# Patient Record
Sex: MALE | Race: WHITE | NOT HISPANIC OR LATINO | Employment: OTHER | URBAN - METROPOLITAN AREA
[De-identification: names, ages, dates, MRNs, and addresses within clinical notes are randomized per-mention and may not be internally consistent; named-entity substitution may affect disease eponyms.]

---

## 2018-07-11 ENCOUNTER — OFFICE VISIT (OUTPATIENT)
Dept: AUDIOLOGY | Facility: CLINIC | Age: 83
End: 2018-07-11
Payer: COMMERCIAL

## 2018-07-11 ENCOUNTER — OFFICE VISIT (OUTPATIENT)
Dept: OTOLARYNGOLOGY | Facility: CLINIC | Age: 83
End: 2018-07-11
Payer: COMMERCIAL

## 2018-07-11 VITALS
OXYGEN SATURATION: 97 % | HEIGHT: 69 IN | DIASTOLIC BLOOD PRESSURE: 75 MMHG | WEIGHT: 199 LBS | SYSTOLIC BLOOD PRESSURE: 111 MMHG | RESPIRATION RATE: 18 BRPM | BODY MASS INDEX: 29.47 KG/M2 | HEART RATE: 70 BPM | TEMPERATURE: 97.5 F

## 2018-07-11 DIAGNOSIS — Z53.9 ERRONEOUS ENCOUNTER--DISREGARD: Primary | ICD-10-CM

## 2018-07-11 DIAGNOSIS — H61.23 BILATERAL IMPACTED CERUMEN: Primary | ICD-10-CM

## 2018-07-11 PROCEDURE — 69210 REMOVE IMPACTED EAR WAX UNI: CPT | Performed by: OTOLARYNGOLOGY

## 2018-07-11 RX ORDER — ATORVASTATIN CALCIUM 10 MG/1
10 TABLET, FILM COATED ORAL DAILY
Refills: 3 | Status: ON HOLD | COMMUNITY
Start: 2018-04-30 | End: 2024-01-01

## 2018-07-11 RX ORDER — FUROSEMIDE 20 MG
20 TABLET ORAL 2 TIMES DAILY
Status: ON HOLD | COMMUNITY
Start: 2018-04-30 | End: 2024-01-01

## 2018-07-11 RX ORDER — KETOCONAZOLE 20 MG/ML
SHAMPOO TOPICAL
Status: ON HOLD | COMMUNITY
Start: 2018-04-18 | End: 2024-01-01

## 2018-07-11 RX ORDER — SACUBITRIL AND VALSARTAN 97; 103 MG/1; MG/1
1 TABLET, FILM COATED ORAL 2 TIMES DAILY
Refills: 3 | COMMUNITY
Start: 2018-06-22 | End: 2024-01-01

## 2018-07-11 RX ORDER — CARVEDILOL 6.25 MG/1
25 TABLET ORAL 2 TIMES DAILY
Refills: 3 | Status: ON HOLD | COMMUNITY
Start: 2018-06-05 | End: 2024-01-01

## 2018-07-11 RX ORDER — INFLIXIMAB 100 MG/10ML
600 INJECTION, POWDER, LYOPHILIZED, FOR SOLUTION INTRAVENOUS
Status: ON HOLD | COMMUNITY
Start: 2016-07-25 | End: 2024-01-01

## 2018-07-11 RX ORDER — ZOLPIDEM TARTRATE 6.25 MG/1
TABLET, FILM COATED, EXTENDED RELEASE ORAL PRN
Refills: 5 | Status: ON HOLD | COMMUNITY
Start: 2018-05-29 | End: 2024-01-01

## 2018-07-11 RX ORDER — WARFARIN SODIUM 6 MG/1
4-6 TABLET ORAL SEE ADMIN INSTRUCTIONS
Refills: 1 | Status: ON HOLD | COMMUNITY
Start: 2018-06-15 | End: 2024-01-01

## 2018-07-11 RX ORDER — ALPRAZOLAM 0.5 MG
TABLET ORAL PRN
Refills: 3 | Status: ON HOLD | COMMUNITY
Start: 2018-06-10 | End: 2024-01-01

## 2018-07-11 RX ORDER — MUPIROCIN 20 MG/G
OINTMENT TOPICAL PRN
Status: ON HOLD | COMMUNITY
Start: 2018-06-04 | End: 2024-01-01

## 2018-07-11 RX ORDER — ALLOPURINOL 300 MG/1
300 TABLET ORAL DAILY
Refills: 0 | COMMUNITY
Start: 2018-06-06

## 2018-07-11 RX ORDER — COLCHICINE 0.6 MG/1
0.6 TABLET, FILM COATED ORAL DAILY PRN
Refills: 0 | COMMUNITY
Start: 2018-04-13

## 2018-07-11 RX ORDER — WARFARIN SODIUM 4 MG/1
4 TABLET ORAL
Status: ON HOLD | COMMUNITY
End: 2024-01-01

## 2018-07-11 RX ORDER — SITAGLIPTIN 50 MG/1
50 TABLET, FILM COATED ORAL DAILY
Refills: 3 | COMMUNITY
Start: 2018-06-12 | End: 2022-09-02

## 2018-07-11 RX ORDER — AZATHIOPRINE 50 MG/1
50 TABLET ORAL DAILY
Refills: 0 | Status: ON HOLD | COMMUNITY
Start: 2018-06-06 | End: 2024-01-01

## 2018-07-11 NOTE — PATIENT INSTRUCTIONS
General Scheduling Information  To schedule your CT/MRI scan, please contact [place] at [phone]   [address] [place] [address]    To schedule your Surgery, please contact our Specialty Schedulers at [phone]    ENT Clinic Locations Clinic Hours Telephone Number     Tanvir Mcgregor  [address] [place] [address]   Tuesday:       8:00am -- 4:00pm    Wednesday:  8:00am - 4:00pm   To schedule an appointment with   Dr. Felix,   please contact our   Specialty Scheduling Department at:     [phone]       Tanvir Tam  [address] [place] [address]   Friday:          8:00am - 4:00pm         Urgent Care Locations Clinic Hours Telephone Numbers     Tanvir Ingram  [address] [place] [address]     Monday-Friday:     11:00pm - 9:00pm    Saturday-Sunday:  9:00am - 5:00pm   [phone]     Tanvir Tam  [address] [place] [address]     Monday-Friday:      5:00pm - 9:00pm     Saturday-Sunday:  9:00am - 5:00pm   [phone]

## 2018-07-11 NOTE — MR AVS SNAPSHOT
After Visit Summary   7/11/2018    Franklyn Sorto    MRN: [id number]           Patient Information     Date Of Birth          1935        Visit Information        Provider Department      7/11/2018 11:00 AM [facility]        Today's Diagnoses     Bilateral impacted cerumen    -  1      Care Instructions    General Scheduling Information  To schedule your CT/MRI scan, please contact [place] [facility] at [phone] [address] [place] [address]    To schedule your Surgery, please contact our Specialty Schedulers at [phone]    ENT Clinic Locations Clinic Hours Telephone Number     Savannah [facility]wood  [address]  [place] [address]   Tuesday:       8:00am -- 4:00pm    Wednesday:  8:00am - 4:00pm   To schedule an appointment with   Dr. Felix,   please contact our   Specialty Scheduling Department at:     [phone]       [facility]  [address] [address]   Friday:          8:00am - 4:00pm         Urgent Care Locations Clinic Hours Telephone Numbers     Savannah [place]  [address] [place] [address]     Monday-Friday:     11:00pm - 9:00pm    Saturday-Sunday:  9:00am - 5:00pm   [phone]     Savannah Anel  [address]   [place] [address]     Monday-Friday:      5:00pm - 9:00pm     Saturday-Sunday:  9:00am - 5:00pm   [phone]                   Follow-ups after your visit        Who to contact     If you have questions or need follow up information about today's clinic visit or your schedule please contact [facility] directly at [phone].  Normal or non-critical lab and imaging results will be communicated to you by MyChart, letter or phone within 4 business days after the clinic has received the results. If you do not hear from us within 7 days, please contact the clinic through MyChart or phone. If you have a critical or abnormal lab result, we will  "notify you by phone as soon as possible.  Submit refill requests through [employer] or call your pharmacy and they will forward the refill request to us. Please allow 3 business days for your refill to be completed.          Additional Information About Your Visit        Care EveryWhere ID     This is your Care EveryWhere ID. This could be used by other organizations to access your [place] medical records  [id number]        Your Vitals Were     Pulse Temperature Respirations Height Pulse Oximetry BMI (Body Mass Index)    70 97.5  F (36.4  C) (Oral) 18 1.753 m (5' 9\") 97% 29.39 kg/m2       Blood Pressure from Last 3 Encounters:   07/11/18 111/75    Weight from Last 3 Encounters:   07/11/18 90.3 kg (199 lb)              We Performed the Following     [facility]        Primary Care Provider Fax #    Physician No Ref-Primary [phone]       No address on file        Equal Access to Services     [facility] : Hadii maurice mondragono Sodora, waaxda luqadaha, qaybta kaalmada adedarshanayada, bryan higgins . So [facility] [phone].    ATENCIÓN: Si habla español, tiene a dejesus disposición servicios gratuitos de asistencia lingüística. Sanyaame al [phone].    We comply with applicable federal civil rights laws and [place] laws. We do not discriminate on the basis of race, color, national origin, age, disability, sex, sexual orientation, or gender identity.            Thank you!     Thank you for choosing [facility]  for your care. Our goal is always to provide you with excellent care. Hearing back from our patients is one way we can continue to improve our services. Please take a few minutes to complete the written survey that you may receive in the mail after your visit with us. Thank you!             Your Updated Medication List - Protect others around you: Learn how to safely use, store and throw away your medicines at www.disposemymeds.org.          This list is accurate as of " 7/11/18 12:46 PM.  Always use your most recent med list.                   Brand Name Dispense Instructions for use Diagnosis    allopurinol 300 MG tablet    ZYLOPRIM     Take 300 mg by mouth daily        ALPRAZolam 0.5 MG tablet    XANAX     as needed        atorvastatin 10 MG tablet    LIPITOR     Take 10 mg by mouth daily        azaTHIOprine 50 MG tablet    IMURAN     Take 50 mg by mouth daily        blood glucose monitoring test strip    no brand specified     Dispense item covered by pt ins. E11.9 NIDDM type II - Test 1 times/day        carvedilol 25 MG tablet    COREG     Take 25 mg by mouth 2 times daily        COLCRYS 0.6 MG tablet   Generic drug:  colchicine      Take 0.6 mg by mouth as needed        ENTRESTO  MG per tablet   Generic drug:  sacubitril-valsartan      Take 1 tablet by mouth 2 times daily        FISH OIL PO      Take 1 capsule by mouth daily        furosemide 20 MG tablet    LASIX     Take 20 mg by mouth 2 times daily        ICAPS AREDS 2 PO      Take 1 capsule by mouth 2 times daily        inFLIXimab 100 MG injection    REMICADE     Inject 600 mg into the vein        JANUVIA 50 MG tablet   Generic drug:  sitagliptin      Take 50 mg by mouth daily        ketoconazole 2 % shampoo    NIZORAL          mupirocin 2 % ointment    BACTROBAN     as needed        omeprazole 20 MG CR capsule    priLOSEC     Take 20 mg by mouth daily        PRANDIN PO      Take 2 mg by mouth 5 times daily        * warfarin 2 MG tablet    COUMADIN     Take 4 mg by mouth twice a week        * warfarin 4 MG tablet    COUMADIN     Take 6 mg by mouth five times a week        zolpidem 6.25 MG CR tablet    AMBIEN CR     as needed        * Notice:  This list has 2 medication(s) that are the same as other medications prescribed for you. Read the directions carefully, and ask your doctor or other care provider to review them with you.

## 2018-07-11 NOTE — LETTER
7/11/2018         RE: Franklyn Sorto  [address] [place] [address]        Dear Colleague,    Thank you for referring your patient, Franklyn Sorto, to the [facility]. Please see a copy of my visit note below.    Cerumen Removal -patient notes left ear plugging for 2 weeks after he got water in his ear in the shower.  He has had issues with cerumen impaction in the past and is needed to have them cleaned out.  He wears hearing aids intermittently.  He also admits to Q-tip usage.  He denies pain or infection.    Physical Exam and Procedure  Ears - On examination of the ears, I found that both ears were impacted with cerumen.  Therefore, I positioned the patient in the examination chair in a semi-supine position. I used the binocular surgical microscope to perform cerumen removal.  On the right side, I began by using a cerumen loop to gently lift the edges of the cerumen mass away from the walls of the external canal.  Once I did this, I was able to pull away fragments of wax and debris. I removed all the wax and debri.  The tympanic membrane was intact, no sign of perforation or middle ear effusion.    I turned my attention to the left side once again using the binocular surgical microscope to perform cerumen removal.  I began by using a cerumen loop to gently lift the edges of the cerumen mass away from the walls of the external canal.  Once I did this, I was able to pull but also suction away fragments of wax and debris. I removed all the wax and debri. The tympanic membrane was intact, no sign of perforation or middle ear effusion.    Assessment and plan -bilateral cerumen impaction.  Both ears were clean.  I advised against Q-tip usage.  He should continue his hearing aids as needed.  We also discussed the usage of Debrox and ear flushing with a bulb syringe at home.  If he is unable to clean his ears this way he can return for cerumen impaction removal.    Again,  thank you for allowing me to participate in the care of your patient.        Sincerely,        Shubham Felix MD

## 2018-07-11 NOTE — PROGRESS NOTES
Cerumen Removal -patient notes left ear plugging for 2 weeks after he got water in his ear in the shower.  He has had issues with cerumen impaction in the past and is needed to have them cleaned out.  He wears hearing aids intermittently.  He also admits to Q-tip usage.  He denies pain or infection.    Physical Exam and Procedure  Ears - On examination of the ears, I found that both ears were impacted with cerumen.  Therefore, I positioned the patient in the examination chair in a semi-supine position. I used the binocular surgical microscope to perform cerumen removal.  On the right side, I began by using a cerumen loop to gently lift the edges of the cerumen mass away from the walls of the external canal.  Once I did this, I was able to pull away fragments of wax and debris. I removed all the wax and debri.  The tympanic membrane was intact, no sign of perforation or middle ear effusion.    I turned my attention to the left side once again using the binocular surgical microscope to perform cerumen removal.  I began by using a cerumen loop to gently lift the edges of the cerumen mass away from the walls of the external canal.  Once I did this, I was able to pull but also suction away fragments of wax and debris. I removed all the wax and debri. The tympanic membrane was intact, no sign of perforation or middle ear effusion.    Assessment and plan -bilateral cerumen impaction.  Both ears were clean.  I advised against Q-tip usage.  He should continue his hearing aids as needed.  We also discussed the usage of Debrox and ear flushing with a bulb syringe at home.  If he is unable to clean his ears this way he can return for cerumen impaction removal.

## 2018-07-11 NOTE — MR AVS SNAPSHOT
After Visit Summary   7/11/2018    Franklyn Sorto    MRN: [id number]           Patient Information     Date Of Birth          1935        Visit Information        Provider Department      7/11/2018 10:30 AM Steven Blum AuD [facility]        Today's Diagnoses     ERRONEOUS ENCOUNTER--DISREGARD    -  1       Follow-ups after your visit        Who to contact     If you have questions or need follow up information about today's clinic visit or your schedule please contact [facility] directly at [phone].  Normal or non-critical lab and imaging results will be communicated to you by MyChart, letter or phone within 4 business days after the clinic has received the results. If you do not hear from us within 7 days, please contact the clinic through MyChart or phone. If you have a critical or abnormal lab result, we will notify you by phone as soon as possible.  Submit refill requests through [employer] or call your pharmacy and they will forward the refill request to us. Please allow 3 business days for your refill to be completed.          Additional Information About Your Visit        Care EveryWhere ID     This is your Care EveryWhere ID. This could be used by other organizations to access your [place] medical records  [id number]         Blood Pressure from Last 3 Encounters:   07/11/18 111/75    Weight from Last 3 Encounters:   07/11/18 199 lb (90.3 kg)              We Performed the Following     ERRONEOUS ENCOUNTER--DISREGARD        Primary Care Provider Fax #    Physician No Ref-Primary [phone]       No address on file        Equal Access to Services     YOK[facility] TOUSSAINT : Hadii maurice mondragono Sodora, waaxda luqadaha, qaybta kaalmada denizda, bryan higgins . So [facility] [phone].    ATENCIÓN: Si habla español, tiene a dejesus disposición servicios gratuitos de asistencia lingüística. Llame al [phone].    We comply with  applicable federal civil rights laws and [place] laws. We do not discriminate on the basis of race, color, national origin, age, disability, sex, sexual orientation, or gender identity.            Thank you!     [facility]  [facility]. Our goal is always to provide you with excellent care. Hearing back from our patients is one way we can continue to improve our services. Please take a few minutes to complete the written survey that you may receive in the mail after your visit with us. Thank you!             Your Updated Medication List - Protect others around you: Learn how to safely use, store and throw away your medicines at www.disposemymeds.org.          This list is accurate as of 7/11/18 11:25 AM.  Always use your most recent med list.                   Brand Name Dispense Instructions for use Diagnosis    allopurinol 300 MG tablet    ZYLOPRIM     Take 300 mg by mouth daily        ALPRAZolam 0.5 MG tablet    XANAX     as needed        atorvastatin 10 MG tablet    LIPITOR     Take 10 mg by mouth daily        azaTHIOprine 50 MG tablet    IMURAN     Take 50 mg by mouth daily        blood glucose monitoring test strip    no brand specified     Dispense item covered by pt ins. E11.9 NIDDM type II - Test 1 times/day        carvedilol 25 MG tablet    COREG     Take 25 mg by mouth 2 times daily        COLCRYS 0.6 MG tablet   Generic drug:  colchicine      Take 0.6 mg by mouth as needed        ENTRESTO  MG per tablet   Generic drug:  sacubitril-valsartan      Take 1 tablet by mouth 2 times daily        FISH OIL PO      Take 1 capsule by mouth daily        furosemide 20 MG tablet    LASIX     Take 20 mg by mouth 2 times daily        ICAPS AREDS 2 PO      Take 1 capsule by mouth 2 times daily        inFLIXimab 100 MG injection    REMICADE     Inject 600 mg into the vein        JANUVIA 50 MG tablet   Generic drug:  sitagliptin      Take 50 mg by mouth daily         ketoconazole 2 % shampoo    NIZORAL          mupirocin 2 % ointment    BACTROBAN     as needed        omeprazole 20 MG CR capsule    priLOSEC     Take 20 mg by mouth daily        PRANDIN PO      Take 2 mg by mouth 5 times daily        * warfarin 2 MG tablet    COUMADIN     Take 4 mg by mouth twice a week        * warfarin 4 MG tablet    COUMADIN     Take 6 mg by mouth five times a week        zolpidem 6.25 MG CR tablet    AMBIEN CR     as needed        * Notice:  This list has 2 medication(s) that are the same as other medications prescribed for you. Read the directions carefully, and ask your doctor or other care provider to review them with you.

## 2019-11-09 ENCOUNTER — RECORDS - HEALTHEAST (OUTPATIENT)
Dept: LAB | Facility: CLINIC | Age: 84
End: 2019-11-09

## 2019-11-09 LAB — INR PPP: 1.79 (ref 0.9–1.1)

## 2019-11-11 ENCOUNTER — RECORDS - HEALTHEAST (OUTPATIENT)
Dept: LAB | Facility: CLINIC | Age: 84
End: 2019-11-11

## 2019-11-11 LAB — INR PPP: 3 (ref 0.9–1.1)

## 2019-11-13 ENCOUNTER — RECORDS - HEALTHEAST (OUTPATIENT)
Dept: LAB | Facility: CLINIC | Age: 84
End: 2019-11-13

## 2019-11-13 LAB — INR PPP: 2.64 (ref 0.9–1.1)

## 2019-11-15 ENCOUNTER — RECORDS - HEALTHEAST (OUTPATIENT)
Dept: LAB | Facility: CLINIC | Age: 84
End: 2019-11-15

## 2019-11-15 LAB — INR PPP: 2.42 (ref 0.9–1.1)

## 2019-11-29 ENCOUNTER — RECORDS - HEALTHEAST (OUTPATIENT)
Dept: LAB | Facility: CLINIC | Age: 84
End: 2019-11-29

## 2019-11-29 LAB
ANION GAP SERPL CALCULATED.3IONS-SCNC: 7 MMOL/L (ref 5–18)
BUN SERPL-MCNC: 10 MG/DL (ref 8–28)
CALCIUM SERPL-MCNC: 8.4 MG/DL (ref 8.5–10.5)
CHLORIDE BLD-SCNC: 107 MMOL/L (ref 98–107)
CO2 SERPL-SCNC: 26 MMOL/L (ref 22–31)
CREAT SERPL-MCNC: 0.92 MG/DL (ref 0.7–1.3)
GFR SERPL CREATININE-BSD FRML MDRD: >60 ML/MIN/1.73M2
GLUCOSE BLD-MCNC: 126 MG/DL (ref 70–125)
HGB BLD-MCNC: 10.6 G/DL (ref 14–18)
POTASSIUM BLD-SCNC: 4.3 MMOL/L (ref 3.5–5)
SODIUM SERPL-SCNC: 140 MMOL/L (ref 136–145)

## 2020-12-01 ENCOUNTER — MEDICAL CORRESPONDENCE (OUTPATIENT)
Dept: HEALTH INFORMATION MANAGEMENT | Facility: CLINIC | Age: 85
End: 2020-12-01

## 2021-06-02 ENCOUNTER — RECORDS - HEALTHEAST (OUTPATIENT)
Dept: ADMINISTRATIVE | Facility: CLINIC | Age: 86
End: 2021-06-02

## 2022-06-29 NOTE — TELEPHONE ENCOUNTER
RECORDS RECEIVED FROM: Type 2 Diabetes   DATE RECEIVED: 9/2/2022   NOTES (FOR ALL VISITS) STATUS DETAILS   OFFICE NOTES from referring provider N/A    OFFICE NOTES from other specialist Care Everywhere [facility]:  3/31/22, 9/17/21 - [facility] OV with Dr. Sommer   ED NOTES N/A    OPERATIVE REPORT  (thyroid, pituitary, adrenal, parathyroid) N/A    MEDICATION LIST Care Everywhere    IMAGING      DEXASCAN N/A    MRI (BRAIN) N/A    XR (Chest) In process [facility]:  6/20/21 - XR Chest   CT (HEAD/NECK/CHEST/ABDOMEN) In process [facility]:  6/20/21 - CT Abd/Pelvis  6/20/21 - CT Cervical Spine  6/20/21 - CT Head/Brain  12/2/20 - CT Neck   NUCLEAR  N/A    ULTRASOUND (HEAD/NECK) N/A    LABS     DIABETES: HBGA1C, CREATININE, FASTING LIPIDS, MICROALBUMIN URINE, POTASSIUM, TSH, T4    THYROID: TSH, T4, CBC, THYRODLONULIN, TOTAL T3, FREE T4, CALCITONIN, CEA Care Everywhere   [facility]:  4/14/22 - BMP  3/31/22 - HBGA1C  12/21/21 - Potassium  12/17/21 - CBC  6/22/21 - Glucose  1/11/20 - CMP  11/5/19 - Creatinine  6/18/19 - Calcium

## 2022-09-02 ENCOUNTER — OFFICE VISIT (OUTPATIENT)
Dept: ENDOCRINOLOGY | Facility: CLINIC | Age: 87
End: 2022-09-02
Payer: MEDICARE

## 2022-09-02 ENCOUNTER — TELEPHONE (OUTPATIENT)
Dept: ENDOCRINOLOGY | Facility: CLINIC | Age: 87
End: 2022-09-02

## 2022-09-02 ENCOUNTER — PRE VISIT (OUTPATIENT)
Dept: ENDOCRINOLOGY | Facility: CLINIC | Age: 87
End: 2022-09-02

## 2022-09-02 VITALS
SYSTOLIC BLOOD PRESSURE: 130 MMHG | DIASTOLIC BLOOD PRESSURE: 78 MMHG | HEIGHT: 69 IN | BODY MASS INDEX: 30.14 KG/M2 | WEIGHT: 203.5 LBS | HEART RATE: 73 BPM

## 2022-09-02 DIAGNOSIS — E11.65 TYPE 2 DIABETES MELLITUS WITH HYPERGLYCEMIA, WITHOUT LONG-TERM CURRENT USE OF INSULIN (H): Primary | ICD-10-CM

## 2022-09-02 DIAGNOSIS — E11.9 TYPE 2 DIABETES MELLITUS (H): Primary | ICD-10-CM

## 2022-09-02 PROCEDURE — 99205 OFFICE O/P NEW HI 60 MIN: CPT | Performed by: INTERNAL MEDICINE

## 2022-09-02 ASSESSMENT — PAIN SCALES - GENERAL: PAINLEVEL: NO PAIN (0)

## 2022-09-02 NOTE — LETTER
Date:September 6, 2022      Patient was self referred, no letter generated. Do not send.        [facility] Health Information

## 2022-09-02 NOTE — PATIENT INSTRUCTIONS
- stop insulin Basaglar  - continue glimepiride 8 mg in the morning  - increase Jardiance to 25 mg daily -- sent to your pharmacy  - add new medication Ozempic 0.25 mg weekly for 2 weeks then increase to 0.5 mg weekly thereafter    - consider using Leni glucose sensor    - please have the eye clinic fax the report to endocrine clinic at [phone]    Return October 7 at 11:20 am    If you have any questions, please do not hesitate to call clinic line at [phone] and ask for Endocrinology clinic.  If you need to fax, please fax to clinic fax number at [phone]    After clinic hours or weekends, please contact [phone] and ask for Endocrinologist-on call      Sincerely,    Khurram Bruce MD  Endocrinology

## 2022-09-02 NOTE — PROGRESS NOTES
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    He was previously seen PCP for type 2 diabetes.     She reported having type 2 diabetes for 15 years.  His diabetes has initially been under control however A1c has trending up over the past few years.  The recent A1c July was 9.7%.  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    Current diabetes medications  Glimepiride 8 mg once daily morning  Jardiance 10 mg daily  Basaglar 14 units daily --started in July 2022    Reviewed blood glucose log, he checked blood sugar about 3-4 times a day before each meal.  Blood sugar range between  before breakfast.  Blood sugar trending up to 200-300s postprandially. He has no hypoglycemia unawareness but stated that his fasting glucose seemed to be quite tight.    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 E11.9 NIDDM type II - Test 1 times/day       carvedilol (COREG) 25 MG tablet Take 25 mg by mouth 2 times daily  3     COLCRYS 0.6 MG tablet Take 0.6 mg by mouth as needed  0     ENTRESTO  MG per tablet Take 1 tablet by mouth 2 times daily  3     furosemide (LASIX) 20 MG tablet Take 20 mg by mouth 2 times daily       inFLIXimab (REMICADE) 100 MG injection Inject 600 mg into the vein       JANUVIA 50 MG tablet Take 50 mg by mouth daily  3     ketoconazole (NIZORAL) 2 % shampoo        Multiple Vitamins-Minerals (ICAPS AREDS 2 PO) Take 1 capsule by mouth 2 times daily       mupirocin (BACTROBAN) 2 % ointment as needed       Omega-3 Fatty Acids (FISH OIL PO) Take 1 capsule by mouth daily       omeprazole (PRILOSEC) 20 MG CR capsule Take 20 mg by mouth daily  2     Repaglinide (PRANDIN PO) Take 2 mg by mouth 5 times daily       warfarin (COUMADIN) 2 MG tablet Take 4 mg by mouth twice a week       warfarin (COUMADIN) 4 MG tablet Take 6 mg by mouth five times a week   1     zolpidem (AMBIEN CR) 6.25 MG CR tablet as needed  5     Family History  Mother had type 2 diabetes, CAD and smoker  Father had CAD and stroke    Social History  Social History     Tobacco Use     Smoking status: Former Smoker     Types: Cigarettes     Quit date: 1975     Years since quittin.7     Smokeless tobacco: Never Used   Substance Use Topics     Alcohol use: Yes   no current use of smoking  Live with significant other    ROS  10 points ROS were negative otherwise mentioned in HPI      Physical Exam  /78   Pulse 73   Ht 1.74 m (5' 8.5\")   Wt 92.3 kg (203 lb 8 oz)   BMI 30.49 kg/m    There is no height or weight on file to calculate BMI.  Constitutional: no distress, comfortable, pleasant   Eyes: anicteric, normal extra-ocular movements, no lid lag or " retraction  Neck: no thyromegaly, no discrete nodule  Cardiovascular: regular rate and rhythm, normal S1 and S2, no murmurs  Respiratory: clear to auscultation, no wheezes or crackles, normal breath sounds   Gastrointestinal:  nontender, no hepatosplenomegaly, no masses   Musculoskeletal: no edema   Skin: no concerning lesions, no jaundice   Neurological: cranial nerves intact, normal gait, no tremor on outstretched hands bilaterally  Psychological: appropriate mood       RESULTS  I have personally reviewed labs and images. I also reviewed labs with patient and discussed the result and plan of care.          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    1) type 2 diabetes with peripheral neuropathy, nephropathy and CAD: long standing. Glycemic control has been worsen in the past few years. The recent A1c is 9.7% in July 2022 which was triggered to start long acting insulin. He is currently on Basaglar 14 units, glimepiride 8 mg daily and Jardiance 10 mg daily  - reviewed glucose log, he has had right fasting glucose with elevated postprandial glucoses.  - based on his age and comorbidity (cardiac and kidney), I recommend to stop insulin and start GLP-1RA. Goal for A1c would be <8%. No need for tight control.     Plan:  - stop insulin Basaglar  - continue glimepiride 8 mg in the morning  - increase Jardiance to 25 mg daily -- sent to your pharmacy  - add new medication Ozempic 0.25 mg weekly for 2 weeks then increase to 0.5 mg weekly thereafter   - consider using Leni glucose sensor    2) DM complications:  Retinopathy: eye exam annually -- he will have an upcoming appt next month  Nephropathy: CKD stage 3, positive urine microalbumin, Cr 1.39 exam 4/2022  Neuropathy: peripheral  neuropathy    PLAN:   - stop insulin Basaglar  - continue glimepiride 8 mg in the morning  - increase Jardiance to 25 mg daily -- sent to your pharmacy  - add new medication Ozempic 0.25 mg weekly for 2 weeks then increase to 0.5 mg weekly thereafter     Return October 7 at 11:20 am for glucose checked    External notes/medical records independently reviewed, labs and imaging independently reviewed, medical management and tests to be discussed/communicated to patient.    Time: I spent 60 minutes spent on the date of the encounter preparing to see patient (including chart review and preparation), obtaining and or reviewing additional medical history, performing a physical exam and evaluation, documenting clinical information in the electronic health record, independently interpreting results, communicating results to the patient and coordinating care.      Khurram Bruce MD  Division of Diabetes and Endocrinology  Department of Medicine  [phone]

## 2022-09-02 NOTE — LETTER
9/2/2022       RE: Franklyn Sorto  [address] [place] [address]     Dear Colleague,    Thank you for referring your patient, Franklyn Sorto, to the [facility] at [facility]. Please see a copy of my visit note below.    Reached out via phone to the pt on 8/30 to remind them of their visit with Dr. Bruce.  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    He was previously seen PCP for type 2 diabetes.     She reported having type 2 diabetes for 15 years.  His diabetes has initially been under control however A1c has trending up over the past few years.  The recent A1c July was 9.7%.  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    Current diabetes medications  Glimepiride 8 mg once daily morning  Jardiance 10 mg daily  Basaglar 14 units daily --started in July 2022    Reviewed blood glucose log, he checked blood sugar about 3-4 times a day before each meal.  Blood sugar range between  before breakfast.  Blood sugar trending up to 200-300s postprandially. He has no hypoglycemia unawareness but stated that his fasting glucose seemed to be quite tight.    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  and then subsequent CRT-D in 2016  underlying left bundle branch block post TAVR    Allergies  Allergies   Allergen Reactions     Hydrocodone-Acetaminophen Nausea and Vomiting     Medications  Current Outpatient Medications   Medication Sig Dispense Refill     allopurinol (ZYLOPRIM) 300 MG tablet Take 300 mg by mouth daily  0     ALPRAZolam (XANAX) 0.5 MG tablet as needed  3     atorvastatin (LIPITOR) 10 MG tablet Take 10 mg by mouth daily  3     azaTHIOprine (IMURAN) 50 MG tablet Take 50 mg by mouth daily  0     blood glucose monitoring (NO BRAND SPECIFIED) test strip Dispense item covered by pt ins. E11.9 NIDDM type II - Test 1 times/day       carvedilol (COREG) 25 MG tablet Take 25 mg by mouth 2 times daily  3     COLCRYS 0.6 MG tablet Take 0.6 mg by mouth as needed  0     ENTRESTO  MG per tablet Take 1 tablet by mouth 2 times daily  3     furosemide (LASIX) 20 MG tablet Take 20 mg by mouth 2 times daily       inFLIXimab (REMICADE) 100 MG injection Inject 600 mg into the vein       JANUVIA 50 MG tablet Take 50 mg by mouth daily  3     ketoconazole (NIZORAL) 2 % shampoo        Multiple Vitamins-Minerals (ICAPS AREDS 2 PO) Take 1 capsule by mouth 2 times daily       mupirocin (BACTROBAN) 2 % ointment as needed       Omega-3 Fatty Acids (FISH OIL PO) Take 1 capsule by mouth daily       omeprazole (PRILOSEC) 20 MG CR capsule Take 20 mg by mouth daily  2     Repaglinide (PRANDIN PO) Take 2 mg by mouth 5 times daily       warfarin (COUMADIN) 2 MG tablet Take 4 mg by mouth twice a week       warfarin (COUMADIN) 4 MG tablet Take 6 mg by mouth five times a week   1     zolpidem (AMBIEN CR) 6.25 MG CR tablet as needed  5     Family History  Mother had type 2 diabetes, CAD and smoker  Father had CAD and stroke    Social History  Social History     Tobacco Use     Smoking status: Former Smoker     Types: Cigarettes     Quit date: 1975     Years since quittin.7     Smokeless tobacco: Never Used   Substance Use  "Topics     Alcohol use: Yes   no current use of smoking  Live with significant other    ROS  10 points ROS were negative otherwise mentioned in HPI      Physical Exam  /78   Pulse 73   Ht 1.74 m (5' 8.5\")   Wt 92.3 kg (203 lb 8 oz)   BMI 30.49 kg/m    There is no height or weight on file to calculate BMI.  Constitutional: no distress, comfortable, pleasant   Eyes: anicteric, normal extra-ocular movements, no lid lag or retraction  Neck: no thyromegaly, no discrete nodule  Cardiovascular: regular rate and rhythm, normal S1 and S2, no murmurs  Respiratory: clear to auscultation, no wheezes or crackles, normal breath sounds   Gastrointestinal:  nontender, no hepatosplenomegaly, no masses   Musculoskeletal: no edema   Skin: no concerning lesions, no jaundice   Neurological: cranial nerves intact, normal gait, no tremor on outstretched hands bilaterally  Psychological: appropriate mood       RESULTS  I have personally reviewed labs and images. I also reviewed labs with patient and discussed the result and plan of care.          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    1) type 2 diabetes with peripheral neuropathy, nephropathy and CAD: long standing. Glycemic control has been worsen in the past few years. The recent A1c is 9.7% in July 2022 which was triggered to start long acting insulin. He is currently on Basaglar 14 units, glimepiride 8 mg daily and Jardiance 10 mg daily  - reviewed glucose log, he has had right fasting glucose with elevated postprandial glucoses.  - based on his age and comorbidity (cardiac and kidney), I recommend to stop insulin and start GLP-1RA. Goal for A1c would be <8%. No need for tight control.     Plan:  - stop insulin Basaglar  - continue " glimepiride 8 mg in the morning  - increase Jardiance to 25 mg daily -- sent to your pharmacy  - add new medication Ozempic 0.25 mg weekly for 2 weeks then increase to 0.5 mg weekly thereafter   - consider using Leni glucose sensor    2) DM complications:  Retinopathy: eye exam annually -- he will have an upcoming appt next month  Nephropathy: CKD stage 3, positive urine microalbumin, Cr 1.39 exam 4/2022  Neuropathy: peripheral neuropathy    PLAN:   - stop insulin Basaglar  - continue glimepiride 8 mg in the morning  - increase Jardiance to 25 mg daily -- sent to your pharmacy  - add new medication Ozempic 0.25 mg weekly for 2 weeks then increase to 0.5 mg weekly thereafter     Return October 7 at 11:20 am for glucose checked    External notes/medical records independently reviewed, labs and imaging independently reviewed, medical management and tests to be discussed/communicated to patient.    Time: I spent 60 minutes spent on the date of the encounter preparing to see patient (including chart review and preparation), obtaining and or reviewing additional medical history, performing a physical exam and evaluation, documenting clinical information in the electronic health record, independently interpreting results, communicating results to the patient and coordinating care.      Khurram Bruce MD  Division of Diabetes and Endocrinology  Department of Medicine  [phone]        Again, thank you for allowing me to participate in the care of your patient.      Sincerely,    Khurram Bruce MD

## 2022-09-02 NOTE — TELEPHONE ENCOUNTER
Patient is eligible to fill with [facility].  Patient currently filling with [facility] pharmacy, left message for patient to offer [facility].

## 2022-09-06 ENCOUNTER — TELEPHONE (OUTPATIENT)
Dept: ENDOCRINOLOGY | Facility: CLINIC | Age: 87
End: 2022-09-06

## 2022-09-06 DIAGNOSIS — E11.9 TYPE 2 DIABETES MELLITUS (H): ICD-10-CM

## 2022-09-06 NOTE — TELEPHONE ENCOUNTER
Freestyle ifrah 2 reader/ sensors sent to [facility] . I will contact patient to stop insulin when starting the Ozempic  Per Dr Paulson. Mireya Torres RN on 9/6/2022 at 10:53 AM

## 2022-09-06 NOTE — TELEPHONE ENCOUNTER
[facility] Call Center    Phone Message    May a detailed message be left on voicemail: yes     Reason for Call: Other: Pt wondering if script for Freestyle Leni Sensor and [place] can be sent to the [facility] for possibility of insurance covering it. Pt wanting to know if provider would be able to get an approval to the insurance to get it covered. Pt also wondering if he needs to completely stop taking [place] Basaglar when he starts Ozempic. Please follow up. thank you     Action Taken: Message routed to:  Other: endo    Travel Screening: Not Applicable

## 2022-09-06 NOTE — TELEPHONE ENCOUNTER
Hello!     Patients insurance requires a completed Certificate of Medical Necessity filled out prior to dispensing a Continuing Glucose Monitoring system. Below is the link to the Freestyle Leni 2 CMN form. If you could please fill out and sign the document. You can scan into the Media tab in Epic or fax the completed document to [phone].    Freestyle Leni 2 form:  [email or web address]       Thank you!    Diabetes Care Services Team  [facility]  [address] [place] [address]

## 2022-09-10 ENCOUNTER — MEDICAL CORRESPONDENCE (OUTPATIENT)
Dept: HEALTH INFORMATION MANAGEMENT | Facility: CLINIC | Age: 87
End: 2022-09-10

## 2022-09-27 ENCOUNTER — TELEPHONE (OUTPATIENT)
Dept: ENDOCRINOLOGY | Facility: CLINIC | Age: 87
End: 2022-09-27

## 2022-09-27 NOTE — TELEPHONE ENCOUNTER
"Spoke w/ Pt: has been using Ozempic \"just fine\", However, this time, he feels he wasted \"a large amount\" by attempting to \"prime the needle\". Then I Dialed up to 0.5 and pushed it in.\" Confirms he will not administer a second dose. Confirms there is still medication in the pen and has second pen in refrigerator.   Confirms Refills may be needed sooner than expected.   Would like provider to know Ozempic is \"working just great\"  Padmaja Viveros RN on 9/27/2022 at 11:41 AM       [facility] Call Center    Phone Message    May a detailed message be left on voicemail: yes     Reason for Call: Other: Patient called today concerned about his insulin dose. He stated that he pushed to test and the medicine squirted out. He would like a call back. Please follow up. Thank you.    Action Taken: Other: Endo    Travel Screening: Not Applicable                                                                      "

## 2022-09-28 NOTE — TELEPHONE ENCOUNTER
Spoke w/ Pt: confirms understanding to continue with Ozempic 1x/week.   Padmaja Viveros, RN on 9/28/2022 at 1:30 PM         RE    Call Back    Khurram Bruce MD  You 10 minutes ago (1:19 PM)     TH    Does he mean Ozempic? If so, yes to continue with Ozempic once a week.     Thanks,   Khurram      Pt stated this morning his blood sugar was 146 when it is normally 98-120s. Pt stated he would like to get the okay from care team for when he should give himself another shot.

## 2022-10-07 ENCOUNTER — OFFICE VISIT (OUTPATIENT)
Dept: ENDOCRINOLOGY | Facility: CLINIC | Age: 87
End: 2022-10-07
Payer: MEDICARE

## 2022-10-07 VITALS
DIASTOLIC BLOOD PRESSURE: 80 MMHG | BODY MASS INDEX: 29.62 KG/M2 | OXYGEN SATURATION: 96 % | WEIGHT: 197.7 LBS | HEART RATE: 78 BPM | SYSTOLIC BLOOD PRESSURE: 144 MMHG

## 2022-10-07 DIAGNOSIS — E11.59 TYPE 2 DIABETES MELLITUS WITH OTHER CIRCULATORY COMPLICATION, WITHOUT LONG-TERM CURRENT USE OF INSULIN (H): Primary | ICD-10-CM

## 2022-10-07 LAB — HBA1C MFR BLD: 7.2 % (ref 4.3–?)

## 2022-10-07 PROCEDURE — 83036 HEMOGLOBIN GLYCOSYLATED A1C: CPT | Performed by: INTERNAL MEDICINE

## 2022-10-07 PROCEDURE — 99214 OFFICE O/P EST MOD 30 MIN: CPT | Performed by: INTERNAL MEDICINE

## 2022-10-07 RX ORDER — SEMAGLUTIDE 1.34 MG/ML
1 INJECTION, SOLUTION SUBCUTANEOUS WEEKLY
Qty: 9 ML | Refills: 3 | Status: SHIPPED | OUTPATIENT
Start: 2022-10-07 | End: 2023-04-17

## 2022-10-07 ASSESSMENT — PAIN SCALES - GENERAL: PAINLEVEL: NO PAIN (0)

## 2022-10-07 NOTE — PATIENT INSTRUCTIONS
- you can continue current regimen until you run out of Ozempic 0.5 mg weekly    - after that, please do the following  - please take Ozempic 1.0 mg weekly   - reduce glimepiride to 1 pill (4 mg) daily  - continue Jardiance 25 mg daily    - call [phone] to report blood glucose around mid November    Return in 3 months    If you have any questions, please do not hesitate to call clinic line at [phone] and ask for Endocrinology clinic.  If you need to fax, please fax to clinic fax number at [phone]    After clinic hours or weekends, please contact [phone] and ask for Endocrinologist-on call      Sincerely,    Khurram Bruce MD  Endocrinology

## 2022-10-07 NOTE — PROGRESS NOTES
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    He was previously seen PCP for type 2 diabetes.     He reported having type 2 diabetes for 15 years.  His diabetes has initially been under control however A1c has trending up over the past few years.  A1c in July was 9.7%.  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    At the last visit, I stopped insulin glargine and started him on Ozempic and increased Jardiance to 25 mg daily. He is on Ozempic 0.5 mg weekly for the past month. He notices intermittent nausea and diarrhea but tolerable. He lost about 5 lbs in the few months.    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 and then subsequent CRT-D in 2016  underlying left bundle branch block post TAVR    Allergies  Allergies   Allergen Reactions     Hydrocodone-Acetaminophen Nausea and Vomiting     Medications  Current Outpatient Medications   Medication Sig Dispense Refill     allopurinol (ZYLOPRIM) 300 MG tablet Take 300 mg by  mouth daily  0     ALPRAZolam (XANAX) 0.5 MG tablet as needed  3     atorvastatin (LIPITOR) 10 MG tablet Take 10 mg by mouth daily  3     azaTHIOprine (IMURAN) 50 MG tablet Take 50 mg by mouth daily  0     blood glucose monitoring (NO BRAND SPECIFIED) test strip Dispense item covered by pt ins. E11.9 NIDDM type II - Test 1 times/day       carvedilol (COREG) 25 MG tablet Take 25 mg by mouth 2 times daily  3     COLCRYS 0.6 MG tablet Take 0.6 mg by mouth as needed  0     Continuous Blood Gluc  (FREESTYLE JULI 2 READER) CARLOS 1 each daily 1 each 1     Continuous Blood Gluc Sensor (FREESTYLE JULI 2 SENSOR) MISC 1 each every 14 days 2 each 11     empagliflozin (JARDIANCE) 25 MG TABS tablet Take 1 tablet (25 mg) by mouth daily 90 tablet 2     ENTRESTO  MG per tablet Take 1 tablet by mouth 2 times daily  3     furosemide (LASIX) 20 MG tablet Take 20 mg by mouth 2 times daily       inFLIXimab (REMICADE) 100 MG injection Inject 600 mg into the vein       ketoconazole (NIZORAL) 2 % shampoo        Multiple Vitamins-Minerals (ICAPS AREDS 2 PO) Take 1 capsule by mouth 2 times daily       mupirocin (BACTROBAN) 2 % ointment as needed       Omega-3 Fatty Acids (FISH OIL PO) Take 1 capsule by mouth daily       omeprazole (PRILOSEC) 20 MG CR capsule Take 20 mg by mouth daily  2     semaglutide (OZEMPIC) 2 MG/1.5ML SOPN pen -Start Ozempic 0.25 mg weekly for 2 weeks then increase to 0.5 mg weekly thereafter 4.5 mL 1     warfarin (COUMADIN) 2 MG tablet Take 4 mg by mouth twice a week       warfarin (COUMADIN) 4 MG tablet Take 6 mg by mouth five times a week   1     zolpidem (AMBIEN CR) 6.25 MG CR tablet as needed  5     Family History  Mother had type 2 diabetes, CAD and smoker  Father had CAD and stroke    Social History  Social History     Tobacco Use     Smoking status: Former Smoker     Types: Cigarettes     Quit date: 1975     Years since quittin.7     Smokeless tobacco: Never Used   Substance Use Topics      Alcohol use: Yes   no current use of smoking  Live with significant other    ROS  10 points ROS were negative otherwise mentioned in HPI      Physical Exam  BP (!) 144/80   Pulse 78   Wt 89.7 kg (197 lb 11.2 oz)   SpO2 96%   BMI 29.62 kg/m    Body mass index is 29.62 kg/m .  Constitutional: no distress, comfortable, pleasant   Eyes: anicteric, normal extra-ocular movements, no lid lag or retraction  Musculoskeletal: no edema   Skin: no concerning lesions, no jaundice   Neurological: cranial nerves intact, normal gait, no tremor on outstretched hands bilaterally  Psychological: appropriate mood       RESULTS  I have personally reviewed labs and images. I also reviewed labs with patient and discussed the result and plan of care.   Latest Reference Range & Units 10/07/22 11:18   Hemoglobin A1C POCT 4.3 - <5.7 % 7.2 !   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    1) type 2 diabetes with peripheral neuropathy, nephropathy and CAD: long standing. Since the last visit, I stopped insulin, increased Jardiance and started him on Ozempic. BG improved significantly. A1c improved form 9.7% to 7.2% today. He lost some weight. He is happy with the result.     - based on his age and comorbidity (cardiac and kidney), goal for A1c would be <8%. No need for tight control.     Plan:  - increase Ozempic to 1.0 mg weekly  - once Ozempic increased, reduce glimepiride to 4 mg daily  - continue Jardiance 25 mg daily  - continue to check BG fasting and before bedtime    2) DM complications:  Retinopathy: eye exam annually -- he will have an upcoming appt next month  Nephropathy: CKD stage 3, positive urine microalbumin, Cr 1.39 exam 4/2022  Neuropathy: peripheral  neuropathy    PLAN:   - increase Ozempic to 1.0 mg weekly  - once Ozempic increased, reduce glimepiride to 4 mg daily  - continue Jardiance 25 mg daily  - continue to check BG fasting and before bedtime    - update glucose log in 6 weeks    - return in 3 months    External notes/medical records independently reviewed, labs and imaging independently reviewed, medical management and tests to be discussed/communicated to patient.    Time: I spent 30 minutes spent on the date of the encounter preparing to see patient (including chart review and preparation), obtaining and or reviewing additional medical history, performing a physical exam and evaluation, documenting clinical information in the electronic health record, independently interpreting results, communicating results to the patient and coordinating care.      Khurram Bruce MD  Division of Diabetes and Endocrinology  Department of Medicine  [phone]

## 2022-10-07 NOTE — LETTER
10/7/2022       RE: Franklyn Sorto  [address] [place] [address]     Dear Colleague,    Thank you for referring your patient, Franklyn Sorto, to the [facility] [place] [facility]. Please see a copy of my visit note below.    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    He was previously seen PCP for type 2 diabetes.     He reported having type 2 diabetes for 15 years.  His diabetes has initially been under control however A1c has trending up over the past few years.  A1c in July was 9.7%.  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    At the last visit, I stopped insulin glargine and started him on Ozempic and increased Jardiance to 25 mg daily. He is on Ozempic 0.5 mg weekly for the past month. He notices intermittent nausea and diarrhea but tolerable. He lost about 5 lbs in the few months.    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  Hypertension  hyperlipidemia  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 and then subsequent CRT-D in 2016  underlying left bundle branch block post TAVR    Allergies  Allergies   Allergen Reactions     Hydrocodone-Acetaminophen Nausea and Vomiting     Medications  Current Outpatient Medications   Medication Sig Dispense Refill     allopurinol (ZYLOPRIM) 300 MG tablet Take 300 mg by mouth daily  0     ALPRAZolam (XANAX) 0.5 MG tablet as needed  3     atorvastatin (LIPITOR) 10 MG tablet Take 10 mg by mouth daily  3     azaTHIOprine (IMURAN) 50 MG tablet Take 50 mg by mouth daily  0     blood glucose monitoring (NO BRAND SPECIFIED) test strip Dispense item covered by pt ins. E11.9 NIDDM type II - Test 1 times/day       carvedilol (COREG) 25 MG tablet Take 25 mg by mouth 2 times daily  3     COLCRYS 0.6 MG tablet Take 0.6 mg by mouth as needed  0     Continuous Blood Gluc  (FREESTYLE JULI 2 READER) CARLOS 1 each daily 1 each 1     Continuous Blood Gluc Sensor (FREESTYLE JULI 2 SENSOR) MISC 1 each every 14 days 2 each 11     empagliflozin (JARDIANCE) 25 MG TABS tablet Take 1 tablet (25 mg) by mouth daily 90 tablet 2     ENTRESTO  MG per tablet Take 1 tablet by mouth 2 times daily  3     furosemide (LASIX) 20 MG tablet Take 20 mg by mouth 2 times daily       inFLIXimab (REMICADE) 100 MG injection Inject 600 mg into the vein       ketoconazole (NIZORAL) 2 % shampoo        Multiple Vitamins-Minerals (ICAPS AREDS 2 PO) Take 1 capsule by mouth 2 times daily       mupirocin (BACTROBAN) 2 % ointment as needed       Omega-3 Fatty Acids (FISH OIL PO) Take 1 capsule by mouth daily       omeprazole (PRILOSEC) 20 MG CR capsule Take 20 mg by mouth daily  2     semaglutide (OZEMPIC) 2 MG/1.5ML SOPN pen -Start Ozempic 0.25 mg weekly for 2 weeks then increase to 0.5 mg weekly thereafter 4.5 mL 1     warfarin (COUMADIN) 2 MG tablet Take 4 mg by mouth twice a week       warfarin (COUMADIN)  4 MG tablet Take 6 mg by mouth five times a week   1     zolpidem (AMBIEN CR) 6.25 MG CR tablet as needed  5     Family History  Mother had type 2 diabetes, CAD and smoker  Father had CAD and stroke    Social History  Social History     Tobacco Use     Smoking status: Former Smoker     Types: Cigarettes     Quit date: 1975     Years since quittin.7     Smokeless tobacco: Never Used   Substance Use Topics     Alcohol use: Yes   no current use of smoking  Live with significant other    ROS  10 points ROS were negative otherwise mentioned in HPI      Physical Exam  BP (!) 144/80   Pulse 78   Wt 89.7 kg (197 lb 11.2 oz)   SpO2 96%   BMI 29.62 kg/m    Body mass index is 29.62 kg/m .  Constitutional: no distress, comfortable, pleasant   Eyes: anicteric, normal extra-ocular movements, no lid lag or retraction  Musculoskeletal: no edema   Skin: no concerning lesions, no jaundice   Neurological: cranial nerves intact, normal gait, no tremor on outstretched hands bilaterally  Psychological: appropriate mood       RESULTS  I have personally reviewed labs and images. I also reviewed labs with patient and discussed the result and plan of care.   Latest Reference Range & Units 10/07/22 11:18   Hemoglobin A1C POCT 4.3 - <5.7 % 7.2 !   !: Data is abnormal          ASSESSMENT:    Franklyn is a 87 year old male with DM2, HTN, CKD with underlying mixed ischemic cardiomyopathy and severe AS status post TAVR  and then subsequent CRT-D in 2016 with underlying left bundle branch block post TAVR presents today for follow up type 2 diabetes management.    1) type 2 diabetes with peripheral neuropathy, nephropathy and CAD: long standing. Since the last visit, I stopped insulin, increased Jardiance and started him on Ozempic. BG improved significantly. A1c improved form 9.7% to 7.2% today. He lost some weight. He is happy with the result.     - based on his age and comorbidity (cardiac and kidney), goal for A1c would be <8%.  No need for tight control.     Plan:  - increase Ozempic to 1.0 mg weekly  - once Ozempic increased, reduce glimepiride to 4 mg daily  - continue Jardiance 25 mg daily  - continue to check BG fasting and before bedtime    2) DM complications:  Retinopathy: eye exam annually -- he will have an upcoming appt next month  Nephropathy: CKD stage 3, positive urine microalbumin, Cr 1.39 exam 4/2022  Neuropathy: peripheral neuropathy    PLAN:   - increase Ozempic to 1.0 mg weekly  - once Ozempic increased, reduce glimepiride to 4 mg daily  - continue Jardiance 25 mg daily  - continue to check BG fasting and before bedtime    - update glucose log in 6 weeks    - return in 3 months    External notes/medical records independently reviewed, labs and imaging independently reviewed, medical management and tests to be discussed/communicated to patient.    Time: I spent 30 minutes spent on the date of the encounter preparing to see patient (including chart review and preparation), obtaining and or reviewing additional medical history, performing a physical exam and evaluation, documenting clinical information in the electronic health record, independently interpreting results, communicating results to the patient and coordinating care.      Khurram Bruce MD  Division of Diabetes and Endocrinology  Department of Medicine  [phone]      Date Time Reading   10/7 7am 99   10/16 10am 97   10/5 11am 110   10/4 10am 138   10/3 9am 163   10/2 10am 121   10/1 9am 115             Again, thank you for allowing me to participate in the care of your patient.      Sincerely,    Khurram Bruce MD

## 2022-10-07 NOTE — PROGRESS NOTES
Date Time Reading   10/7 7am 99   10/16 10am 97   10/5 11am 110   10/4 10am 138   10/3 9am 163   10/2 10am 121   10/1 9am 115

## 2022-10-07 NOTE — LETTER
Date:October 10, 2022      Patient was self referred, no letter generated. Do not send.        [facility] Health Information

## 2022-10-21 ENCOUNTER — TELEPHONE (OUTPATIENT)
Dept: ENDOCRINOLOGY | Facility: CLINIC | Age: 87
End: 2022-10-21

## 2022-10-21 ENCOUNTER — CARE COORDINATION (OUTPATIENT)
Dept: ENDOCRINOLOGY | Facility: CLINIC | Age: 87
End: 2022-10-21

## 2022-10-21 DIAGNOSIS — E11.9 TYPE 2 DIABETES MELLITUS (H): Primary | ICD-10-CM

## 2022-10-21 RX ORDER — GLIMEPIRIDE 4 MG/1
TABLET ORAL
Qty: 135 TABLET | Refills: 1 | Status: CANCELLED | OUTPATIENT
Start: 2022-10-21

## 2022-10-21 NOTE — TELEPHONE ENCOUNTER
Khurram Bruce MD Miller, Deanne M RN  Caller: Unspecified (Today, 10:51 AM)  Please let him increase Ozempic to 1.0 mg weekly if he has no side effect.     Will start next dose with increased Ozempic dose of 1mg and notify clinic of any ill effects.   Padmaja Viveros, RN on 10/21/2022 at 3:40 PM

## 2022-10-21 NOTE — TELEPHONE ENCOUNTER
"Spoke w/ Pt: States has still been taking 8mg (2 tablets) daily   Still taking 0.5mg Ozempic. Started 0.25mg on 09/06, increased to 0.5mg 10/04   \"continue current regimen until you run out of it\"  Has not run out of it yet and consequently still on 0.5mg dose. Unsure of when he will run out and increase dose to 1mg weekly.   Provider notified.   Padmaja Viveros RN on 10/21/2022 at 11:13 AM       Khurram Bruce MD  [facility]  Please let the patient know that glucoses were up most likely from reduced dose of glimepiride to 4 mg daily. I recommend to take glimepiride 4 mg in the morning and 2 mg (1/2 pill) in the evening then.   Continue Ozempic 1.0 mg weekly.     "

## 2022-10-21 NOTE — TELEPHONE ENCOUNTER
Patient called concerned about his glucose levels.   I called and left a message requesting pt call me back with 3-4 days of glucose readings so I can send them to Dr. Paulson.  Lily Swenson

## 2022-10-21 NOTE — TELEPHONE ENCOUNTER
Amaryl    RE    RE: Pt concerned re glucose - numbers below  Received: Today  Khurram Bruce MD  [facility]  Please let the patient know that glucoses were up most likely from reduced dose of glimepiride to 4 mg daily. I recommend to take glimepiride 4 mg in the morning and 2 mg (1/2 pill) in the evening then.   Continue Ozempic 1.0 mg weekly.       lcv notes:    PLAN:   - increase Ozempic to 1.0 mg weekly  - once Ozempic increased, reduce glimepiride to 4 mg daily  - continue Jardiance 25 mg daily  - continue to check BG fasting and before bedtime

## 2022-10-21 NOTE — TELEPHONE ENCOUNTER
Date Time Reading   10/17 10 a 145   10/18 10 a 145   10/19 10 a 148   10/20 10 a 185   10/21 10 a 146     Lily Swenson    [facility] Call Center     Phone Message     May a detailed message be left on voicemail: yes      Reason for Call: Other: Pt stated he normally has blood sugar levels of around low 100s but the past few days it has been 145 and reached 185 this morning. Pt wanting to speak with care team to further discuss as he believes it may have to do with Ozempic. Please advise. Thank you      Action Taken: Message routed to:  Other: endo     Travel Screening: Not Applicable

## 2022-10-21 NOTE — TELEPHONE ENCOUNTER
----- Message from Khurram Bruce MD sent at 10/21/2022 10:18 AM CDT -----  Regarding: RE: Pt concerned re glucose - numbers below  Please let the patient know that glucoses were up most likely from reduced dose of glimepiride to 4 mg daily. I recommend to take glimepiride 4 mg in the morning and 2 mg (1/2 pill) in the evening then.  Continue Ozempic 1.0 mg weekly.    Thanks,  Khurram    ----- Message -----  From: Lily Swenson  Sent: 10/21/2022  10:03 AM CDT  To: Khurram Bruce MD  Subject: Pt concerned re glucose - numbers below          Called pt and received these numbers over the phone.    Date Time Reading  10/17 10 a 145  10/18 10 a 145  10/19 10 a 148  10/20 10 a 185  10/21 10 a 146     Lily Swenson     [facility] Call Center     Phone Message     May a detailed message be left on voicemail: yes      Reason for Call: Other: Pt stated he normally has blood sugar levels of around low 100s but the past few days it has been 145 and reached 185 this morning. Pt wanting to speak with care team to further discuss as he believes it may have to do with Ozempic. Please advise. Thank you      Action Taken: Message routed to:  Other: endo

## 2022-10-24 ENCOUNTER — TELEPHONE (OUTPATIENT)
Dept: ENDOCRINOLOGY | Facility: CLINIC | Age: 87
End: 2022-10-24

## 2022-10-24 NOTE — TELEPHONE ENCOUNTER
"----- Message from Khurram Bruce MD sent at 10/24/2022  1:12 PM CDT -----  Please clarify with him what kind of shot. I think he referred to Ozempic. He can increase Ozempic to 1.0 mg weekly.    Thanks,  Khurram    ----- Message -----  From: Mireya Torres RN  Sent: 10/24/2022   1:11 PM CDT  To: Khurram Bruce MD      ----- Message -----  From: Hadley Urias  Sent: 10/24/2022  12:41 PM CDT  To: [facility]    Hello,     Pt said last week his number were high. This week, pt says the last two days his number have been 121-131 which he defined as \"good.\" Pt stated that Dr. Paulson recommended him to take a shot when number were high but pt wants to know if he should hold his shot since his number are lower. Please advise when able.     Thank you,   -hadley         "

## 2022-10-24 NOTE — TELEPHONE ENCOUNTER
Franklyn was given the verbal to start te  Ozempic 1 mg tomorrow . Mireya Torres RN on 10/24/2022 at 4:16 PM

## 2022-10-31 DIAGNOSIS — E11.65 TYPE 2 DIABETES MELLITUS WITH HYPERGLYCEMIA, WITHOUT LONG-TERM CURRENT USE OF INSULIN (H): Primary | ICD-10-CM

## 2022-10-31 NOTE — TELEPHONE ENCOUNTER
[facility] Call Center    Phone Message    May a detailed message be left on voicemail: yes     Reason for Call: Medication Refill Request    Has the patient contacted the pharmacy for the refill? Yes   Name of medication being requested: Glimepiride   4MG 60 Tablets     Provider who prescribed the medication: Dr. Perkins     Pharmacy: [facility] #[id number] [place] [id number] AT [facility] [address]      Date medication is needed: asap           Action Taken: Other: ENDO    Travel Screening: Not Applicable

## 2022-11-01 RX ORDER — GLIMEPIRIDE 4 MG/1
4 TABLET ORAL
Qty: 90 TABLET | Refills: 1 | Status: SHIPPED | OUTPATIENT
Start: 2022-11-01 | End: 2022-11-15

## 2022-11-01 NOTE — TELEPHONE ENCOUNTER
" Centralized Medication Refill Team note:  Glimepiride   4MG 60 Tablets         glimepiride (AMARYL) 4 mg tablet  Last Written Prescription Date:  10/1/22  Last Fill Quantity: 60,   # refills: 0  Last Office Visit : 10/7/22 [place]   Future Office visit:  1/6/2023    Routing refill request to provider for review/approval because:  Drug not active on patient's medication list.  Last ordered 10/1/22 by Primary care Dr Ros Meade per chart review  10/7/22 Endocrine note:\" Plan:  - increase Ozempic to 1.0 mg weekly  - once Ozempic increased, reduce glimepiride to 4 mg daily  - continue Jardiance 25 mg daily  - continue to check BG fasting and before bedtime\"             "

## 2022-11-02 ENCOUNTER — TELEPHONE (OUTPATIENT)
Dept: ENDOCRINOLOGY | Facility: CLINIC | Age: 87
End: 2022-11-02

## 2022-11-02 NOTE — TELEPHONE ENCOUNTER
[facility] Call Center    Phone Message    May a detailed message be left on voicemail: yes     Reason for Call: Medication Question or concern regarding medication   Prescription Clarification  Name of Medication: Semaglutide, 1 MG/DOSE, (OZEMPIC, 1 MG/DOSE,) 4 MG/3ML SOPN  Prescribing Provider: Khurram Bruce MD   Pharmacy: [facility] #[id number] [address] AT [facility]   What on the order needs clarification? Patient is requesting a call back to discuss some questions that he has with using his newly higher dosage Ozempic pen. Please follow up. Thank you.          Action Taken: Other: Endo    Travel Screening: Not Applicable

## 2022-11-03 ENCOUNTER — TELEPHONE (OUTPATIENT)
Dept: ENDOCRINOLOGY | Facility: CLINIC | Age: 87
End: 2022-11-03

## 2022-11-03 NOTE — TELEPHONE ENCOUNTER
Can someone  do a  Virtual teach on Ozempic with him ? Mireya Torres RN on 11/3/2022 at 11:59 AM

## 2022-11-03 NOTE — TELEPHONE ENCOUNTER
Phone call to Franklyn:  We discussed how to use the Ozempic pen.  He may not be turning the pen all the way to the 1.0 jesus on the pen but he will do so in the future.  He feels that whatever he is doing is working because his blood sugar has improved. Dianne Agosto RN,CDE

## 2022-11-03 NOTE — TELEPHONE ENCOUNTER
"Patient states that when he dials the pen to 1 he states that the pen does not actually show a one and only shows \"a bunch of downward lines\". Patient states he needs coaching on dialing the pen. Patient states he believes that he is getting the medication but does not feel comfortable because he does not actually see a one when he dials the pen. Please call patient to discuss.   "

## 2022-11-03 NOTE — TELEPHONE ENCOUNTER
LVM to please call clinic with questions.   Padmaja Viveros RN on 11/3/2022 at 11:07 AM       RE    Prescription Clarification  Name of Medication: Semaglutide, 1 MG/DOSE, (OZEMPIC, 1 MG/DOSE,) 4 MG/3ML SOPN  Prescribing Provider: Khurram Bruce MD              Pharmacy: [facility] #[id number] [address] AT [facility]              What on the order needs clarification? Patient is requesting a call back to discuss some questions that he has with using his newly higher dosage Ozempic pen. Please follow up. Thank you.

## 2022-11-15 ENCOUNTER — TELEPHONE (OUTPATIENT)
Dept: ENDOCRINOLOGY | Facility: CLINIC | Age: 87
End: 2022-11-15

## 2022-11-15 NOTE — TELEPHONE ENCOUNTER
Spoke w/ Pt: Confirms understanding to stop glimepiride per Dr Paulson.   No questions at this time.   Padmaja Viveros, RN on 11/15/2022 at 12:04 PM         Symptoms  (Newest Message First)   Khurram Bruce MD  You Just now (12:02 PM)     TH  Please have him stop glimepiride.     ThanksKhurram

## 2022-11-15 NOTE — TELEPHONE ENCOUNTER
Spoke w/ Pt: Unable to upload blood sugars. Only has 2 fasting results to report of 73 and 76 over the past 2 days.  Tested Positive for Covid on Friday.   Reports Decreased Appetite.   No fever, no congestion. Has spoken with Primary Care triage. Called 911 and they came to Pt's home and assessed found no reason to treat. Went to Urgent Care and they found no reason to treat.    Instructed to stay hydrated and get plenty of rest,  have 3 small healthy meals and 3 small healthy snacks, record glucose results, and notify provider if lows persist or worsen.   Provider notified.   Padmaja Viveros RN on 11/15/2022 at 10:31 AM       Re       May a detailed message be left on voicemail: yes      Reason for Call: Symptoms or Concerns      If patient has red-flag symptoms, warm transfer to triage line     Current symptom or concern: low blood sugars     Symptoms have been present for:  1 week(s)     Has patient previously been seen for this? Yes        Are there any new or worsening symptoms? Yes: Patient calling requesting a return call to discuss his symptoms and blood sugars. Patient states he has not been feeling good and not eating well. Patients blood sugars have been 73-76 and patient is concerned. Please call to discuss thank you.         Action Taken: Message routed to:  [facility] [place] [place]     Travel Screening: Not Applicable                                                                                                                                                                                                                                                                                                                                                                                                                                                                                                                                                                                                                                                                                              Note      Franklyn Sorto [phone]

## 2022-11-15 NOTE — TELEPHONE ENCOUNTER
[facility] Call Center    Phone Message    May a detailed message be left on voicemail: yes     Reason for Call: Symptoms or Concerns     If patient has red-flag symptoms, warm transfer to triage line    Current symptom or concern: low blood sugars    Symptoms have been present for:  1 week(s)    Has patient previously been seen for this? Yes      Are there any new or worsening symptoms? Yes: Patient calling requesting a return call to discuss his symptoms and blood sugars. Patient states he has not been feeling good and not eating well. Patients blood sugars have been 73-76 and patient is concerned. Please call to discuss thank you.       Action Taken: Message routed to:  [facility] ([facility]): endo    Travel Screening: Not Applicable

## 2023-01-01 ENCOUNTER — TELEPHONE (OUTPATIENT)
Dept: ENDOCRINOLOGY | Facility: CLINIC | Age: 88
End: 2023-01-01
Payer: MEDICARE

## 2023-01-01 ENCOUNTER — HEALTH MAINTENANCE LETTER (OUTPATIENT)
Age: 88
End: 2023-01-01

## 2023-01-01 ENCOUNTER — OFFICE VISIT (OUTPATIENT)
Dept: ENDOCRINOLOGY | Facility: CLINIC | Age: 88
End: 2023-01-01
Payer: MEDICARE

## 2023-01-01 ENCOUNTER — LAB (OUTPATIENT)
Dept: LAB | Facility: CLINIC | Age: 88
End: 2023-01-01
Payer: MEDICARE

## 2023-01-01 VITALS
OXYGEN SATURATION: 96 % | DIASTOLIC BLOOD PRESSURE: 73 MMHG | BODY MASS INDEX: 24.88 KG/M2 | HEART RATE: 98 BPM | WEIGHT: 168 LBS | HEIGHT: 69 IN | SYSTOLIC BLOOD PRESSURE: 139 MMHG

## 2023-01-01 DIAGNOSIS — E11.65 TYPE 2 DIABETES MELLITUS WITH HYPERGLYCEMIA, WITHOUT LONG-TERM CURRENT USE OF INSULIN (H): ICD-10-CM

## 2023-01-01 DIAGNOSIS — E11.9 WELL CONTROLLED TYPE 2 DIABETES MELLITUS (H): Primary | ICD-10-CM

## 2023-01-01 DIAGNOSIS — E11.9 WELL CONTROLLED TYPE 2 DIABETES MELLITUS (H): ICD-10-CM

## 2023-01-01 LAB
ALT SERPL W P-5'-P-CCNC: 22 U/L (ref 0–70)
ANION GAP SERPL CALCULATED.3IONS-SCNC: 10 MMOL/L (ref 7–15)
BUN SERPL-MCNC: 17.1 MG/DL (ref 8–23)
CALCIUM SERPL-MCNC: 9.2 MG/DL (ref 8.8–10.2)
CHLORIDE SERPL-SCNC: 107 MMOL/L (ref 98–107)
CREAT SERPL-MCNC: 1.09 MG/DL (ref 0.67–1.17)
DEPRECATED HCO3 PLAS-SCNC: 24 MMOL/L (ref 22–29)
EGFRCR SERPLBLD CKD-EPI 2021: 66 ML/MIN/1.73M2
GLUCOSE SERPL-MCNC: 126 MG/DL (ref 70–99)
HBA1C MFR BLD: 7.3 %
POTASSIUM SERPL-SCNC: 5 MMOL/L (ref 3.4–5.3)
SODIUM SERPL-SCNC: 141 MMOL/L (ref 135–145)
T4 FREE SERPL-MCNC: 0.97 NG/DL (ref 0.9–1.7)
TSH SERPL DL<=0.005 MIU/L-ACNC: 5.01 UIU/ML (ref 0.3–4.2)

## 2023-01-01 PROCEDURE — 99213 OFFICE O/P EST LOW 20 MIN: CPT | Performed by: PHYSICIAN ASSISTANT

## 2023-01-01 PROCEDURE — 80048 BASIC METABOLIC PNL TOTAL CA: CPT | Performed by: PATHOLOGY

## 2023-01-01 PROCEDURE — 84443 ASSAY THYROID STIM HORMONE: CPT | Performed by: PATHOLOGY

## 2023-01-01 PROCEDURE — 84460 ALANINE AMINO (ALT) (SGPT): CPT | Performed by: PATHOLOGY

## 2023-01-01 PROCEDURE — 83036 HEMOGLOBIN GLYCOSYLATED A1C: CPT | Performed by: PATHOLOGY

## 2023-01-01 PROCEDURE — 36415 COLL VENOUS BLD VENIPUNCTURE: CPT | Performed by: PATHOLOGY

## 2023-01-01 PROCEDURE — 84439 ASSAY OF FREE THYROXINE: CPT | Performed by: PATHOLOGY

## 2023-01-01 RX ORDER — LANCETS
EACH MISCELLANEOUS
Qty: 200 EACH | Refills: 1 | Status: ON HOLD | OUTPATIENT
Start: 2023-01-01 | End: 2024-01-01

## 2023-01-01 ASSESSMENT — PAIN SCALES - GENERAL: PAINLEVEL: NO PAIN (0)

## 2023-01-05 ENCOUNTER — TELEPHONE (OUTPATIENT)
Dept: ENDOCRINOLOGY | Facility: CLINIC | Age: 88
End: 2023-01-05

## 2023-01-05 NOTE — PROGRESS NOTES
Outcome for 01/05/23 2:40 PM :Patient is sharing data via clinic device website and patient has been instructed to update data on website. Find login information by using .Emily Swenson

## 2023-01-05 NOTE — TELEPHONE ENCOUNTER
Outcome for 01/05/23 2:41 PM :Patient is sharing data via clinic device website and patient has been instructed to update data on website. Find login information by using .[facility]    Appointment reminder phone call made to patient.   Lily Swenson

## 2023-01-06 ENCOUNTER — OFFICE VISIT (OUTPATIENT)
Dept: ENDOCRINOLOGY | Facility: CLINIC | Age: 88
End: 2023-01-06
Payer: MEDICARE

## 2023-01-06 VITALS
WEIGHT: 185 LBS | HEART RATE: 69 BPM | DIASTOLIC BLOOD PRESSURE: 52 MMHG | BODY MASS INDEX: 27.72 KG/M2 | SYSTOLIC BLOOD PRESSURE: 109 MMHG

## 2023-01-06 DIAGNOSIS — E11.65 TYPE 2 DIABETES MELLITUS WITH HYPERGLYCEMIA, WITHOUT LONG-TERM CURRENT USE OF INSULIN (H): Primary | ICD-10-CM

## 2023-01-06 LAB — HBA1C MFR BLD: 7.1 % (ref 4.3–?)

## 2023-01-06 PROCEDURE — 83036 HEMOGLOBIN GLYCOSYLATED A1C: CPT | Performed by: INTERNAL MEDICINE

## 2023-01-06 PROCEDURE — 99215 OFFICE O/P EST HI 40 MIN: CPT | Performed by: INTERNAL MEDICINE

## 2023-01-06 RX ORDER — LANCETS
EACH MISCELLANEOUS
Qty: 200 EACH | Refills: 4 | Status: SHIPPED | OUTPATIENT
Start: 2023-01-06 | End: 2023-01-01

## 2023-01-06 NOTE — PATIENT INSTRUCTIONS
- stop glimepiride  - take Ozempic 1.0 mg weekly  - take Jardiance 25 mg daily  - check blood glucose before breakfast daily and before bedtime 2 times per week  - update blood glucose to me in 2 weeks. You can call my nurse    Return in 3 months with PA and with lab done prior to the visit   Return 6 months with MD    Please let me know if you have any questions or concerns    Thank you,  Khurram

## 2023-01-06 NOTE — LETTER
1/6/2023       RE: Franklyn Sorto  [address] [place] [address]     Dear Colleague,    Thank you for referring your patient, Franklyn Sorto, to the [facility] [place] [facility]. Please see a copy of my visit note below.    Outcome for 01/05/23 2:40 PM :Patient is sharing data via clinic device website and patient has been instructed to update data on website. 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    He was previously seen PCP for type 2 diabetes.     He reported having type 2 diabetes for 15 years.  His diabetes has initially been under control however A1c has trending up over the past few years.  A1c in July 2022 was up to 9.7%.  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    In September 2022, I stopped insulin glargine and started him on Ozempic and increased Jardiance to 25 mg daily.     He has been on Ozempic 1.0 mg weekly and Jardiance 25 mg daily. He lost about 15-18 lbs since September 2022.    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 and then subsequent CRT-D in 2016  underlying left bundle branch block post TAVR    Allergies  Allergies   Allergen  Reactions     Hydrocodone-Acetaminophen Nausea and Vomiting     Medications  Current Outpatient Medications   Medication Sig Dispense Refill     allopurinol (ZYLOPRIM) 300 MG tablet Take 300 mg by mouth daily  0     ALPRAZolam (XANAX) 0.5 MG tablet as needed  3     atorvastatin (LIPITOR) 10 MG tablet Take 10 mg by mouth daily  3     azaTHIOprine (IMURAN) 50 MG tablet Take 50 mg by mouth daily  0     blood glucose monitoring (NO BRAND SPECIFIED) test strip Dispense item covered by pt ins. E11.9 NIDDM type II - Test 1 times/day       carvedilol (COREG) 25 MG tablet Take 25 mg by mouth 2 times daily  3     COLCRYS 0.6 MG tablet Take 0.6 mg by mouth as needed  0     Continuous Blood Gluc  (FREESTYLE JULI 2 READER) CARLOS 1 each daily 1 each 1     Continuous Blood Gluc Sensor (FREESTYLE JULI 2 SENSOR) MISC 1 each every 14 days 2 each 11     empagliflozin (JARDIANCE) 25 MG TABS tablet Take 1 tablet (25 mg) by mouth daily 90 tablet 2     ENTRESTO  MG per tablet Take 1 tablet by mouth 2 times daily  3     furosemide (LASIX) 20 MG tablet Take 20 mg by mouth 2 times daily       inFLIXimab (REMICADE) 100 MG injection Inject 600 mg into the vein       ketoconazole (NIZORAL) 2 % shampoo        Multiple Vitamins-Minerals (ICAPS AREDS 2 PO) Take 1 capsule by mouth 2 times daily       mupirocin (BACTROBAN) 2 % ointment as needed       Omega-3 Fatty Acids (FISH OIL PO) Take 1 capsule by mouth daily       omeprazole (PRILOSEC) 20 MG CR capsule Take 20 mg by mouth daily  2     Semaglutide, 1 MG/DOSE, (OZEMPIC, 1 MG/DOSE,) 4 MG/3ML SOPN Inject 1 mg Subcutaneous once a week 9 mL 3     warfarin (COUMADIN) 2 MG tablet Take 4 mg by mouth twice a week       warfarin (COUMADIN) 4 MG tablet Take 6 mg by mouth five times a week   1     zolpidem (AMBIEN CR) 6.25 MG CR tablet as needed  5     Family History  Mother had type 2 diabetes, CAD and smoker  Father had CAD and stroke    Social History  Social History     Tobacco Use      Smoking status: Former     Types: Cigarettes     Quit date: 1975     Years since quittin.0     Smokeless tobacco: Never   Substance Use Topics     Alcohol use: Yes   no current use of smoking  Live with significant other    ROS  10 points ROS were negative otherwise mentioned in HPI      Physical Exam  /52 (BP Location: Right arm, Patient Position: Sitting, Cuff Size: Adult Regular)   Pulse 69   Wt 83.9 kg (185 lb)   BMI 27.72 kg/m    Body mass index is 27.72 kg/m .  Constitutional: no distress, comfortable, pleasant   Eyes: anicteric, normal extra-ocular movements, no lid lag or retraction  CVS: RRR, normal S1, S2, no murmur  Lungs: CTA B/L  Musculoskeletal: no edema   Skin: no concerning lesions, no jaundice   Neurological: cranial nerves intact, normal gait, no tremor on outstretched hands bilaterally  Psychological: appropriate mood       RESULTS  I have personally reviewed labs and images. I also reviewed labs with patient and discussed the result and plan of care.   Latest Reference Range & Units 10/07/22 11:18 23 13:25   Hemoglobin A1C POCT 4.3 - <5.7 % 7.2 ! 7.1   !: Data is abnormal            ASSESSMENT:    Franklyn is a 87 year old male with DM2, HTN, CKD with underlying mixed ischemic cardiomyopathy and severe AS status post TAVR  and then subsequent CRT-D in  with underlying left bundle branch block post TAVR presents today for follow up type 2 diabetes management.    1) type 2 diabetes with peripheral neuropathy, nephropathy and CAD: long standing.   BG improved significantly with current regiment of Ozempic and Jardiance. A1c is 7.1% today. He lost some weight. He is happy with the result.     - based on his age and comorbidity (cardiac and kidney), goal for A1c would be <8%. No need for the tight control.     Plan:  - continue Ozempic 1.0 mg weekly  - once Ozempic increased, reduce glimepiride to 4 mg daily  - continue Jardiance 25 mg daily  - continue to check BG  fasting and before bedtime    2) DM complications:  Retinopathy: eye exam annually -- he will have an upcoming appt next month  Nephropathy: CKD stage 3, positive urine microalbumin, Cr 1.39 exam 4/2022  Neuropathy: peripheral neuropathy    PLAN:   - continue Ozempic 1.0 mg weekly  - continue Jardiance 25 mg daily  - continue to check BG fasting and before bedtime    - update glucose log in 2-4 weeks    - return in 3 months with PA and fasting labs before the appointment (lipid, A1c, urine microalb, Cr)  - return in 6 months with MD    External notes/medical records independently reviewed, labs and imaging independently reviewed, medical management and tests to be discussed/communicated to patient.    Time: I spent 46 minutes spent on the date of the encounter preparing to see patient (including chart review and preparation), obtaining and or reviewing additional medical history, performing a physical exam and evaluation, documenting clinical information in the electronic health record, independently interpreting results, communicating results to the patient and coordinating care.      Khurram Bruce MD  Division of Diabetes and Endocrinology  Department of Medicine  [phone]        Again, thank you for allowing me to participate in the care of your patient.      Sincerely,    Khurram Bruce MD

## 2023-01-06 NOTE — LETTER
Date:January 9, 2023      Patient was self referred, no letter generated. Do not send.        [facility] Health Information

## 2023-01-06 NOTE — PROGRESS NOTES
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    He was previously seen PCP for type 2 diabetes.     He reported having type 2 diabetes for 15 years.  His diabetes has initially been under control however A1c has trending up over the past few years.  A1c in July 2022 was up to 9.7%.  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    In September 2022, I stopped insulin glargine and started him on Ozempic and increased Jardiance to 25 mg daily.     He has been on Ozempic 1.0 mg weekly and Jardiance 25 mg daily. He lost about 15-18 lbs since September 2022.    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 and then subsequent CRT-D in 2016  underlying left bundle branch block post TAVR    Allergies  Allergies   Allergen Reactions     Hydrocodone-Acetaminophen Nausea and Vomiting     Medications  Current Outpatient Medications   Medication Sig Dispense Refill     allopurinol (ZYLOPRIM) 300 MG tablet Take 300 mg by mouth daily  0     ALPRAZolam (XANAX) 0.5 MG tablet as needed  3     atorvastatin (LIPITOR) 10 MG tablet Take 10 mg by mouth daily  3     azaTHIOprine (IMURAN) 50 MG tablet Take 50 mg by mouth daily  0     blood glucose monitoring (NO BRAND SPECIFIED) test strip Dispense item covered by pt ins. E11.9 NIDDM type II - Test 1 times/day        carvedilol (COREG) 25 MG tablet Take 25 mg by mouth 2 times daily  3     COLCRYS 0.6 MG tablet Take 0.6 mg by mouth as needed  0     Continuous Blood Gluc  (FREESTYLE JULI 2 READER) CARLOS 1 each daily 1 each 1     Continuous Blood Gluc Sensor (FREESTYLE JULI 2 SENSOR) MISC 1 each every 14 days 2 each 11     empagliflozin (JARDIANCE) 25 MG TABS tablet Take 1 tablet (25 mg) by mouth daily 90 tablet 2     ENTRESTO  MG per tablet Take 1 tablet by mouth 2 times daily  3     furosemide (LASIX) 20 MG tablet Take 20 mg by mouth 2 times daily       inFLIXimab (REMICADE) 100 MG injection Inject 600 mg into the vein       ketoconazole (NIZORAL) 2 % shampoo        Multiple Vitamins-Minerals (ICAPS AREDS 2 PO) Take 1 capsule by mouth 2 times daily       mupirocin (BACTROBAN) 2 % ointment as needed       Omega-3 Fatty Acids (FISH OIL PO) Take 1 capsule by mouth daily       omeprazole (PRILOSEC) 20 MG CR capsule Take 20 mg by mouth daily  2     Semaglutide, 1 MG/DOSE, (OZEMPIC, 1 MG/DOSE,) 4 MG/3ML SOPN Inject 1 mg Subcutaneous once a week 9 mL 3     warfarin (COUMADIN) 2 MG tablet Take 4 mg by mouth twice a week       warfarin (COUMADIN) 4 MG tablet Take 6 mg by mouth five times a week   1     zolpidem (AMBIEN CR) 6.25 MG CR tablet as needed  5     Family History  Mother had type 2 diabetes, CAD and smoker  Father had CAD and stroke    Social History  Social History     Tobacco Use     Smoking status: Former     Types: Cigarettes     Quit date: 1975     Years since quittin.0     Smokeless tobacco: Never   Substance Use Topics     Alcohol use: Yes   no current use of smoking  Live with significant other    ROS  10 points ROS were negative otherwise mentioned in HPI      Physical Exam  /52 (BP Location: Right arm, Patient Position: Sitting, Cuff Size: Adult Regular)   Pulse 69   Wt 83.9 kg (185 lb)   BMI 27.72 kg/m    Body mass index is 27.72 kg/m .  Constitutional: no distress, comfortable,  pleasant   Eyes: anicteric, normal extra-ocular movements, no lid lag or retraction  CVS: RRR, normal S1, S2, no murmur  Lungs: CTA B/L  Musculoskeletal: no edema   Skin: no concerning lesions, no jaundice   Neurological: cranial nerves intact, normal gait, no tremor on outstretched hands bilaterally  Psychological: appropriate mood       RESULTS  I have personally reviewed labs and images. I also reviewed labs with patient and discussed the result and plan of care.   Latest Reference Range & Units 10/07/22 11:18 01/06/23 13:25   Hemoglobin A1C POCT 4.3 - <5.7 % 7.2 ! 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    1) type 2 diabetes with peripheral neuropathy, nephropathy and CAD: long standing.   BG improved significantly with current regiment of Ozempic and Jardiance. A1c is 7.1% today. He lost some weight. He is happy with the result.     - based on his age and comorbidity (cardiac and kidney), goal for A1c would be <8%. No need for the tight control.     Plan:  - continue Ozempic 1.0 mg weekly  - once Ozempic increased, reduce glimepiride to 4 mg daily  - continue Jardiance 25 mg daily  - continue to check BG fasting and before bedtime    2) DM complications:  Retinopathy: eye exam annually -- he will have an upcoming appt next month  Nephropathy: CKD stage 3, positive urine microalbumin, Cr 1.39 exam 4/2022  Neuropathy: peripheral neuropathy    PLAN:   - continue Ozempic 1.0 mg weekly  - continue Jardiance 25 mg daily  - continue to check BG fasting and before bedtime    - update glucose log in 2-4 weeks    - return in 3 months with PA and fasting labs before the appointment (lipid, A1c, urine microalb, Cr)  - return in 6 months with  MD    External notes/medical records independently reviewed, labs and imaging independently reviewed, medical management and tests to be discussed/communicated to patient.    Time: I spent 46 minutes spent on the date of the encounter preparing to see patient (including chart review and preparation), obtaining and or reviewing additional medical history, performing a physical exam and evaluation, documenting clinical information in the electronic health record, independently interpreting results, communicating results to the patient and coordinating care.      Khurram Bruce MD  Division of Diabetes and Endocrinology  Department of Medicine  [phone]

## 2023-01-11 ENCOUNTER — TELEPHONE (OUTPATIENT)
Dept: ENDOCRINOLOGY | Facility: CLINIC | Age: 88
End: 2023-01-11

## 2023-01-11 NOTE — TELEPHONE ENCOUNTER
[facility] Call Center    Phone Message    May a detailed message be left on voicemail: yes     Reason for Call: Other: Blood Pressure Concerns they want to dicuss changing the dose of patients   empagliflozin (JARDIANCE) 25 MG TABS tablet      Action Taken: Other: ENDO    Travel Screening: Not Applicable

## 2023-01-12 NOTE — TELEPHONE ENCOUNTER
"Spoke w/ Pt: Heart failure Pt and has concerns about Jardiance, entresto and carvedilol and B/P.  States: B/P \"sinks - it goes way down, ...  when I take Jardiance it goes way down, get real weak and dizzy. Told me at the heart clinic when ever you mix Jardiance with entresto you can get low\"  Average: 90/60 range  \"I didn't take the entresto in the morning but I did take Jardiance and my B/P went to 90/53. I need to know what to do\", \"I have lost alot of weight\"  Has lost 18lbs.   Asking if Jardiance should be adjusted or stopped or if alternative is preferred?   Provider notified.   Padmaja Viveros RN on 1/12/2023 at 11:36 AM     "

## 2023-01-12 NOTE — TELEPHONE ENCOUNTER
Patient calling in , he wants to let care team know that he is not going to take this medication until he hears back from care team due to his concerns about the blood pressure.

## 2023-01-13 NOTE — TELEPHONE ENCOUNTER
"Spoke w/ Pt: Pt confirms understanding of review and recommendation from Dr Paulson.   Padmaja Viveros, RN on 1/13/2023 at 1:40 PM     RE    Message  Received: Today  Khurram Bruce MD Miller, Padmaja RAMOS RN  Caller: Unspecified (2 days ago,  2:35 PM)  Great!!!! Let him continue on this regimen.       Spoke w/ Pt: Asks that we notify Dr Paulson Cardiologist adjusted entresto and carvedilol on Friday.Stopped amlodipine and halved entresto and carvedilol.   Pt held Jardiance 2 days ago and his blood sugar was over 270.    Took Jardiance again yesterday and BS back down to 130s and \"woke up this morning and everything was really good\" \"Blood pressure was really good\"   Provider notified.   Padmaja Viveros, RN on 1/13/2023 at 11:39 AM       RE    Call Back  (Newest Message First)   Khurram Bruce MD  You 14 hours ago (9:22 PM)     TH  He can stop Jardiance for now but he has to talk with his cardiologist to adjust medication entresto and carvedilol before we can add back Jardiance.        "

## 2023-01-20 ENCOUNTER — TELEPHONE (OUTPATIENT)
Dept: ENDOCRINOLOGY | Facility: CLINIC | Age: 88
End: 2023-01-20
Payer: MEDICARE

## 2023-01-20 NOTE — TELEPHONE ENCOUNTER
"LVM with review and recommendation from Dr Paulson.   Padmaja Viveros, RN on 1/23/2023 at 12:53 PM       RE    Call Back  (Newest Message First)  Khurram Bruce MD  You 3 days ago     TH  Thank you. Please have him continue current regimen.          Spoke w/ Pt: Lost 18 lbs. Confirms taking diabetic medications as ordered.     Reports the following Glucose Results: states 10:00AM is fasting at waking    Jan 20: 10AM: 140  Jan 19: \"ate first by mistake\": 164; hos 146  Jan 18: 141 at 10am   Jan 17: 124 10am   Jan 16: 129 10am Jan 15: 111 at 10am Jan 14: 123   Dagoberto 13: 113  Jan 12: 112; HOS \"ate too much before bed 227 highest ever\"  Jan 11: 131 10am   Jan 10: 104 10am   Jan 9: 120;      No questions. Provider notified.   Padmaja Viveros, RN on 1/20/2023 at 12:36 PM     RE    [facility] Call Center     Phone Message     May a detailed message be left on voicemail: yes      Reason for Call: Other: Per patient calling with to connect with nurse to update them on his  insulin and blood sugar levels. He did not wish to state the update in a message only wants to speak to a nurse he said that is what he was instructed to do.      Thank you.      Action Taken: Other: ENDO     Travel Screening: Not Applicable                                                                                                                                                                       "

## 2023-01-20 NOTE — TELEPHONE ENCOUNTER
[facility] Call Center    Phone Message    May a detailed message be left on voicemail: yes     Reason for Call: Other: Per patient calling with to connect with nurse to update them on his  insulin and blood sugar levels. He did not wish to state the update in a message only wants to speak to a nurse he said that is what he was instructed to do.     Thank you.     Action Taken: Other: ENDO    Travel Screening: Not Applicable

## 2023-01-30 ENCOUNTER — MEDICAL CORRESPONDENCE (OUTPATIENT)
Dept: HEALTH INFORMATION MANAGEMENT | Facility: CLINIC | Age: 88
End: 2023-01-30
Payer: MEDICARE

## 2023-01-31 ENCOUNTER — TELEPHONE (OUTPATIENT)
Dept: ENDOCRINOLOGY | Facility: CLINIC | Age: 88
End: 2023-01-31
Payer: MEDICARE

## 2023-04-06 ENCOUNTER — LAB (OUTPATIENT)
Dept: LAB | Facility: CLINIC | Age: 88
End: 2023-04-06
Payer: MEDICARE

## 2023-04-06 DIAGNOSIS — E11.65 TYPE 2 DIABETES MELLITUS WITH HYPERGLYCEMIA, WITHOUT LONG-TERM CURRENT USE OF INSULIN (H): ICD-10-CM

## 2023-04-06 LAB
CHOLEST SERPL-MCNC: 123 MG/DL
CREAT SERPL-MCNC: 1.28 MG/DL (ref 0.67–1.17)
CREAT UR-MCNC: 114 MG/DL
GFR SERPL CREATININE-BSD FRML MDRD: 55 ML/MIN/1.73M2
HBA1C MFR BLD: 7.6 % (ref 0–5.6)
HDLC SERPL-MCNC: 45 MG/DL
LDLC SERPL CALC-MCNC: 64 MG/DL
MICROALBUMIN UR-MCNC: 76.3 MG/L
MICROALBUMIN/CREAT UR: 66.93 MG/G CR (ref 0–17)
NONHDLC SERPL-MCNC: 78 MG/DL
TRIGL SERPL-MCNC: 70 MG/DL

## 2023-04-06 PROCEDURE — 82043 UR ALBUMIN QUANTITATIVE: CPT

## 2023-04-06 PROCEDURE — 82565 ASSAY OF CREATININE: CPT

## 2023-04-06 PROCEDURE — 83036 HEMOGLOBIN GLYCOSYLATED A1C: CPT

## 2023-04-06 PROCEDURE — 80061 LIPID PANEL: CPT

## 2023-04-06 PROCEDURE — 36415 COLL VENOUS BLD VENIPUNCTURE: CPT

## 2023-04-06 PROCEDURE — 82570 ASSAY OF URINE CREATININE: CPT

## 2023-04-13 NOTE — PROGRESS NOTES
Patient is showing 5/5 MNCM met.   Lily Swenson, MEÑO   Outcome for 04/14/23 12:27 PM :Reached patient but they declined to share any data because they will bring meter to appt.   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 He usually sees Dr. Paulson.  Today is the first time I am meeting him. He reported having type 2 diabetes for over 15 years, but pre-diabetes since the 90's.  His diabetes has initially been under control, however A1c has been trending up over the past few years.  A1c in July 2022 was up to 9.7%, now down to 7.6%.  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  In September 2022, Dr. Paulson stopped insulin glargine and started him on Ozempic and increased Jardiance to 25 mg daily. He has been doing well on this combination. He does feel like he has been snacking more at night.       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  No side effects.   - take Jardiance 25 mg daily  - check blood glucose before breakfast daily and before bedtime 2 times per week    Diabetes Control:   Lab Results   Component Value Date    A1C 7.6 04/06/2023    A1C 6.4 03/24/2015    A1C 6.1 08/23/2013    A1C 6.4 09/19/2011    A1C  6.6 2011       No past medical history on file.    No past surgical history on file.    No family history on file.    Social History   Social Hx: .   Retired .  Worked for [employer]  [employer].   Socioeconomic History     Marital status:    Tobacco Use     Smoking status: Former     Types: Cigarettes     Quit date: 1975     Years since quittin.3     Smokeless tobacco: Never   Substance and Sexual Activity     Alcohol use: Yes       Current Outpatient Medications   Medication     allopurinol (ZYLOPRIM) 300 MG tablet     ALPRAZolam (XANAX) 0.5 MG tablet     atorvastatin (LIPITOR) 10 MG tablet     azaTHIOprine (IMURAN) 50 MG tablet     blood glucose monitoring (NO BRAND SPECIFIED) test strip     blood glucose monitoring (ONE TOUCH ULTRASOFT) lancets     carvedilol (COREG) 25 MG tablet     COLCRYS 0.6 MG tablet     empagliflozin (JARDIANCE) 25 MG TABS tablet     ENTRESTO  MG per tablet     furosemide (LASIX) 20 MG tablet     inFLIXimab (REMICADE) 100 MG injection     ketoconazole (NIZORAL) 2 % shampoo     Multiple Vitamins-Minerals (ICAPS AREDS 2 PO)     mupirocin (BACTROBAN) 2 % ointment     Omega-3 Fatty Acids (FISH OIL PO)     omeprazole (PRILOSEC) 20 MG CR capsule     Semaglutide, 1 MG/DOSE, (OZEMPIC, 1 MG/DOSE,) 4 MG/3ML SOPN     warfarin (COUMADIN) 2 MG tablet     warfarin (COUMADIN) 4 MG tablet     zolpidem (AMBIEN CR) 6.25 MG CR tablet     No current facility-administered medications for this visit.          Allergies   Allergen Reactions     Hydrocodone-Acetaminophen Nausea and Vomiting       Physical Exam  /70 (BP Location: Right arm, Patient Position: Sitting, Cuff Size: Adult Regular)   Pulse 83   Wt 81.2 kg (179 lb)   SpO2 95%   BMI 26.82 kg/m    GENERAL:  Alert and oriented X3, NAD, well dressed, answering questions appropriately, appears stated age.  EXTREMITIES: trace edema, +pulses, no rashes, no lesions  NEUROLOGY: reduced  Monofilament bilaterally,  + DTR upper and lower extremity    RESULTS  Lab Results   Component Value Date    A1C 7.6 (H) 04/06/2023    A1C 6.4 (H) 03/24/2015    A1C 6.1 08/23/2013    A1C 6.4 (H) 09/19/2011    A1C 6.6 (H) 01/11/2011    HEMOGLOBINA1 7.1 01/06/2023    HEMOGLOBINA1 7.2 (A) 10/07/2022       No results found for: TSH, T4    No results found for: ALT]    Recent Labs   Lab Test 04/06/23  1044   CHOL 123   HDL 45   LDL 64   TRIG 70       Lab Results   Component Value Date     11/29/2019      Lab Results   Component Value Date    POTASSIUM 4.3 11/29/2019     Lab Results   Component Value Date    CHLORIDE 107 11/29/2019     Lab Results   Component Value Date    JÚNIOR 8.4 11/29/2019     Lab Results   Component Value Date    CO2 26 11/29/2019     Lab Results   Component Value Date    BUN 10 11/29/2019     Lab Results   Component Value Date    CR 1.28 04/06/2023       GFR Estimate   Date Value Ref Range Status   04/06/2023 55 (L) >60 mL/min/1.73m2 Final     Comment:     eGFR calculated using 2021 CKD-EPI equation.   11/29/2019 >60 >60 mL/min/1.73m2 Final     GFR Estimate If Black   Date Value Ref Range Status   11/29/2019 >60 >60 mL/min/1.73m2 Final       Lab Results   Component Value Date    MICROL 76.3 04/06/2023     No results found for: MICROALBUMIN  No results found for: CPEPT, GADAB, ISCAB    No results found for: B12]    Most recent eye exam date: : Not Found     Assessment/Plan:     1.  Type 2 diabetes- Doing very well.  A1c 7.6% (goal <8% given his age) and glucose is stable.  Could consider higher dose ozempic (or low dose metformin) in the future, but for now, he will focus on snacking less at night.    2.  Risk factors-     Retinopathy:Had eye exam in the past year.   Nephropathy:  BP well controlled.+ microalbuminuria.  On SGLT-2 inhibitor and entresto.  Creatinine stable.   Neuropathy: No.    Feet: OK, no ulcers.   Lipids:  LDL at target.  Patient taking statin    3.  F/U in 3 mos with Dr. Paulson, 6 mos with me, sooner  with concerns.     I spent 65 minutes with this patient face to face and explained the conditions and plans (more than 50% of time was counseling/coordination of care, diabetes management, follow up plan for worsening hyper and hypoglycemia) . The patient understood and is satisfied with today's visit.      Merly Gutiérrez PA-C, MPAS   [facility]  Department of Medicine  Division of Endocrinology and Diabetes

## 2023-04-14 ENCOUNTER — TELEPHONE (OUTPATIENT)
Dept: ENDOCRINOLOGY | Facility: CLINIC | Age: 88
End: 2023-04-14
Payer: MEDICARE

## 2023-04-14 NOTE — TELEPHONE ENCOUNTER
Appointment reminder phone call made to patient.   Sent patient mychart activation code.  Lily Swenson

## 2023-04-17 ENCOUNTER — OFFICE VISIT (OUTPATIENT)
Dept: ENDOCRINOLOGY | Facility: CLINIC | Age: 88
End: 2023-04-17
Payer: MEDICARE

## 2023-04-17 VITALS
WEIGHT: 179 LBS | HEART RATE: 83 BPM | OXYGEN SATURATION: 95 % | BODY MASS INDEX: 26.82 KG/M2 | DIASTOLIC BLOOD PRESSURE: 70 MMHG | SYSTOLIC BLOOD PRESSURE: 125 MMHG

## 2023-04-17 DIAGNOSIS — E11.65 TYPE 2 DIABETES MELLITUS WITH HYPERGLYCEMIA, WITHOUT LONG-TERM CURRENT USE OF INSULIN (H): Primary | ICD-10-CM

## 2023-04-17 PROBLEM — E11.9 DIABETES MELLITUS, TYPE 2 (H): Status: ACTIVE | Noted: 2023-04-17

## 2023-04-17 PROCEDURE — 99215 OFFICE O/P EST HI 40 MIN: CPT | Performed by: PHYSICIAN ASSISTANT

## 2023-04-17 RX ORDER — SEMAGLUTIDE 1.34 MG/ML
1 INJECTION, SOLUTION SUBCUTANEOUS WEEKLY
Qty: 9 ML | Refills: 3 | Status: SHIPPED | OUTPATIENT
Start: 2023-04-17 | End: 2024-01-01

## 2023-04-17 ASSESSMENT — PAIN SCALES - GENERAL: PAINLEVEL: NO PAIN (0)

## 2023-04-17 NOTE — PATIENT INSTRUCTIONS
Keep up the great work!    Please let me know if you are having low blood sugars less than 70 or over 250 mg/dL.      If you have concerns, please send me a [employer] message M-Th, call the clinic at [phone], or call [phone] after hours/weekends and ask to speak with the endocrinologist on call.

## 2023-04-17 NOTE — LETTER
4/17/2023       RE: Franklyn Sorto  [address] [place] [address]     Dear Colleague,    Thank you for referring your patient, Franklyn Sorto, to the [facility] ENDO[facility]INIC [place] at [facility]. Please see a copy of my visit note below.    Patient is showing 5/5 MNCM met.   Lily Swenson, VF   Outcome for 04/14/23 12:27 PM :Reached patient but they declined to share any data because they will bring meter to appt.   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 He usually sees Dr. Paulson.  Today is the first time I am meeting him. He reported having type 2 diabetes for over 15 years, but pre-diabetes since the 90's.  His diabetes has initially been under control, however A1c has been trending up over the past few years.  A1c in July 2022 was up to 9.7%, now down to 7.6%.  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  In September 2022, Dr. Paulson stopped insulin glargine and started him on Ozempic and increased Jardiance to 25 mg daily. He has been doing well on this combination. He does feel like he has been snacking more at night.       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  No side effects.   - take Jardiance 25 mg daily  - check blood glucose before breakfast daily and before bedtime 2 times per week    Diabetes Control:   Lab Results   Component Value Date    A1C 7.6 2023    A1C 6.4 2015    A1C 6.1 2013    A1C 6.4 2011    A1C 6.6 2011       No past medical history on file.    No past surgical history on file.    No family history on file.    Social History   Social Hx: .   Retired .  Worked for [employer]  [employer].   Socioeconomic History    Marital status:    Tobacco Use    Smoking status: Former     Types: Cigarettes     Quit date: 1975     Years since quittin.3    Smokeless tobacco: Never   Substance and Sexual Activity    Alcohol use: Yes       Current Outpatient Medications   Medication    allopurinol (ZYLOPRIM) 300 MG tablet    ALPRAZolam (XANAX) 0.5 MG tablet    atorvastatin (LIPITOR) 10 MG tablet    azaTHIOprine (IMURAN) 50 MG tablet    blood glucose monitoring (NO BRAND SPECIFIED) test strip    blood glucose monitoring (ONE TOUCH ULTRASOFT) lancets    carvedilol (COREG) 25 MG tablet    COLCRYS 0.6 MG tablet    empagliflozin (JARDIANCE) 25 MG TABS tablet    ENTRESTO  MG per tablet    furosemide (LASIX) 20 MG tablet    inFLIXimab (REMICADE) 100 MG injection    ketoconazole (NIZORAL) 2 % shampoo    Multiple Vitamins-Minerals (ICAPS AREDS 2 PO)    mupirocin (BACTROBAN) 2 % ointment    Omega-3 Fatty Acids (FISH OIL PO)    omeprazole (PRILOSEC) 20 MG CR capsule    Semaglutide, 1 MG/DOSE, (OZEMPIC, 1 MG/DOSE,) 4 MG/3ML SOPN    warfarin (COUMADIN) 2 MG tablet    warfarin (COUMADIN) 4 MG tablet    zolpidem (AMBIEN CR) 6.25 MG CR tablet     No current facility-administered medications for this visit.          Allergies   Allergen Reactions    Hydrocodone-Acetaminophen Nausea and Vomiting       Physical Exam  /70 (BP Location: Right arm, Patient Position:  Sitting, Cuff Size: Adult Regular)   Pulse 83   Wt 81.2 kg (179 lb)   SpO2 95%   BMI 26.82 kg/m    GENERAL:  Alert and oriented X3, NAD, well dressed, answering questions appropriately, appears stated age.  EXTREMITIES: trace edema, +pulses, no rashes, no lesions  NEUROLOGY: reduced  Monofilament bilaterally, + DTR upper and lower extremity    RESULTS  Lab Results   Component Value Date    A1C 7.6 (H) 04/06/2023    A1C 6.4 (H) 03/24/2015    A1C 6.1 08/23/2013    A1C 6.4 (H) 09/19/2011    A1C 6.6 (H) 01/11/2011    HEMOGLOBINA1 7.1 01/06/2023    HEMOGLOBINA1 7.2 (A) 10/07/2022       No results found for: TSH, T4    No results found for: ALT]    Recent Labs   Lab Test 04/06/23  1044   CHOL 123   HDL 45   LDL 64   TRIG 70       Lab Results   Component Value Date     11/29/2019      Lab Results   Component Value Date    POTASSIUM 4.3 11/29/2019     Lab Results   Component Value Date    CHLORIDE 107 11/29/2019     Lab Results   Component Value Date    JÚNIOR 8.4 11/29/2019     Lab Results   Component Value Date    CO2 26 11/29/2019     Lab Results   Component Value Date    BUN 10 11/29/2019     Lab Results   Component Value Date    CR 1.28 04/06/2023       GFR Estimate   Date Value Ref Range Status   04/06/2023 55 (L) >60 mL/min/1.73m2 Final     Comment:     eGFR calculated using 2021 CKD-EPI equation.   11/29/2019 >60 >60 mL/min/1.73m2 Final     GFR Estimate If Black   Date Value Ref Range Status   11/29/2019 >60 >60 mL/min/1.73m2 Final       Lab Results   Component Value Date    MICROL 76.3 04/06/2023     No results found for: MICROALBUMIN  No results found for: CPEPT, GADAB, ISCAB    No results found for: B12]    Most recent eye exam date: : Not Found     Assessment/Plan:     1.  Type 2 diabetes- Doing very well.  A1c 7.6% (goal <8% given his age) and glucose is stable.  Could consider higher dose ozempic (or low dose metformin) in the future, but for now, he will focus on snacking less at night.    2.  Risk  factors-     Retinopathy:Had eye exam in the past year.   Nephropathy:  BP well controlled.+ microalbuminuria.  On SGLT-2 inhibitor and entresto.  Creatinine stable.   Neuropathy: No.    Feet: OK, no ulcers.   Lipids:  LDL at target.  Patient taking statin    3.  F/U in 3 mos with Dr. Paulson, 6 mos with me, sooner with concerns.     I spent 65 minutes with this patient face to face and explained the conditions and plans (more than 50% of time was counseling/coordination of care, diabetes management, follow up plan for worsening hyper and hypoglycemia) . The patient understood and is satisfied with today's visit.      Merly Gutiérrez PA-C, MPAS   [facility]  Department of Medicine  Division of Endocrinology and Diabetes

## 2023-04-18 ENCOUNTER — TELEPHONE (OUTPATIENT)
Dept: ENDOCRINOLOGY | Facility: CLINIC | Age: 88
End: 2023-04-18

## 2023-04-18 NOTE — TELEPHONE ENCOUNTER
" Health Call Center    Phone Message    May a detailed message be left on voicemail: yes     Reason for Call: Other: Per pt insurance currently has him in a \"donunt\", they dont fully cover the Semaglutide, 1 MG/DOSE, (OZEMPIC, 1 MG/DOSE,) 4 MG/3ML pen. Per pt would have to pay out of pocket $770 for 3 months or $220 for 1 month for the medication. Per pt is doing great with the medication and would like to continue with it. Per pt is wondering if the team would have a alternative or some kind of help for theses medication coverages. Please call pt abck to discuss. Thank you!     Action Taken: Message routed to:  [facility] END[facility]    Travel Screening: Not Applicable                                                                        "

## 2023-04-18 NOTE — TELEPHONE ENCOUNTER
Called and spoke to Franklyn. He is eligible for the [employer] Patient Assistance Program for his Ozempic. I went over the program details and application with patient via phone. I am mailing the patient application to patient per request. I will be mailing that today.    I will work on provider form tomorrow and get it sent to care team for provider to fill out and sign.    Thank you,     Jasiel Leija, [facility]  Pharmacy Clinic [facility]  [email or web address]   Phone: [phone]  Fax: [phone]

## 2023-04-19 NOTE — TELEPHONE ENCOUNTER
"Provider form sent to Mireya Torres and Padmaja Viveros via e-mail. I have also uploaded it under the media tab. Dr. Gutiérrez wrote a script for Ozempic most recently so the provider form is under her name but I can change it to Dr. Baez if preferred.    Please note that the Michelle [employer] program will only accept \"120 days\" or \"4 months\" written in the QTY column on the application.     Thank you,     Jasiel Leija [facility]  Pharmacy Clinic [facility]  [email or web address]   Phone: [phone]  Fax: [phone]  "

## 2023-04-26 NOTE — TELEPHONE ENCOUNTER
Franklyn called and LM asking if I could return his call to help him fill out his application. I gave him a call back. He was just wondering where to sign and about the proof of income needed. He will be finishing his application shortly and will be submitting it [employer] via mail. Will check back in around 5/2 once I receive the provider form.     Thank you,     Jasiel Leija, [facility]  Pharmacy Clinic [facility]  [email or web address]   Phone: [phone]  Fax: [phone]

## 2023-04-26 NOTE — TELEPHONE ENCOUNTER
I have the forms  In clinic for the PAP Ozempic. Merly Gutiérrez will be  here Tuesday 5/2/23 and we will have her sign it then. It needs the  wet signature Mireya Torres RN on 4/26/2023 at 9:24 AM

## 2023-05-02 NOTE — TELEPHONE ENCOUNTER
I sent  It to Merly Gutiérrez  But neither her or Khurram have been in clinic to  Sign.Mireya Torres RN on 5/2/2023 at 12:44 PM

## 2023-05-02 NOTE — TELEPHONE ENCOUNTER
Franklyn called and LM about taking his last shot and he can't afford the $250 for his next box of Ozempic. If I am able to submit the provider application within the next few days I can try to obtain a free drug voucher from [employer] but he has to be accepted into the program first.    When provider form is complete, please send to me via e-mail as soon as possible so I can submit to [employer].    Thank you,     Jasiel Leija, [facility]  Pharmacy Clinic [facility]  [email or web address]   Phone: [phone]  Fax: [phone]

## 2023-05-04 NOTE — TELEPHONE ENCOUNTER
Free Drug Application Initiated  Medication:  Ozempic   Sponsor: [employer]  Phone #: [phone]  Fax #: [phone]  Additional Information: Submitted provider portion. Will f/u today to see if they can process right away so I can obtain a free drug voucher for patient.    Thank you,     Jasiel Leija [facility]  Pharmacy Clinic [facility]  [email or web address]   Phone: [phone]  Fax: [phone]

## 2023-05-04 NOTE — TELEPHONE ENCOUNTER
Called [employer] at [phone]. Spoke to Anju. She states that they only received page 1 of 2 of the 3770 form for the income verification. Called and spoke to Franklyn. Franklyn is currently hospitalized but said his wife may be able to run up to [employer] to fax the income verification tonight. This is time sensitive as the Ozempic is due by Tuesday and he cannot afford the $250 bill.     Thank you,     Jasiel Leija, [facility]  Pharmacy Clinic [facility]  [email or web address]   Phone: [phone]  Fax: [phone]

## 2023-05-05 NOTE — TELEPHONE ENCOUNTER
Free Drug Application Approved  Medication: Ozempic   Effective Dates: 5/5/23-12/31/23   Patient notified: Y  Additional Information:  Application approved. Free drug voucher information received.  Provided information to pharmacy. Pharmacist verified that the free voucher worked and said that he would have to order it in for Monday. Called and relayed information to Franklyn. He will contact me if anything further is needed. [employer] is working on the order and it should be delivered to the providers office within 10-14 business days.    RX BIN: [id number]  RX PCN: [id number]  RX ID: [id number]  RX GRP: [id number]    Thank you,     Jasiel Leija, [facility]  Pharmacy Clinic [facility]  [email or web address]   Phone: [phone]  Fax: [phone]

## 2023-05-18 NOTE — TELEPHONE ENCOUNTER
Franklyn called and LM to ask if the Michelle Nordisk program had yearly renewals. Called back and LM letting him know that it is a yearly renewal program and that I would be giving him a call again around 10/15/23 to start the PAP process for the 2024 year.    Thank you,     Jasiel Leija, [facility]  Pharmacy Clinic [facility]  [email or web address]   Phone: [phone]  Fax: [phone]

## 2023-06-16 ENCOUNTER — TELEPHONE (OUTPATIENT)
Dept: ENDOCRINOLOGY | Facility: CLINIC | Age: 88
End: 2023-06-16
Payer: MEDICARE

## 2023-06-21 ENCOUNTER — TELEPHONE (OUTPATIENT)
Dept: ENDOCRINOLOGY | Facility: CLINIC | Age: 88
End: 2023-06-21
Payer: MEDICARE

## 2023-06-21 NOTE — TELEPHONE ENCOUNTER
[facility] Call Center    Phone Message    May a detailed message be left on voicemail: yes     Reason for Call: Medication Question or concern regarding medication   Prescription Clarification    Name of Medication:   * Semaglutide, 1 MG/DOSE, (OZEMPIC, 1 MG/DOSE,) 4 MG/3ML pen    Prescribing Provider: Khurram     [id number] [address] AT [facility] [address]     What on the order needs clarification? Per Patient states he was in the [facility]  for [facility]. Patient states he was not able to take the medication 6/20/2023. Patient is wondering if he can take the medication today when he gets home from the hospital and resume taking the medication every Tuesday like he has been doing. Patient is wanting to get a call back at confidential line and able to leave a detailed message if not answered. Please advise.       Action Taken: Message routed to:  [facility] Endo    Travel Screening: Not Applicable

## 2023-06-21 NOTE — TELEPHONE ENCOUNTER
Franklyn notified to do his injection tonight per Khurram. Mireya Torres RN on 6/21/2023 at 4:42 PM

## 2023-06-21 NOTE — TELEPHONE ENCOUNTER
Per Patient is calling back stating he never received a call. Patient stats he has a new pace maker put in yesterday 6/20/2023. Patient was not able to take the medication Ozempic on the day of surgery. Patient is wanting to know if he is able to take the medication today and then resume taking the medication every Tuesday starting next week. Please advise.

## 2023-06-29 NOTE — PROGRESS NOTES
Patient is showing 5/5 MNCM met.   Lily Swenson, VF  Outcome for 07/03/23 2:27 PM :Sent patient [employer] message asking them to upload their BG data   Lily Swenson  Outcome for 07/06/23 10:38 AM :Glucose data obtained via Phone and Located in Table Below    Lily Swenson    Date Time Reading   7/6 10:40 a 173   7/5 8 a 141   7/4 10 a 150   7/3 10 a 159   7/2 10 a 182 after breakfast   7/1 10 a 147   6/30 9 a 151   6/29 10 a 163  after breakfast   6/28 10 a 139   6/27 10 a 130   6/26 8 a 139   6/25 9 a 159   6/24 11 a 175   6/23 10 a 128   6/22 11 a 118

## 2023-07-07 ENCOUNTER — OFFICE VISIT (OUTPATIENT)
Dept: ENDOCRINOLOGY | Facility: CLINIC | Age: 88
End: 2023-07-07
Payer: MEDICARE

## 2023-07-07 VITALS
HEART RATE: 88 BPM | SYSTOLIC BLOOD PRESSURE: 108 MMHG | DIASTOLIC BLOOD PRESSURE: 50 MMHG | WEIGHT: 164 LBS | BODY MASS INDEX: 24.57 KG/M2

## 2023-07-07 DIAGNOSIS — E11.65 TYPE 2 DIABETES MELLITUS WITH HYPERGLYCEMIA, WITHOUT LONG-TERM CURRENT USE OF INSULIN (H): Primary | ICD-10-CM

## 2023-07-07 PROCEDURE — 99213 OFFICE O/P EST LOW 20 MIN: CPT | Performed by: INTERNAL MEDICINE

## 2023-07-07 ASSESSMENT — PAIN SCALES - GENERAL: PAINLEVEL: NO PAIN (0)

## 2023-07-07 NOTE — PATIENT INSTRUCTIONS
Continue ozempic 1.0 mg weekly  Continue Jardiance 25 mg daily    Continue eating healthy and walking    Return to see Merly Gutiérrez in 3 months  Return to see MD in 6 months    Please let me know if you have any questions or concerns    Thank you,  Khurram

## 2023-07-07 NOTE — LETTER
7/7/2023       RE: Franklyn Sorto  [address] [place] [address]     Dear Colleague,    Thank you for referring your patient, Franklyn Sorto, to the [facility] [place] at [facility]. Please see a copy of my visit note below.    Patient is showing 5/5 MNCM met.   Lily Swenson, VF  Outcome for 07/03/23 2:27 PM :Sent patient [employer] message asking them to upload their BG data   Lily Swenson  Outcome for 07/06/23 10:38 AM :Glucose data obtained via Phone and Located in Table Below    Lily Swenson    Date Time Reading   7/6 10:40 a 173   7/5 8 a 141   7/4 10 a 150   7/3 10 a 159   7/2 10 a 182 after breakfast   7/1 10 a 147   6/30 9 a 151   6/29 10 a 163  after breakfast   6/28 10 a 139   6/27 10 a 130   6/26 8 a 139   6/25 9 a 159   6/24 11 a 175   6/23 10 a 128   6/22 11 a 118                     Endocrinology Note         Franklyn is a 87 year old male presents today for type 2 diabetes management    HPI  Franklyn is a 87 year old male with CAD s/p PCI, DM2, HTN, CKD with 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    He reported having type 2 diabetes for 15 years.  His diabetes has initially been under control however A1c has trending up over the past few years. 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    In September 2022, I stopped insulin glargine and started him on Ozempic and increased Jardiance to 25 mg daily.     Interim history  He did have pacemaker placement and stents placement in the past couple of months. He recovered well.    He has been on Ozempic 1.0 mg weekly and Jardiance 25 mg daily. He lost about 40 lbs since September 2022. He would like to keep his weight around 160s. He does not want to lose more weight.      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  Hypertension  hyperlipidemia  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 and then subsequent CRT-D in 2016  underlying left bundle branch block post TAVR    Allergies  Allergies   Allergen Reactions     Hydrocodone-Acetaminophen Nausea and Vomiting     Medications  Current Outpatient Medications   Medication Sig Dispense Refill     allopurinol (ZYLOPRIM) 300 MG tablet Take 300 mg by mouth daily  0     ALPRAZolam (XANAX) 0.5 MG tablet as needed  3     atorvastatin (LIPITOR) 10 MG tablet Take 10 mg by mouth daily  3     azaTHIOprine (IMURAN) 50 MG tablet Take 50 mg by mouth daily  0     blood glucose monitoring (NO BRAND SPECIFIED) test strip Dispense item covered by pt ins. E11.9 NIDDM type II - Test 1 times/day       blood glucose monitoring (ONE TOUCH ULTRASOFT) lancets Use to test blood sugar 2 times daily or as directed. 200 each 4     carvedilol (COREG) 25 MG tablet Take 25 mg by mouth 2 times daily  3     empagliflozin (JARDIANCE) 25 MG TABS tablet Take 1 tablet (25 mg) by mouth daily 90 tablet 3     ENTRESTO  MG per tablet Take 1 tablet by mouth 2 times daily  3     furosemide (LASIX) 20 MG tablet Take 20 mg by mouth 2 times daily       inFLIXimab (REMICADE) 100 MG injection Inject 600 mg into the vein       ketoconazole (NIZORAL) 2 % shampoo        Multiple Vitamins-Minerals (ICAPS  AREDS 2 PO) Take 1 capsule by mouth 2 times daily       mupirocin (BACTROBAN) 2 % ointment as needed       Omega-3 Fatty Acids (FISH OIL PO) Take 1 capsule by mouth daily       omeprazole (PRILOSEC) 20 MG CR capsule Take 20 mg by mouth daily  2     Semaglutide, 1 MG/DOSE, (OZEMPIC, 1 MG/DOSE,) 4 MG/3ML pen Inject 1 mg Subcutaneous once a week 9 mL 3     warfarin (COUMADIN) 2 MG tablet Take 4 mg by mouth twice a week       warfarin (COUMADIN) 4 MG tablet Take 6 mg by mouth five times a week   1     zolpidem (AMBIEN CR) 6.25 MG CR tablet as needed  5     COLCRYS 0.6 MG tablet Take 0.6 mg by mouth as needed (Patient not taking: Reported on 2023)  0     Family History  Mother had type 2 diabetes, CAD and smoker  Father had CAD and stroke    Social History  Social History     Tobacco Use     Smoking status: Former     Types: Cigarettes     Quit date: 1975     Years since quittin.5     Smokeless tobacco: Never   Substance Use Topics     Alcohol use: Yes   no current use of smoking  Live with significant other    ROS  10 points ROS were negative otherwise mentioned in HPI      Physical Exam  /50 (BP Location: Right arm, Patient Position: Sitting, Cuff Size: Adult Regular)   Pulse 88   Wt 74.4 kg (164 lb)   BMI 24.57 kg/m    Body mass index is 24.57 kg/m .  Constitutional: no distress, comfortable, pleasant   Eyes: anicteric, normal extra-ocular movements, no lid lag or retraction  CVS: RRR, normal S1, S2, no murmur  Lungs: CTA B/L  Musculoskeletal: no edema   Skin: no concerning lesions, no jaundice   Neurological: cranial nerves intact, normal gait, no tremor on outstretched hands bilaterally  Psychological: appropriate mood       RESULTS  I have personally reviewed labs and images. I also reviewed labs with patient and discussed the result and plan of care.           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    1) type 2 diabetes with peripheral neuropathy, nephropathy and CAD: long standing.   BG improved significantly with current regiment of Ozempic and Jardiance. A1c is 7.6% today. He lost 40 lbs since starting Ozempic He is happy with the result and does not want to lose more.    - based on his age and comorbidity (cardiac and kidney), goal for A1c would be <8%. No need for the tight control.     Plan:  - continue Ozempic 1.0 mg weekly  - continue Jardiance 25 mg daily  - continue to check BG fasting and before bedtime    2) DM complications:  Retinopathy: eye exam annually at [facility]  Nephropathy: CKD stage 3, positive urine microalbumin, Cr 1.2 exam 6/2023  Neuropathy: peripheral neuropathy -- stable    PLAN:   - continue Ozempic 1.0 mg weekly  - continue Jardiance 25 mg daily  - continue to check BG fasting and before bedtime    - return in 3 months with PA  - return in 6 months with MD    External notes/medical records independently reviewed, labs and imaging independently reviewed, medical management and tests to be discussed/communicated to patient.    Time: I spent 28 minutes spent on the date of the encounter preparing to see patient (including chart review and preparation), obtaining and or reviewing additional medical history, performing a physical exam and evaluation, documenting clinical information in the electronic health record, independently interpreting results, communicating results to the patient and coordinating care.      Khurram Bruce MD  Division of Diabetes and Endocrinology  Department of Medicine          Again, thank you for allowing  me to participate in the care of your patient.      Sincerely,    Khurram Bruce MD

## 2023-07-07 NOTE — PROGRESS NOTES
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    He reported having type 2 diabetes for 15 years.  His diabetes has initially been under control however A1c has trending up over the past few years. 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    In September 2022, I stopped insulin glargine and started him on Ozempic and increased Jardiance to 25 mg daily.     Interim history  He did have pacemaker placement and stents placement in the past couple of months. He recovered well.    He has been on Ozempic 1.0 mg weekly and Jardiance 25 mg daily. He lost about 40 lbs since September 2022. He would like to keep his weight around 160s. He does not want to lose more weight.      Date Time Reading   7/6 10:40 a 173   7/5 8 a 141   7/4 10 a 150   7/3 10 a 159   7/2 10 a 182 after breakfast   7/1 10 a 147   6/30 9 a 151   6/29 10 a 163  after breakfast   6/28 10 a 139   6/27 10 a 130   6/26 8 a 139   6/25 9 a 159   6/24 11 a 175   6/23 10 a 128   6/22 11 a 118         DM complications:  Retinopathy: [facility] exam annually [facility]  Nephropathy: CKD stage 3, positive urine microalbumin, Cr 1.2 exam 6/2023  Neuropathy: peripheral neuropathy -- stable    Risk of fall -- yes, he is using cane when walking outside    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  Hypertension  hyperlipidemia  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 and then subsequent CRT-D in 2016  underlying left bundle branch block post TAVR    Allergies  Allergies   Allergen Reactions     Hydrocodone-Acetaminophen Nausea and Vomiting      Medications  Current Outpatient Medications   Medication Sig Dispense Refill     allopurinol (ZYLOPRIM) 300 MG tablet Take 300 mg by mouth daily  0     ALPRAZolam (XANAX) 0.5 MG tablet as needed  3     atorvastatin (LIPITOR) 10 MG tablet Take 10 mg by mouth daily  3     azaTHIOprine (IMURAN) 50 MG tablet Take 50 mg by mouth daily  0     blood glucose monitoring (NO BRAND SPECIFIED) test strip Dispense item covered by pt ins. E11.9 NIDDM type II - Test 1 times/day       blood glucose monitoring (ONE TOUCH ULTRASOFT) lancets Use to test blood sugar 2 times daily or as directed. 200 each 4     carvedilol (COREG) 25 MG tablet Take 25 mg by mouth 2 times daily  3     empagliflozin (JARDIANCE) 25 MG TABS tablet Take 1 tablet (25 mg) by mouth daily 90 tablet 3     ENTRESTO  MG per tablet Take 1 tablet by mouth 2 times daily  3     furosemide (LASIX) 20 MG tablet Take 20 mg by mouth 2 times daily       inFLIXimab (REMICADE) 100 MG injection Inject 600 mg into the vein       ketoconazole (NIZORAL) 2 % shampoo        Multiple Vitamins-Minerals (ICAPS AREDS 2 PO) Take 1 capsule by mouth 2 times daily       mupirocin (BACTROBAN) 2 % ointment as needed       Omega-3 Fatty Acids (FISH OIL PO) Take 1 capsule by mouth daily       omeprazole (PRILOSEC) 20 MG CR capsule Take 20 mg by mouth daily  2     Semaglutide, 1 MG/DOSE, (OZEMPIC, 1 MG/DOSE,) 4 MG/3ML pen Inject 1 mg Subcutaneous once a week 9 mL 3     warfarin (COUMADIN) 2 MG tablet Take 4 mg by mouth twice a week       warfarin (COUMADIN) 4 MG tablet Take 6 mg by mouth five times a week   1     zolpidem (AMBIEN CR) 6.25 MG CR tablet as needed  5     COLCRYS 0.6 MG tablet Take 0.6 mg by mouth as needed (Patient not taking: Reported on 1/6/2023)  0     Family History  Mother had type 2 diabetes, CAD and smoker  Father had CAD and stroke    Social History  Social History     Tobacco Use     Smoking status: Former     Types: Cigarettes     Quit date: 1/1/1975     Years  since quittin.5     Smokeless tobacco: Never   Substance Use Topics     Alcohol use: Yes   no current use of smoking  Live with significant other    ROS  10 points ROS were negative otherwise mentioned in HPI      Physical Exam  /50 (BP Location: Right arm, Patient Position: Sitting, Cuff Size: Adult Regular)   Pulse 88   Wt 74.4 kg (164 lb)   BMI 24.57 kg/m    Body mass index is 24.57 kg/m .  Constitutional: no distress, comfortable, pleasant   Eyes: anicteric, normal extra-ocular movements, no lid lag or retraction  CVS: RRR, normal S1, S2, no murmur  Lungs: CTA B/L  Musculoskeletal: no edema   Skin: no concerning lesions, no jaundice   Neurological: cranial nerves intact, normal gait, no tremor on outstretched hands bilaterally  Psychological: appropriate mood       RESULTS  I have personally reviewed labs and images. I also reviewed labs with patient and discussed the result and plan of care.           Latest Ref Rng 2023  10:44 AM   ENDO DIABETES     Albumin Urine mg/L mg/L 76.3    Albumin Urine mg/g Cr 0.00 - 17.00 mg/g Cr 66.93 (H)       Legend:  (H) High          ASSESSMENT:    Franklyn is a 87 year old male with CAD s/p stents, DM2, HTN, CKD with underlying mixed ischemic cardiomyopathy and severe AS status post TAVR  and then subsequent CRT-D in  with underlying left bundle branch block post TAVR presents today for follow up type 2 diabetes management.    1) type 2 diabetes with peripheral neuropathy, nephropathy and CAD: long standing.   BG improved significantly with current regiment of Ozempic and Jardiance. A1c is 7.6% today. He lost 40 lbs since starting Ozempic He is happy with the result and does not want to lose more.    - based on his age and comorbidity (cardiac and kidney), goal for A1c would be <8%. No need for the tight control.     Plan:  - continue Ozempic 1.0 mg weekly  - continue Jardiance 25 mg daily  - continue to check BG fasting and before bedtime    2) DM  complications:  Retinopathy: eye exam annually at [facility]  Nephropathy: CKD stage 3, positive urine microalbumin, Cr 1.2 exam 6/2023  Neuropathy: peripheral neuropathy -- stable    PLAN:   - continue Ozempic 1.0 mg weekly  - continue Jardiance 25 mg daily  - continue to check BG fasting and before bedtime    - return in 3 months with PA  - return in 6 months with MD    External notes/medical records independently reviewed, labs and imaging independently reviewed, medical management and tests to be discussed/communicated to patient.    Time: I spent 28 minutes spent on the date of the encounter preparing to see patient (including chart review and preparation), obtaining and or reviewing additional medical history, performing a physical exam and evaluation, documenting clinical information in the electronic health record, independently interpreting results, communicating results to the patient and coordinating care.      Khurram Bruce MD  Division of Diabetes and Endocrinology  Department of Medicine

## 2023-07-11 ENCOUNTER — APPOINTMENT (OUTPATIENT)
Dept: RADIOLOGY | Facility: HOSPITAL | Age: 88
End: 2023-07-11
Attending: EMERGENCY MEDICINE
Payer: MEDICARE

## 2023-07-11 ENCOUNTER — APPOINTMENT (OUTPATIENT)
Dept: CT IMAGING | Facility: HOSPITAL | Age: 88
End: 2023-07-11
Attending: EMERGENCY MEDICINE
Payer: MEDICARE

## 2023-07-11 ENCOUNTER — HOSPITAL ENCOUNTER (EMERGENCY)
Facility: HOSPITAL | Age: 88
Discharge: HOME | End: 2023-07-11
Attending: EMERGENCY MEDICINE | Admitting: EMERGENCY MEDICINE
Payer: MEDICARE

## 2023-07-11 VITALS
DIASTOLIC BLOOD PRESSURE: 82 MMHG | BODY MASS INDEX: 24.29 KG/M2 | OXYGEN SATURATION: 95 % | TEMPERATURE: 98 F | SYSTOLIC BLOOD PRESSURE: 130 MMHG | HEART RATE: 75 BPM | HEIGHT: 69 IN | WEIGHT: 164 LBS | RESPIRATION RATE: 21 BRPM

## 2023-07-11 DIAGNOSIS — S09.90XA CLOSED HEAD INJURY, INITIAL ENCOUNTER: ICD-10-CM
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
## 2023-07-11 DIAGNOSIS — S51.819A SKIN TEAR OF FOREARM WITHOUT COMPLICATION, UNSPECIFIED LATERALITY, INITIAL ENCOUNTER: ICD-10-CM

## 2023-07-11 PROBLEM — K76.89 OTHER CHRONIC NONALCOHOLIC LIVER DISEASE: Status: ACTIVE | Noted: 2023-07-11

## 2023-07-11 PROBLEM — M06.9 RHEUMATOID ARTHRITIS (H): Status: ACTIVE | Noted: 2023-07-11

## 2023-07-11 PROBLEM — R80.9 MICROALBUMINURIA: Status: ACTIVE | Noted: 2023-07-11

## 2023-07-11 PROBLEM — M17.9 OA (OSTEOARTHRITIS) OF KNEE: Status: ACTIVE | Noted: 2023-07-11

## 2023-07-11 PROBLEM — I10 HYPERTENSION: Status: ACTIVE | Noted: 2023-07-11

## 2023-07-11 PROBLEM — I50.22 CHRONIC SYSTOLIC CONGESTIVE HEART FAILURE (H): Status: ACTIVE | Noted: 2021-11-18

## 2023-07-11 PROBLEM — I10 BENIGN ESSENTIAL HTN: Status: ACTIVE | Noted: 2023-07-11

## 2023-07-11 PROBLEM — N18.30 STAGE 3 CHRONIC KIDNEY DISEASE, UNSPECIFIED WHETHER STAGE 3A OR 3B CKD (H): Status: ACTIVE | Noted: 2023-01-17

## 2023-07-11 PROBLEM — H51.8 ABNORMAL LATERAL CONJUGATE GAZE: Status: ACTIVE | Noted: 2023-07-11

## 2023-07-11 PROBLEM — E11.49 DM (DIABETES MELLITUS) TYPE II CONTROLLED, NEUROLOGICAL MANIFESTATION (H): Status: ACTIVE | Noted: 2023-07-11

## 2023-07-11 LAB
BASOPHILS # BLD AUTO: 0 10E3/UL (ref 0–0.2)
BASOPHILS NFR BLD AUTO: 0 %
EOSINOPHIL # BLD AUTO: 0.1 10E3/UL (ref 0–0.7)
EOSINOPHIL NFR BLD AUTO: 2 %
ERYTHROCYTE [DISTWIDTH] IN BLOOD BY AUTOMATED COUNT: 14.6 % (ref 10–15)
HCT VFR BLD AUTO: 47 % (ref 40–53)
HGB BLD-MCNC: 15.5 G/DL (ref 13.3–17.7)
HOLD SPECIMEN: NORMAL
IMM GRANULOCYTES # BLD: 0 10E3/UL
IMM GRANULOCYTES NFR BLD: 0 %
INR PPP: 2.72 (ref 0.85–1.15)
LYMPHOCYTES # BLD AUTO: 1.6 10E3/UL (ref 0.8–5.3)
LYMPHOCYTES NFR BLD AUTO: 21 %
MCH RBC QN AUTO: 31.3 PG (ref 26.5–33)
MCHC RBC AUTO-ENTMCNC: 33 G/DL (ref 31.5–36.5)
MCV RBC AUTO: 95 FL (ref 78–100)
MONOCYTES # BLD AUTO: 0.7 10E3/UL (ref 0–1.3)
MONOCYTES NFR BLD AUTO: 9 %
NEUTROPHILS # BLD AUTO: 5.3 10E3/UL (ref 1.6–8.3)
NEUTROPHILS NFR BLD AUTO: 68 %
NRBC # BLD AUTO: 0 10E3/UL
NRBC BLD AUTO-RTO: 0 /100
PLATELET # BLD AUTO: 165 10E3/UL (ref 150–450)
RBC # BLD AUTO: 4.95 10E6/UL (ref 4.4–5.9)
WBC # BLD AUTO: 7.8 10E3/UL (ref 4–11)

## 2023-07-11 PROCEDURE — 85025 COMPLETE CBC W/AUTO DIFF WBC: CPT | Performed by: EMERGENCY MEDICINE

## 2023-07-11 PROCEDURE — 85610 PROTHROMBIN TIME: CPT | Performed by: EMERGENCY MEDICINE

## 2023-07-11 PROCEDURE — 99285 EMERGENCY DEPT VISIT HI MDM: CPT | Mod: 25

## 2023-07-11 PROCEDURE — 73560 X-RAY EXAM OF KNEE 1 OR 2: CPT | Mod: LT

## 2023-07-11 PROCEDURE — G1010 CDSM STANSON: HCPCS

## 2023-07-11 PROCEDURE — 36415 COLL VENOUS BLD VENIPUNCTURE: CPT | Performed by: EMERGENCY MEDICINE

## 2023-07-11 PROCEDURE — 250N000009 HC RX 250: Performed by: EMERGENCY MEDICINE

## 2023-07-11 RX ORDER — GINSENG 100 MG
CAPSULE ORAL ONCE
Status: COMPLETED | OUTPATIENT
Start: 2023-07-11 | End: 2023-07-11

## 2023-07-11 RX ADMIN — BACITRACIN: 500 OINTMENT TOPICAL at 14:17

## 2023-07-11 ASSESSMENT — ACTIVITIES OF DAILY LIVING (ADL): ADLS_ACUITY_SCORE: 35

## 2023-07-11 NOTE — DISCHARGE INSTRUCTIONS
You were seen in the emergency department.  Your evaluation included trauma imaging of your head and your left knee.  This looked reassuring.  Your INR and other blood counts were okay.  We dressed your skin tears.  You can continue dressing these with gauze and topical antibiotic ointment like Neosporin or bacitracin.  Please plan to review any ongoing concerns with your primary clinic after this ED visit.

## 2023-07-11 NOTE — ED PROVIDER NOTES
EMERGENCY DEPARTMENT ENCOUNTER      NAME: Franklyn Sorto  AGE: 87 year old male  YOB: 1935  MRN: [id number]  EVALUATION DATE & TIME: 2023  1:23 PM    PCP: No Ref-Primary, Physician    ED PROVIDER: Mike Burgess M.D.      Chief Complaint   Patient presents with     Fall         FINAL IMPRESSION:  1. Fall, initial encounter    2. Closed head injury, initial encounter    3. Skin tear of forearm without complication, unspecified laterality, initial encounter          ED COURSE & MEDICAL DECISION MAKIN:29 PM Trauma alert called. Patient immediately roomed to ED-02.  1:30 PM I met with the patient, obtained history, performed an initial exam, and discussed options and plan for diagnostics and treatment here in the ED.     87 year old male presents to the Emergency Department for evaluation of a mechanical fall with head injury.  Patient is anticoagulated on Coumadin and thus was seen as a trauma alert, roomed immediately.  He was taken for CT imaging of his head (personally reviewed) which was negative for any acute intracranial trauma.  His INR is therapeutic at 2.7.  He has a couple of skin tears on his forearm, no deep lacerations requiring primary closure.  No evidence of underlying bony fracture or process requiring additional radiographs of these areas.  He also indicates some left anterior knee pain and requests an x-ray due to history of knee replacement.  This showed no evidence of periprosthetic fracture or loosening.  Patient resting comfortably on reevaluation.  We discussed some wound care for his skin tears/abrasions.  He was comfortable plan for discharge and left in stable condition.    At the conclusion of the encounter I discussed the results of all of the tests and the disposition. The questions were answered. The patient or family acknowledged understanding and was agreeable with the care plan.       Medical Decision Making    History:    Supplemental history from:  Documented in chart, if applicable    External Record(s) reviewed: Documented in chart, if applicable.    Work Up:    Chart documentation includes differential considered and any EKGs or imaging independently interpreted by provider, where specified.    In additional to work up documented, I considered the following work up: Documented in chart, if applicable.    External consultation:    Discussion of management with another provider: Documented in chart, if applicable    Complicating factors:    Care impacted by chronic illness: Chronic Kidney Disease, Diabetes, Heart Disease, Hyperlipidemia and Hypertension    Care affected by social determinants of health: N/A    Disposition considerations: Discharge. No recommendations on prescription strength medication(s). See documentation for any additional details.            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    The patient presents after having a mechanical fall and hitting his left forehead at 9:30 AM this morning. He is on Coumadin but denies loss of consciousness. Now, he has an abrasion over his left eyebrow, skin tear on his right forearm, and abrasion on his left forearm. He used a coagulation powder on his right forearm to control the bleeding and placed bandages over his forearms and forehead. The patient did not immediately come in after the fall because he had to take his  "dog to the  and then change a flat tire. He came in after these errands \"because I was in the neighborhood and wanted to get checked out\". He has recently lost 40 pounds on Ozempic and says that his skin is saggy. His latest Tdap was in 2017. He has a road trip to [place] on 7/13/2023 that he would like to go on.      He denies neck pain, abdominal pain, chest pain, back pain, or any other complaints at this time.     REVIEW OF SYSTEMS   All systems reviewed and negative except as noted in HPI.    PAST MEDICAL HISTORY:  No past medical history on file.    PAST SURGICAL HISTORY:  No past surgical history on file.        CURRENT MEDICATIONS:    No current facility-administered medications for this encounter.     Current Outpatient Medications   Medication     allopurinol (ZYLOPRIM) 300 MG tablet     ALPRAZolam (XANAX) 0.5 MG tablet     atorvastatin (LIPITOR) 10 MG tablet     azaTHIOprine (IMURAN) 50 MG tablet     blood glucose monitoring (NO BRAND SPECIFIED) test strip     blood glucose monitoring (ONE TOUCH ULTRASOFT) lancets     carvedilol (COREG) 25 MG tablet     COLCRYS 0.6 MG tablet     empagliflozin (JARDIANCE) 25 MG TABS tablet     ENTRESTO  MG per tablet     furosemide (LASIX) 20 MG tablet     inFLIXimab (REMICADE) 100 MG injection     ketoconazole (NIZORAL) 2 % shampoo     Multiple Vitamins-Minerals (ICAPS AREDS 2 PO)     mupirocin (BACTROBAN) 2 % ointment     Omega-3 Fatty Acids (FISH OIL PO)     omeprazole (PRILOSEC) 20 MG CR capsule     Semaglutide, 1 MG/DOSE, (OZEMPIC, 1 MG/DOSE,) 4 MG/3ML pen     warfarin (COUMADIN) 2 MG tablet     warfarin (COUMADIN) 4 MG tablet     zolpidem (AMBIEN CR) 6.25 MG CR tablet         ALLERGIES:  Allergies   Allergen Reactions     Clopidogrel Hives     Hydrocodone      Other reaction(s): sick to his stomach     Hydrocodone-Acetaminophen Nausea and Vomiting     Levofloxacin Other (See Comments)     Other reaction(s): tendonitis  Tendonitis right calf       " "Spironolactone      Other reaction(s): Hyperkalemia       FAMILY HISTORY:  No family history on file.    SOCIAL HISTORY:   Social History     Socioeconomic History     Marital status:    Tobacco Use     Smoking status: Former     Types: Cigarettes     Quit date: 1975     Years since quittin.5     Smokeless tobacco: Never   Substance and Sexual Activity     Alcohol use: Yes       VITALS:  /82   Pulse 75   Temp 98  F (36.7  C) (Temporal)   Resp 21   Ht 1.74 m (5' 8.5\")   Wt 74.4 kg (164 lb)   SpO2 95%   BMI 24.57 kg/m      PHYSICAL EXAM    Constitutional: Well developed elderly male patient, sitting up in bed, no acute distress.  HENT: There is an abrasion to the left forehead.  No underlying palpable skull defects or deformities.  Ranging neck with no discomfort and mild midline cervical spine tenderness.  Eyes: EOMI, Conjunctiva normal.  Respiratory: Breathing comfortably on room air. Speaks full sentences easily. Lungs clear to ascultation.  Cardiovascular: Normal heart rate, Regular rhythm. No peripheral edema.  Abdomen: Soft, nontender.  Musculoskeletal: Good range of motion in all major joints. No major deformities noted.  There is mild bruising to the left anterior knee but intact range of motion.  Integument: There are abrasions/skin tears to the bilateral forearms.  There is a smaller approximately 1 cm skin tear to the left ulnar forearm without any palpable underlying deformity or bony tenderness.  There is a larger approximately 3 cm skin tear to the dorsum of the right forearm again with no bony tenderness or deformity.    Neurologic: Alert & awake, Normal motor function, Normal sensory function, No focal deficits noted.   Psychiatric: Cooperative. Affect appropriate.     LAB:  All pertinent labs reviewed and interpreted.  Labs Ordered and Resulted from Time of ED Arrival to Time of ED Departure   INR - Abnormal       Result Value    INR 2.72 (*)    CBC WITH PLATELETS AND " DIFFERENTIAL    WBC Count 7.8      RBC Count 4.95      Hemoglobin 15.5      Hematocrit 47.0      MCV 95      MCH 31.3      MCHC 33.0      RDW 14.6      Platelet Count 165      % Neutrophils 68      % Lymphocytes 21      % Monocytes 9      % Eosinophils 2      % Basophils 0      % Immature Granulocytes 0      NRBCs per 100 WBC 0      Absolute Neutrophils 5.3      Absolute Lymphocytes 1.6      Absolute Monocytes 0.7      Absolute Eosinophils 0.1      Absolute Basophils 0.0      Absolute Immature Granulocytes 0.0      Absolute NRBCs 0.0         RADIOLOGY:  Reviewed all pertinent imaging. Please see official radiology report.  XR Knee Left 1/2 Views   Final Result   IMPRESSION: There are postoperative changes from left total knee arthroplasty, in standard alignment. No acute fracture is identified. No concerning periprosthetic lucency. No sizable joint effusion. Vascular calcifications are noted.      Head CT w/o contrast   Final Result   IMPRESSION:     1.  No evidence of acute intracranial hemorrhage or mass effect.   2.  Mild nonspecific white matter changes.   3.  Moderate brain parenchymal volume loss.              I, Jg Cedeno, am serving as a scribe to document services personally performed by Dr. Mike Bugress, based on my observation and the provider's statements to me. I, Mike Burgess MD attest that Jg Cedeno is acting in a scribe capacity, has observed my performance of the services and has documented them in accordance with my direction.    Mike Burgess M.D.  Emergency Medicine  [facility] EMERGENCY DEPARTMENT  [facility] [facility] [address]  [phone]  Dept: [phone]     Mike Burgess MD  07/11/23 0899

## 2023-07-11 NOTE — ED TRIAGE NOTES
Patient on thinners. Patient was bringing in food for his dog and tripped and fell at 0930. Patient did hit head. Denies LOC. Patient has blunt trauma to forehead and bilateral arms. Trauma Alert Called.     Triage Assessment     Row Name 07/11/23 1320       Triage Assessment (Adult)    Airway WDL WDL       Respiratory WDL    Respiratory WDL WDL       Skin Circulation/Temperature WDL    Skin Circulation/Temperature WDL WDL       Cardiac WDL    Cardiac WDL WDL       Peripheral/Neurovascular WDL    Peripheral Neurovascular WDL WDL       Cognitive/Neuro/Behavioral WDL    Cognitive/Neuro/Behavioral WDL WDL

## 2023-08-14 ENCOUNTER — TELEPHONE (OUTPATIENT)
Dept: ENDOCRINOLOGY | Facility: CLINIC | Age: 88
End: 2023-08-14
Payer: MEDICARE

## 2023-08-14 NOTE — TELEPHONE ENCOUNTER
CECILIA and sent mychart x1 for patient to reschedule:    2/16/24 appointment with Dr. Paulson as provider is no longer available at that time. Return Diabetes. Reschedule to same day, different time.

## 2023-08-22 ENCOUNTER — TELEPHONE (OUTPATIENT)
Dept: ENDOCRINOLOGY | Facility: CLINIC | Age: 88
End: 2023-08-22
Payer: MEDICARE

## 2023-08-22 NOTE — TELEPHONE ENCOUNTER
"Spoke w/ Pt: Confirms understanding of 4 doses in every pen.   Padmaja Viveros RN on 8/22/2023 at 2:26 PM           Jerrod Carlson, [facility]  You 8 minutes ago (2:15 PM)     RL  No -- there should be 4 doses inside . . . Now if he is on his 4th dose and it wont let him dial up anymore it could be a  error, which he would contact the company that he received a faulty pen. Do you want me to reach out?     Jerrod       Re      Spoke w/ Pt: states he thinks he only gets 3 shots out of each pen.   Aug 08,   Aug 15,   Aug 22.     Asking for Patrizia at Pharmacy team to \"give him a call\"    Informed Pt that 4 doses out of 3ml is a very small amount of medication and the amount left in the pen after 3 doses should be sufficient.     Pharm D notified to please advise.   Padmaja Viveros RN on 8/22/2023 at 2:00 PM             [facility] Call Center    Phone Message    May a detailed message be left on voicemail: yes     Reason for Call: Medication Question or concern regarding medication   Prescription Clarification  Name of Medication: Semaglutide, 1 MG/DOSE, (OZEMPIC, 1 MG/DOSE,) 4 MG/3ML pen [[id number]]   Prescribing Provider: Merly Gutiérrez PAVasiliyC   Pharmacy: [id number] [address] AT [facility]     What on the order needs clarification? Patient has questions on dosage .  Pls return call      Action Taken: Other: endo    Travel Screening: Not Applicable                                                                   "

## 2023-08-23 NOTE — TELEPHONE ENCOUNTER
Form filled out just need Merly Gutiérrez to sign When back Monday . Form sitting on file cabinet for Padmaja to obtain signature Mireya Torres RN on 8/23/2023 at 2:31 PM

## 2023-08-23 NOTE — TELEPHONE ENCOUNTER
"Franklyn called and stated that he will be needing a refill for his Ozempic right away. Provider refill form uploaded under the media tab. If it is blurry, please let me know and I can send via e-mail. Please note that [employer] requires that the QTY column say either \"120 months\" or \"4 months.\" Once provider form is complete, please send to me via e-mail and I will submit to [employer].     Thank you,     Jasiel Leija [facility]  Pharmacy Clinic [facility]  [email or web address]   Phone: [phone]  Fax: [phone]  "

## 2023-08-29 NOTE — TELEPHONE ENCOUNTER
Form was placed in Merly box  per Padmaja for when she is in clinic again to sign. She was virtual this week . Mireya Torres RN on 8/29/2023 at 3:35 PM

## 2023-09-05 NOTE — TELEPHONE ENCOUNTER
Once form is complete, please send to me via e-mail and I will submit to [facility].     Thank you,     Jasiel Leija [facility]  Pharmacy Clinic [email or web address]   Phone: [phone]  Fax: [phone]

## 2023-09-11 ENCOUNTER — TELEPHONE (OUTPATIENT)
Dept: ENDOCRINOLOGY | Facility: CLINIC | Age: 88
End: 2023-09-11
Payer: MEDICARE

## 2023-09-11 NOTE — TELEPHONE ENCOUNTER
Signed ozempic PAP form faxed to [employer]. Copy on file.   Padmaja Viveros RN on 9/11/2023 at 3:02 PM

## 2023-09-11 NOTE — TELEPHONE ENCOUNTER
Refill faxed to [employer] at [phone]. Fax confirmed sent.     Thank you,     Jasiel Leija [facility]  Pharmacy Clinic [facility]  [email or web address]   Phone: [phone]  Fax: [phone]

## 2023-09-12 ENCOUNTER — TELEPHONE (OUTPATIENT)
Dept: ENDOCRINOLOGY | Facility: CLINIC | Age: 88
End: 2023-09-12
Payer: MEDICARE

## 2023-09-13 NOTE — TELEPHONE ENCOUNTER
Called Michelle [employer] at [phone]. Anju states that they need us to re-send the form. Form re-sent via fax. Fax confirmed sent.    Thank you,     Jasiel Leija [facility]  Pharmacy Clinic [facility]  [email or web address]   Phone: [phone]  Fax: [phone]

## 2023-09-15 NOTE — TELEPHONE ENCOUNTER
Called [employer] at [phone]. They have received the provider form and it has been processed, effective today. They will deliver the Ozempic to the pharmacy within 10-14 business days.    I asked the representative if I can get a free voucher since Franklyn will be out of Ozempic by then and they stated that they could not do that because it hasn't been past the 10-14 business day shipping time. They stated that Franklyn could try calling and getting a free voucher. I called Franklyn and he said that he just ended up paying for the Ozempic out of pocket to get him through until the middle of October. Pharmacy should be receiving shipment by then.    Franklyn will give me a call if he needs any additional assistance. Nothing further needed at this time.     Thank you,     Jasiel Leija, [facility]  Pharmacy Clinic [facility]   [email or web address]   Phone: [phone]  Fax: [phone]

## 2023-09-15 NOTE — TELEPHONE ENCOUNTER
Patient is interested in enrolling in the [facility] Patient Assistance Program for his Jardiance. He requested the application be sent to his residence by mail. Patient application was put into the mail today.   Thank you,     Jasiel Leija [facility]  Pharmacy Clinic [facility]   [email or web address]   Phone: [phone]  Fax: [phone]

## 2023-09-18 ENCOUNTER — TELEPHONE (OUTPATIENT)
Dept: ENDOCRINOLOGY | Facility: CLINIC | Age: 88
End: 2023-09-18
Payer: MEDICARE

## 2023-09-18 NOTE — TELEPHONE ENCOUNTER
Provider form sent to Padmaja Viveros and Mireya Torres via e-mail for provider completion. Once complete, please send to me via e-mail and I will submit to [facility].    Thank you,     Jasiel Leija [facility]  Pharmacy Clinic [facility]  [email or web address]   Phone: [phone]  Fax: [phone]

## 2023-09-18 NOTE — TELEPHONE ENCOUNTER
Signed jardiance form faxed to [facility] Copy to pharm team.   Padmaja Viveros RN on 9/19/2023 at 2:43 PM     RE    Jardiance PAP form emailed to provider.   Padmaja Viveros RN on 9/18/2023 at 1:41 PM

## 2023-09-19 NOTE — TELEPHONE ENCOUNTER
Completed provider form received for [facility]. Submitted to [facility] via fax at [phone]. Fax confirmed sent.    Thank you,     Jasiel Leija [facility]  Pharmacy Clinic Liaison   [facility]  [email or web address]   Phone: [phone]  Fax: [phone]

## 2023-09-20 NOTE — TELEPHONE ENCOUNTER
FREE DRUG APPLICATION INITIATED    Medication:  Jardiance  Free Drug Program Name: ARLETTE Mann  Start Date: 9/20/23  Phone #: [phone]  Fax #: [phone]  Additional Information: Provider application has been received. ARLETTE Mann will be needing patients application. Patient has not received his application yet via mail.     Thank you,     Jasiel Leija, [facility]  Pharmacy Clinic [facility]  [email or web address]   Phone: [phone]  Fax: [phone]

## 2023-09-22 NOTE — TELEPHONE ENCOUNTER
Bhavani states that he needs to speak with Jasiel she was helping him with the forms he was to fill out he has not received the patient portion yet for assistance with Ozempic. Please advise. Thank you

## 2023-09-25 NOTE — NURSING NOTE
"Chief Complaint   Patient presents with     Diabetes     Vital signs:      BP: (!) 148/66 Pulse: 83     SpO2: 95 %       Weight: 81.2 kg (179 lb)  Estimated body mass index is 26.82 kg/m  as calculated from the following:    Height as of 9/2/22: 1.74 m (5' 8.5\").    Weight as of this encounter: 81.2 kg (179 lb).      Vital signs:      BP: 125/70 (manual) Pulse: 83     SpO2: 95 %       Weight: 81.2 kg (179 lb)  Estimated body mass index is 26.82 kg/m  as calculated from the following:    Height as of 9/2/22: 1.74 m (5' 8.5\").    Weight as of this encounter: 81.2 kg (179 lb).          "
No.

## 2023-09-26 NOTE — TELEPHONE ENCOUNTER
Called ARLETTE Mann at [phone]. They state that they do not have Franklyn' portion of the application yet.    Thank you,     Jasiel Leija [facility]  Pharmacy Clinic [facility]  [email or web address]   Phone: [phone]  Fax: [phone]

## 2023-09-28 NOTE — TELEPHONE ENCOUNTER
FREE DRUG APPLICATION APPROVED    Medication:  Jardiance  Program Name: ARLETTE Mann   Effective Date: 9/28/2023  Expiration Date: 12/31/2023  Pharmacy Filling the Rx:    Patient Notified: Yes  Additional Information: Called ARLETTE Mann at [phone] because I received a communication stating that Franklyn was not eligible for the program, but the letter did not state why. Spoke to a representative that stated that Franklyn had marked that he has private, commercial drug coverage (which he does not, he is a Medicare part D patient) which makes him not eligible. I asked the representative if there was any way to change that if I confirm he does NOT have commercial coverage. Representative was able to update the application to reflect that he only has Medicare part D pharmacy insurance. Jardiance will be delivered to patient within 3-5 business days.      Thank you,     Jasiel Leija, [facility]  Pharmacy Clinic [facility]  [email or web address]   Phone: [phone]  Fax: [phone]

## 2023-09-29 ENCOUNTER — TELEPHONE (OUTPATIENT)
Dept: ENDOCRINOLOGY | Facility: CLINIC | Age: 88
End: 2023-09-29

## 2023-09-29 NOTE — TELEPHONE ENCOUNTER
Mailed patient re-enrollment forms for the 2024 for Ozempic through [employer] and Jardiance through [employer]. The programs don't open up the application deadline until on/after 10/15/23. Patient aware. Will request provider forms to be completed on/after 10/15/23.    Thank you,     Jasiel Leija, [facility]  Pharmacy Clinic [facility]  [email or web address]   Phone: [phone]  Fax: [phone]

## 2023-10-16 NOTE — TELEPHONE ENCOUNTER
"Provider forms uploaded under the media tab. Please complete provider forms and send to me via e-mail and I will submit and follow with the programs. Please fill out the [employer] form for Ozempic and the  Cares form for Jardiance.     Please note that [employer] requires the QTY column say either \"120 days\" or \"4 months.\"    If form is blurry and you would like me to send via e-mail, please let me know.     Thank you,     Jasiel Leija [facility]  Pharmacy Clinic [facility]  [email or web address]   Phone: [phone]  Fax: [phone]  "

## 2023-10-16 NOTE — TELEPHONE ENCOUNTER
FREE DRUG APPLICATION INITIATED    Medication:  Jardiance  Free Drug Program Name: [employer]  Date Submitted: 10/16/2023  3:05 PM  Phone #:  [phone]  Fax #:  [phone]  Additional Information: Faxed completed [employer] form to fax number listed above. Fax confirmed sent.     Thank you,     Jasiel Leija [facility]  Pharmacy Clinic [facility]  [email or web address]   Phone: [phone]  Fax: [phone]

## 2023-10-17 NOTE — TELEPHONE ENCOUNTER
FREE DRUG APPLICATION INITIATED    Medication:  Ozempic  Free Drug Program Name: [employer]   Date Submitted: 10/16/2023  3:05 PM  Phone #: [phone]  Fax #: [phone]  Additional Information: Faxed completed provider form to [employer] at the fax number listed above. Fax confirmed sent.     Thank you,     Jasiel Leija [facility]  Pharmacy Clinic [facility]  [email or web address]   Phone: [phone]  Fax: [phone]

## 2023-10-18 NOTE — TELEPHONE ENCOUNTER
Ozempic PAP form signed. Emailed to pharm team. Faxed. Copy on file.   Padmaja Viveros RN on 10/18/2023 at 8:11 AM

## 2023-10-19 NOTE — TELEPHONE ENCOUNTER
Jardiance 25mg PAP form signed, faxed to [facility], copy to pharm team, copy on file.   Padmaja Viveros RN on 10/19/2023 at 2:07 PM

## 2023-10-19 NOTE — TELEPHONE ENCOUNTER
Called  Johnathan at [phone]. They received the provider portion but are waiting on the patient portion. Will check back in early next week.     Thank you,     Jasiel Leija [facility]  [facility] Clinic [facility]  [email or web address]   Phone: [phone]  Fax: [phone]

## 2023-10-19 NOTE — TELEPHONE ENCOUNTER
Called [employer] at [phone]. Spoke to Treasure. She states that they have everything they need except Brad's page 3 of the application. They asked if I could relay that information to Franklyn. I contacted Franklyn to see if he needs me to mail a new application or if he still has his on hand and is able to re-fax to Michelle.     Thank you,     Jasiel Leija [facility]  Pharmacy Clinic [facility]  [email or web address]   Phone: [phone]  Fax: [phone]

## 2023-10-24 NOTE — TELEPHONE ENCOUNTER
[employer] sent a fax communication stating that they have the patients portion of the application but are needing the provider form re-sent. Faxed completed provider form to [employer] at [phone]. Fax confirmed sent.        Thank you,     Jasiel Leija, [facility]  Pharmacy Clinic [facility]  [email or web address]   Phone: [phone]  Fax: [phone]

## 2023-10-30 NOTE — TELEPHONE ENCOUNTER
Called [employer] at [phone]. Spoke to Courtney. PT ID#: [id number]. She states that the provider form, if received, hasn't been uploaded yet. She recommends I check back in a few days.     Thank you,     Jasiel Leija, [facility]  Pharmacy Clinic [facility]  [email or web address]   Phone: [phone]  Fax: [phone]

## 2023-11-01 NOTE — TELEPHONE ENCOUNTER
Called and spoke to ARLETTE Mann at [phone]. They state that they don't have the patients portion of the application.     Spoke to Franklyn. He will re-send the applications via fax. He requested I mail them to him. Mailed applications to patients home address.    Thank you,     Jasiel Leija, [facility]  Pharmacy Clinic [facility]  [email or web address]   Phone: [phone]  Fax: [phone]

## 2023-11-01 NOTE — TELEPHONE ENCOUNTER
Called [email or web address] at [phone]. Spoke to Courtney. PT ID#: [id number]. She states that the provider form, if received, hasn't been uploaded yet. Re-faxed form to [email or web address] at [phone]. Will check early next week to make sure it was received.     Thank you,     Jasiel Leija, [facility]  Pharmacy Clinic [facility]  [email or web address]   Phone: [phone]  Fax: [phone]

## 2023-11-06 NOTE — PROGRESS NOTES
Patient is showing 5/5 MNCM met.   Outcome for 11/06/23 1:23 PM: [employer] message sent  Kellie Norris CMA      HPI:   Franklyn is a 88 year old male here with his friend for follow up of type 2 diabetes.  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 He also sees Dr. Paulson.  He reported having type 2 diabetes for over 15 years, but pre-diabetes since the 90's.  His diabetes has initially been under control, however A1c has been trending up over the past few years.  A1c in July 2022 was up to 9.7%, now down to 7.6% to 7.3%.  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  In September 2022, Dr. Paulson stopped insulin glargine and started him on Ozempic and increased Jardiance to 25 mg daily. He has been doing well on this combination. Sugar does go higher with some sweets, but overall well controlled with numbers in the low 100s.        DM complications:  Retinopathy: eye exam annually   Nephropathy: CKD stage 3, positive urine microalbumin.  Neuropathy: peripheral neuropathy    Risk of fall -- yes, he is using cane when walking outside    Uses the elliptical- leg lifts. He tries to stay active most days.     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    No past surgical history on file.    No family history on file.    Social History   Social Hx: .   Retired .  Worked for [employer]  [employer].   Socioeconomic History    Marital status:    Tobacco Use    Smoking status: Former     Types: Cigarettes     Quit date: 1975     Years since quittin.3    Smokeless tobacco: Never   Substance and Sexual Activity    Alcohol use: Yes       Current Outpatient Medications   Medication    allopurinol (ZYLOPRIM) 300 MG tablet    ALPRAZolam (XANAX) 0.5 MG tablet    atorvastatin (LIPITOR) 10 MG tablet    azaTHIOprine (IMURAN) 50 MG tablet    blood glucose monitoring (NO BRAND SPECIFIED) test strip    blood glucose monitoring (ONE TOUCH ULTRASOFT) lancets    carvedilol (COREG) 25 MG tablet    COLCRYS 0.6 MG tablet    empagliflozin (JARDIANCE) 25 MG TABS tablet    ENTRESTO  MG per tablet    furosemide (LASIX) 20 MG tablet    inFLIXimab (REMICADE) 100 MG injection    ketoconazole (NIZORAL) 2 % shampoo    Multiple Vitamins-Minerals (ICAPS AREDS 2 PO)    mupirocin (BACTROBAN) 2 % ointment    Omega-3 Fatty Acids (FISH OIL PO)    omeprazole (PRILOSEC) 20 MG CR capsule    Semaglutide, 1 MG/DOSE, (OZEMPIC, 1 MG/DOSE,) 4 MG/3ML pen    warfarin (COUMADIN) 2 MG tablet    warfarin (COUMADIN) 4 MG tablet    zolpidem (AMBIEN CR) 6.25 MG CR tablet     No current facility-administered medications for this visit.          Allergies   Allergen Reactions    Clopidogrel Hives    Hydrocodone      Other reaction(s): sick to his stomach    Hydrocodone-Acetaminophen Nausea and Vomiting    Levofloxacin Other (See Comments)     Other reaction(s): tendonitis  Tendonitis right calf      Spironolactone      Other reaction(s): Hyperkalemia       Physical Exam  /73   Pulse 98   Ht 1.753 m (5' 9\")   Wt 76.2 kg (168 lb)   SpO2 96%   BMI 24.81 kg/m    GENERAL:  Alert and oriented X3, NAD, well dressed, answering " "questions appropriately, appears stated age.  EXTREMITIES: trace edema, +pulses, no rashes, no lesions  NEUROLOGY: reduced  Monofilament bilaterally, reduced vibratory sensation. + DTR upper and lower extremity    RESULTS  Lab Results   Component Value Date    A1C 7.6 (H) 04/06/2023    A1C 6.4 (H) 03/24/2015    A1C 6.1 08/23/2013    A1C 6.4 (H) 09/19/2011    A1C 6.6 (H) 01/11/2011    HEMOGLOBINA1 7.1 01/06/2023    HEMOGLOBINA1 7.2 (A) 10/07/2022       No results found for: \"TSH\", \"T4\"    No results found for: \"ALT\"]    Recent Labs   Lab Test 04/06/23  1044   CHOL 123   HDL 45   LDL 64   TRIG 70       Lab Results   Component Value Date     11/29/2019      Lab Results   Component Value Date    POTASSIUM 4.3 11/29/2019     Lab Results   Component Value Date    CHLORIDE 107 11/29/2019     Lab Results   Component Value Date    JÚNIOR 8.4 11/29/2019     Lab Results   Component Value Date    CO2 26 11/29/2019     Lab Results   Component Value Date    BUN 10 11/29/2019     Lab Results   Component Value Date    CR 1.28 04/06/2023       GFR Estimate   Date Value Ref Range Status   04/06/2023 55 (L) >60 mL/min/1.73m2 Final     Comment:     eGFR calculated using 2021 CKD-EPI equation.   11/29/2019 >60 >60 mL/min/1.73m2 Final     GFR Estimate If Black   Date Value Ref Range Status   11/29/2019 >60 >60 mL/min/1.73m2 Final       Lab Results   Component Value Date    MICROL 76.3 04/06/2023     No results found for: \"MICROALBUMIN\"  No results found for: \"CPEPT\", \"GADAB\", \"ISCAB\"    No results found for: \"B12\"]    Most recent eye exam date: : Not Found     Assessment/Plan:     1.  Type 2 diabetes- Doing very well.  A1c 7.3% (goal <8% given his age) and glucose is stable.  Could consider higher dose ozempic (or low dose metformin) in the future, but for now, he is doing quite well.  No changes.      2.  Risk factors-     Retinopathy:Had eye exam in the past year.   Nephropathy:  BP well controlled.+ microalbuminuria.  On SGLT-2 " inhibitor and entresto.  Creatinine stable.   Neuropathy: No.    Feet: OK, no ulcers.   Lipids:  LDL at target.  Patient taking statin    3.  F/U in 3 mos with Dr. Paulson, 6 mos with me, sooner with concerns. Could consider graduation to primary care if glucose remains stable on current regimen.     I spent 28 minutes with this patient face to face and explained the conditions and plans (more than 50% of time was counseling/coordination of care, diabetes management, follow up plan for worsening hyper and hypoglycemia) . The patient understood and is satisfied with today's visit.      Merly Gutiérrez PA-C, MPAS   [facility]  Department of Medicine  Division of Endocrinology and Diabetes

## 2023-11-13 NOTE — LETTER
11/13/2023       RE: Franklyn Sorto  [address] [place] [address]     Dear Colleague,    Thank you for referring your patient, Franklyn Sorto, to the [facility] ENDOCRINOLOG[facility] [place] at [facility]. Please see a copy of my visit note below.    Patient is showing 5/5 MNCM met.   Outcome for 11/06/23 1:23 PM: [employer] message sent  Kellie Norris CMA      HPI:   Franklyn is a 88 year old male here with his friend for follow up of type 2 diabetes.  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 He also sees Dr. Paulson.  He reported having type 2 diabetes for over 15 years, but pre-diabetes since the 90's.  His diabetes has initially been under control, however A1c has been trending up over the past few years.  A1c in July 2022 was up to 9.7%, now down to 7.6% to 7.3%.  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  In September 2022, Dr. Paulson stopped insulin glargine and started him on Ozempic and increased Jardiance to 25 mg daily. He has been doing well on this combination. Sugar does go higher with some sweets, but overall well controlled with numbers in the low 100s.        DM complications:  Retinopathy: eye exam annually   Nephropathy: CKD stage 3, positive urine microalbumin.  Neuropathy: peripheral neuropathy    Risk of fall -- yes, he is using cane when walking outside    Uses the elliptical- leg lifts. He tries to stay active most days.     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    No past surgical history on file.    No family history on file.    Social History   Social Hx: .   Retired .  Worked for [employer]  [employer].   Socioeconomic History    Marital status:    Tobacco Use    Smoking status: Former     Types: Cigarettes     Quit date: 1975     Years since quittin.3    Smokeless tobacco: Never   Substance and Sexual Activity    Alcohol use: Yes       Current Outpatient Medications   Medication    allopurinol (ZYLOPRIM) 300 MG tablet    ALPRAZolam (XANAX) 0.5 MG tablet    atorvastatin (LIPITOR) 10 MG tablet    azaTHIOprine (IMURAN) 50 MG tablet    blood glucose monitoring (NO BRAND SPECIFIED) test strip    blood glucose monitoring (ONE TOUCH ULTRASOFT) lancets    carvedilol (COREG) 25 MG tablet    COLCRYS 0.6 MG tablet    empagliflozin (JARDIANCE) 25 MG TABS tablet    ENTRESTO  MG per tablet    furosemide (LASIX) 20 MG tablet    inFLIXimab (REMICADE) 100 MG injection    ketoconazole (NIZORAL) 2 % shampoo    Multiple Vitamins-Minerals (ICAPS AREDS 2 PO)    mupirocin (BACTROBAN) 2 % ointment    Omega-3 Fatty Acids (FISH OIL PO)    omeprazole (PRILOSEC) 20 MG CR capsule    Semaglutide, 1 MG/DOSE, (OZEMPIC, 1 MG/DOSE,) 4 MG/3ML pen    warfarin (COUMADIN) 2 MG tablet    warfarin (COUMADIN) 4 MG tablet    zolpidem (AMBIEN CR) 6.25 MG CR tablet     No current facility-administered medications for this visit.          Allergies   Allergen Reactions    Clopidogrel Hives    Hydrocodone      Other reaction(s): sick to his stomach    Hydrocodone-Acetaminophen Nausea and Vomiting    Levofloxacin  "Other (See Comments)     Other reaction(s): tendonitis  Tendonitis right calf      Spironolactone      Other reaction(s): Hyperkalemia       Physical Exam  /73   Pulse 98   Ht 1.753 m (5' 9\")   Wt 76.2 kg (168 lb)   SpO2 96%   BMI 24.81 kg/m    GENERAL:  Alert and oriented X3, NAD, well dressed, answering questions appropriately, appears stated age.  EXTREMITIES: trace edema, +pulses, no rashes, no lesions  NEUROLOGY: reduced  Monofilament bilaterally, reduced vibratory sensation. + DTR upper and lower extremity    RESULTS  Lab Results   Component Value Date    A1C 7.6 (H) 04/06/2023    A1C 6.4 (H) 03/24/2015    A1C 6.1 08/23/2013    A1C 6.4 (H) 09/19/2011    A1C 6.6 (H) 01/11/2011    HEMOGLOBINA1 7.1 01/06/2023    HEMOGLOBINA1 7.2 (A) 10/07/2022       No results found for: \"TSH\", \"T4\"    No results found for: \"ALT\"]    Recent Labs   Lab Test 04/06/23  1044   CHOL 123   HDL 45   LDL 64   TRIG 70       Lab Results   Component Value Date     11/29/2019      Lab Results   Component Value Date    POTASSIUM 4.3 11/29/2019     Lab Results   Component Value Date    CHLORIDE 107 11/29/2019     Lab Results   Component Value Date    JÚNIOR 8.4 11/29/2019     Lab Results   Component Value Date    CO2 26 11/29/2019     Lab Results   Component Value Date    BUN 10 11/29/2019     Lab Results   Component Value Date    CR 1.28 04/06/2023       GFR Estimate   Date Value Ref Range Status   04/06/2023 55 (L) >60 mL/min/1.73m2 Final     Comment:     eGFR calculated using 2021 CKD-EPI equation.   11/29/2019 >60 >60 mL/min/1.73m2 Final     GFR Estimate If Black   Date Value Ref Range Status   11/29/2019 >60 >60 mL/min/1.73m2 Final       Lab Results   Component Value Date    MICROL 76.3 04/06/2023     No results found for: \"MICROALBUMIN\"  No results found for: \"CPEPT\", \"GADAB\", \"ISCAB\"    No results found for: \"B12\"]    Most recent eye exam date: : Not Found     Assessment/Plan:     1.  Type 2 diabetes- Doing very well.  A1c " 7.3% (goal <8% given his age) and glucose is stable.  Could consider higher dose ozempic (or low dose metformin) in the future, but for now, he is doing quite well.  No changes.      2.  Risk factors-     Retinopathy:Had eye exam in the past year.   Nephropathy:  BP well controlled.+ microalbuminuria.  On SGLT-2 inhibitor and entresto.  Creatinine stable.   Neuropathy: No.    Feet: OK, no ulcers.   Lipids:  LDL at target.  Patient taking statin    3.  F/U in 3 mos with Dr. Paulson, 6 mos with me, sooner with concerns. Could consider graduation to primary care if glucose remains stable on current regimen.     I spent 28 minutes with this patient face to face and explained the conditions and plans (more than 50% of time was counseling/coordination of care, diabetes management, follow up plan for worsening hyper and hypoglycemia) . The patient understood and is satisfied with today's visit.      Merly Gutiérrez PA-C, MPAS   [facility]  Department of Medicine  Division of Endocrinology and Diabetes

## 2023-11-15 NOTE — TELEPHONE ENCOUNTER
Called [employer] at [phone]. Spoke to Tiffanie. She states that they did receive Franklyn' application via fax but he didn't sign any places that he was supposed to sign. Called and updated Franklyn about what Michelle Nordisk is needing from him. Franklyn said he would re-fax the form today.       Thank you,     Jasiel Leija, [facility]  Pharmacy Clinic [facility]  [email or web address]   Phone: [phone]  Fax: [phone]

## 2023-11-15 NOTE — TELEPHONE ENCOUNTER
FREE DRUG APPLICATION APPROVED    Medication:  Jardiance  Program Name:  Other (see comments)Comment:  [employer]  Effective Date: 1/1/2024  Expiration Date: 12/31/2024  Pharmacy Filling the Rx:  [facility]  Patient Notified: Yes  Additional Information:            Thank you,     Jasiel Leija, [facility]  Pharmacy Clinic [facility]  [email or web address]   Phone: [phone]  Fax: [phone]

## 2023-11-20 NOTE — TELEPHONE ENCOUNTER
FREE DRUG APPLICATION APPROVED    Medication:  Ozempic  Program Name:  Michelle [employer]  Effective Date: 1/1/2024  Expiration Date: 12/31/2024  Pharmacy Filling the Rx:  [facility]  Patient Notified: Yes  Additional Information: Spoke to Justina, application approved    Thank you,     Jasiel Leija, [facility]  Pharmacy Clinic [facility]  [email or web address]   Phone: [phone]  Fax: [phone]

## 2023-12-05 NOTE — TELEPHONE ENCOUNTER
[facility] Call Center    Phone Message    May a detailed message be left on voicemail: yes     Reason for Call: Medication Refill Request    Has the patient contacted the pharmacy for the refill? Yes   Name of medication being requested: onetouch ultrasoft lancets sets and one touch ultra test strips  Provider who prescribed the medication:   Pharmacy:   [id number] [address] AT [facility] [address]     Date medication is needed: ASAP    Per pt would like to ask  to start filling these RX since his old provider no longer working @ [facility].     Action Taken: Message routed to:  [facility] ENDO    Travel Screening: Not Applicable

## 2024-01-01 ENCOUNTER — LAB REQUISITION (OUTPATIENT)
Dept: LAB | Facility: CLINIC | Age: 89
End: 2024-01-01
Payer: MEDICARE

## 2024-01-01 ENCOUNTER — APPOINTMENT (OUTPATIENT)
Dept: PHYSICAL THERAPY | Facility: HOSPITAL | Age: 89
DRG: 853 | End: 2024-01-01
Payer: MEDICARE

## 2024-01-01 ENCOUNTER — APPOINTMENT (OUTPATIENT)
Dept: PHYSICAL THERAPY | Facility: HOSPITAL | Age: 89
DRG: 853 | End: 2024-01-01
Attending: INTERNAL MEDICINE
Payer: MEDICARE

## 2024-01-01 ENCOUNTER — APPOINTMENT (OUTPATIENT)
Dept: CARDIOLOGY | Facility: HOSPITAL | Age: 89
DRG: 853 | End: 2024-01-01
Attending: INTERNAL MEDICINE
Payer: MEDICARE

## 2024-01-01 ENCOUNTER — VIRTUAL VISIT (OUTPATIENT)
Dept: INFECTIOUS DISEASES | Facility: CLINIC | Age: 89
End: 2024-01-01
Payer: MEDICARE

## 2024-01-01 ENCOUNTER — DOCUMENTATION ONLY (OUTPATIENT)
Dept: CARDIOLOGY | Facility: CLINIC | Age: 89
End: 2024-01-01
Payer: MEDICARE

## 2024-01-01 ENCOUNTER — APPOINTMENT (OUTPATIENT)
Dept: CT IMAGING | Facility: HOSPITAL | Age: 89
DRG: 853 | End: 2024-01-01
Attending: STUDENT IN AN ORGANIZED HEALTH CARE EDUCATION/TRAINING PROGRAM
Payer: MEDICARE

## 2024-01-01 ENCOUNTER — APPOINTMENT (OUTPATIENT)
Dept: SPEECH THERAPY | Facility: CLINIC | Age: 89
DRG: 592 | End: 2024-01-01
Payer: MEDICARE

## 2024-01-01 ENCOUNTER — APPOINTMENT (OUTPATIENT)
Dept: ULTRASOUND IMAGING | Facility: HOSPITAL | Age: 89
DRG: 853 | End: 2024-01-01
Attending: INTERNAL MEDICINE
Payer: MEDICARE

## 2024-01-01 ENCOUNTER — APPOINTMENT (OUTPATIENT)
Dept: SPEECH THERAPY | Facility: HOSPITAL | Age: 89
DRG: 853 | End: 2024-01-01
Attending: INTERNAL MEDICINE
Payer: MEDICARE

## 2024-01-01 ENCOUNTER — HOSPITAL ENCOUNTER (INPATIENT)
Facility: CLINIC | Age: 89
LOS: 8 days | Discharge: HOSPICE | DRG: 592 | End: 2024-09-27
Attending: EMERGENCY MEDICINE | Admitting: INTERNAL MEDICINE
Payer: MEDICARE

## 2024-01-01 ENCOUNTER — APPOINTMENT (OUTPATIENT)
Dept: SPEECH THERAPY | Facility: HOSPITAL | Age: 89
DRG: 698 | End: 2024-01-01
Payer: MEDICARE

## 2024-01-01 ENCOUNTER — APPOINTMENT (OUTPATIENT)
Dept: SPEECH THERAPY | Facility: HOSPITAL | Age: 89
DRG: 853 | End: 2024-01-01
Payer: MEDICARE

## 2024-01-01 ENCOUNTER — APPOINTMENT (OUTPATIENT)
Dept: OCCUPATIONAL THERAPY | Facility: HOSPITAL | Age: 89
DRG: 853 | End: 2024-01-01
Attending: HOSPITALIST
Payer: MEDICARE

## 2024-01-01 ENCOUNTER — LAB REQUISITION (OUTPATIENT)
Dept: LAB | Facility: CLINIC | Age: 89
End: 2024-01-01
Payer: COMMERCIAL

## 2024-01-01 ENCOUNTER — PATIENT OUTREACH (OUTPATIENT)
Dept: CARE COORDINATION | Facility: CLINIC | Age: 89
End: 2024-01-01
Payer: MEDICARE

## 2024-01-01 ENCOUNTER — APPOINTMENT (OUTPATIENT)
Dept: NUCLEAR MEDICINE | Facility: HOSPITAL | Age: 89
DRG: 698 | End: 2024-01-01
Attending: NURSE PRACTITIONER
Payer: MEDICARE

## 2024-01-01 ENCOUNTER — APPOINTMENT (OUTPATIENT)
Dept: RADIOLOGY | Facility: HOSPITAL | Age: 89
DRG: 853 | End: 2024-01-01
Attending: HOSPITALIST
Payer: MEDICARE

## 2024-01-01 ENCOUNTER — APPOINTMENT (OUTPATIENT)
Dept: OCCUPATIONAL THERAPY | Facility: HOSPITAL | Age: 89
DRG: 698 | End: 2024-01-01
Payer: MEDICARE

## 2024-01-01 ENCOUNTER — OFFICE VISIT (OUTPATIENT)
Dept: AUDIOLOGY | Facility: CLINIC | Age: 89
End: 2024-01-01
Payer: MEDICARE

## 2024-01-01 ENCOUNTER — APPOINTMENT (OUTPATIENT)
Dept: RADIOLOGY | Facility: HOSPITAL | Age: 89
DRG: 698 | End: 2024-01-01
Attending: STUDENT IN AN ORGANIZED HEALTH CARE EDUCATION/TRAINING PROGRAM
Payer: MEDICARE

## 2024-01-01 ENCOUNTER — TELEPHONE (OUTPATIENT)
Dept: INFECTIOUS DISEASES | Facility: CLINIC | Age: 89
End: 2024-01-01
Payer: MEDICARE

## 2024-01-01 ENCOUNTER — APPOINTMENT (OUTPATIENT)
Dept: CT IMAGING | Facility: HOSPITAL | Age: 89
DRG: 853 | End: 2024-01-01
Payer: MEDICARE

## 2024-01-01 ENCOUNTER — VIRTUAL VISIT (OUTPATIENT)
Dept: SURGERY | Facility: CLINIC | Age: 89
End: 2024-01-01
Payer: MEDICARE

## 2024-01-01 ENCOUNTER — TELEPHONE (OUTPATIENT)
Dept: ENDOCRINOLOGY | Facility: CLINIC | Age: 89
End: 2024-01-01
Payer: MEDICARE

## 2024-01-01 ENCOUNTER — HOSPITAL ENCOUNTER (INPATIENT)
Facility: HOSPITAL | Age: 89
LOS: 5 days | Discharge: NURSING, REHAB OR LONG-TERM CARE | DRG: 698 | End: 2024-06-26
Attending: EMERGENCY MEDICINE | Admitting: STUDENT IN AN ORGANIZED HEALTH CARE EDUCATION/TRAINING PROGRAM
Payer: MEDICARE

## 2024-01-01 ENCOUNTER — APPOINTMENT (OUTPATIENT)
Dept: RADIOLOGY | Facility: HOSPITAL | Age: 89
DRG: 698 | End: 2024-01-01
Attending: EMERGENCY MEDICINE
Payer: MEDICARE

## 2024-01-01 ENCOUNTER — DOCUMENTATION ONLY (OUTPATIENT)
Dept: OTHER | Facility: CLINIC | Age: 89
End: 2024-01-01
Payer: MEDICARE

## 2024-01-01 ENCOUNTER — LAB (OUTPATIENT)
Dept: LAB | Facility: CLINIC | Age: 89
End: 2024-01-01
Payer: MEDICARE

## 2024-01-01 ENCOUNTER — APPOINTMENT (OUTPATIENT)
Dept: GENERAL RADIOLOGY | Facility: CLINIC | Age: 89
DRG: 592 | End: 2024-01-01
Attending: INTERNAL MEDICINE
Payer: MEDICARE

## 2024-01-01 ENCOUNTER — ANESTHESIA EVENT (OUTPATIENT)
Dept: SURGERY | Facility: HOSPITAL | Age: 89
DRG: 853 | End: 2024-01-01
Payer: MEDICARE

## 2024-01-01 ENCOUNTER — ANESTHESIA (OUTPATIENT)
Dept: SURGERY | Facility: HOSPITAL | Age: 89
DRG: 853 | End: 2024-01-01
Payer: MEDICARE

## 2024-01-01 ENCOUNTER — APPOINTMENT (OUTPATIENT)
Dept: OCCUPATIONAL THERAPY | Facility: HOSPITAL | Age: 89
DRG: 853 | End: 2024-01-01
Payer: MEDICARE

## 2024-01-01 ENCOUNTER — APPOINTMENT (OUTPATIENT)
Dept: PHYSICAL THERAPY | Facility: HOSPITAL | Age: 89
DRG: 698 | End: 2024-01-01
Payer: MEDICARE

## 2024-01-01 ENCOUNTER — TRANSCRIBE ORDERS (OUTPATIENT)
Dept: OTHER | Age: 89
End: 2024-01-01

## 2024-01-01 ENCOUNTER — APPOINTMENT (OUTPATIENT)
Dept: OCCUPATIONAL THERAPY | Facility: HOSPITAL | Age: 89
DRG: 698 | End: 2024-01-01
Attending: STUDENT IN AN ORGANIZED HEALTH CARE EDUCATION/TRAINING PROGRAM
Payer: MEDICARE

## 2024-01-01 ENCOUNTER — HOSPITAL ENCOUNTER (INPATIENT)
Facility: HOSPITAL | Age: 89
LOS: 25 days | Discharge: HOME HEALTH | DRG: 853 | End: 2024-08-30
Attending: EMERGENCY MEDICINE | Admitting: INTERNAL MEDICINE
Payer: MEDICARE

## 2024-01-01 ENCOUNTER — APPOINTMENT (OUTPATIENT)
Dept: PHYSICAL THERAPY | Facility: HOSPITAL | Age: 89
DRG: 698 | End: 2024-01-01
Attending: STUDENT IN AN ORGANIZED HEALTH CARE EDUCATION/TRAINING PROGRAM
Payer: MEDICARE

## 2024-01-01 ENCOUNTER — DOCUMENTATION ONLY (OUTPATIENT)
Dept: OTHER | Facility: CLINIC | Age: 89
End: 2024-01-01

## 2024-01-01 ENCOUNTER — APPOINTMENT (OUTPATIENT)
Dept: RADIOLOGY | Facility: HOSPITAL | Age: 89
DRG: 853 | End: 2024-01-01
Attending: INTERNAL MEDICINE
Payer: MEDICARE

## 2024-01-01 ENCOUNTER — HOME INFUSION (PRE-WILLOW HOME INFUSION) (OUTPATIENT)
Dept: PHARMACY | Facility: CLINIC | Age: 89
End: 2024-01-01
Payer: MEDICARE

## 2024-01-01 ENCOUNTER — HEALTH MAINTENANCE LETTER (OUTPATIENT)
Age: 89
End: 2024-01-01

## 2024-01-01 ENCOUNTER — APPOINTMENT (OUTPATIENT)
Dept: GENERAL RADIOLOGY | Facility: CLINIC | Age: 89
DRG: 592 | End: 2024-01-01
Attending: EMERGENCY MEDICINE
Payer: MEDICARE

## 2024-01-01 ENCOUNTER — APPOINTMENT (OUTPATIENT)
Dept: CT IMAGING | Facility: HOSPITAL | Age: 89
DRG: 853 | End: 2024-01-01
Attending: INTERNAL MEDICINE
Payer: MEDICARE

## 2024-01-01 ENCOUNTER — APPOINTMENT (OUTPATIENT)
Dept: RADIOLOGY | Facility: HOSPITAL | Age: 89
DRG: 853 | End: 2024-01-01
Attending: STUDENT IN AN ORGANIZED HEALTH CARE EDUCATION/TRAINING PROGRAM
Payer: MEDICARE

## 2024-01-01 ENCOUNTER — APPOINTMENT (OUTPATIENT)
Dept: RADIOLOGY | Facility: HOSPITAL | Age: 89
DRG: 853 | End: 2024-01-01
Payer: MEDICARE

## 2024-01-01 ENCOUNTER — APPOINTMENT (OUTPATIENT)
Dept: CT IMAGING | Facility: HOSPITAL | Age: 89
DRG: 698 | End: 2024-01-01
Attending: INTERNAL MEDICINE
Payer: MEDICARE

## 2024-01-01 ENCOUNTER — APPOINTMENT (OUTPATIENT)
Dept: SPEECH THERAPY | Facility: CLINIC | Age: 89
DRG: 592 | End: 2024-01-01
Attending: INTERNAL MEDICINE
Payer: MEDICARE

## 2024-01-01 ENCOUNTER — TELEPHONE (OUTPATIENT)
Dept: INFECTIOUS DISEASES | Facility: CLINIC | Age: 89
End: 2024-01-01

## 2024-01-01 ENCOUNTER — APPOINTMENT (OUTPATIENT)
Dept: SPEECH THERAPY | Facility: HOSPITAL | Age: 89
DRG: 698 | End: 2024-01-01
Attending: STUDENT IN AN ORGANIZED HEALTH CARE EDUCATION/TRAINING PROGRAM
Payer: MEDICARE

## 2024-01-01 ENCOUNTER — OFFICE VISIT (OUTPATIENT)
Dept: ENDOCRINOLOGY | Facility: CLINIC | Age: 89
End: 2024-01-01
Payer: MEDICARE

## 2024-01-01 ENCOUNTER — MEDICAL CORRESPONDENCE (OUTPATIENT)
Dept: HEALTH INFORMATION MANAGEMENT | Facility: CLINIC | Age: 89
End: 2024-01-01
Payer: MEDICARE

## 2024-01-01 ENCOUNTER — APPOINTMENT (OUTPATIENT)
Dept: CT IMAGING | Facility: HOSPITAL | Age: 89
DRG: 853 | End: 2024-01-01
Attending: SURGERY
Payer: MEDICARE

## 2024-01-01 ENCOUNTER — APPOINTMENT (OUTPATIENT)
Dept: ULTRASOUND IMAGING | Facility: HOSPITAL | Age: 89
DRG: 698 | End: 2024-01-01
Attending: EMERGENCY MEDICINE
Payer: MEDICARE

## 2024-01-01 ENCOUNTER — APPOINTMENT (OUTPATIENT)
Dept: PHYSICAL THERAPY | Facility: CLINIC | Age: 89
DRG: 592 | End: 2024-01-01
Attending: INTERNAL MEDICINE
Payer: MEDICARE

## 2024-01-01 ENCOUNTER — APPOINTMENT (OUTPATIENT)
Dept: OCCUPATIONAL THERAPY | Facility: HOSPITAL | Age: 89
DRG: 853 | End: 2024-01-01
Attending: INTERNAL MEDICINE
Payer: MEDICARE

## 2024-01-01 ENCOUNTER — LAB REQUISITION (OUTPATIENT)
Dept: LAB | Facility: CLINIC | Age: 89
DRG: 592 | End: 2024-01-01
Payer: MEDICARE

## 2024-01-01 ENCOUNTER — OFFICE VISIT (OUTPATIENT)
Dept: OTOLARYNGOLOGY | Facility: CLINIC | Age: 89
End: 2024-01-01
Payer: MEDICARE

## 2024-01-01 ENCOUNTER — HOSPITAL ENCOUNTER (EMERGENCY)
Facility: CLINIC | Age: 89
Discharge: NURSING, REHAB OR LONG-TERM CARE | End: 2024-09-14
Attending: EMERGENCY MEDICINE | Admitting: EMERGENCY MEDICINE
Payer: MEDICARE

## 2024-01-01 ENCOUNTER — VIRTUAL VISIT (OUTPATIENT)
Dept: PALLIATIVE CARE | Facility: CLINIC | Age: 89
End: 2024-01-01
Attending: INTERNAL MEDICINE
Payer: MEDICARE

## 2024-01-01 VITALS
WEIGHT: 146.8 LBS | RESPIRATION RATE: 16 BRPM | SYSTOLIC BLOOD PRESSURE: 106 MMHG | DIASTOLIC BLOOD PRESSURE: 53 MMHG | TEMPERATURE: 97.3 F | OXYGEN SATURATION: 96 % | HEIGHT: 69 IN | HEART RATE: 84 BPM | BODY MASS INDEX: 21.74 KG/M2

## 2024-01-01 VITALS
TEMPERATURE: 98 F | OXYGEN SATURATION: 95 % | DIASTOLIC BLOOD PRESSURE: 70 MMHG | SYSTOLIC BLOOD PRESSURE: 152 MMHG | RESPIRATION RATE: 18 BRPM | BODY MASS INDEX: 24.29 KG/M2 | WEIGHT: 164.02 LBS | HEIGHT: 69 IN | HEART RATE: 84 BPM

## 2024-01-01 VITALS
OXYGEN SATURATION: 92 % | SYSTOLIC BLOOD PRESSURE: 146 MMHG | BODY MASS INDEX: 24.73 KG/M2 | DIASTOLIC BLOOD PRESSURE: 68 MMHG | WEIGHT: 167 LBS | HEIGHT: 69 IN | HEART RATE: 82 BPM

## 2024-01-01 VITALS
TEMPERATURE: 98 F | BODY MASS INDEX: 23.84 KG/M2 | RESPIRATION RATE: 14 BRPM | WEIGHT: 160.94 LBS | HEIGHT: 69 IN | DIASTOLIC BLOOD PRESSURE: 81 MMHG | OXYGEN SATURATION: 94 % | HEART RATE: 76 BPM | SYSTOLIC BLOOD PRESSURE: 139 MMHG

## 2024-01-01 VITALS — WEIGHT: 164 LBS | BODY MASS INDEX: 24.29 KG/M2 | HEIGHT: 69 IN

## 2024-01-01 VITALS
RESPIRATION RATE: 16 BRPM | DIASTOLIC BLOOD PRESSURE: 71 MMHG | BODY MASS INDEX: 24.25 KG/M2 | TEMPERATURE: 98 F | HEART RATE: 82 BPM | SYSTOLIC BLOOD PRESSURE: 125 MMHG | WEIGHT: 160 LBS | OXYGEN SATURATION: 97 % | HEIGHT: 68 IN

## 2024-01-01 VITALS
SYSTOLIC BLOOD PRESSURE: 130 MMHG | OXYGEN SATURATION: 98 % | DIASTOLIC BLOOD PRESSURE: 84 MMHG | HEART RATE: 80 BPM | RESPIRATION RATE: 18 BRPM

## 2024-01-01 DIAGNOSIS — E55.9 VITAMIN D DEFICIENCY, UNSPECIFIED: ICD-10-CM

## 2024-01-01 DIAGNOSIS — I10 ESSENTIAL (PRIMARY) HYPERTENSION: ICD-10-CM

## 2024-01-01 DIAGNOSIS — Z79.01 ANTICOAGULATED ON COUMADIN: ICD-10-CM

## 2024-01-01 DIAGNOSIS — H90.3 ASYMMETRICAL SENSORINEURAL HEARING LOSS: Primary | ICD-10-CM

## 2024-01-01 DIAGNOSIS — I63.311 CEREBRAL INFARCTION DUE TO THROMBOSIS OF RIGHT MIDDLE CEREBRAL ARTERY (H): ICD-10-CM

## 2024-01-01 DIAGNOSIS — L89.154 PRESSURE INJURY OF SACRAL REGION, STAGE 4 (H): ICD-10-CM

## 2024-01-01 DIAGNOSIS — I10 BENIGN ESSENTIAL HTN: ICD-10-CM

## 2024-01-01 DIAGNOSIS — Z79.01 LONG TERM (CURRENT) USE OF ANTICOAGULANTS: ICD-10-CM

## 2024-01-01 DIAGNOSIS — Z51.5 HOSPICE CARE: ICD-10-CM

## 2024-01-01 DIAGNOSIS — D68.69 OTHER THROMBOPHILIA (H): ICD-10-CM

## 2024-01-01 DIAGNOSIS — Z95.2 S/P TAVR (TRANSCATHETER AORTIC VALVE REPLACEMENT): ICD-10-CM

## 2024-01-01 DIAGNOSIS — R33.9 URINARY RETENTION: ICD-10-CM

## 2024-01-01 DIAGNOSIS — I95.9 HYPOTENSION, UNSPECIFIED HYPOTENSION TYPE: ICD-10-CM

## 2024-01-01 DIAGNOSIS — D72.829 LEUKOCYTOSIS, UNSPECIFIED TYPE: ICD-10-CM

## 2024-01-01 DIAGNOSIS — D69.6 THROMBOCYTOPENIA, UNSPECIFIED (H): ICD-10-CM

## 2024-01-01 DIAGNOSIS — H65.92 OME (OTITIS MEDIA WITH EFFUSION), LEFT: ICD-10-CM

## 2024-01-01 DIAGNOSIS — J18.9 COMMUNITY ACQUIRED PNEUMONIA OF LEFT LOWER LOBE OF LUNG: ICD-10-CM

## 2024-01-01 DIAGNOSIS — I50.22 CHRONIC SYSTOLIC CONGESTIVE HEART FAILURE (H): ICD-10-CM

## 2024-01-01 DIAGNOSIS — Z95.2 PRESENCE OF PROSTHETIC HEART VALVE: ICD-10-CM

## 2024-01-01 DIAGNOSIS — M25.569 ARTHRALGIA OF LOWER LEG, UNSPECIFIED LATERALITY: ICD-10-CM

## 2024-01-01 DIAGNOSIS — H90.3 SENSORINEURAL HEARING LOSS (SNHL), BILATERAL: ICD-10-CM

## 2024-01-01 DIAGNOSIS — Z86.73 HISTORY OF STROKE: ICD-10-CM

## 2024-01-01 DIAGNOSIS — I63.511 ACUTE ISCHEMIC RIGHT MCA STROKE (H): Primary | ICD-10-CM

## 2024-01-01 DIAGNOSIS — E11.69 TYPE 2 DIABETES MELLITUS WITH OTHER SPECIFIED COMPLICATION, UNSPECIFIED WHETHER LONG TERM INSULIN USE (H): ICD-10-CM

## 2024-01-01 DIAGNOSIS — I50.9 CONGESTIVE HEART FAILURE, UNSPECIFIED HF CHRONICITY, UNSPECIFIED HEART FAILURE TYPE (H): ICD-10-CM

## 2024-01-01 DIAGNOSIS — D72.829 ELEVATED WHITE BLOOD CELL COUNT, UNSPECIFIED: ICD-10-CM

## 2024-01-01 DIAGNOSIS — H90.8 MIXED HEARING LOSS, UNILATERAL: Primary | ICD-10-CM

## 2024-01-01 DIAGNOSIS — Z86.73 HISTORY OF CVA (CEREBROVASCULAR ACCIDENT): ICD-10-CM

## 2024-01-01 DIAGNOSIS — E11.40 CONTROLLED TYPE 2 DIABETES MELLITUS WITH DIABETIC NEUROPATHY, WITHOUT LONG-TERM CURRENT USE OF INSULIN (H): ICD-10-CM

## 2024-01-01 DIAGNOSIS — L89.150 PRESSURE INJURY OF SACRAL REGION, UNSTAGEABLE (H): ICD-10-CM

## 2024-01-01 DIAGNOSIS — Z16.12 INFECTION DUE TO ESBL-PRODUCING ESCHERICHIA COLI: ICD-10-CM

## 2024-01-01 DIAGNOSIS — A49.8 INFECTION DUE TO ESBL-PRODUCING ESCHERICHIA COLI: ICD-10-CM

## 2024-01-01 DIAGNOSIS — E11.65 TYPE 2 DIABETES MELLITUS WITH HYPERGLYCEMIA, WITHOUT LONG-TERM CURRENT USE OF INSULIN (H): ICD-10-CM

## 2024-01-01 DIAGNOSIS — T14.8XXA WOUND INFECTION: Primary | ICD-10-CM

## 2024-01-01 DIAGNOSIS — S31.000S WOUND OF SACRAL REGION, SEQUELA: ICD-10-CM

## 2024-01-01 DIAGNOSIS — L08.9 WOUND INFECTION: ICD-10-CM

## 2024-01-01 DIAGNOSIS — B96.29 OTHER ESCHERICHIA COLI (E. COLI) AS THE CAUSE OF DISEASES CLASSIFIED ELSEWHERE: ICD-10-CM

## 2024-01-01 DIAGNOSIS — L89.150 PRESSURE INJURY OF SACRAL REGION, UNSTAGEABLE (H): Primary | ICD-10-CM

## 2024-01-01 DIAGNOSIS — L08.9 WOUND INFECTION: Primary | ICD-10-CM

## 2024-01-01 DIAGNOSIS — R33.8 OTHER RETENTION OF URINE: ICD-10-CM

## 2024-01-01 DIAGNOSIS — T19.0XXA URETHRAL FOREIGN BODY, INITIAL ENCOUNTER: ICD-10-CM

## 2024-01-01 DIAGNOSIS — R09.02 HYPOXIA: ICD-10-CM

## 2024-01-01 DIAGNOSIS — E83.39 HYPOPHOSPHATEMIA: ICD-10-CM

## 2024-01-01 DIAGNOSIS — I50.20 HFREF (HEART FAILURE WITH REDUCED EJECTION FRACTION) (H): ICD-10-CM

## 2024-01-01 DIAGNOSIS — E11.9 DIABETES MELLITUS TYPE 2, NONINSULIN DEPENDENT (H): ICD-10-CM

## 2024-01-01 DIAGNOSIS — E87.6 HYPOKALEMIA: ICD-10-CM

## 2024-01-01 DIAGNOSIS — T14.8XXA WOUND INFECTION: ICD-10-CM

## 2024-01-01 DIAGNOSIS — Z43.3 COLOSTOMY CARE (H): ICD-10-CM

## 2024-01-01 DIAGNOSIS — D64.9 ANEMIA, UNSPECIFIED: ICD-10-CM

## 2024-01-01 DIAGNOSIS — L89.153 PRESSURE INJURY OF SACRAL REGION, STAGE 3 (H): ICD-10-CM

## 2024-01-01 DIAGNOSIS — D68.59 THROMBOPHILIA (H): ICD-10-CM

## 2024-01-01 DIAGNOSIS — N39.0 URINARY TRACT INFECTION, SITE NOT SPECIFIED: ICD-10-CM

## 2024-01-01 DIAGNOSIS — N30.00 ACUTE CYSTITIS WITHOUT HEMATURIA: ICD-10-CM

## 2024-01-01 DIAGNOSIS — I25.10 ATHEROSCLEROTIC HEART DISEASE OF NATIVE CORONARY ARTERY WITHOUT ANGINA PECTORIS: ICD-10-CM

## 2024-01-01 DIAGNOSIS — E11.65 TYPE 2 DIABETES MELLITUS WITH HYPERGLYCEMIA, WITHOUT LONG-TERM CURRENT USE OF INSULIN (H): Primary | ICD-10-CM

## 2024-01-01 DIAGNOSIS — J69.0 ASPIRATION PNEUMONIA, UNSPECIFIED ASPIRATION PNEUMONIA TYPE, UNSPECIFIED LATERALITY, UNSPECIFIED PART OF LUNG (H): ICD-10-CM

## 2024-01-01 DIAGNOSIS — H90.8 MIXED HEARING LOSS, UNILATERAL: ICD-10-CM

## 2024-01-01 DIAGNOSIS — Z48.02 ENCOUNTER FOR STAPLE REMOVAL: ICD-10-CM

## 2024-01-01 DIAGNOSIS — L89.626 PRESSURE INJURY OF DEEP TISSUE OF LEFT HEEL: ICD-10-CM

## 2024-01-01 DIAGNOSIS — Z46.6 URINARY CATHETER (FOLEY) CHANGE REQUIRED: ICD-10-CM

## 2024-01-01 DIAGNOSIS — Z98.890 POSTOPERATIVE STATE: Primary | ICD-10-CM

## 2024-01-01 LAB
ABO/RH(D): NORMAL
ACINETOBACTER SPECIES: NOT DETECTED
ALBUMIN SERPL BCG-MCNC: 2.2 G/DL (ref 3.5–5.2)
ALBUMIN SERPL BCG-MCNC: 2.4 G/DL (ref 3.5–5.2)
ALBUMIN SERPL BCG-MCNC: 2.4 G/DL (ref 3.5–5.2)
ALBUMIN SERPL BCG-MCNC: 2.5 G/DL (ref 3.5–5.2)
ALBUMIN SERPL BCG-MCNC: 2.5 G/DL (ref 3.5–5.2)
ALBUMIN SERPL BCG-MCNC: 2.6 G/DL (ref 3.5–5.2)
ALBUMIN SERPL BCG-MCNC: 2.7 G/DL (ref 3.5–5.2)
ALBUMIN SERPL BCG-MCNC: 2.8 G/DL (ref 3.5–5.2)
ALBUMIN UR-MCNC: 20 MG/DL
ALBUMIN UR-MCNC: 20 MG/DL
ALBUMIN UR-MCNC: 30 MG/DL
ALBUMIN UR-MCNC: 300 MG/DL
ALBUMIN UR-MCNC: 50 MG/DL
ALBUMIN UR-MCNC: 70 MG/DL
ALP SERPL-CCNC: 101 U/L (ref 40–150)
ALP SERPL-CCNC: 102 U/L (ref 40–150)
ALP SERPL-CCNC: 117 U/L (ref 40–150)
ALP SERPL-CCNC: 90 U/L (ref 40–150)
ALP SERPL-CCNC: 96 U/L (ref 40–150)
ALP SERPL-CCNC: 96 U/L (ref 40–150)
ALP SERPL-CCNC: 97 U/L (ref 40–150)
ALP SERPL-CCNC: 99 U/L (ref 40–150)
ALT SERPL W P-5'-P-CCNC: 10 U/L (ref 0–70)
ALT SERPL W P-5'-P-CCNC: 11 U/L (ref 0–70)
ALT SERPL W P-5'-P-CCNC: 11 U/L (ref 0–70)
ALT SERPL W P-5'-P-CCNC: 12 U/L (ref 0–70)
ALT SERPL W P-5'-P-CCNC: 18 U/L (ref 0–70)
ALT SERPL W P-5'-P-CCNC: 19 U/L (ref 0–70)
ALT SERPL W P-5'-P-CCNC: 23 U/L (ref 0–70)
ALT SERPL W P-5'-P-CCNC: 6 U/L (ref 0–70)
ALT SERPL W P-5'-P-CCNC: 7 U/L (ref 0–70)
ALT SERPL W P-5'-P-CCNC: 7 U/L (ref 0–70)
ANION GAP SERPL CALCULATED.3IONS-SCNC: 10 MMOL/L (ref 7–15)
ANION GAP SERPL CALCULATED.3IONS-SCNC: 11 MMOL/L (ref 7–15)
ANION GAP SERPL CALCULATED.3IONS-SCNC: 12 MMOL/L (ref 7–15)
ANION GAP SERPL CALCULATED.3IONS-SCNC: 12 MMOL/L (ref 7–15)
ANION GAP SERPL CALCULATED.3IONS-SCNC: 13 MMOL/L (ref 7–15)
ANION GAP SERPL CALCULATED.3IONS-SCNC: 14 MMOL/L (ref 7–15)
ANION GAP SERPL CALCULATED.3IONS-SCNC: 15 MMOL/L (ref 7–15)
ANION GAP SERPL CALCULATED.3IONS-SCNC: 4 MMOL/L (ref 7–15)
ANION GAP SERPL CALCULATED.3IONS-SCNC: 5 MMOL/L (ref 7–15)
ANION GAP SERPL CALCULATED.3IONS-SCNC: 6 MMOL/L (ref 7–15)
ANION GAP SERPL CALCULATED.3IONS-SCNC: 6 MMOL/L (ref 7–15)
ANION GAP SERPL CALCULATED.3IONS-SCNC: 7 MMOL/L (ref 7–15)
ANION GAP SERPL CALCULATED.3IONS-SCNC: 7 MMOL/L (ref 7–15)
ANION GAP SERPL CALCULATED.3IONS-SCNC: 8 MMOL/L (ref 7–15)
ANION GAP SERPL CALCULATED.3IONS-SCNC: 9 MMOL/L (ref 7–15)
ANION GAP SERPL CALCULATED.3IONS-SCNC: 9 MMOL/L (ref 7–15)
ANTIBODY SCREEN: NEGATIVE
APPEARANCE UR: ABNORMAL
APTT PPP: 54 SECONDS (ref 22–38)
AST SERPL W P-5'-P-CCNC: 14 U/L (ref 0–45)
AST SERPL W P-5'-P-CCNC: 19 U/L (ref 0–45)
AST SERPL W P-5'-P-CCNC: 20 U/L (ref 0–45)
AST SERPL W P-5'-P-CCNC: 21 U/L (ref 0–45)
AST SERPL W P-5'-P-CCNC: 24 U/L (ref 0–45)
AST SERPL W P-5'-P-CCNC: 25 U/L (ref 0–45)
AST SERPL W P-5'-P-CCNC: 25 U/L (ref 0–45)
AST SERPL W P-5'-P-CCNC: 30 U/L (ref 0–45)
ATRIAL RATE - MUSE: 73 BPM
BACTERIA #/AREA URNS HPF: ABNORMAL /HPF
BACTERIA #/AREA URNS HPF: ABNORMAL /HPF
BACTERIA BLD CULT: ABNORMAL
BACTERIA BLD CULT: NO GROWTH
BACTERIA BLD CULT: NO GROWTH
BACTERIA SPEC CULT: NORMAL
BACTERIA TISS BX CULT: ABNORMAL
BACTERIA UR CULT: ABNORMAL
BACTERIA UR CULT: NO GROWTH
BACTERIA WND CULT: ABNORMAL
BASE EXCESS BLDV CALC-SCNC: 2.5 MMOL/L (ref -3–3)
BASOPHILS # BLD AUTO: 0.1 10E3/UL (ref 0–0.2)
BASOPHILS # BLD MANUAL: 0 10E3/UL (ref 0–0.2)
BASOPHILS NFR BLD AUTO: 0 %
BASOPHILS NFR BLD AUTO: 1 %
BASOPHILS NFR BLD MANUAL: 0 %
BILIRUB DIRECT SERPL-MCNC: <0.2 MG/DL (ref 0–0.3)
BILIRUB DIRECT SERPL-MCNC: <0.2 MG/DL (ref 0–0.3)
BILIRUB SERPL-MCNC: 0.3 MG/DL
BILIRUB SERPL-MCNC: 0.4 MG/DL
BILIRUB SERPL-MCNC: 0.5 MG/DL
BILIRUB SERPL-MCNC: 0.5 MG/DL
BILIRUB SERPL-MCNC: 0.6 MG/DL
BILIRUB UR QL STRIP: NEGATIVE
BLD PROD TYP BPU: NORMAL
BLD PROD TYP BPU: NORMAL
BLOOD COMPONENT TYPE: NORMAL
BLOOD COMPONENT TYPE: NORMAL
BUN SERPL-MCNC: 13.6 MG/DL (ref 8–23)
BUN SERPL-MCNC: 14 MG/DL (ref 8–23)
BUN SERPL-MCNC: 14.4 MG/DL (ref 8–23)
BUN SERPL-MCNC: 14.4 MG/DL (ref 8–23)
BUN SERPL-MCNC: 14.9 MG/DL (ref 8–23)
BUN SERPL-MCNC: 15 MG/DL (ref 8–23)
BUN SERPL-MCNC: 15.2 MG/DL (ref 8–23)
BUN SERPL-MCNC: 15.2 MG/DL (ref 8–23)
BUN SERPL-MCNC: 15.7 MG/DL (ref 8–23)
BUN SERPL-MCNC: 16.1 MG/DL (ref 8–23)
BUN SERPL-MCNC: 16.2 MG/DL (ref 8–23)
BUN SERPL-MCNC: 16.5 MG/DL (ref 8–23)
BUN SERPL-MCNC: 16.7 MG/DL (ref 8–23)
BUN SERPL-MCNC: 17 MG/DL (ref 8–23)
BUN SERPL-MCNC: 17.3 MG/DL (ref 8–23)
BUN SERPL-MCNC: 17.3 MG/DL (ref 8–23)
BUN SERPL-MCNC: 18.1 MG/DL (ref 8–23)
BUN SERPL-MCNC: 18.2 MG/DL (ref 8–23)
BUN SERPL-MCNC: 18.7 MG/DL (ref 8–23)
BUN SERPL-MCNC: 19.4 MG/DL (ref 8–23)
BUN SERPL-MCNC: 20.5 MG/DL (ref 8–23)
BUN SERPL-MCNC: 20.7 MG/DL (ref 8–23)
BUN SERPL-MCNC: 21.1 MG/DL (ref 8–23)
BUN SERPL-MCNC: 21.8 MG/DL (ref 8–23)
BUN SERPL-MCNC: 23.5 MG/DL (ref 8–23)
BUN SERPL-MCNC: 24.3 MG/DL (ref 8–23)
BUN SERPL-MCNC: 24.5 MG/DL (ref 8–23)
BUN SERPL-MCNC: 24.8 MG/DL (ref 8–23)
BUN SERPL-MCNC: 25.4 MG/DL (ref 8–23)
BUN SERPL-MCNC: 26.4 MG/DL (ref 8–23)
BUN SERPL-MCNC: 26.8 MG/DL (ref 8–23)
BUN SERPL-MCNC: 28.9 MG/DL (ref 8–23)
BUN SERPL-MCNC: 31 MG/DL (ref 8–23)
C PNEUM DNA SPEC QL NAA+PROBE: NOT DETECTED
CALCIUM SERPL-MCNC: 7.1 MG/DL (ref 8.8–10.4)
CALCIUM SERPL-MCNC: 7.3 MG/DL (ref 8.8–10.4)
CALCIUM SERPL-MCNC: 7.4 MG/DL (ref 8.8–10.4)
CALCIUM SERPL-MCNC: 7.4 MG/DL (ref 8.8–10.4)
CALCIUM SERPL-MCNC: 7.5 MG/DL (ref 8.8–10.4)
CALCIUM SERPL-MCNC: 7.6 MG/DL (ref 8.8–10.4)
CALCIUM SERPL-MCNC: 7.7 MG/DL (ref 8.8–10.4)
CALCIUM SERPL-MCNC: 7.8 MG/DL (ref 8.8–10.4)
CALCIUM SERPL-MCNC: 7.9 MG/DL (ref 8.8–10.2)
CALCIUM SERPL-MCNC: 8 MG/DL (ref 8.8–10.2)
CALCIUM SERPL-MCNC: 8.1 MG/DL (ref 8.8–10.2)
CALCIUM SERPL-MCNC: 8.1 MG/DL (ref 8.8–10.4)
CALCIUM SERPL-MCNC: 8.1 MG/DL (ref 8.8–10.4)
CALCIUM SERPL-MCNC: 8.2 MG/DL (ref 8.8–10.2)
CALCIUM SERPL-MCNC: 8.2 MG/DL (ref 8.8–10.2)
CALCIUM SERPL-MCNC: 8.2 MG/DL (ref 8.8–10.4)
CALCIUM SERPL-MCNC: 8.2 MG/DL (ref 8.8–10.4)
CALCIUM SERPL-MCNC: 8.3 MG/DL (ref 8.8–10.4)
CALCIUM SERPL-MCNC: 8.4 MG/DL (ref 8.8–10.4)
CALCIUM SERPL-MCNC: 8.5 MG/DL (ref 8.8–10.4)
CALCIUM SERPL-MCNC: 8.5 MG/DL (ref 8.8–10.4)
CHLORIDE SERPL-SCNC: 100 MMOL/L (ref 98–107)
CHLORIDE SERPL-SCNC: 101 MMOL/L (ref 98–107)
CHLORIDE SERPL-SCNC: 102 MMOL/L (ref 98–107)
CHLORIDE SERPL-SCNC: 103 MMOL/L (ref 98–107)
CHLORIDE SERPL-SCNC: 104 MMOL/L (ref 98–107)
CHLORIDE SERPL-SCNC: 105 MMOL/L (ref 98–107)
CHLORIDE SERPL-SCNC: 106 MMOL/L (ref 98–107)
CHLORIDE SERPL-SCNC: 107 MMOL/L (ref 98–107)
CHLORIDE SERPL-SCNC: 107 MMOL/L (ref 98–107)
CHLORIDE SERPL-SCNC: 109 MMOL/L (ref 98–107)
CHLORIDE SERPL-SCNC: 95 MMOL/L (ref 98–107)
CHLORIDE SERPL-SCNC: 97 MMOL/L (ref 98–107)
CHLORIDE SERPL-SCNC: 98 MMOL/L (ref 98–107)
CHLORIDE SERPL-SCNC: 98 MMOL/L (ref 98–107)
CHLORIDE SERPL-SCNC: 99 MMOL/L (ref 98–107)
CHLORIDE SERPL-SCNC: 99 MMOL/L (ref 98–107)
CHOLEST SERPL-MCNC: 136 MG/DL
CITROBACTER SPECIES: NOT DETECTED
CK SERPL-CCNC: 21 U/L (ref 39–308)
CK SERPL-CCNC: 21 U/L (ref 39–308)
CK SERPL-CCNC: 28 U/L (ref 39–308)
CK SERPL-CCNC: 30 U/L (ref 39–308)
CK SERPL-CCNC: 92 U/L (ref 39–308)
CK SERPL-CCNC: 95 U/L (ref 39–308)
CODING SYSTEM: NORMAL
CODING SYSTEM: NORMAL
COLOR UR AUTO: ABNORMAL
COLOR UR AUTO: ABNORMAL
COLOR UR AUTO: YELLOW
CREAT SERPL-MCNC: 0.48 MG/DL (ref 0.67–1.17)
CREAT SERPL-MCNC: 0.49 MG/DL (ref 0.67–1.17)
CREAT SERPL-MCNC: 0.5 MG/DL (ref 0.67–1.17)
CREAT SERPL-MCNC: 0.5 MG/DL (ref 0.67–1.17)
CREAT SERPL-MCNC: 0.51 MG/DL (ref 0.67–1.17)
CREAT SERPL-MCNC: 0.52 MG/DL (ref 0.67–1.17)
CREAT SERPL-MCNC: 0.52 MG/DL (ref 0.67–1.17)
CREAT SERPL-MCNC: 0.53 MG/DL (ref 0.67–1.17)
CREAT SERPL-MCNC: 0.55 MG/DL (ref 0.67–1.17)
CREAT SERPL-MCNC: 0.56 MG/DL (ref 0.67–1.17)
CREAT SERPL-MCNC: 0.57 MG/DL (ref 0.67–1.17)
CREAT SERPL-MCNC: 0.57 MG/DL (ref 0.67–1.17)
CREAT SERPL-MCNC: 0.6 MG/DL (ref 0.67–1.17)
CREAT SERPL-MCNC: 0.62 MG/DL (ref 0.67–1.17)
CREAT SERPL-MCNC: 0.63 MG/DL (ref 0.67–1.17)
CREAT SERPL-MCNC: 0.65 MG/DL (ref 0.67–1.17)
CREAT SERPL-MCNC: 0.66 MG/DL (ref 0.67–1.17)
CREAT SERPL-MCNC: 0.67 MG/DL (ref 0.67–1.17)
CREAT SERPL-MCNC: 0.68 MG/DL (ref 0.67–1.17)
CREAT SERPL-MCNC: 0.69 MG/DL (ref 0.67–1.17)
CREAT SERPL-MCNC: 0.7 MG/DL (ref 0.67–1.17)
CREAT SERPL-MCNC: 0.74 MG/DL (ref 0.67–1.17)
CREAT SERPL-MCNC: 0.8 MG/DL (ref 0.67–1.17)
CREAT SERPL-MCNC: 0.81 MG/DL (ref 0.67–1.17)
CREAT SERPL-MCNC: 0.85 MG/DL (ref 0.67–1.17)
CREAT SERPL-MCNC: 0.87 MG/DL (ref 0.67–1.17)
CREAT SERPL-MCNC: 0.9 MG/DL (ref 0.67–1.17)
CREAT SERPL-MCNC: 0.93 MG/DL (ref 0.67–1.17)
CREAT SERPL-MCNC: 0.98 MG/DL (ref 0.67–1.17)
CREAT SERPL-MCNC: 1.12 MG/DL (ref 0.67–1.17)
CREAT SERPL-MCNC: 1.22 MG/DL (ref 0.67–1.17)
CREAT SERPL-MCNC: 1.38 MG/DL (ref 0.67–1.17)
CREAT UR-MCNC: 87.3 MG/DL
CRP SERPL HS-MCNC: 52.7 MG/L
CRP SERPL-MCNC: 154.9 MG/L
CRP SERPL-MCNC: 37.4 MG/L
CRP SERPL-MCNC: 44.75 MG/L
CRP SERPL-MCNC: 45.23 MG/L
CRP SERPL-MCNC: 61.8 MG/L
CRP SERPL-MCNC: 68.73 MG/L
CTX-M: DETECTED
DEPRECATED HCO3 PLAS-SCNC: 21 MMOL/L (ref 22–29)
DEPRECATED HCO3 PLAS-SCNC: 21 MMOL/L (ref 22–29)
DEPRECATED HCO3 PLAS-SCNC: 23 MMOL/L (ref 22–29)
DEPRECATED HCO3 PLAS-SCNC: 24 MMOL/L (ref 22–29)
DEPRECATED HCO3 PLAS-SCNC: 25 MMOL/L (ref 22–29)
DEPRECATED HCO3 PLAS-SCNC: 25 MMOL/L (ref 22–29)
DEPRECATED HCO3 PLAS-SCNC: 28 MMOL/L (ref 22–29)
DIASTOLIC BLOOD PRESSURE - MUSE: 48 MMHG
EGFRCR SERPLBLD CKD-EPI 2021: 50 ML/MIN/1.73M2
EGFRCR SERPLBLD CKD-EPI 2021: 58 ML/MIN/1.73M2
EGFRCR SERPLBLD CKD-EPI 2021: 64 ML/MIN/1.73M2
EGFRCR SERPLBLD CKD-EPI 2021: 75 ML/MIN/1.73M2
EGFRCR SERPLBLD CKD-EPI 2021: 80 ML/MIN/1.73M2
EGFRCR SERPLBLD CKD-EPI 2021: 83 ML/MIN/1.73M2
EGFRCR SERPLBLD CKD-EPI 2021: 84 ML/MIN/1.73M2
EGFRCR SERPLBLD CKD-EPI 2021: 85 ML/MIN/1.73M2
EGFRCR SERPLBLD CKD-EPI 2021: 86 ML/MIN/1.73M2
EGFRCR SERPLBLD CKD-EPI 2021: 86 ML/MIN/1.73M2
EGFRCR SERPLBLD CKD-EPI 2021: 88 ML/MIN/1.73M2
EGFRCR SERPLBLD CKD-EPI 2021: 90 ML/MIN/1.73M2
EGFRCR SERPLBLD CKD-EPI 2021: 90 ML/MIN/1.73M2
EGFRCR SERPLBLD CKD-EPI 2021: >90 ML/MIN/1.73M2
ENTEROBACTER SPECIES: NOT DETECTED
ENTEROCOCCUS FAECALIS: NOT DETECTED
ENTEROCOCCUS FAECIUM: NOT DETECTED
EOSINOPHIL # BLD AUTO: 0.1 10E3/UL (ref 0–0.7)
EOSINOPHIL # BLD AUTO: 0.2 10E3/UL (ref 0–0.7)
EOSINOPHIL # BLD AUTO: 0.2 10E3/UL (ref 0–0.7)
EOSINOPHIL # BLD AUTO: 0.5 10E3/UL (ref 0–0.7)
EOSINOPHIL # BLD AUTO: 0.5 10E3/UL (ref 0–0.7)
EOSINOPHIL # BLD AUTO: 0.9 10E3/UL (ref 0–0.7)
EOSINOPHIL # BLD MANUAL: 0 10E3/UL (ref 0–0.7)
EOSINOPHIL # BLD MANUAL: 0 10E3/UL (ref 0–0.7)
EOSINOPHIL # BLD MANUAL: 0.3 10E3/UL (ref 0–0.7)
EOSINOPHIL NFR BLD AUTO: 0 %
EOSINOPHIL NFR BLD AUTO: 1 %
EOSINOPHIL NFR BLD AUTO: 1 %
EOSINOPHIL NFR BLD AUTO: 3 %
EOSINOPHIL NFR BLD AUTO: 4 %
EOSINOPHIL NFR BLD AUTO: 9 %
EOSINOPHIL NFR BLD MANUAL: 0 %
EOSINOPHIL NFR BLD MANUAL: 0 %
EOSINOPHIL NFR BLD MANUAL: 2 %
ERYTHROCYTE [DISTWIDTH] IN BLOOD BY AUTOMATED COUNT: 13.1 % (ref 10–15)
ERYTHROCYTE [DISTWIDTH] IN BLOOD BY AUTOMATED COUNT: 13.2 % (ref 10–15)
ERYTHROCYTE [DISTWIDTH] IN BLOOD BY AUTOMATED COUNT: 13.3 % (ref 10–15)
ERYTHROCYTE [DISTWIDTH] IN BLOOD BY AUTOMATED COUNT: 13.3 % (ref 10–15)
ERYTHROCYTE [DISTWIDTH] IN BLOOD BY AUTOMATED COUNT: 13.4 % (ref 10–15)
ERYTHROCYTE [DISTWIDTH] IN BLOOD BY AUTOMATED COUNT: 13.5 % (ref 10–15)
ERYTHROCYTE [DISTWIDTH] IN BLOOD BY AUTOMATED COUNT: 13.9 % (ref 10–15)
ERYTHROCYTE [DISTWIDTH] IN BLOOD BY AUTOMATED COUNT: 14.4 % (ref 10–15)
ERYTHROCYTE [DISTWIDTH] IN BLOOD BY AUTOMATED COUNT: 14.5 % (ref 10–15)
ERYTHROCYTE [DISTWIDTH] IN BLOOD BY AUTOMATED COUNT: 14.5 % (ref 10–15)
ERYTHROCYTE [DISTWIDTH] IN BLOOD BY AUTOMATED COUNT: 14.6 % (ref 10–15)
ERYTHROCYTE [DISTWIDTH] IN BLOOD BY AUTOMATED COUNT: 14.7 % (ref 10–15)
ERYTHROCYTE [DISTWIDTH] IN BLOOD BY AUTOMATED COUNT: 14.7 % (ref 10–15)
ERYTHROCYTE [DISTWIDTH] IN BLOOD BY AUTOMATED COUNT: 14.8 % (ref 10–15)
ERYTHROCYTE [DISTWIDTH] IN BLOOD BY AUTOMATED COUNT: 14.8 % (ref 10–15)
ERYTHROCYTE [DISTWIDTH] IN BLOOD BY AUTOMATED COUNT: 14.9 % (ref 10–15)
ERYTHROCYTE [DISTWIDTH] IN BLOOD BY AUTOMATED COUNT: 14.9 % (ref 10–15)
ERYTHROCYTE [DISTWIDTH] IN BLOOD BY AUTOMATED COUNT: 15 % (ref 10–15)
ERYTHROCYTE [DISTWIDTH] IN BLOOD BY AUTOMATED COUNT: 15.1 % (ref 10–15)
ERYTHROCYTE [DISTWIDTH] IN BLOOD BY AUTOMATED COUNT: 15.2 % (ref 10–15)
ERYTHROCYTE [DISTWIDTH] IN BLOOD BY AUTOMATED COUNT: 15.3 % (ref 10–15)
ERYTHROCYTE [DISTWIDTH] IN BLOOD BY AUTOMATED COUNT: 15.4 % (ref 10–15)
ERYTHROCYTE [DISTWIDTH] IN BLOOD BY AUTOMATED COUNT: 15.8 % (ref 10–15)
ERYTHROCYTE [DISTWIDTH] IN BLOOD BY AUTOMATED COUNT: 16 % (ref 10–15)
ERYTHROCYTE [DISTWIDTH] IN BLOOD BY AUTOMATED COUNT: 16 % (ref 10–15)
ERYTHROCYTE [DISTWIDTH] IN BLOOD BY AUTOMATED COUNT: 16.4 % (ref 10–15)
ERYTHROCYTE [DISTWIDTH] IN BLOOD BY AUTOMATED COUNT: 16.5 % (ref 10–15)
ESCHERICHIA COLI: DETECTED
FASTING STATUS PATIENT QL REPORTED: NO
FLUAV H1 2009 PAND RNA SPEC QL NAA+PROBE: NOT DETECTED
FLUAV H1 RNA SPEC QL NAA+PROBE: NOT DETECTED
FLUAV H3 RNA SPEC QL NAA+PROBE: NOT DETECTED
FLUAV RNA SPEC QL NAA+PROBE: NEGATIVE
FLUAV RNA SPEC QL NAA+PROBE: NOT DETECTED
FLUBV RNA RESP QL NAA+PROBE: NEGATIVE
FLUBV RNA SPEC QL NAA+PROBE: NOT DETECTED
GLUCOSE BLDC GLUCOMTR-MCNC: 101 MG/DL (ref 70–99)
GLUCOSE BLDC GLUCOMTR-MCNC: 101 MG/DL (ref 70–99)
GLUCOSE BLDC GLUCOMTR-MCNC: 102 MG/DL (ref 70–99)
GLUCOSE BLDC GLUCOMTR-MCNC: 104 MG/DL (ref 70–99)
GLUCOSE BLDC GLUCOMTR-MCNC: 105 MG/DL (ref 70–99)
GLUCOSE BLDC GLUCOMTR-MCNC: 107 MG/DL (ref 70–99)
GLUCOSE BLDC GLUCOMTR-MCNC: 109 MG/DL (ref 70–99)
GLUCOSE BLDC GLUCOMTR-MCNC: 109 MG/DL (ref 70–99)
GLUCOSE BLDC GLUCOMTR-MCNC: 112 MG/DL (ref 70–99)
GLUCOSE BLDC GLUCOMTR-MCNC: 115 MG/DL (ref 70–99)
GLUCOSE BLDC GLUCOMTR-MCNC: 115 MG/DL (ref 70–99)
GLUCOSE BLDC GLUCOMTR-MCNC: 116 MG/DL (ref 70–99)
GLUCOSE BLDC GLUCOMTR-MCNC: 117 MG/DL (ref 70–99)
GLUCOSE BLDC GLUCOMTR-MCNC: 118 MG/DL (ref 70–99)
GLUCOSE BLDC GLUCOMTR-MCNC: 118 MG/DL (ref 70–99)
GLUCOSE BLDC GLUCOMTR-MCNC: 119 MG/DL (ref 70–99)
GLUCOSE BLDC GLUCOMTR-MCNC: 120 MG/DL (ref 70–99)
GLUCOSE BLDC GLUCOMTR-MCNC: 121 MG/DL (ref 70–99)
GLUCOSE BLDC GLUCOMTR-MCNC: 122 MG/DL (ref 70–99)
GLUCOSE BLDC GLUCOMTR-MCNC: 124 MG/DL (ref 70–99)
GLUCOSE BLDC GLUCOMTR-MCNC: 126 MG/DL (ref 70–99)
GLUCOSE BLDC GLUCOMTR-MCNC: 126 MG/DL (ref 70–99)
GLUCOSE BLDC GLUCOMTR-MCNC: 127 MG/DL (ref 70–99)
GLUCOSE BLDC GLUCOMTR-MCNC: 128 MG/DL (ref 70–99)
GLUCOSE BLDC GLUCOMTR-MCNC: 128 MG/DL (ref 70–99)
GLUCOSE BLDC GLUCOMTR-MCNC: 129 MG/DL (ref 70–99)
GLUCOSE BLDC GLUCOMTR-MCNC: 130 MG/DL (ref 70–99)
GLUCOSE BLDC GLUCOMTR-MCNC: 131 MG/DL (ref 70–99)
GLUCOSE BLDC GLUCOMTR-MCNC: 133 MG/DL (ref 70–99)
GLUCOSE BLDC GLUCOMTR-MCNC: 134 MG/DL (ref 70–99)
GLUCOSE BLDC GLUCOMTR-MCNC: 135 MG/DL (ref 70–99)
GLUCOSE BLDC GLUCOMTR-MCNC: 135 MG/DL (ref 70–99)
GLUCOSE BLDC GLUCOMTR-MCNC: 138 MG/DL (ref 70–99)
GLUCOSE BLDC GLUCOMTR-MCNC: 138 MG/DL (ref 70–99)
GLUCOSE BLDC GLUCOMTR-MCNC: 140 MG/DL (ref 70–99)
GLUCOSE BLDC GLUCOMTR-MCNC: 140 MG/DL (ref 70–99)
GLUCOSE BLDC GLUCOMTR-MCNC: 141 MG/DL (ref 70–99)
GLUCOSE BLDC GLUCOMTR-MCNC: 141 MG/DL (ref 70–99)
GLUCOSE BLDC GLUCOMTR-MCNC: 142 MG/DL (ref 70–99)
GLUCOSE BLDC GLUCOMTR-MCNC: 143 MG/DL (ref 70–99)
GLUCOSE BLDC GLUCOMTR-MCNC: 144 MG/DL (ref 70–99)
GLUCOSE BLDC GLUCOMTR-MCNC: 145 MG/DL (ref 70–99)
GLUCOSE BLDC GLUCOMTR-MCNC: 145 MG/DL (ref 70–99)
GLUCOSE BLDC GLUCOMTR-MCNC: 147 MG/DL (ref 70–99)
GLUCOSE BLDC GLUCOMTR-MCNC: 148 MG/DL (ref 70–99)
GLUCOSE BLDC GLUCOMTR-MCNC: 149 MG/DL (ref 70–99)
GLUCOSE BLDC GLUCOMTR-MCNC: 150 MG/DL (ref 70–99)
GLUCOSE BLDC GLUCOMTR-MCNC: 151 MG/DL (ref 70–99)
GLUCOSE BLDC GLUCOMTR-MCNC: 151 MG/DL (ref 70–99)
GLUCOSE BLDC GLUCOMTR-MCNC: 152 MG/DL (ref 70–99)
GLUCOSE BLDC GLUCOMTR-MCNC: 153 MG/DL (ref 70–99)
GLUCOSE BLDC GLUCOMTR-MCNC: 155 MG/DL (ref 70–99)
GLUCOSE BLDC GLUCOMTR-MCNC: 157 MG/DL (ref 70–99)
GLUCOSE BLDC GLUCOMTR-MCNC: 157 MG/DL (ref 70–99)
GLUCOSE BLDC GLUCOMTR-MCNC: 158 MG/DL (ref 70–99)
GLUCOSE BLDC GLUCOMTR-MCNC: 160 MG/DL (ref 70–99)
GLUCOSE BLDC GLUCOMTR-MCNC: 161 MG/DL (ref 70–99)
GLUCOSE BLDC GLUCOMTR-MCNC: 164 MG/DL (ref 70–99)
GLUCOSE BLDC GLUCOMTR-MCNC: 165 MG/DL (ref 70–99)
GLUCOSE BLDC GLUCOMTR-MCNC: 165 MG/DL (ref 70–99)
GLUCOSE BLDC GLUCOMTR-MCNC: 166 MG/DL (ref 70–99)
GLUCOSE BLDC GLUCOMTR-MCNC: 166 MG/DL (ref 70–99)
GLUCOSE BLDC GLUCOMTR-MCNC: 167 MG/DL (ref 70–99)
GLUCOSE BLDC GLUCOMTR-MCNC: 167 MG/DL (ref 70–99)
GLUCOSE BLDC GLUCOMTR-MCNC: 170 MG/DL (ref 70–99)
GLUCOSE BLDC GLUCOMTR-MCNC: 171 MG/DL (ref 70–99)
GLUCOSE BLDC GLUCOMTR-MCNC: 173 MG/DL (ref 70–99)
GLUCOSE BLDC GLUCOMTR-MCNC: 174 MG/DL (ref 70–99)
GLUCOSE BLDC GLUCOMTR-MCNC: 176 MG/DL (ref 70–99)
GLUCOSE BLDC GLUCOMTR-MCNC: 177 MG/DL (ref 70–99)
GLUCOSE BLDC GLUCOMTR-MCNC: 178 MG/DL (ref 70–99)
GLUCOSE BLDC GLUCOMTR-MCNC: 179 MG/DL (ref 70–99)
GLUCOSE BLDC GLUCOMTR-MCNC: 180 MG/DL (ref 70–99)
GLUCOSE BLDC GLUCOMTR-MCNC: 181 MG/DL (ref 70–99)
GLUCOSE BLDC GLUCOMTR-MCNC: 183 MG/DL (ref 70–99)
GLUCOSE BLDC GLUCOMTR-MCNC: 185 MG/DL (ref 70–99)
GLUCOSE BLDC GLUCOMTR-MCNC: 186 MG/DL (ref 70–99)
GLUCOSE BLDC GLUCOMTR-MCNC: 186 MG/DL (ref 70–99)
GLUCOSE BLDC GLUCOMTR-MCNC: 187 MG/DL (ref 70–99)
GLUCOSE BLDC GLUCOMTR-MCNC: 187 MG/DL (ref 70–99)
GLUCOSE BLDC GLUCOMTR-MCNC: 188 MG/DL (ref 70–99)
GLUCOSE BLDC GLUCOMTR-MCNC: 193 MG/DL (ref 70–99)
GLUCOSE BLDC GLUCOMTR-MCNC: 194 MG/DL (ref 70–99)
GLUCOSE BLDC GLUCOMTR-MCNC: 196 MG/DL (ref 70–99)
GLUCOSE BLDC GLUCOMTR-MCNC: 196 MG/DL (ref 70–99)
GLUCOSE BLDC GLUCOMTR-MCNC: 197 MG/DL (ref 70–99)
GLUCOSE BLDC GLUCOMTR-MCNC: 197 MG/DL (ref 70–99)
GLUCOSE BLDC GLUCOMTR-MCNC: 198 MG/DL (ref 70–99)
GLUCOSE BLDC GLUCOMTR-MCNC: 200 MG/DL (ref 70–99)
GLUCOSE BLDC GLUCOMTR-MCNC: 212 MG/DL (ref 70–99)
GLUCOSE BLDC GLUCOMTR-MCNC: 214 MG/DL (ref 70–99)
GLUCOSE BLDC GLUCOMTR-MCNC: 216 MG/DL (ref 70–99)
GLUCOSE BLDC GLUCOMTR-MCNC: 219 MG/DL (ref 70–99)
GLUCOSE BLDC GLUCOMTR-MCNC: 219 MG/DL (ref 70–99)
GLUCOSE BLDC GLUCOMTR-MCNC: 220 MG/DL (ref 70–99)
GLUCOSE BLDC GLUCOMTR-MCNC: 221 MG/DL (ref 70–99)
GLUCOSE BLDC GLUCOMTR-MCNC: 222 MG/DL (ref 70–99)
GLUCOSE BLDC GLUCOMTR-MCNC: 223 MG/DL (ref 70–99)
GLUCOSE BLDC GLUCOMTR-MCNC: 223 MG/DL (ref 70–99)
GLUCOSE BLDC GLUCOMTR-MCNC: 224 MG/DL (ref 70–99)
GLUCOSE BLDC GLUCOMTR-MCNC: 225 MG/DL (ref 70–99)
GLUCOSE BLDC GLUCOMTR-MCNC: 226 MG/DL (ref 70–99)
GLUCOSE BLDC GLUCOMTR-MCNC: 231 MG/DL (ref 70–99)
GLUCOSE BLDC GLUCOMTR-MCNC: 232 MG/DL (ref 70–99)
GLUCOSE BLDC GLUCOMTR-MCNC: 234 MG/DL (ref 70–99)
GLUCOSE BLDC GLUCOMTR-MCNC: 236 MG/DL (ref 70–99)
GLUCOSE BLDC GLUCOMTR-MCNC: 240 MG/DL (ref 70–99)
GLUCOSE BLDC GLUCOMTR-MCNC: 246 MG/DL (ref 70–99)
GLUCOSE BLDC GLUCOMTR-MCNC: 248 MG/DL (ref 70–99)
GLUCOSE BLDC GLUCOMTR-MCNC: 250 MG/DL (ref 70–99)
GLUCOSE BLDC GLUCOMTR-MCNC: 259 MG/DL (ref 70–99)
GLUCOSE BLDC GLUCOMTR-MCNC: 261 MG/DL (ref 70–99)
GLUCOSE BLDC GLUCOMTR-MCNC: 264 MG/DL (ref 70–99)
GLUCOSE BLDC GLUCOMTR-MCNC: 271 MG/DL (ref 70–99)
GLUCOSE BLDC GLUCOMTR-MCNC: 279 MG/DL (ref 70–99)
GLUCOSE BLDC GLUCOMTR-MCNC: 284 MG/DL (ref 70–99)
GLUCOSE BLDC GLUCOMTR-MCNC: 285 MG/DL (ref 70–99)
GLUCOSE BLDC GLUCOMTR-MCNC: 287 MG/DL (ref 70–99)
GLUCOSE BLDC GLUCOMTR-MCNC: 289 MG/DL (ref 70–99)
GLUCOSE BLDC GLUCOMTR-MCNC: 313 MG/DL (ref 70–99)
GLUCOSE BLDC GLUCOMTR-MCNC: 317 MG/DL (ref 70–99)
GLUCOSE BLDC GLUCOMTR-MCNC: 56 MG/DL (ref 70–99)
GLUCOSE BLDC GLUCOMTR-MCNC: 56 MG/DL (ref 70–99)
GLUCOSE BLDC GLUCOMTR-MCNC: 66 MG/DL (ref 70–99)
GLUCOSE BLDC GLUCOMTR-MCNC: 70 MG/DL (ref 70–99)
GLUCOSE BLDC GLUCOMTR-MCNC: 73 MG/DL (ref 70–99)
GLUCOSE BLDC GLUCOMTR-MCNC: 74 MG/DL (ref 70–99)
GLUCOSE BLDC GLUCOMTR-MCNC: 75 MG/DL (ref 70–99)
GLUCOSE BLDC GLUCOMTR-MCNC: 80 MG/DL (ref 70–99)
GLUCOSE BLDC GLUCOMTR-MCNC: 81 MG/DL (ref 70–99)
GLUCOSE BLDC GLUCOMTR-MCNC: 83 MG/DL (ref 70–99)
GLUCOSE BLDC GLUCOMTR-MCNC: 86 MG/DL (ref 70–99)
GLUCOSE BLDC GLUCOMTR-MCNC: 90 MG/DL (ref 70–99)
GLUCOSE BLDC GLUCOMTR-MCNC: 90 MG/DL (ref 70–99)
GLUCOSE BLDC GLUCOMTR-MCNC: 91 MG/DL (ref 70–99)
GLUCOSE BLDC GLUCOMTR-MCNC: 94 MG/DL (ref 70–99)
GLUCOSE BLDC GLUCOMTR-MCNC: 95 MG/DL (ref 70–99)
GLUCOSE BLDC GLUCOMTR-MCNC: 97 MG/DL (ref 70–99)
GLUCOSE BLDC GLUCOMTR-MCNC: 99 MG/DL (ref 70–99)
GLUCOSE SERPL-MCNC: 104 MG/DL (ref 70–99)
GLUCOSE SERPL-MCNC: 104 MG/DL (ref 70–99)
GLUCOSE SERPL-MCNC: 112 MG/DL (ref 70–99)
GLUCOSE SERPL-MCNC: 114 MG/DL (ref 70–99)
GLUCOSE SERPL-MCNC: 121 MG/DL (ref 70–99)
GLUCOSE SERPL-MCNC: 122 MG/DL (ref 70–99)
GLUCOSE SERPL-MCNC: 128 MG/DL (ref 70–99)
GLUCOSE SERPL-MCNC: 132 MG/DL (ref 70–99)
GLUCOSE SERPL-MCNC: 134 MG/DL (ref 70–99)
GLUCOSE SERPL-MCNC: 140 MG/DL (ref 70–99)
GLUCOSE SERPL-MCNC: 146 MG/DL (ref 70–99)
GLUCOSE SERPL-MCNC: 147 MG/DL (ref 70–99)
GLUCOSE SERPL-MCNC: 148 MG/DL (ref 70–99)
GLUCOSE SERPL-MCNC: 150 MG/DL (ref 70–99)
GLUCOSE SERPL-MCNC: 152 MG/DL (ref 70–99)
GLUCOSE SERPL-MCNC: 152 MG/DL (ref 70–99)
GLUCOSE SERPL-MCNC: 156 MG/DL (ref 70–99)
GLUCOSE SERPL-MCNC: 166 MG/DL (ref 70–99)
GLUCOSE SERPL-MCNC: 185 MG/DL (ref 70–99)
GLUCOSE SERPL-MCNC: 189 MG/DL (ref 70–99)
GLUCOSE SERPL-MCNC: 194 MG/DL (ref 70–99)
GLUCOSE SERPL-MCNC: 197 MG/DL (ref 70–99)
GLUCOSE SERPL-MCNC: 206 MG/DL (ref 70–99)
GLUCOSE SERPL-MCNC: 218 MG/DL (ref 70–99)
GLUCOSE SERPL-MCNC: 232 MG/DL (ref 70–99)
GLUCOSE SERPL-MCNC: 237 MG/DL (ref 70–99)
GLUCOSE SERPL-MCNC: 238 MG/DL (ref 70–99)
GLUCOSE SERPL-MCNC: 280 MG/DL (ref 70–99)
GLUCOSE SERPL-MCNC: 289 MG/DL (ref 70–99)
GLUCOSE SERPL-MCNC: 72 MG/DL (ref 70–99)
GLUCOSE SERPL-MCNC: 79 MG/DL (ref 70–99)
GLUCOSE SERPL-MCNC: 79 MG/DL (ref 70–99)
GLUCOSE SERPL-MCNC: 85 MG/DL (ref 70–99)
GLUCOSE UR STRIP-MCNC: >1000 MG/DL
GLUCOSE UR STRIP-MCNC: >1000 MG/DL
GLUCOSE UR STRIP-MCNC: >=1000 MG/DL
GRAM STAIN RESULT: ABNORMAL
GRAM STAIN RESULT: NORMAL
GRAM STAIN RESULT: NORMAL
HADV DNA SPEC QL NAA+PROBE: NOT DETECTED
HBA1C MFR BLD: 7.2 %
HCO3 BLDV-SCNC: 28 MMOL/L (ref 21–28)
HCO3 SERPL-SCNC: 22 MMOL/L (ref 22–29)
HCO3 SERPL-SCNC: 23 MMOL/L (ref 22–29)
HCO3 SERPL-SCNC: 24 MMOL/L (ref 22–29)
HCO3 SERPL-SCNC: 24 MMOL/L (ref 22–29)
HCO3 SERPL-SCNC: 25 MMOL/L (ref 22–29)
HCO3 SERPL-SCNC: 26 MMOL/L (ref 22–29)
HCO3 SERPL-SCNC: 27 MMOL/L (ref 22–29)
HCO3 SERPL-SCNC: 28 MMOL/L (ref 22–29)
HCO3 SERPL-SCNC: 30 MMOL/L (ref 22–29)
HCO3 SERPL-SCNC: 30 MMOL/L (ref 22–29)
HCO3 SERPL-SCNC: 32 MMOL/L (ref 22–29)
HCO3 SERPL-SCNC: 36 MMOL/L (ref 22–29)
HCO3 SERPL-SCNC: 36 MMOL/L (ref 22–29)
HCO3 SERPL-SCNC: 38 MMOL/L (ref 22–29)
HCOV PNL SPEC NAA+PROBE: NOT DETECTED
HCT VFR BLD AUTO: 25.4 % (ref 40–53)
HCT VFR BLD AUTO: 26.3 % (ref 40–53)
HCT VFR BLD AUTO: 26.6 % (ref 40–53)
HCT VFR BLD AUTO: 27.1 % (ref 40–53)
HCT VFR BLD AUTO: 27.2 % (ref 40–53)
HCT VFR BLD AUTO: 27.3 % (ref 40–53)
HCT VFR BLD AUTO: 27.4 % (ref 40–53)
HCT VFR BLD AUTO: 28.2 % (ref 40–53)
HCT VFR BLD AUTO: 32.3 % (ref 40–53)
HCT VFR BLD AUTO: 32.6 % (ref 40–53)
HCT VFR BLD AUTO: 32.9 % (ref 40–53)
HCT VFR BLD AUTO: 33 % (ref 40–53)
HCT VFR BLD AUTO: 33.4 % (ref 40–53)
HCT VFR BLD AUTO: 33.5 % (ref 40–53)
HCT VFR BLD AUTO: 33.8 % (ref 40–53)
HCT VFR BLD AUTO: 34 % (ref 40–53)
HCT VFR BLD AUTO: 34.2 % (ref 40–53)
HCT VFR BLD AUTO: 34.6 % (ref 40–53)
HCT VFR BLD AUTO: 35.1 % (ref 40–53)
HCT VFR BLD AUTO: 35.3 % (ref 40–53)
HCT VFR BLD AUTO: 35.6 % (ref 40–53)
HCT VFR BLD AUTO: 36.2 % (ref 40–53)
HCT VFR BLD AUTO: 37 % (ref 40–53)
HCT VFR BLD AUTO: 37.7 % (ref 40–53)
HCT VFR BLD AUTO: 37.7 % (ref 40–53)
HCT VFR BLD AUTO: 40.2 % (ref 40–53)
HCT VFR BLD AUTO: 40.3 % (ref 40–53)
HCT VFR BLD AUTO: 41.8 % (ref 40–53)
HCT VFR BLD AUTO: 41.9 % (ref 40–53)
HCT VFR BLD AUTO: 42 % (ref 40–53)
HCT VFR BLD AUTO: 42 % (ref 40–53)
HCT VFR BLD AUTO: 42.9 % (ref 40–53)
HCT VFR BLD AUTO: 43.2 % (ref 40–53)
HCT VFR BLD AUTO: 45.2 % (ref 40–53)
HDLC SERPL-MCNC: 52 MG/DL
HGB BLD-MCNC: 10.1 G/DL (ref 13.3–17.7)
HGB BLD-MCNC: 10.3 G/DL (ref 13.3–17.7)
HGB BLD-MCNC: 10.4 G/DL (ref 13.3–17.7)
HGB BLD-MCNC: 10.4 G/DL (ref 13.3–17.7)
HGB BLD-MCNC: 10.5 G/DL (ref 13.3–17.7)
HGB BLD-MCNC: 10.5 G/DL (ref 13.3–17.7)
HGB BLD-MCNC: 10.8 G/DL (ref 13.3–17.7)
HGB BLD-MCNC: 10.8 G/DL (ref 13.3–17.7)
HGB BLD-MCNC: 10.9 G/DL (ref 13.3–17.7)
HGB BLD-MCNC: 10.9 G/DL (ref 13.3–17.7)
HGB BLD-MCNC: 11 G/DL (ref 13.3–17.7)
HGB BLD-MCNC: 11.1 G/DL (ref 13.3–17.7)
HGB BLD-MCNC: 11.3 G/DL (ref 13.3–17.7)
HGB BLD-MCNC: 11.4 G/DL (ref 13.3–17.7)
HGB BLD-MCNC: 11.6 G/DL (ref 13.3–17.7)
HGB BLD-MCNC: 11.7 G/DL (ref 13.3–17.7)
HGB BLD-MCNC: 11.8 G/DL (ref 13.3–17.7)
HGB BLD-MCNC: 12.9 G/DL (ref 13.3–17.7)
HGB BLD-MCNC: 13 G/DL (ref 13.3–17.7)
HGB BLD-MCNC: 13.1 G/DL (ref 13.3–17.7)
HGB BLD-MCNC: 13.5 G/DL (ref 13.3–17.7)
HGB BLD-MCNC: 13.7 G/DL (ref 13.3–17.7)
HGB BLD-MCNC: 13.7 G/DL (ref 13.3–17.7)
HGB BLD-MCNC: 13.9 G/DL (ref 13.3–17.7)
HGB BLD-MCNC: 13.9 G/DL (ref 13.3–17.7)
HGB BLD-MCNC: 14.1 G/DL (ref 13.3–17.7)
HGB BLD-MCNC: 14.7 G/DL (ref 13.3–17.7)
HGB BLD-MCNC: 7.5 G/DL (ref 13.3–17.7)
HGB BLD-MCNC: 8.1 G/DL (ref 13.3–17.7)
HGB BLD-MCNC: 8.2 G/DL (ref 13.3–17.7)
HGB BLD-MCNC: 8.3 G/DL (ref 13.3–17.7)
HGB BLD-MCNC: 8.4 G/DL (ref 13.3–17.7)
HGB BLD-MCNC: 8.5 G/DL (ref 13.3–17.7)
HGB BLD-MCNC: 8.6 G/DL (ref 13.3–17.7)
HGB BLD-MCNC: 8.6 G/DL (ref 13.3–17.7)
HGB BLD-MCNC: 8.7 G/DL (ref 13.3–17.7)
HGB BLD-MCNC: 8.8 G/DL (ref 13.3–17.7)
HGB BLD-MCNC: 8.8 G/DL (ref 13.3–17.7)
HGB UR QL STRIP: ABNORMAL
HMPV RNA SPEC QL NAA+PROBE: NOT DETECTED
HOLD SPECIMEN: NORMAL
HPIV1 RNA SPEC QL NAA+PROBE: NOT DETECTED
HPIV2 RNA SPEC QL NAA+PROBE: NOT DETECTED
HPIV3 RNA SPEC QL NAA+PROBE: NOT DETECTED
HPIV4 RNA SPEC QL NAA+PROBE: NOT DETECTED
IMM GRANULOCYTES # BLD: 0 10E3/UL
IMM GRANULOCYTES # BLD: 0 10E3/UL
IMM GRANULOCYTES # BLD: 0.1 10E3/UL
IMM GRANULOCYTES # BLD: 0.1 10E3/UL
IMM GRANULOCYTES # BLD: 0.2 10E3/UL
IMM GRANULOCYTES # BLD: 0.3 10E3/UL
IMM GRANULOCYTES NFR BLD: 0 %
IMM GRANULOCYTES NFR BLD: 0 %
IMM GRANULOCYTES NFR BLD: 1 %
IMM GRANULOCYTES NFR BLD: 2 %
IMP: NOT DETECTED
INR PPP: 1.35 (ref 0.85–1.15)
INR PPP: 1.37 (ref 0.85–1.15)
INR PPP: 1.42 (ref 0.85–1.15)
INR PPP: 1.43 (ref 0.85–1.15)
INR PPP: 1.44 (ref 0.85–1.15)
INR PPP: 1.45 (ref 0.85–1.15)
INR PPP: 1.49 (ref 0.85–1.15)
INR PPP: 1.54 (ref 0.85–1.15)
INR PPP: 1.64 (ref 0.85–1.15)
INR PPP: 1.66 (ref 0.85–1.15)
INR PPP: 1.7 (ref 0.85–1.15)
INR PPP: 1.71 (ref 0.85–1.15)
INR PPP: 1.75 (ref 0.85–1.15)
INR PPP: 1.75 (ref 0.85–1.15)
INR PPP: 1.76 (ref 0.85–1.15)
INR PPP: 1.76 (ref 0.85–1.15)
INR PPP: 1.8 (ref 0.85–1.15)
INR PPP: 1.91 (ref 0.85–1.15)
INR PPP: 2 (ref 0.85–1.15)
INR PPP: 2.03 (ref 0.85–1.15)
INR PPP: 2.04 (ref 0.85–1.15)
INR PPP: 2.05 (ref 0.85–1.15)
INR PPP: 2.07 (ref 0.85–1.15)
INR PPP: 2.11 (ref 0.85–1.15)
INR PPP: 2.11 (ref 0.85–1.15)
INR PPP: 2.12 (ref 0.85–1.15)
INR PPP: 2.31 (ref 0.85–1.15)
INR PPP: 2.32 (ref 0.85–1.15)
INR PPP: 2.37 (ref 0.85–1.15)
INR PPP: 2.39 (ref 0.85–1.15)
INR PPP: 2.54 (ref 0.85–1.15)
INR PPP: 2.66 (ref 0.85–1.15)
INR PPP: 2.71 (ref 0.85–1.15)
INR PPP: 2.88 (ref 0.85–1.15)
INR PPP: 2.96 (ref 0.85–1.15)
INR PPP: 3.02 (ref 0.85–1.15)
INR PPP: 3.03 (ref 0.85–1.15)
INR PPP: 3.14 (ref 0.85–1.15)
INR PPP: 3.31 (ref 0.85–1.15)
INR PPP: 3.43 (ref 0.85–1.15)
INR PPP: 3.66 (ref 0.85–1.15)
INR PPP: 3.66 (ref 0.85–1.15)
INR PPP: 3.68 (ref 0.85–1.15)
INR PPP: 3.69 (ref 0.85–1.15)
INR PPP: 3.94 (ref 0.85–1.15)
INR PPP: 4.08 (ref 0.85–1.15)
INR PPP: 4.14 (ref 0.85–1.15)
INR PPP: 4.14 (ref 0.85–1.15)
INR PPP: 4.84 (ref 0.85–1.15)
INR PPP: 6.09 (ref 0.85–1.15)
INR PPP: 6.93 (ref 0.85–1.15)
INTERPRETATION ECG - MUSE: NORMAL
ISSUE DATE AND TIME: NORMAL
ISSUE DATE AND TIME: NORMAL
KETONES UR STRIP-MCNC: NEGATIVE MG/DL
KLEBSIELLA OXYTOCA: NOT DETECTED
KLEBSIELLA PNEUMONIAE: NOT DETECTED
KPC: NOT DETECTED
LACTATE SERPL-SCNC: 1.1 MMOL/L (ref 0.7–2)
LACTATE SERPL-SCNC: 1.2 MMOL/L (ref 0.7–2)
LACTATE SERPL-SCNC: 1.2 MMOL/L (ref 0.7–2)
LACTATE SERPL-SCNC: 1.3 MMOL/L (ref 0.7–2)
LACTATE SERPL-SCNC: 1.4 MMOL/L (ref 0.7–2)
LACTATE SERPL-SCNC: 1.5 MMOL/L (ref 0.7–2)
LDLC SERPL CALC-MCNC: 65 MG/DL
LEUKOCYTE ESTERASE UR QL STRIP: ABNORMAL
LIPASE SERPL-CCNC: 43 U/L (ref 13–60)
LISTERIA SPECIES (DETECTED/NOT DETECTED): NOT DETECTED
LYMPHOCYTES # BLD AUTO: 1.5 10E3/UL (ref 0.8–5.3)
LYMPHOCYTES # BLD AUTO: 1.9 10E3/UL (ref 0.8–5.3)
LYMPHOCYTES # BLD AUTO: 2 10E3/UL (ref 0.8–5.3)
LYMPHOCYTES # BLD AUTO: 2.1 10E3/UL (ref 0.8–5.3)
LYMPHOCYTES # BLD AUTO: 2.1 10E3/UL (ref 0.8–5.3)
LYMPHOCYTES # BLD AUTO: 2.2 10E3/UL (ref 0.8–5.3)
LYMPHOCYTES # BLD MANUAL: 1.6 10E3/UL (ref 0.8–5.3)
LYMPHOCYTES # BLD MANUAL: 1.7 10E3/UL (ref 0.8–5.3)
LYMPHOCYTES # BLD MANUAL: 2.2 10E3/UL (ref 0.8–5.3)
LYMPHOCYTES NFR BLD AUTO: 11 %
LYMPHOCYTES NFR BLD AUTO: 13 %
LYMPHOCYTES NFR BLD AUTO: 14 %
LYMPHOCYTES NFR BLD AUTO: 16 %
LYMPHOCYTES NFR BLD AUTO: 21 %
LYMPHOCYTES NFR BLD AUTO: 6 %
LYMPHOCYTES NFR BLD MANUAL: 17 %
LYMPHOCYTES NFR BLD MANUAL: 7 %
LYMPHOCYTES NFR BLD MANUAL: 9 %
M PNEUMO DNA SPEC QL NAA+PROBE: NOT DETECTED
MAGNESIUM SERPL-MCNC: 1.6 MG/DL (ref 1.7–2.3)
MAGNESIUM SERPL-MCNC: 2 MG/DL (ref 1.7–2.3)
MAGNESIUM SERPL-MCNC: 2.1 MG/DL (ref 1.7–2.3)
MAGNESIUM SERPL-MCNC: 2.2 MG/DL (ref 1.7–2.3)
MCH RBC QN AUTO: 28.7 PG (ref 26.5–33)
MCH RBC QN AUTO: 29.1 PG (ref 26.5–33)
MCH RBC QN AUTO: 29.2 PG (ref 26.5–33)
MCH RBC QN AUTO: 29.5 PG (ref 26.5–33)
MCH RBC QN AUTO: 29.9 PG (ref 26.5–33)
MCH RBC QN AUTO: 30 PG (ref 26.5–33)
MCH RBC QN AUTO: 30.1 PG (ref 26.5–33)
MCH RBC QN AUTO: 30.1 PG (ref 26.5–33)
MCH RBC QN AUTO: 30.2 PG (ref 26.5–33)
MCH RBC QN AUTO: 30.4 PG (ref 26.5–33)
MCH RBC QN AUTO: 30.5 PG (ref 26.5–33)
MCH RBC QN AUTO: 30.6 PG (ref 26.5–33)
MCH RBC QN AUTO: 30.7 PG (ref 26.5–33)
MCH RBC QN AUTO: 30.7 PG (ref 26.5–33)
MCH RBC QN AUTO: 30.8 PG (ref 26.5–33)
MCH RBC QN AUTO: 30.9 PG (ref 26.5–33)
MCH RBC QN AUTO: 31 PG (ref 26.5–33)
MCH RBC QN AUTO: 31 PG (ref 26.5–33)
MCH RBC QN AUTO: 31.2 PG (ref 26.5–33)
MCH RBC QN AUTO: 31.4 PG (ref 26.5–33)
MCH RBC QN AUTO: 31.5 PG (ref 26.5–33)
MCH RBC QN AUTO: 31.6 PG (ref 26.5–33)
MCH RBC QN AUTO: 31.6 PG (ref 26.5–33)
MCH RBC QN AUTO: 31.7 PG (ref 26.5–33)
MCH RBC QN AUTO: 31.8 PG (ref 26.5–33)
MCH RBC QN AUTO: 32.8 PG (ref 26.5–33)
MCHC RBC AUTO-ENTMCNC: 30.4 G/DL (ref 31.5–36.5)
MCHC RBC AUTO-ENTMCNC: 30.8 G/DL (ref 31.5–36.5)
MCHC RBC AUTO-ENTMCNC: 30.9 G/DL (ref 31.5–36.5)
MCHC RBC AUTO-ENTMCNC: 31 G/DL (ref 31.5–36.5)
MCHC RBC AUTO-ENTMCNC: 31 G/DL (ref 31.5–36.5)
MCHC RBC AUTO-ENTMCNC: 31.3 G/DL (ref 31.5–36.5)
MCHC RBC AUTO-ENTMCNC: 31.4 G/DL (ref 31.5–36.5)
MCHC RBC AUTO-ENTMCNC: 31.5 G/DL (ref 31.5–36.5)
MCHC RBC AUTO-ENTMCNC: 31.5 G/DL (ref 31.5–36.5)
MCHC RBC AUTO-ENTMCNC: 31.6 G/DL (ref 31.5–36.5)
MCHC RBC AUTO-ENTMCNC: 31.9 G/DL (ref 31.5–36.5)
MCHC RBC AUTO-ENTMCNC: 32 G/DL (ref 31.5–36.5)
MCHC RBC AUTO-ENTMCNC: 32.1 G/DL (ref 31.5–36.5)
MCHC RBC AUTO-ENTMCNC: 32.2 G/DL (ref 31.5–36.5)
MCHC RBC AUTO-ENTMCNC: 32.2 G/DL (ref 31.5–36.5)
MCHC RBC AUTO-ENTMCNC: 32.3 G/DL (ref 31.5–36.5)
MCHC RBC AUTO-ENTMCNC: 32.4 G/DL (ref 31.5–36.5)
MCHC RBC AUTO-ENTMCNC: 32.5 G/DL (ref 31.5–36.5)
MCHC RBC AUTO-ENTMCNC: 32.6 G/DL (ref 31.5–36.5)
MCHC RBC AUTO-ENTMCNC: 32.7 G/DL (ref 31.5–36.5)
MCHC RBC AUTO-ENTMCNC: 32.8 G/DL (ref 31.5–36.5)
MCHC RBC AUTO-ENTMCNC: 33.1 G/DL (ref 31.5–36.5)
MCHC RBC AUTO-ENTMCNC: 34.5 G/DL (ref 31.5–36.5)
MCV RBC AUTO: 100 FL (ref 78–100)
MCV RBC AUTO: 91 FL (ref 78–100)
MCV RBC AUTO: 92 FL (ref 78–100)
MCV RBC AUTO: 93 FL (ref 78–100)
MCV RBC AUTO: 94 FL (ref 78–100)
MCV RBC AUTO: 95 FL (ref 78–100)
MCV RBC AUTO: 96 FL (ref 78–100)
MCV RBC AUTO: 97 FL (ref 78–100)
MCV RBC AUTO: 98 FL (ref 78–100)
MCV RBC AUTO: 99 FL (ref 78–100)
MCV RBC AUTO: 99 FL (ref 78–100)
MICROALBUMIN UR-MCNC: 46.8 MG/L
MICROALBUMIN/CREAT UR: 53.61 MG/G CR (ref 0–17)
MONOCYTES # BLD AUTO: 0.9 10E3/UL (ref 0–1.3)
MONOCYTES # BLD AUTO: 1.3 10E3/UL (ref 0–1.3)
MONOCYTES # BLD AUTO: 1.4 10E3/UL (ref 0–1.3)
MONOCYTES # BLD AUTO: 1.5 10E3/UL (ref 0–1.3)
MONOCYTES # BLD MANUAL: 1.2 10E3/UL (ref 0–1.3)
MONOCYTES # BLD MANUAL: 1.6 10E3/UL (ref 0–1.3)
MONOCYTES # BLD MANUAL: 2.2 10E3/UL (ref 0–1.3)
MONOCYTES NFR BLD AUTO: 10 %
MONOCYTES NFR BLD AUTO: 12 %
MONOCYTES NFR BLD AUTO: 6 %
MONOCYTES NFR BLD AUTO: 7 %
MONOCYTES NFR BLD AUTO: 8 %
MONOCYTES NFR BLD AUTO: 8 %
MONOCYTES NFR BLD MANUAL: 9 %
MUCOUS THREADS #/AREA URNS LPF: PRESENT /LPF
NDM: NOT DETECTED
NEUTROPHILS # BLD AUTO: 10.8 10E3/UL (ref 1.6–8.3)
NEUTROPHILS # BLD AUTO: 13 10E3/UL (ref 1.6–8.3)
NEUTROPHILS # BLD AUTO: 13.4 10E3/UL (ref 1.6–8.3)
NEUTROPHILS # BLD AUTO: 20.6 10E3/UL (ref 1.6–8.3)
NEUTROPHILS # BLD AUTO: 5.8 10E3/UL (ref 1.6–8.3)
NEUTROPHILS # BLD AUTO: 8.8 10E3/UL (ref 1.6–8.3)
NEUTROPHILS # BLD MANUAL: 14.8 10E3/UL (ref 1.6–8.3)
NEUTROPHILS # BLD MANUAL: 20.7 10E3/UL (ref 1.6–8.3)
NEUTROPHILS # BLD MANUAL: 9.3 10E3/UL (ref 1.6–8.3)
NEUTROPHILS NFR BLD AUTO: 57 %
NEUTROPHILS NFR BLD AUTO: 70 %
NEUTROPHILS NFR BLD AUTO: 74 %
NEUTROPHILS NFR BLD AUTO: 77 %
NEUTROPHILS NFR BLD AUTO: 77 %
NEUTROPHILS NFR BLD AUTO: 87 %
NEUTROPHILS NFR BLD MANUAL: 72 %
NEUTROPHILS NFR BLD MANUAL: 82 %
NEUTROPHILS NFR BLD MANUAL: 84 %
NITRATE UR QL: NEGATIVE
NONHDLC SERPL-MCNC: 84 MG/DL
NRBC # BLD AUTO: 0 10E3/UL
NRBC BLD AUTO-RTO: 0 /100
NT-PROBNP SERPL-MCNC: 4359 PG/ML (ref 0–1800)
NT-PROBNP SERPL-MCNC: 9693 PG/ML (ref 0–1800)
O2/TOTAL GAS SETTING VFR VENT: 28 %
OXA (DETECTED/NOT DETECTED): NOT DETECTED
OXYHGB MFR BLDV: 13 % (ref 70–75)
P AXIS - MUSE: NORMAL DEGREES
PATH REV: ABNORMAL
PCO2 BLDV: 50 MM HG (ref 40–50)
PH BLDV: 7.36 [PH] (ref 7.32–7.43)
PH UR STRIP: 6 [PH] (ref 5–7)
PH UR STRIP: 8 [PH] (ref 5–7)
PH UR STRIP: 8 [PH] (ref 5–7)
PHOSPHATE SERPL-MCNC: 0.8 MG/DL (ref 2.5–4.5)
PHOSPHATE SERPL-MCNC: 1.4 MG/DL (ref 2.5–4.5)
PHOSPHATE SERPL-MCNC: 1.5 MG/DL (ref 2.5–4.5)
PHOSPHATE SERPL-MCNC: 1.6 MG/DL (ref 2.5–4.5)
PHOSPHATE SERPL-MCNC: 1.6 MG/DL (ref 2.5–4.5)
PHOSPHATE SERPL-MCNC: 1.7 MG/DL (ref 2.5–4.5)
PHOSPHATE SERPL-MCNC: 1.8 MG/DL (ref 2.5–4.5)
PHOSPHATE SERPL-MCNC: 1.8 MG/DL (ref 2.5–4.5)
PHOSPHATE SERPL-MCNC: 1.9 MG/DL (ref 2.5–4.5)
PHOSPHATE SERPL-MCNC: 2.1 MG/DL (ref 2.5–4.5)
PHOSPHATE SERPL-MCNC: 2.1 MG/DL (ref 2.5–4.5)
PHOSPHATE SERPL-MCNC: 2.2 MG/DL (ref 2.5–4.5)
PHOSPHATE SERPL-MCNC: 2.3 MG/DL (ref 2.5–4.5)
PHOSPHATE SERPL-MCNC: 2.4 MG/DL (ref 2.5–4.5)
PHOSPHATE SERPL-MCNC: 2.4 MG/DL (ref 2.5–4.5)
PHOSPHATE SERPL-MCNC: 2.6 MG/DL (ref 2.5–4.5)
PHOSPHATE SERPL-MCNC: 2.7 MG/DL (ref 2.5–4.5)
PHOSPHATE SERPL-MCNC: 2.7 MG/DL (ref 2.5–4.5)
PHOSPHATE SERPL-MCNC: 2.8 MG/DL (ref 2.5–4.5)
PHOSPHATE SERPL-MCNC: 2.8 MG/DL (ref 2.5–4.5)
PHOSPHATE SERPL-MCNC: 2.9 MG/DL (ref 2.5–4.5)
PLAT MORPH BLD: ABNORMAL
PLATELET # BLD AUTO: 146 10E3/UL (ref 150–450)
PLATELET # BLD AUTO: 148 10E3/UL (ref 150–450)
PLATELET # BLD AUTO: 154 10E3/UL (ref 150–450)
PLATELET # BLD AUTO: 158 10E3/UL (ref 150–450)
PLATELET # BLD AUTO: 158 10E3/UL (ref 150–450)
PLATELET # BLD AUTO: 160 10E3/UL (ref 150–450)
PLATELET # BLD AUTO: 161 10E3/UL (ref 150–450)
PLATELET # BLD AUTO: 163 10E3/UL (ref 150–450)
PLATELET # BLD AUTO: 164 10E3/UL (ref 150–450)
PLATELET # BLD AUTO: 168 10E3/UL (ref 150–450)
PLATELET # BLD AUTO: 170 10E3/UL (ref 150–450)
PLATELET # BLD AUTO: 180 10E3/UL (ref 150–450)
PLATELET # BLD AUTO: 184 10E3/UL (ref 150–450)
PLATELET # BLD AUTO: 186 10E3/UL (ref 150–450)
PLATELET # BLD AUTO: 191 10E3/UL (ref 150–450)
PLATELET # BLD AUTO: 192 10E3/UL (ref 150–450)
PLATELET # BLD AUTO: 192 10E3/UL (ref 150–450)
PLATELET # BLD AUTO: 195 10E3/UL (ref 150–450)
PLATELET # BLD AUTO: 198 10E3/UL (ref 150–450)
PLATELET # BLD AUTO: 203 10E3/UL (ref 150–450)
PLATELET # BLD AUTO: 203 10E3/UL (ref 150–450)
PLATELET # BLD AUTO: 213 10E3/UL (ref 150–450)
PLATELET # BLD AUTO: 223 10E3/UL (ref 150–450)
PLATELET # BLD AUTO: 223 10E3/UL (ref 150–450)
PLATELET # BLD AUTO: 224 10E3/UL (ref 150–450)
PLATELET # BLD AUTO: 224 10E3/UL (ref 150–450)
PLATELET # BLD AUTO: 225 10E3/UL (ref 150–450)
PLATELET # BLD AUTO: 235 10E3/UL (ref 150–450)
PLATELET # BLD AUTO: 236 10E3/UL (ref 150–450)
PLATELET # BLD AUTO: 240 10E3/UL (ref 150–450)
PLATELET # BLD AUTO: 253 10E3/UL (ref 150–450)
PLATELET # BLD AUTO: 261 10E3/UL (ref 150–450)
PLATELET # BLD AUTO: 278 10E3/UL (ref 150–450)
PLATELET # BLD AUTO: 287 10E3/UL (ref 150–450)
PLATELET # BLD AUTO: 295 10E3/UL (ref 150–450)
PLATELET # BLD AUTO: 307 10E3/UL (ref 150–450)
PLATELET # BLD AUTO: 326 10E3/UL (ref 150–450)
PLATELET # BLD AUTO: 92 10E3/UL (ref 150–450)
PO2 BLDV: 14 MM HG (ref 25–47)
POTASSIUM SERPL-SCNC: 3.2 MMOL/L (ref 3.4–5.3)
POTASSIUM SERPL-SCNC: 3.2 MMOL/L (ref 3.4–5.3)
POTASSIUM SERPL-SCNC: 3.5 MMOL/L (ref 3.4–5.3)
POTASSIUM SERPL-SCNC: 3.6 MMOL/L (ref 3.4–5.3)
POTASSIUM SERPL-SCNC: 3.7 MMOL/L (ref 3.4–5.3)
POTASSIUM SERPL-SCNC: 3.8 MMOL/L (ref 3.4–5.3)
POTASSIUM SERPL-SCNC: 3.9 MMOL/L (ref 3.4–5.3)
POTASSIUM SERPL-SCNC: 4 MMOL/L (ref 3.4–5.3)
POTASSIUM SERPL-SCNC: 4.1 MMOL/L (ref 3.4–5.3)
POTASSIUM SERPL-SCNC: 4.2 MMOL/L (ref 3.4–5.3)
POTASSIUM SERPL-SCNC: 4.3 MMOL/L (ref 3.4–5.3)
POTASSIUM SERPL-SCNC: 4.4 MMOL/L (ref 3.4–5.3)
POTASSIUM SERPL-SCNC: 4.8 MMOL/L (ref 3.4–5.3)
POTASSIUM SERPL-SCNC: 4.8 MMOL/L (ref 3.4–5.3)
PR INTERVAL - MUSE: NORMAL MS
PROCALCITONIN SERPL IA-MCNC: 0.11 NG/ML
PROCALCITONIN SERPL IA-MCNC: 0.12 NG/ML
PROCALCITONIN SERPL IA-MCNC: 39.57 NG/ML
PROCALCITONIN SERPL IA-MCNC: 65.78 NG/ML
PROT SERPL-MCNC: 6 G/DL (ref 6.4–8.3)
PROT SERPL-MCNC: 6.1 G/DL (ref 6.4–8.3)
PROT SERPL-MCNC: 6.1 G/DL (ref 6.4–8.3)
PROT SERPL-MCNC: 6.4 G/DL (ref 6.4–8.3)
PROT SERPL-MCNC: 6.5 G/DL (ref 6.4–8.3)
PROT SERPL-MCNC: 6.9 G/DL (ref 6.4–8.3)
PROTEUS SPECIES: NOT DETECTED
PSEUDOMONAS AERUGINOSA: NOT DETECTED
QRS DURATION - MUSE: 114 MS
QT - MUSE: 496 MS
QTC - MUSE: 546 MS
R AXIS - MUSE: -84 DEGREES
RBC # BLD AUTO: 2.66 10E6/UL (ref 4.4–5.9)
RBC # BLD AUTO: 2.66 10E6/UL (ref 4.4–5.9)
RBC # BLD AUTO: 2.74 10E6/UL (ref 4.4–5.9)
RBC # BLD AUTO: 2.77 10E6/UL (ref 4.4–5.9)
RBC # BLD AUTO: 2.81 10E6/UL (ref 4.4–5.9)
RBC # BLD AUTO: 2.87 10E6/UL (ref 4.4–5.9)
RBC # BLD AUTO: 2.88 10E6/UL (ref 4.4–5.9)
RBC # BLD AUTO: 2.89 10E6/UL (ref 4.4–5.9)
RBC # BLD AUTO: 3.32 10E6/UL (ref 4.4–5.9)
RBC # BLD AUTO: 3.38 10E6/UL (ref 4.4–5.9)
RBC # BLD AUTO: 3.44 10E6/UL (ref 4.4–5.9)
RBC # BLD AUTO: 3.45 10E6/UL (ref 4.4–5.9)
RBC # BLD AUTO: 3.5 10E6/UL (ref 4.4–5.9)
RBC # BLD AUTO: 3.53 10E6/UL (ref 4.4–5.9)
RBC # BLD AUTO: 3.57 10E6/UL (ref 4.4–5.9)
RBC # BLD AUTO: 3.58 10E6/UL (ref 4.4–5.9)
RBC # BLD AUTO: 3.59 10E6/UL (ref 4.4–5.9)
RBC # BLD AUTO: 3.65 10E6/UL (ref 4.4–5.9)
RBC # BLD AUTO: 3.65 10E6/UL (ref 4.4–5.9)
RBC # BLD AUTO: 3.68 10E6/UL (ref 4.4–5.9)
RBC # BLD AUTO: 3.69 10E6/UL (ref 4.4–5.9)
RBC # BLD AUTO: 3.87 10E6/UL (ref 4.4–5.9)
RBC # BLD AUTO: 3.96 10E6/UL (ref 4.4–5.9)
RBC # BLD AUTO: 4.06 10E6/UL (ref 4.4–5.9)
RBC # BLD AUTO: 4.08 10E6/UL (ref 4.4–5.9)
RBC # BLD AUTO: 4.11 10E6/UL (ref 4.4–5.9)
RBC # BLD AUTO: 4.15 10E6/UL (ref 4.4–5.9)
RBC # BLD AUTO: 4.25 10E6/UL (ref 4.4–5.9)
RBC # BLD AUTO: 4.34 10E6/UL (ref 4.4–5.9)
RBC # BLD AUTO: 4.37 10E6/UL (ref 4.4–5.9)
RBC # BLD AUTO: 4.42 10E6/UL (ref 4.4–5.9)
RBC # BLD AUTO: 4.45 10E6/UL (ref 4.4–5.9)
RBC # BLD AUTO: 4.48 10E6/UL (ref 4.4–5.9)
RBC # BLD AUTO: 4.64 10E6/UL (ref 4.4–5.9)
RBC MORPH BLD: ABNORMAL
RBC URINE: 0 /HPF
RBC URINE: 111 /HPF
RBC URINE: 33 /HPF
RBC URINE: 7 /HPF
RBC URINE: >182 /HPF
RBC URINE: >182 /HPF
RSV RNA SPEC NAA+PROBE: NEGATIVE
RSV RNA SPEC QL NAA+PROBE: NOT DETECTED
RSV RNA SPEC QL NAA+PROBE: NOT DETECTED
RV+EV RNA SPEC QL NAA+PROBE: NOT DETECTED
SAO2 % BLDV: 13.3 % (ref 70–75)
SARS-COV-2 RNA RESP QL NAA+PROBE: NEGATIVE
SARS-COV-2 RNA RESP QL NAA+PROBE: NEGATIVE
SODIUM SERPL-SCNC: 136 MMOL/L (ref 135–145)
SODIUM SERPL-SCNC: 137 MMOL/L (ref 135–145)
SODIUM SERPL-SCNC: 138 MMOL/L (ref 135–145)
SODIUM SERPL-SCNC: 139 MMOL/L (ref 135–145)
SODIUM SERPL-SCNC: 140 MMOL/L (ref 135–145)
SODIUM SERPL-SCNC: 141 MMOL/L (ref 135–145)
SODIUM SERPL-SCNC: 142 MMOL/L (ref 135–145)
SODIUM SERPL-SCNC: 144 MMOL/L (ref 135–145)
SP GR UR STRIP: 1.01 (ref 1–1.03)
SP GR UR STRIP: 1.01 (ref 1–1.03)
SP GR UR STRIP: 1.03 (ref 1–1.03)
SPECIMEN EXPIRATION DATE: NORMAL
SQUAMOUS EPITHELIAL: 1 /HPF
SQUAMOUS EPITHELIAL: 7 /HPF
SQUAMOUS EPITHELIAL: 9 /HPF
STAPHYLOCOCCUS AUREUS: NOT DETECTED
STAPHYLOCOCCUS EPIDERMIDIS: NOT DETECTED
STAPHYLOCOCCUS LUGDUNENSIS: NOT DETECTED
STAPHYLOCOCCUS SPECIES: NOT DETECTED
STREPTOCOCCUS AGALACTIAE: NOT DETECTED
STREPTOCOCCUS ANGINOSUS GROUP: NOT DETECTED
STREPTOCOCCUS PNEUMONIAE: NOT DETECTED
STREPTOCOCCUS PYOGENES: NOT DETECTED
STREPTOCOCCUS SPECIES: NOT DETECTED
SYSTOLIC BLOOD PRESSURE - MUSE: 84 MMHG
T AXIS - MUSE: 62 DEGREES
TRANSITIONAL EPI: <1 /HPF
TRIGL SERPL-MCNC: 93 MG/DL
TROPONIN T SERPL HS-MCNC: 48 NG/L
TROPONIN T SERPL HS-MCNC: 63 NG/L
TROPONIN T SERPL HS-MCNC: 72 NG/L
UNIT ABO/RH: NORMAL
UNIT ABO/RH: NORMAL
UNIT NUMBER: NORMAL
UNIT NUMBER: NORMAL
UNIT STATUS: NORMAL
UNIT STATUS: NORMAL
UNIT TYPE ISBT: 6200
UNIT TYPE ISBT: 6200
UROBILINOGEN UR STRIP-MCNC: 2 MG/DL
UROBILINOGEN UR STRIP-MCNC: 4 MG/DL
UROBILINOGEN UR STRIP-MCNC: <2 MG/DL
UROBILINOGEN UR STRIP-MCNC: NORMAL MG/DL
VANCOMYCIN SERPL-MCNC: 16.1 UG/ML
VENTRICULAR RATE- MUSE: 73 BPM
VIM: NOT DETECTED
VIT D+METAB SERPL-MCNC: 34 NG/ML (ref 20–50)
WBC # BLD AUTO: 10.2 10E3/UL (ref 4–11)
WBC # BLD AUTO: 10.8 10E3/UL (ref 4–11)
WBC # BLD AUTO: 10.9 10E3/UL (ref 4–11)
WBC # BLD AUTO: 11.3 10E3/UL (ref 4–11)
WBC # BLD AUTO: 11.7 10E3/UL (ref 4–11)
WBC # BLD AUTO: 12.4 10E3/UL (ref 4–11)
WBC # BLD AUTO: 12.7 10E3/UL (ref 4–11)
WBC # BLD AUTO: 12.9 10E3/UL (ref 4–11)
WBC # BLD AUTO: 13.1 10E3/UL (ref 4–11)
WBC # BLD AUTO: 13.2 10E3/UL (ref 4–11)
WBC # BLD AUTO: 13.9 10E3/UL (ref 4–11)
WBC # BLD AUTO: 14 10E3/UL (ref 4–11)
WBC # BLD AUTO: 14.1 10E3/UL (ref 4–11)
WBC # BLD AUTO: 14.1 10E3/UL (ref 4–11)
WBC # BLD AUTO: 14.3 10E3/UL (ref 4–11)
WBC # BLD AUTO: 14.6 10E3/UL (ref 4–11)
WBC # BLD AUTO: 14.7 10E3/UL (ref 4–11)
WBC # BLD AUTO: 15.5 10E3/UL (ref 4–11)
WBC # BLD AUTO: 15.6 10E3/UL (ref 4–11)
WBC # BLD AUTO: 16.1 10E3/UL (ref 4–11)
WBC # BLD AUTO: 16.4 10E3/UL (ref 4–11)
WBC # BLD AUTO: 16.4 10E3/UL (ref 4–11)
WBC # BLD AUTO: 16.7 10E3/UL (ref 4–11)
WBC # BLD AUTO: 16.7 10E3/UL (ref 4–11)
WBC # BLD AUTO: 16.8 10E3/UL (ref 4–11)
WBC # BLD AUTO: 17.3 10E3/UL (ref 4–11)
WBC # BLD AUTO: 17.8 10E3/UL (ref 4–11)
WBC # BLD AUTO: 18 10E3/UL (ref 4–11)
WBC # BLD AUTO: 20.9 10E3/UL (ref 4–11)
WBC # BLD AUTO: 21 10E3/UL (ref 4–11)
WBC # BLD AUTO: 23.7 10E3/UL (ref 4–11)
WBC # BLD AUTO: 24.6 10E3/UL (ref 4–11)
WBC # BLD AUTO: 9.1 10E3/UL (ref 4–11)
WBC # BLD AUTO: 9.2 10E3/UL (ref 4–11)
WBC # BLD AUTO: 9.3 10E3/UL (ref 4–11)
WBC # BLD AUTO: 9.5 10E3/UL (ref 4–11)
WBC # BLD AUTO: 9.7 10E3/UL (ref 4–11)
WBC CLUMPS #/AREA URNS HPF: PRESENT /HPF
WBC URINE: >182 /HPF
YEAST #/AREA URNS HPF: ABNORMAL /HPF
YEAST #/AREA URNS HPF: ABNORMAL /HPF

## 2024-01-01 PROCEDURE — 87637 SARSCOV2&INF A&B&RSV AMP PRB: CPT | Performed by: EMERGENCY MEDICINE

## 2024-01-01 PROCEDURE — 80048 BASIC METABOLIC PNL TOTAL CA: CPT | Performed by: INTERNAL MEDICINE

## 2024-01-01 PROCEDURE — 85007 BL SMEAR W/DIFF WBC COUNT: CPT | Performed by: INTERNAL MEDICINE

## 2024-01-01 PROCEDURE — 82962 GLUCOSE BLOOD TEST: CPT

## 2024-01-01 PROCEDURE — 83605 ASSAY OF LACTIC ACID: CPT | Performed by: EMERGENCY MEDICINE

## 2024-01-01 PROCEDURE — 120N000001 HC R&B MED SURG/OB

## 2024-01-01 PROCEDURE — 85025 COMPLETE CBC W/AUTO DIFF WBC: CPT | Mod: ORL | Performed by: FAMILY MEDICINE

## 2024-01-01 PROCEDURE — 36415 COLL VENOUS BLD VENIPUNCTURE: CPT | Performed by: INTERNAL MEDICINE

## 2024-01-01 PROCEDURE — 258N000003 HC RX IP 258 OP 636: Performed by: SURGERY

## 2024-01-01 PROCEDURE — 85610 PROTHROMBIN TIME: CPT | Performed by: NURSE PRACTITIONER

## 2024-01-01 PROCEDURE — 250N000012 HC RX MED GY IP 250 OP 636 PS 637

## 2024-01-01 PROCEDURE — 250N000013 HC RX MED GY IP 250 OP 250 PS 637: Performed by: INTERNAL MEDICINE

## 2024-01-01 PROCEDURE — 85027 COMPLETE CBC AUTOMATED: CPT | Performed by: INTERNAL MEDICINE

## 2024-01-01 PROCEDURE — 99232 SBSQ HOSP IP/OBS MODERATE 35: CPT | Performed by: INTERNAL MEDICINE

## 2024-01-01 PROCEDURE — 97535 SELF CARE MNGMENT TRAINING: CPT | Mod: GO

## 2024-01-01 PROCEDURE — 99239 HOSP IP/OBS DSCHRG MGMT >30: CPT | Performed by: INTERNAL MEDICINE

## 2024-01-01 PROCEDURE — 250N000013 HC RX MED GY IP 250 OP 250 PS 637: Performed by: SURGERY

## 2024-01-01 PROCEDURE — 93306 TTE W/DOPPLER COMPLETE: CPT | Mod: 26 | Performed by: INTERNAL MEDICINE

## 2024-01-01 PROCEDURE — 250N000013 HC RX MED GY IP 250 OP 250 PS 637: Performed by: EMERGENCY MEDICINE

## 2024-01-01 PROCEDURE — 250N000011 HC RX IP 250 OP 636: Performed by: SURGERY

## 2024-01-01 PROCEDURE — 258N000003 HC RX IP 258 OP 636: Performed by: INTERNAL MEDICINE

## 2024-01-01 PROCEDURE — 85610 PROTHROMBIN TIME: CPT | Performed by: SURGERY

## 2024-01-01 PROCEDURE — A9537 TC99M MEBROFENIN: HCPCS | Performed by: EMERGENCY MEDICINE

## 2024-01-01 PROCEDURE — 99283 EMERGENCY DEPT VISIT LOW MDM: CPT | Mod: 25

## 2024-01-01 PROCEDURE — 85610 PROTHROMBIN TIME: CPT | Performed by: INTERNAL MEDICINE

## 2024-01-01 PROCEDURE — 84100 ASSAY OF PHOSPHORUS: CPT | Performed by: HOSPITALIST

## 2024-01-01 PROCEDURE — 250N000013 HC RX MED GY IP 250 OP 250 PS 637: Performed by: NURSE PRACTITIONER

## 2024-01-01 PROCEDURE — 83880 ASSAY OF NATRIURETIC PEPTIDE: CPT | Performed by: STUDENT IN AN ORGANIZED HEALTH CARE EDUCATION/TRAINING PROGRAM

## 2024-01-01 PROCEDURE — 80202 ASSAY OF VANCOMYCIN: CPT | Performed by: INTERNAL MEDICINE

## 2024-01-01 PROCEDURE — 81001 URINALYSIS AUTO W/SCOPE: CPT | Mod: ORL | Performed by: FAMILY MEDICINE

## 2024-01-01 PROCEDURE — 97605 NEG PRS WND THER DME<=50SQCM: CPT

## 2024-01-01 PROCEDURE — 87077 CULTURE AEROBIC IDENTIFY: CPT | Performed by: PHYSICIAN ASSISTANT

## 2024-01-01 PROCEDURE — 83735 ASSAY OF MAGNESIUM: CPT | Performed by: STUDENT IN AN ORGANIZED HEALTH CARE EDUCATION/TRAINING PROGRAM

## 2024-01-01 PROCEDURE — 97530 THERAPEUTIC ACTIVITIES: CPT | Mod: GO

## 2024-01-01 PROCEDURE — 85610 PROTHROMBIN TIME: CPT | Performed by: EMERGENCY MEDICINE

## 2024-01-01 PROCEDURE — 250N000013 HC RX MED GY IP 250 OP 250 PS 637: Performed by: HOSPITALIST

## 2024-01-01 PROCEDURE — 82565 ASSAY OF CREATININE: CPT | Performed by: EMERGENCY MEDICINE

## 2024-01-01 PROCEDURE — 250N000012 HC RX MED GY IP 250 OP 636 PS 637: Performed by: STUDENT IN AN ORGANIZED HEALTH CARE EDUCATION/TRAINING PROGRAM

## 2024-01-01 PROCEDURE — 250N000011 HC RX IP 250 OP 636: Performed by: INTERNAL MEDICINE

## 2024-01-01 PROCEDURE — 82565 ASSAY OF CREATININE: CPT | Performed by: INTERNAL MEDICINE

## 2024-01-01 PROCEDURE — 87077 CULTURE AEROBIC IDENTIFY: CPT | Performed by: INTERNAL MEDICINE

## 2024-01-01 PROCEDURE — 97110 THERAPEUTIC EXERCISES: CPT | Mod: GP

## 2024-01-01 PROCEDURE — 250N000009 HC RX 250: Performed by: HOSPITALIST

## 2024-01-01 PROCEDURE — 85049 AUTOMATED PLATELET COUNT: CPT | Performed by: HOSPITALIST

## 2024-01-01 PROCEDURE — 99223 1ST HOSP IP/OBS HIGH 75: CPT | Mod: 24 | Performed by: NURSE PRACTITIONER

## 2024-01-01 PROCEDURE — 83735 ASSAY OF MAGNESIUM: CPT | Performed by: INTERNAL MEDICINE

## 2024-01-01 PROCEDURE — 85018 HEMOGLOBIN: CPT | Performed by: INTERNAL MEDICINE

## 2024-01-01 PROCEDURE — 92526 ORAL FUNCTION THERAPY: CPT | Mod: GN

## 2024-01-01 PROCEDURE — 74177 CT ABD & PELVIS W/CONTRAST: CPT | Mod: MG

## 2024-01-01 PROCEDURE — 96372 THER/PROPH/DIAG INJ SC/IM: CPT | Performed by: STUDENT IN AN ORGANIZED HEALTH CARE EDUCATION/TRAINING PROGRAM

## 2024-01-01 PROCEDURE — 86140 C-REACTIVE PROTEIN: CPT | Performed by: INTERNAL MEDICINE

## 2024-01-01 PROCEDURE — 250N000011 HC RX IP 250 OP 636: Mod: JZ | Performed by: INTERNAL MEDICINE

## 2024-01-01 PROCEDURE — 97530 THERAPEUTIC ACTIVITIES: CPT | Mod: GP

## 2024-01-01 PROCEDURE — 82805 BLOOD GASES W/O2 SATURATION: CPT | Performed by: EMERGENCY MEDICINE

## 2024-01-01 PROCEDURE — 99231 SBSQ HOSP IP/OBS SF/LOW 25: CPT | Mod: 24

## 2024-01-01 PROCEDURE — 0D1N0Z4 BYPASS SIGMOID COLON TO CUTANEOUS, OPEN APPROACH: ICD-10-PCS | Performed by: SURGERY

## 2024-01-01 PROCEDURE — 0JB70ZZ EXCISION OF BACK SUBCUTANEOUS TISSUE AND FASCIA, OPEN APPROACH: ICD-10-PCS | Performed by: SURGERY

## 2024-01-01 PROCEDURE — 92610 EVALUATE SWALLOWING FUNCTION: CPT | Mod: GN

## 2024-01-01 PROCEDURE — 80048 BASIC METABOLIC PNL TOTAL CA: CPT | Performed by: NURSE PRACTITIONER

## 2024-01-01 PROCEDURE — 99285 EMERGENCY DEPT VISIT HI MDM: CPT | Mod: 25

## 2024-01-01 PROCEDURE — 99222 1ST HOSP IP/OBS MODERATE 55: CPT | Performed by: INTERNAL MEDICINE

## 2024-01-01 PROCEDURE — 250N000013 HC RX MED GY IP 250 OP 250 PS 637

## 2024-01-01 PROCEDURE — 85610 PROTHROMBIN TIME: CPT | Mod: ORL | Performed by: NURSE PRACTITIONER

## 2024-01-01 PROCEDURE — 99417 PROLNG OP E/M EACH 15 MIN: CPT | Mod: 95 | Performed by: STUDENT IN AN ORGANIZED HEALTH CARE EDUCATION/TRAINING PROGRAM

## 2024-01-01 PROCEDURE — 258N000001 HC RX 258: Performed by: INTERNAL MEDICINE

## 2024-01-01 PROCEDURE — 80061 LIPID PANEL: CPT | Performed by: PATHOLOGY

## 2024-01-01 PROCEDURE — 99292 CRITICAL CARE ADDL 30 MIN: CPT

## 2024-01-01 PROCEDURE — 85610 PROTHROMBIN TIME: CPT

## 2024-01-01 PROCEDURE — 84100 ASSAY OF PHOSPHORUS: CPT | Performed by: INTERNAL MEDICINE

## 2024-01-01 PROCEDURE — 97550 CAREGIVER TRAING 1ST 30 MIN: CPT | Mod: GN

## 2024-01-01 PROCEDURE — 250N000011 HC RX IP 250 OP 636: Mod: JZ | Performed by: STUDENT IN AN ORGANIZED HEALTH CARE EDUCATION/TRAINING PROGRAM

## 2024-01-01 PROCEDURE — 250N000012 HC RX MED GY IP 250 OP 636 PS 637: Performed by: SURGERY

## 2024-01-01 PROCEDURE — 80048 BASIC METABOLIC PNL TOTAL CA: CPT | Performed by: HOSPITALIST

## 2024-01-01 PROCEDURE — 36415 COLL VENOUS BLD VENIPUNCTURE: CPT | Performed by: STUDENT IN AN ORGANIZED HEALTH CARE EDUCATION/TRAINING PROGRAM

## 2024-01-01 PROCEDURE — 250N000011 HC RX IP 250 OP 636: Performed by: ANESTHESIOLOGY

## 2024-01-01 PROCEDURE — 93005 ELECTROCARDIOGRAM TRACING: CPT | Performed by: STUDENT IN AN ORGANIZED HEALTH CARE EDUCATION/TRAINING PROGRAM

## 2024-01-01 PROCEDURE — 82947 ASSAY GLUCOSE BLOOD QUANT: CPT | Performed by: STUDENT IN AN ORGANIZED HEALTH CARE EDUCATION/TRAINING PROGRAM

## 2024-01-01 PROCEDURE — 250N000011 HC RX IP 250 OP 636: Performed by: EMERGENCY MEDICINE

## 2024-01-01 PROCEDURE — 82550 ASSAY OF CK (CPK): CPT | Performed by: INTERNAL MEDICINE

## 2024-01-01 PROCEDURE — 80048 BASIC METABOLIC PNL TOTAL CA: CPT

## 2024-01-01 PROCEDURE — G0463 HOSPITAL OUTPT CLINIC VISIT: HCPCS | Mod: 25

## 2024-01-01 PROCEDURE — 44188 LAP COLOSTOMY: CPT | Performed by: SURGERY

## 2024-01-01 PROCEDURE — 72193 CT PELVIS W/DYE: CPT | Mod: MG

## 2024-01-01 PROCEDURE — 250N000009 HC RX 250: Performed by: SURGERY

## 2024-01-01 PROCEDURE — F08G5CZ WOUND MANAGEMENT TREATMENT OF INTEGUMENTARY SYSTEM - LOWER BACK / LOWER EXTREMITY USING MECHANICAL EQUIPMENT: ICD-10-PCS | Performed by: SURGERY

## 2024-01-01 PROCEDURE — 85025 COMPLETE CBC W/AUTO DIFF WBC: CPT | Performed by: EMERGENCY MEDICINE

## 2024-01-01 PROCEDURE — 99231 SBSQ HOSP IP/OBS SF/LOW 25: CPT | Performed by: PHYSICIAN ASSISTANT

## 2024-01-01 PROCEDURE — 84460 ALANINE AMINO (ALT) (SGPT): CPT | Performed by: INTERNAL MEDICINE

## 2024-01-01 PROCEDURE — 258N000003 HC RX IP 258 OP 636: Performed by: STUDENT IN AN ORGANIZED HEALTH CARE EDUCATION/TRAINING PROGRAM

## 2024-01-01 PROCEDURE — 250N000009 HC RX 250: Performed by: INTERNAL MEDICINE

## 2024-01-01 PROCEDURE — 250N000009 HC RX 250

## 2024-01-01 PROCEDURE — 97161 PT EVAL LOW COMPLEX 20 MIN: CPT | Mod: GP

## 2024-01-01 PROCEDURE — 85027 COMPLETE CBC AUTOMATED: CPT | Performed by: SURGERY

## 2024-01-01 PROCEDURE — 87086 URINE CULTURE/COLONY COUNT: CPT | Performed by: EMERGENCY MEDICINE

## 2024-01-01 PROCEDURE — 87205 SMEAR GRAM STAIN: CPT | Performed by: SURGERY

## 2024-01-01 PROCEDURE — 92611 MOTION FLUOROSCOPY/SWALLOW: CPT | Mod: GN

## 2024-01-01 PROCEDURE — 99100 ANES PT EXTEME AGE<1 YR&>70: CPT | Performed by: NURSE ANESTHETIST, CERTIFIED REGISTERED

## 2024-01-01 PROCEDURE — 36415 COLL VENOUS BLD VENIPUNCTURE: CPT | Performed by: EMERGENCY MEDICINE

## 2024-01-01 PROCEDURE — 87077 CULTURE AEROBIC IDENTIFY: CPT | Performed by: EMERGENCY MEDICINE

## 2024-01-01 PROCEDURE — 250N000012 HC RX MED GY IP 250 OP 636 PS 637: Performed by: INTERNAL MEDICINE

## 2024-01-01 PROCEDURE — 99231 SBSQ HOSP IP/OBS SF/LOW 25: CPT | Performed by: INTERNAL MEDICINE

## 2024-01-01 PROCEDURE — 85610 PROTHROMBIN TIME: CPT | Performed by: ANESTHESIOLOGY

## 2024-01-01 PROCEDURE — 272N000001 HC OR GENERAL SUPPLY STERILE: Performed by: SURGERY

## 2024-01-01 PROCEDURE — 250N000009 HC RX 250: Performed by: NURSE ANESTHETIST, CERTIFIED REGISTERED

## 2024-01-01 PROCEDURE — 99231 SBSQ HOSP IP/OBS SF/LOW 25: CPT

## 2024-01-01 PROCEDURE — 97112 NEUROMUSCULAR REEDUCATION: CPT | Mod: GP

## 2024-01-01 PROCEDURE — 250N000025 HC SEVOFLURANE, PER MIN: Performed by: SURGERY

## 2024-01-01 PROCEDURE — 250N000013 HC RX MED GY IP 250 OP 250 PS 637: Performed by: STUDENT IN AN ORGANIZED HEALTH CARE EDUCATION/TRAINING PROGRAM

## 2024-01-01 PROCEDURE — 80048 BASIC METABOLIC PNL TOTAL CA: CPT | Mod: ORL | Performed by: FAMILY MEDICINE

## 2024-01-01 PROCEDURE — 250N000013 HC RX MED GY IP 250 OP 250 PS 637: Performed by: FAMILY MEDICINE

## 2024-01-01 PROCEDURE — G0378 HOSPITAL OBSERVATION PER HR: HCPCS

## 2024-01-01 PROCEDURE — 999N000040 HC STATISTIC CONSULT NO CHARGE VASC ACCESS

## 2024-01-01 PROCEDURE — 11043 DBRDMT MUSC&/FSCA 1ST 20/<: CPT | Mod: 51 | Performed by: SURGERY

## 2024-01-01 PROCEDURE — 83605 ASSAY OF LACTIC ACID: CPT | Performed by: INTERNAL MEDICINE

## 2024-01-01 PROCEDURE — 258N000003 HC RX IP 258 OP 636: Performed by: NURSE ANESTHETIST, CERTIFIED REGISTERED

## 2024-01-01 PROCEDURE — 83880 ASSAY OF NATRIURETIC PEPTIDE: CPT | Performed by: INTERNAL MEDICINE

## 2024-01-01 PROCEDURE — 97166 OT EVAL MOD COMPLEX 45 MIN: CPT | Mod: GO

## 2024-01-01 PROCEDURE — 92550 TYMPANOMETRY & REFLEX THRESH: CPT

## 2024-01-01 PROCEDURE — 99233 SBSQ HOSP IP/OBS HIGH 50: CPT | Performed by: INTERNAL MEDICINE

## 2024-01-01 PROCEDURE — 71045 X-RAY EXAM CHEST 1 VIEW: CPT

## 2024-01-01 PROCEDURE — 83605 ASSAY OF LACTIC ACID: CPT

## 2024-01-01 PROCEDURE — 0WQF0ZZ REPAIR ABDOMINAL WALL, OPEN APPROACH: ICD-10-PCS | Performed by: SURGERY

## 2024-01-01 PROCEDURE — 258N000003 HC RX IP 258 OP 636: Mod: JZ | Performed by: EMERGENCY MEDICINE

## 2024-01-01 PROCEDURE — G0463 HOSPITAL OUTPT CLINIC VISIT: HCPCS

## 2024-01-01 PROCEDURE — G1010 CDSM STANSON: HCPCS

## 2024-01-01 PROCEDURE — 97112 NEUROMUSCULAR REEDUCATION: CPT | Mod: GO

## 2024-01-01 PROCEDURE — 81001 URINALYSIS AUTO W/SCOPE: CPT | Performed by: EMERGENCY MEDICINE

## 2024-01-01 PROCEDURE — 11046 DBRDMT MUSC&/FSCA EA ADDL: CPT | Mod: 51 | Performed by: SURGERY

## 2024-01-01 PROCEDURE — 99222 1ST HOSP IP/OBS MODERATE 55: CPT | Mod: FS | Performed by: SURGERY

## 2024-01-01 PROCEDURE — 44340 REVISION OF COLOSTOMY: CPT | Performed by: ANESTHESIOLOGY

## 2024-01-01 PROCEDURE — 85025 COMPLETE CBC W/AUTO DIFF WBC: CPT | Performed by: INTERNAL MEDICINE

## 2024-01-01 PROCEDURE — 83036 HEMOGLOBIN GLYCOSYLATED A1C: CPT | Performed by: PATHOLOGY

## 2024-01-01 PROCEDURE — 99140 ANES COMP EMERGENCY COND: CPT | Performed by: NURSE ANESTHETIST, CERTIFIED REGISTERED

## 2024-01-01 PROCEDURE — 85048 AUTOMATED LEUKOCYTE COUNT: CPT | Performed by: EMERGENCY MEDICINE

## 2024-01-01 PROCEDURE — 250N000011 HC RX IP 250 OP 636: Performed by: HOSPITALIST

## 2024-01-01 PROCEDURE — 99207 PR NO CHARGE LOS: CPT | Performed by: INTERNAL MEDICINE

## 2024-01-01 PROCEDURE — 87186 SC STD MICRODIL/AGAR DIL: CPT | Performed by: STUDENT IN AN ORGANIZED HEALTH CARE EDUCATION/TRAINING PROGRAM

## 2024-01-01 PROCEDURE — 80048 BASIC METABOLIC PNL TOTAL CA: CPT | Mod: ORL | Performed by: NURSE PRACTITIONER

## 2024-01-01 PROCEDURE — 84145 PROCALCITONIN (PCT): CPT | Performed by: INTERNAL MEDICINE

## 2024-01-01 PROCEDURE — 99232 SBSQ HOSP IP/OBS MODERATE 35: CPT | Performed by: HOSPITALIST

## 2024-01-01 PROCEDURE — 343N000001 HC RX 343: Performed by: EMERGENCY MEDICINE

## 2024-01-01 PROCEDURE — 370N000017 HC ANESTHESIA TECHNICAL FEE, PER MIN: Performed by: SURGERY

## 2024-01-01 PROCEDURE — 85041 AUTOMATED RBC COUNT: CPT | Performed by: EMERGENCY MEDICINE

## 2024-01-01 PROCEDURE — 87040 BLOOD CULTURE FOR BACTERIA: CPT | Performed by: EMERGENCY MEDICINE

## 2024-01-01 PROCEDURE — 87635 SARS-COV-2 COVID-19 AMP PRB: CPT | Performed by: INTERNAL MEDICINE

## 2024-01-01 PROCEDURE — 84100 ASSAY OF PHOSPHORUS: CPT | Performed by: SURGERY

## 2024-01-01 PROCEDURE — 250N000011 HC RX IP 250 OP 636: Mod: JZ | Performed by: EMERGENCY MEDICINE

## 2024-01-01 PROCEDURE — 82550 ASSAY OF CK (CPK): CPT | Performed by: EMERGENCY MEDICINE

## 2024-01-01 PROCEDURE — 99231 SBSQ HOSP IP/OBS SF/LOW 25: CPT | Mod: 57 | Performed by: SURGERY

## 2024-01-01 PROCEDURE — 999N000287 HC ICU ADULT ROUNDING, EACH 10 MINS

## 2024-01-01 PROCEDURE — 200N000001 HC R&B ICU

## 2024-01-01 PROCEDURE — 87149 DNA/RNA DIRECT PROBE: CPT | Performed by: EMERGENCY MEDICINE

## 2024-01-01 PROCEDURE — 87086 URINE CULTURE/COLONY COUNT: CPT | Performed by: NURSE PRACTITIONER

## 2024-01-01 PROCEDURE — 97110 THERAPEUTIC EXERCISES: CPT | Mod: GP | Performed by: PHYSICAL THERAPIST

## 2024-01-01 PROCEDURE — 85027 COMPLETE CBC AUTOMATED: CPT | Mod: ORL | Performed by: NURSE PRACTITIONER

## 2024-01-01 PROCEDURE — 81003 URINALYSIS AUTO W/O SCOPE: CPT | Performed by: EMERGENCY MEDICINE

## 2024-01-01 PROCEDURE — 85049 AUTOMATED PLATELET COUNT: CPT | Performed by: SURGERY

## 2024-01-01 PROCEDURE — 87186 SC STD MICRODIL/AGAR DIL: CPT | Mod: ORL | Performed by: NURSE PRACTITIONER

## 2024-01-01 PROCEDURE — 84132 ASSAY OF SERUM POTASSIUM: CPT | Performed by: INTERNAL MEDICINE

## 2024-01-01 PROCEDURE — 99291 CRITICAL CARE FIRST HOUR: CPT | Mod: 25

## 2024-01-01 PROCEDURE — P9059 PLASMA, FRZ BETWEEN 8-24HOUR: HCPCS | Performed by: SURGERY

## 2024-01-01 PROCEDURE — 87040 BLOOD CULTURE FOR BACTERIA: CPT | Performed by: INTERNAL MEDICINE

## 2024-01-01 PROCEDURE — 99207 PR NO BILLABLE SERVICE THIS VISIT: CPT | Performed by: PHYSICIAN ASSISTANT

## 2024-01-01 PROCEDURE — P9047 ALBUMIN (HUMAN), 25%, 50ML: HCPCS | Performed by: INTERNAL MEDICINE

## 2024-01-01 PROCEDURE — P9045 ALBUMIN (HUMAN), 5%, 250 ML: HCPCS | Performed by: INTERNAL MEDICINE

## 2024-01-01 PROCEDURE — 96367 TX/PROPH/DG ADDL SEQ IV INF: CPT

## 2024-01-01 PROCEDURE — 97606 NEG PRS WND THER DME>50 SQCM: CPT

## 2024-01-01 PROCEDURE — 710N000010 HC RECOVERY PHASE 1, LEVEL 2, PER MIN: Performed by: SURGERY

## 2024-01-01 PROCEDURE — 97530 THERAPEUTIC ACTIVITIES: CPT | Mod: GP | Performed by: PHYSICAL THERAPIST

## 2024-01-01 PROCEDURE — 82043 UR ALBUMIN QUANTITATIVE: CPT | Performed by: INTERNAL MEDICINE

## 2024-01-01 PROCEDURE — 44188 LAP COLOSTOMY: CPT | Performed by: ANESTHESIOLOGY

## 2024-01-01 PROCEDURE — 87070 CULTURE OTHR SPECIMN AEROBIC: CPT | Performed by: PHYSICIAN ASSISTANT

## 2024-01-01 PROCEDURE — 999N000127 HC STATISTIC PERIPHERAL IV START W US GUIDANCE

## 2024-01-01 PROCEDURE — 86141 C-REACTIVE PROTEIN HS: CPT | Mod: ORL | Performed by: NURSE PRACTITIONER

## 2024-01-01 PROCEDURE — 71046 X-RAY EXAM CHEST 2 VIEWS: CPT

## 2024-01-01 PROCEDURE — 258N000003 HC RX IP 258 OP 636: Performed by: EMERGENCY MEDICINE

## 2024-01-01 PROCEDURE — 80048 BASIC METABOLIC PNL TOTAL CA: CPT | Performed by: STUDENT IN AN ORGANIZED HEALTH CARE EDUCATION/TRAINING PROGRAM

## 2024-01-01 PROCEDURE — 85014 HEMATOCRIT: CPT | Performed by: INTERNAL MEDICINE

## 2024-01-01 PROCEDURE — 36415 COLL VENOUS BLD VENIPUNCTURE: CPT | Performed by: SURGERY

## 2024-01-01 PROCEDURE — 96375 TX/PRO/DX INJ NEW DRUG ADDON: CPT

## 2024-01-01 PROCEDURE — 82306 VITAMIN D 25 HYDROXY: CPT | Mod: ORL | Performed by: FAMILY MEDICINE

## 2024-01-01 PROCEDURE — 84145 PROCALCITONIN (PCT): CPT | Performed by: EMERGENCY MEDICINE

## 2024-01-01 PROCEDURE — 87186 SC STD MICRODIL/AGAR DIL: CPT | Mod: ORL | Performed by: FAMILY MEDICINE

## 2024-01-01 PROCEDURE — 84145 PROCALCITONIN (PCT): CPT | Performed by: FAMILY MEDICINE

## 2024-01-01 PROCEDURE — 44340 REVISION OF COLOSTOMY: CPT | Performed by: NURSE ANESTHETIST, CERTIFIED REGISTERED

## 2024-01-01 PROCEDURE — 250N000011 HC RX IP 250 OP 636: Performed by: PHYSICIAN ASSISTANT

## 2024-01-01 PROCEDURE — 999N000141 HC STATISTIC PRE-PROCEDURE NURSING ASSESSMENT: Performed by: SURGERY

## 2024-01-01 PROCEDURE — 99024 POSTOP FOLLOW-UP VISIT: CPT | Performed by: PHYSICIAN ASSISTANT

## 2024-01-01 PROCEDURE — 99207 PR APP CREDIT; MD BILLING SHARED VISIT: CPT

## 2024-01-01 PROCEDURE — 99214 OFFICE O/P EST MOD 30 MIN: CPT | Mod: 95 | Performed by: INTERNAL MEDICINE

## 2024-01-01 PROCEDURE — 84155 ASSAY OF PROTEIN SERUM: CPT | Performed by: EMERGENCY MEDICINE

## 2024-01-01 PROCEDURE — 36415 COLL VENOUS BLD VENIPUNCTURE: CPT | Performed by: PATHOLOGY

## 2024-01-01 PROCEDURE — 99215 OFFICE O/P EST HI 40 MIN: CPT | Mod: 24 | Performed by: STUDENT IN AN ORGANIZED HEALTH CARE EDUCATION/TRAINING PROGRAM

## 2024-01-01 PROCEDURE — 85610 PROTHROMBIN TIME: CPT | Mod: ORL | Performed by: FAMILY MEDICINE

## 2024-01-01 PROCEDURE — 96376 TX/PRO/DX INJ SAME DRUG ADON: CPT

## 2024-01-01 PROCEDURE — 51702 INSERT TEMP BLADDER CATH: CPT

## 2024-01-01 PROCEDURE — 44188 LAP COLOSTOMY: CPT | Mod: AS

## 2024-01-01 PROCEDURE — 83605 ASSAY OF LACTIC ACID: CPT | Performed by: STUDENT IN AN ORGANIZED HEALTH CARE EDUCATION/TRAINING PROGRAM

## 2024-01-01 PROCEDURE — 99233 SBSQ HOSP IP/OBS HIGH 50: CPT | Performed by: EMERGENCY MEDICINE

## 2024-01-01 PROCEDURE — 83690 ASSAY OF LIPASE: CPT | Performed by: EMERGENCY MEDICINE

## 2024-01-01 PROCEDURE — 87205 SMEAR GRAM STAIN: CPT | Performed by: INTERNAL MEDICINE

## 2024-01-01 PROCEDURE — 84145 PROCALCITONIN (PCT): CPT | Performed by: STUDENT IN AN ORGANIZED HEALTH CARE EDUCATION/TRAINING PROGRAM

## 2024-01-01 PROCEDURE — 93971 EXTREMITY STUDY: CPT | Mod: RT

## 2024-01-01 PROCEDURE — 83735 ASSAY OF MAGNESIUM: CPT | Performed by: HOSPITALIST

## 2024-01-01 PROCEDURE — 0D1N4Z4 BYPASS SIGMOID COLON TO CUTANEOUS, PERCUTANEOUS ENDOSCOPIC APPROACH: ICD-10-PCS | Performed by: SURGERY

## 2024-01-01 PROCEDURE — 250N000011 HC RX IP 250 OP 636: Performed by: NURSE ANESTHETIST, CERTIFIED REGISTERED

## 2024-01-01 PROCEDURE — 99239 HOSP IP/OBS DSCHRG MGMT >30: CPT | Performed by: HOSPITALIST

## 2024-01-01 PROCEDURE — 82040 ASSAY OF SERUM ALBUMIN: CPT | Performed by: INTERNAL MEDICINE

## 2024-01-01 PROCEDURE — 86900 BLOOD TYPING SEROLOGIC ABO: CPT | Performed by: INTERNAL MEDICINE

## 2024-01-01 PROCEDURE — 87486 CHLMYD PNEUM DNA AMP PROBE: CPT | Performed by: INTERNAL MEDICINE

## 2024-01-01 PROCEDURE — 85027 COMPLETE CBC AUTOMATED: CPT | Performed by: HOSPITALIST

## 2024-01-01 PROCEDURE — 85014 HEMATOCRIT: CPT | Performed by: HOSPITALIST

## 2024-01-01 PROCEDURE — 97602 WOUND(S) CARE NON-SELECTIVE: CPT

## 2024-01-01 PROCEDURE — 80053 COMPREHEN METABOLIC PANEL: CPT | Performed by: INTERNAL MEDICINE

## 2024-01-01 PROCEDURE — 255N000002 HC RX 255 OP 636: Performed by: INTERNAL MEDICINE

## 2024-01-01 PROCEDURE — 999N000065 XR CHEST PICC PLACEMENT PORT 1 VIEW

## 2024-01-01 PROCEDURE — 0DNN4ZZ RELEASE SIGMOID COLON, PERCUTANEOUS ENDOSCOPIC APPROACH: ICD-10-PCS | Performed by: SURGERY

## 2024-01-01 PROCEDURE — 99207 PR NO BILLABLE SERVICE THIS VISIT: CPT | Performed by: CLINICAL NURSE SPECIALIST

## 2024-01-01 PROCEDURE — 82310 ASSAY OF CALCIUM: CPT | Performed by: INTERNAL MEDICINE

## 2024-01-01 PROCEDURE — 97168 OT RE-EVAL EST PLAN CARE: CPT | Mod: GO

## 2024-01-01 PROCEDURE — 74230 X-RAY XM SWLNG FUNCJ C+: CPT

## 2024-01-01 PROCEDURE — 84450 TRANSFERASE (AST) (SGOT): CPT | Performed by: EMERGENCY MEDICINE

## 2024-01-01 PROCEDURE — 81001 URINALYSIS AUTO W/SCOPE: CPT

## 2024-01-01 PROCEDURE — 82248 BILIRUBIN DIRECT: CPT | Performed by: EMERGENCY MEDICINE

## 2024-01-01 PROCEDURE — 44345 REVISION OF COLOSTOMY: CPT | Mod: 58 | Performed by: SURGERY

## 2024-01-01 PROCEDURE — 96361 HYDRATE IV INFUSION ADD-ON: CPT

## 2024-01-01 PROCEDURE — 99000 SPECIMEN HANDLING OFFICE-LAB: CPT | Performed by: PATHOLOGY

## 2024-01-01 PROCEDURE — 36415 COLL VENOUS BLD VENIPUNCTURE: CPT

## 2024-01-01 PROCEDURE — 97110 THERAPEUTIC EXERCISES: CPT | Mod: GO

## 2024-01-01 PROCEDURE — P9604 ONE-WAY ALLOW PRORATED TRIP: HCPCS | Performed by: NURSE PRACTITIONER

## 2024-01-01 PROCEDURE — 85027 COMPLETE CBC AUTOMATED: CPT | Mod: ORL | Performed by: FAMILY MEDICINE

## 2024-01-01 PROCEDURE — 96365 THER/PROPH/DIAG IV INF INIT: CPT

## 2024-01-01 PROCEDURE — 81001 URINALYSIS AUTO W/SCOPE: CPT | Performed by: NURSE PRACTITIONER

## 2024-01-01 PROCEDURE — 99222 1ST HOSP IP/OBS MODERATE 55: CPT | Performed by: SPECIALIST

## 2024-01-01 PROCEDURE — 78227 HEPATOBIL SYST IMAGE W/DRUG: CPT | Mod: MG

## 2024-01-01 PROCEDURE — 258N000003 HC RX IP 258 OP 636: Performed by: HOSPITALIST

## 2024-01-01 PROCEDURE — 97162 PT EVAL MOD COMPLEX 30 MIN: CPT | Mod: GP

## 2024-01-01 PROCEDURE — 99291 CRITICAL CARE FIRST HOUR: CPT | Performed by: INTERNAL MEDICINE

## 2024-01-01 PROCEDURE — 99233 SBSQ HOSP IP/OBS HIGH 50: CPT | Mod: 24 | Performed by: STUDENT IN AN ORGANIZED HEALTH CARE EDUCATION/TRAINING PROGRAM

## 2024-01-01 PROCEDURE — 85014 HEMATOCRIT: CPT | Performed by: STUDENT IN AN ORGANIZED HEALTH CARE EDUCATION/TRAINING PROGRAM

## 2024-01-01 PROCEDURE — 82565 ASSAY OF CREATININE: CPT | Performed by: HOSPITALIST

## 2024-01-01 PROCEDURE — 92526 ORAL FUNCTION THERAPY: CPT | Mod: GN | Performed by: REHABILITATION PRACTITIONER

## 2024-01-01 PROCEDURE — 250N000011 HC RX IP 250 OP 636

## 2024-01-01 PROCEDURE — 99212 OFFICE O/P EST SF 10 MIN: CPT | Performed by: INTERNAL MEDICINE

## 2024-01-01 PROCEDURE — 258N000003 HC RX IP 258 OP 636

## 2024-01-01 PROCEDURE — 92557 COMPREHENSIVE HEARING TEST: CPT

## 2024-01-01 PROCEDURE — 82310 ASSAY OF CALCIUM: CPT | Performed by: HOSPITALIST

## 2024-01-01 PROCEDURE — 85027 COMPLETE CBC AUTOMATED: CPT | Performed by: NURSE PRACTITIONER

## 2024-01-01 PROCEDURE — 92526 ORAL FUNCTION THERAPY: CPT | Mod: GN | Performed by: SPEECH-LANGUAGE PATHOLOGIST

## 2024-01-01 PROCEDURE — 71275 CT ANGIOGRAPHY CHEST: CPT | Mod: ME

## 2024-01-01 PROCEDURE — 99223 1ST HOSP IP/OBS HIGH 75: CPT | Mod: AI | Performed by: INTERNAL MEDICINE

## 2024-01-01 PROCEDURE — 250N000011 HC RX IP 250 OP 636: Performed by: STUDENT IN AN ORGANIZED HEALTH CARE EDUCATION/TRAINING PROGRAM

## 2024-01-01 PROCEDURE — 80053 COMPREHEN METABOLIC PANEL: CPT | Performed by: NURSE PRACTITIONER

## 2024-01-01 PROCEDURE — 99203 OFFICE O/P NEW LOW 30 MIN: CPT | Performed by: OTOLARYNGOLOGY

## 2024-01-01 PROCEDURE — 99232 SBSQ HOSP IP/OBS MODERATE 35: CPT | Mod: FS | Performed by: FAMILY MEDICINE

## 2024-01-01 PROCEDURE — 272N000450 HC KIT 4FR POWER PICC SINGLE LUMEN

## 2024-01-01 PROCEDURE — 85007 BL SMEAR W/DIFF WBC COUNT: CPT | Performed by: EMERGENCY MEDICINE

## 2024-01-01 PROCEDURE — 87070 CULTURE OTHR SPECIMN AEROBIC: CPT | Performed by: SURGERY

## 2024-01-01 PROCEDURE — 80048 BASIC METABOLIC PNL TOTAL CA: CPT | Performed by: FAMILY MEDICINE

## 2024-01-01 PROCEDURE — 82247 BILIRUBIN TOTAL: CPT | Performed by: EMERGENCY MEDICINE

## 2024-01-01 PROCEDURE — 360N000077 HC SURGERY LEVEL 4, PER MIN: Performed by: SURGERY

## 2024-01-01 PROCEDURE — 250N000011 HC RX IP 250 OP 636: Performed by: FAMILY MEDICINE

## 2024-01-01 PROCEDURE — 99221 1ST HOSP IP/OBS SF/LOW 40: CPT | Mod: FS | Performed by: SPECIALIST

## 2024-01-01 PROCEDURE — 87205 SMEAR GRAM STAIN: CPT | Performed by: PHYSICIAN ASSISTANT

## 2024-01-01 PROCEDURE — 258N000003 HC RX IP 258 OP 636: Performed by: ANESTHESIOLOGY

## 2024-01-01 PROCEDURE — 999N000065 XR CHEST PORT 1 VIEW

## 2024-01-01 PROCEDURE — 80048 BASIC METABOLIC PNL TOTAL CA: CPT | Performed by: SURGERY

## 2024-01-01 PROCEDURE — 85730 THROMBOPLASTIN TIME PARTIAL: CPT | Performed by: EMERGENCY MEDICINE

## 2024-01-01 PROCEDURE — 99232 SBSQ HOSP IP/OBS MODERATE 35: CPT | Mod: 25 | Performed by: INTERNAL MEDICINE

## 2024-01-01 PROCEDURE — 85027 COMPLETE CBC AUTOMATED: CPT | Performed by: PHYSICIAN ASSISTANT

## 2024-01-01 PROCEDURE — 85018 HEMOGLOBIN: CPT

## 2024-01-01 PROCEDURE — 44188 LAP COLOSTOMY: CPT | Performed by: NURSE ANESTHETIST, CERTIFIED REGISTERED

## 2024-01-01 PROCEDURE — 99223 1ST HOSP IP/OBS HIGH 75: CPT | Performed by: STUDENT IN AN ORGANIZED HEALTH CARE EDUCATION/TRAINING PROGRAM

## 2024-01-01 PROCEDURE — 85027 COMPLETE CBC AUTOMATED: CPT | Performed by: EMERGENCY MEDICINE

## 2024-01-01 PROCEDURE — 87075 CULTR BACTERIA EXCEPT BLOOD: CPT | Performed by: SURGERY

## 2024-01-01 PROCEDURE — 99207 PR APP CREDIT; MD BILLING SHARED VISIT: CPT | Performed by: NURSE PRACTITIONER

## 2024-01-01 PROCEDURE — G2211 COMPLEX E/M VISIT ADD ON: HCPCS | Mod: 95 | Performed by: STUDENT IN AN ORGANIZED HEALTH CARE EDUCATION/TRAINING PROGRAM

## 2024-01-01 PROCEDURE — 80053 COMPREHEN METABOLIC PANEL: CPT | Performed by: EMERGENCY MEDICINE

## 2024-01-01 PROCEDURE — 0DBN0ZZ EXCISION OF SIGMOID COLON, OPEN APPROACH: ICD-10-PCS | Performed by: SURGERY

## 2024-01-01 PROCEDURE — 86901 BLOOD TYPING SEROLOGIC RH(D): CPT | Performed by: INTERNAL MEDICINE

## 2024-01-01 PROCEDURE — 93005 ELECTROCARDIOGRAM TRACING: CPT | Performed by: EMERGENCY MEDICINE

## 2024-01-01 PROCEDURE — 84484 ASSAY OF TROPONIN QUANT: CPT | Performed by: INTERNAL MEDICINE

## 2024-01-01 PROCEDURE — 83735 ASSAY OF MAGNESIUM: CPT | Performed by: SURGERY

## 2024-01-01 PROCEDURE — 99223 1ST HOSP IP/OBS HIGH 75: CPT | Performed by: HOSPITALIST

## 2024-01-01 PROCEDURE — 87070 CULTURE OTHR SPECIMN AEROBIC: CPT | Performed by: INTERNAL MEDICINE

## 2024-01-01 PROCEDURE — P9047 ALBUMIN (HUMAN), 25%, 50ML: HCPCS | Performed by: HOSPITALIST

## 2024-01-01 PROCEDURE — 99024 POSTOP FOLLOW-UP VISIT: CPT | Mod: 93 | Performed by: SURGERY

## 2024-01-01 PROCEDURE — 81001 URINALYSIS AUTO W/SCOPE: CPT | Performed by: STUDENT IN AN ORGANIZED HEALTH CARE EDUCATION/TRAINING PROGRAM

## 2024-01-01 PROCEDURE — 84484 ASSAY OF TROPONIN QUANT: CPT | Performed by: STUDENT IN AN ORGANIZED HEALTH CARE EDUCATION/TRAINING PROGRAM

## 2024-01-01 PROCEDURE — 85025 COMPLETE CBC W/AUTO DIFF WBC: CPT | Performed by: STUDENT IN AN ORGANIZED HEALTH CARE EDUCATION/TRAINING PROGRAM

## 2024-01-01 PROCEDURE — 99233 SBSQ HOSP IP/OBS HIGH 50: CPT | Performed by: STUDENT IN AN ORGANIZED HEALTH CARE EDUCATION/TRAINING PROGRAM

## 2024-01-01 PROCEDURE — 99222 1ST HOSP IP/OBS MODERATE 55: CPT | Performed by: CLINICAL NURSE SPECIALIST

## 2024-01-01 PROCEDURE — 87086 URINE CULTURE/COLONY COUNT: CPT | Mod: ORL | Performed by: FAMILY MEDICINE

## 2024-01-01 PROCEDURE — 85027 COMPLETE CBC AUTOMATED: CPT | Performed by: FAMILY MEDICINE

## 2024-01-01 PROCEDURE — 250N000011 HC RX IP 250 OP 636: Mod: JZ

## 2024-01-01 PROCEDURE — 76705 ECHO EXAM OF ABDOMEN: CPT

## 2024-01-01 PROCEDURE — 250N000011 HC RX IP 250 OP 636: Mod: JW | Performed by: SURGERY

## 2024-01-01 PROCEDURE — 710N000009 HC RECOVERY PHASE 1, LEVEL 1, PER MIN: Performed by: SURGERY

## 2024-01-01 RX ORDER — ONDANSETRON 4 MG/1
4 TABLET, ORALLY DISINTEGRATING ORAL EVERY 8 HOURS PRN
Status: ON HOLD | COMMUNITY
End: 2024-01-01

## 2024-01-01 RX ORDER — BACLOFEN 5 MG/1
5 TABLET ORAL 3 TIMES DAILY PRN
Status: DISCONTINUED | OUTPATIENT
Start: 2024-01-01 | End: 2024-01-01 | Stop reason: HOSPADM

## 2024-01-01 RX ORDER — PROPOFOL 10 MG/ML
INJECTION, EMULSION INTRAVENOUS CONTINUOUS PRN
Status: DISCONTINUED | OUTPATIENT
Start: 2024-01-01 | End: 2024-01-01

## 2024-01-01 RX ORDER — NICOTINE POLACRILEX 4 MG
15-30 LOZENGE BUCCAL
Status: DISCONTINUED | OUTPATIENT
Start: 2024-01-01 | End: 2024-01-01 | Stop reason: HOSPADM

## 2024-01-01 RX ORDER — NALOXONE HYDROCHLORIDE 0.4 MG/ML
0.4 INJECTION, SOLUTION INTRAMUSCULAR; INTRAVENOUS; SUBCUTANEOUS
Status: DISCONTINUED | OUTPATIENT
Start: 2024-01-01 | End: 2024-01-01 | Stop reason: HOSPADM

## 2024-01-01 RX ORDER — LIDOCAINE HYDROCHLORIDE 10 MG/ML
INJECTION, SOLUTION INFILTRATION; PERINEURAL PRN
Status: DISCONTINUED | OUTPATIENT
Start: 2024-01-01 | End: 2024-01-01

## 2024-01-01 RX ORDER — OXYCODONE HYDROCHLORIDE 5 MG/1
5 TABLET ORAL EVERY 4 HOURS PRN
Status: DISCONTINUED | OUTPATIENT
Start: 2024-01-01 | End: 2024-01-01

## 2024-01-01 RX ORDER — DEXTROSE MONOHYDRATE 25 G/50ML
25-50 INJECTION, SOLUTION INTRAVENOUS
Status: DISCONTINUED | OUTPATIENT
Start: 2024-01-01 | End: 2024-01-01 | Stop reason: HOSPADM

## 2024-01-01 RX ORDER — OXYCODONE HYDROCHLORIDE 10 MG/1
10 TABLET ORAL
Status: DISCONTINUED | OUTPATIENT
Start: 2024-01-01 | End: 2024-01-01 | Stop reason: HOSPADM

## 2024-01-01 RX ORDER — ACETAMINOPHEN 325 MG/1
650 TABLET ORAL EVERY 4 HOURS PRN
Status: DISCONTINUED | OUTPATIENT
Start: 2024-01-01 | End: 2024-01-01

## 2024-01-01 RX ORDER — ACETAMINOPHEN 325 MG/1
650 TABLET ORAL EVERY 4 HOURS PRN
Status: DISCONTINUED | OUTPATIENT
Start: 2024-01-01 | End: 2024-01-01 | Stop reason: HOSPADM

## 2024-01-01 RX ORDER — MEROPENEM 1 G/1
1 INJECTION, POWDER, FOR SOLUTION INTRAVENOUS ONCE
Status: COMPLETED | OUTPATIENT
Start: 2024-01-01 | End: 2024-01-01

## 2024-01-01 RX ORDER — ASPIRIN 81 MG/1
81 TABLET, CHEWABLE ORAL DAILY
Status: DISCONTINUED | OUTPATIENT
Start: 2024-01-01 | End: 2024-01-01

## 2024-01-01 RX ORDER — ACETAMINOPHEN 325 MG/1
975 TABLET ORAL EVERY 8 HOURS
Status: DISCONTINUED | OUTPATIENT
Start: 2024-01-01 | End: 2024-01-01

## 2024-01-01 RX ORDER — WARFARIN SODIUM 2 MG/1
4 TABLET ORAL
Status: DISCONTINUED | OUTPATIENT
Start: 2024-01-01 | End: 2024-01-01 | Stop reason: HOSPADM

## 2024-01-01 RX ORDER — BARIUM SULFATE 400 MG/ML
SUSPENSION ORAL ONCE
Status: COMPLETED | OUTPATIENT
Start: 2024-01-01 | End: 2024-01-01

## 2024-01-01 RX ORDER — ASPIRIN 81 MG/1
81 TABLET ORAL DAILY
Status: DISCONTINUED | OUTPATIENT
Start: 2024-01-01 | End: 2024-01-01

## 2024-01-01 RX ORDER — WARFARIN SODIUM 2 MG/1
2 TABLET ORAL
Status: COMPLETED | OUTPATIENT
Start: 2024-01-01 | End: 2024-01-01

## 2024-01-01 RX ORDER — ALBUMIN (HUMAN) 12.5 G/50ML
25 SOLUTION INTRAVENOUS ONCE
Status: COMPLETED | OUTPATIENT
Start: 2024-01-01 | End: 2024-01-01

## 2024-01-01 RX ORDER — BISACODYL 5 MG
5 TABLET, DELAYED RELEASE (ENTERIC COATED) ORAL DAILY PRN
Status: DISCONTINUED | OUTPATIENT
Start: 2024-01-01 | End: 2024-01-01 | Stop reason: HOSPADM

## 2024-01-01 RX ORDER — BISACODYL 10 MG
10 SUPPOSITORY, RECTAL RECTAL DAILY PRN
Qty: 2 SUPPOSITORY | Refills: 0 | Status: SHIPPED | OUTPATIENT
Start: 2024-01-01

## 2024-01-01 RX ORDER — NALOXONE HYDROCHLORIDE 0.4 MG/ML
0.2 INJECTION, SOLUTION INTRAMUSCULAR; INTRAVENOUS; SUBCUTANEOUS
Status: DISCONTINUED | OUTPATIENT
Start: 2024-01-01 | End: 2024-01-01 | Stop reason: HOSPADM

## 2024-01-01 RX ORDER — BISACODYL 10 MG
10 SUPPOSITORY, RECTAL RECTAL DAILY PRN
Status: DISCONTINUED | OUTPATIENT
Start: 2024-01-01 | End: 2024-01-01

## 2024-01-01 RX ORDER — GUAIFENESIN AND DEXTROMETHORPHAN HYDROBROMIDE 100; 10 MG/5ML; MG/5ML
5 SOLUTION ORAL EVERY 4 HOURS PRN
Status: ON HOLD | COMMUNITY
End: 2024-01-01

## 2024-01-01 RX ORDER — FENTANYL CITRATE 50 UG/ML
25 INJECTION, SOLUTION INTRAMUSCULAR; INTRAVENOUS EVERY 5 MIN PRN
Status: DISCONTINUED | OUTPATIENT
Start: 2024-01-01 | End: 2024-01-01 | Stop reason: HOSPADM

## 2024-01-01 RX ORDER — LORAZEPAM 0.5 MG/1
.25-.5 TABLET ORAL EVERY 4 HOURS PRN
Qty: 15 TABLET | Refills: 0 | Status: SHIPPED | OUTPATIENT
Start: 2024-01-01

## 2024-01-01 RX ORDER — AMOXICILLIN 250 MG
1 CAPSULE ORAL 2 TIMES DAILY
Status: DISCONTINUED | OUTPATIENT
Start: 2024-01-01 | End: 2024-01-01 | Stop reason: HOSPADM

## 2024-01-01 RX ORDER — OXYCODONE HYDROCHLORIDE 5 MG/1
2.5 TABLET ORAL EVERY 4 HOURS PRN
Qty: 30 TABLET | Refills: 0 | Status: SHIPPED | OUTPATIENT
Start: 2024-01-01 | End: 2024-01-01

## 2024-01-01 RX ORDER — POLYETHYLENE GLYCOL 3350 17 G/17G
17 POWDER, FOR SOLUTION ORAL DAILY
Status: DISCONTINUED | OUTPATIENT
Start: 2024-01-01 | End: 2024-01-01

## 2024-01-01 RX ORDER — ASPIRIN 81 MG/1
81 TABLET ORAL DAILY
Status: DISCONTINUED | OUTPATIENT
Start: 2024-01-01 | End: 2024-01-01 | Stop reason: ALTCHOICE

## 2024-01-01 RX ORDER — MINERAL OIL/HYDROPHIL PETROLAT
OINTMENT (GRAM) TOPICAL
Status: DISCONTINUED | OUTPATIENT
Start: 2024-01-01 | End: 2024-01-01 | Stop reason: HOSPADM

## 2024-01-01 RX ORDER — WARFARIN SODIUM 2 MG/1
2-4 TABLET ORAL DAILY
COMMUNITY
End: 2024-01-01

## 2024-01-01 RX ORDER — AMPICILLIN AND SULBACTAM 2; 1 G/1; G/1
3 INJECTION, POWDER, FOR SOLUTION INTRAMUSCULAR; INTRAVENOUS EVERY 6 HOURS
Status: DISCONTINUED | OUTPATIENT
Start: 2024-01-01 | End: 2024-01-01

## 2024-01-01 RX ORDER — ACETAMINOPHEN 500 MG
1000 TABLET ORAL 3 TIMES DAILY PRN
Status: ON HOLD | COMMUNITY
End: 2024-01-01

## 2024-01-01 RX ORDER — BUPIVACAINE HYDROCHLORIDE AND EPINEPHRINE 2.5; 5 MG/ML; UG/ML
INJECTION, SOLUTION INFILTRATION; PERINEURAL PRN
Status: DISCONTINUED | OUTPATIENT
Start: 2024-01-01 | End: 2024-01-01 | Stop reason: HOSPADM

## 2024-01-01 RX ORDER — ERYTHROMYCIN 5 MG/G
OINTMENT OPHTHALMIC AT BEDTIME
Status: DISCONTINUED | OUTPATIENT
Start: 2024-01-01 | End: 2024-01-01

## 2024-01-01 RX ORDER — BISACODYL 10 MG
10 SUPPOSITORY, RECTAL RECTAL DAILY PRN
Status: DISCONTINUED | OUTPATIENT
Start: 2024-01-01 | End: 2024-01-01 | Stop reason: HOSPADM

## 2024-01-01 RX ORDER — CEFAZOLIN SODIUM/WATER 2 G/20 ML
2 SYRINGE (ML) INTRAVENOUS
Status: COMPLETED | OUTPATIENT
Start: 2024-01-01 | End: 2024-01-01

## 2024-01-01 RX ORDER — ERYTHROMYCIN 5 MG/G
OINTMENT OPHTHALMIC AT BEDTIME
Status: ON HOLD | COMMUNITY
Start: 2023-01-01 | End: 2024-01-01

## 2024-01-01 RX ORDER — ALLOPURINOL 300 MG/1
300 TABLET ORAL DAILY
Status: DISCONTINUED | OUTPATIENT
Start: 2024-01-01 | End: 2024-01-01 | Stop reason: HOSPADM

## 2024-01-01 RX ORDER — MULTIPLE VITAMINS W/ MINERALS TAB 9MG-400MCG
1 TAB ORAL DAILY
Status: DISCONTINUED | OUTPATIENT
Start: 2024-01-01 | End: 2024-01-01 | Stop reason: HOSPADM

## 2024-01-01 RX ORDER — AMOXICILLIN 250 MG
1 CAPSULE ORAL 2 TIMES DAILY PRN
Status: DISCONTINUED | OUTPATIENT
Start: 2024-01-01 | End: 2024-01-01 | Stop reason: HOSPADM

## 2024-01-01 RX ORDER — ONDANSETRON 4 MG/1
4 TABLET, ORALLY DISINTEGRATING ORAL EVERY 30 MIN PRN
Status: DISCONTINUED | OUTPATIENT
Start: 2024-01-01 | End: 2024-01-01 | Stop reason: HOSPADM

## 2024-01-01 RX ORDER — SODIUM CHLORIDE, SODIUM LACTATE, POTASSIUM CHLORIDE, CALCIUM CHLORIDE 600; 310; 30; 20 MG/100ML; MG/100ML; MG/100ML; MG/100ML
INJECTION, SOLUTION INTRAVENOUS CONTINUOUS
Status: DISCONTINUED | OUTPATIENT
Start: 2024-01-01 | End: 2024-01-01 | Stop reason: HOSPADM

## 2024-01-01 RX ORDER — SALIVA STIMULANT COMB. NO.3
2 SPRAY, NON-AEROSOL (ML) MUCOUS MEMBRANE
Status: DISCONTINUED | OUTPATIENT
Start: 2024-01-01 | End: 2024-01-01 | Stop reason: HOSPADM

## 2024-01-01 RX ORDER — HYDROMORPHONE HCL IN WATER/PF 6 MG/30 ML
0.2 PATIENT CONTROLLED ANALGESIA SYRINGE INTRAVENOUS
Status: DISCONTINUED | OUTPATIENT
Start: 2024-01-01 | End: 2024-01-01

## 2024-01-01 RX ORDER — POTASSIUM CHLORIDE 7.45 MG/ML
10 INJECTION INTRAVENOUS
Status: DISCONTINUED | OUTPATIENT
Start: 2024-01-01 | End: 2024-01-01

## 2024-01-01 RX ORDER — LIDOCAINE 40 MG/G
CREAM TOPICAL
Status: ACTIVE | OUTPATIENT
Start: 2024-01-01 | End: 2024-01-01

## 2024-01-01 RX ORDER — METHOCARBAMOL 500 MG/1
500 TABLET, FILM COATED ORAL 3 TIMES DAILY
Status: ON HOLD | COMMUNITY
Start: 2024-01-01 | End: 2024-01-01

## 2024-01-01 RX ORDER — POTASSIUM CHLORIDE 7.45 MG/ML
10 INJECTION INTRAVENOUS
Status: COMPLETED | OUTPATIENT
Start: 2024-01-01 | End: 2024-01-01

## 2024-01-01 RX ORDER — WARFARIN SODIUM 2 MG/1
4 TABLET ORAL
Status: COMPLETED | OUTPATIENT
Start: 2024-01-01 | End: 2024-01-01

## 2024-01-01 RX ORDER — POLYETHYLENE GLYCOL 3350 17 G/17G
17 POWDER, FOR SOLUTION ORAL EVERY OTHER DAY
Status: DISCONTINUED | OUTPATIENT
Start: 2024-01-01 | End: 2024-01-01

## 2024-01-01 RX ORDER — MAGNESIUM SULFATE HEPTAHYDRATE 40 MG/ML
2 INJECTION, SOLUTION INTRAVENOUS ONCE
Status: COMPLETED | OUTPATIENT
Start: 2024-01-01 | End: 2024-01-01

## 2024-01-01 RX ORDER — ACETAMINOPHEN 500 MG
500 TABLET ORAL EVERY 8 HOURS PRN
COMMUNITY
Start: 2024-01-01

## 2024-01-01 RX ORDER — ACETAMINOPHEN 500 MG
500 TABLET ORAL EVERY 8 HOURS
Status: DISCONTINUED | OUTPATIENT
Start: 2024-01-01 | End: 2024-01-01 | Stop reason: HOSPADM

## 2024-01-01 RX ORDER — FUROSEMIDE 20 MG
20 TABLET ORAL DAILY
Status: DISCONTINUED | OUTPATIENT
Start: 2024-01-01 | End: 2024-01-01 | Stop reason: HOSPADM

## 2024-01-01 RX ORDER — ALLOPURINOL 300 MG/1
300 TABLET ORAL DAILY
Status: DISCONTINUED | OUTPATIENT
Start: 2024-01-01 | End: 2024-01-01

## 2024-01-01 RX ORDER — MIDODRINE HYDROCHLORIDE 5 MG/1
5 TABLET ORAL 3 TIMES DAILY PRN
Status: DISCONTINUED | OUTPATIENT
Start: 2024-01-01 | End: 2024-01-01 | Stop reason: HOSPADM

## 2024-01-01 RX ORDER — AMOXICILLIN 250 MG
1 CAPSULE ORAL 2 TIMES DAILY
Status: DISCONTINUED | OUTPATIENT
Start: 2024-01-01 | End: 2024-01-01

## 2024-01-01 RX ORDER — HEPARIN SODIUM 5000 [USP'U]/.5ML
5000 INJECTION, SOLUTION INTRAVENOUS; SUBCUTANEOUS EVERY 8 HOURS
Status: DISCONTINUED | OUTPATIENT
Start: 2024-01-01 | End: 2024-01-01

## 2024-01-01 RX ORDER — ALBUTEROL SULFATE 0.83 MG/ML
2.5 SOLUTION RESPIRATORY (INHALATION)
OUTPATIENT
Start: 2024-01-01

## 2024-01-01 RX ORDER — WARFARIN SODIUM 3 MG/1
3 TABLET ORAL
Status: COMPLETED | OUTPATIENT
Start: 2024-01-01 | End: 2024-01-01

## 2024-01-01 RX ORDER — NALOXONE HYDROCHLORIDE 0.4 MG/ML
0.2 INJECTION, SOLUTION INTRAMUSCULAR; INTRAVENOUS; SUBCUTANEOUS
Status: DISCONTINUED | OUTPATIENT
Start: 2024-01-01 | End: 2024-01-01

## 2024-01-01 RX ORDER — LIDOCAINE 40 MG/G
CREAM TOPICAL
Status: DISCONTINUED | OUTPATIENT
Start: 2024-01-01 | End: 2024-01-01 | Stop reason: HOSPADM

## 2024-01-01 RX ORDER — AMOXICILLIN 250 MG
1 CAPSULE ORAL AT BEDTIME
Status: DISCONTINUED | OUTPATIENT
Start: 2024-01-01 | End: 2024-01-01

## 2024-01-01 RX ORDER — PANTOPRAZOLE SODIUM 40 MG/1
40 TABLET, DELAYED RELEASE ORAL
Status: DISCONTINUED | OUTPATIENT
Start: 2024-01-01 | End: 2024-01-01

## 2024-01-01 RX ORDER — MEROPENEM 1 G/1
1 INJECTION, POWDER, FOR SOLUTION INTRAVENOUS EVERY 8 HOURS
Status: ON HOLD
Start: 2024-01-01 | End: 2024-01-01

## 2024-01-01 RX ORDER — SODIUM CHLORIDE, SODIUM LACTATE, POTASSIUM CHLORIDE, CALCIUM CHLORIDE 600; 310; 30; 20 MG/100ML; MG/100ML; MG/100ML; MG/100ML
INJECTION, SOLUTION INTRAVENOUS CONTINUOUS PRN
Status: DISCONTINUED | OUTPATIENT
Start: 2024-01-01 | End: 2024-01-01

## 2024-01-01 RX ORDER — BACLOFEN 5 MG/1
5 TABLET ORAL 3 TIMES DAILY
Status: ON HOLD | COMMUNITY
Start: 2024-01-01 | End: 2024-01-01

## 2024-01-01 RX ORDER — BISACODYL 10 MG
10 SUPPOSITORY, RECTAL RECTAL DAILY PRN
COMMUNITY
Start: 2024-01-01

## 2024-01-01 RX ORDER — DEXAMETHASONE SODIUM PHOSPHATE 4 MG/ML
4 INJECTION, SOLUTION INTRA-ARTICULAR; INTRALESIONAL; INTRAMUSCULAR; INTRAVENOUS; SOFT TISSUE
Status: DISCONTINUED | OUTPATIENT
Start: 2024-01-01 | End: 2024-01-01 | Stop reason: HOSPADM

## 2024-01-01 RX ORDER — PIPERACILLIN SODIUM, TAZOBACTAM SODIUM 3; .375 G/15ML; G/15ML
3.38 INJECTION, POWDER, LYOPHILIZED, FOR SOLUTION INTRAVENOUS EVERY 8 HOURS
Status: DISCONTINUED | OUTPATIENT
Start: 2024-01-01 | End: 2024-01-01

## 2024-01-01 RX ORDER — DEXTROSE MONOHYDRATE, SODIUM CHLORIDE, AND POTASSIUM CHLORIDE 50; 1.49; 4.5 G/1000ML; G/1000ML; G/1000ML
INJECTION, SOLUTION INTRAVENOUS CONTINUOUS
Status: DISCONTINUED | OUTPATIENT
Start: 2024-01-01 | End: 2024-01-01

## 2024-01-01 RX ORDER — ACETAMINOPHEN 650 MG/1
650 SUPPOSITORY RECTAL EVERY 4 HOURS PRN
Status: DISCONTINUED | OUTPATIENT
Start: 2024-01-01 | End: 2024-01-01 | Stop reason: HOSPADM

## 2024-01-01 RX ORDER — OXYCODONE HYDROCHLORIDE 5 MG/1
10 TABLET ORAL EVERY 4 HOURS PRN
Status: ON HOLD | COMMUNITY
Start: 2024-01-01 | End: 2024-01-01

## 2024-01-01 RX ORDER — POLYETHYLENE GLYCOL 3350 17 G/17G
17 POWDER, FOR SOLUTION ORAL 2 TIMES DAILY PRN
Status: DISCONTINUED | OUTPATIENT
Start: 2024-01-01 | End: 2024-01-01 | Stop reason: HOSPADM

## 2024-01-01 RX ORDER — PROCHLORPERAZINE MALEATE 5 MG
5 TABLET ORAL EVERY 6 HOURS PRN
Status: DISCONTINUED | OUTPATIENT
Start: 2024-01-01 | End: 2024-01-01 | Stop reason: HOSPADM

## 2024-01-01 RX ORDER — SODIUM BICARBONATE 650 MG/1
650 TABLET ORAL PRN
Status: ON HOLD | COMMUNITY
Start: 2024-01-01 | End: 2024-01-01

## 2024-01-01 RX ORDER — MIDODRINE HYDROCHLORIDE 5 MG/1
5 TABLET ORAL
Status: COMPLETED | OUTPATIENT
Start: 2024-01-01 | End: 2024-01-01

## 2024-01-01 RX ORDER — ROSUVASTATIN CALCIUM 10 MG/1
20 TABLET, COATED ORAL AT BEDTIME
Status: DISCONTINUED | OUTPATIENT
Start: 2024-01-01 | End: 2024-01-01 | Stop reason: HOSPADM

## 2024-01-01 RX ORDER — FENTANYL CITRATE 50 UG/ML
50 INJECTION, SOLUTION INTRAMUSCULAR; INTRAVENOUS EVERY 5 MIN PRN
Status: DISCONTINUED | OUTPATIENT
Start: 2024-01-01 | End: 2024-01-01 | Stop reason: HOSPADM

## 2024-01-01 RX ORDER — NALOXONE HYDROCHLORIDE 0.4 MG/ML
0.4 INJECTION, SOLUTION INTRAMUSCULAR; INTRAVENOUS; SUBCUTANEOUS
Status: DISCONTINUED | OUTPATIENT
Start: 2024-01-01 | End: 2024-01-01

## 2024-01-01 RX ORDER — PIPERACILLIN SODIUM, TAZOBACTAM SODIUM 3; .375 G/15ML; G/15ML
3.38 INJECTION, POWDER, LYOPHILIZED, FOR SOLUTION INTRAVENOUS ONCE
Status: COMPLETED | OUTPATIENT
Start: 2024-01-01 | End: 2024-01-01

## 2024-01-01 RX ORDER — EPINEPHRINE 1 MG/ML
0.3 INJECTION, SOLUTION, CONCENTRATE INTRAVENOUS EVERY 5 MIN PRN
OUTPATIENT
Start: 2024-01-01

## 2024-01-01 RX ORDER — METHYLPREDNISOLONE SODIUM SUCCINATE 125 MG/2ML
125 INJECTION, POWDER, LYOPHILIZED, FOR SOLUTION INTRAMUSCULAR; INTRAVENOUS
Start: 2024-01-01

## 2024-01-01 RX ORDER — METOPROLOL SUCCINATE 25 MG/1
25 TABLET, EXTENDED RELEASE ORAL DAILY
Status: CANCELLED | OUTPATIENT
Start: 2024-01-01

## 2024-01-01 RX ORDER — MULTIPLE VITAMINS W/ MINERALS TAB 9MG-400MCG
1 TAB ORAL DAILY
Qty: 30 TABLET | Refills: 0 | Status: ON HOLD | OUTPATIENT
Start: 2024-01-01 | End: 2024-01-01

## 2024-01-01 RX ORDER — NALOXONE HYDROCHLORIDE 1 MG/ML
0.1 INJECTION INTRAMUSCULAR; INTRAVENOUS; SUBCUTANEOUS
Status: DISCONTINUED | OUTPATIENT
Start: 2024-01-01 | End: 2024-01-01 | Stop reason: HOSPADM

## 2024-01-01 RX ORDER — ONDANSETRON 2 MG/ML
4 INJECTION INTRAMUSCULAR; INTRAVENOUS EVERY 6 HOURS PRN
Status: DISCONTINUED | OUTPATIENT
Start: 2024-01-01 | End: 2024-01-01 | Stop reason: HOSPADM

## 2024-01-01 RX ORDER — MAGNESIUM HYDROXIDE 1200 MG/15ML
LIQUID ORAL PRN
Status: DISCONTINUED | OUTPATIENT
Start: 2024-01-01 | End: 2024-01-01 | Stop reason: HOSPADM

## 2024-01-01 RX ORDER — MIDODRINE HYDROCHLORIDE 5 MG/1
5 TABLET ORAL
Status: DISCONTINUED | OUTPATIENT
Start: 2024-01-01 | End: 2024-01-01

## 2024-01-01 RX ORDER — AMOXICILLIN 250 MG
2 CAPSULE ORAL 2 TIMES DAILY PRN
Status: DISCONTINUED | OUTPATIENT
Start: 2024-01-01 | End: 2024-01-01 | Stop reason: HOSPADM

## 2024-01-01 RX ORDER — NITROGLYCERIN 0.4 MG/1
0.4 TABLET SUBLINGUAL EVERY 5 MIN PRN
Status: DISCONTINUED | OUTPATIENT
Start: 2024-01-01 | End: 2024-01-01 | Stop reason: HOSPADM

## 2024-01-01 RX ORDER — WARFARIN SODIUM 2 MG/1
2 TABLET ORAL
Status: DISCONTINUED | OUTPATIENT
Start: 2024-01-01 | End: 2024-01-01

## 2024-01-01 RX ORDER — FAMOTIDINE 20 MG/1
20 TABLET, FILM COATED ORAL 2 TIMES DAILY
Status: DISCONTINUED | OUTPATIENT
Start: 2024-01-01 | End: 2024-01-01 | Stop reason: HOSPADM

## 2024-01-01 RX ORDER — MIDODRINE HYDROCHLORIDE 5 MG/1
5 TABLET ORAL 2 TIMES DAILY
Status: DISCONTINUED | OUTPATIENT
Start: 2024-01-01 | End: 2024-01-01

## 2024-01-01 RX ORDER — MEPERIDINE HYDROCHLORIDE 25 MG/ML
25 INJECTION INTRAMUSCULAR; INTRAVENOUS; SUBCUTANEOUS EVERY 30 MIN PRN
OUTPATIENT
Start: 2024-01-01

## 2024-01-01 RX ORDER — BUPIVACAINE HYDROCHLORIDE AND EPINEPHRINE 2.5; 5 MG/ML; UG/ML
INJECTION, SOLUTION EPIDURAL; INFILTRATION; INTRACAUDAL; PERINEURAL PRN
Status: DISCONTINUED | OUTPATIENT
Start: 2024-01-01 | End: 2024-01-01 | Stop reason: HOSPADM

## 2024-01-01 RX ORDER — NICOTINE POLACRILEX 4 MG
15-30 LOZENGE BUCCAL
Status: DISCONTINUED | OUTPATIENT
Start: 2024-01-01 | End: 2024-01-01

## 2024-01-01 RX ORDER — ONDANSETRON 2 MG/ML
4 INJECTION INTRAMUSCULAR; INTRAVENOUS EVERY 6 HOURS PRN
Status: DISCONTINUED | OUTPATIENT
Start: 2024-01-01 | End: 2024-01-01

## 2024-01-01 RX ORDER — MEROPENEM 1 G/1
1 INJECTION, POWDER, FOR SOLUTION INTRAVENOUS EVERY 8 HOURS
Status: DISCONTINUED | OUTPATIENT
Start: 2024-01-01 | End: 2024-01-01

## 2024-01-01 RX ORDER — CALCIUM CARBONATE 500 MG/1
1000 TABLET, CHEWABLE ORAL 4 TIMES DAILY PRN
Status: DISCONTINUED | OUTPATIENT
Start: 2024-01-01 | End: 2024-01-01 | Stop reason: HOSPADM

## 2024-01-01 RX ORDER — SALIVA STIMULANT COMB. NO.3
2 SPRAY, NON-AEROSOL (ML) MUCOUS MEMBRANE EVERY 6 HOURS PRN
Status: DISCONTINUED | OUTPATIENT
Start: 2024-01-01 | End: 2024-01-01

## 2024-01-01 RX ORDER — OXYCODONE HYDROCHLORIDE 5 MG/1
10 TABLET ORAL EVERY 4 HOURS PRN
Status: DISCONTINUED | OUTPATIENT
Start: 2024-01-01 | End: 2024-01-01

## 2024-01-01 RX ORDER — VANCOMYCIN HYDROCHLORIDE 1 G/200ML
1000 INJECTION, SOLUTION INTRAVENOUS EVERY 24 HOURS
Status: DISCONTINUED | OUTPATIENT
Start: 2024-01-01 | End: 2024-01-01

## 2024-01-01 RX ORDER — FUROSEMIDE 20 MG
20 TABLET ORAL DAILY
Status: DISCONTINUED | OUTPATIENT
Start: 2024-01-01 | End: 2024-01-01

## 2024-01-01 RX ORDER — ERTAPENEM 1 G/1
1 INJECTION, POWDER, LYOPHILIZED, FOR SOLUTION INTRAMUSCULAR; INTRAVENOUS EVERY 24 HOURS
DISCHARGE
Start: 2024-01-01 | End: 2024-01-01

## 2024-01-01 RX ORDER — ACETAMINOPHEN 325 MG/1
650 TABLET ORAL EVERY 8 HOURS
Status: DISCONTINUED | OUTPATIENT
Start: 2024-01-01 | End: 2024-01-01

## 2024-01-01 RX ORDER — FENTANYL CITRATE 50 UG/ML
INJECTION, SOLUTION INTRAMUSCULAR; INTRAVENOUS PRN
Status: DISCONTINUED | OUTPATIENT
Start: 2024-01-01 | End: 2024-01-01

## 2024-01-01 RX ORDER — LANOLIN ALCOHOL/MO/W.PET/CERES
3 CREAM (GRAM) TOPICAL AT BEDTIME
COMMUNITY

## 2024-01-01 RX ORDER — OXYCODONE HYDROCHLORIDE 5 MG/1
5 TABLET ORAL EVERY 4 HOURS PRN
Status: DISCONTINUED | OUTPATIENT
Start: 2024-01-01 | End: 2024-01-01 | Stop reason: HOSPADM

## 2024-01-01 RX ORDER — BACLOFEN 5 MG/1
5 TABLET ORAL DAILY PRN
COMMUNITY
End: 2024-01-01

## 2024-01-01 RX ORDER — FUROSEMIDE 20 MG
20 TABLET ORAL DAILY
Qty: 30 TABLET | Refills: 0 | Status: ON HOLD | OUTPATIENT
Start: 2024-01-01 | End: 2024-01-01

## 2024-01-01 RX ORDER — METOPROLOL SUCCINATE 25 MG/1
25 TABLET, EXTENDED RELEASE ORAL DAILY
Qty: 30 TABLET | Refills: 0 | Status: ON HOLD | OUTPATIENT
Start: 2024-01-01 | End: 2024-01-01

## 2024-01-01 RX ORDER — ONDANSETRON 4 MG/1
4 TABLET, ORALLY DISINTEGRATING ORAL EVERY 6 HOURS PRN
Status: DISCONTINUED | OUTPATIENT
Start: 2024-01-01 | End: 2024-01-01 | Stop reason: HOSPADM

## 2024-01-01 RX ORDER — LANSOPRAZOLE 30 MG/1
30 TABLET, ORALLY DISINTEGRATING, DELAYED RELEASE ORAL
COMMUNITY
Start: 2024-01-01 | End: 2024-01-01

## 2024-01-01 RX ORDER — OLANZAPINE 5 MG/1
5 TABLET, ORALLY DISINTEGRATING ORAL EVERY 6 HOURS PRN
Status: DISCONTINUED | OUTPATIENT
Start: 2024-01-01 | End: 2024-01-01 | Stop reason: HOSPADM

## 2024-01-01 RX ORDER — CEFTRIAXONE 1 G/1
1 INJECTION, POWDER, FOR SOLUTION INTRAMUSCULAR; INTRAVENOUS EVERY 24 HOURS
Status: DISCONTINUED | OUTPATIENT
Start: 2024-01-01 | End: 2024-01-01

## 2024-01-01 RX ORDER — ONDANSETRON 2 MG/ML
INJECTION INTRAMUSCULAR; INTRAVENOUS PRN
Status: DISCONTINUED | OUTPATIENT
Start: 2024-01-01 | End: 2024-01-01

## 2024-01-01 RX ORDER — NITROGLYCERIN 0.4 MG/1
0.4 TABLET SUBLINGUAL EVERY 5 MIN PRN
COMMUNITY

## 2024-01-01 RX ORDER — ONDANSETRON 2 MG/ML
4 INJECTION INTRAMUSCULAR; INTRAVENOUS EVERY 30 MIN PRN
Status: DISCONTINUED | OUTPATIENT
Start: 2024-01-01 | End: 2024-01-01 | Stop reason: HOSPADM

## 2024-01-01 RX ORDER — ROSUVASTATIN CALCIUM 20 MG/1
1 TABLET, COATED ORAL AT BEDTIME
Status: ON HOLD | COMMUNITY
Start: 2024-01-01 | End: 2024-01-01

## 2024-01-01 RX ORDER — ERTAPENEM 1 G/1
1 INJECTION, POWDER, LYOPHILIZED, FOR SOLUTION INTRAMUSCULAR; INTRAVENOUS EVERY 24 HOURS
Status: DISCONTINUED | OUTPATIENT
Start: 2024-01-01 | End: 2024-01-01 | Stop reason: HOSPADM

## 2024-01-01 RX ORDER — ONDANSETRON 4 MG/1
4 TABLET, ORALLY DISINTEGRATING ORAL EVERY 6 HOURS PRN
Qty: 15 TABLET | Refills: 0 | Status: SHIPPED | OUTPATIENT
Start: 2024-01-01

## 2024-01-01 RX ORDER — CARVEDILOL 3.12 MG/1
3.12 TABLET ORAL 2 TIMES DAILY WITH MEALS
Status: DISCONTINUED | OUTPATIENT
Start: 2024-01-01 | End: 2024-01-01

## 2024-01-01 RX ORDER — IOPAMIDOL 755 MG/ML
75 INJECTION, SOLUTION INTRAVASCULAR ONCE
Status: COMPLETED | OUTPATIENT
Start: 2024-01-01 | End: 2024-01-01

## 2024-01-01 RX ORDER — IOPAMIDOL 755 MG/ML
80 INJECTION, SOLUTION INTRAVASCULAR ONCE
Status: COMPLETED | OUTPATIENT
Start: 2024-01-01 | End: 2024-01-01

## 2024-01-01 RX ORDER — PANTOPRAZOLE SODIUM 40 MG/1
40 TABLET, DELAYED RELEASE ORAL
Status: DISCONTINUED | OUTPATIENT
Start: 2024-01-01 | End: 2024-01-01 | Stop reason: HOSPADM

## 2024-01-01 RX ORDER — WARFARIN SODIUM 3 MG/1
3 TABLET ORAL
Status: DISCONTINUED | OUTPATIENT
Start: 2024-01-01 | End: 2024-01-01

## 2024-01-01 RX ORDER — HYDROMORPHONE HYDROCHLORIDE 2 MG/1
2 TABLET ORAL 3 TIMES DAILY PRN
Status: ON HOLD | COMMUNITY
Start: 2024-01-01 | End: 2024-01-01

## 2024-01-01 RX ORDER — PROPOFOL 10 MG/ML
INJECTION, EMULSION INTRAVENOUS PRN
Status: DISCONTINUED | OUTPATIENT
Start: 2024-01-01 | End: 2024-01-01

## 2024-01-01 RX ORDER — NYSTATIN 100000 [USP'U]/G
POWDER TOPICAL 2 TIMES DAILY PRN
COMMUNITY
Start: 2024-01-01

## 2024-01-01 RX ORDER — POLYETHYLENE GLYCOL 400 AND PROPYLENE GLYCOL 4; 3 MG/ML; MG/ML
2 SOLUTION/ DROPS OPHTHALMIC 2 TIMES DAILY
COMMUNITY
Start: 2024-01-01

## 2024-01-01 RX ORDER — POLYETHYLENE GLYCOL 3350 17 G/17G
17 POWDER, FOR SOLUTION ORAL 2 TIMES DAILY PRN
COMMUNITY
Start: 2024-01-01

## 2024-01-01 RX ORDER — NALOXONE HYDROCHLORIDE 0.4 MG/ML
0.1 INJECTION, SOLUTION INTRAMUSCULAR; INTRAVENOUS; SUBCUTANEOUS
Status: DISCONTINUED | OUTPATIENT
Start: 2024-01-01 | End: 2024-01-01 | Stop reason: HOSPADM

## 2024-01-01 RX ORDER — FUROSEMIDE 10 MG/ML
20 INJECTION INTRAMUSCULAR; INTRAVENOUS EVERY 8 HOURS
Status: DISCONTINUED | OUTPATIENT
Start: 2024-01-01 | End: 2024-01-01

## 2024-01-01 RX ORDER — ERTAPENEM 1 G/1
1 INJECTION, POWDER, LYOPHILIZED, FOR SOLUTION INTRAMUSCULAR; INTRAVENOUS EVERY 24 HOURS
Status: SHIPPED
Start: 2024-01-01 | End: 2024-01-01

## 2024-01-01 RX ORDER — METHOCARBAMOL 500 MG/1
500 TABLET, FILM COATED ORAL 3 TIMES DAILY PRN
Status: DISCONTINUED | OUTPATIENT
Start: 2024-01-01 | End: 2024-01-01 | Stop reason: HOSPADM

## 2024-01-01 RX ORDER — WARFARIN SODIUM 2 MG/1
4 TABLET ORAL
Status: DISCONTINUED | OUTPATIENT
Start: 2024-01-01 | End: 2024-01-01

## 2024-01-01 RX ORDER — LORAZEPAM 2 MG/ML
1 INJECTION INTRAMUSCULAR
Status: DISCONTINUED | OUTPATIENT
Start: 2024-01-01 | End: 2024-01-01 | Stop reason: HOSPADM

## 2024-01-01 RX ORDER — ACETAMINOPHEN 650 MG/1
650 SUPPOSITORY RECTAL EVERY 4 HOURS PRN
Qty: 4 SUPPOSITORY | Refills: 0 | Status: SHIPPED | OUTPATIENT
Start: 2024-01-01

## 2024-01-01 RX ORDER — MEROPENEM 1 G/1
1 INJECTION, POWDER, FOR SOLUTION INTRAVENOUS EVERY 12 HOURS
Status: DISCONTINUED | OUTPATIENT
Start: 2024-01-01 | End: 2024-01-01

## 2024-01-01 RX ORDER — IOPAMIDOL 755 MG/ML
81 INJECTION, SOLUTION INTRAVASCULAR ONCE
Status: COMPLETED | OUTPATIENT
Start: 2024-01-01 | End: 2024-01-01

## 2024-01-01 RX ORDER — MULTIVIT WITH MINERALS/LUTEIN
500 TABLET ORAL DAILY
Status: DISCONTINUED | OUTPATIENT
Start: 2024-01-01 | End: 2024-01-01 | Stop reason: HOSPADM

## 2024-01-01 RX ORDER — HYDROMORPHONE HYDROCHLORIDE 2 MG/1
2 TABLET ORAL 3 TIMES DAILY PRN
Qty: 10 TABLET | Refills: 0 | Status: ON HOLD | OUTPATIENT
Start: 2024-01-01 | End: 2024-01-01

## 2024-01-01 RX ORDER — DEXTROSE MONOHYDRATE 25 G/50ML
25-50 INJECTION, SOLUTION INTRAVENOUS
Status: DISCONTINUED | OUTPATIENT
Start: 2024-01-01 | End: 2024-01-01

## 2024-01-01 RX ORDER — HYDROMORPHONE HCL IN WATER/PF 6 MG/30 ML
0.2 PATIENT CONTROLLED ANALGESIA SYRINGE INTRAVENOUS
Status: DISCONTINUED | OUTPATIENT
Start: 2024-01-01 | End: 2024-01-01 | Stop reason: HOSPADM

## 2024-01-01 RX ORDER — HYDROMORPHONE HYDROCHLORIDE 1 MG/ML
0.5 INJECTION, SOLUTION INTRAMUSCULAR; INTRAVENOUS; SUBCUTANEOUS
Status: DISCONTINUED | OUTPATIENT
Start: 2024-01-01 | End: 2024-01-01

## 2024-01-01 RX ORDER — CHOLECALCIFEROL (VITAMIN D3) 50 MCG
2 TABLET ORAL DAILY
Status: ON HOLD | COMMUNITY
End: 2024-01-01

## 2024-01-01 RX ORDER — METHOCARBAMOL 500 MG/1
500 TABLET, FILM COATED ORAL 3 TIMES DAILY PRN
DISCHARGE
Start: 2024-01-01 | End: 2024-01-01

## 2024-01-01 RX ORDER — FUROSEMIDE 10 MG/ML
20 INJECTION INTRAMUSCULAR; INTRAVENOUS ONCE
Status: COMPLETED | OUTPATIENT
Start: 2024-01-01 | End: 2024-01-01

## 2024-01-01 RX ORDER — MORPHINE SULFATE 2 MG/ML
1 INJECTION, SOLUTION INTRAMUSCULAR; INTRAVENOUS ONCE
Status: COMPLETED | OUTPATIENT
Start: 2024-01-01 | End: 2024-01-01

## 2024-01-01 RX ORDER — LANOLIN ALCOHOL/MO/W.PET/CERES
3 CREAM (GRAM) TOPICAL
Status: DISCONTINUED | OUTPATIENT
Start: 2024-01-01 | End: 2024-01-01 | Stop reason: HOSPADM

## 2024-01-01 RX ORDER — BACITRACIN ZINC 500 [USP'U]/G
OINTMENT TOPICAL 2 TIMES DAILY
Status: ON HOLD | COMMUNITY
End: 2024-01-01

## 2024-01-01 RX ORDER — MEROPENEM 1 G/1
1 INJECTION, POWDER, FOR SOLUTION INTRAVENOUS EVERY 8 HOURS
Status: DISCONTINUED | OUTPATIENT
Start: 2024-01-01 | End: 2024-01-01 | Stop reason: HOSPADM

## 2024-01-01 RX ORDER — ONDANSETRON 4 MG/1
4 TABLET, ORALLY DISINTEGRATING ORAL EVERY 6 HOURS PRN
Status: DISCONTINUED | OUTPATIENT
Start: 2024-01-01 | End: 2024-01-01

## 2024-01-01 RX ORDER — ERYTHROMYCIN 5 MG/G
OINTMENT OPHTHALMIC AT BEDTIME
Status: DISCONTINUED | OUTPATIENT
Start: 2024-01-01 | End: 2024-01-01 | Stop reason: HOSPADM

## 2024-01-01 RX ORDER — CEFAZOLIN SODIUM/WATER 2 G/20 ML
2 SYRINGE (ML) INTRAVENOUS SEE ADMIN INSTRUCTIONS
Status: DISCONTINUED | OUTPATIENT
Start: 2024-01-01 | End: 2024-01-01 | Stop reason: HOSPADM

## 2024-01-01 RX ORDER — WARFARIN SODIUM 2 MG/1
2-4 TABLET ORAL DAILY
Qty: 34 TABLET | Refills: 11 | Status: SHIPPED | OUTPATIENT
Start: 2024-01-01 | End: 2024-01-01

## 2024-01-01 RX ORDER — BISACODYL 10 MG
10 SUPPOSITORY, RECTAL RECTAL
Status: DISCONTINUED | OUTPATIENT
Start: 2024-01-01 | End: 2024-01-01 | Stop reason: HOSPADM

## 2024-01-01 RX ORDER — CEFTRIAXONE 1 G/1
1 INJECTION, POWDER, FOR SOLUTION INTRAMUSCULAR; INTRAVENOUS ONCE
Status: COMPLETED | OUTPATIENT
Start: 2024-01-01 | End: 2024-01-01

## 2024-01-01 RX ORDER — WARFARIN SODIUM 6 MG/1
TABLET ORAL
DISCHARGE
Start: 2024-01-01 | End: 2024-01-01

## 2024-01-01 RX ORDER — ENOXAPARIN SODIUM 100 MG/ML
40 INJECTION SUBCUTANEOUS EVERY 24 HOURS
Status: DISCONTINUED | OUTPATIENT
Start: 2024-01-01 | End: 2024-01-01

## 2024-01-01 RX ORDER — ATROPINE SULFATE 10 MG/ML
1-2 SOLUTION/ DROPS OPHTHALMIC EVERY 4 HOURS PRN
Qty: 5 ML | Refills: 0 | Status: SHIPPED | OUTPATIENT
Start: 2024-01-01

## 2024-01-01 RX ORDER — FUROSEMIDE 20 MG
20 TABLET ORAL DAILY
Status: CANCELLED | OUTPATIENT
Start: 2024-01-01

## 2024-01-01 RX ORDER — KIT FOR THE PREPARATION OF TECHNETIUM TC 99M MEBROFENIN 45 MG/10ML
5-10 INJECTION, POWDER, LYOPHILIZED, FOR SOLUTION INTRAVENOUS ONCE
Status: COMPLETED | OUTPATIENT
Start: 2024-01-01 | End: 2024-01-01

## 2024-01-01 RX ORDER — POLYETHYLENE GLYCOL 3350 17 G/17G
17 POWDER, FOR SOLUTION ORAL AT BEDTIME
Status: DISCONTINUED | OUTPATIENT
Start: 2024-01-01 | End: 2024-01-01

## 2024-01-01 RX ORDER — DIPHENHYDRAMINE HYDROCHLORIDE 50 MG/ML
50 INJECTION INTRAMUSCULAR; INTRAVENOUS
Start: 2024-01-01

## 2024-01-01 RX ORDER — WARFARIN SODIUM 2 MG/1
TABLET ORAL DAILY
Status: ON HOLD | COMMUNITY
End: 2024-01-01

## 2024-01-01 RX ORDER — SENNOSIDES A AND B 8.6 MG/1
1-2 TABLET, FILM COATED ORAL 2 TIMES DAILY PRN
Qty: 100 TABLET | Refills: 0 | Status: SHIPPED | OUTPATIENT
Start: 2024-01-01

## 2024-01-01 RX ORDER — HYDROMORPHONE HCL IN WATER/PF 6 MG/30 ML
0.2 PATIENT CONTROLLED ANALGESIA SYRINGE INTRAVENOUS EVERY 5 MIN PRN
Status: DISCONTINUED | OUTPATIENT
Start: 2024-01-01 | End: 2024-01-01 | Stop reason: HOSPADM

## 2024-01-01 RX ORDER — POLYETHYLENE GLYCOL 3350 17 G/17G
17 POWDER, FOR SOLUTION ORAL EVERY OTHER DAY
Status: DISCONTINUED | OUTPATIENT
Start: 2024-01-01 | End: 2024-01-01 | Stop reason: HOSPADM

## 2024-01-01 RX ORDER — CEFAZOLIN SODIUM 1 G/50ML
1250 SOLUTION INTRAVENOUS EVERY 24 HOURS
Status: DISCONTINUED | OUTPATIENT
Start: 2024-01-01 | End: 2024-01-01

## 2024-01-01 RX ORDER — ASPIRIN 81 MG/1
81 TABLET, CHEWABLE ORAL DAILY
Status: DISCONTINUED | OUTPATIENT
Start: 2024-01-01 | End: 2024-01-01 | Stop reason: HOSPADM

## 2024-01-01 RX ORDER — GLIPIZIDE 10 MG/1
2 TABLET ORAL 2 TIMES DAILY
Status: DISCONTINUED | OUTPATIENT
Start: 2024-01-01 | End: 2024-01-01 | Stop reason: HOSPADM

## 2024-01-01 RX ORDER — ASPIRIN 81 MG/1
81 TABLET ORAL DAILY
Status: ON HOLD | COMMUNITY
End: 2024-01-01

## 2024-01-01 RX ORDER — OXYCODONE HYDROCHLORIDE 5 MG/1
5-10 TABLET ORAL EVERY 4 HOURS PRN
Qty: 30 TABLET | Refills: 0 | Status: SHIPPED | OUTPATIENT
Start: 2024-01-01

## 2024-01-01 RX ORDER — ALBUTEROL SULFATE 90 UG/1
1-2 AEROSOL, METERED RESPIRATORY (INHALATION)
Start: 2024-01-01

## 2024-01-01 RX ORDER — LIDOCAINE 40 MG/G
CREAM TOPICAL
Status: DISCONTINUED | OUTPATIENT
Start: 2024-01-01 | End: 2024-01-01

## 2024-01-01 RX ORDER — FUROSEMIDE 10 MG/ML
20 INJECTION INTRAMUSCULAR; INTRAVENOUS EVERY 12 HOURS
Status: DISCONTINUED | OUTPATIENT
Start: 2024-01-01 | End: 2024-01-01

## 2024-01-01 RX ORDER — CARVEDILOL 12.5 MG/1
25 TABLET ORAL 2 TIMES DAILY
Status: DISCONTINUED | OUTPATIENT
Start: 2024-01-01 | End: 2024-01-01 | Stop reason: HOSPADM

## 2024-01-01 RX ORDER — FUROSEMIDE 10 MG/ML
20 INJECTION INTRAMUSCULAR; INTRAVENOUS DAILY
Status: DISCONTINUED | OUTPATIENT
Start: 2024-01-01 | End: 2024-01-01

## 2024-01-01 RX ORDER — HALOPERIDOL 0.5 MG/1
.5-1 TABLET ORAL EVERY 6 HOURS PRN
Qty: 15 TABLET | Refills: 0 | Status: SHIPPED | OUTPATIENT
Start: 2024-01-01

## 2024-01-01 RX ORDER — EPHEDRINE SULFATE 50 MG/ML
INJECTION, SOLUTION INTRAMUSCULAR; INTRAVENOUS; SUBCUTANEOUS PRN
Status: DISCONTINUED | OUTPATIENT
Start: 2024-01-01 | End: 2024-01-01

## 2024-01-01 RX ORDER — LORAZEPAM 1 MG/1
1 TABLET ORAL
Status: DISCONTINUED | OUTPATIENT
Start: 2024-01-01 | End: 2024-01-01 | Stop reason: HOSPADM

## 2024-01-01 RX ORDER — METOPROLOL SUCCINATE 25 MG/1
25 TABLET, EXTENDED RELEASE ORAL DAILY
Status: DISCONTINUED | OUTPATIENT
Start: 2024-01-01 | End: 2024-01-01

## 2024-01-01 RX ORDER — MIDODRINE HYDROCHLORIDE 2.5 MG/1
2.5 TABLET ORAL
Status: DISCONTINUED | OUTPATIENT
Start: 2024-01-01 | End: 2024-01-01

## 2024-01-01 RX ORDER — PROCHLORPERAZINE MALEATE 5 MG
5 TABLET ORAL EVERY 6 HOURS PRN
Status: DISCONTINUED | OUTPATIENT
Start: 2024-01-01 | End: 2024-01-01

## 2024-01-01 RX ORDER — CARBOXYMETHYLCELLULOSE SODIUM 5 MG/ML
1-2 SOLUTION/ DROPS OPHTHALMIC
Status: DISCONTINUED | OUTPATIENT
Start: 2024-01-01 | End: 2024-01-01 | Stop reason: HOSPADM

## 2024-01-01 RX ORDER — METOPROLOL SUCCINATE 25 MG/1
25 TABLET, EXTENDED RELEASE ORAL DAILY
Status: DISCONTINUED | OUTPATIENT
Start: 2024-01-01 | End: 2024-01-01 | Stop reason: HOSPADM

## 2024-01-01 RX ORDER — SENNOSIDES 8.6 MG
1 TABLET ORAL 2 TIMES DAILY PRN
Status: DISCONTINUED | OUTPATIENT
Start: 2024-01-01 | End: 2024-01-01

## 2024-01-01 RX ORDER — ROSUVASTATIN CALCIUM 20 MG/1
20 TABLET, COATED ORAL AT BEDTIME
Status: ON HOLD | COMMUNITY
Start: 2024-01-01 | End: 2024-01-01

## 2024-01-01 RX ORDER — SALIVA STIMULANT COMB. NO.3
2 SPRAY, NON-AEROSOL (ML) MUCOUS MEMBRANE 4 TIMES DAILY
Status: DISCONTINUED | OUTPATIENT
Start: 2024-01-01 | End: 2024-01-01 | Stop reason: HOSPADM

## 2024-01-01 RX ORDER — AMPICILLIN AND SULBACTAM 2; 1 G/1; G/1
3 INJECTION, POWDER, FOR SOLUTION INTRAMUSCULAR; INTRAVENOUS ONCE
Status: COMPLETED | OUTPATIENT
Start: 2024-01-01 | End: 2024-01-01

## 2024-01-01 RX ORDER — HYDROMORPHONE HCL IN WATER/PF 6 MG/30 ML
0.4 PATIENT CONTROLLED ANALGESIA SYRINGE INTRAVENOUS EVERY 5 MIN PRN
Status: DISCONTINUED | OUTPATIENT
Start: 2024-01-01 | End: 2024-01-01 | Stop reason: HOSPADM

## 2024-01-01 RX ORDER — ACETAMINOPHEN 325 MG/1
975 TABLET ORAL EVERY 8 HOURS
Status: COMPLETED | OUTPATIENT
Start: 2024-01-01 | End: 2024-01-01

## 2024-01-01 RX ORDER — MULTIPLE VITAMINS W/ MINERALS TAB 9MG-400MCG
1 TAB ORAL DAILY
Status: DISCONTINUED | OUTPATIENT
Start: 2024-01-01 | End: 2024-01-01

## 2024-01-01 RX ORDER — MAGNESIUM SULFATE 4 G/50ML
4 INJECTION INTRAVENOUS ONCE
Status: COMPLETED | OUTPATIENT
Start: 2024-01-01 | End: 2024-01-01

## 2024-01-01 RX ORDER — OXYCODONE HYDROCHLORIDE 5 MG/1
5 TABLET ORAL
Status: DISCONTINUED | OUTPATIENT
Start: 2024-01-01 | End: 2024-01-01 | Stop reason: HOSPADM

## 2024-01-01 RX ORDER — HYDROXYZINE HYDROCHLORIDE 10 MG/1
10 TABLET, FILM COATED ORAL EVERY 6 HOURS PRN
Status: DISCONTINUED | OUTPATIENT
Start: 2024-01-01 | End: 2024-01-01 | Stop reason: HOSPADM

## 2024-01-01 RX ORDER — SODIUM CHLORIDE 9 MG/ML
INJECTION, SOLUTION INTRAVENOUS CONTINUOUS
Status: DISCONTINUED | OUTPATIENT
Start: 2024-01-01 | End: 2024-01-01

## 2024-01-01 RX ORDER — CEFAZOLIN SODIUM 1 G/50ML
1250 SOLUTION INTRAVENOUS ONCE
Status: COMPLETED | OUTPATIENT
Start: 2024-01-01 | End: 2024-01-01

## 2024-01-01 RX ORDER — HYDROMORPHONE HCL IN WATER/PF 6 MG/30 ML
0.4 PATIENT CONTROLLED ANALGESIA SYRINGE INTRAVENOUS
Status: DISCONTINUED | OUTPATIENT
Start: 2024-01-01 | End: 2024-01-01 | Stop reason: HOSPADM

## 2024-01-01 RX ORDER — ACETAMINOPHEN 325 MG/1
975 TABLET ORAL ONCE
Status: COMPLETED | OUTPATIENT
Start: 2024-01-01 | End: 2024-01-01

## 2024-01-01 RX ORDER — HALOPERIDOL 0.5 MG/1
.5-1 TABLET ORAL EVERY 6 HOURS PRN
Status: DISCONTINUED | OUTPATIENT
Start: 2024-01-01 | End: 2024-01-01 | Stop reason: HOSPADM

## 2024-01-01 RX ADMIN — Medication 1 TABLET: at 12:42

## 2024-01-01 RX ADMIN — MEROPENEM 1 G: 1 INJECTION, POWDER, FOR SOLUTION INTRAVENOUS at 12:42

## 2024-01-01 RX ADMIN — MEROPENEM 1 G: 1 INJECTION, POWDER, FOR SOLUTION INTRAVENOUS at 02:29

## 2024-01-01 RX ADMIN — ACETAMINOPHEN 975 MG: 325 TABLET ORAL at 17:28

## 2024-01-01 RX ADMIN — FAMOTIDINE 20 MG: 10 INJECTION, SOLUTION INTRAVENOUS at 21:22

## 2024-01-01 RX ADMIN — ALLOPURINOL 300 MG: 300 TABLET ORAL at 09:20

## 2024-01-01 RX ADMIN — Medication 3 MG: at 20:28

## 2024-01-01 RX ADMIN — SENNOSIDES AND DOCUSATE SODIUM 1 TABLET: 8.6; 5 TABLET ORAL at 09:40

## 2024-01-01 RX ADMIN — ASPIRIN 81 MG CHEWABLE TABLET 81 MG: 81 TABLET CHEWABLE at 09:26

## 2024-01-01 RX ADMIN — Medication 1 TABLET: at 09:24

## 2024-01-01 RX ADMIN — Medication 2 SPRAY: at 10:02

## 2024-01-01 RX ADMIN — Medication 2 SPRAY: at 12:19

## 2024-01-01 RX ADMIN — MEROPENEM 1 G: 1 INJECTION, POWDER, FOR SOLUTION INTRAVENOUS at 13:43

## 2024-01-01 RX ADMIN — INSULIN GLARGINE 5 UNITS: 100 INJECTION, SOLUTION SUBCUTANEOUS at 22:35

## 2024-01-01 RX ADMIN — MICONAZOLE NITRATE: 20 POWDER TOPICAL at 22:31

## 2024-01-01 RX ADMIN — SODIUM CHLORIDE 1250 MG: 9 INJECTION, SOLUTION INTRAVENOUS at 13:29

## 2024-01-01 RX ADMIN — SENNOSIDES AND DOCUSATE SODIUM 1 TABLET: 8.6; 5 TABLET ORAL at 21:16

## 2024-01-01 RX ADMIN — Medication 40 MG: at 05:54

## 2024-01-01 RX ADMIN — Medication 1 TABLET: at 10:30

## 2024-01-01 RX ADMIN — PANTOPRAZOLE SODIUM 40 MG: 40 TABLET, DELAYED RELEASE ORAL at 07:00

## 2024-01-01 RX ADMIN — DICLOFENAC 2 G: 10 GEL TOPICAL at 17:39

## 2024-01-01 RX ADMIN — OXYCODONE HYDROCHLORIDE 5 MG: 5 TABLET ORAL at 20:55

## 2024-01-01 RX ADMIN — Medication 500 MG: at 09:41

## 2024-01-01 RX ADMIN — OXYCODONE HYDROCHLORIDE 5 MG: 5 TABLET ORAL at 10:55

## 2024-01-01 RX ADMIN — Medication 2 SPRAY: at 09:11

## 2024-01-01 RX ADMIN — Medication 1 TABLET: at 08:49

## 2024-01-01 RX ADMIN — IOPAMIDOL 75 ML: 755 INJECTION, SOLUTION INTRAVENOUS at 14:38

## 2024-01-01 RX ADMIN — DEXMEDETOMIDINE HYDROCHLORIDE 12 MCG: 100 INJECTION, SOLUTION INTRAVENOUS at 07:47

## 2024-01-01 RX ADMIN — OXYCODONE HYDROCHLORIDE 2.5 MG: 5 TABLET ORAL at 21:53

## 2024-01-01 RX ADMIN — ERTAPENEM SODIUM 1 G: 1 INJECTION, POWDER, LYOPHILIZED, FOR SOLUTION INTRAMUSCULAR; INTRAVENOUS at 21:28

## 2024-01-01 RX ADMIN — MEROPENEM 1 G: 1 INJECTION, POWDER, FOR SOLUTION INTRAVENOUS at 05:54

## 2024-01-01 RX ADMIN — ROCURONIUM BROMIDE 20 MG: 50 INJECTION, SOLUTION INTRAVENOUS at 14:57

## 2024-01-01 RX ADMIN — CARVEDILOL 25 MG: 12.5 TABLET, FILM COATED ORAL at 09:18

## 2024-01-01 RX ADMIN — ACETAMINOPHEN 975 MG: 325 TABLET ORAL at 05:10

## 2024-01-01 RX ADMIN — MICONAZOLE NITRATE: 20 POWDER TOPICAL at 21:59

## 2024-01-01 RX ADMIN — SODIUM CHLORIDE, POTASSIUM CHLORIDE, SODIUM LACTATE AND CALCIUM CHLORIDE: 600; 310; 30; 20 INJECTION, SOLUTION INTRAVENOUS at 07:51

## 2024-01-01 RX ADMIN — HYDROMORPHONE HYDROCHLORIDE 0.2 MG: 0.2 INJECTION, SOLUTION INTRAMUSCULAR; INTRAVENOUS; SUBCUTANEOUS at 00:52

## 2024-01-01 RX ADMIN — IOPAMIDOL 81 ML: 755 INJECTION, SOLUTION INTRAVENOUS at 18:37

## 2024-01-01 RX ADMIN — Medication 2 SPRAY: at 16:43

## 2024-01-01 RX ADMIN — DAPTOMYCIN 400 MG: 500 INJECTION, POWDER, LYOPHILIZED, FOR SOLUTION INTRAVENOUS at 20:47

## 2024-01-01 RX ADMIN — Medication 2 SPRAY: at 17:01

## 2024-01-01 RX ADMIN — Medication 500 MG: at 09:11

## 2024-01-01 RX ADMIN — ALLOPURINOL 300 MG: 300 TABLET ORAL at 09:05

## 2024-01-01 RX ADMIN — Medication 2 SPRAY: at 09:49

## 2024-01-01 RX ADMIN — Medication 2 SPRAY: at 11:59

## 2024-01-01 RX ADMIN — ACETAMINOPHEN 500 MG: 500 TABLET ORAL at 23:49

## 2024-01-01 RX ADMIN — DICLOFENAC 2 G: 10 GEL TOPICAL at 20:43

## 2024-01-01 RX ADMIN — DEXTRAN 70, GLYCERIN, HYPROMELLOSE 2 DROP: 1; 2; 3 SOLUTION/ DROPS OPHTHALMIC at 09:35

## 2024-01-01 RX ADMIN — MEROPENEM 1 G: 1 INJECTION, POWDER, FOR SOLUTION INTRAVENOUS at 18:40

## 2024-01-01 RX ADMIN — OXYCODONE HYDROCHLORIDE 10 MG: 5 TABLET ORAL at 11:13

## 2024-01-01 RX ADMIN — SACUBITRIL AND VALSARTAN 1 TABLET: 97; 103 TABLET, FILM COATED ORAL at 22:24

## 2024-01-01 RX ADMIN — Medication 2 SPRAY: at 16:15

## 2024-01-01 RX ADMIN — MEROPENEM 1 G: 1 INJECTION, POWDER, FOR SOLUTION INTRAVENOUS at 17:25

## 2024-01-01 RX ADMIN — POLYPROPYLENE GLYCOL 400, PROPYLENE GLYCOL 2 DROP: .4; .3 LIQUID OPHTHALMIC at 09:24

## 2024-01-01 RX ADMIN — POTASSIUM & SODIUM PHOSPHATES POWDER PACK 280-160-250 MG 1 PACKET: 280-160-250 PACK at 18:05

## 2024-01-01 RX ADMIN — SODIUM PHOSPHATE, MONOBASIC, MONOHYDRATE AND SODIUM PHOSPHATE, DIBASIC, ANHYDROUS 9 MMOL: 142; 276 INJECTION, SOLUTION INTRAVENOUS at 09:01

## 2024-01-01 RX ADMIN — ASPIRIN 81 MG: 81 TABLET, COATED ORAL at 09:18

## 2024-01-01 RX ADMIN — DEXTRAN 70, GLYCERIN, HYPROMELLOSE 2 DROP: 1; 2; 3 SOLUTION/ DROPS OPHTHALMIC at 21:29

## 2024-01-01 RX ADMIN — DEXTRAN 70, GLYCERIN, HYPROMELLOSE 2 DROP: 1; 2; 3 SOLUTION/ DROPS OPHTHALMIC at 22:27

## 2024-01-01 RX ADMIN — POLYPROPYLENE GLYCOL 400, PROPYLENE GLYCOL 2 DROP: .4; .3 LIQUID OPHTHALMIC at 09:33

## 2024-01-01 RX ADMIN — METOPROLOL SUCCINATE 25 MG: 25 TABLET, EXTENDED RELEASE ORAL at 09:11

## 2024-01-01 RX ADMIN — MEROPENEM 1 G: 1 INJECTION, POWDER, FOR SOLUTION INTRAVENOUS at 03:03

## 2024-01-01 RX ADMIN — DEXTRAN 70, GLYCERIN, HYPROMELLOSE 2 DROP: 1; 2; 3 SOLUTION/ DROPS OPHTHALMIC at 21:31

## 2024-01-01 RX ADMIN — CARVEDILOL 25 MG: 12.5 TABLET, FILM COATED ORAL at 22:25

## 2024-01-01 RX ADMIN — MEROPENEM 1 G: 1 INJECTION, POWDER, FOR SOLUTION INTRAVENOUS at 21:18

## 2024-01-01 RX ADMIN — PANTOPRAZOLE SODIUM 40 MG: 40 TABLET, DELAYED RELEASE ORAL at 08:29

## 2024-01-01 RX ADMIN — DAPTOMYCIN 400 MG: 500 INJECTION, POWDER, LYOPHILIZED, FOR SOLUTION INTRAVENOUS at 18:15

## 2024-01-01 RX ADMIN — Medication 3 MG: at 18:31

## 2024-01-01 RX ADMIN — Medication 1 TABLET: at 09:45

## 2024-01-01 RX ADMIN — POLYPROPYLENE GLYCOL 400, PROPYLENE GLYCOL 2 DROP: .4; .3 LIQUID OPHTHALMIC at 20:25

## 2024-01-01 RX ADMIN — MEROPENEM 1 G: 1 INJECTION, POWDER, FOR SOLUTION INTRAVENOUS at 01:37

## 2024-01-01 RX ADMIN — MEROPENEM 1 G: 1 INJECTION, POWDER, FOR SOLUTION INTRAVENOUS at 20:59

## 2024-01-01 RX ADMIN — MEROPENEM 1 G: 1 INJECTION, POWDER, FOR SOLUTION INTRAVENOUS at 14:56

## 2024-01-01 RX ADMIN — HEPARIN SODIUM 5000 UNITS: 10000 INJECTION, SOLUTION INTRAVENOUS; SUBCUTANEOUS at 05:10

## 2024-01-01 RX ADMIN — INSULIN ASPART 1 UNITS: 100 INJECTION, SOLUTION INTRAVENOUS; SUBCUTANEOUS at 17:21

## 2024-01-01 RX ADMIN — ACETAMINOPHEN 500 MG: 500 TABLET ORAL at 09:24

## 2024-01-01 RX ADMIN — ACETAMINOPHEN 975 MG: 325 TABLET ORAL at 10:17

## 2024-01-01 RX ADMIN — INSULIN ASPART 2 UNITS: 100 INJECTION, SOLUTION INTRAVENOUS; SUBCUTANEOUS at 12:26

## 2024-01-01 RX ADMIN — ACETAMINOPHEN 650 MG: 325 TABLET ORAL at 20:50

## 2024-01-01 RX ADMIN — Medication 1 TABLET: at 09:00

## 2024-01-01 RX ADMIN — FUROSEMIDE 20 MG: 10 INJECTION, SOLUTION INTRAMUSCULAR; INTRAVENOUS at 18:38

## 2024-01-01 RX ADMIN — DEXTRAN 70, GLYCERIN, HYPROMELLOSE 2 DROP: 1; 2; 3 SOLUTION/ DROPS OPHTHALMIC at 08:53

## 2024-01-01 RX ADMIN — OXYCODONE HYDROCHLORIDE 2.5 MG: 5 TABLET ORAL at 10:34

## 2024-01-01 RX ADMIN — Medication 2 SPRAY: at 20:14

## 2024-01-01 RX ADMIN — MEROPENEM 1 G: 1 INJECTION, POWDER, FOR SOLUTION INTRAVENOUS at 01:58

## 2024-01-01 RX ADMIN — Medication 500 MG: at 09:59

## 2024-01-01 RX ADMIN — PANTOPRAZOLE SODIUM 40 MG: 40 TABLET, DELAYED RELEASE ORAL at 09:24

## 2024-01-01 RX ADMIN — Medication 2 SPRAY: at 20:47

## 2024-01-01 RX ADMIN — INSULIN ASPART 1 UNITS: 100 INJECTION, SOLUTION INTRAVENOUS; SUBCUTANEOUS at 12:30

## 2024-01-01 RX ADMIN — INSULIN ASPART 1 UNITS: 100 INJECTION, SOLUTION INTRAVENOUS; SUBCUTANEOUS at 18:37

## 2024-01-01 RX ADMIN — FUROSEMIDE 20 MG: 10 INJECTION, SOLUTION INTRAMUSCULAR; INTRAVENOUS at 12:30

## 2024-01-01 RX ADMIN — Medication 2 SPRAY: at 20:01

## 2024-01-01 RX ADMIN — SODIUM PHOSPHATE, DIBASIC, ANHYDROUS, POTASSIUM PHOSPHATE, MONOBASIC, AND SODIUM PHOSPHATE, MONOBASIC, MONOHYDRATE 250 MG: 852; 155; 130 TABLET, COATED ORAL at 21:04

## 2024-01-01 RX ADMIN — WARFARIN SODIUM 4 MG: 2 TABLET ORAL at 18:01

## 2024-01-01 RX ADMIN — INSULIN GLARGINE 5 UNITS: 100 INJECTION, SOLUTION SUBCUTANEOUS at 21:34

## 2024-01-01 RX ADMIN — OXYCODONE HYDROCHLORIDE 5 MG: 5 TABLET ORAL at 09:41

## 2024-01-01 RX ADMIN — DAPTOMYCIN 400 MG: 500 INJECTION, POWDER, LYOPHILIZED, FOR SOLUTION INTRAVENOUS at 19:11

## 2024-01-01 RX ADMIN — SACUBITRIL AND VALSARTAN 1 TABLET: 97; 103 TABLET, FILM COATED ORAL at 19:42

## 2024-01-01 RX ADMIN — SACUBITRIL AND VALSARTAN 1 TABLET: 97; 103 TABLET, FILM COATED ORAL at 21:21

## 2024-01-01 RX ADMIN — DICLOFENAC 2 G: 10 GEL TOPICAL at 20:36

## 2024-01-01 RX ADMIN — OXYCODONE HYDROCHLORIDE 5 MG: 5 TABLET ORAL at 14:23

## 2024-01-01 RX ADMIN — SODIUM CHLORIDE 250 ML: 9 INJECTION, SOLUTION INTRAVENOUS at 12:52

## 2024-01-01 RX ADMIN — ACETAMINOPHEN 650 MG: 325 TABLET ORAL at 16:42

## 2024-01-01 RX ADMIN — POLYPROPYLENE GLYCOL 400, PROPYLENE GLYCOL 2 DROP: .4; .3 LIQUID OPHTHALMIC at 08:59

## 2024-01-01 RX ADMIN — FENTANYL CITRATE 25 MCG: 50 INJECTION, SOLUTION INTRAMUSCULAR; INTRAVENOUS at 11:36

## 2024-01-01 RX ADMIN — DICLOFENAC SODIUM 2 G: 10 GEL TOPICAL at 22:27

## 2024-01-01 RX ADMIN — DICLOFENAC SODIUM 2 G: 10 GEL TOPICAL at 22:34

## 2024-01-01 RX ADMIN — OXYCODONE HYDROCHLORIDE 5 MG: 5 TABLET ORAL at 05:49

## 2024-01-01 RX ADMIN — FUROSEMIDE 20 MG: 10 INJECTION, SOLUTION INTRAMUSCULAR; INTRAVENOUS at 19:09

## 2024-01-01 RX ADMIN — SODIUM CHLORIDE, POTASSIUM CHLORIDE, SODIUM LACTATE AND CALCIUM CHLORIDE: 600; 310; 30; 20 INJECTION, SOLUTION INTRAVENOUS at 06:37

## 2024-01-01 RX ADMIN — Medication 2 SPRAY: at 17:15

## 2024-01-01 RX ADMIN — MEROPENEM 1 G: 1 INJECTION, POWDER, FOR SOLUTION INTRAVENOUS at 17:28

## 2024-01-01 RX ADMIN — DICLOFENAC 2 G: 10 GEL TOPICAL at 12:46

## 2024-01-01 RX ADMIN — EMPAGLIFLOZIN 25 MG: 25 TABLET, FILM COATED ORAL at 08:42

## 2024-01-01 RX ADMIN — ALLOPURINOL 300 MG: 300 TABLET ORAL at 09:26

## 2024-01-01 RX ADMIN — HYDROMORPHONE HYDROCHLORIDE 0.4 MG: 0.2 INJECTION, SOLUTION INTRAMUSCULAR; INTRAVENOUS; SUBCUTANEOUS at 19:45

## 2024-01-01 RX ADMIN — DAPTOMYCIN 400 MG: 500 INJECTION, POWDER, LYOPHILIZED, FOR SOLUTION INTRAVENOUS at 19:02

## 2024-01-01 RX ADMIN — BARIUM SULFATE: 400 SUSPENSION ORAL at 14:52

## 2024-01-01 RX ADMIN — HYDROMORPHONE HYDROCHLORIDE 0.4 MG: 1 INJECTION, SOLUTION INTRAMUSCULAR; INTRAVENOUS; SUBCUTANEOUS at 17:24

## 2024-01-01 RX ADMIN — INSULIN ASPART 3 UNITS: 100 INJECTION, SOLUTION INTRAVENOUS; SUBCUTANEOUS at 17:24

## 2024-01-01 RX ADMIN — ACETAMINOPHEN 650 MG: 325 TABLET ORAL at 21:44

## 2024-01-01 RX ADMIN — OXYCODONE HYDROCHLORIDE 5 MG: 5 TABLET ORAL at 16:18

## 2024-01-01 RX ADMIN — Medication 3 MG: at 21:30

## 2024-01-01 RX ADMIN — DEXTRAN 70, GLYCERIN, HYPROMELLOSE 2 DROP: 1; 2; 3 SOLUTION/ DROPS OPHTHALMIC at 20:48

## 2024-01-01 RX ADMIN — SACUBITRIL AND VALSARTAN 1 TABLET: 97; 103 TABLET, FILM COATED ORAL at 08:04

## 2024-01-01 RX ADMIN — MEROPENEM 1 G: 1 INJECTION, POWDER, FOR SOLUTION INTRAVENOUS at 05:17

## 2024-01-01 RX ADMIN — MIDODRINE HYDROCHLORIDE 5 MG: 5 TABLET ORAL at 17:14

## 2024-01-01 RX ADMIN — Medication 2 SPRAY: at 20:25

## 2024-01-01 RX ADMIN — PANTOPRAZOLE SODIUM 40 MG: 40 TABLET, DELAYED RELEASE ORAL at 06:38

## 2024-01-01 RX ADMIN — MIDODRINE HYDROCHLORIDE 5 MG: 5 TABLET ORAL at 13:36

## 2024-01-01 RX ADMIN — OXYCODONE HYDROCHLORIDE 10 MG: 10 TABLET ORAL at 13:01

## 2024-01-01 RX ADMIN — MIDODRINE HYDROCHLORIDE 5 MG: 5 TABLET ORAL at 18:49

## 2024-01-01 RX ADMIN — SACUBITRIL AND VALSARTAN 1 TABLET: 97; 103 TABLET, FILM COATED ORAL at 21:45

## 2024-01-01 RX ADMIN — INSULIN ASPART 1 UNITS: 100 INJECTION, SOLUTION INTRAVENOUS; SUBCUTANEOUS at 12:34

## 2024-01-01 RX ADMIN — OXYCODONE HYDROCHLORIDE 5 MG: 5 TABLET ORAL at 09:39

## 2024-01-01 RX ADMIN — INSULIN ASPART 1 UNITS: 100 INJECTION, SOLUTION INTRAVENOUS; SUBCUTANEOUS at 16:09

## 2024-01-01 RX ADMIN — Medication 2 SPRAY: at 11:54

## 2024-01-01 RX ADMIN — PROCHLORPERAZINE EDISYLATE 5 MG: 5 INJECTION INTRAMUSCULAR; INTRAVENOUS at 17:31

## 2024-01-01 RX ADMIN — Medication 3 MG: at 21:16

## 2024-01-01 RX ADMIN — MEROPENEM 1 G: 1 INJECTION, POWDER, FOR SOLUTION INTRAVENOUS at 00:36

## 2024-01-01 RX ADMIN — ASPIRIN 81 MG: 81 TABLET, COATED ORAL at 08:53

## 2024-01-01 RX ADMIN — ACETAMINOPHEN 325MG 650 MG: 325 TABLET ORAL at 16:41

## 2024-01-01 RX ADMIN — INSULIN ASPART 3 UNITS: 100 INJECTION, SOLUTION INTRAVENOUS; SUBCUTANEOUS at 16:35

## 2024-01-01 RX ADMIN — PHENYLEPHRINE HYDROCHLORIDE 100 MCG: 10 INJECTION INTRAVENOUS at 14:35

## 2024-01-01 RX ADMIN — POLYETHYLENE GLYCOL 3350 17 G: 17 POWDER, FOR SOLUTION ORAL at 21:32

## 2024-01-01 RX ADMIN — FUROSEMIDE 20 MG: 20 TABLET ORAL at 08:40

## 2024-01-01 RX ADMIN — POLYPROPYLENE GLYCOL 400, PROPYLENE GLYCOL 2 DROP: .4; .3 LIQUID OPHTHALMIC at 10:17

## 2024-01-01 RX ADMIN — HYDROMORPHONE HYDROCHLORIDE 0.2 MG: 0.2 INJECTION, SOLUTION INTRAMUSCULAR; INTRAVENOUS; SUBCUTANEOUS at 23:35

## 2024-01-01 RX ADMIN — Medication 2 SPRAY: at 20:50

## 2024-01-01 RX ADMIN — INSULIN ASPART 3 UNITS: 100 INJECTION, SOLUTION INTRAVENOUS; SUBCUTANEOUS at 17:42

## 2024-01-01 RX ADMIN — HYDROMORPHONE HYDROCHLORIDE 0.4 MG: 0.2 INJECTION, SOLUTION INTRAMUSCULAR; INTRAVENOUS; SUBCUTANEOUS at 18:55

## 2024-01-01 RX ADMIN — ONDANSETRON 4 MG: 2 INJECTION INTRAMUSCULAR; INTRAVENOUS at 16:06

## 2024-01-01 RX ADMIN — POLYPROPYLENE GLYCOL 400, PROPYLENE GLYCOL 2 DROP: .4; .3 LIQUID OPHTHALMIC at 09:07

## 2024-01-01 RX ADMIN — Medication 3 MG: at 19:36

## 2024-01-01 RX ADMIN — FUROSEMIDE 20 MG: 20 TABLET ORAL at 09:12

## 2024-01-01 RX ADMIN — POLYETHYLENE GLYCOL 400 AND PROPYLENE GLYCOL 2 DROP: 4; 3 SOLUTION/ DROPS OPHTHALMIC at 08:43

## 2024-01-01 RX ADMIN — SACUBITRIL AND VALSARTAN 1 TABLET: 97; 103 TABLET, FILM COATED ORAL at 08:33

## 2024-01-01 RX ADMIN — DAPTOMYCIN 400 MG: 500 INJECTION, POWDER, LYOPHILIZED, FOR SOLUTION INTRAVENOUS at 01:33

## 2024-01-01 RX ADMIN — ALLOPURINOL 300 MG: 300 TABLET ORAL at 09:12

## 2024-01-01 RX ADMIN — POLYPROPYLENE GLYCOL 400, PROPYLENE GLYCOL 2 DROP: .4; .3 LIQUID OPHTHALMIC at 09:51

## 2024-01-01 RX ADMIN — DAPTOMYCIN 400 MG: 500 INJECTION, POWDER, LYOPHILIZED, FOR SOLUTION INTRAVENOUS at 20:09

## 2024-01-01 RX ADMIN — POLYETHYLENE GLYCOL 400 AND PROPYLENE GLYCOL 2 DROP: 4; 3 SOLUTION/ DROPS OPHTHALMIC at 08:32

## 2024-01-01 RX ADMIN — ROSUVASTATIN CALCIUM 20 MG: 10 TABLET, FILM COATED ORAL at 21:41

## 2024-01-01 RX ADMIN — SINCALIDE 1.5 MCG: 5 INJECTION, POWDER, LYOPHILIZED, FOR SOLUTION INTRAVENOUS at 14:50

## 2024-01-01 RX ADMIN — INSULIN GLARGINE 5 UNITS: 100 INJECTION, SOLUTION SUBCUTANEOUS at 21:10

## 2024-01-01 RX ADMIN — AMPICILLIN SODIUM AND SULBACTAM SODIUM 3 G: 2; 1 INJECTION, POWDER, FOR SOLUTION INTRAMUSCULAR; INTRAVENOUS at 18:53

## 2024-01-01 RX ADMIN — Medication 2 SPRAY: at 09:19

## 2024-01-01 RX ADMIN — DICLOFENAC 2 G: 10 GEL TOPICAL at 11:40

## 2024-01-01 RX ADMIN — MEROPENEM 1 G: 1 INJECTION, POWDER, FOR SOLUTION INTRAVENOUS at 09:27

## 2024-01-01 RX ADMIN — SODIUM PHOSPHATE, MONOBASIC, MONOHYDRATE AND SODIUM PHOSPHATE, DIBASIC, ANHYDROUS 9 MMOL: 142; 276 INJECTION, SOLUTION INTRAVENOUS at 00:23

## 2024-01-01 RX ADMIN — POTASSIUM CHLORIDE 10 MEQ: 10 INJECTION, SOLUTION INTRAVENOUS at 14:18

## 2024-01-01 RX ADMIN — ERYTHROMYCIN 1 G: 5 OINTMENT OPHTHALMIC at 20:52

## 2024-01-01 RX ADMIN — INSULIN ASPART 1 UNITS: 100 INJECTION, SOLUTION INTRAVENOUS; SUBCUTANEOUS at 12:33

## 2024-01-01 RX ADMIN — MEROPENEM 1 G: 1 INJECTION, POWDER, FOR SOLUTION INTRAVENOUS at 21:40

## 2024-01-01 RX ADMIN — OXYCODONE HYDROCHLORIDE 5 MG: 5 TABLET ORAL at 16:32

## 2024-01-01 RX ADMIN — FUROSEMIDE 20 MG: 10 INJECTION, SOLUTION INTRAMUSCULAR; INTRAVENOUS at 03:33

## 2024-01-01 RX ADMIN — WARFARIN SODIUM 4 MG: 2 TABLET ORAL at 18:20

## 2024-01-01 RX ADMIN — PANTOPRAZOLE SODIUM 40 MG: 40 TABLET, DELAYED RELEASE ORAL at 10:07

## 2024-01-01 RX ADMIN — DAPTOMYCIN 400 MG: 500 INJECTION, POWDER, LYOPHILIZED, FOR SOLUTION INTRAVENOUS at 18:52

## 2024-01-01 RX ADMIN — OXYCODONE HYDROCHLORIDE 5 MG: 5 TABLET ORAL at 21:52

## 2024-01-01 RX ADMIN — ALTEPLASE 2 MG: 2.2 INJECTION, POWDER, LYOPHILIZED, FOR SOLUTION INTRAVENOUS at 12:55

## 2024-01-01 RX ADMIN — HEPARIN SODIUM 5000 UNITS: 10000 INJECTION, SOLUTION INTRAVENOUS; SUBCUTANEOUS at 12:09

## 2024-01-01 RX ADMIN — ENOXAPARIN SODIUM 40 MG: 40 INJECTION SUBCUTANEOUS at 11:04

## 2024-01-01 RX ADMIN — MICONAZOLE NITRATE: 20 POWDER TOPICAL at 08:55

## 2024-01-01 RX ADMIN — Medication 3 MG: at 20:25

## 2024-01-01 RX ADMIN — MEROPENEM 1 G: 1 INJECTION, POWDER, FOR SOLUTION INTRAVENOUS at 04:48

## 2024-01-01 RX ADMIN — Medication 2 SPRAY: at 19:41

## 2024-01-01 RX ADMIN — POLYPROPYLENE GLYCOL 400, PROPYLENE GLYCOL 2 DROP: .4; .3 LIQUID OPHTHALMIC at 09:19

## 2024-01-01 RX ADMIN — DEXTRAN 70, GLYCERIN, HYPROMELLOSE 2 DROP: 1; 2; 3 SOLUTION/ DROPS OPHTHALMIC at 09:15

## 2024-01-01 RX ADMIN — Medication 2 SPRAY: at 16:28

## 2024-01-01 RX ADMIN — ACETAMINOPHEN 650 MG: 325 TABLET ORAL at 09:10

## 2024-01-01 RX ADMIN — DICLOFENAC 2 G: 10 GEL TOPICAL at 20:29

## 2024-01-01 RX ADMIN — OXYCODONE HYDROCHLORIDE 5 MG: 5 TABLET ORAL at 01:11

## 2024-01-01 RX ADMIN — ROCURONIUM BROMIDE 20 MG: 50 INJECTION, SOLUTION INTRAVENOUS at 15:50

## 2024-01-01 RX ADMIN — METOPROLOL SUCCINATE 25 MG: 25 TABLET, EXTENDED RELEASE ORAL at 10:30

## 2024-01-01 RX ADMIN — MEROPENEM 1 G: 1 INJECTION, POWDER, FOR SOLUTION INTRAVENOUS at 13:41

## 2024-01-01 RX ADMIN — POLYPROPYLENE GLYCOL 400, PROPYLENE GLYCOL 2 DROP: .4; .3 LIQUID OPHTHALMIC at 10:03

## 2024-01-01 RX ADMIN — HYDROMORPHONE HYDROCHLORIDE 0.4 MG: 0.2 INJECTION, SOLUTION INTRAMUSCULAR; INTRAVENOUS; SUBCUTANEOUS at 08:29

## 2024-01-01 RX ADMIN — FUROSEMIDE 20 MG: 10 INJECTION, SOLUTION INTRAMUSCULAR; INTRAVENOUS at 09:54

## 2024-01-01 RX ADMIN — SODIUM PHOSPHATE, MONOBASIC, MONOHYDRATE AND SODIUM PHOSPHATE, DIBASIC, ANHYDROUS 9 MMOL: 142; 276 INJECTION, SOLUTION INTRAVENOUS at 23:38

## 2024-01-01 RX ADMIN — WARFARIN SODIUM 4 MG: 2 TABLET ORAL at 17:27

## 2024-01-01 RX ADMIN — OXYCODONE HYDROCHLORIDE 5 MG: 5 TABLET ORAL at 12:03

## 2024-01-01 RX ADMIN — ERYTHROMYCIN 1 G: 5 OINTMENT OPHTHALMIC at 21:45

## 2024-01-01 RX ADMIN — OXYCODONE HYDROCHLORIDE 5 MG: 5 TABLET ORAL at 08:35

## 2024-01-01 RX ADMIN — FENTANYL CITRATE 50 MCG: 50 INJECTION INTRAMUSCULAR; INTRAVENOUS at 14:32

## 2024-01-01 RX ADMIN — HYDROMORPHONE HYDROCHLORIDE 0.4 MG: 0.2 INJECTION, SOLUTION INTRAMUSCULAR; INTRAVENOUS; SUBCUTANEOUS at 03:05

## 2024-01-01 RX ADMIN — SENNOSIDES AND DOCUSATE SODIUM 1 TABLET: 8.6; 5 TABLET ORAL at 09:25

## 2024-01-01 RX ADMIN — POLYPROPYLENE GLYCOL 400, PROPYLENE GLYCOL 2 DROP: .4; .3 LIQUID OPHTHALMIC at 20:23

## 2024-01-01 RX ADMIN — MEROPENEM 1 G: 1 INJECTION, POWDER, FOR SOLUTION INTRAVENOUS at 04:32

## 2024-01-01 RX ADMIN — MIDODRINE HYDROCHLORIDE 5 MG: 5 TABLET ORAL at 08:51

## 2024-01-01 RX ADMIN — ENOXAPARIN SODIUM 40 MG: 40 INJECTION SUBCUTANEOUS at 09:47

## 2024-01-01 RX ADMIN — MEBROFENIN 8 MILLICURIE: 45 INJECTION, POWDER, LYOPHILIZED, FOR SOLUTION INTRAVENOUS at 13:51

## 2024-01-01 RX ADMIN — DICLOFENAC 2 G: 10 GEL TOPICAL at 17:15

## 2024-01-01 RX ADMIN — SODIUM CHLORIDE 1500 MG: 9 INJECTION, SOLUTION INTRAVENOUS at 17:31

## 2024-01-01 RX ADMIN — FUROSEMIDE 20 MG: 20 TABLET ORAL at 09:10

## 2024-01-01 RX ADMIN — POTASSIUM CHLORIDE 10 MEQ: 7.46 INJECTION, SOLUTION INTRAVENOUS at 14:12

## 2024-01-01 RX ADMIN — MEROPENEM 1 G: 1 INJECTION, POWDER, FOR SOLUTION INTRAVENOUS at 16:08

## 2024-01-01 RX ADMIN — HYDROMORPHONE HYDROCHLORIDE 0.2 MG: 0.2 INJECTION, SOLUTION INTRAMUSCULAR; INTRAVENOUS; SUBCUTANEOUS at 15:05

## 2024-01-01 RX ADMIN — HYDROMORPHONE HYDROCHLORIDE 0.2 MG: 0.2 INJECTION, SOLUTION INTRAMUSCULAR; INTRAVENOUS; SUBCUTANEOUS at 14:20

## 2024-01-01 RX ADMIN — METOPROLOL SUCCINATE 25 MG: 25 TABLET, EXTENDED RELEASE ORAL at 09:24

## 2024-01-01 RX ADMIN — WARFARIN SODIUM 4 MG: 2 TABLET ORAL at 17:10

## 2024-01-01 RX ADMIN — MEROPENEM 1 G: 1 INJECTION, POWDER, FOR SOLUTION INTRAVENOUS at 15:27

## 2024-01-01 RX ADMIN — PERFLUTREN 3 ML: 6.52 INJECTION, SUSPENSION INTRAVENOUS at 16:28

## 2024-01-01 RX ADMIN — PANTOPRAZOLE SODIUM 40 MG: 40 TABLET, DELAYED RELEASE ORAL at 08:02

## 2024-01-01 RX ADMIN — Medication 2 SPRAY: at 08:52

## 2024-01-01 RX ADMIN — MICONAZOLE NITRATE: 20 POWDER TOPICAL at 08:20

## 2024-01-01 RX ADMIN — POTASSIUM & SODIUM PHOSPHATES POWDER PACK 280-160-250 MG 1 PACKET: 280-160-250 PACK at 09:28

## 2024-01-01 RX ADMIN — FENTANYL CITRATE 50 MCG: 50 INJECTION, SOLUTION INTRAMUSCULAR; INTRAVENOUS at 17:13

## 2024-01-01 RX ADMIN — Medication 2 SPRAY: at 13:08

## 2024-01-01 RX ADMIN — POLYPROPYLENE GLYCOL 400, PROPYLENE GLYCOL 2 DROP: .4; .3 LIQUID OPHTHALMIC at 11:54

## 2024-01-01 RX ADMIN — Medication 500 MG: at 09:45

## 2024-01-01 RX ADMIN — WARFARIN SODIUM 3 MG: 3 TABLET ORAL at 17:37

## 2024-01-01 RX ADMIN — WARFARIN SODIUM 4 MG: 2 TABLET ORAL at 18:48

## 2024-01-01 RX ADMIN — MEROPENEM 1 G: 1 INJECTION, POWDER, FOR SOLUTION INTRAVENOUS at 13:16

## 2024-01-01 RX ADMIN — POLYPROPYLENE GLYCOL 400, PROPYLENE GLYCOL 2 DROP: .4; .3 LIQUID OPHTHALMIC at 09:59

## 2024-01-01 RX ADMIN — Medication 40 MG: at 05:38

## 2024-01-01 RX ADMIN — POTASSIUM CHLORIDE, DEXTROSE MONOHYDRATE AND SODIUM CHLORIDE: 150; 5; 450 INJECTION, SOLUTION INTRAVENOUS at 19:55

## 2024-01-01 RX ADMIN — Medication 1 TABLET: at 09:11

## 2024-01-01 RX ADMIN — Medication 1 TABLET: at 09:38

## 2024-01-01 RX ADMIN — FAMOTIDINE 20 MG: 20 TABLET ORAL at 18:06

## 2024-01-01 RX ADMIN — INSULIN ASPART 1 UNITS: 100 INJECTION, SOLUTION INTRAVENOUS; SUBCUTANEOUS at 13:00

## 2024-01-01 RX ADMIN — SODIUM CHLORIDE 500 ML: 9 INJECTION, SOLUTION INTRAVENOUS at 13:57

## 2024-01-01 RX ADMIN — FUROSEMIDE 20 MG: 10 INJECTION, SOLUTION INTRAMUSCULAR; INTRAVENOUS at 10:12

## 2024-01-01 RX ADMIN — ACETAMINOPHEN 650 MG: 325 TABLET ORAL at 09:56

## 2024-01-01 RX ADMIN — ENOXAPARIN SODIUM 40 MG: 40 INJECTION SUBCUTANEOUS at 20:35

## 2024-01-01 RX ADMIN — CARVEDILOL 25 MG: 12.5 TABLET, FILM COATED ORAL at 08:43

## 2024-01-01 RX ADMIN — HYDROMORPHONE HYDROCHLORIDE 0.4 MG: 0.2 INJECTION, SOLUTION INTRAMUSCULAR; INTRAVENOUS; SUBCUTANEOUS at 08:51

## 2024-01-01 RX ADMIN — OXYCODONE HYDROCHLORIDE 2.5 MG: 5 TABLET ORAL at 13:33

## 2024-01-01 RX ADMIN — ERYTHROMYCIN 1 G: 5 OINTMENT OPHTHALMIC at 20:00

## 2024-01-01 RX ADMIN — Medication 2 SPRAY: at 09:40

## 2024-01-01 RX ADMIN — SENNOSIDES AND DOCUSATE SODIUM 1 TABLET: 8.6; 5 TABLET ORAL at 10:16

## 2024-01-01 RX ADMIN — DAPTOMYCIN 400 MG: 500 INJECTION, POWDER, LYOPHILIZED, FOR SOLUTION INTRAVENOUS at 19:20

## 2024-01-01 RX ADMIN — FAMOTIDINE 20 MG: 20 TABLET ORAL at 09:45

## 2024-01-01 RX ADMIN — POLYPROPYLENE GLYCOL 400, PROPYLENE GLYCOL 2 DROP: .4; .3 LIQUID OPHTHALMIC at 21:34

## 2024-01-01 RX ADMIN — SACUBITRIL AND VALSARTAN 1 TABLET: 97; 103 TABLET, FILM COATED ORAL at 22:25

## 2024-01-01 RX ADMIN — Medication 3 MG: at 20:50

## 2024-01-01 RX ADMIN — MEROPENEM 1 G: 1 INJECTION, POWDER, FOR SOLUTION INTRAVENOUS at 12:32

## 2024-01-01 RX ADMIN — SACUBITRIL AND VALSARTAN 1 TABLET: 97; 103 TABLET, FILM COATED ORAL at 08:42

## 2024-01-01 RX ADMIN — FUROSEMIDE 20 MG: 10 INJECTION, SOLUTION INTRAMUSCULAR; INTRAVENOUS at 09:45

## 2024-01-01 RX ADMIN — MEROPENEM 1 G: 1 INJECTION, POWDER, FOR SOLUTION INTRAVENOUS at 17:34

## 2024-01-01 RX ADMIN — DEXTRAN 70, GLYCERIN, HYPROMELLOSE 2 DROP: 1; 2; 3 SOLUTION/ DROPS OPHTHALMIC at 09:17

## 2024-01-01 RX ADMIN — DICLOFENAC 2 G: 10 GEL TOPICAL at 08:58

## 2024-01-01 RX ADMIN — EMPAGLIFLOZIN 25 MG: 25 TABLET, FILM COATED ORAL at 09:26

## 2024-01-01 RX ADMIN — ASPIRIN 81 MG CHEWABLE TABLET 81 MG: 81 TABLET CHEWABLE at 08:02

## 2024-01-01 RX ADMIN — SODIUM PHOSPHATE, DIBASIC, ANHYDROUS, POTASSIUM PHOSPHATE, MONOBASIC, AND SODIUM PHOSPHATE, MONOBASIC, MONOHYDRATE 250 MG: 852; 155; 130 TABLET, COATED ORAL at 20:50

## 2024-01-01 RX ADMIN — ASPIRIN 81 MG: 81 TABLET, COATED ORAL at 08:59

## 2024-01-01 RX ADMIN — METOPROLOL SUCCINATE 25 MG: 25 TABLET, EXTENDED RELEASE ORAL at 09:05

## 2024-01-01 RX ADMIN — OXYCODONE HYDROCHLORIDE 5 MG: 5 TABLET ORAL at 20:14

## 2024-01-01 RX ADMIN — Medication 1 TABLET: at 09:05

## 2024-01-01 RX ADMIN — Medication 1 TABLET: at 09:10

## 2024-01-01 RX ADMIN — Medication 2 SPRAY: at 09:00

## 2024-01-01 RX ADMIN — FENTANYL CITRATE 50 MCG: 50 INJECTION INTRAMUSCULAR; INTRAVENOUS at 14:35

## 2024-01-01 RX ADMIN — MIDODRINE HYDROCHLORIDE 5 MG: 5 TABLET ORAL at 16:25

## 2024-01-01 RX ADMIN — WARFARIN SODIUM 4 MG: 2 TABLET ORAL at 17:34

## 2024-01-01 RX ADMIN — DICLOFENAC 2 G: 10 GEL TOPICAL at 09:07

## 2024-01-01 RX ADMIN — ACETAMINOPHEN 975 MG: 325 TABLET ORAL at 00:51

## 2024-01-01 RX ADMIN — MEROPENEM 1 G: 1 INJECTION, POWDER, FOR SOLUTION INTRAVENOUS at 14:00

## 2024-01-01 RX ADMIN — DEXTRAN 70, GLYCERIN, HYPROMELLOSE 2 DROP: 1; 2; 3 SOLUTION/ DROPS OPHTHALMIC at 19:51

## 2024-01-01 RX ADMIN — PANTOPRAZOLE SODIUM 40 MG: 40 TABLET, DELAYED RELEASE ORAL at 06:32

## 2024-01-01 RX ADMIN — ACETAMINOPHEN 975 MG: 325 TABLET ORAL at 12:41

## 2024-01-01 RX ADMIN — PANTOPRAZOLE SODIUM 40 MG: 40 TABLET, DELAYED RELEASE ORAL at 09:12

## 2024-01-01 RX ADMIN — CARVEDILOL 25 MG: 12.5 TABLET, FILM COATED ORAL at 08:02

## 2024-01-01 RX ADMIN — Medication 1 TABLET: at 09:41

## 2024-01-01 RX ADMIN — INSULIN GLARGINE 5 UNITS: 100 INJECTION, SOLUTION SUBCUTANEOUS at 21:41

## 2024-01-01 RX ADMIN — OXYCODONE HYDROCHLORIDE 5 MG: 5 TABLET ORAL at 13:55

## 2024-01-01 RX ADMIN — DICLOFENAC 2 G: 10 GEL TOPICAL at 12:31

## 2024-01-01 RX ADMIN — Medication 40 MG: at 06:20

## 2024-01-01 RX ADMIN — INSULIN ASPART 1 UNITS: 100 INJECTION, SOLUTION INTRAVENOUS; SUBCUTANEOUS at 12:13

## 2024-01-01 RX ADMIN — SENNOSIDES AND DOCUSATE SODIUM 1 TABLET: 8.6; 5 TABLET ORAL at 18:02

## 2024-01-01 RX ADMIN — MICONAZOLE NITRATE: 20 POWDER TOPICAL at 21:34

## 2024-01-01 RX ADMIN — HYDROMORPHONE HYDROCHLORIDE 0.4 MG: 0.2 INJECTION, SOLUTION INTRAMUSCULAR; INTRAVENOUS; SUBCUTANEOUS at 22:07

## 2024-01-01 RX ADMIN — PANTOPRAZOLE SODIUM 40 MG: 40 TABLET, DELAYED RELEASE ORAL at 08:43

## 2024-01-01 RX ADMIN — INSULIN ASPART 2 UNITS: 100 INJECTION, SOLUTION INTRAVENOUS; SUBCUTANEOUS at 12:21

## 2024-01-01 RX ADMIN — ALLOPURINOL 300 MG: 300 TABLET ORAL at 08:43

## 2024-01-01 RX ADMIN — HYDROMORPHONE HYDROCHLORIDE 0.2 MG: 0.2 INJECTION, SOLUTION INTRAMUSCULAR; INTRAVENOUS; SUBCUTANEOUS at 11:30

## 2024-01-01 RX ADMIN — INSULIN GLARGINE 5 UNITS: 100 INJECTION, SOLUTION SUBCUTANEOUS at 22:00

## 2024-01-01 RX ADMIN — OXYCODONE HYDROCHLORIDE 2.5 MG: 5 TABLET ORAL at 10:16

## 2024-01-01 RX ADMIN — EMPAGLIFLOZIN 25 MG: 25 TABLET, FILM COATED ORAL at 09:12

## 2024-01-01 RX ADMIN — MICONAZOLE NITRATE: 20 POWDER TOPICAL at 10:11

## 2024-01-01 RX ADMIN — Medication 1 TABLET: at 10:16

## 2024-01-01 RX ADMIN — INSULIN GLARGINE 5 UNITS: 100 INJECTION, SOLUTION SUBCUTANEOUS at 21:12

## 2024-01-01 RX ADMIN — ERYTHROMYCIN 1 G: 5 OINTMENT OPHTHALMIC at 21:47

## 2024-01-01 RX ADMIN — Medication 2 SPRAY: at 10:14

## 2024-01-01 RX ADMIN — POTASSIUM CHLORIDE 10 MEQ: 10 INJECTION, SOLUTION INTRAVENOUS at 12:44

## 2024-01-01 RX ADMIN — HEPARIN SODIUM 5000 UNITS: 10000 INJECTION, SOLUTION INTRAVENOUS; SUBCUTANEOUS at 03:49

## 2024-01-01 RX ADMIN — MEROPENEM 1 G: 1 INJECTION, POWDER, FOR SOLUTION INTRAVENOUS at 20:51

## 2024-01-01 RX ADMIN — PHYTONADIONE 5 MG: 10 INJECTION, EMULSION INTRAMUSCULAR; INTRAVENOUS; SUBCUTANEOUS at 22:59

## 2024-01-01 RX ADMIN — ROSUVASTATIN CALCIUM 20 MG: 10 TABLET, FILM COATED ORAL at 21:45

## 2024-01-01 RX ADMIN — MEROPENEM 1 G: 1 INJECTION, POWDER, FOR SOLUTION INTRAVENOUS at 21:05

## 2024-01-01 RX ADMIN — MEROPENEM 1 G: 1 INJECTION, POWDER, FOR SOLUTION INTRAVENOUS at 18:45

## 2024-01-01 RX ADMIN — DICLOFENAC 2 G: 10 GEL TOPICAL at 09:54

## 2024-01-01 RX ADMIN — SENNOSIDES AND DOCUSATE SODIUM 1 TABLET: 8.6; 5 TABLET ORAL at 09:48

## 2024-01-01 RX ADMIN — POLYPROPYLENE GLYCOL 400, PROPYLENE GLYCOL 2 DROP: .4; .3 LIQUID OPHTHALMIC at 21:46

## 2024-01-01 RX ADMIN — DAPTOMYCIN 400 MG: 500 INJECTION, POWDER, LYOPHILIZED, FOR SOLUTION INTRAVENOUS at 18:01

## 2024-01-01 RX ADMIN — OXYCODONE HYDROCHLORIDE 2.5 MG: 5 TABLET ORAL at 09:24

## 2024-01-01 RX ADMIN — FUROSEMIDE 20 MG: 10 INJECTION, SOLUTION INTRAMUSCULAR; INTRAVENOUS at 11:00

## 2024-01-01 RX ADMIN — METOPROLOL SUCCINATE 25 MG: 25 TABLET, EXTENDED RELEASE ORAL at 08:50

## 2024-01-01 RX ADMIN — MEROPENEM 1 G: 1 INJECTION, POWDER, FOR SOLUTION INTRAVENOUS at 00:30

## 2024-01-01 RX ADMIN — WARFARIN SODIUM 2 MG: 2 TABLET ORAL at 17:31

## 2024-01-01 RX ADMIN — MEROPENEM 1 G: 1 INJECTION, POWDER, FOR SOLUTION INTRAVENOUS at 05:55

## 2024-01-01 RX ADMIN — METOPROLOL SUCCINATE 25 MG: 25 TABLET, EXTENDED RELEASE ORAL at 13:01

## 2024-01-01 RX ADMIN — MEROPENEM 1 G: 1 INJECTION, POWDER, FOR SOLUTION INTRAVENOUS at 14:23

## 2024-01-01 RX ADMIN — HEPARIN SODIUM 5000 UNITS: 10000 INJECTION, SOLUTION INTRAVENOUS; SUBCUTANEOUS at 04:03

## 2024-01-01 RX ADMIN — FUROSEMIDE 20 MG: 10 INJECTION, SOLUTION INTRAMUSCULAR; INTRAVENOUS at 18:55

## 2024-01-01 RX ADMIN — SODIUM PHOSPHATE, MONOBASIC, MONOHYDRATE AND SODIUM PHOSPHATE, DIBASIC, ANHYDROUS 15 MMOL: 142; 276 INJECTION, SOLUTION INTRAVENOUS at 10:34

## 2024-01-01 RX ADMIN — ROCURONIUM BROMIDE 50 MG: 50 INJECTION, SOLUTION INTRAVENOUS at 14:36

## 2024-01-01 RX ADMIN — BARIUM SULFATE 60 ML: 400 SUSPENSION ORAL at 14:32

## 2024-01-01 RX ADMIN — Medication 500 MG: at 11:00

## 2024-01-01 RX ADMIN — POLYPROPYLENE GLYCOL 400, PROPYLENE GLYCOL 2 DROP: .4; .3 LIQUID OPHTHALMIC at 09:05

## 2024-01-01 RX ADMIN — WARFARIN SODIUM 4 MG: 2 TABLET ORAL at 18:51

## 2024-01-01 RX ADMIN — DICLOFENAC 2 G: 10 GEL TOPICAL at 16:38

## 2024-01-01 RX ADMIN — ERYTHROMYCIN 1 G: 5 OINTMENT OPHTHALMIC at 21:33

## 2024-01-01 RX ADMIN — SENNOSIDES AND DOCUSATE SODIUM 1 TABLET: 8.6; 5 TABLET ORAL at 18:35

## 2024-01-01 RX ADMIN — INSULIN ASPART 1 UNITS: 100 INJECTION, SOLUTION INTRAVENOUS; SUBCUTANEOUS at 13:33

## 2024-01-01 RX ADMIN — ACETAMINOPHEN 325MG 650 MG: 325 TABLET ORAL at 13:21

## 2024-01-01 RX ADMIN — INSULIN ASPART 1 UNITS: 100 INJECTION, SOLUTION INTRAVENOUS; SUBCUTANEOUS at 08:59

## 2024-01-01 RX ADMIN — DAPTOMYCIN 400 MG: 500 INJECTION, POWDER, LYOPHILIZED, FOR SOLUTION INTRAVENOUS at 20:36

## 2024-01-01 RX ADMIN — ASPIRIN 81 MG: 81 TABLET, COATED ORAL at 08:51

## 2024-01-01 RX ADMIN — POLYPROPYLENE GLYCOL 400, PROPYLENE GLYCOL 2 DROP: .4; .3 LIQUID OPHTHALMIC at 11:19

## 2024-01-01 RX ADMIN — POLYPROPYLENE GLYCOL 400, PROPYLENE GLYCOL 2 DROP: .4; .3 LIQUID OPHTHALMIC at 19:41

## 2024-01-01 RX ADMIN — SENNOSIDES AND DOCUSATE SODIUM 1 TABLET: 8.6; 5 TABLET ORAL at 09:02

## 2024-01-01 RX ADMIN — OXYCODONE HYDROCHLORIDE 5 MG: 5 TABLET ORAL at 01:08

## 2024-01-01 RX ADMIN — Medication 2 SPRAY: at 12:55

## 2024-01-01 RX ADMIN — INSULIN ASPART 1 UNITS: 100 INJECTION, SOLUTION INTRAVENOUS; SUBCUTANEOUS at 17:00

## 2024-01-01 RX ADMIN — FENTANYL CITRATE 25 MCG: 50 INJECTION INTRAMUSCULAR; INTRAVENOUS at 16:06

## 2024-01-01 RX ADMIN — FENTANYL CITRATE 50 MCG: 50 INJECTION, SOLUTION INTRAMUSCULAR; INTRAVENOUS at 17:18

## 2024-01-01 RX ADMIN — Medication 2 SPRAY: at 09:55

## 2024-01-01 RX ADMIN — Medication 2 SPRAY: at 16:36

## 2024-01-01 RX ADMIN — POLYETHYLENE GLYCOL 400 AND PROPYLENE GLYCOL 2 DROP: 4; 3 SOLUTION/ DROPS OPHTHALMIC at 09:18

## 2024-01-01 RX ADMIN — Medication 2 SPRAY: at 08:49

## 2024-01-01 RX ADMIN — PIPERACILLIN AND TAZOBACTAM 3.38 G: 3; .375 INJECTION, POWDER, FOR SOLUTION INTRAVENOUS at 14:47

## 2024-01-01 RX ADMIN — MEROPENEM 1 G: 1 INJECTION, POWDER, FOR SOLUTION INTRAVENOUS at 22:21

## 2024-01-01 RX ADMIN — SODIUM CHLORIDE, POTASSIUM CHLORIDE, SODIUM LACTATE AND CALCIUM CHLORIDE: 600; 310; 30; 20 INJECTION, SOLUTION INTRAVENOUS at 11:25

## 2024-01-01 RX ADMIN — Medication 500 MG: at 12:42

## 2024-01-01 RX ADMIN — Medication 3 MG: at 21:15

## 2024-01-01 RX ADMIN — LIDOCAINE HYDROCHLORIDE 2 ML: 10 INJECTION, SOLUTION EPIDURAL; INFILTRATION; INTRACAUDAL; PERINEURAL at 12:05

## 2024-01-01 RX ADMIN — SENNOSIDES AND DOCUSATE SODIUM 1 TABLET: 8.6; 5 TABLET ORAL at 10:01

## 2024-01-01 RX ADMIN — SACUBITRIL AND VALSARTAN 1 TABLET: 97; 103 TABLET, FILM COATED ORAL at 08:43

## 2024-01-01 RX ADMIN — DICLOFENAC 2 G: 10 GEL TOPICAL at 17:12

## 2024-01-01 RX ADMIN — POLYPROPYLENE GLYCOL 400, PROPYLENE GLYCOL 2 DROP: .4; .3 LIQUID OPHTHALMIC at 09:08

## 2024-01-01 RX ADMIN — CARVEDILOL 25 MG: 12.5 TABLET, FILM COATED ORAL at 20:13

## 2024-01-01 RX ADMIN — SACUBITRIL AND VALSARTAN 1 TABLET: 97; 103 TABLET, FILM COATED ORAL at 09:31

## 2024-01-01 RX ADMIN — DICLOFENAC 2 G: 10 GEL TOPICAL at 16:14

## 2024-01-01 RX ADMIN — DICLOFENAC 2 G: 10 GEL TOPICAL at 10:14

## 2024-01-01 RX ADMIN — POLYPROPYLENE GLYCOL 400, PROPYLENE GLYCOL 2 DROP: .4; .3 LIQUID OPHTHALMIC at 21:04

## 2024-01-01 RX ADMIN — FUROSEMIDE 20 MG: 10 INJECTION, SOLUTION INTRAMUSCULAR; INTRAVENOUS at 10:25

## 2024-01-01 RX ADMIN — DEXTRAN 70, GLYCERIN, HYPROMELLOSE 2 DROP: 1; 2; 3 SOLUTION/ DROPS OPHTHALMIC at 10:11

## 2024-01-01 RX ADMIN — PANTOPRAZOLE SODIUM 40 MG: 40 TABLET, DELAYED RELEASE ORAL at 09:05

## 2024-01-01 RX ADMIN — MEROPENEM 1 G: 1 INJECTION, POWDER, FOR SOLUTION INTRAVENOUS at 22:57

## 2024-01-01 RX ADMIN — POLYPROPYLENE GLYCOL 400, PROPYLENE GLYCOL 2 DROP: .4; .3 LIQUID OPHTHALMIC at 08:51

## 2024-01-01 RX ADMIN — ACETAMINOPHEN 975 MG: 325 TABLET ORAL at 04:41

## 2024-01-01 RX ADMIN — POLYETHYLENE GLYCOL 400 AND PROPYLENE GLYCOL 2 DROP: 4; 3 SOLUTION/ DROPS OPHTHALMIC at 19:59

## 2024-01-01 RX ADMIN — POLYPROPYLENE GLYCOL 400, PROPYLENE GLYCOL 2 DROP: .4; .3 LIQUID OPHTHALMIC at 09:40

## 2024-01-01 RX ADMIN — SACUBITRIL AND VALSARTAN 1 TABLET: 97; 103 TABLET, FILM COATED ORAL at 19:56

## 2024-01-01 RX ADMIN — ALBUMIN HUMAN 50 G: 0.05 INJECTION, SOLUTION INTRAVENOUS at 20:42

## 2024-01-01 RX ADMIN — HEPARIN SODIUM 5000 UNITS: 10000 INJECTION, SOLUTION INTRAVENOUS; SUBCUTANEOUS at 13:43

## 2024-01-01 RX ADMIN — HYDROMORPHONE HYDROCHLORIDE 0.2 MG: 0.2 INJECTION, SOLUTION INTRAMUSCULAR; INTRAVENOUS; SUBCUTANEOUS at 07:28

## 2024-01-01 RX ADMIN — POLYETHYLENE GLYCOL 400 AND PROPYLENE GLYCOL 2 DROP: 4; 3 SOLUTION/ DROPS OPHTHALMIC at 20:18

## 2024-01-01 RX ADMIN — Medication 2 G: at 08:06

## 2024-01-01 RX ADMIN — SODIUM CHLORIDE 1250 MG: 9 INJECTION, SOLUTION INTRAVENOUS at 14:05

## 2024-01-01 RX ADMIN — SODIUM CHLORIDE 500 ML: 9 INJECTION, SOLUTION INTRAVENOUS at 16:44

## 2024-01-01 RX ADMIN — SUGAMMADEX 200 MG: 100 INJECTION, SOLUTION INTRAVENOUS at 16:36

## 2024-01-01 RX ADMIN — ERYTHROMYCIN 1 G: 5 OINTMENT OPHTHALMIC at 20:43

## 2024-01-01 RX ADMIN — MEROPENEM 1 G: 1 INJECTION, POWDER, FOR SOLUTION INTRAVENOUS at 08:46

## 2024-01-01 RX ADMIN — Medication 1 TABLET: at 09:12

## 2024-01-01 RX ADMIN — WARFARIN SODIUM 0.5 MG: 1 TABLET ORAL at 17:45

## 2024-01-01 RX ADMIN — POLYETHYLENE GLYCOL 3350 17 G: 17 POWDER, FOR SOLUTION ORAL at 10:40

## 2024-01-01 RX ADMIN — MEROPENEM 1 G: 1 INJECTION, POWDER, FOR SOLUTION INTRAVENOUS at 22:11

## 2024-01-01 RX ADMIN — ALLOPURINOL 300 MG: 300 TABLET ORAL at 08:42

## 2024-01-01 RX ADMIN — Medication 500 MG: at 09:25

## 2024-01-01 RX ADMIN — DAPTOMYCIN 400 MG: 500 INJECTION, POWDER, LYOPHILIZED, FOR SOLUTION INTRAVENOUS at 00:24

## 2024-01-01 RX ADMIN — DICLOFENAC 2 G: 10 GEL TOPICAL at 13:11

## 2024-01-01 RX ADMIN — MEROPENEM 1 G: 1 INJECTION, POWDER, FOR SOLUTION INTRAVENOUS at 17:38

## 2024-01-01 RX ADMIN — WARFARIN SODIUM 2 MG: 2 TABLET ORAL at 17:14

## 2024-01-01 RX ADMIN — DAPTOMYCIN 400 MG: 500 INJECTION, POWDER, LYOPHILIZED, FOR SOLUTION INTRAVENOUS at 19:50

## 2024-01-01 RX ADMIN — FUROSEMIDE 20 MG: 20 TABLET ORAL at 09:45

## 2024-01-01 RX ADMIN — INSULIN ASPART 1 UNITS: 100 INJECTION, SOLUTION INTRAVENOUS; SUBCUTANEOUS at 14:04

## 2024-01-01 RX ADMIN — Medication 2 SPRAY: at 15:40

## 2024-01-01 RX ADMIN — MAGNESIUM SULFATE HEPTAHYDRATE 2 G: 40 INJECTION, SOLUTION INTRAVENOUS at 13:37

## 2024-01-01 RX ADMIN — DEXTRAN 70, GLYCERIN, HYPROMELLOSE 2 DROP: 1; 2; 3 SOLUTION/ DROPS OPHTHALMIC at 21:07

## 2024-01-01 RX ADMIN — INSULIN ASPART 1 UNITS: 100 INJECTION, SOLUTION INTRAVENOUS; SUBCUTANEOUS at 12:05

## 2024-01-01 RX ADMIN — MIDODRINE HYDROCHLORIDE 5 MG: 5 TABLET ORAL at 08:53

## 2024-01-01 RX ADMIN — POLYPROPYLENE GLYCOL 400, PROPYLENE GLYCOL 2 DROP: .4; .3 LIQUID OPHTHALMIC at 20:41

## 2024-01-01 RX ADMIN — POLYPROPYLENE GLYCOL 400, PROPYLENE GLYCOL 2 DROP: .4; .3 LIQUID OPHTHALMIC at 19:51

## 2024-01-01 RX ADMIN — INSULIN GLARGINE 5 UNITS: 100 INJECTION, SOLUTION SUBCUTANEOUS at 22:31

## 2024-01-01 RX ADMIN — DEXTRAN 70, GLYCERIN, HYPROMELLOSE 2 DROP: 1; 2; 3 SOLUTION/ DROPS OPHTHALMIC at 08:20

## 2024-01-01 RX ADMIN — DICLOFENAC 2 G: 10 GEL TOPICAL at 09:45

## 2024-01-01 RX ADMIN — Medication 2 SPRAY: at 09:07

## 2024-01-01 RX ADMIN — ACETAMINOPHEN 500 MG: 500 TABLET ORAL at 09:16

## 2024-01-01 RX ADMIN — HYDROMORPHONE HYDROCHLORIDE 0.4 MG: 0.2 INJECTION, SOLUTION INTRAMUSCULAR; INTRAVENOUS; SUBCUTANEOUS at 09:32

## 2024-01-01 RX ADMIN — DICLOFENAC 2 G: 10 GEL TOPICAL at 09:11

## 2024-01-01 RX ADMIN — Medication 2 SPRAY: at 16:25

## 2024-01-01 RX ADMIN — WARFARIN SODIUM 4 MG: 2 TABLET ORAL at 17:24

## 2024-01-01 RX ADMIN — INSULIN ASPART 2 UNITS: 100 INJECTION, SOLUTION INTRAVENOUS; SUBCUTANEOUS at 09:49

## 2024-01-01 RX ADMIN — MEROPENEM 1 G: 1 INJECTION, POWDER, FOR SOLUTION INTRAVENOUS at 00:42

## 2024-01-01 RX ADMIN — ACETAMINOPHEN 325MG 650 MG: 325 TABLET ORAL at 11:58

## 2024-01-01 RX ADMIN — Medication 3 MG: at 22:25

## 2024-01-01 RX ADMIN — INSULIN ASPART 1 UNITS: 100 INJECTION, SOLUTION INTRAVENOUS; SUBCUTANEOUS at 12:45

## 2024-01-01 RX ADMIN — SENNOSIDES AND DOCUSATE SODIUM 1 TABLET: 8.6; 5 TABLET ORAL at 21:02

## 2024-01-01 RX ADMIN — PROPOFOL 50 MCG/KG/MIN: 10 INJECTION, EMULSION INTRAVENOUS at 14:40

## 2024-01-01 RX ADMIN — IOPAMIDOL 80 ML: 755 INJECTION, SOLUTION INTRAVENOUS at 16:13

## 2024-01-01 RX ADMIN — Medication 2 SPRAY: at 20:24

## 2024-01-01 RX ADMIN — SENNOSIDES AND DOCUSATE SODIUM 1 TABLET: 8.6; 5 TABLET ORAL at 20:37

## 2024-01-01 RX ADMIN — Medication 2 SPRAY: at 21:29

## 2024-01-01 RX ADMIN — ASPIRIN 81 MG CHEWABLE TABLET 81 MG: 81 TABLET CHEWABLE at 08:31

## 2024-01-01 RX ADMIN — SODIUM CHLORIDE 250 ML: 9 INJECTION, SOLUTION INTRAVENOUS at 14:52

## 2024-01-01 RX ADMIN — MICONAZOLE NITRATE: 20 POWDER TOPICAL at 21:30

## 2024-01-01 RX ADMIN — DICLOFENAC SODIUM 2 G: 10 GEL TOPICAL at 17:17

## 2024-01-01 RX ADMIN — Medication 3 MG: at 21:53

## 2024-01-01 RX ADMIN — MEROPENEM 1 G: 1 INJECTION, POWDER, FOR SOLUTION INTRAVENOUS at 02:56

## 2024-01-01 RX ADMIN — DAPTOMYCIN 400 MG: 500 INJECTION, POWDER, LYOPHILIZED, FOR SOLUTION INTRAVENOUS at 18:36

## 2024-01-01 RX ADMIN — Medication 500 MG: at 08:50

## 2024-01-01 RX ADMIN — MEROPENEM 1 G: 1 INJECTION, POWDER, FOR SOLUTION INTRAVENOUS at 08:27

## 2024-01-01 RX ADMIN — INSULIN GLARGINE 5 UNITS: 100 INJECTION, SOLUTION SUBCUTANEOUS at 22:36

## 2024-01-01 RX ADMIN — FENTANYL CITRATE 25 MCG: 50 INJECTION INTRAMUSCULAR; INTRAVENOUS at 15:04

## 2024-01-01 RX ADMIN — DICLOFENAC 2 G: 10 GEL TOPICAL at 12:50

## 2024-01-01 RX ADMIN — DAPTOMYCIN 400 MG: 500 INJECTION, POWDER, LYOPHILIZED, FOR SOLUTION INTRAVENOUS at 19:01

## 2024-01-01 RX ADMIN — ALLOPURINOL 300 MG: 300 TABLET ORAL at 10:07

## 2024-01-01 RX ADMIN — OXYCODONE HYDROCHLORIDE 5 MG: 5 TABLET ORAL at 19:01

## 2024-01-01 RX ADMIN — Medication 500 MG: at 09:10

## 2024-01-01 RX ADMIN — HYDROMORPHONE HYDROCHLORIDE 0.4 MG: 1 INJECTION, SOLUTION INTRAMUSCULAR; INTRAVENOUS; SUBCUTANEOUS at 17:37

## 2024-01-01 RX ADMIN — MAGNESIUM SULFATE HEPTAHYDRATE 2 G: 40 INJECTION, SOLUTION INTRAVENOUS at 11:36

## 2024-01-01 RX ADMIN — MEROPENEM 1 G: 1 INJECTION, POWDER, FOR SOLUTION INTRAVENOUS at 21:03

## 2024-01-01 RX ADMIN — INSULIN GLARGINE 5 UNITS: 100 INJECTION, SOLUTION SUBCUTANEOUS at 22:07

## 2024-01-01 RX ADMIN — OLANZAPINE 5 MG: 5 TABLET, ORALLY DISINTEGRATING ORAL at 02:42

## 2024-01-01 RX ADMIN — DICLOFENAC 2 G: 10 GEL TOPICAL at 09:23

## 2024-01-01 RX ADMIN — HYDROMORPHONE HYDROCHLORIDE 0.4 MG: 0.2 INJECTION, SOLUTION INTRAMUSCULAR; INTRAVENOUS; SUBCUTANEOUS at 04:41

## 2024-01-01 RX ADMIN — OXYCODONE HYDROCHLORIDE 5 MG: 5 TABLET ORAL at 15:34

## 2024-01-01 RX ADMIN — ONDANSETRON 4 MG: 2 INJECTION INTRAMUSCULAR; INTRAVENOUS at 11:20

## 2024-01-01 RX ADMIN — MEROPENEM 1 G: 1 INJECTION, POWDER, FOR SOLUTION INTRAVENOUS at 18:12

## 2024-01-01 RX ADMIN — INSULIN GLARGINE 5 UNITS: 100 INJECTION, SOLUTION SUBCUTANEOUS at 20:52

## 2024-01-01 RX ADMIN — METOPROLOL SUCCINATE 25 MG: 25 TABLET, EXTENDED RELEASE ORAL at 09:59

## 2024-01-01 RX ADMIN — METOPROLOL SUCCINATE 25 MG: 25 TABLET, EXTENDED RELEASE ORAL at 09:41

## 2024-01-01 RX ADMIN — MIDODRINE HYDROCHLORIDE 5 MG: 5 TABLET ORAL at 20:37

## 2024-01-01 RX ADMIN — AMPICILLIN SODIUM AND SULBACTAM SODIUM 3 G: 2; 1 INJECTION, POWDER, FOR SOLUTION INTRAMUSCULAR; INTRAVENOUS at 09:53

## 2024-01-01 RX ADMIN — ACETAMINOPHEN 975 MG: 325 TABLET ORAL at 22:45

## 2024-01-01 RX ADMIN — MEROPENEM 1 G: 1 INJECTION, POWDER, FOR SOLUTION INTRAVENOUS at 20:30

## 2024-01-01 RX ADMIN — ACETAMINOPHEN 975 MG: 325 TABLET ORAL at 20:54

## 2024-01-01 RX ADMIN — Medication 2 SPRAY: at 11:43

## 2024-01-01 RX ADMIN — CARVEDILOL 25 MG: 12.5 TABLET, FILM COATED ORAL at 08:31

## 2024-01-01 RX ADMIN — SENNOSIDES AND DOCUSATE SODIUM 1 TABLET: 8.6; 5 TABLET ORAL at 09:39

## 2024-01-01 RX ADMIN — MEROPENEM 1 G: 1 INJECTION, POWDER, FOR SOLUTION INTRAVENOUS at 10:56

## 2024-01-01 RX ADMIN — DEXTRAN 70, GLYCERIN, HYPROMELLOSE 2 DROP: 1; 2; 3 SOLUTION/ DROPS OPHTHALMIC at 08:41

## 2024-01-01 RX ADMIN — HYDROMORPHONE HYDROCHLORIDE 0.4 MG: 1 INJECTION, SOLUTION INTRAMUSCULAR; INTRAVENOUS; SUBCUTANEOUS at 17:48

## 2024-01-01 RX ADMIN — WARFARIN SODIUM 4 MG: 2 TABLET ORAL at 17:29

## 2024-01-01 RX ADMIN — DICLOFENAC 2 G: 10 GEL TOPICAL at 12:20

## 2024-01-01 RX ADMIN — MAGNESIUM SULFATE HEPTAHYDRATE 2 G: 40 INJECTION, SOLUTION INTRAVENOUS at 09:52

## 2024-01-01 RX ADMIN — DICLOFENAC 2 G: 10 GEL TOPICAL at 09:00

## 2024-01-01 RX ADMIN — ACETAMINOPHEN 975 MG: 325 TABLET ORAL at 10:58

## 2024-01-01 RX ADMIN — INSULIN ASPART 1 UNITS: 100 INJECTION, SOLUTION INTRAVENOUS; SUBCUTANEOUS at 17:11

## 2024-01-01 RX ADMIN — Medication 2 SPRAY: at 09:05

## 2024-01-01 RX ADMIN — MEROPENEM 1 G: 1 INJECTION, POWDER, FOR SOLUTION INTRAVENOUS at 16:13

## 2024-01-01 RX ADMIN — POTASSIUM PHOSPHATE, MONOBASIC POTASSIUM PHOSPHATE, DIBASIC 15 MMOL: 224; 236 INJECTION, SOLUTION, CONCENTRATE INTRAVENOUS at 13:17

## 2024-01-01 RX ADMIN — PIPERACILLIN AND TAZOBACTAM 3.38 G: 3; .375 INJECTION, POWDER, FOR SOLUTION INTRAVENOUS at 21:04

## 2024-01-01 RX ADMIN — POLYPROPYLENE GLYCOL 400, PROPYLENE GLYCOL 2 DROP: .4; .3 LIQUID OPHTHALMIC at 09:00

## 2024-01-01 RX ADMIN — Medication 1 MG: at 21:43

## 2024-01-01 RX ADMIN — ACETAMINOPHEN 500 MG: 500 TABLET ORAL at 23:55

## 2024-01-01 RX ADMIN — MEROPENEM 1 G: 1 INJECTION, POWDER, FOR SOLUTION INTRAVENOUS at 09:05

## 2024-01-01 RX ADMIN — METOPROLOL SUCCINATE 25 MG: 25 TABLET, EXTENDED RELEASE ORAL at 08:40

## 2024-01-01 RX ADMIN — MEROPENEM 1 G: 1 INJECTION, POWDER, FOR SOLUTION INTRAVENOUS at 21:11

## 2024-01-01 RX ADMIN — ONDANSETRON 4 MG: 2 INJECTION INTRAMUSCULAR; INTRAVENOUS at 12:51

## 2024-01-01 RX ADMIN — FUROSEMIDE 20 MG: 10 INJECTION, SOLUTION INTRAMUSCULAR; INTRAVENOUS at 02:59

## 2024-01-01 RX ADMIN — MEROPENEM 1 G: 1 INJECTION, POWDER, FOR SOLUTION INTRAVENOUS at 04:30

## 2024-01-01 RX ADMIN — ROCURONIUM BROMIDE 20 MG: 50 INJECTION, SOLUTION INTRAVENOUS at 08:54

## 2024-01-01 RX ADMIN — Medication 2 SPRAY: at 20:29

## 2024-01-01 RX ADMIN — LIDOCAINE HYDROCHLORIDE 3.5 ML: 10 INJECTION, SOLUTION INFILTRATION; PERINEURAL at 08:03

## 2024-01-01 RX ADMIN — EMPAGLIFLOZIN 25 MG: 25 TABLET, FILM COATED ORAL at 08:31

## 2024-01-01 RX ADMIN — Medication 2 SPRAY: at 13:09

## 2024-01-01 RX ADMIN — ERYTHROMYCIN 1 G: 5 OINTMENT OPHTHALMIC at 21:48

## 2024-01-01 RX ADMIN — MIDODRINE HYDROCHLORIDE 5 MG: 5 TABLET ORAL at 08:29

## 2024-01-01 RX ADMIN — INSULIN GLARGINE 5 UNITS: 100 INJECTION, SOLUTION SUBCUTANEOUS at 20:29

## 2024-01-01 RX ADMIN — POLYPROPYLENE GLYCOL 400, PROPYLENE GLYCOL 2 DROP: .4; .3 LIQUID OPHTHALMIC at 09:49

## 2024-01-01 RX ADMIN — FUROSEMIDE 20 MG: 20 TABLET ORAL at 09:41

## 2024-01-01 RX ADMIN — ACETAMINOPHEN 650 MG: 325 TABLET ORAL at 20:44

## 2024-01-01 RX ADMIN — MEROPENEM 1 G: 1 INJECTION, POWDER, FOR SOLUTION INTRAVENOUS at 08:01

## 2024-01-01 RX ADMIN — POTASSIUM & SODIUM PHOSPHATES POWDER PACK 280-160-250 MG 2 PACKET: 280-160-250 PACK at 12:45

## 2024-01-01 RX ADMIN — MEROPENEM 1 G: 1 INJECTION, POWDER, FOR SOLUTION INTRAVENOUS at 01:36

## 2024-01-01 RX ADMIN — Medication 1 MG: at 21:29

## 2024-01-01 RX ADMIN — CEFTRIAXONE SODIUM 1 G: 1 INJECTION, POWDER, FOR SOLUTION INTRAMUSCULAR; INTRAVENOUS at 18:13

## 2024-01-01 RX ADMIN — MEROPENEM 1 G: 1 INJECTION, POWDER, FOR SOLUTION INTRAVENOUS at 14:15

## 2024-01-01 RX ADMIN — DICLOFENAC 2 G: 10 GEL TOPICAL at 20:54

## 2024-01-01 RX ADMIN — ALLOPURINOL 300 MG: 300 TABLET ORAL at 09:24

## 2024-01-01 RX ADMIN — INSULIN GLARGINE 5 UNITS: 100 INJECTION, SOLUTION SUBCUTANEOUS at 21:24

## 2024-01-01 RX ADMIN — ALBUMIN HUMAN 25 G: 0.25 SOLUTION INTRAVENOUS at 16:53

## 2024-01-01 RX ADMIN — MICONAZOLE NITRATE: 20 POWDER TOPICAL at 20:48

## 2024-01-01 RX ADMIN — ROCURONIUM BROMIDE 50 MG: 50 INJECTION, SOLUTION INTRAVENOUS at 08:03

## 2024-01-01 RX ADMIN — ACETAMINOPHEN 975 MG: 325 TABLET ORAL at 13:39

## 2024-01-01 RX ADMIN — SENNOSIDES AND DOCUSATE SODIUM 1 TABLET: 8.6; 5 TABLET ORAL at 09:46

## 2024-01-01 RX ADMIN — MIDODRINE HYDROCHLORIDE 5 MG: 5 TABLET ORAL at 08:59

## 2024-01-01 RX ADMIN — POLYPROPYLENE GLYCOL 400, PROPYLENE GLYCOL 2 DROP: .4; .3 LIQUID OPHTHALMIC at 22:51

## 2024-01-01 RX ADMIN — ASPIRIN 81 MG: 81 TABLET, COATED ORAL at 08:30

## 2024-01-01 RX ADMIN — SENNOSIDES AND DOCUSATE SODIUM 1 TABLET: 8.6; 5 TABLET ORAL at 18:50

## 2024-01-01 RX ADMIN — POLYETHYLENE GLYCOL 3350 17 G: 17 POWDER, FOR SOLUTION ORAL at 14:37

## 2024-01-01 RX ADMIN — ASPIRIN 81 MG: 81 TABLET, COATED ORAL at 08:40

## 2024-01-01 RX ADMIN — MEROPENEM 1 G: 1 INJECTION, POWDER, FOR SOLUTION INTRAVENOUS at 04:31

## 2024-01-01 RX ADMIN — POLYPROPYLENE GLYCOL 400, PROPYLENE GLYCOL 2 DROP: .4; .3 LIQUID OPHTHALMIC at 18:40

## 2024-01-01 RX ADMIN — POLYETHYLENE GLYCOL 400 AND PROPYLENE GLYCOL 2 DROP: 4; 3 SOLUTION/ DROPS OPHTHALMIC at 21:45

## 2024-01-01 RX ADMIN — Medication 2 SPRAY: at 21:37

## 2024-01-01 RX ADMIN — MEROPENEM 1 G: 1 INJECTION, POWDER, FOR SOLUTION INTRAVENOUS at 20:40

## 2024-01-01 RX ADMIN — MEROPENEM 1 G: 1 INJECTION, POWDER, FOR SOLUTION INTRAVENOUS at 16:54

## 2024-01-01 RX ADMIN — Medication 1 TABLET: at 08:40

## 2024-01-01 RX ADMIN — ROSUVASTATIN CALCIUM 20 MG: 10 TABLET, FILM COATED ORAL at 21:28

## 2024-01-01 RX ADMIN — POLYPROPYLENE GLYCOL 400, PROPYLENE GLYCOL 2 DROP: .4; .3 LIQUID OPHTHALMIC at 20:15

## 2024-01-01 RX ADMIN — SODIUM PHOSPHATE, MONOBASIC, MONOHYDRATE AND SODIUM PHOSPHATE, DIBASIC, ANHYDROUS 15 MMOL: 142; 276 INJECTION, SOLUTION INTRAVENOUS at 11:19

## 2024-01-01 RX ADMIN — SODIUM CHLORIDE 1000 ML: 9 INJECTION, SOLUTION INTRAVENOUS at 18:08

## 2024-01-01 RX ADMIN — FENTANYL CITRATE 25 MCG: 50 INJECTION INTRAMUSCULAR; INTRAVENOUS at 07:58

## 2024-01-01 RX ADMIN — DAPTOMYCIN 400 MG: 500 INJECTION, POWDER, LYOPHILIZED, FOR SOLUTION INTRAVENOUS at 19:17

## 2024-01-01 RX ADMIN — ERYTHROMYCIN 1 G: 5 OINTMENT OPHTHALMIC at 22:45

## 2024-01-01 RX ADMIN — OXYCODONE HYDROCHLORIDE 5 MG: 5 TABLET ORAL at 18:03

## 2024-01-01 RX ADMIN — Medication 2 SPRAY: at 12:49

## 2024-01-01 RX ADMIN — MEROPENEM 1 G: 1 INJECTION, POWDER, FOR SOLUTION INTRAVENOUS at 09:00

## 2024-01-01 RX ADMIN — ACETAMINOPHEN 325MG 650 MG: 325 TABLET ORAL at 22:25

## 2024-01-01 RX ADMIN — Medication 2 SPRAY: at 16:35

## 2024-01-01 RX ADMIN — ACETAMINOPHEN 975 MG: 325 TABLET ORAL at 20:36

## 2024-01-01 RX ADMIN — Medication 10 MG: at 08:28

## 2024-01-01 RX ADMIN — MEROPENEM 1 G: 1 INJECTION, POWDER, FOR SOLUTION INTRAVENOUS at 04:41

## 2024-01-01 RX ADMIN — FAMOTIDINE 20 MG: 20 TABLET ORAL at 09:41

## 2024-01-01 RX ADMIN — Medication 2 SPRAY: at 19:50

## 2024-01-01 RX ADMIN — DAPTOMYCIN 400 MG: 500 INJECTION, POWDER, LYOPHILIZED, FOR SOLUTION INTRAVENOUS at 00:54

## 2024-01-01 RX ADMIN — ACETAMINOPHEN 975 MG: 325 TABLET ORAL at 04:03

## 2024-01-01 RX ADMIN — MEROPENEM 1 G: 1 INJECTION, POWDER, FOR SOLUTION INTRAVENOUS at 12:45

## 2024-01-01 RX ADMIN — Medication 40 MG: at 10:30

## 2024-01-01 RX ADMIN — ROSUVASTATIN CALCIUM 20 MG: 10 TABLET, FILM COATED ORAL at 21:02

## 2024-01-01 RX ADMIN — INSULIN ASPART 1 UNITS: 100 INJECTION, SOLUTION INTRAVENOUS; SUBCUTANEOUS at 08:52

## 2024-01-01 RX ADMIN — ACETAMINOPHEN 650 MG: 325 TABLET ORAL at 17:30

## 2024-01-01 RX ADMIN — POTASSIUM & SODIUM PHOSPHATES POWDER PACK 280-160-250 MG 2 PACKET: 280-160-250 PACK at 09:40

## 2024-01-01 RX ADMIN — MEROPENEM 1 G: 1 INJECTION, POWDER, FOR SOLUTION INTRAVENOUS at 05:04

## 2024-01-01 RX ADMIN — MICONAZOLE NITRATE: 20 POWDER TOPICAL at 21:07

## 2024-01-01 RX ADMIN — HYDROMORPHONE HYDROCHLORIDE 0.4 MG: 1 INJECTION, SOLUTION INTRAMUSCULAR; INTRAVENOUS; SUBCUTANEOUS at 17:29

## 2024-01-01 RX ADMIN — DAPTOMYCIN 400 MG: 500 INJECTION, POWDER, LYOPHILIZED, FOR SOLUTION INTRAVENOUS at 23:49

## 2024-01-01 RX ADMIN — DICLOFENAC 2 G: 10 GEL TOPICAL at 09:05

## 2024-01-01 RX ADMIN — MEROPENEM 1 G: 1 INJECTION, POWDER, FOR SOLUTION INTRAVENOUS at 16:33

## 2024-01-01 RX ADMIN — MEROPENEM 1 G: 1 INJECTION, POWDER, FOR SOLUTION INTRAVENOUS at 09:34

## 2024-01-01 RX ADMIN — Medication 2 SPRAY: at 09:50

## 2024-01-01 RX ADMIN — MEROPENEM 1 G: 1 INJECTION, POWDER, FOR SOLUTION INTRAVENOUS at 04:56

## 2024-01-01 RX ADMIN — Medication 2 SPRAY: at 12:31

## 2024-01-01 RX ADMIN — MAGNESIUM SULFATE HEPTAHYDRATE 4 G: 80 INJECTION, SOLUTION INTRAVENOUS at 06:38

## 2024-01-01 RX ADMIN — ACETAMINOPHEN 650 MG: 325 TABLET ORAL at 16:38

## 2024-01-01 RX ADMIN — HYDROMORPHONE HYDROCHLORIDE 0.4 MG: 0.2 INJECTION, SOLUTION INTRAMUSCULAR; INTRAVENOUS; SUBCUTANEOUS at 22:46

## 2024-01-01 RX ADMIN — MEROPENEM 1 G: 1 INJECTION, POWDER, FOR SOLUTION INTRAVENOUS at 02:12

## 2024-01-01 RX ADMIN — HYDROMORPHONE HYDROCHLORIDE 0.4 MG: 0.2 INJECTION, SOLUTION INTRAMUSCULAR; INTRAVENOUS; SUBCUTANEOUS at 02:44

## 2024-01-01 RX ADMIN — OXYCODONE HYDROCHLORIDE 5 MG: 5 TABLET ORAL at 10:38

## 2024-01-01 RX ADMIN — PHENYLEPHRINE HYDROCHLORIDE 100 MCG: 10 INJECTION INTRAVENOUS at 08:25

## 2024-01-01 RX ADMIN — DICLOFENAC 2 G: 10 GEL TOPICAL at 18:53

## 2024-01-01 RX ADMIN — POLYPROPYLENE GLYCOL 400, PROPYLENE GLYCOL 2 DROP: .4; .3 LIQUID OPHTHALMIC at 20:44

## 2024-01-01 RX ADMIN — SENNOSIDES AND DOCUSATE SODIUM 1 TABLET: 8.6; 5 TABLET ORAL at 09:52

## 2024-01-01 RX ADMIN — POLYPROPYLENE GLYCOL 400, PROPYLENE GLYCOL 2 DROP: .4; .3 LIQUID OPHTHALMIC at 18:36

## 2024-01-01 RX ADMIN — BARIUM SULFATE: 400 SUSPENSION ORAL at 14:32

## 2024-01-01 RX ADMIN — DICLOFENAC 2 G: 10 GEL TOPICAL at 21:37

## 2024-01-01 RX ADMIN — SODIUM PHOSPHATE, MONOBASIC, MONOHYDRATE AND SODIUM PHOSPHATE, DIBASIC, ANHYDROUS 15 MMOL: 142; 276 INJECTION, SOLUTION INTRAVENOUS at 11:44

## 2024-01-01 RX ADMIN — ACETAMINOPHEN 650 MG: 325 TABLET ORAL at 22:32

## 2024-01-01 RX ADMIN — ACETAMINOPHEN 975 MG: 325 TABLET ORAL at 18:49

## 2024-01-01 RX ADMIN — CARVEDILOL 25 MG: 12.5 TABLET, FILM COATED ORAL at 09:47

## 2024-01-01 RX ADMIN — POLYPROPYLENE GLYCOL 400, PROPYLENE GLYCOL 2 DROP: .4; .3 LIQUID OPHTHALMIC at 20:49

## 2024-01-01 RX ADMIN — PANTOPRAZOLE SODIUM 40 MG: 40 TABLET, DELAYED RELEASE ORAL at 06:31

## 2024-01-01 RX ADMIN — DAPTOMYCIN 400 MG: 500 INJECTION, POWDER, LYOPHILIZED, FOR SOLUTION INTRAVENOUS at 18:06

## 2024-01-01 RX ADMIN — Medication 2 SPRAY: at 12:27

## 2024-01-01 RX ADMIN — ACETAMINOPHEN 975 MG: 325 TABLET ORAL at 14:56

## 2024-01-01 RX ADMIN — EMPAGLIFLOZIN 25 MG: 25 TABLET, FILM COATED ORAL at 10:35

## 2024-01-01 RX ADMIN — Medication 2 SPRAY: at 08:58

## 2024-01-01 RX ADMIN — MEROPENEM 1 G: 1 INJECTION, POWDER, FOR SOLUTION INTRAVENOUS at 21:01

## 2024-01-01 RX ADMIN — SENNOSIDES AND DOCUSATE SODIUM 1 TABLET: 8.6; 5 TABLET ORAL at 18:03

## 2024-01-01 RX ADMIN — INSULIN GLARGINE 5 UNITS: 100 INJECTION, SOLUTION SUBCUTANEOUS at 21:21

## 2024-01-01 RX ADMIN — INSULIN GLARGINE 5 UNITS: 100 INJECTION, SOLUTION SUBCUTANEOUS at 20:10

## 2024-01-01 RX ADMIN — ONDANSETRON 4 MG: 2 INJECTION INTRAMUSCULAR; INTRAVENOUS at 16:55

## 2024-01-01 RX ADMIN — LIDOCAINE HYDROCHLORIDE 5 ML: 10 INJECTION, SOLUTION INFILTRATION; PERINEURAL at 14:35

## 2024-01-01 RX ADMIN — POLYETHYLENE GLYCOL 3350 17 G: 17 POWDER, FOR SOLUTION ORAL at 08:51

## 2024-01-01 RX ADMIN — DICLOFENAC SODIUM 2 G: 10 GEL TOPICAL at 19:51

## 2024-01-01 RX ADMIN — ASPIRIN 81 MG: 81 TABLET, COATED ORAL at 09:05

## 2024-01-01 RX ADMIN — SODIUM CHLORIDE: 9 INJECTION, SOLUTION INTRAVENOUS at 18:52

## 2024-01-01 RX ADMIN — HYDROMORPHONE HYDROCHLORIDE 0.2 MG: 0.2 INJECTION, SOLUTION INTRAMUSCULAR; INTRAVENOUS; SUBCUTANEOUS at 10:32

## 2024-01-01 RX ADMIN — INSULIN ASPART 1 UNITS: 100 INJECTION, SOLUTION INTRAVENOUS; SUBCUTANEOUS at 21:28

## 2024-01-01 RX ADMIN — ALLOPURINOL 300 MG: 300 TABLET ORAL at 09:18

## 2024-01-01 RX ADMIN — Medication 2 SPRAY: at 08:54

## 2024-01-01 RX ADMIN — CARVEDILOL 3.12 MG: 3.12 TABLET, FILM COATED ORAL at 14:36

## 2024-01-01 RX ADMIN — FAMOTIDINE 20 MG: 20 TABLET ORAL at 21:02

## 2024-01-01 RX ADMIN — FUROSEMIDE 20 MG: 10 INJECTION, SOLUTION INTRAMUSCULAR; INTRAVENOUS at 01:21

## 2024-01-01 RX ADMIN — ALLOPURINOL 300 MG: 300 TABLET ORAL at 08:02

## 2024-01-01 RX ADMIN — Medication 2 SPRAY: at 20:36

## 2024-01-01 RX ADMIN — Medication 2 SPRAY: at 11:40

## 2024-01-01 RX ADMIN — PHENYLEPHRINE HYDROCHLORIDE 100 MCG: 10 INJECTION INTRAVENOUS at 14:42

## 2024-01-01 RX ADMIN — FUROSEMIDE 20 MG: 20 TABLET ORAL at 09:24

## 2024-01-01 RX ADMIN — MEROPENEM 1 G: 1 INJECTION, POWDER, FOR SOLUTION INTRAVENOUS at 00:32

## 2024-01-01 RX ADMIN — MICONAZOLE NITRATE: 20 POWDER TOPICAL at 23:39

## 2024-01-01 RX ADMIN — MEROPENEM 1 G: 1 INJECTION, POWDER, FOR SOLUTION INTRAVENOUS at 13:05

## 2024-01-01 RX ADMIN — METOPROLOL SUCCINATE 25 MG: 25 TABLET, EXTENDED RELEASE ORAL at 09:48

## 2024-01-01 RX ADMIN — DICLOFENAC 2 G: 10 GEL TOPICAL at 08:52

## 2024-01-01 RX ADMIN — POLYPROPYLENE GLYCOL 400, PROPYLENE GLYCOL 2 DROP: .4; .3 LIQUID OPHTHALMIC at 20:29

## 2024-01-01 RX ADMIN — MEROPENEM 1 G: 1 INJECTION, POWDER, FOR SOLUTION INTRAVENOUS at 12:37

## 2024-01-01 RX ADMIN — FAMOTIDINE 20 MG: 20 TABLET ORAL at 18:02

## 2024-01-01 RX ADMIN — DICLOFENAC SODIUM 2 G: 10 GEL TOPICAL at 12:03

## 2024-01-01 RX ADMIN — Medication 2 SPRAY: at 21:47

## 2024-01-01 RX ADMIN — MORPHINE SULFATE 1 MG: 2 INJECTION, SOLUTION INTRAMUSCULAR; INTRAVENOUS at 10:43

## 2024-01-01 RX ADMIN — SODIUM CHLORIDE 500 ML: 9 INJECTION, SOLUTION INTRAVENOUS at 12:20

## 2024-01-01 RX ADMIN — ACETAMINOPHEN 325MG 650 MG: 325 TABLET ORAL at 06:32

## 2024-01-01 RX ADMIN — POTASSIUM & SODIUM PHOSPHATES POWDER PACK 280-160-250 MG 1 PACKET: 280-160-250 PACK at 09:24

## 2024-01-01 RX ADMIN — POLYETHYLENE GLYCOL 400 AND PROPYLENE GLYCOL 2 DROP: 4; 3 SOLUTION/ DROPS OPHTHALMIC at 08:03

## 2024-01-01 RX ADMIN — MEROPENEM 1 G: 1 INJECTION, POWDER, FOR SOLUTION INTRAVENOUS at 04:40

## 2024-01-01 RX ADMIN — POTASSIUM CHLORIDE, DEXTROSE MONOHYDRATE AND SODIUM CHLORIDE: 150; 5; 450 INJECTION, SOLUTION INTRAVENOUS at 09:51

## 2024-01-01 RX ADMIN — OXYCODONE HYDROCHLORIDE 5 MG: 5 TABLET ORAL at 21:16

## 2024-01-01 RX ADMIN — CARVEDILOL 25 MG: 12.5 TABLET, FILM COATED ORAL at 21:45

## 2024-01-01 RX ADMIN — EMPAGLIFLOZIN 25 MG: 25 TABLET, FILM COATED ORAL at 09:05

## 2024-01-01 RX ADMIN — ALTEPLASE 2 MG: 2.2 INJECTION, POWDER, LYOPHILIZED, FOR SOLUTION INTRAVENOUS at 16:51

## 2024-01-01 RX ADMIN — EMPAGLIFLOZIN 25 MG: 25 TABLET, FILM COATED ORAL at 09:24

## 2024-01-01 RX ADMIN — MEROPENEM 1 G: 1 INJECTION, POWDER, FOR SOLUTION INTRAVENOUS at 17:15

## 2024-01-01 RX ADMIN — HYDROMORPHONE HYDROCHLORIDE 0.4 MG: 0.2 INJECTION, SOLUTION INTRAMUSCULAR; INTRAVENOUS; SUBCUTANEOUS at 00:52

## 2024-01-01 RX ADMIN — Medication 2 SPRAY: at 11:24

## 2024-01-01 RX ADMIN — Medication 2 SPRAY: at 21:33

## 2024-01-01 RX ADMIN — INSULIN ASPART 1 UNITS: 100 INJECTION, SOLUTION INTRAVENOUS; SUBCUTANEOUS at 16:57

## 2024-01-01 RX ADMIN — ACETAMINOPHEN 500 MG: 500 TABLET ORAL at 23:38

## 2024-01-01 RX ADMIN — Medication 500 MG: at 09:38

## 2024-01-01 RX ADMIN — OXYCODONE HYDROCHLORIDE 2.5 MG: 5 TABLET ORAL at 14:50

## 2024-01-01 RX ADMIN — ASPIRIN 81 MG CHEWABLE TABLET 81 MG: 81 TABLET CHEWABLE at 08:49

## 2024-01-01 RX ADMIN — POLYETHYLENE GLYCOL 400 AND PROPYLENE GLYCOL 2 DROP: 4; 3 SOLUTION/ DROPS OPHTHALMIC at 19:42

## 2024-01-01 RX ADMIN — OXYCODONE HYDROCHLORIDE 5 MG: 5 TABLET ORAL at 05:52

## 2024-01-01 RX ADMIN — Medication 2 SPRAY: at 14:04

## 2024-01-01 RX ADMIN — ACETAMINOPHEN 500 MG: 500 TABLET ORAL at 23:33

## 2024-01-01 RX ADMIN — HYDROMORPHONE HYDROCHLORIDE 0.2 MG: 0.2 INJECTION, SOLUTION INTRAMUSCULAR; INTRAVENOUS; SUBCUTANEOUS at 09:07

## 2024-01-01 RX ADMIN — WARFARIN SODIUM 4 MG: 2 TABLET ORAL at 18:03

## 2024-01-01 RX ADMIN — PHENYLEPHRINE HYDROCHLORIDE 1 MCG/KG/MIN: 10 INJECTION INTRAVENOUS at 14:42

## 2024-01-01 RX ADMIN — POLYPROPYLENE GLYCOL 400, PROPYLENE GLYCOL 2 DROP: .4; .3 LIQUID OPHTHALMIC at 21:09

## 2024-01-01 RX ADMIN — MEROPENEM 1 G: 1 INJECTION, POWDER, FOR SOLUTION INTRAVENOUS at 01:21

## 2024-01-01 RX ADMIN — FENTANYL CITRATE 75 MCG: 50 INJECTION INTRAMUSCULAR; INTRAVENOUS at 08:03

## 2024-01-01 RX ADMIN — ROSUVASTATIN CALCIUM 20 MG: 10 TABLET, FILM COATED ORAL at 22:15

## 2024-01-01 RX ADMIN — ASPIRIN 81 MG: 81 TABLET, COATED ORAL at 09:12

## 2024-01-01 RX ADMIN — ACETAMINOPHEN 650 MG: 325 TABLET ORAL at 02:03

## 2024-01-01 RX ADMIN — DEXTRAN 70, GLYCERIN, HYPROMELLOSE 2 DROP: 1; 2; 3 SOLUTION/ DROPS OPHTHALMIC at 21:59

## 2024-01-01 RX ADMIN — SODIUM PHOSPHATE, MONOBASIC, MONOHYDRATE AND SODIUM PHOSPHATE, DIBASIC, ANHYDROUS 9 MMOL: 142; 276 INJECTION, SOLUTION INTRAVENOUS at 20:28

## 2024-01-01 RX ADMIN — INSULIN GLARGINE 5 UNITS: 100 INJECTION, SOLUTION SUBCUTANEOUS at 22:12

## 2024-01-01 RX ADMIN — DICLOFENAC 2 G: 10 GEL TOPICAL at 08:51

## 2024-01-01 RX ADMIN — ACETAMINOPHEN 650 MG: 325 TABLET ORAL at 09:27

## 2024-01-01 RX ADMIN — FAMOTIDINE 20 MG: 20 TABLET ORAL at 09:11

## 2024-01-01 RX ADMIN — FUROSEMIDE 20 MG: 10 INJECTION, SOLUTION INTRAMUSCULAR; INTRAVENOUS at 03:05

## 2024-01-01 RX ADMIN — MEROPENEM 1 G: 1 INJECTION, POWDER, FOR SOLUTION INTRAVENOUS at 05:25

## 2024-01-01 RX ADMIN — Medication 2 SPRAY: at 17:31

## 2024-01-01 RX ADMIN — ACETAMINOPHEN 325MG 650 MG: 325 TABLET ORAL at 21:31

## 2024-01-01 RX ADMIN — ASPIRIN 81 MG CHEWABLE TABLET 81 MG: 81 TABLET CHEWABLE at 10:01

## 2024-01-01 RX ADMIN — SENNOSIDES AND DOCUSATE SODIUM 1 TABLET: 8.6; 5 TABLET ORAL at 11:01

## 2024-01-01 RX ADMIN — HYDROMORPHONE HYDROCHLORIDE 0.4 MG: 0.2 INJECTION, SOLUTION INTRAMUSCULAR; INTRAVENOUS; SUBCUTANEOUS at 15:40

## 2024-01-01 RX ADMIN — MEROPENEM 1 G: 1 INJECTION, POWDER, FOR SOLUTION INTRAVENOUS at 05:49

## 2024-01-01 RX ADMIN — POLYPROPYLENE GLYCOL 400, PROPYLENE GLYCOL 2 DROP: .4; .3 LIQUID OPHTHALMIC at 21:37

## 2024-01-01 RX ADMIN — WARFARIN SODIUM 3 MG: 3 TABLET ORAL at 17:03

## 2024-01-01 RX ADMIN — POLYPROPYLENE GLYCOL 400, PROPYLENE GLYCOL 2 DROP: .4; .3 LIQUID OPHTHALMIC at 20:50

## 2024-01-01 RX ADMIN — DICLOFENAC 2 G: 10 GEL TOPICAL at 16:25

## 2024-01-01 RX ADMIN — OXYCODONE HYDROCHLORIDE 5 MG: 5 TABLET ORAL at 09:20

## 2024-01-01 RX ADMIN — ASPIRIN 81 MG: 81 TABLET, COATED ORAL at 09:24

## 2024-01-01 RX ADMIN — DICLOFENAC 2 G: 10 GEL TOPICAL at 20:18

## 2024-01-01 RX ADMIN — METOPROLOL SUCCINATE 25 MG: 25 TABLET, EXTENDED RELEASE ORAL at 12:00

## 2024-01-01 RX ADMIN — METOPROLOL SUCCINATE 25 MG: 25 TABLET, EXTENDED RELEASE ORAL at 09:12

## 2024-01-01 RX ADMIN — ALLOPURINOL 300 MG: 300 TABLET ORAL at 08:31

## 2024-01-01 RX ADMIN — SODIUM CHLORIDE 1250 MG: 9 INJECTION, SOLUTION INTRAVENOUS at 17:11

## 2024-01-01 RX ADMIN — POLYETHYLENE GLYCOL 3350 17 G: 17 POWDER, FOR SOLUTION ORAL at 10:14

## 2024-01-01 RX ADMIN — OXYCODONE HYDROCHLORIDE 2.5 MG: 5 TABLET ORAL at 14:37

## 2024-01-01 RX ADMIN — PANTOPRAZOLE SODIUM 40 MG: 40 INJECTION, POWDER, FOR SOLUTION INTRAVENOUS at 06:36

## 2024-01-01 RX ADMIN — Medication 2 SPRAY: at 21:03

## 2024-01-01 RX ADMIN — DAPTOMYCIN 400 MG: 500 INJECTION, POWDER, LYOPHILIZED, FOR SOLUTION INTRAVENOUS at 20:42

## 2024-01-01 RX ADMIN — Medication 40 MG: at 06:41

## 2024-01-01 RX ADMIN — MICONAZOLE NITRATE ANTIFUNGAL POWDER: 2 POWDER TOPICAL at 13:10

## 2024-01-01 RX ADMIN — CARVEDILOL 25 MG: 12.5 TABLET, FILM COATED ORAL at 19:41

## 2024-01-01 RX ADMIN — DICLOFENAC 2 G: 10 GEL TOPICAL at 16:43

## 2024-01-01 RX ADMIN — ACETAMINOPHEN 500 MG: 500 TABLET ORAL at 09:11

## 2024-01-01 RX ADMIN — SENNOSIDES AND DOCUSATE SODIUM 1 TABLET: 8.6; 5 TABLET ORAL at 20:29

## 2024-01-01 RX ADMIN — Medication 2 SPRAY: at 17:08

## 2024-01-01 RX ADMIN — IOPAMIDOL 80 ML: 755 INJECTION, SOLUTION INTRAVENOUS at 06:21

## 2024-01-01 RX ADMIN — ACETAMINOPHEN 325MG 650 MG: 325 TABLET ORAL at 21:06

## 2024-01-01 RX ADMIN — INSULIN ASPART 1 UNITS: 100 INJECTION, SOLUTION INTRAVENOUS; SUBCUTANEOUS at 22:00

## 2024-01-01 RX ADMIN — ACETAMINOPHEN 500 MG: 500 TABLET ORAL at 09:45

## 2024-01-01 RX ADMIN — FAMOTIDINE 20 MG: 20 TABLET ORAL at 18:34

## 2024-01-01 RX ADMIN — PROPOFOL 120 MG: 10 INJECTION, EMULSION INTRAVENOUS at 14:35

## 2024-01-01 RX ADMIN — MEROPENEM 1 G: 1 INJECTION, POWDER, FOR SOLUTION INTRAVENOUS at 09:21

## 2024-01-01 RX ADMIN — HYDROMORPHONE HYDROCHLORIDE 0.4 MG: 0.2 INJECTION, SOLUTION INTRAMUSCULAR; INTRAVENOUS; SUBCUTANEOUS at 04:55

## 2024-01-01 RX ADMIN — SENNOSIDES AND DOCUSATE SODIUM 1 TABLET: 8.6; 5 TABLET ORAL at 09:11

## 2024-01-01 RX ADMIN — Medication 1 TABLET: at 09:48

## 2024-01-01 RX ADMIN — ACETAMINOPHEN 975 MG: 325 TABLET ORAL at 08:50

## 2024-01-01 RX ADMIN — Medication 2 SPRAY: at 12:45

## 2024-01-01 RX ADMIN — ACETAMINOPHEN 325MG 650 MG: 325 TABLET ORAL at 17:29

## 2024-01-01 RX ADMIN — POLYPROPYLENE GLYCOL 400, PROPYLENE GLYCOL 2 DROP: .4; .3 LIQUID OPHTHALMIC at 08:53

## 2024-01-01 RX ADMIN — OXYCODONE HYDROCHLORIDE 5 MG: 5 TABLET ORAL at 09:56

## 2024-01-01 RX ADMIN — DAPTOMYCIN 400 MG: 500 INJECTION, POWDER, LYOPHILIZED, FOR SOLUTION INTRAVENOUS at 18:51

## 2024-01-01 RX ADMIN — MICONAZOLE NITRATE: 20 POWDER TOPICAL at 09:21

## 2024-01-01 RX ADMIN — OXYCODONE HYDROCHLORIDE 2.5 MG: 5 TABLET ORAL at 06:54

## 2024-01-01 RX ADMIN — Medication 2 SPRAY: at 20:44

## 2024-01-01 RX ADMIN — SUGAMMADEX 200 MG: 100 INJECTION, SOLUTION INTRAVENOUS at 09:59

## 2024-01-01 RX ADMIN — MEROPENEM 1 G: 1 INJECTION, POWDER, FOR SOLUTION INTRAVENOUS at 13:38

## 2024-01-01 RX ADMIN — PHYTONADIONE 2 MG: 10 INJECTION, EMULSION INTRAMUSCULAR; INTRAVENOUS; SUBCUTANEOUS at 21:55

## 2024-01-01 RX ADMIN — MEROPENEM 1 G: 1 INJECTION, POWDER, FOR SOLUTION INTRAVENOUS at 20:42

## 2024-01-01 RX ADMIN — INSULIN ASPART 2 UNITS: 100 INJECTION, SOLUTION INTRAVENOUS; SUBCUTANEOUS at 16:56

## 2024-01-01 RX ADMIN — PANTOPRAZOLE SODIUM 40 MG: 40 TABLET, DELAYED RELEASE ORAL at 08:40

## 2024-01-01 RX ADMIN — ACETAMINOPHEN 975 MG: 325 TABLET ORAL at 09:48

## 2024-01-01 RX ADMIN — ACETAMINOPHEN 325MG 650 MG: 325 TABLET ORAL at 13:00

## 2024-01-01 RX ADMIN — Medication 2 SPRAY: at 16:27

## 2024-01-01 RX ADMIN — DICLOFENAC SODIUM 2 G: 10 GEL TOPICAL at 16:42

## 2024-01-01 RX ADMIN — MEROPENEM 1 G: 1 INJECTION, POWDER, FOR SOLUTION INTRAVENOUS at 21:12

## 2024-01-01 RX ADMIN — ACETAMINOPHEN 975 MG: 325 TABLET ORAL at 09:39

## 2024-01-01 RX ADMIN — OXYCODONE HYDROCHLORIDE 2.5 MG: 5 TABLET ORAL at 09:47

## 2024-01-01 RX ADMIN — EMPAGLIFLOZIN 25 MG: 25 TABLET, FILM COATED ORAL at 08:43

## 2024-01-01 RX ADMIN — DICLOFENAC SODIUM 2 G: 10 GEL TOPICAL at 21:21

## 2024-01-01 RX ADMIN — SACUBITRIL AND VALSARTAN 1 TABLET: 97; 103 TABLET, FILM COATED ORAL at 09:08

## 2024-01-01 RX ADMIN — PANTOPRAZOLE SODIUM 40 MG: 40 TABLET, DELAYED RELEASE ORAL at 06:30

## 2024-01-01 RX ADMIN — ACETAMINOPHEN 650 MG: 325 TABLET ORAL at 21:42

## 2024-01-01 RX ADMIN — SACUBITRIL AND VALSARTAN 1 TABLET: 97; 103 TABLET, FILM COATED ORAL at 09:26

## 2024-01-01 RX ADMIN — EMPAGLIFLOZIN 25 MG: 25 TABLET, FILM COATED ORAL at 08:03

## 2024-01-01 RX ADMIN — SACUBITRIL AND VALSARTAN 1 TABLET: 97; 103 TABLET, FILM COATED ORAL at 10:35

## 2024-01-01 RX ADMIN — POTASSIUM CHLORIDE 10 MEQ: 10 INJECTION, SOLUTION INTRAVENOUS at 15:28

## 2024-01-01 RX ADMIN — MEROPENEM 1 G: 1 INJECTION, POWDER, FOR SOLUTION INTRAVENOUS at 12:38

## 2024-01-01 RX ADMIN — DEXTROSE MONOHYDRATE 25 ML: 25 INJECTION, SOLUTION INTRAVENOUS at 20:56

## 2024-01-01 RX ADMIN — SODIUM PHOSPHATE, MONOBASIC, MONOHYDRATE AND SODIUM PHOSPHATE, DIBASIC, ANHYDROUS 9 MMOL: 142; 276 INJECTION, SOLUTION INTRAVENOUS at 14:46

## 2024-01-01 RX ADMIN — Medication 40 MG: at 09:24

## 2024-01-01 RX ADMIN — Medication 2 SPRAY: at 12:46

## 2024-01-01 RX ADMIN — POTASSIUM CHLORIDE 10 MEQ: 10 INJECTION, SOLUTION INTRAVENOUS at 11:19

## 2024-01-01 RX ADMIN — MEROPENEM 1 G: 1 INJECTION, POWDER, FOR SOLUTION INTRAVENOUS at 09:40

## 2024-01-01 RX ADMIN — MIDODRINE HYDROCHLORIDE 5 MG: 5 TABLET ORAL at 13:13

## 2024-01-01 RX ADMIN — Medication 2 SPRAY: at 12:44

## 2024-01-01 RX ADMIN — INSULIN ASPART 1 UNITS: 100 INJECTION, SOLUTION INTRAVENOUS; SUBCUTANEOUS at 11:59

## 2024-01-01 RX ADMIN — MEROPENEM 1 G: 1 INJECTION, POWDER, FOR SOLUTION INTRAVENOUS at 21:16

## 2024-01-01 RX ADMIN — INSULIN ASPART 1 UNITS: 100 INJECTION, SOLUTION INTRAVENOUS; SUBCUTANEOUS at 09:36

## 2024-01-01 RX ADMIN — INSULIN ASPART 2 UNITS: 100 INJECTION, SOLUTION INTRAVENOUS; SUBCUTANEOUS at 16:43

## 2024-01-01 RX ADMIN — Medication 40 MG: at 07:07

## 2024-01-01 RX ADMIN — SODIUM CHLORIDE, POTASSIUM CHLORIDE, SODIUM LACTATE AND CALCIUM CHLORIDE: 600; 310; 30; 20 INJECTION, SOLUTION INTRAVENOUS at 14:32

## 2024-01-01 RX ADMIN — Medication 500 MG: at 08:49

## 2024-01-01 RX ADMIN — PROPOFOL 120 MG: 10 INJECTION, EMULSION INTRAVENOUS at 08:03

## 2024-01-01 RX ADMIN — POLYPROPYLENE GLYCOL 400, PROPYLENE GLYCOL 2 DROP: .4; .3 LIQUID OPHTHALMIC at 09:11

## 2024-01-01 RX ADMIN — DICLOFENAC 2 G: 10 GEL TOPICAL at 20:24

## 2024-01-01 RX ADMIN — POLYETHYLENE GLYCOL 3350 17 G: 17 POWDER, FOR SOLUTION ORAL at 09:05

## 2024-01-01 RX ADMIN — INSULIN ASPART 1 UNITS: 100 INJECTION, SOLUTION INTRAVENOUS; SUBCUTANEOUS at 17:54

## 2024-01-01 RX ADMIN — Medication 3 MG: at 21:07

## 2024-01-01 RX ADMIN — Medication 40 MG: at 06:58

## 2024-01-01 RX ADMIN — FUROSEMIDE 20 MG: 20 TABLET ORAL at 09:25

## 2024-01-01 RX ADMIN — DICLOFENAC 2 G: 10 GEL TOPICAL at 16:27

## 2024-01-01 RX ADMIN — SENNOSIDES AND DOCUSATE SODIUM 1 TABLET: 8.6; 5 TABLET ORAL at 18:30

## 2024-01-01 RX ADMIN — WARFARIN SODIUM 3 MG: 3 TABLET ORAL at 18:29

## 2024-01-01 RX ADMIN — ENOXAPARIN SODIUM 40 MG: 40 INJECTION SUBCUTANEOUS at 08:50

## 2024-01-01 RX ADMIN — SODIUM PHOSPHATE, MONOBASIC, MONOHYDRATE AND SODIUM PHOSPHATE, DIBASIC, ANHYDROUS 9 MMOL: 142; 276 INJECTION, SOLUTION INTRAVENOUS at 04:53

## 2024-01-01 RX ADMIN — DICLOFENAC SODIUM 2 G: 10 GEL TOPICAL at 16:16

## 2024-01-01 RX ADMIN — Medication 40 MG: at 09:47

## 2024-01-01 RX ADMIN — SODIUM CHLORIDE, POTASSIUM CHLORIDE, SODIUM LACTATE AND CALCIUM CHLORIDE 1000 ML: 600; 310; 30; 20 INJECTION, SOLUTION INTRAVENOUS at 18:30

## 2024-01-01 RX ADMIN — POLYPROPYLENE GLYCOL 400, PROPYLENE GLYCOL 2 DROP: .4; .3 LIQUID OPHTHALMIC at 09:58

## 2024-01-01 RX ADMIN — INSULIN ASPART 3 UNITS: 100 INJECTION, SOLUTION INTRAVENOUS; SUBCUTANEOUS at 17:15

## 2024-01-01 RX ADMIN — DICLOFENAC SODIUM 2 G: 10 GEL TOPICAL at 16:36

## 2024-01-01 RX ADMIN — ERYTHROMYCIN 1 G: 5 OINTMENT OPHTHALMIC at 21:43

## 2024-01-01 RX ADMIN — SENNOSIDES AND DOCUSATE SODIUM 1 TABLET: 8.6; 5 TABLET ORAL at 20:00

## 2024-01-01 RX ADMIN — HYDROMORPHONE HYDROCHLORIDE 0.2 MG: 0.2 INJECTION, SOLUTION INTRAMUSCULAR; INTRAVENOUS; SUBCUTANEOUS at 21:53

## 2024-01-01 RX ADMIN — ACETAMINOPHEN 500 MG: 500 TABLET ORAL at 09:41

## 2024-01-01 RX ADMIN — ASPIRIN 81 MG: 81 TABLET, COATED ORAL at 08:43

## 2024-01-01 RX ADMIN — FENTANYL CITRATE 25 MCG: 50 INJECTION INTRAMUSCULAR; INTRAVENOUS at 15:10

## 2024-01-01 RX ADMIN — SACUBITRIL AND VALSARTAN 1 TABLET: 97; 103 TABLET, FILM COATED ORAL at 09:48

## 2024-01-01 RX ADMIN — WARFARIN SODIUM 3 MG: 3 TABLET ORAL at 17:41

## 2024-01-01 RX ADMIN — POLYPROPYLENE GLYCOL 400, PROPYLENE GLYCOL 2 DROP: .4; .3 LIQUID OPHTHALMIC at 20:00

## 2024-01-01 RX ADMIN — Medication 500 MG: at 10:17

## 2024-01-01 RX ADMIN — SODIUM PHOSPHATE, MONOBASIC, MONOHYDRATE AND SODIUM PHOSPHATE, DIBASIC, ANHYDROUS 9 MMOL: 142; 276 INJECTION, SOLUTION INTRAVENOUS at 09:33

## 2024-01-01 RX ADMIN — SACUBITRIL AND VALSARTAN 1 TABLET: 97; 103 TABLET, FILM COATED ORAL at 21:53

## 2024-01-01 RX ADMIN — MEROPENEM 1 G: 1 INJECTION, POWDER, FOR SOLUTION INTRAVENOUS at 15:07

## 2024-01-01 RX ADMIN — Medication 2 SPRAY: at 16:14

## 2024-01-01 RX ADMIN — METOPROLOL SUCCINATE 25 MG: 25 TABLET, EXTENDED RELEASE ORAL at 09:51

## 2024-01-01 RX ADMIN — ACETAMINOPHEN 500 MG: 500 TABLET ORAL at 17:23

## 2024-01-01 RX ADMIN — HYDROMORPHONE HYDROCHLORIDE 0.4 MG: 0.2 INJECTION, SOLUTION INTRAMUSCULAR; INTRAVENOUS; SUBCUTANEOUS at 09:13

## 2024-01-01 RX ADMIN — AZITHROMYCIN MONOHYDRATE 500 MG: 500 INJECTION, POWDER, LYOPHILIZED, FOR SOLUTION INTRAVENOUS at 15:26

## 2024-01-01 RX ADMIN — OXYCODONE HYDROCHLORIDE 2.5 MG: 5 TABLET ORAL at 09:05

## 2024-01-01 RX ADMIN — MEROPENEM 1 G: 1 INJECTION, POWDER, FOR SOLUTION INTRAVENOUS at 02:36

## 2024-01-01 RX ADMIN — OXYCODONE HYDROCHLORIDE 5 MG: 5 TABLET ORAL at 21:21

## 2024-01-01 RX ADMIN — INSULIN GLARGINE 5 UNITS: 100 INJECTION, SOLUTION SUBCUTANEOUS at 20:42

## 2024-01-01 RX ADMIN — DICLOFENAC 2 G: 10 GEL TOPICAL at 15:51

## 2024-01-01 RX ADMIN — EMPAGLIFLOZIN 25 MG: 25 TABLET, FILM COATED ORAL at 09:47

## 2024-01-01 RX ADMIN — Medication 2 SPRAY: at 16:13

## 2024-01-01 RX ADMIN — OXYCODONE HYDROCHLORIDE 5 MG: 5 TABLET ORAL at 21:01

## 2024-01-01 RX ADMIN — HEPARIN SODIUM 5000 UNITS: 10000 INJECTION, SOLUTION INTRAVENOUS; SUBCUTANEOUS at 19:40

## 2024-01-01 RX ADMIN — SENNOSIDES AND DOCUSATE SODIUM 1 TABLET: 8.6; 5 TABLET ORAL at 20:43

## 2024-01-01 RX ADMIN — Medication 2 SPRAY: at 20:40

## 2024-01-01 RX ADMIN — DEXTRAN 70, GLYCERIN, HYPROMELLOSE 2 DROP: 1; 2; 3 SOLUTION/ DROPS OPHTHALMIC at 09:24

## 2024-01-01 RX ADMIN — METOPROLOL SUCCINATE 25 MG: 25 TABLET, EXTENDED RELEASE ORAL at 09:15

## 2024-01-01 RX ADMIN — SENNOSIDES AND DOCUSATE SODIUM 1 TABLET: 8.6; 5 TABLET ORAL at 21:23

## 2024-01-01 RX ADMIN — INSULIN ASPART 2 UNITS: 100 INJECTION, SOLUTION INTRAVENOUS; SUBCUTANEOUS at 12:38

## 2024-01-01 RX ADMIN — MICONAZOLE NITRATE: 20 POWDER TOPICAL at 08:43

## 2024-01-01 RX ADMIN — DICLOFENAC 2 G: 10 GEL TOPICAL at 11:24

## 2024-01-01 RX ADMIN — HYDROMORPHONE HYDROCHLORIDE 0.4 MG: 1 INJECTION, SOLUTION INTRAMUSCULAR; INTRAVENOUS; SUBCUTANEOUS at 17:42

## 2024-01-01 RX ADMIN — ACETAMINOPHEN 500 MG: 500 TABLET ORAL at 09:06

## 2024-01-01 RX ADMIN — DAPTOMYCIN 400 MG: 500 INJECTION, POWDER, LYOPHILIZED, FOR SOLUTION INTRAVENOUS at 19:40

## 2024-01-01 RX ADMIN — Medication 1 MG: at 00:10

## 2024-01-01 RX ADMIN — Medication 40 MG: at 09:51

## 2024-01-01 RX ADMIN — POLYETHYLENE GLYCOL 3350 17 G: 17 POWDER, FOR SOLUTION ORAL at 09:40

## 2024-01-01 RX ADMIN — MICONAZOLE NITRATE: 20 POWDER TOPICAL at 09:27

## 2024-01-01 RX ADMIN — SACUBITRIL AND VALSARTAN 1 TABLET: 97; 103 TABLET, FILM COATED ORAL at 20:13

## 2024-01-01 RX ADMIN — CARVEDILOL 25 MG: 12.5 TABLET, FILM COATED ORAL at 19:56

## 2024-01-01 RX ADMIN — Medication 500 MG: at 09:05

## 2024-01-01 RX ADMIN — POLYPROPYLENE GLYCOL 400, PROPYLENE GLYCOL 2 DROP: .4; .3 LIQUID OPHTHALMIC at 09:13

## 2024-01-01 RX ADMIN — ALLOPURINOL 300 MG: 300 TABLET ORAL at 08:19

## 2024-01-01 RX ADMIN — Medication 40 MG: at 09:22

## 2024-01-01 RX ADMIN — POLYETHYLENE GLYCOL 400 AND PROPYLENE GLYCOL 2 DROP: 4; 3 SOLUTION/ DROPS OPHTHALMIC at 09:48

## 2024-01-01 RX ADMIN — IOPAMIDOL 75 ML: 755 INJECTION, SOLUTION INTRAVENOUS at 17:25

## 2024-01-01 RX ADMIN — MEROPENEM 1 G: 1 INJECTION, POWDER, FOR SOLUTION INTRAVENOUS at 18:46

## 2024-01-01 RX ADMIN — Medication 40 MG: at 09:40

## 2024-01-01 RX ADMIN — OXYCODONE HYDROCHLORIDE 5 MG: 5 TABLET ORAL at 08:42

## 2024-01-01 RX ADMIN — FUROSEMIDE 20 MG: 10 INJECTION, SOLUTION INTRAMUSCULAR; INTRAVENOUS at 11:39

## 2024-01-01 RX ADMIN — MEROPENEM 1 G: 1 INJECTION, POWDER, FOR SOLUTION INTRAVENOUS at 21:02

## 2024-01-01 RX ADMIN — OXYCODONE HYDROCHLORIDE 5 MG: 5 TABLET ORAL at 10:36

## 2024-01-01 RX ADMIN — ASPIRIN 81 MG: 81 TABLET, COATED ORAL at 09:52

## 2024-01-01 RX ADMIN — OXYCODONE HYDROCHLORIDE 5 MG: 5 TABLET ORAL at 17:32

## 2024-01-01 RX ADMIN — DICLOFENAC SODIUM 2 G: 10 GEL TOPICAL at 11:04

## 2024-01-01 RX ADMIN — MEROPENEM 1 G: 1 INJECTION, POWDER, FOR SOLUTION INTRAVENOUS at 09:25

## 2024-01-01 RX ADMIN — HEPARIN SODIUM 5000 UNITS: 10000 INJECTION, SOLUTION INTRAVENOUS; SUBCUTANEOUS at 20:53

## 2024-01-01 RX ADMIN — ALBUMIN HUMAN 25 G: 0.25 SOLUTION INTRAVENOUS at 19:07

## 2024-01-01 RX ADMIN — Medication 2 SPRAY: at 14:38

## 2024-01-01 RX ADMIN — ACETAMINOPHEN 500 MG: 500 TABLET ORAL at 09:59

## 2024-01-01 RX ADMIN — CARVEDILOL 25 MG: 12.5 TABLET, FILM COATED ORAL at 09:26

## 2024-01-01 RX ADMIN — MEROPENEM 1 G: 1 INJECTION, POWDER, FOR SOLUTION INTRAVENOUS at 10:29

## 2024-01-01 RX ADMIN — DICLOFENAC 2 G: 10 GEL TOPICAL at 14:04

## 2024-01-01 RX ADMIN — DICLOFENAC 2 G: 10 GEL TOPICAL at 13:37

## 2024-01-01 RX ADMIN — Medication 500 MG: at 09:48

## 2024-01-01 RX ADMIN — SENNOSIDES AND DOCUSATE SODIUM 1 TABLET: 8.6; 5 TABLET ORAL at 22:47

## 2024-01-01 RX ADMIN — POLYETHYLENE GLYCOL 400 AND PROPYLENE GLYCOL 2 DROP: 4; 3 SOLUTION/ DROPS OPHTHALMIC at 09:25

## 2024-01-01 RX ADMIN — POLYPROPYLENE GLYCOL 400, PROPYLENE GLYCOL 2 DROP: .4; .3 LIQUID OPHTHALMIC at 18:47

## 2024-01-01 RX ADMIN — SACUBITRIL AND VALSARTAN 1 TABLET: 97; 103 TABLET, FILM COATED ORAL at 09:35

## 2024-01-01 RX ADMIN — ACETAMINOPHEN 975 MG: 325 TABLET ORAL at 16:45

## 2024-01-01 RX ADMIN — DICLOFENAC 2 G: 10 GEL TOPICAL at 21:04

## 2024-01-01 RX ADMIN — FAMOTIDINE 20 MG: 20 TABLET ORAL at 09:25

## 2024-01-01 RX ADMIN — FUROSEMIDE 20 MG: 10 INJECTION, SOLUTION INTRAMUSCULAR; INTRAVENOUS at 03:58

## 2024-01-01 RX ADMIN — ERYTHROMYCIN 1 G: 5 OINTMENT OPHTHALMIC at 21:04

## 2024-01-01 RX ADMIN — HYDROMORPHONE HYDROCHLORIDE 0.2 MG: 0.2 INJECTION, SOLUTION INTRAMUSCULAR; INTRAVENOUS; SUBCUTANEOUS at 05:19

## 2024-01-01 RX ADMIN — MICONAZOLE NITRATE: 20 POWDER TOPICAL at 19:51

## 2024-01-01 RX ADMIN — INSULIN ASPART 2 UNITS: 100 INJECTION, SOLUTION INTRAVENOUS; SUBCUTANEOUS at 17:27

## 2024-01-01 RX ADMIN — SODIUM CHLORIDE 1500 MG: 9 INJECTION, SOLUTION INTRAVENOUS at 18:37

## 2024-01-01 RX ADMIN — OXYCODONE HYDROCHLORIDE 10 MG: 10 TABLET ORAL at 02:54

## 2024-01-01 RX ADMIN — Medication 2 SPRAY: at 19:02

## 2024-01-01 RX ADMIN — INSULIN ASPART 1 UNITS: 100 INJECTION, SOLUTION INTRAVENOUS; SUBCUTANEOUS at 17:36

## 2024-01-01 RX ADMIN — ERYTHROMYCIN 1 G: 5 OINTMENT OPHTHALMIC at 20:16

## 2024-01-01 RX ADMIN — Medication 2 SPRAY: at 17:12

## 2024-01-01 RX ADMIN — MEROPENEM 1 G: 1 INJECTION, POWDER, FOR SOLUTION INTRAVENOUS at 05:13

## 2024-01-01 RX ADMIN — DAPTOMYCIN 400 MG: 500 INJECTION, POWDER, LYOPHILIZED, FOR SOLUTION INTRAVENOUS at 18:34

## 2024-01-01 RX ADMIN — ACETAMINOPHEN 975 MG: 325 TABLET ORAL at 01:12

## 2024-01-01 RX ADMIN — MEROPENEM 1 G: 1 INJECTION, POWDER, FOR SOLUTION INTRAVENOUS at 09:18

## 2024-01-01 RX ADMIN — OXYCODONE HYDROCHLORIDE 10 MG: 10 TABLET ORAL at 06:45

## 2024-01-01 RX ADMIN — MEROPENEM 1 G: 1 INJECTION, POWDER, FOR SOLUTION INTRAVENOUS at 02:21

## 2024-01-01 RX ADMIN — ASPIRIN 81 MG: 81 TABLET, COATED ORAL at 09:47

## 2024-01-01 RX ADMIN — SODIUM CHLORIDE 1250 MG: 9 INJECTION, SOLUTION INTRAVENOUS at 16:29

## 2024-01-01 RX ADMIN — MEROPENEM 1 G: 1 INJECTION, POWDER, FOR SOLUTION INTRAVENOUS at 04:54

## 2024-01-01 RX ADMIN — Medication 2 SPRAY: at 09:21

## 2024-01-01 RX ADMIN — FUROSEMIDE 20 MG: 10 INJECTION, SOLUTION INTRAMUSCULAR; INTRAVENOUS at 13:20

## 2024-01-01 RX ADMIN — POLYPROPYLENE GLYCOL 400, PROPYLENE GLYCOL 2 DROP: .4; .3 LIQUID OPHTHALMIC at 19:02

## 2024-01-01 RX ADMIN — DICLOFENAC 2 G: 10 GEL TOPICAL at 16:13

## 2024-01-01 RX ADMIN — Medication 2 SPRAY: at 16:38

## 2024-01-01 RX ADMIN — OXYCODONE HYDROCHLORIDE 5 MG: 5 TABLET ORAL at 20:43

## 2024-01-01 RX ADMIN — POLYPROPYLENE GLYCOL 400, PROPYLENE GLYCOL 2 DROP: .4; .3 LIQUID OPHTHALMIC at 20:01

## 2024-01-01 RX ADMIN — MIDODRINE HYDROCHLORIDE 5 MG: 5 TABLET ORAL at 12:44

## 2024-01-01 RX ADMIN — MEROPENEM 1 G: 1 INJECTION, POWDER, FOR SOLUTION INTRAVENOUS at 02:35

## 2024-01-01 RX ADMIN — MEROPENEM 1 G: 1 INJECTION, POWDER, FOR SOLUTION INTRAVENOUS at 19:17

## 2024-01-01 RX ADMIN — DICLOFENAC 2 G: 10 GEL TOPICAL at 13:08

## 2024-01-01 RX ADMIN — DAPTOMYCIN 400 MG: 500 INJECTION, POWDER, LYOPHILIZED, FOR SOLUTION INTRAVENOUS at 20:33

## 2024-01-01 RX ADMIN — PANTOPRAZOLE SODIUM 40 MG: 40 INJECTION, POWDER, FOR SOLUTION INTRAVENOUS at 08:52

## 2024-01-01 RX ADMIN — SODIUM PHOSPHATE, DIBASIC, ANHYDROUS, POTASSIUM PHOSPHATE, MONOBASIC, AND SODIUM PHOSPHATE, MONOBASIC, MONOHYDRATE 250 MG: 852; 155; 130 TABLET, COATED ORAL at 17:11

## 2024-01-01 RX ADMIN — ROCURONIUM BROMIDE 10 MG: 50 INJECTION, SOLUTION INTRAVENOUS at 15:01

## 2024-01-01 RX ADMIN — MEROPENEM 1 G: 1 INJECTION, POWDER, FOR SOLUTION INTRAVENOUS at 20:47

## 2024-01-01 RX ADMIN — SODIUM PHOSPHATE, MONOBASIC, MONOHYDRATE AND SODIUM PHOSPHATE, DIBASIC, ANHYDROUS 15 MMOL: 142; 276 INJECTION, SOLUTION INTRAVENOUS at 13:18

## 2024-01-01 RX ADMIN — ASPIRIN 81 MG: 81 TABLET, COATED ORAL at 09:21

## 2024-01-01 RX ADMIN — DICLOFENAC 2 G: 10 GEL TOPICAL at 14:38

## 2024-01-01 RX ADMIN — MEROPENEM 1 G: 1 INJECTION, POWDER, FOR SOLUTION INTRAVENOUS at 13:37

## 2024-01-01 RX ADMIN — Medication 1 TABLET: at 09:59

## 2024-01-01 RX ADMIN — ERYTHROMYCIN 1 G: 5 OINTMENT OPHTHALMIC at 20:45

## 2024-01-01 RX ADMIN — POLYETHYLENE GLYCOL 3350 17 G: 17 POWDER, FOR SOLUTION ORAL at 18:35

## 2024-01-01 RX ADMIN — MEROPENEM 1 G: 1 INJECTION, POWDER, FOR SOLUTION INTRAVENOUS at 05:26

## 2024-01-01 RX ADMIN — HYDROMORPHONE HYDROCHLORIDE 0.4 MG: 0.2 INJECTION, SOLUTION INTRAMUSCULAR; INTRAVENOUS; SUBCUTANEOUS at 15:44

## 2024-01-01 RX ADMIN — DICLOFENAC 2 G: 10 GEL TOPICAL at 08:50

## 2024-01-01 RX ADMIN — PANTOPRAZOLE SODIUM 40 MG: 40 TABLET, DELAYED RELEASE ORAL at 06:21

## 2024-01-01 RX ADMIN — Medication 40 MG: at 06:54

## 2024-01-01 RX ADMIN — INSULIN GLARGINE 5 UNITS: 100 INJECTION, SOLUTION SUBCUTANEOUS at 21:03

## 2024-01-01 RX ADMIN — ACETAMINOPHEN 500 MG: 500 TABLET ORAL at 17:26

## 2024-01-01 RX ADMIN — PHENYLEPHRINE HYDROCHLORIDE 0.5 MCG/KG/MIN: 10 INJECTION INTRAVENOUS at 08:25

## 2024-01-01 RX ADMIN — ERYTHROMYCIN 1 G: 5 OINTMENT OPHTHALMIC at 21:44

## 2024-01-01 RX ADMIN — INSULIN ASPART 1 UNITS: 100 INJECTION, SOLUTION INTRAVENOUS; SUBCUTANEOUS at 21:19

## 2024-01-01 RX ADMIN — Medication 2 G: at 14:32

## 2024-01-01 RX ADMIN — FUROSEMIDE 20 MG: 20 TABLET ORAL at 09:05

## 2024-01-01 RX ADMIN — POLYPROPYLENE GLYCOL 400, PROPYLENE GLYCOL 2 DROP: .4; .3 LIQUID OPHTHALMIC at 21:15

## 2024-01-01 RX ADMIN — WARFARIN SODIUM 3 MG: 3 TABLET ORAL at 18:38

## 2024-01-01 RX ADMIN — DICLOFENAC 2 G: 10 GEL TOPICAL at 21:44

## 2024-01-01 RX ADMIN — ACETAMINOPHEN 500 MG: 500 TABLET ORAL at 16:33

## 2024-01-01 RX ADMIN — FUROSEMIDE 20 MG: 10 INJECTION, SOLUTION INTRAMUSCULAR; INTRAVENOUS at 08:59

## 2024-01-01 RX ADMIN — PANTOPRAZOLE SODIUM 40 MG: 40 TABLET, DELAYED RELEASE ORAL at 06:37

## 2024-01-01 RX ADMIN — INSULIN ASPART 1 UNITS: 100 INJECTION, SOLUTION INTRAVENOUS; SUBCUTANEOUS at 13:01

## 2024-01-01 RX ADMIN — POLYPROPYLENE GLYCOL 400, PROPYLENE GLYCOL 2 DROP: .4; .3 LIQUID OPHTHALMIC at 10:30

## 2024-01-01 RX ADMIN — DICLOFENAC SODIUM 2 G: 10 GEL TOPICAL at 10:11

## 2024-01-01 RX ADMIN — INSULIN GLARGINE 5 UNITS: 100 INJECTION, SOLUTION SUBCUTANEOUS at 21:13

## 2024-01-01 RX ADMIN — MEROPENEM 1 G: 1 INJECTION, POWDER, FOR SOLUTION INTRAVENOUS at 14:51

## 2024-01-01 RX ADMIN — Medication 2 SPRAY: at 14:02

## 2024-01-01 RX ADMIN — POLYETHYLENE GLYCOL 3350 17 G: 17 POWDER, FOR SOLUTION ORAL at 10:01

## 2024-01-01 RX ADMIN — HEPARIN SODIUM 5000 UNITS: 10000 INJECTION, SOLUTION INTRAVENOUS; SUBCUTANEOUS at 12:43

## 2024-01-01 RX ADMIN — ACETAMINOPHEN 500 MG: 500 TABLET ORAL at 15:14

## 2024-01-01 RX ADMIN — POTASSIUM CHLORIDE 10 MEQ: 7.46 INJECTION, SOLUTION INTRAVENOUS at 15:39

## 2024-01-01 RX ADMIN — SENNOSIDES AND DOCUSATE SODIUM 1 TABLET: 8.6; 5 TABLET ORAL at 08:49

## 2024-01-01 ASSESSMENT — ACTIVITIES OF DAILY LIVING (ADL)
ADLS_ACUITY_SCORE: 58
DEPENDENT_IADLS:: CLEANING;COOKING;LAUNDRY;SHOPPING;MEAL PREPARATION;MEDICATION MANAGEMENT;MONEY MANAGEMENT;TRANSPORTATION;INCONTINENCE
ADLS_ACUITY_SCORE: 60
ADLS_ACUITY_SCORE: 58
ADLS_ACUITY_SCORE: 62
ADLS_ACUITY_SCORE: 60
ADLS_ACUITY_SCORE: 62
ADLS_ACUITY_SCORE: 54
ADLS_ACUITY_SCORE: 56
ADLS_ACUITY_SCORE: 62
ADLS_ACUITY_SCORE: 54
ADLS_ACUITY_SCORE: 58
ADLS_ACUITY_SCORE: 49
ADLS_ACUITY_SCORE: 55
ADLS_ACUITY_SCORE: 55
ADLS_ACUITY_SCORE: 58
ADLS_ACUITY_SCORE: 54
ADLS_ACUITY_SCORE: 54
ADLS_ACUITY_SCORE: 52
ADLS_ACUITY_SCORE: 56
ADLS_ACUITY_SCORE: 55
ADLS_ACUITY_SCORE: 50
ADLS_ACUITY_SCORE: 57
ADLS_ACUITY_SCORE: 56
ADLS_ACUITY_SCORE: 58
ADLS_ACUITY_SCORE: 58
ADLS_ACUITY_SCORE: 50
ADLS_ACUITY_SCORE: 47
ADLS_ACUITY_SCORE: 58
ADLS_ACUITY_SCORE: 54
ADLS_ACUITY_SCORE: 56
ADLS_ACUITY_SCORE: 55
ADLS_ACUITY_SCORE: 55
ADLS_ACUITY_SCORE: 60
ADLS_ACUITY_SCORE: 55
ADLS_ACUITY_SCORE: 57
ADLS_ACUITY_SCORE: 58
ADLS_ACUITY_SCORE: 62
ADLS_ACUITY_SCORE: 54
ADLS_ACUITY_SCORE: 58
ADLS_ACUITY_SCORE: 56
DRESSING/BATHING_DIFFICULTY: YES
ADLS_ACUITY_SCORE: 62
ADLS_ACUITY_SCORE: 55
ADLS_ACUITY_SCORE: 56
ADLS_ACUITY_SCORE: 56
ADLS_ACUITY_SCORE: 53
ADLS_ACUITY_SCORE: 62
ADLS_ACUITY_SCORE: 46
ADLS_ACUITY_SCORE: 53
ADLS_ACUITY_SCORE: 52
ADLS_ACUITY_SCORE: 55
ADLS_ACUITY_SCORE: 58
ADLS_ACUITY_SCORE: 54
ADLS_ACUITY_SCORE: 62
CONCENTRATING,_REMEMBERING_OR_MAKING_DECISIONS_DIFFICULTY: NO
ADLS_ACUITY_SCORE: 54
ADLS_ACUITY_SCORE: 56
ADLS_ACUITY_SCORE: 57
ADLS_ACUITY_SCORE: 55
ADLS_ACUITY_SCORE: 59
ADLS_ACUITY_SCORE: 54
ADLS_ACUITY_SCORE: 46
ADLS_ACUITY_SCORE: 58
ADLS_ACUITY_SCORE: 56
ADLS_ACUITY_SCORE: 46
ADLS_ACUITY_SCORE: 62
ADLS_ACUITY_SCORE: 46
ADLS_ACUITY_SCORE: 53
ADLS_ACUITY_SCORE: 46
ADLS_ACUITY_SCORE: 56
ADLS_ACUITY_SCORE: 58
ADLS_ACUITY_SCORE: 55
ADLS_ACUITY_SCORE: 58
ADLS_ACUITY_SCORE: 60
ADLS_ACUITY_SCORE: 60
ADLS_ACUITY_SCORE: 56
ADLS_ACUITY_SCORE: 54
ADLS_ACUITY_SCORE: 52
ADLS_ACUITY_SCORE: 56
ADLS_ACUITY_SCORE: 62
ADLS_ACUITY_SCORE: 58
ADLS_ACUITY_SCORE: 59
ADLS_ACUITY_SCORE: 55
ADLS_ACUITY_SCORE: 62
ADLS_ACUITY_SCORE: 55
ADLS_ACUITY_SCORE: 53
ADLS_ACUITY_SCORE: 58
ADLS_ACUITY_SCORE: 56
ADLS_ACUITY_SCORE: 47
ADLS_ACUITY_SCORE: 54
ADLS_ACUITY_SCORE: 62
ADLS_ACUITY_SCORE: 56
ADLS_ACUITY_SCORE: 62
ADLS_ACUITY_SCORE: 52
ADLS_ACUITY_SCORE: 58
ADLS_ACUITY_SCORE: 55
ADLS_ACUITY_SCORE: 56
ADLS_ACUITY_SCORE: 58
ADLS_ACUITY_SCORE: 55
ADLS_ACUITY_SCORE: 58
ADLS_ACUITY_SCORE: 60
ADLS_ACUITY_SCORE: 62
ADLS_ACUITY_SCORE: 56
ADLS_ACUITY_SCORE: 54
EQUIPMENT_CURRENTLY_USED_AT_HOME: LIFT DEVICE
ADLS_ACUITY_SCORE: 55
ADLS_ACUITY_SCORE: 52
ADLS_ACUITY_SCORE: 40
ADLS_ACUITY_SCORE: 53
ADLS_ACUITY_SCORE: 38
ADLS_ACUITY_SCORE: 54
ADLS_ACUITY_SCORE: 58
ADLS_ACUITY_SCORE: 58
FALL_HISTORY_WITHIN_LAST_SIX_MONTHS: YES
ADLS_ACUITY_SCORE: 55
ADLS_ACUITY_SCORE: 58
ADLS_ACUITY_SCORE: 54
ADLS_ACUITY_SCORE: 58
ADLS_ACUITY_SCORE: 62
ADLS_ACUITY_SCORE: 57
ADLS_ACUITY_SCORE: 58
ADLS_ACUITY_SCORE: 62
ADLS_ACUITY_SCORE: 50
ADLS_ACUITY_SCORE: 56
ADLS_ACUITY_SCORE: 59
ADLS_ACUITY_SCORE: 56
ADLS_ACUITY_SCORE: 56
ADLS_ACUITY_SCORE: 58
ADLS_ACUITY_SCORE: 62
ADLS_ACUITY_SCORE: 58
ADLS_ACUITY_SCORE: 55
ADLS_ACUITY_SCORE: 49
ADLS_ACUITY_SCORE: 57
ADLS_ACUITY_SCORE: 60
ADLS_ACUITY_SCORE: 57
ADLS_ACUITY_SCORE: 58
ADLS_ACUITY_SCORE: 50
ADLS_ACUITY_SCORE: 56
ADLS_ACUITY_SCORE: 58
ADLS_ACUITY_SCORE: 55
ADLS_ACUITY_SCORE: 58
ADLS_ACUITY_SCORE: 62
ADLS_ACUITY_SCORE: 58
ADLS_ACUITY_SCORE: 52
ADLS_ACUITY_SCORE: 54
ADLS_ACUITY_SCORE: 54
ADLS_ACUITY_SCORE: 58
ADLS_ACUITY_SCORE: 58
ADLS_ACUITY_SCORE: 56
ADLS_ACUITY_SCORE: 62
ADLS_ACUITY_SCORE: 39
ADLS_ACUITY_SCORE: 50
ADLS_ACUITY_SCORE: 54
ADLS_ACUITY_SCORE: 54
ADLS_ACUITY_SCORE: 56
ADLS_ACUITY_SCORE: 49
ADLS_ACUITY_SCORE: 48
ADLS_ACUITY_SCORE: 54
ADLS_ACUITY_SCORE: 58
ADLS_ACUITY_SCORE: 54
ADLS_ACUITY_SCORE: 58
ADLS_ACUITY_SCORE: 58
ADLS_ACUITY_SCORE: 55
ADLS_ACUITY_SCORE: 56
ADLS_ACUITY_SCORE: 55
ADLS_ACUITY_SCORE: 62
ADLS_ACUITY_SCORE: 57
ADLS_ACUITY_SCORE: 58
ADLS_ACUITY_SCORE: 58
ADLS_ACUITY_SCORE: 55
ADLS_ACUITY_SCORE: 58
ADLS_ACUITY_SCORE: 58
ADLS_ACUITY_SCORE: 56
ADLS_ACUITY_SCORE: 58
ADLS_ACUITY_SCORE: 58
ADLS_ACUITY_SCORE: 56
ADLS_ACUITY_SCORE: 57
ADLS_ACUITY_SCORE: 58
ADLS_ACUITY_SCORE: 56
ADLS_ACUITY_SCORE: 59
ADLS_ACUITY_SCORE: 49
ADLS_ACUITY_SCORE: 48
ADLS_ACUITY_SCORE: 55
ADLS_ACUITY_SCORE: 58
ADLS_ACUITY_SCORE: 58
ADLS_ACUITY_SCORE: 59
ADLS_ACUITY_SCORE: 58
ADLS_ACUITY_SCORE: 58
ADLS_ACUITY_SCORE: 47
ADLS_ACUITY_SCORE: 62
ADLS_ACUITY_SCORE: 56
ADLS_ACUITY_SCORE: 62
ADLS_ACUITY_SCORE: 54
ADLS_ACUITY_SCORE: 57
ADLS_ACUITY_SCORE: 55
ADLS_ACUITY_SCORE: 54
ADLS_ACUITY_SCORE: 56
ADLS_ACUITY_SCORE: 58
ADLS_ACUITY_SCORE: 58
ADLS_ACUITY_SCORE: 56
ADLS_ACUITY_SCORE: 58
ADLS_ACUITY_SCORE: 62
ADLS_ACUITY_SCORE: 30
ADLS_ACUITY_SCORE: 46
ADLS_ACUITY_SCORE: 60
ADLS_ACUITY_SCORE: 58
ADLS_ACUITY_SCORE: 56
ADLS_ACUITY_SCORE: 56
ADLS_ACUITY_SCORE: 58
ADLS_ACUITY_SCORE: 60
DOING_ERRANDS_INDEPENDENTLY_DIFFICULTY: YES
ADLS_ACUITY_SCORE: 62
ADLS_ACUITY_SCORE: 56
ADLS_ACUITY_SCORE: 56
ADLS_ACUITY_SCORE: 35
ADLS_ACUITY_SCORE: 62
ADLS_ACUITY_SCORE: 54
ADLS_ACUITY_SCORE: 54
ADLS_ACUITY_SCORE: 58
ADLS_ACUITY_SCORE: 58
ADLS_ACUITY_SCORE: 62
ADLS_ACUITY_SCORE: 62
ADLS_ACUITY_SCORE: 55
ADLS_ACUITY_SCORE: 50
ADLS_ACUITY_SCORE: 52
ADLS_ACUITY_SCORE: 59
ADLS_ACUITY_SCORE: 48
ADLS_ACUITY_SCORE: 57
ADLS_ACUITY_SCORE: 55
ADLS_ACUITY_SCORE: 46
ADLS_ACUITY_SCORE: 56
ADLS_ACUITY_SCORE: 47
ADLS_ACUITY_SCORE: 58
ADLS_ACUITY_SCORE: 62
ADLS_ACUITY_SCORE: 48
ADLS_ACUITY_SCORE: 47
EATING/SWALLOWING: SWALLOWING LIQUIDS;SWALLOWING SOLID FOOD
ADLS_ACUITY_SCORE: 56
WALKING_OR_CLIMBING_STAIRS: TRANSFERRING DIFFICULTY, REQUIRES EQUIPMENT
ADLS_ACUITY_SCORE: 62
ADLS_ACUITY_SCORE: 56
ADLS_ACUITY_SCORE: 62
ADLS_ACUITY_SCORE: 56
ADLS_ACUITY_SCORE: 39
ADLS_ACUITY_SCORE: 57
ADLS_ACUITY_SCORE: 54
ADLS_ACUITY_SCORE: 56
ADLS_ACUITY_SCORE: 58
ADLS_ACUITY_SCORE: 56
ADLS_ACUITY_SCORE: 60
ADLS_ACUITY_SCORE: 58
ADLS_ACUITY_SCORE: 36
ADLS_ACUITY_SCORE: 54
ADLS_ACUITY_SCORE: 39
WEAR_GLASSES_OR_BLIND: YES
ADLS_ACUITY_SCORE: 62
ADLS_ACUITY_SCORE: 60
ADLS_ACUITY_SCORE: 57
ADLS_ACUITY_SCORE: 50
ADLS_ACUITY_SCORE: 62
ADLS_ACUITY_SCORE: 48
ADLS_ACUITY_SCORE: 58
ADLS_ACUITY_SCORE: 56
ADLS_ACUITY_SCORE: 50
ADLS_ACUITY_SCORE: 57
ADLS_ACUITY_SCORE: 56
ADLS_ACUITY_SCORE: 54
ADLS_ACUITY_SCORE: 58
ADLS_ACUITY_SCORE: 56
ADLS_ACUITY_SCORE: 62
ADLS_ACUITY_SCORE: 55
ADLS_ACUITY_SCORE: 59
ADLS_ACUITY_SCORE: 52
DEPENDENT_IADLS:: CLEANING;COOKING;LAUNDRY;SHOPPING;MEAL PREPARATION;MEDICATION MANAGEMENT;MONEY MANAGEMENT;TRANSPORTATION;INCONTINENCE
ADLS_ACUITY_SCORE: 56
ADLS_ACUITY_SCORE: 48
ADLS_ACUITY_SCORE: 54
ADLS_ACUITY_SCORE: 60
ADLS_ACUITY_SCORE: 57
ADLS_ACUITY_SCORE: 59
ADLS_ACUITY_SCORE: 47
ADLS_ACUITY_SCORE: 54
ADLS_ACUITY_SCORE: 55
ADLS_ACUITY_SCORE: 56
ADLS_ACUITY_SCORE: 60
ADLS_ACUITY_SCORE: 59
ADLS_ACUITY_SCORE: 62
ADLS_ACUITY_SCORE: 52
ADLS_ACUITY_SCORE: 52
ADLS_ACUITY_SCORE: 55
ADLS_ACUITY_SCORE: 58
ADLS_ACUITY_SCORE: 60
ADLS_ACUITY_SCORE: 58
ADLS_ACUITY_SCORE: 55
ADLS_ACUITY_SCORE: 56
ADLS_ACUITY_SCORE: 58
ADLS_ACUITY_SCORE: 54
ADLS_ACUITY_SCORE: 62
ADLS_ACUITY_SCORE: 46
ADLS_ACUITY_SCORE: 59
ADLS_ACUITY_SCORE: 56
ADLS_ACUITY_SCORE: 53
ADLS_ACUITY_SCORE: 58
ADLS_ACUITY_SCORE: 49
ADLS_ACUITY_SCORE: 52
ADLS_ACUITY_SCORE: 47
ADLS_ACUITY_SCORE: 54
ADLS_ACUITY_SCORE: 58
ADLS_ACUITY_SCORE: 52
ADLS_ACUITY_SCORE: 56
ADLS_ACUITY_SCORE: 49
ADLS_ACUITY_SCORE: 52
ADLS_ACUITY_SCORE: 40
ADLS_ACUITY_SCORE: 62
ADLS_ACUITY_SCORE: 53
ADLS_ACUITY_SCORE: 54
ADLS_ACUITY_SCORE: 46
ADLS_ACUITY_SCORE: 58
ADLS_ACUITY_SCORE: 50
ADLS_ACUITY_SCORE: 59
ADLS_ACUITY_SCORE: 58
ADLS_ACUITY_SCORE: 59
ADLS_ACUITY_SCORE: 46
ADLS_ACUITY_SCORE: 59
ADLS_ACUITY_SCORE: 59
ADLS_ACUITY_SCORE: 55
ADLS_ACUITY_SCORE: 56
ADLS_ACUITY_SCORE: 58
ADLS_ACUITY_SCORE: 57
ADLS_ACUITY_SCORE: 58
ADLS_ACUITY_SCORE: 54
ADLS_ACUITY_SCORE: 55
ADLS_ACUITY_SCORE: 56
ADLS_ACUITY_SCORE: 58
ADLS_ACUITY_SCORE: 57
ADLS_ACUITY_SCORE: 53
ADLS_ACUITY_SCORE: 52
ADLS_ACUITY_SCORE: 57
ADLS_ACUITY_SCORE: 52
ADLS_ACUITY_SCORE: 57
ADLS_ACUITY_SCORE: 59
ADLS_ACUITY_SCORE: 53
ADLS_ACUITY_SCORE: 56
ADLS_ACUITY_SCORE: 56
ADLS_ACUITY_SCORE: 52
ADLS_ACUITY_SCORE: 52
ADLS_ACUITY_SCORE: 62
ADLS_ACUITY_SCORE: 53
ADLS_ACUITY_SCORE: 55
ADLS_ACUITY_SCORE: 55
ADLS_ACUITY_SCORE: 52
ADLS_ACUITY_SCORE: 58
ADLS_ACUITY_SCORE: 56
ADLS_ACUITY_SCORE: 58
ADLS_ACUITY_SCORE: 62
ADLS_ACUITY_SCORE: 58
ADLS_ACUITY_SCORE: 56
ADLS_ACUITY_SCORE: 56
ADLS_ACUITY_SCORE: 57
ADLS_ACUITY_SCORE: 56
ADLS_ACUITY_SCORE: 54
ADLS_ACUITY_SCORE: 49
ADLS_ACUITY_SCORE: 58
ADLS_ACUITY_SCORE: 46
ADLS_ACUITY_SCORE: 58
ADLS_ACUITY_SCORE: 48
ADLS_ACUITY_SCORE: 54
ADLS_ACUITY_SCORE: 56
ADLS_ACUITY_SCORE: 46
ADLS_ACUITY_SCORE: 42
ADLS_ACUITY_SCORE: 50
ADLS_ACUITY_SCORE: 57
ADLS_ACUITY_SCORE: 60
ADLS_ACUITY_SCORE: 55
ADLS_ACUITY_SCORE: 52
ADLS_ACUITY_SCORE: 54
ADLS_ACUITY_SCORE: 42
ADLS_ACUITY_SCORE: 57
ADLS_ACUITY_SCORE: 54
ADLS_ACUITY_SCORE: 56
ADLS_ACUITY_SCORE: 57
ADLS_ACUITY_SCORE: 48
ADLS_ACUITY_SCORE: 46
ADLS_ACUITY_SCORE: 54
ADLS_ACUITY_SCORE: 54
ADLS_ACUITY_SCORE: 60
ADLS_ACUITY_SCORE: 42
ADLS_ACUITY_SCORE: 54
ADLS_ACUITY_SCORE: 56
ADLS_ACUITY_SCORE: 50
ADLS_ACUITY_SCORE: 54
ADLS_ACUITY_SCORE: 62
ADLS_ACUITY_SCORE: 55
ADLS_ACUITY_SCORE: 58
ADLS_ACUITY_SCORE: 56
ADLS_ACUITY_SCORE: 56
ADLS_ACUITY_SCORE: 58
ADLS_ACUITY_SCORE: 58
ADLS_ACUITY_SCORE: 62
ADLS_ACUITY_SCORE: 56
ADLS_ACUITY_SCORE: 54
ADLS_ACUITY_SCORE: 56
ADLS_ACUITY_SCORE: 58
ADLS_ACUITY_SCORE: 60
ADLS_ACUITY_SCORE: 56
ADLS_ACUITY_SCORE: 62
ADLS_ACUITY_SCORE: 62
ADLS_ACUITY_SCORE: 56
ADLS_ACUITY_SCORE: 45
ADLS_ACUITY_SCORE: 56
ADLS_ACUITY_SCORE: 57
ADLS_ACUITY_SCORE: 57
ADLS_ACUITY_SCORE: 56
ADLS_ACUITY_SCORE: 60
ADLS_ACUITY_SCORE: 57
ADLS_ACUITY_SCORE: 59
ADLS_ACUITY_SCORE: 52
ADLS_ACUITY_SCORE: 58
ADLS_ACUITY_SCORE: 53
ADLS_ACUITY_SCORE: 49
ADLS_ACUITY_SCORE: 48
ADLS_ACUITY_SCORE: 58
ADLS_ACUITY_SCORE: 58
ADLS_ACUITY_SCORE: 56
ADLS_ACUITY_SCORE: 39
ADLS_ACUITY_SCORE: 62
ADLS_ACUITY_SCORE: 55
ADLS_ACUITY_SCORE: 55
DEPENDENT_IADLS:: CLEANING;COOKING;LAUNDRY;SHOPPING;MEAL PREPARATION;MONEY MANAGEMENT;TRANSPORTATION
ADLS_ACUITY_SCORE: 56
ADLS_ACUITY_SCORE: 54
ADLS_ACUITY_SCORE: 47
ADLS_ACUITY_SCORE: 60
ADLS_ACUITY_SCORE: 60
ADLS_ACUITY_SCORE: 55
ADLS_ACUITY_SCORE: 58
ADLS_ACUITY_SCORE: 56
ADLS_ACUITY_SCORE: 58
ADLS_ACUITY_SCORE: 56
ADLS_ACUITY_SCORE: 54
ADLS_ACUITY_SCORE: 56
ADLS_ACUITY_SCORE: 55
ADLS_ACUITY_SCORE: 40
ADLS_ACUITY_SCORE: 50
ADLS_ACUITY_SCORE: 62
ADLS_ACUITY_SCORE: 53
ADLS_ACUITY_SCORE: 56
ADLS_ACUITY_SCORE: 54
ADLS_ACUITY_SCORE: 60
ADLS_ACUITY_SCORE: 39
ADLS_ACUITY_SCORE: 55
ADLS_ACUITY_SCORE: 55
ADLS_ACUITY_SCORE: 39
ADLS_ACUITY_SCORE: 54
ADLS_ACUITY_SCORE: 60
ADLS_ACUITY_SCORE: 38
ADLS_ACUITY_SCORE: 58
ADLS_ACUITY_SCORE: 55
ADLS_ACUITY_SCORE: 58
ADLS_ACUITY_SCORE: 58
DRESSING/BATHING: DRESSING DIFFICULTY, REQUIRES EQUIPMENT
ADLS_ACUITY_SCORE: 58
ADLS_ACUITY_SCORE: 56
ADLS_ACUITY_SCORE: 54
ADLS_ACUITY_SCORE: 47
ADLS_ACUITY_SCORE: 48
ADLS_ACUITY_SCORE: 58
ADLS_ACUITY_SCORE: 50
ADLS_ACUITY_SCORE: 56
ADLS_ACUITY_SCORE: 64
ADLS_ACUITY_SCORE: 56
ADLS_ACUITY_SCORE: 55
ADLS_ACUITY_SCORE: 56
ADLS_ACUITY_SCORE: 55
ADLS_ACUITY_SCORE: 55
ADLS_ACUITY_SCORE: 56
ADLS_ACUITY_SCORE: 58
ADLS_ACUITY_SCORE: 55
ADLS_ACUITY_SCORE: 59
ADLS_ACUITY_SCORE: 58
ADLS_ACUITY_SCORE: 55
ADLS_ACUITY_SCORE: 58
ADLS_ACUITY_SCORE: 57
ADLS_ACUITY_SCORE: 56
ADLS_ACUITY_SCORE: 62
ADLS_ACUITY_SCORE: 58
ADLS_ACUITY_SCORE: 57
ADLS_ACUITY_SCORE: 52
ADLS_ACUITY_SCORE: 46
ADLS_ACUITY_SCORE: 48
ADLS_ACUITY_SCORE: 50
ADLS_ACUITY_SCORE: 56
ADLS_ACUITY_SCORE: 62
ADLS_ACUITY_SCORE: 60
ADLS_ACUITY_SCORE: 54
ADLS_ACUITY_SCORE: 50
ADLS_ACUITY_SCORE: 52
ADLS_ACUITY_SCORE: 55
ADLS_ACUITY_SCORE: 62
ADLS_ACUITY_SCORE: 52
ADLS_ACUITY_SCORE: 48
ADLS_ACUITY_SCORE: 58
ADLS_ACUITY_SCORE: 58
TOILETING_ISSUES: NO
ADLS_ACUITY_SCORE: 57
ADLS_ACUITY_SCORE: 58
ADLS_ACUITY_SCORE: 57
ADLS_ACUITY_SCORE: 55
ADLS_ACUITY_SCORE: 58
ADLS_ACUITY_SCORE: 55
ADLS_ACUITY_SCORE: 58
ADLS_ACUITY_SCORE: 58
ADLS_ACUITY_SCORE: 53
ADLS_ACUITY_SCORE: 62
ADLS_ACUITY_SCORE: 55
ADLS_ACUITY_SCORE: 54
ADLS_ACUITY_SCORE: 53
ADLS_ACUITY_SCORE: 52
ADLS_ACUITY_SCORE: 55
ADLS_ACUITY_SCORE: 52
ADLS_ACUITY_SCORE: 35
ADLS_ACUITY_SCORE: 56
ADLS_ACUITY_SCORE: 52
ADLS_ACUITY_SCORE: 55
ADLS_ACUITY_SCORE: 56
ADLS_ACUITY_SCORE: 58
ADLS_ACUITY_SCORE: 50
ADLS_ACUITY_SCORE: 60
ADLS_ACUITY_SCORE: 47
ADLS_ACUITY_SCORE: 55
ADLS_ACUITY_SCORE: 57
ADLS_ACUITY_SCORE: 62
ADLS_ACUITY_SCORE: 57
ADLS_ACUITY_SCORE: 56
ADLS_ACUITY_SCORE: 57
ADLS_ACUITY_SCORE: 62
ADLS_ACUITY_SCORE: 58
ADLS_ACUITY_SCORE: 53
ADLS_ACUITY_SCORE: 57
ADLS_ACUITY_SCORE: 45
ADLS_ACUITY_SCORE: 58
ADLS_ACUITY_SCORE: 56
ADLS_ACUITY_SCORE: 59
ADLS_ACUITY_SCORE: 54
ADLS_ACUITY_SCORE: 58
ADLS_ACUITY_SCORE: 58
ADLS_ACUITY_SCORE: 56
VISION_MANAGEMENT: GLASSES
ADLS_ACUITY_SCORE: 54
ADLS_ACUITY_SCORE: 57
ADLS_ACUITY_SCORE: 50
ADLS_ACUITY_SCORE: 54
ADLS_ACUITY_SCORE: 54
ADLS_ACUITY_SCORE: 56
ADLS_ACUITY_SCORE: 54
ADLS_ACUITY_SCORE: 58
ADLS_ACUITY_SCORE: 50
ADLS_ACUITY_SCORE: 54
ADLS_ACUITY_SCORE: 62
ADLS_ACUITY_SCORE: 58
ADLS_ACUITY_SCORE: 58
ADLS_ACUITY_SCORE: 56
ADLS_ACUITY_SCORE: 58
ADLS_ACUITY_SCORE: 50
ADLS_ACUITY_SCORE: 62
ADLS_ACUITY_SCORE: 62
DIFFICULTY_EATING/SWALLOWING: YES
ADLS_ACUITY_SCORE: 58
ADLS_ACUITY_SCORE: 54
ADLS_ACUITY_SCORE: 62
ADLS_ACUITY_SCORE: 40
ADLS_ACUITY_SCORE: 53
ADLS_ACUITY_SCORE: 62
ADLS_ACUITY_SCORE: 54
ADLS_ACUITY_SCORE: 56
ADLS_ACUITY_SCORE: 58
ADLS_ACUITY_SCORE: 55
ADLS_ACUITY_SCORE: 58
ADLS_ACUITY_SCORE: 58
ADLS_ACUITY_SCORE: 56
ADLS_ACUITY_SCORE: 53
ADLS_ACUITY_SCORE: 57
ADLS_ACUITY_SCORE: 58
ADLS_ACUITY_SCORE: 54
ADLS_ACUITY_SCORE: 62
ADLS_ACUITY_SCORE: 45
ADLS_ACUITY_SCORE: 42
ADLS_ACUITY_SCORE: 55
ADLS_ACUITY_SCORE: 62
ADLS_ACUITY_SCORE: 58
ADLS_ACUITY_SCORE: 59
ADLS_ACUITY_SCORE: 56
ADLS_ACUITY_SCORE: 58
ADLS_ACUITY_SCORE: 52
ADLS_ACUITY_SCORE: 53
ADLS_ACUITY_SCORE: 50
ADLS_ACUITY_SCORE: 60
ADLS_ACUITY_SCORE: 53
ADLS_ACUITY_SCORE: 52
ADLS_ACUITY_SCORE: 56
ADLS_ACUITY_SCORE: 58
ADLS_ACUITY_SCORE: 39
ADLS_ACUITY_SCORE: 62
ADLS_ACUITY_SCORE: 39
ADLS_ACUITY_SCORE: 54
ADLS_ACUITY_SCORE: 53
ADLS_ACUITY_SCORE: 56
ADLS_ACUITY_SCORE: 58
ADLS_ACUITY_SCORE: 56
ADLS_ACUITY_SCORE: 57
ADLS_ACUITY_SCORE: 58
ADLS_ACUITY_SCORE: 62
ADLS_ACUITY_SCORE: 52
ADLS_ACUITY_SCORE: 56
ADLS_ACUITY_SCORE: 56
ADLS_ACUITY_SCORE: 60
ADLS_ACUITY_SCORE: 62
ADLS_ACUITY_SCORE: 50
ADLS_ACUITY_SCORE: 56
ADLS_ACUITY_SCORE: 62
ADLS_ACUITY_SCORE: 58
ADLS_ACUITY_SCORE: 54
ADLS_ACUITY_SCORE: 55
ADLS_ACUITY_SCORE: 58
ADLS_ACUITY_SCORE: 55
ADLS_ACUITY_SCORE: 50
ADLS_ACUITY_SCORE: 58
ADLS_ACUITY_SCORE: 50
ADLS_ACUITY_SCORE: 58
ADLS_ACUITY_SCORE: 62
ADLS_ACUITY_SCORE: 62
ADLS_ACUITY_SCORE: 56
ADLS_ACUITY_SCORE: 55
ADLS_ACUITY_SCORE: 59
ADLS_ACUITY_SCORE: 54
ADLS_ACUITY_SCORE: 57
ADLS_ACUITY_SCORE: 62
ADLS_ACUITY_SCORE: 53
ADLS_ACUITY_SCORE: 52
ADLS_ACUITY_SCORE: 58
ADLS_ACUITY_SCORE: 57
ADLS_ACUITY_SCORE: 50
ADLS_ACUITY_SCORE: 58
ADLS_ACUITY_SCORE: 58
ADLS_ACUITY_SCORE: 62
ADLS_ACUITY_SCORE: 58
ADLS_ACUITY_SCORE: 56
ADLS_ACUITY_SCORE: 56
ADLS_ACUITY_SCORE: 58
ADLS_ACUITY_SCORE: 54
ADLS_ACUITY_SCORE: 54
ADLS_ACUITY_SCORE: 58
ADLS_ACUITY_SCORE: 54
ADLS_ACUITY_SCORE: 54
ADLS_ACUITY_SCORE: 62
ADLS_ACUITY_SCORE: 35
ADLS_ACUITY_SCORE: 55
ADLS_ACUITY_SCORE: 47
ADLS_ACUITY_SCORE: 54
ADLS_ACUITY_SCORE: 56
ADLS_ACUITY_SCORE: 58
ADLS_ACUITY_SCORE: 54
ADLS_ACUITY_SCORE: 45
ADLS_ACUITY_SCORE: 39
ADLS_ACUITY_SCORE: 39
ADLS_ACUITY_SCORE: 58
ADLS_ACUITY_SCORE: 56
ADLS_ACUITY_SCORE: 56
ADLS_ACUITY_SCORE: 58
ADLS_ACUITY_SCORE: 56
ADLS_ACUITY_SCORE: 62
ADLS_ACUITY_SCORE: 50
ADLS_ACUITY_SCORE: 62
ADLS_ACUITY_SCORE: 56
ADLS_ACUITY_SCORE: 58
ADLS_ACUITY_SCORE: 52
ADLS_ACUITY_SCORE: 46
ADLS_ACUITY_SCORE: 35
ADLS_ACUITY_SCORE: 52
ADLS_ACUITY_SCORE: 57
ADLS_ACUITY_SCORE: 58
ADLS_ACUITY_SCORE: 55
ADLS_ACUITY_SCORE: 58
ADLS_ACUITY_SCORE: 56
ADLS_ACUITY_SCORE: 60
ADLS_ACUITY_SCORE: 56
ADLS_ACUITY_SCORE: 52
ADLS_ACUITY_SCORE: 54
ADLS_ACUITY_SCORE: 57
ADLS_ACUITY_SCORE: 56
ADLS_ACUITY_SCORE: 58
ADLS_ACUITY_SCORE: 58
ADLS_ACUITY_SCORE: 53
ADLS_ACUITY_SCORE: 62
ADLS_ACUITY_SCORE: 56
ADLS_ACUITY_SCORE: 62
ADLS_ACUITY_SCORE: 59
ADLS_ACUITY_SCORE: 62
ADLS_ACUITY_SCORE: 54
DIFFICULTY_COMMUNICATING: NO
ADLS_ACUITY_SCORE: 56
ADLS_ACUITY_SCORE: 57
ADLS_ACUITY_SCORE: 58
ADLS_ACUITY_SCORE: 50
ADLS_ACUITY_SCORE: 50
ADLS_ACUITY_SCORE: 53
ADLS_ACUITY_SCORE: 58
ADLS_ACUITY_SCORE: 60
ADLS_ACUITY_SCORE: 58
ADLS_ACUITY_SCORE: 62
ADLS_ACUITY_SCORE: 54
ADLS_ACUITY_SCORE: 55
ADLS_ACUITY_SCORE: 62
ADLS_ACUITY_SCORE: 55
ADLS_ACUITY_SCORE: 54
ADLS_ACUITY_SCORE: 58
ADLS_ACUITY_SCORE: 62
ADLS_ACUITY_SCORE: 56
ADLS_ACUITY_SCORE: 62
ADLS_ACUITY_SCORE: 55
ADLS_ACUITY_SCORE: 59
ADLS_ACUITY_SCORE: 46
ADLS_ACUITY_SCORE: 58
ADLS_ACUITY_SCORE: 56
ADLS_ACUITY_SCORE: 47
ADLS_ACUITY_SCORE: 62
ADLS_ACUITY_SCORE: 54
ADLS_ACUITY_SCORE: 54
ADLS_ACUITY_SCORE: 58
ADLS_ACUITY_SCORE: 53
HEARING_DIFFICULTY_OR_DEAF: NO
ADLS_ACUITY_SCORE: 58
ADLS_ACUITY_SCORE: 58
ADLS_ACUITY_SCORE: 54
ADLS_ACUITY_SCORE: 57
ADLS_ACUITY_SCORE: 48
NUMBER_OF_TIMES_PATIENT_HAS_FALLEN_WITHIN_LAST_SIX_MONTHS: 2
ADLS_ACUITY_SCORE: 48
ADLS_ACUITY_SCORE: 52
ADLS_ACUITY_SCORE: 52
ADLS_ACUITY_SCORE: 54
ADLS_ACUITY_SCORE: 58
ADLS_ACUITY_SCORE: 52
ADLS_ACUITY_SCORE: 56
ADLS_ACUITY_SCORE: 54
ADLS_ACUITY_SCORE: 56
ADLS_ACUITY_SCORE: 62
ADLS_ACUITY_SCORE: 58
ADLS_ACUITY_SCORE: 45
ADLS_ACUITY_SCORE: 55
ADLS_ACUITY_SCORE: 54
ADLS_ACUITY_SCORE: 35
ADLS_ACUITY_SCORE: 47
ADLS_ACUITY_SCORE: 58
ADLS_ACUITY_SCORE: 54
ADLS_ACUITY_SCORE: 58
ADLS_ACUITY_SCORE: 50
ADLS_ACUITY_SCORE: 39
ADLS_ACUITY_SCORE: 56
ADLS_ACUITY_SCORE: 54
ADLS_ACUITY_SCORE: 58
ADLS_ACUITY_SCORE: 58
ADLS_ACUITY_SCORE: 55
ADLS_ACUITY_SCORE: 58
ADLS_ACUITY_SCORE: 54
ADLS_ACUITY_SCORE: 58
ADLS_ACUITY_SCORE: 57
ADLS_ACUITY_SCORE: 58
ADLS_ACUITY_SCORE: 58
ADLS_ACUITY_SCORE: 62
ADLS_ACUITY_SCORE: 54
ADLS_ACUITY_SCORE: 57
ADLS_ACUITY_SCORE: 56
ADLS_ACUITY_SCORE: 55
ADLS_ACUITY_SCORE: 57
ADLS_ACUITY_SCORE: 58
ADLS_ACUITY_SCORE: 54
ADLS_ACUITY_SCORE: 47
ADLS_ACUITY_SCORE: 50
ADLS_ACUITY_SCORE: 58
ADLS_ACUITY_SCORE: 52
ADLS_ACUITY_SCORE: 55
ADLS_ACUITY_SCORE: 62
ADLS_ACUITY_SCORE: 58
ADLS_ACUITY_SCORE: 54
ADLS_ACUITY_SCORE: 50
ADLS_ACUITY_SCORE: 52
ADLS_ACUITY_SCORE: 57
ADLS_ACUITY_SCORE: 58
ADLS_ACUITY_SCORE: 38
ADLS_ACUITY_SCORE: 58
ADLS_ACUITY_SCORE: 62
ADLS_ACUITY_SCORE: 52
ADLS_ACUITY_SCORE: 62
ADLS_ACUITY_SCORE: 49
ADLS_ACUITY_SCORE: 55
ADLS_ACUITY_SCORE: 55
ADLS_ACUITY_SCORE: 54
ADLS_ACUITY_SCORE: 56
ADLS_ACUITY_SCORE: 56
WALKING_OR_CLIMBING_STAIRS_DIFFICULTY: YES
ADLS_ACUITY_SCORE: 56
ADLS_ACUITY_SCORE: 56
ADLS_ACUITY_SCORE: 62
ADLS_ACUITY_SCORE: 38
ADLS_ACUITY_SCORE: 56
ADLS_ACUITY_SCORE: 57
ADLS_ACUITY_SCORE: 54
ADLS_ACUITY_SCORE: 51
ADLS_ACUITY_SCORE: 54
ADLS_ACUITY_SCORE: 57
ADLS_ACUITY_SCORE: 58
ADLS_ACUITY_SCORE: 55
ADLS_ACUITY_SCORE: 55
ADLS_ACUITY_SCORE: 60
ADLS_ACUITY_SCORE: 55
ADLS_ACUITY_SCORE: 58
ADLS_ACUITY_SCORE: 53
ADLS_ACUITY_SCORE: 57
ADLS_ACUITY_SCORE: 58
ADLS_ACUITY_SCORE: 52
ADLS_ACUITY_SCORE: 55
ADLS_ACUITY_SCORE: 52
ADLS_ACUITY_SCORE: 58
ADLS_ACUITY_SCORE: 53
ADLS_ACUITY_SCORE: 56
ADLS_ACUITY_SCORE: 56
ADLS_ACUITY_SCORE: 39
ADLS_ACUITY_SCORE: 56
ADLS_ACUITY_SCORE: 57
ADLS_ACUITY_SCORE: 59
ADLS_ACUITY_SCORE: 58
ADLS_ACUITY_SCORE: 58
ADLS_ACUITY_SCORE: 55
ADLS_ACUITY_SCORE: 56
ADLS_ACUITY_SCORE: 57
ADLS_ACUITY_SCORE: 55
ADLS_ACUITY_SCORE: 59
ADLS_ACUITY_SCORE: 46
ADLS_ACUITY_SCORE: 55
ADLS_ACUITY_SCORE: 42
ADLS_ACUITY_SCORE: 54
ADLS_ACUITY_SCORE: 62
ADLS_ACUITY_SCORE: 54
ADLS_ACUITY_SCORE: 52
ADLS_ACUITY_SCORE: 62
ADLS_ACUITY_SCORE: 50
ADLS_ACUITY_SCORE: 52
ADLS_ACUITY_SCORE: 59
ADLS_ACUITY_SCORE: 52
ADLS_ACUITY_SCORE: 58
ADLS_ACUITY_SCORE: 58
ADLS_ACUITY_SCORE: 54
ADLS_ACUITY_SCORE: 56
ADLS_ACUITY_SCORE: 59
ADLS_ACUITY_SCORE: 58
ADLS_ACUITY_SCORE: 56
ADLS_ACUITY_SCORE: 52
ADLS_ACUITY_SCORE: 58
ADLS_ACUITY_SCORE: 48
ADLS_ACUITY_SCORE: 54
ADLS_ACUITY_SCORE: 52
ADLS_ACUITY_SCORE: 62
ADLS_ACUITY_SCORE: 52
ADLS_ACUITY_SCORE: 56
ADLS_ACUITY_SCORE: 58
ADLS_ACUITY_SCORE: 58
ADLS_ACUITY_SCORE: 52
ADLS_ACUITY_SCORE: 58
ADLS_ACUITY_SCORE: 57
ADLS_ACUITY_SCORE: 49
ADLS_ACUITY_SCORE: 58
ADLS_ACUITY_SCORE: 58
ADLS_ACUITY_SCORE: 55
PREVIOUS_RESPONSIBILITIES: DRIVING
ADLS_ACUITY_SCORE: 57
ADLS_ACUITY_SCORE: 58
ADLS_ACUITY_SCORE: 57
ADLS_ACUITY_SCORE: 57
ADLS_ACUITY_SCORE: 59
ADLS_ACUITY_SCORE: 56
ADLS_ACUITY_SCORE: 50
ADLS_ACUITY_SCORE: 54
ADLS_ACUITY_SCORE: 62
ADLS_ACUITY_SCORE: 50
ADLS_ACUITY_SCORE: 56
ADLS_ACUITY_SCORE: 58
ADLS_ACUITY_SCORE: 60

## 2024-01-01 ASSESSMENT — COLUMBIA-SUICIDE SEVERITY RATING SCALE - C-SSRS

## 2024-01-01 ASSESSMENT — ENCOUNTER SYMPTOMS
DIARRHEA: 0
VOMITING: 0
SHORTNESS OF BREATH: 0
HEADACHES: 0
HEMATURIA: 0
DYSURIA: 0
ABDOMINAL PAIN: 0
COUGH: 0

## 2024-01-01 ASSESSMENT — PAIN SCALES - GENERAL
PAINLEVEL: NO PAIN (0)
PAINLEVEL: NO PAIN (0)

## 2024-01-07 NOTE — PROGRESS NOTES
Chief Complaint - Hearing loss    History of Present Illness - Franklyn Sorto is a 88 year old male who presents to me today with hearing loss in the left ear.  It has been present and noticeable for approximately 2-3 weeks with a bad upper respiratory infection. He came down with an ear ache. Two months ago he had wax with plugging, and needed it cleaned. That helped, but the cold made everything worse. He still feels the left ear is a bother. He feels a blockage. His cold is better, but the ear is only a little better. Right ear is fine. He doesn't wear hearing aids.     I last saw him in 2018 for cerumen removal.    Tests personally reviewed today for this visit:   1.) audiogram was performed today as part of the evaluation and personally reviewed. The audiogram showed a significant asymmetric low to high frequency mostly sensorineural hearing loss, but left ear shows some worse low frequency hearing loss, some conductive component.    2.) tympanograms were performed today and were Type A curves, some negative middle ear pressure left.     Past Medical History -   Patient Active Problem List   Diagnosis    Neck pain    Cervicalgia    Diabetes mellitus, type 2 (H)    Benign prostatic hyperplasia    Chronic systolic congestive heart failure (H)    Abnormal lateral conjugate gaze    Arteriosclerosis of coronary artery    Benign essential HTN    DM (diabetes mellitus) type II controlled, neurological manifestation (H)    Frequent PVCs    GERD (gastroesophageal reflux disease)    Health care maintenance    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) (H)    Hyperlipidemia    Hypertension    Peripheral vascular disease (H24)    Long term current use of anticoagulant therapy    Lumbar post-laminectomy syndrome    Microalbuminuria    Mucopurulent chronic bronchitis (H)    OA (osteoarthritis) of knee    Other chronic nonalcoholic liver disease    Rheumatoid arthritis (H)    S/P cardiac pacemaker procedure    S/P  TAVR (transcatheter aortic valve replacement)    Spinal stenosis, unspecified region other than cervical    Stage 3 chronic kidney disease, unspecified whether stage 3a or 3b CKD (H)    Thrombocytopenia (H24)    Thrombophilia (H24)    Transient vision disturbance, bilateral    Diabetes mellitus type 2, noninsulin dependent (H)    Type II or unspecified type diabetes mellitus without mention of complication, uncontrolled    Pain in joint, lower leg       Current Medications -   Current Outpatient Medications:     allopurinol (ZYLOPRIM) 300 MG tablet, Take 300 mg by mouth daily, Disp: , Rfl: 0    ALPRAZolam (XANAX) 0.5 MG tablet, as needed (Patient not taking: Reported on 11/13/2023), Disp: , Rfl: 3    atorvastatin (LIPITOR) 10 MG tablet, Take 10 mg by mouth daily, Disp: , Rfl: 3    azaTHIOprine (IMURAN) 50 MG tablet, Take 50 mg by mouth daily, Disp: , Rfl: 0    blood glucose (NO BRAND SPECIFIED) test strip, Dispense item covered by pt ins. E11.9 NIDDM type II - Test 2 times daily. Pt prefers One Touch Ultra, Disp: 200 strip, Rfl: 1    blood glucose monitoring (ONE TOUCH ULTRASOFT) lancets, Use to test blood sugar 2 times daily or as directed., Disp: 200 each, Rfl: 1    carvedilol (COREG) 25 MG tablet, Take 25 mg by mouth 2 times daily, Disp: , Rfl: 3    COLCRYS 0.6 MG tablet, Take 0.6 mg by mouth as needed (Patient not taking: Reported on 11/13/2023), Disp: , Rfl: 0    empagliflozin (JARDIANCE) 25 MG TABS tablet, Take 1 tablet (25 mg) by mouth daily, Disp: 90 tablet, Rfl: 3    ENTRESTO  MG per tablet, Take 1 tablet by mouth 2 times daily, Disp: , Rfl: 3    furosemide (LASIX) 20 MG tablet, Take 20 mg by mouth 2 times daily, Disp: , Rfl:     inFLIXimab (REMICADE) 100 MG injection, Inject 600 mg into the vein, Disp: , Rfl:     ketoconazole (NIZORAL) 2 % shampoo, , Disp: , Rfl:     Multiple Vitamins-Minerals (ICAPS AREDS 2 PO), Take 1 capsule by mouth 2 times daily, Disp: , Rfl:     mupirocin (BACTROBAN) 2 %  ointment, as needed, Disp: , Rfl:     Omega-3 Fatty Acids (FISH OIL PO), Take 1 capsule by mouth daily, Disp: , Rfl:     omeprazole (PRILOSEC) 20 MG CR capsule, Take 20 mg by mouth daily, Disp: , Rfl: 2    Semaglutide, 1 MG/DOSE, (OZEMPIC, 1 MG/DOSE,) 4 MG/3ML pen, Inject 1 mg Subcutaneous once a week, Disp: 9 mL, Rfl: 3    warfarin (COUMADIN) 2 MG tablet, Take 4 mg by mouth twice a week, Disp: , Rfl:     warfarin (COUMADIN) 4 MG tablet, Take 6 mg by mouth five times a week , Disp: , Rfl: 1    zolpidem (AMBIEN CR) 6.25 MG CR tablet, as needed (Patient not taking: Reported on 2023), Disp: , Rfl: 5    Allergies -   Allergies   Allergen Reactions    Clopidogrel Hives    Hydrocodone      Other reaction(s): sick to his stomach    Hydrocodone-Acetaminophen Nausea and Vomiting    Levofloxacin Other (See Comments)     Other reaction(s): tendonitis  Tendonitis right calf      Spironolactone      Other reaction(s): Hyperkalemia       Social History -   Social History     Socioeconomic History    Marital status:    Tobacco Use    Smoking status: Former     Types: Cigarettes     Quit date: 1975     Years since quittin.0    Smokeless tobacco: Never   Substance and Sexual Activity    Alcohol use: Yes       Physical Exam  General - The patient is in no distress.  Alert, answers questions and cooperates with examination appropriately.   Voice and Breathing - The patient was breathing comfortably without the use of accessory muscles. There was no wheezing, stridor, or stertor.  The patients voice was clear and strong.  Ears - The auricles are normal. Left tympanic membrane intact, but with a lam effusion, partial. He can valsalva and pop left ear. No acute infection. Right tympanic membrane intact. No effusion.  Eyes - Extraocular movements intact.  Sclera were not icteric or injected.      Assessment and Plan -     ICD-10-CM    1. Mixed hearing loss, unilateral  H90.8 Adult Audiology [facility] Referral       2. Sensorineural hearing loss (SNHL), bilateral  H90.3 [facility]      3. OME (otitis media with effusion), left  H65.92           Franklyn Sorto is a 88 year old male who presents to me today with mixed hearing loss left due to OME from recent upper respiratory infection. He can pop left ear and I instructed him to continue to do this. 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 I recommend hearing aid consultation and hearing aids.    Shubham Felix MD  Otolaryngology  [facility]

## 2024-01-09 NOTE — PROGRESS NOTES
AUDIOLOGY REPORT:    Patient was referred to [facility] Audiology from ENT by Dr. Felix for a hearing examination. Patient is here today due to left aural fullness and a gradual decline in hearing; he would like a baseline hearing evaluation. The patient denies pain, tinnitus, drainage, dizziness, family history of hearing loss and history of noise exposure. 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 Franklyn was not accompanied to today's appointment    Testing:    Otoscopy:   Otoscopic exam indicates ears are clear of cerumen bilaterally     Tympanograms:    RIGHT: normal eardrum mobility     LEFT:   normal eardrum mobility    Reflexes (reported by stimulus ear): 1000 Hz  Could not maintain seal for bilateral reflexes    Thresholds:   Pure Tone Thresholds assessed using conventional audiometry with good  reliability from 250-8000 Hz bilaterally using insert earphones and circumaural headphones     RIGHT:  normal sloping to moderately-severe sensorineural hearing loss    LEFT:    moderate sloping to severe sensorineural hearing loss    Speech Reception Threshold:    RIGHT: 40 dB HL    LEFT:   45 dB HL  Speech Reception Thresholds are in good agreement with pure tone thresholds    Word Recognition Score:     RIGHT: 100% at 80 dB HL using NU-6 recorded word list.    LEFT:   100% at 85 dB HL using NU-6 recorded word list.    Discussed results with the patient. It was recommended he return for a hearing aid consultation    Patient was returned to ENT for follow up.     Solitario Saab, CCC-A  Licensed Audiologist  [place] #[id number]    01/09/24

## 2024-01-09 NOTE — LETTER
1/9/2024         RE: Franklyn Sorto  [address] [place] [address]        Dear Colleague,    Thank you for referring your patient, Farnklyn Sorto, to the [facility]. Please see a copy of my visit note below.    Chief Complaint - Hearing loss    History of Present Illness - Franklyn Sorto is a 88 year old male who presents to me today with hearing loss in the left ear.  It has been present and noticeable for approximately 2-3 weeks with a bad upper respiratory infection. He came down with an ear ache. Two months ago he had wax with plugging, and needed it cleaned. That helped, but the cold made everything worse. He still feels the left ear is a bother. He feels a blockage. His cold is better, but the ear is only a little better. Right ear is fine. He doesn't wear hearing aids.     I last saw him in 2018 for cerumen removal.    Tests personally reviewed today for this visit:   1.) audiogram was performed today as part of the evaluation and personally reviewed. The audiogram showed a significant asymmetric low to high frequency mostly sensorineural hearing loss, but left ear shows some worse low frequency hearing loss, some conductive component.    2.) tympanograms were performed today and were Type A curves, some negative middle ear pressure left.     Past Medical History -   Patient Active Problem List   Diagnosis     Neck pain     Cervicalgia     Diabetes mellitus, type 2 (H)     Benign prostatic hyperplasia     Chronic systolic congestive heart failure (H)     Abnormal lateral conjugate gaze     Arteriosclerosis of coronary artery     Benign essential HTN     DM (diabetes mellitus) type II controlled, neurological manifestation (H)     Frequent PVCs     GERD (gastroesophageal reflux disease)     Health care maintenance     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) (H)     Hyperlipidemia     Hypertension     Peripheral vascular disease (H24)     Long term  current use of anticoagulant therapy     Lumbar post-laminectomy syndrome     Microalbuminuria     Mucopurulent chronic bronchitis (H)     OA (osteoarthritis) of knee     Other chronic nonalcoholic liver disease     Rheumatoid arthritis (H)     S/P cardiac pacemaker procedure     S/P TAVR (transcatheter aortic valve replacement)     Spinal stenosis, unspecified region other than cervical     Stage 3 chronic kidney disease, unspecified whether stage 3a or 3b CKD (H)     Thrombocytopenia (H24)     Thrombophilia (H24)     Transient vision disturbance, bilateral     Diabetes mellitus type 2, noninsulin dependent (H)     Type II or unspecified type diabetes mellitus without mention of complication, uncontrolled     Pain in joint, lower leg       Current Medications -   Current Outpatient Medications:      allopurinol (ZYLOPRIM) 300 MG tablet, Take 300 mg by mouth daily, Disp: , Rfl: 0     ALPRAZolam (XANAX) 0.5 MG tablet, as needed (Patient not taking: Reported on 11/13/2023), Disp: , Rfl: 3     atorvastatin (LIPITOR) 10 MG tablet, Take 10 mg by mouth daily, Disp: , Rfl: 3     azaTHIOprine (IMURAN) 50 MG tablet, Take 50 mg by mouth daily, Disp: , Rfl: 0     blood glucose (NO BRAND SPECIFIED) test strip, Dispense item covered by pt ins. E11.9 NIDDM type II - Test 2 times daily. Pt prefers One Touch Ultra, Disp: 200 strip, Rfl: 1     blood glucose monitoring (ONE TOUCH ULTRASOFT) lancets, Use to test blood sugar 2 times daily or as directed., Disp: 200 each, Rfl: 1     carvedilol (COREG) 25 MG tablet, Take 25 mg by mouth 2 times daily, Disp: , Rfl: 3     COLCRYS 0.6 MG tablet, Take 0.6 mg by mouth as needed (Patient not taking: Reported on 11/13/2023), Disp: , Rfl: 0     empagliflozin (JARDIANCE) 25 MG TABS tablet, Take 1 tablet (25 mg) by mouth daily, Disp: 90 tablet, Rfl: 3     ENTRESTO  MG per tablet, Take 1 tablet by mouth 2 times daily, Disp: , Rfl: 3     furosemide (LASIX) 20 MG tablet, Take 20 mg by mouth 2  times daily, Disp: , Rfl:      inFLIXimab (REMICADE) 100 MG injection, Inject 600 mg into the vein, Disp: , Rfl:      ketoconazole (NIZORAL) 2 % shampoo, , Disp: , Rfl:      Multiple Vitamins-Minerals (ICAPS AREDS 2 PO), Take 1 capsule by mouth 2 times daily, Disp: , Rfl:      mupirocin (BACTROBAN) 2 % ointment, as needed, Disp: , Rfl:      Omega-3 Fatty Acids (FISH OIL PO), Take 1 capsule by mouth daily, Disp: , Rfl:      omeprazole (PRILOSEC) 20 MG CR capsule, Take 20 mg by mouth daily, Disp: , Rfl: 2     Semaglutide, 1 MG/DOSE, (OZEMPIC, 1 MG/DOSE,) 4 MG/3ML pen, Inject 1 mg Subcutaneous once a week, Disp: 9 mL, Rfl: 3     warfarin (COUMADIN) 2 MG tablet, Take 4 mg by mouth twice a week, Disp: , Rfl:      warfarin (COUMADIN) 4 MG tablet, Take 6 mg by mouth five times a week , Disp: , Rfl: 1     zolpidem (AMBIEN CR) 6.25 MG CR tablet, as needed (Patient not taking: Reported on 2023), Disp: , Rfl: 5    Allergies -   Allergies   Allergen Reactions     Clopidogrel Hives     Hydrocodone      Other reaction(s): sick to his stomach     Hydrocodone-Acetaminophen Nausea and Vomiting     Levofloxacin Other (See Comments)     Other reaction(s): tendonitis  Tendonitis right calf       Spironolactone      Other reaction(s): Hyperkalemia       Social History -   Social History     Socioeconomic History     Marital status:    Tobacco Use     Smoking status: Former     Types: Cigarettes     Quit date: 1975     Years since quittin.0     Smokeless tobacco: Never   Substance and Sexual Activity     Alcohol use: Yes       Physical Exam  General - The patient is in no distress.  Alert, answers questions and cooperates with examination appropriately.   Voice and Breathing - The patient was breathing comfortably without the use of accessory muscles. There was no wheezing, stridor, or stertor.  The patients voice was clear and strong.  Ears - The auricles are normal. Left tympanic membrane intact, but with a  lam effusion, partial. He can valsalva and pop left ear. No acute infection. Right tympanic membrane intact. No effusion.  Eyes - Extraocular movements intact.  Sclera were not icteric or injected.      Assessment and Plan -     ICD-10-CM    1. Mixed hearing loss, unilateral  H90.8 Adult Audiology  Referral      2. Sensorineural hearing loss (SNHL), bilateral  H90.3 Adult Audiology  Referral      3. OME (otitis media with effusion), left  H65.92           Franklyn Sorto is a 88 year old male who presents to me today with mixed hearing loss left due to OME from recent upper respiratory infection. He can pop left ear and I instructed him to continue to do this. 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 I recommend hearing aid consultation and hearing aids.    Shubham Felix MD  Otolaryngology  [facility]        Again, thank you for allowing me to participate in the care of your patient.        Sincerely,        Shubham Felix MD

## 2024-01-10 NOTE — TELEPHONE ENCOUNTER
Franklyn called and left a message asking if I could request a Ozempic refill for him. Called [employer] at [phone]. They stated that they started working on the refill on 1/4/24 and it will be delivered to the [facility] pharmacy within 10-14 business days. Updated Franklyn. Nothing further needed at this time.     Thank you,     Jasiel Leija, [facility]  Pharmacy Clinic [facility]  [email or web address]   Phone: [phone]  Fax: [phone]

## 2024-03-01 NOTE — PROGRESS NOTES
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    He reported having type 2 diabetes for 15 years.  His diabetes has initially been under control however A1c has trending up over the past few years. 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    In September 2022, I stopped insulin glargine and started him on Ozempic and increased Jardiance to 25 mg daily.     Interim history  Last seen Merly Gutiérrez 11/2023.     A1c today 7.2%. He has been on Ozempic 1.0 mg weekly and Jardiance 25 mg daily. He lost about 40 lbs since September 2022 and is now stable.    Reviewed glucose log, attached. No hypoglycemia. FBG lower 100s.    DM complications:  Retinopathy: eye exam annually at [facility] eye 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 and then subsequent CRT-D in 2016  underlying left bundle branch block post TAVR    Allergies  Allergies   Allergen Reactions    Clopidogrel Hives    Hydrocodone      Other reaction(s): sick to his stomach    Hydrocodone-Acetaminophen Nausea and Vomiting    Levofloxacin Other (See Comments)     Other reaction(s): tendonitis  Tendonitis right calf      Spironolactone      Other reaction(s): Hyperkalemia     Medications  Current Outpatient Medications   Medication Sig  Dispense Refill    allopurinol (ZYLOPRIM) 300 MG tablet Take 300 mg by mouth daily  0    ALPRAZolam (XANAX) 0.5 MG tablet as needed (Patient not taking: Reported on 11/13/2023)  3    atorvastatin (LIPITOR) 10 MG tablet Take 10 mg by mouth daily  3    azaTHIOprine (IMURAN) 50 MG tablet Take 50 mg by mouth daily  0    blood glucose (NO BRAND SPECIFIED) test strip Dispense item covered by pt ins. E11.9 NIDDM type II - Test 2 times daily. Pt prefers One Touch Ultra 200 strip 1    blood glucose monitoring (ONE TOUCH ULTRASOFT) lancets Use to test blood sugar 2 times daily or as directed. 200 each 1    carvedilol (COREG) 25 MG tablet Take 25 mg by mouth 2 times daily  3    COLCRYS 0.6 MG tablet Take 0.6 mg by mouth as needed (Patient not taking: Reported on 11/13/2023)  0    empagliflozin (JARDIANCE) 25 MG TABS tablet Take 1 tablet (25 mg) by mouth daily 90 tablet 3    ENTRESTO  MG per tablet Take 1 tablet by mouth 2 times daily  3    furosemide (LASIX) 20 MG tablet Take 20 mg by mouth 2 times daily      inFLIXimab (REMICADE) 100 MG injection Inject 600 mg into the vein      ketoconazole (NIZORAL) 2 % shampoo       Multiple Vitamins-Minerals (ICAPS AREDS 2 PO) Take 1 capsule by mouth 2 times daily      mupirocin (BACTROBAN) 2 % ointment as needed      Omega-3 Fatty Acids (FISH OIL PO) Take 1 capsule by mouth daily      omeprazole (PRILOSEC) 20 MG CR capsule Take 20 mg by mouth daily  2    Semaglutide, 1 MG/DOSE, (OZEMPIC, 1 MG/DOSE,) 4 MG/3ML pen Inject 1 mg Subcutaneous once a week 9 mL 3    warfarin (COUMADIN) 2 MG tablet Take 4 mg by mouth twice a week      warfarin (COUMADIN) 4 MG tablet Take 6 mg by mouth five times a week   1    zolpidem (AMBIEN CR) 6.25 MG CR tablet as needed (Patient not taking: Reported on 11/13/2023)  5     Family History  Mother had type 2 diabetes, CAD and smoker  Father had CAD and stroke    Social History  Social History     Tobacco Use    Smoking status: Former     Types: Cigarettes  "    Quit date: 1975     Years since quittin.1    Smokeless tobacco: Never   Substance Use Topics    Alcohol use: Yes   no current use of smoking  Live with significant other    ROS  10 points ROS were negative otherwise mentioned in HPI      Physical Exam  BP (!) 146/68   Pulse 82   Ht 1.753 m (5' 9\")   Wt 75.8 kg (167 lb)   SpO2 92%   BMI 24.66 kg/m    Body mass index is 24.66 kg/m .  Constitutional: no distress, comfortable, pleasant   Eyes: anicteric, normal extra-ocular movements, no lid lag or retraction  CVS: RRR, normal S1, S2, no murmur  Lungs: CTA B/L  Musculoskeletal: no edema   Skin: no concerning lesions, no jaundice   Neurological: cranial nerves intact, normal gait, no tremor on outstretched hands bilaterally  Psychological: appropriate mood       RESULTS  I have personally reviewed labs and images. I also reviewed labs with patient and discussed the result and plan of care.   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    1) type 2 diabetes with peripheral neuropathy, nephropathy and CAD: long standing.   BG improved significantly with current regiment of Ozempic and Jardiance. A1c is 7.2% today. He lost 40 lbs since starting Ozempic He is happy with the result and does not want to lose more.    - based on his age and comorbidity (cardiac and kidney), goal for A1c would " be <8%. No need for the tight control.     Plan:  - continue Ozempic 1.0 mg weekly  - continue Jardiance 25 mg daily  - continue to check BG fasting and before bedtime    2) DM complications:  Retinopathy: eye exam annually at [facility] eye [facility] every 6 months  Nephropathy: CKD stage 3, positive urine microalbumin, Cr 1.2 exam 6/2023  Neuropathy: peripheral neuropathy -- stable    PLAN:   - lab for lipid, urine microalbumin today  - continue Ozempic 1.0 mg weekly  - continue Jardiance 25 mg daily  - continue to check BG fasting and sometime before bedtime    - he would like to keep f/up with our clinic for diabetes  - return in 6 months with PA    External notes/medical records independently reviewed, labs and imaging independently reviewed, medical management and tests to be discussed/communicated to patient.    Time: I spent 15 minutes spent on the date of the encounter preparing to see patient (including chart review and preparation), obtaining and or reviewing additional medical history, performing a physical exam and evaluation, documenting clinical information in the electronic health record, independently interpreting results, communicating results to the patient and coordinating care.      Khurram Bruce MD  Division of Diabetes and Endocrinology  Department of Medicine

## 2024-03-01 NOTE — LETTER
3/1/2024       RE: Franklyn Sorto  [address] [place] [address]     Dear Colleague,    Thank you for referring your patient, Franklyn Sorto, to the [facility] ENDOCRINOLOGY CLINIC [place] at . Please see a copy of my visit note below.        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    He reported having type 2 diabetes for 15 years.  His diabetes has initially been under control however A1c has trending up over the past few years. 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    In September 2022, I stopped insulin glargine and started him on Ozempic and increased Jardiance to 25 mg daily.     Interim history  Last seen Merly Gutiérrez 11/2023.     A1c today 7.2%. He has been on Ozempic 1.0 mg weekly and Jardiance 25 mg daily. He lost about 40 lbs since September 2022 and is now stable.    Reviewed glucose log, attached. No hypoglycemia. FBG lower 100s.    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 and then subsequent CRT-D in 2016  underlying left bundle branch block post TAVR    Allergies  Allergies   Allergen Reactions     Clopidogrel Hives      Hydrocodone      Other reaction(s): sick to his stomach     Hydrocodone-Acetaminophen Nausea and Vomiting     Levofloxacin Other (See Comments)     Other reaction(s): tendonitis  Tendonitis right calf       Spironolactone      Other reaction(s): Hyperkalemia     Medications  Current Outpatient Medications   Medication Sig Dispense Refill     allopurinol (ZYLOPRIM) 300 MG tablet Take 300 mg by mouth daily  0     ALPRAZolam (XANAX) 0.5 MG tablet as needed (Patient not taking: Reported on 11/13/2023)  3     atorvastatin (LIPITOR) 10 MG tablet Take 10 mg by mouth daily  3     azaTHIOprine (IMURAN) 50 MG tablet Take 50 mg by mouth daily  0     blood glucose (NO BRAND SPECIFIED) test strip Dispense item covered by pt ins. E11.9 NIDDM type II - Test 2 times daily. Pt prefers One Touch Ultra 200 strip 1     blood glucose monitoring (ONE TOUCH ULTRASOFT) lancets Use to test blood sugar 2 times daily or as directed. 200 each 1     carvedilol (COREG) 25 MG tablet Take 25 mg by mouth 2 times daily  3     COLCRYS 0.6 MG tablet Take 0.6 mg by mouth as needed (Patient not taking: Reported on 11/13/2023)  0     empagliflozin (JARDIANCE) 25 MG TABS tablet Take 1 tablet (25 mg) by mouth daily 90 tablet 3     ENTRESTO  MG per tablet Take 1 tablet by mouth 2 times daily  3     furosemide (LASIX) 20 MG tablet Take 20 mg by mouth 2 times daily       inFLIXimab (REMICADE) 100 MG injection Inject 600 mg into the vein       ketoconazole (NIZORAL) 2 % shampoo        Multiple Vitamins-Minerals (ICAPS AREDS 2 PO) Take 1 capsule by mouth 2 times daily       mupirocin (BACTROBAN) 2 % ointment as needed       Omega-3 Fatty Acids (FISH OIL PO) Take 1 capsule by mouth daily       omeprazole (PRILOSEC) 20 MG CR capsule Take 20 mg by mouth daily  2     Semaglutide, 1 MG/DOSE, (OZEMPIC, 1 MG/DOSE,) 4 MG/3ML pen Inject 1 mg Subcutaneous once a week 9 mL 3     warfarin (COUMADIN) 2 MG tablet Take 4 mg by mouth twice a week       warfarin  "(COUMADIN) 4 MG tablet Take 6 mg by mouth five times a week   1     zolpidem (AMBIEN CR) 6.25 MG CR tablet as needed (Patient not taking: Reported on 2023)  5     Family History  Mother had type 2 diabetes, CAD and smoker  Father had CAD and stroke    Social History  Social History     Tobacco Use     Smoking status: Former     Types: Cigarettes     Quit date: 1975     Years since quittin.1     Smokeless tobacco: Never   Substance Use Topics     Alcohol use: Yes   no current use of smoking  Live with significant other    ROS  10 points ROS were negative otherwise mentioned in HPI      Physical Exam  BP (!) 146/68   Pulse 82   Ht 1.753 m (5' 9\")   Wt 75.8 kg (167 lb)   SpO2 92%   BMI 24.66 kg/m    Body mass index is 24.66 kg/m .  Constitutional: no distress, comfortable, pleasant   Eyes: anicteric, normal extra-ocular movements, no lid lag or retraction  CVS: RRR, normal S1, S2, no murmur  Lungs: CTA B/L  Musculoskeletal: no edema   Skin: no concerning lesions, no jaundice   Neurological: cranial nerves intact, normal gait, no tremor on outstretched hands bilaterally  Psychological: appropriate mood       RESULTS  I have personally reviewed labs and images. I also reviewed labs with patient and discussed the result and plan of care.   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    1) type 2 diabetes with peripheral neuropathy, nephropathy and CAD: long standing.   BG improved significantly with current regiment of Ozempic and Jardiance. A1c is 7.2% today. He lost 40 lbs since starting Ozempic He is happy with the result and does not want to lose more.    - based on his age and comorbidity (cardiac and kidney), goal for A1c would be <8%. No need for the tight control.     Plan:  - continue Ozempic 1.0 mg weekly  - continue Jardiance 25 mg daily  - continue to check BG fasting and before bedtime    2) DM complications:  Retinopathy: eye exam annually at [facility] eye [facility] every 6 months  Nephropathy: CKD stage 3, positive urine microalbumin, Cr 1.2 exam 6/2023  Neuropathy: peripheral neuropathy -- stable    PLAN:   - lab for lipid, urine microalbumin today  - continue Ozempic 1.0 mg weekly  - continue Jardiance 25 mg daily  - continue to check BG fasting and sometime before bedtime    - he would like to keep f/up with our clinic for diabetes  - return in 6 months with PA    External notes/medical records independently reviewed, labs and imaging independently reviewed, medical management and tests to be discussed/communicated to patient.    Time: I spent 15 minutes spent on the date of the encounter preparing to see patient (including chart review and preparation), obtaining and or reviewing additional medical history, performing a physical exam and evaluation, documenting clinical information in the electronic health record, independently interpreting results, communicating results to the patient and coordinating care.      Khurram Bruce MD  Division of Diabetes and Endocrinology  Department of Medicine                  Again, thank you for allowing me to participate in the care of your patient.      Sincerely,    Khurram Bruce MD

## 2024-03-01 NOTE — PATIENT INSTRUCTIONS
Lab today  Continue Ozempic 1.0 mg weekly  Continue Jardiance 25 mg daily    Return in 6 months with PA    If you have any questions, please do not hesitate to call clinic line at [phone] and ask for Endocrinology clinic.  If you need to fax, please fax to clinic fax number at [phone]    After clinic hours or weekends, please contact [phone] and ask for Endocrinologist-on call      Sincerely,    Khurram Bruce MD  Endocrinology

## 2024-05-03 NOTE — TELEPHONE ENCOUNTER
"Forms inboxed on site for provider signature.   Provider notified.   Padmaja Viveros RN on 5/3/2024 at 2:18 PM     RE    Provider refill form for Ozempic through the [employer] Assistance Program has been uploaded under the media tab. Please complete provider form and send to me via e-mail and I will submit and follow with [employer].     Please note that [employer] requires the QTY column say either \"120 days\" or \"4 months.\"    If form is blurry and you would like me to send via e-mail, please let me know.     Thank you,     Jasiel Leija, [facility]  Pharmacy Clinic [facility]  [email or web address]   Phone: [phone]  Fax: [phone]  "

## 2024-05-09 NOTE — TELEPHONE ENCOUNTER
Provider refill form received and faxed to [employer] at [phone]. Fax confirmed sent.    Thank you,     Jasiel Leija [facility]  Pharmacy Clinic [email or web address]   Phone: [phone]  Fax: [phone]

## 2024-05-13 NOTE — TELEPHONE ENCOUNTER
Called [employer] at [phone]. Spoke to Nikki. They have received the refill as of 5/10/24 and it will be delivered within 10-14 business days. Patient notified. Nothing further needed at this time.    Thank you,     Jasiel Leija, [facility]  Pharmacy Clinic [facility]  [email or web address]   Phone: [phone]  Fax: [phone]

## 2024-06-20 PROBLEM — J69.0 ASPIRATION PNEUMONIA, UNSPECIFIED ASPIRATION PNEUMONIA TYPE, UNSPECIFIED LATERALITY, UNSPECIFIED PART OF LUNG (H): Status: ACTIVE | Noted: 2024-01-01

## 2024-06-20 PROBLEM — Z86.73 HISTORY OF CVA (CEREBROVASCULAR ACCIDENT): Status: ACTIVE | Noted: 2024-01-01

## 2024-06-20 PROBLEM — N30.00 ACUTE CYSTITIS WITHOUT HEMATURIA: Status: ACTIVE | Noted: 2024-01-01

## 2024-06-20 NOTE — H&P
[facility]    History and Physical - Hospitalist Service       Date of Admission:  6/20/2024    Assessment & Plan      Franklyn Sorto is a 88 year old male admitted on 6/20/2024. He is presenting from lab appointment where he was noted to have leukocytosis and told to come to ER. He notes increased urinary frequency in the setting of UTI.    UTI   ?CAUTI  -had catheter from recent stroke in place for 2 weeks, removed recently at facility   -UA with positive leukocyte esterase, pyuria, bacteriuria  -Urine culture with E. Coli  -Continue ceftriaxone  -Follow-up on susceptibilities  -check lactate   -see below for penile lesion concern- may be nidus of infection     Question aspiration pneumonia  -CXR noting interval development of a left basilar airspace opacity, nonspecific may reflect atelectasis or consolidation  -Viral panel negative  -Was given a dose of Unasyn in the ER for suspected aspiration pneumonia  -Patient otherwise without signs and symptoms of pneumonia, will monitor off additional antibiotics for the time being   -SLP consult     Sacral ulcer   Penile lesion  -WOC consult    Residual left sided weakness   Hx stroke   -noted  -SLP eval as noted above    Chronic conditions: pharmacy med rec pending, needs med rec         Observation Goals: -diagnostic tests and consults completed and resulted, -vital signs normal or at patient baseline, -infection is improving, Nurse to notify provider when observation goals have been met and patient is ready for discharge.  Diet: Regular Diet Adult  DVT Prophylaxis: Lovenox  Javier Catheter: Not present  Lines: None     Cardiac Monitoring: None  Code Status: No Order    Clinically Significant Risk Factors Present on Admission              # Hypoalbuminemia: Lowest albumin = 2.7 g/dL at 6/20/2024  4:04 PM, will monitor as appropriate    # Drug Induced Coagulation Defect: home medication list includes an anticoagulant medication  # Drug Induced  Platelet Defect: home medication list includes an antiplatelet medication   # Hypertension: Noted on problem list            # DMII: A1C = N/A within past 6 months               Disposition Plan     Medically Ready for Discharge: Anticipated Tomorrow           Federico Cota DO  Hospitalist Service  [facility]  Securely message with [employer] (more info)  Text page via [employer] Paging/Directory     ______________________________________________________________________    Chief Complaint   Leukocytosis     History is obtained from the patient    History of Present Illness   Franklyn Sorto is a 88 year old male who is presenting from outpatient lab visit. He is presenting from lab appointment where he was noted to have leukocytosis and told to come to ER. He notes increased urinary frequency. No dysuria. No fevers or chills. No diarrhea, cough, shortness of breath. States he had cutler placed during his stroke which caused some pain and lesion. He had a catheter in place for 2 weeks at time of stroke and it was just removed a few days ago.       Past Medical History    History reviewed. No pertinent past medical history.    Past Surgical History   History reviewed. No pertinent surgical history.    Prior to Admission Medications   Prior to Admission Medications   Prescriptions Last Dose Informant Patient Reported? Taking?   ALPRAZolam (XANAX) 0.5 MG tablet   Yes No   Sig: as needed   Patient not taking: Reported on 11/13/2023   Baclofen (LIORESAL) 5 MG tablet 6/20/2024 at am  Yes Yes   Sig: Take 5 mg by mouth 3 times daily   Baclofen (LIORESAL) 5 MG tablet Unknown at prn  Yes Yes   Sig: Take 5 mg by mouth daily as needed for muscle spasms   COLCRYS 0.6 MG tablet Unknown at prn  Yes Yes   Sig: Take 0.6 mg by mouth as needed   ENTRESTO  MG per tablet 6/20/2024 at am  Yes Yes   Sig: Take 1 tablet by mouth 2 times daily   HYDROmorphone (DILAUDID) 2 MG tablet   Yes Yes   Sig: Take 2 mg by mouth 3  times daily as needed for pain   LANsoprazole (PREVACID SOLUTAB) 30 MG ODT 2024 at am  Yes Yes   Sig: Place 30 mg under the tongue every morning (before breakfast)   Multiple Vitamins-Minerals (ICAPS AREDS 2 PO)   Yes No   Sig: Take 1 capsule by mouth 2 times daily   Omega-3 Fatty Acids (FISH OIL PO)   Yes No   Sig: Take 1 capsule by mouth daily   Semaglutide, 1 MG/DOSE, (OZEMPIC, 1 MG/DOSE,) 4 MG/3ML pen 2024 at am  No Yes   Sig: Inject 1 mg Subcutaneous once a week   Sennosides 17.2 MG TABS 2024 at am  Yes Yes   Si.2 mg by Enteral route daily   acetaminophen (TYLENOL) 500 MG tablet Unknown at prn  Yes Yes   Sig: Take 1,000 mg by mouth 3 times daily as needed for mild pain   allopurinol (ZYLOPRIM) 300 MG tablet 2024 at am  Yes Yes   Sig: Take 300 mg by mouth daily   aspirin 81 MG EC tablet 2024 at am  Yes Yes   Sig: Take 81 mg by mouth daily   atorvastatin (LIPITOR) 10 MG tablet   Yes No   Sig: Take 10 mg by mouth daily   azaTHIOprine (IMURAN) 50 MG tablet   Yes No   Sig: Take 50 mg by mouth daily   bisacodyl (DULCOLAX) 10 MG suppository Unknown at prn  Yes Yes   Sig: Place 10 mg rectally daily as needed for constipation   blood glucose (NO BRAND SPECIFIED) test strip   No No   Sig: Dispense item covered by pt ins. E11.9 NIDDM type II - Test 2 times daily. Pt prefers One Touch Ultra   blood glucose monitoring (ONE TOUCH ULTRASOFT) lancets   No No   Sig: Use to test blood sugar 2 times daily or as directed.   carvedilol (COREG) 6.25 MG tablet 2024 at Am  Yes Yes   Sig: Take 25 mg by mouth 2 times daily   empagliflozin (JARDIANCE) 25 MG TABS tablet 2024 at am  No Yes   Sig: Take 1 tablet (25 mg) by mouth daily   erythromycin (ROMYCIN) 5 MG/GM ophthalmic ointment 2024 at hs  Yes Yes   Sig: Place into both eyes at bedtime   furosemide (LASIX) 20 MG tablet   Yes No   Sig: Take 20 mg by mouth 2 times daily   inFLIXimab (REMICADE) 100 MG injection   Yes No   Sig: Inject 600 mg  into the vein   ketoconazole (NIZORAL) 2 % shampoo   Yes No   lipase-protease-amylase (CREON 12) 43707-17[address]-18646 units CPEP Unknown at prn  Yes Yes   Sig: Take 1 capsule by mouth as needed   melatonin 3 MG tablet 2024 at hs  Yes Yes   Sig: Take 3 mg by mouth nightly as needed for sleep   methocarbamol (ROBAXIN) 500 MG tablet 2024 at am  Yes Yes   Sig: Take 500 mg by mouth 3 times daily   mupirocin (BACTROBAN) 2 % ointment   Yes No   Sig: as needed   nitroGLYcerin (NITROSTAT) 0.4 MG sublingual tablet Unknown at prn  Yes Yes   Sig: Place 0.4 mg under the tongue every 5 minutes as needed for chest pain For chest pain place 1 tablet under the tongue every 5 minutes for 3 doses. If symptoms persist 5 minutes after 1st dose call 911.   nystatin (MYCOSTATIN) 605306 UNIT/GM external powder Unknown at prn  Yes Yes   Sig: Apply topically 2 times daily as needed for other   omeprazole (PRILOSEC) 20 MG CR capsule   Yes No   Sig: Take 20 mg by mouth daily   ondansetron (ZOFRAN ODT) 4 MG ODT tab Unknown at prn  Yes Yes   Sig: Take 4 mg by mouth every 8 hours as needed for nausea   polyethylene glycol-propylene glycol (SYSTANE) 0.4-0.3 % SOLN ophthalmic solution 2024 at am  Yes Yes   Sig: Apply 2 drops to eye 2 times daily   rosuvastatin (CRESTOR) 20 MG tablet 2024 at hs  Yes Yes   Sig: Take 1 tablet by mouth at bedtime   sodium bicarbonate 650 MG tablet Unknown at prn  Yes Yes   Si mg by Per G Tube route as needed for heartburn   vitamin D3 (CHOLECALCIFEROL) 50 mcg (2000 units) tablet 2024 at am  Yes Yes   Sig: Take 2 tablets by mouth daily   warfarin (COUMADIN) 2 MG tablet   Yes No   Sig: Take 4 mg by mouth twice a week   warfarin (COUMADIN) 4 MG tablet   Yes No   Sig: Take 6 mg by mouth five times a week    zolpidem (AMBIEN CR) 6.25 MG CR tablet   Yes No   Sig: as needed   Patient not taking: Reported on 2023      Facility-Administered Medications: None        Social History   I have  reviewed this patient's social history and updated it with pertinent information if needed.  Social History     Tobacco Use    Smoking status: Former     Current packs/day: 0.00     Types: Cigarettes     Quit date: 1975     Years since quittin.5    Smokeless tobacco: Never   Substance Use Topics    Alcohol use: Yes         Family History           Allergies   Allergies   Allergen Reactions    Clopidogrel Hives    Hydrocodone      Other reaction(s): sick to his stomach    Hydrocodone-Acetaminophen Nausea and Vomiting    Levofloxacin Other (See Comments)     Other reaction(s): tendonitis  Tendonitis right calf      Spironolactone      Other reaction(s): Hyperkalemia        Physical Exam   Vital Signs: Temp: 98.2  F (36.8  C) Temp src: Oral BP: 129/58 Pulse: 73   Resp: 17 SpO2: 92 % O2 Device: None (Room air)    Weight: 166 lbs .1 oz    General Appearance: Non-toxic in no acute distress  Respiratory: CTAB, decreased effort  Cardiovascular: RRR  GI: no pain  Skin: no rash  : lesion at glans, no obvious purulence  Other: Alert and oriented, LUE chronic swelling and known facial droop.     Medical Decision Making       80 MINUTES SPENT BY ME on the date of service doing chart review, history, exam, documentation & further activities per the note.      Data     I have personally reviewed the following data over the past 24 hrs:    24.6 (H)  \   13.9   / 213     138 103 26.4 (H) /  238 (H)   4.2 25 0.81 \     ALT: 23 AST: 20 AP: 117 TBILI: 0.5   ALB: 2.7 (L) TOT PROTEIN: 6.4 LIPASE: 43     Procal: N/A CRP: N/A Lactic Acid: 1.2       INR:  3.03 (H) PTT:  N/A   D-dimer:  N/A Fibrinogen:  N/A       Imaging results reviewed over the past 24 hrs:   Recent Results (from the past 24 hour(s))   XR Chest Port 1 View    Narrative    EXAM: XR CHEST PORT 1 VIEW  LOCATION: [facility]  DATE: 2024    INDICATION: Recent CVA.  Evaluate for any consolidation.  COMPARISON: 2024      Impression     IMPRESSION: Left subclavian approach pacing device. Interval development left basilar airspace opacity, may reflect pleural effusion, atelectasis, and/or consolidation, singly or in combination. Recommend follow-up to resolution. No pneumothorax. Clear   right lung. Cervical fusion hardware.

## 2024-06-20 NOTE — ED NOTES
Bed: JNEDH-H  Expected date:   Expected time:   Means of arrival:   Comments:  [facility] - 88M Elevated WBC

## 2024-06-20 NOTE — ED PROVIDER NOTES
EMERGENCY DEPARTMENT ENCOUNTER      NAME: Franklyn Sorto  AGE: 88 year old male  YOB: 1935  MRN: [id number]  EVALUATION DATE & TIME: 6/20/2024  3:30 PM    PCP: No Ref-Primary, Physician    ED PROVIDER: Shazia Edmonds M.D.      CHIEF COMPLAINT     Chief Complaint   Patient presents with    Abnormal Labs         FINAL IMPRESSION:     1. Acute cystitis without hematuria    2. Aspiration pneumonia, unspecified aspiration pneumonia type, unspecified laterality, unspecified part of lung (H)    3. History of CVA (cerebrovascular accident)    4. Urethral foreign body, initial encounter    5. Anticoagulated on Coumadin          MEDICAL DECISION MAKING:       Pertinent Labs & Imaging studies reviewed. (See chart for details)    88 year old male presents to the Emergency Department for evaluation of elevated white blood cell count    History  Supplemental history from EMS wife and nursing home  External Record(s) review as documented below    Exam  Nontoxic.  Swelling of the left arm.  Dry mucous membranes.  Paralysis of the left arm left leg.  Appropriate answering questions has a sacral ulcer and a foreign body in the penis.    Differential Diagnosis include but not limited to section secondary to pneumonia UTI pyelonephritis malignancy among others      Vital Signs: Reviewed  EKG: None  Imaging: I independently interpreted this maker.  Unable to see the left lower lung border..Formal read by radiologist.  Home meds: Reviewed  ED meds: Rocephin  Fluids: Normal saline  Labs:  K 4.2  Cr 0.81  Wbc 24.6  Hgb 13.9  platelets 213    Clinical Impression and Decision Making    88-year-old male coming from TCU because he has an elevated white blood cell count.  Patient at TCU from a CVA that left him with left-sided paresis.  He does have a history of silent aspiration per the wife.  He denies any cough chest pain abdominal pain vomiting or diarrhea.    Non Toxic on exam.  Paralysis and swelling of the left upper  extremity in the left lower extremity.  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    Plan etiologies considered.    Review of records patient had a urine analysis done on 6/19 that is positive for E. coli.  No sensitivities done.    Discussed with pharmacy started on Rocephin.    X-ray unable to exclude consolidation on the left side given the history of silent aspiration.  Unasyn was started.    COVID and influenza negative.    Elevated white blood cell count with a left shift.  Urine concerning for infection and again yesterday positive for E. coli.  No sensitivities yet.    Anticoagulated on warfarin therapeutic INR at 3.03 no history of trauma.    Admitted to the hospitalist service in stable condition with guarded diagnosis.        In addition to the work out documented, I considered the following work up lumbar puncture patient is awake alert no evidence of encephalopathy.           Medical Decision Making    History:  Supplemental history from: EMS  External Record(s) reviewed: Documented in chart    Work Up:  Chart documentation includes differential considered and any EKGs or imaging independently interpreted by provider, where specified.  In additional to work up documented, I considered the following work up: Documented in chart, if applicable.    External consultation:  Discussion of management with another provider: Documented in chart, if applicable    Complicating factors:  Care impacted by chronic illness: Cerebrovascular Disease, Chronic Kidney Disease, Chronic Lung Disease, Diabetes, Heart Disease, Hyperlipidemia, Hypertension, Peripheral Vascular Disease, and Other: Chronic nonalcoholic liver disease  Care affected by social determinants of health: N/A    Disposition considerations: Admit.      Review of External Records  Hospital/Clinic: [facility]  Date: 6/18/2024  Stroke CKD DM Hfref s/p tavr dvt      External Consultation  Pharmacist  Hospitalist        ED  COURSE   3:39 PM I met with the patient to gather history and to perform my initial exam. We discussed plans for the ED course, including diagnostic testing and treatment.    4:47 PM Evaluated patient with chaperone.  6:09 PM reevaluated and updated  6:39 PM Rechecked and updated patient. Patient's wife and friend are now present at patient's bedside.   6:43 PM Spoke with the Hospitalist, Dr. Cota, who accepts the patient for admission.     At the conclusion of the encounter I discussed the results of all of the tests and the disposition. The questions were answered. The patient, patient's wife, and patient's friend acknowledged understanding and was agreeable with the care plan.         MEDICATIONS GIVEN IN THE EMERGENCY:     Medications   senna-docusate (SENOKOT-S/PERICOLACE) 8.6-50 MG per tablet 1 tablet (has no administration in time range)     Or   senna-docusate (SENOKOT-S/PERICOLACE) 8.6-50 MG per tablet 2 tablet (has no administration in time range)   ondansetron (ZOFRAN ODT) ODT tab 4 mg (has no administration in time range)     Or   ondansetron (ZOFRAN) injection 4 mg (has no administration in time range)   enoxaparin ANTICOAGULANT (LOVENOX) injection 40 mg (40 mg Subcutaneous $Given 6/20/24 2035)   acetaminophen (TYLENOL) tablet 650 mg (has no administration in time range)     Or   acetaminophen (TYLENOL) Suppository 650 mg (has no administration in time range)   melatonin tablet 1 mg (has no administration in time range)   polyethylene glycol (MIRALAX) Packet 17 g (has no administration in time range)   cefTRIAXone (ROCEPHIN) 1 g vial to attach to  mL bag for ADULTS or NS 50 mL bag for PEDS (has no administration in time range)   cefTRIAXone (ROCEPHIN) 1 g vial to attach to  mL bag for ADULTS or NS 50 mL bag for PEDS (0 g Intravenous Stopped 6/20/24 1853)   ampicillin-sulbactam (UNASYN) 3 g vial to attach to  mL bag (3 g Intravenous $New Bag 6/20/24 1853)       NEW PRESCRIPTIONS  STARTED AT TODAY'S ER VISIT     Current Discharge Medication List             =================================================================    HPI     Patient information was obtained from: EMS and Patient     Use of : N/A       Franklyn Sorto is a 88 year old male with a pertinent medical history of acute ischemic right MCA stroke, cerebrum stroke, CAD, chronic systolic congestive heart failure, HFrEF, s/p cardiac pacemaker procedure,. s/p TAVR, PVD, T2DM, CKD, HTN, HLD, mucopurulent chronic bronchitis, chronic nonalcoholic liver disease, rheumatoid arthritis, thrombocytopenia, thrombophilia, who presents to the ED by EMS for evaluation of abnormal lab results.     Per Chart Review, patient was admitted to [facility]  On 05/25/24 until 06/07/24. Patient was admitted on 5/25/2024 with left sided weakness and facial droop. Patient presented to the ED for evaluation of onset of left-sided weakness, gargled speech, and facial drooping at 10:20 AM. In the ED, INR 2.1. CT head and CTA without acute abnormality. Stroke code called but not candidate for TNK given INR 2.1 while on warfarin. Patient admitted for further evaluation.  Neurology was obtained. Patient had acute right pontine ischemic stroke. He had severe impairment of the swallowing and hence was placed on tube feeds for nutritional support. Palliative care consultation obtained for goals of care discussion. CODE STATUS changed to DNR/DNI. Patient and family agreed against escalation to ICU admission if he deteriorated. Patient designated his son and his close friend as power of  and did not designate his significant other as power of . GJ tube with IR placed on 6/1.Gastrojejunal feedings were started after placing percutaneous tube. Patient had challenges with tablets and tube feeding. Over several days, patient was able to tolerate tube feedings. SLP evaluated and performed a video swallow evaluation and  cleared him for dysphagia diet. Physical therapy and Occupational Therapy recommended short-term rehabilitation. Patient was discharged in stable condition.    Per Chart Review, patient WBC count performed earlier today (06/20/24) with [facility] resulted a value of 21.0.     Per EMS, patient was sent to the ED for evaluation of abnormal lab results after he was found to have a WBC count value of 21 earlier today. They note the patient is on Coumadin.     Patient states he is currently recovering from a stroke that occurred on 05/25/24. 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 He also endorses having trouble swallowing since his stroke.     Patient denies any recent headaches, chest pain, shortness of breath, abdominal pain, vomiting, diarrhea, dysuria, hematuria, cough, or any other complications at this time.       REVIEW OF SYSTEMS   Review of Systems   Respiratory:  Negative for cough and shortness of breath.    Cardiovascular:  Negative for chest pain.   Gastrointestinal:  Negative for abdominal pain, diarrhea and vomiting.   Genitourinary:  Negative for dysuria and hematuria.   Neurological:  Negative for headaches.   All other systems reviewed and are negative.      PAST MEDICAL HISTORY:   History reviewed. No pertinent past medical history.    PAST SURGICAL HISTORY:   History reviewed. No pertinent surgical history.      CURRENT MEDICATIONS:   No current outpatient medications on file.       ALLERGIES:     Allergies   Allergen Reactions    Clopidogrel Hives    Hydrocodone      Other reaction(s): sick to his stomach    Hydrocodone-Acetaminophen Nausea and Vomiting    Levofloxacin Other (See Comments)     Other reaction(s): tendonitis  Tendonitis right calf      Spironolactone      Other reaction(s): Hyperkalemia       FAMILY HISTORY:   No family history on file.    SOCIAL HISTORY:     Social History     Socioeconomic History     Marital status:    Tobacco Use    Smoking status: Former     Current packs/day: 0.00     Types: Cigarettes     Quit date: 1975     Years since quittin.5    Smokeless tobacco: Never   Substance and Sexual Activity    Alcohol use: Yes     Social Determinants of Health     Financial Resource Strain: Medium Risk (5/15/2023)    Received from [facility], [facility]    Financial Resource Strain     Difficulty of Paying Living Expenses: 1     Difficulty of Paying Living Expenses: 2   Food Insecurity: No Food Insecurity (5/15/2023)    Received from [facility], [facility]    Food Insecurity     Worried About Running Out of Food in the Last Year: 1   Transportation Needs: No Transportation Needs (2024)    Received from [facility]    Transportation Needs     Lack of Transportation (Medical): 1    Received from [facility]    Social Connections   Housing Stability: Low Risk  (5/15/2023)    Received from [facility], [facility]    Housing Stability     Unable to Pay for Housing in the Last Year: 1       VITALS:   /58 (BP Location: Left arm)   Pulse 73   Temp 98.2  F (36.8  C) (Oral)   Resp 17   Wt 75.3 kg (166 lb 0.1 oz)   SpO2 92%   BMI 24.51 kg/m      PHYSICAL EXAM     Physical Exam  Vitals and nursing note reviewed. Exam conducted with a chaperone present.   Constitutional:       Appearance: Normal appearance.   Genitourinary:     Penis: Hypospadias present.       Testes: Normal.      Peter stage (genital): 5.          Comments: Circumcised male.  Hypospadias.  There is a white tissue substance on the urethra unable to be pulled out.  Skin:            Comments: Stage II sacral ulcer.  Mild erythema.   Neurological:       Mental Status: He is alert.         Physical Exam   Constitutional: Well-appearing. Nontoxic. Pleasant. Cooperative. No acute distress.    Head: Atraumatic.     Nose: Nose normal.     Mouth/Throat: Dry mucous membranes. Oropharynx is clear and moist.     Eyes: EOM are normal. Pupils are equal, round, and reactive to light.     Ears: Bilateral pearly white tympanic membranes.    Neck: Normal range of motion. Neck supple.     Cardiovascular: Pacemaker present. Normal rate, irregular rhythm.      Pulmonary/Chest: Normal effort  and breath sounds normal.     Abdominal: Soft. Nontender. Surgical scar present. Feeding tube present at left upper quadrant.     Musculoskeletal: Left upper extremity paralysis with swelling. Left lower extremity paralysis.      Neurological:  Left upper extremity paralysis with swelling. Left lower extremity paralysis.      Lymphatics: Left upper extremity edema present.     : Patient is wearing adult diapers.  Plus.  He has white substance in the urethra    Skin: Skin is warm and dry.  Sacral ulcer.    Psychiatric: Normal mood and affect. Behavior is normal.     LAB:     All pertinent labs reviewed and interpreted.  Labs Ordered and Resulted from Time of ED Arrival to Time of ED Departure   BASIC METABOLIC PANEL - Abnormal       Result Value    Sodium 138      Potassium 4.2      Chloride 103      Carbon Dioxide (CO2) 25      Anion Gap 10      Urea Nitrogen 26.4 (*)     Creatinine 0.81      GFR Estimate 86      Calcium 8.1 (*)     Glucose 238 (*)    HEPATIC FUNCTION PANEL - Abnormal    Protein Total 6.4      Albumin 2.7 (*)     Bilirubin Total 0.5      Alkaline Phosphatase 117      AST 20      ALT 23      Bilirubin Direct <0.20     ROUTINE UA WITH MICROSCOPIC REFLEX TO CULTURE - Abnormal    Color Urine Yellow      Appearance Urine Cloudy (*)     Glucose Urine >1000 (*)     Bilirubin Urine Negative      Ketones Urine Negative      Specific Gravity Urine 1.032 (*)     Blood Urine 0.2 mg/dL (*)      pH Urine 6.0      Protein Albumin Urine 70 (*)     Urobilinogen Urine 2.0 (*)     Nitrite Urine Negative      Leukocyte Esterase Urine 500 Mynor/uL (*)     WBC Clumps Urine Present (*)     RBC Urine 0      WBC Urine >182 (*)     Squamous Epithelials Urine 9 (*)    CBC WITH PLATELETS AND DIFFERENTIAL - Abnormal    WBC Count 24.6 (*)     RBC Count 4.42      Hemoglobin 13.9      Hematocrit 42.0      MCV 95      MCH 31.4      MCHC 33.1      RDW 13.2      Platelet Count 213      NRBCs per 100 WBC 0      Absolute NRBCs 0.0     MANUAL DIFFERENTIAL - Abnormal    % Neutrophils 84      % Lymphocytes 7      % Monocytes 9      % Eosinophils 0      % Basophils 0      Absolute Neutrophils 20.7 (*)     Absolute Lymphocytes 1.7      Absolute Monocytes 2.2 (*)     Absolute Eosinophils 0.0      Absolute Basophils 0.0      RBC Morphology Confirmed RBC Indices      Platelet Assessment        Value: Automated Count Confirmed. Platelet morphology is normal.    Pathologist Review Comments (Blood)        Value: Increased absolute monocytes, morphologically consistant with reactive monocytosis.   INFLUENZA A/B, RSV, & SARS-COV2 PCR - Normal    Influenza A PCR Negative      Influenza B PCR Negative      RSV PCR Negative      SARS CoV2 PCR Negative     LIPASE - Normal    Lipase 43     LACTIC ACID WHOLE BLOOD - Normal    Lactic Acid 1.2     URINE CULTURE        RADIOLOGY:     Reviewed all pertinent imaging. Please see official radiology report.  XR Chest Port 1 View   Final Result   IMPRESSION: Left subclavian approach pacing device. Interval development left basilar airspace opacity, may reflect pleural effusion, atelectasis, and/or consolidation, singly or in combination. Recommend follow-up to resolution. No pneumothorax. Clear    right lung. Cervical fusion hardware.           EKG:       None.      PROCEDURES:     Procedures  None.    ICastro, am serving as a scribe to document services personally performed by Dr. Edmonds  based on my observation and the provider's statements to me. I, Shazia Edmonds MD attest that Castro Mahmoodstu is acting in a scribe capacity, has observed my performance of the services and has documented them in accordance with my direction.    Shazia Edmonds M.D.  Emergency Medicine  [facility] EMERGENCY DEPARTMENT  [address] [place] [id number] [phone]  Dept: [phone]       Shazia Edmonds MD  06/20/24 7180

## 2024-06-20 NOTE — ED TRIAGE NOTES
"He comes from assisted living where he had a stroke a month ago leaving him with left sided weakness. Today however, he is here due to labs and xray taken at the facility. Turns out his white count is elevated and \"they saw something on xray\" according to EMS. At this time he feels \"okay.\" Denies pain and SOB. He does endorse generalized fatigue.        "

## 2024-06-21 PROBLEM — T19.0XXA URETHRAL FOREIGN BODY, INITIAL ENCOUNTER: Status: ACTIVE | Noted: 2024-01-01

## 2024-06-21 PROBLEM — Z79.01 ANTICOAGULATED ON COUMADIN: Status: ACTIVE | Noted: 2024-01-01

## 2024-06-21 NOTE — PLAN OF CARE
"0700 - 1100  AO, VSS on RA, denies pain. Tolerates meds crushed in applesauce. Repositioned with pillows, primofit now in place. Pt is triggering sepsis protocols, TEA Calderón aware- not initiating protocols at this time. No acute events reported at this time.    PRIMARY DIAGNOSIS: \"GENERIC\" NURSING  OUTPATIENT/OBSERVATION GOALS TO BE MET BEFORE DISCHARGE:  ADLs back to baseline: Yes    Activity and level of assistance: Total assist    Pain status: Pain free.    Return to near baseline physical activity: Yes     Discharge Planner Nurse   Safe discharge environment identified: No  Barriers to discharge: Yes       Entered by: Gabriel Louis RN 06/21/2024 12:03 PM     Please review provider order for any additional goals.   Nurse to notify provider when observation goals have been met and patient is ready for discharge.  "

## 2024-06-21 NOTE — PROGRESS NOTES
"Speech-Language Pathology: Clinical Swallow Evaluation     06/21/24 1000   Appointment Info   Signing Clinician's Name / Credentials (SLP) Radha Kolb MA, CCC-SLP   Quick Adds Certification   General Information   Onset of Illness/Injury or Date of Surgery 06/20/24   Referring Physician Federico Cota, DO   Pertinent History of Current Problem Per ordering provider \"Franklyn Sorto is a 88 year old male who is presenting from outpatient lab visit. He is presenting from lab appointment where he was noted to have leukocytosis and told to come to ER. He notes increased urinary frequency. No dysuria. No fevers or chills. No diarrhea, cough, shortness of breath. States he had cutler placed during his stroke which caused some pain and lesion. He had a catheter in place for 2 weeks at time of stroke and it was just removed a few days ago.\".   General Observations Alert and cooperative   Type of Evaluation   Type of Evaluation Swallow Evaluation   Oral Motor   Oral Musculature generally intact   Dentition (Oral Motor)   Dentition (Oral Motor) adequate dentition   Facial Symmetry (Oral Motor)   Facial Symmetry (Oral Motor) left side impairment   Lip Function (Oral Motor)   Lip Range of Motion (Oral Motor) unable/difficult to assess   Tongue Function (Oral Motor)   Tongue ROM (Oral Motor) WNL   Cough/Swallow/Gag Reflex (Oral Motor)   Volitional Throat Clear/Cough (Oral Motor) WNL   Volitional Swallow (Oral Motor) effortful   Vocal Quality/Secretion Management (Oral Motor)   Vocal Quality (Oral Motor) WFL;hoarse   Secretion Management (Oral Motor) WNL   General Swallowing Observations   Past History of Dysphagia Pt had VFSS on 5/28/24 and 6/5/24. Results from most recent as follows \" Pt with much improved oropharyngeal swallow function relative to initial evaluation, now judged to present with mild-moderate oropharyngeal dysphagia. Pt with overall improved swallow efficiency, coordination, airway protection and clearance " "and no aspiration is directly appreciated during this study, however note that visualization somewhat limited during this study d/t pt positioning/field obstruction as this study completed with pt in tilt-inspace wheelchair. Will plan for cautious PO diet initiation with adherence to safe PO strategie and close monitoring of tolerance and respiratory status and repeat VFSS as indicated. Pt may continue to benefit from supplemental non-oral nutrition pending BERNADINE and tolerance.\". Pt reports he was eating very soft food that the TCU prior to admission. He also stated that he couldn't tolerate thickened liquids anymore so they advanced him to thin with strategies. He reports completing ph ex with SLP at TCU.   Current Diet/Method of Nutritional Intake (General Swallowing Observations, NIS) thin liquids (level 0);regular diet   Swallowing Evaluation Clinical swallow evaluation   Clinical Swallow Evaluation   Clinical Swallow Evaluation Textures Trialed thin liquids;pureed;solid foods   Clinical Swallow Eval: Thin Liquid Texture Trial   Mode of Presentation, Thin Liquids cup;straw;self-fed   Volume of Liquid or Food Presented 4oz   Oral Phase of Swallow WFL   Pharyngeal Phase of Swallow intact   Successful Strategies Trialed During Procedure chin tuck   Diagnostic Statement Pt uses chin tuck posture with all intake. Min cues for improved accuracy.   Clinical Swallow Evaluation: Puree Solid Texture Trial   Mode of Presentation, Puree spoon   Volume of Puree Presented x3 bites   Oral Phase, Puree WFL   Pharyngeal Phase, Puree intact   Diagnostic Statement NO s/s aspiration   Clinical Swallow Evaluation: Solid Food Texture Trial   Mode of Presentation self-fed   Volume Presented x2 bites   Oral Phase WFL   Pharyngeal Phase intact   Successful Strategies Trialed During Procedure chin tuck  (liquid wash)   Diagnostic Statement Pt was nervous to trial solid textures. Trained strategies and pt was successful.   Swallowing " Recommendations   Diet Consistency Recommendations thin liquids (level 0);soft & bite-sized (level 6)   Supervision Level for Intake distant supervision needed   Mode of Delivery Recommendations bolus size, small   Postural Recommendations chin tuck   Swallowing Maneuver Recommendations alternate food and liquid intake   Monitoring/Assistance Required (Eating/Swallowing) stop eating activities when fatigue is present   Recommended Feeding/Eating Techniques (Swallow Eval) maintain upright sitting position for eating;set-up and prepare tray   Medication Administration Recommendations, Swallowing (SLP) In puree   Instrumental Assessment Recommendations VFSS (videofluoroscopic swallowing study)   General Therapy Interventions   Planned Therapy Interventions Dysphagia Treatment   Dysphagia treatment Modified diet education;Instruction of safe swallow strategies;Compensatory strategies for swallowing   Clinical Impression   Criteria for Skilled Therapeutic Interventions Met (SLP Eval) Yes, treatment indicated   SLP Diagnosis dysphagia   Risks & Benefits of therapy have been explained evaluation/treatment results reviewed;care plan/treatment goals reviewed;participants included;participants voiced agreement with care plan;patient   Clinical Impression Comments Clinical Swallow Evaluation completed. Patient had no overt s/s aspiration with any intake. Oral motor function was mildly impaired. Mastication was slow but functional. Hyolaryngeal elevation appears present upon visualization and palpation. Recommend diet of soft and bite size with thin. Pt must be upright, small bites/sips, alternate consistencies, no straws, and chin tuck with liquids. SLP to follow for VFSS.   SLP Total Evaluation Time   Eval: oral/pharyngeal swallow function, clinical swallow Minutes (65669) 15   Therapy Certification   Start of Care Date 06/21/24   Certification date from 06/21/24   Certification date to 06/28/24   Medical Diagnosis Acute  cystitis without hematuria   SLP Goals   Therapy Frequency (SLP Eval) 5 times/week   SLP Predicted Duration/Target Date for Goal Attainment 06/28/24   SLP Goals Swallow   SLP: Safely tolerate diet without signs/symptoms of aspiration Regular diet;Thin liquids;With use of swallow precautions;With use of compensatory swallow strategies;Independently   Interventions   Interventions Quick Adds Swallowing Dysfunction   Swallowing Dysfunction &/or Oral Function for Feeding   Treatment of Swallowing Dysfunction &/or Oral Function for Feeding Minutes (20830) 10   Treatment Detail/Skilled Intervention Trained better positioning for chin tuck posture, small single cup sips, and alternating liquids/solids. Improved use of chin tuck after cues with solid and liquids. Improved use of liquid wash after education. Pt is in agreement for VFSS to reassess swallow, strategies needed, and guide ph ex program/   SLP Discharge Planning   SLP Brief overview of current status  Recommend Soft and Bite sized with Thin liquids. Strategies of small bites/sips, chin tuck, and alternate liquids/solids. NO straws.   Total Session Time   Total Session Time (sum of timed and untimed services) 25                                                                              [facility]      OUTPATIENT SPEECH LANGUAGE PATHOLOGY EVALUATION  PLAN OF TREATMENT FOR OUTPATIENT REHABILITATION  (COMPLETE FOR INITIAL CLAIMS ONLY)  Patient's Last Name, First Name, M.I.  YOB: 1935  Franklyn Sorto                        Provider's Name  [facility] Medical Record No.  [id number]                               Onset Date:  06/20/24  Start of Care Date: 06/21/24    Type:     ___PT   ___OT   _X_SLP Medical Diagnosis: Acute cystitis without hematuria          SLP Diagnosis:  dysphagia  Visits from SOC:  1   ________________________________________________________________  Plan of  Treatment/Functional Goals    Planned Interventions:   Dysphagia Treatment       Modified diet education, Instruction of safe swallow strategies, Compensatory strategies for swallowing        Goals: See Speech Language Pathology Goals on Care Plan in Epic electronic health record.    Therapy Frequency:5 times/week   Predicted Duration of Therapy Intervention: 06/28/24  ________________________________________________________________________________    I CERTIFY THE NEED FOR THESE SERVICES FURNISHED UNDER        THIS PLAN OF TREATMENT AND WHILE UNDER MY CARE     (Physician attestation of this document indicates review and certification of the therapy plan).              Certification date from: 06/21/24 Certification date to: 06/28/24    Referring Physician: Federico Cota,            Initial Assessment        See Speech Language Pathology documentation in Epic electronic health record, evaluation dated  06/21/24

## 2024-06-21 NOTE — CONSULTS
[facility]  WOC Nurse Inpatient Assessment     Consulted for: penile lesion    Summary: POI penile mucosal ulcer    Patient History (according to provider note(s):          Assessment:      Areas visualized during today's visit: Focused:    Wound location: glans penis    Last photo: NA  Wound due to: Pressure Injury, POI, mucosal injury from when pt had catheter while he was IP at [facility]  Wound history/plan of care: as above  Wound base: 100 %  white mucousy tissue     Palpation of the wound bed: normal      Drainage:  unable to determine due to location and having primofit in place         Measurements (length x width x depth, in cm): 0.4  x 0.4  x  0.2 cm      Tunneling: N/A     Undermining: N/A  Periwound skin: Intact      Color: normal and consistent with surrounding tissue      Temperature: normal   Odor: none  Pain: denies ,   Pain interventions prior to dressing change: patient tolerated well  Treatment goal: Heal  and Maintain (prevention of deterioration)  STATUS: initial assessment  Supplies ordered: supplies stored on unit       Treatment Plan:     Penile wound(s): BID and with brief changes, all demi cares  Gently cleanse the glans penis with incontinence wipes.      Orders: Written    RECOMMEND PRIMARY TEAM ORDER: None, at this time  Education provided: plan of care  Discussed plan of care with: Patient  WOC nurse follow-up plan: weekly  Notify WOC if wound(s) deteriorate.  Nursing to notify the Provider(s) and re-consult the WOC Nurse if new skin concern.    DATA:     Current support surface: Standard  Standard Isoflex gel  Containment of urine/stool: Brief and Suction based external urinary catheter   BMI: Body mass index is 24.51 kg/m .   Active diet order: Orders Placed This Encounter      Combination Diet Soft and Bite Sized Diet (level 6); Thin Liquids (level 0)     Output: No intake/output data recorded.     Labs:   Recent Labs   Lab 06/21/24  0819 06/20/24  9124  06/20/24  1604   ALBUMIN  --   --  2.7*   HGB  --   --  13.9   INR 2.96*   < >  --    WBC  --   --  24.6*    < > = values in this interval not displayed.     Pressure injury risk assessment:   Sensory Perception: 3-->slightly limited  Moisture: 3-->occasionally moist  Activity: 2-->chairfast  Mobility: 2-->very limited  Nutrition: 3-->adequate  Friction and Shear: 1-->problem  Dilan Score: 14    HERMINIA HANSEN RN CWOCN, CFCN  Pager no longer is use, please contact through [employer] group: [facility]

## 2024-06-21 NOTE — PLAN OF CARE
PRIMARY DIAGNOSIS: Post stroke complication  OUTPATIENT/OBSERVATION GOALS TO BE MET BEFORE DISCHARGE:  1. Stable vital signs Yes  2. Tolerating diet:Yes  3. Pain controlled with oral pain medications:   Denies pain  4. Positive bowel sounds:  Yes  5. Voiding without difficulty:  Yes  6. Able to ambulate:  No  7. Provider specific discharge goals met:  No    Discharge Planner Nurse   Safe discharge environment identified: No  Barriers to discharge: Yes    Pt alert and oriented, vitals stable, on Room Air, incontinent B/B, left side weakness due to stroke that happened may/24, Regular diet, pt stated at rehab is on soft diet. Pt states he has double vision and wears glasses with tape on the right lens. Pt has difficulty swallowing, given sips of water on a glass without straw, raised 90 degrees. Tolerated without coughing. Pt is able to hear bilateral and able to speak clearly. Denies pain and shortness of breath. 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

## 2024-06-21 NOTE — PLAN OF CARE
"9300 - 1004  Assessment unchanged from previous.    PRIMARY DIAGNOSIS: \"GENERIC\" NURSING  OUTPATIENT/OBSERVATION GOALS TO BE MET BEFORE DISCHARGE:  ADLs back to baseline: Yes    Activity and level of assistance: Total assist    Pain status: Pain free.    Return to near baseline physical activity: Yes     Discharge Planner Nurse   Safe discharge environment identified: No  Barriers to discharge: Yes       Entered by: Gabriel Louis RN 06/21/2024 2:30 PM     Please review provider order for any additional goals.   Nurse to notify provider when observation goals have been met and patient is ready for discharge.  "

## 2024-06-21 NOTE — UTILIZATION REVIEW
Admission Status; Secondary Review Determination   Under the authority of the Utilization Management Committee, the utilization review process indicated a secondary review on the above patient. The review outcome is based on review of the medical records, discussions with staff, and applying clinical experience noted on the date of the review.   (x) Inpatient Status Appropriate - This patient's medical care is consistent with medical management for inpatient care and reasonable inpatient medical practice.   RATIONALE FOR DETERMINATION   Mr. Sorto is a 87 yo male with PMH of CVA and recent cutler cath removal at TCU who presents to the ED with increased urinary frequency and leukocytosis.  Abnormal UA concerning for UTI and CXR with evidence of LLL infiltrate.  Given IV rocephin in the ED, IV unasyn on admisison to also cover aspiration pna.  UC now positive for ESBL E coli.  Started on IV meropenem and infections disease consult requested today and is pending.   He is requiring further medical evaluation, continued inpatient treatment and close clinical monitoring at this time.   At the time of admission with the information available to the attending physician more than 2 nights Hospital complex care was anticipated, based on patient risk of adverse outcome if treated as outpatient and complex care required. Inpatient admission is appropriate based on the Medicare guidelines.   The information on this document is developed by the utilization review team in order for the business office to ensure compliance. This only denotes the appropriateness of proper admission status and does not reflect the quality of care rendered.   The definitions of Inpatient Status and Observation Status used in making the determination above are those provided in the CMS Coverage Manual, Chapter 1 and Chapter 6, section 70.4.     Sincerely,     Connie Dumont, DO  Utilization Review  Physician Advisor

## 2024-06-21 NOTE — PLAN OF CARE
PRIMARY DIAGNOSIS: GENERALIZED WEAKNESS    OUTPATIENT/OBSERVATION GOALS TO BE MET BEFORE DISCHARGE  1. Orthostatic performed: No    2. Tolerating PO medications: Yes    3. Return to near baseline physical activity: No    4. Cleared for discharge by consultants (if involved): No    Discharge Planner Nurse   Safe discharge environment identified: No  Barriers to discharge: Yes       Entered by: Gisel Stewart RN 06/21/2024 5:02 AM

## 2024-06-21 NOTE — DISCHARGE INSTRUCTIONS
[facility] DISCHARGE INSTRUCTIONS:  Consult Orders   Wound Ostomy Continence Nurse IP Consult: Type of Consult: Wound; Location of Wound(s): scrotum, coccyx [[id number]] ordered by Neno Harris MD at 06/23/24 [address] [place] [address] Nurse Inpatient Assessment      Consulted for: scrotum, coccyx, penile lesion     Summary: POI penile mucosal ulcer     Patient History (according to provider note(s):            Assessment:       Pressure Injury Location: sacrum          Last photo: 6/24  Wound type: Pressure Injury     Pressure Injury Stage: Unstageable, present on admission      This is a Medical Device Related Pressure Injury (MDRPI) due to  n/a  Wound history/plan of care:   patient reported he spends a lot of time in bed since his stroke     Wound base: 100 % slough,      Palpation of the wound bed: boggy      Drainage: scant     Description of drainage: serosanguinous     Measurements (length x width x depth, in cm) 2.5 cm  x 2 cm      Tunneling N/A     Undermining N/A  Periwound skin: Intact and Erythema- blanchable      Color: normal and consistent with surrounding tissue and pink      Temperature: normal   Odor: none  Pain: denies , none  Pain intervention prior to dressing change: N/A  Treatment goal: Heal , Protection, Promote epidermal migration, and Remove necrotic tissue  STATUS: initial assessment  Supplies ordered: ordered honey and supplies stored on unit     My PI Risk Assessment     Sensory Perception: 3 - Slightly Limited     Moisture: 2 - Very moist      Activity: 2 - Chairfast     Mobility: 2 - Very limited     Nutrition: 2 - Probably inadequate      Friction/Shear: 2 - Potential problem      TOTAL: 13     Skin Injury Location: scrotum     Last photo: unable to obtain  Skin injury due to: Moisture associated skin damage (MASD)  Skin history and plan of care:   patient wearing brief and spends a lot of time in bed  Affected area:      Skin assessment: Intact and  Erythema     Measurements (length x width x depth, in cm) unable to obtain     Color: pink     Temperature  warm     Drainage: none .      Color: none      Odor: mild  Pain: denies , none  Pain interventions prior to dressing change: N/A  Treatment goal: Heal , Decrease moisture, and Protection  STATUS: initial assessment  Supplies ordered: ordered critic aid and supplies stored on unit        Areas visualized during today's visit: Focused:     Wound location: glans penis     Last photo: NA  Wound due to: Pressure Injury, POI, mucosal injury from when pt had catheter while he was IP at [facility]  Wound history/plan of care: as above  Wound base: 100 %  white mucousy tissue     Palpation of the wound bed: normal      Drainage:  unable to determine due to location and having primofit in place         Measurements (length x width x depth, in cm): 0.4  x 0.4  x  0.2 cm      Tunneling: N/A     Undermining: N/A  Periwound skin: Intact      Color: normal and consistent with surrounding tissue      Temperature: normal   Odor: none  Pain: denies ,   Pain interventions prior to dressing change: patient tolerated well  Treatment goal: Heal  and Maintain (prevention of deterioration)  STATUS: evolving  Supplies ordered: supplies stored on unit        Treatment Plan:      Sacrum wound Daily or PRN if dressing soiled, saturated or falls off  Cleanse with soap and water or bath wipe and pat dry  Apply nickel thick amount of honey to wound bed and cover with mepilex     Scrotum/groin BID and with brief changes  Cleanse area with soap and water or bath wipes and dry completely  Apply critic aid to any and all affected areas     Penile wound(s): BID and with brief changes, all demi cares  Gently cleanse the glans penis with incontinence wipes.       Pressure Injury Prevention (PIP) Plan:  If patient is declining pressure injury prevention interventions: Explore reason why and address patient's concerns, Educate on pressure injury risk  "and prevention intervention(s), If patient is still declining, document \"informed refusal\" , and Ensure Care team is aware ( provider, charge nurse, etc)  Mattress: Follow bed algorithm, reassess daily and order specialty mattress, if indicated.  HOB: Maintain at or below 30 degrees, unless contraindicated  Repositioning in bed: Every 1-2 hours  and Raise foot of bed prior to raising head of bed, to reduce patient sliding down (shear)  Heels: Keep elevated off mattress and Pillows under calves  Protective Dressing: Sacral Mepilex for prevention (#[id number]),  especially for the agitated patient   Positioning Equipment:None  Chair positioning: Chair cushion (#[id number]) , Assist patient to reposition hourly, and Do NOT use a donut for sitting (this increases pressure to smaller area and creates a higher potential for injury)    If patient has a buttock pressure injury, or high risk for PI use chair cushion or SPS.  Moisture Management: Perineal cleansing /protection: Follow Incontinence Protocol and Moisturize dry skin  Under Devices: Inspect skin under all medical devices during skin inspection , Ensure tubes are stabilized without tension, and Ensure patient is not lying on medical devices or equipment when repositioned  Ask provider to discontinue device when no longer needed.           "

## 2024-06-21 NOTE — PROGRESS NOTES
06/21/24 1340   Appointment Info   Signing Clinician's Name / Credentials (PT) Shirlene Beebe PT   Living Environment   People in Home significant other   Current Living Arrangements house  ([facility])   Home Accessibility stairs within home   Number of Stairs, Within Home, Primary greater than 10 stairs  (13)   Stair Railings, Within Home, Primary railings safe and in good condition   Living Environment Comments stairs to bedroom   Self-Care   Equipment Currently Used at Home cane, straight;walker, rolling   Activity/Exercise/Self-Care Comment from TCU since May 2024 following CVA with L hemiparesis; prior to CVA, pt independent amb. with cane or FWW   General Information   Onset of Illness/Injury or Date of Surgery 06/20/24   Referring Physician Dr. Villeda   Patient/Family Therapy Goals Statement (PT) go back to TCU for more therapy   Pertinent History of Current Problem (include personal factors and/or comorbidities that impact the POC) cystitis, asp. 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 per pt report 2/5   Bed Mobility   Bed Mobility supine-sit;sit-supine   Supine-Sit [place] (Bed Mobility) maximum assist (25% patient effort);verbal cues   Sit-Supine [place] (Bed Mobility) 2 person assist;maximum assist (25% patient effort);verbal cues   Assistive Device (Bed Mobility) bed rails;draw sheet   Transfers   Comment, (Transfers) pt declined standing trial; fearful   Balance   Balance Comments sitting EOB min/mod assist with RUE support, R lean, cuing for feet flat on floor   Sensory Examination   Sensory  Perception Comments neuropathy B feet   Muscle Tone   Muscle Tone Comments flaccid LUE; weakness LLE   Clinical Impression   Criteria for Skilled Therapeutic Intervention Yes, treatment indicated   PT Diagnosis (PT) impaired functional mobility   Influenced by the following impairments decreased strength, sensation, balance; fear   Functional limitations due to impairments bed mob., transfers, amb., stairs   Clinical Presentation (PT Evaluation Complexity) stable   Clinical Presentation Rationale pt presents as medically diagnosed   Clinical Decision Making (Complexity) low complexity   Planned Therapy Interventions (PT) balance training;bed mobility training;gait training;home exercise program;neuromuscular re-education;patient/family education;ROM (range of motion);strengthening;stretching;transfer training;wheelchair management/propulsion training   Risk & Benefits of therapy have been explained evaluation/treatment results reviewed;patient   PT Total Evaluation Time   PT Eval, Low Complexity Minutes (96123) 10   Physical Therapy Goals   PT Frequency 4x/week   PT Predicted Duration/Target Date for Goal Attainment 06/28/24   PT Goals Bed Mobility;Transfers;PT Goal 1   PT: Bed Mobility Moderate assist;Supine to/from sit   PT: Transfers Moderate assist;Sit to/from stand;Bed to/from chair;Assistive device   PT: Goal 1 Sit at edge of bed x5 min with RUE support, sba to prepare for transfers.   Interventions   Interventions Quick Adds Therapeutic Procedure   Therapeutic Procedure/Exercise   Ther. Procedure: strength, endurance, ROM, flexibillity Minutes (18969) 8   Symptoms Noted During/After Treatment none   Treatment Detail/Skilled Intervention supine BLE AA ex x10 reps with cuing and demonstration for technique   PT Discharge Planning   PT Plan LE ex; bed mob., sitting balance, trial sit<>STand assist of 2 with RUE use   PT Discharge Recommendation (DC Rec) Transitional Care Facility   PT Rationale for DC Rec  continue PT at TCU for strengthening and mobility training s/p CVA with L hemiparesis   PT Brief overview of current status max assist bed mob., sitting balance mod assist   PT Equipment Needed at Discharge lift device;hospital bed;wheelchair;wheelchair cushion   Total Session Time   Timed Code Treatment Minutes 8   Total Session Time (sum of timed and untimed services) 18     [facility]  OUTPATIENT PHYSICAL THERAPY EVALUATION  PLAN OF TREATMENT FOR OUTPATIENT REHABILITATION  (COMPLETE FOR INITIAL CLAIMS ONLY)  Patient's Last Name, First Name, M.I.  YOB: 1935  AngelFranklyn emmanuel  ALYSSA                        Provider's Name  [facility] Medical Record No.  [id number]                             Onset Date:  06/20/24   Start of Care Date:      Type:     _X_PT   ___OT   ___SLP Medical Diagnosis:                 PT Diagnosis:  impaired functional mobility Visits from SOC:  1     See note for plan of treatment, functional goals and certification details    I CERTIFY THE NEED FOR THESE SERVICES FURNISHED UNDER        THIS PLAN OF TREATMENT AND WHILE UNDER MY CARE     (Physician co-signature of this document indicates review and certification of the therapy plan).

## 2024-06-21 NOTE — CONSULTS
Consultation - Infectious Disease  Franklyn Sorto,  1935, MRN [id number]    Admitting Dx: History of CVA (cerebrovascular accident) [Z86.73]  Acute cystitis without hematuria [N30.00]  Anticoagulated on Coumadin [Z79.01]  Urethral foreign body, initial encounter [T19.0XXA]  Aspiration pneumonia, unspecified aspiration pneumonia type, unspecified laterality, unspecified part of lung (H) [J69.0]    PCP: No Ref-Primary, Physician, None   Code status:  Full Code       Extended Emergency Contact Information  Primary Emergency Contact: Narda Valdez   [facility]  Home Phone: [phone]  Relation: Spouse       ASSESSMENT   ESBL UTI. Recent cutler catheter. Symptomatic after catheter removal. Leukocytosis higher than expected for cystitis alone. Consider abscess.    Abnormal Chest X ray. Atelectasis vs consolidation vs pleural effusions.   Recent CVA with resulting L hemiplegia.   TAVR, pacemaker, DM, CHF, DVT, anti-phospholipid syndrome.   Principal Problem:    Acute cystitis without hematuria  Active Problems:    History of CVA (cerebrovascular accident)    Aspiration pneumonia, unspecified aspiration pneumonia type, unspecified laterality, unspecified part of lung (H)       PLAN   Meropenem for ESBL E coli, will cover most pneumonias if this is present  CT abd/pelvis to look for abscess, can also view lower portion of pleural space.   Expect IV antibiotic 10-14 days.    Eriberto Verdugo MD  [facility]  Contact via [employer] or [facility] [phone]  ______________________________________________________________________        Reason For Consult: ESBL UTI     HPI    We have been requested by Jovanna Calderón to evaluate Franklyn Sorto. He has history of TAVR, pacemaker, DM, CKD, CHF, DVT, anti-phospholipid syndrome. He presented yesterday from outpatient lab visit where he was noted to have leukocytosis and told to come to ER. He notes increased urinary frequency and mild  dysuria. No fevers or chills. He had been hospitalized at [facility] 5/25-6/7 with stroke, he had a catheter in place for 2 weeks at time of stroke and it was just removed a few days ago. Recalls the catheter was painful, had purulent drainage after it was removed.  Urine culture has grown ESBL E coli.    UA with >182 WBC. Breathing mildly worse, without much for cough. His L arm is swollen. He has weakness on left side.        Medical History  History reviewed. No pertinent past medical history. Surgical History  He  has no past surgical history on file.   Social History  Reviewed, and he  reports that he quit smoking about 49 years ago. His smoking use included cigarettes. He has never used smokeless tobacco. He reports current alcohol use.   Allergies  Allergies   Allergen Reactions    Clopidogrel Hives    Hydrocodone      Other reaction(s): sick to his stomach    Hydrocodone-Acetaminophen Nausea and Vomiting    Levofloxacin Other (See Comments)     Other reaction(s): tendonitis  Tendonitis right calf      Spironolactone      Other reaction(s): Hyperkalemia    Family History    family history not pertinent to presenting problem.    Psychosocial Needs  Social History     Social History Narrative    Not on file     Additional psychosocial needs reviewed per nursing assessment.         Prior to Admission Medications   Medications Prior to Admission   Medication Sig Dispense Refill Last Dose    acetaminophen (TYLENOL) 500 MG tablet Take 1,000 mg by mouth 3 times daily as needed for mild pain   Unknown at prn    allopurinol (ZYLOPRIM) 300 MG tablet Take 300 mg by mouth daily  0 6/20/2024 at am    aspirin 81 MG EC tablet Take 81 mg by mouth daily   6/20/2024 at am    Baclofen (LIORESAL) 5 MG tablet Take 5 mg by mouth 3 times daily   6/20/2024 at am    Baclofen (LIORESAL) 5 MG tablet Take 5 mg by mouth daily as needed for muscle spasms   Unknown at prn    bisacodyl (DULCOLAX) 10 MG suppository Place 10 mg  rectally daily as needed for constipation   Unknown at prn    carvedilol (COREG) 6.25 MG tablet Take 25 mg by mouth 2 times daily  3 6/20/2024 at Am    COLCRYS 0.6 MG tablet Take 0.6 mg by mouth as needed  0 Unknown at prn    diclofenac (VOLTAREN) 1 % topical gel Apply 2 g topically 4 times daily   6/20/2024 at am    empagliflozin (JARDIANCE) 25 MG TABS tablet Take 1 tablet (25 mg) by mouth daily 90 tablet 3 6/20/2024 at am    ENTRESTO  MG per tablet Take 1 tablet by mouth 2 times daily  3 6/20/2024 at am    erythromycin (ROMYCIN) 5 MG/GM ophthalmic ointment Place into both eyes at bedtime   6/19/2024 at hs    HYDROmorphone (DILAUDID) 2 MG tablet Take 2 mg by mouth 3 times daily as needed for pain   Unknown at prn    LANsoprazole (PREVACID SOLUTAB) 30 MG ODT Place 30 mg under the tongue every morning (before breakfast)   6/20/2024 at am    lipase-protease-amylase (CREON 12) 26866-02029-44717 units CPEP Take 1 capsule by mouth as needed   Unknown at prn    melatonin 3 MG tablet Take 3 mg by mouth nightly as needed for sleep   6/18/2024 at hs    methocarbamol (ROBAXIN) 500 MG tablet Take 500 mg by mouth 3 times daily   6/20/2024 at am    nitroGLYcerin (NITROSTAT) 0.4 MG sublingual tablet Place 0.4 mg under the tongue every 5 minutes as needed for chest pain For chest pain place 1 tablet under the tongue every 5 minutes for 3 doses. If symptoms persist 5 minutes after 1st dose call 911.   Unknown at prn    nystatin (MYCOSTATIN) 910383 UNIT/GM external powder Apply topically 2 times daily as needed for other   Unknown at prn    ondansetron (ZOFRAN ODT) 4 MG ODT tab Take 4 mg by mouth every 8 hours as needed for nausea   Unknown at prn    polyethylene glycol-propylene glycol (SYSTANE) 0.4-0.3 % SOLN ophthalmic solution Apply 2 drops to eye 2 times daily   6/20/2024 at am    rosuvastatin (CRESTOR) 20 MG tablet Take 1 tablet by mouth at bedtime   6/18/2024 at hs    Semaglutide, 1 MG/DOSE, (OZEMPIC, 1 MG/DOSE,) 4  MG/3ML pen Inject 1 mg Subcutaneous once a week 9 mL 3 6/18/2024 at am    Sennosides 17.2 MG TABS 17.2 mg by Enteral route daily   6/20/2024 at am    sodium bicarbonate 650 MG tablet 650 mg by Per G Tube route as needed for heartburn   Unknown at prn    vitamin D3 (CHOLECALCIFEROL) 50 mcg (2000 units) tablet Take 2 tablets by mouth daily   6/20/2024 at am    warfarin ANTICOAGULANT (COUMADIN) 6 MG tablet Take 4-6 mg by mouth See Admin Instructions Take 6 mg on Sunday, take 4 mg daily rest of the week (dose change on 6/20/24).  1 6/19/2024 at pm          Anti-infectives: ceftriaxone 6/20  Amp/sulbactam 6/2-  Cultures: 6/19 UC ESBL E coli, 6/20 E coli  Imaging: reviewed images          Review of Systems:  All other systems negative in detail except what is noted above. Physical Exam:  Temp:  [97.9  F (36.6  C)-98.8  F (37.1  C)] 97.9  F (36.6  C)  Pulse:  [70-81] 81  Resp:  [17-19] 18  BP: (129-162)/(58-86) 140/65  SpO2:  [92 %-98 %] 95 %    GENERAL:  In no acute distress, is alert and oriented to person, place, time. Appears frail.   EYES: No conjunctival injection, pupils equally reactive to light, extra-ocular movement intact  HEAD, EARS, NOSE, MOUTH, AND THROAT: Nontraumatic, mouth without oral ulcers. Aged dentition.   RESPIRATORY: Decreased on left.   CARDIOVASCULAR: Regular rate and rhythm, normal S1 and S2, no murmurs, rubs, or gallops.  ABDOMEN: Soft, nontender, no masses, no organomegaly.  Normal bowel sounds.  MUSCULOSKELETAL: No synovitis.  SKIN/HAIR/NAILS: No rashes, no signs of peripheral emboli.  NEUROLOGIC: L facial weakness, L hemiplegia.   External urinary catheter       Pertinent Labs         Recent Labs   Lab 06/21/24  0536 06/20/24  1604    138   CO2 28 25   BUN 24.3* 26.4*       Lab Results   Component Value Date    ALT 23 06/20/2024    AST 20 06/20/2024    ALKPHOS 117 06/20/2024     UA >182 WBC     Pertinent Radiology  Radiology Results: Reviewed  XR Chest Port 1 View    Result Date:  6/20/2024  EXAM: XR CHEST PORT 1 VIEW LOCATION: [facility] DATE: 6/20/2024 INDICATION: Recent CVA.  Evaluate for any consolidation. COMPARISON: 4/11/2024     IMPRESSION: Left subclavian approach pacing device. Interval development left basilar airspace opacity, may reflect pleural effusion, atelectasis, and/or consolidation, singly or in combination. Recommend follow-up to resolution. No pneumothorax. Clear right lung. Cervical fusion hardware.    Video swallow study with speech path TODAY    Result Date: 6/5/2024  EXAM: XR VIDEO SWALLOW W SPEECH DATE OF EXAM: 6/5/2024 Patient: Franklyn Sorto (1935), MRN [id number] CLINICAL HISTORY: Assess swallow for aspiration. TECHNIQUE: Fluoroscopy was provided for speech pathologist during video swallow   study. FINDINGS: Patient was given barium of varying consistencies to ingest. There was laryngeal penetration during ingestion of thin barium liquids. Aspiration suspected with thin liquids but unable to confirm due to patient positioning. Patient did cough with thin liquids. There was flash laryngeal penetration but no aspiration during ingestion of nectar thick barium. No laryngeal penetration with pudding thick barium and barium coated cracker. Please see the report by the speech pathologist for further details and recommendations. FLUOROSCOPY TIME: 1 minute and 42 secs Yesi Arias PA-C [employer] [facility] Radiology Dept.    XR Abdomen Port 1 View    Result Date: 6/1/2024  Indication: Abdominal pain. Findings: 2 views of the abdomen show no dilated loops of bowel. Percutaneous gastrostomy tube.  Contrast throughout the colon. Degenerative changes of the spine.    IR PERCUTANEOUS TUBE PLACEMENT    Result Date: 5/31/2024  Procedure: Percutaneous gastrojenjunostomy tube placement under fluoroscopic guidance Indication: malnutrition Interventionalist: Esteban Ashley MD Fluoroscopy time: 15.4 minutes. Reference air  kerma: 626 mGy. Estimated Blood Loss: <10 mL Medications: 1. 0.5 mg midazolam IV 2. 50 mcg fentanyl IV 3. 25 mL lidocaine SQ 4. 1 mg glucagon IV Sedation: Moderate Conscious sedation: 45 minutes of intraservice time. The sedation was supervised by myself and the patient's vital signs were actively monitored by an independent registered nurse. Complications: None immediate. Contrast: Contrast: Omnipaque 350, 20 mL into the GI tract Implanted device: 22 [place] MARTITA gastrojejunostomy feeding tube Technique: The procedure, risks, and alternative therapies were discussed in detail, and written informed consent was obtained. A time out was performed to verify correct patient and procedure. Moderate sedation was administered as above. The patient's pulse oximetry, EKG, and blood pressure were monitored by the interventional radiology nurse at all times. The patient was placed supine on the fluoroscopy table. The anterior abdomen was prepped and draped in the usual sterile fashion. All elements of maximum sterile barrier technique were used. Preliminary fluoroscopy demonstrated barium opacification of the transverse colon. The stomach was insufflated with air via a nasogastric tube. An appropriate puncture site was chosen in the region of the mid gastric body. The skin and subcutaneous tissues were anesthetized with 1% lidocaine.  Using a 18-gauge T fastener deployment device, three T fasteners were inserted into the stomach on opposing sides of the planned incision site and locked in place for gastropexy. Intragastric position was confirmed fluoroscopically by aspiration of air and contrast injection.   A skin nick was made, and an 18-gauge needle was advanced into the stomach. Intragastric position was confirmed by aspiration of air and by contrast injection.  A stiff guidewire was inserted into the needle. A Kumpe catheter was inserted. The catheter and wire were advanced through the pylorus and into the distal duodenum.   The tract was sequentially dilated to a 24 [place] peel-away sheath.  MARTITA gastrojejunostomy feeding tube was inserted into the peel-away sheath, which was then removed. The balloon was inflated with 9 mL of saline mixed with a tiny amount of contrast.  Injection of the gastrostomy port confirmed correct placement. Injection of jejunostomy port confirmed tip position in the small bowel. The tube and balloon were pulled anteriorly and the external disc was secured.  The exit site was covered with a sterile dressing.  There were no complications and the patient tolerated the procedure well. Impression: Successful placement of a percutaneous gastrojejunostomy tube using fluoroscopic guidance. Please contact me with any questions. Esteban Ashley MD Vascular & Interventional Radiology at [facility] [phone] Pager: [phone]    XR Abdomen Port 1 View    Result Date: 2024  For Patients:  As a result of the  Cures Act, medical imaging exams and procedure reports are released immediately into your electronic medical record.  You may view this report before your referring provider.  If you have questions, please contact your health care provider. INDICATION: Check opacity of bowel prior to GJ placement COMPARISON: 2024 TECHNIQUE: 1 view abdomen, supine. FINDINGS: Devices: Pacemaker leads over the lower central chest. Moderate stool burden. Contrast seen in the distal small bowel and in the cecum to the splenic flexure. The appendix is also well opacified with contrast. Moderate stool. No dilated bowel. No unusual mass effect.    Contrast in portions of the small bowel from the cecum to the splenic flexure. Dictated by Tiffanie Navas MD @ May 31 2024 10:11AM (Electronically Signed) [email or web address]    XR Chest Port 1 View    Result Date: 2024  For Patients:  As a result of the  Cures Act, medical imaging exams and procedure reports are  released immediately into your electronic medical record.  You may view this report before your referring provider.  If you have questions, please contact your health care provider. HISTORY: Evaluate lung infiltrate. TECHNIQUE: One view of the chest. COMPARISON: 04/11/2024. FINDINGS: Feeding tube terminates within the proximal stomach. AICD. TAVR. No lung consolidation or pulmonary edema. No pneumothorax or pleural effusion. Degenerative changes of the spine. Prior cervical fusion.    1.  No focal lung infiltrate or pulmonary edema. Dictated by Lowell Coreas MD @ May 28 2024  4:10PM (Electronically Signed) [email or web address])    MR Brain w/o Contrast    Result Date: 5/28/2024  For Patients:  As a result of the 21st Century Cures Act, medical imaging exams and procedure reports are released immediately into your electronic medical record.  You may view this report before your referring provider.  If you have questions, please contact your health care provider. INDICATION: Neuro deficits. Comparison 05/27/2024. Technique: Multiplanar T1, T2, FLAIR and diffusion-weighted imaging. FINDINGS: Moderate generalized volume loss. Wedge-shaped T2/FLAIR signal hyperintensity of the right inferior anne taryn with corresponding restricted diffusion (series 8, image 8; series 6, image 33) consistent with the recent, likely subacute infarcts. No other areas of restricted diffusion. No intracranial hemorrhage. Compensatory mild dilatation ventricular system. Intracranial vascular flow voids are preserved. No mass effect. No midline shift. Scattered patchy T2/FLAIR signal hyperintensity within the white matter both supra hemispheres consistent with chronic deep white matter small vessel ischemic changes. Prominent perivascular space inferior right basal ganglia. No susceptibility artifact of remote hemorrhage. Bilateral orbits are unremarkable. Normal appearing sella. Mild mucosal thickening within the maxillary  sinuses. Remaining visualized paranasal sinuses and mastoid air cells are unremarkable.    1. Recent, likely subacute infarct of the right inferior anne taryn. 2. No other areas of restricted diffusion. 3. Moderate generalized cerebral volume loss. Chronic deep white matter small vessel ischemic changes 4. No acute or chronic intracranial hemorrhage Dictated by Herman Power MD @ 5/28/2024 12:58:21 PM (Electronically Signed)    Video swallow study with speech path TODAY    Result Date: 5/28/2024  For Patients:  As a result of the 21st Century Cures Act, medical imaging exams and procedure reports are released immediately into your electronic medical record.  You may view this report before your referring provider.   If you have questions, please contact your health care provider. CLINICAL HISTORY: Assess swallow for aspiration. TECHNIQUE: Fluoroscopy was provided for speech pathologist gurvinder during video swallow study. Please see additional report for full details and recommendations. 1 minutes and 54 seconds of fluoroscopy time was utilized. FINDINGS: Patient was given barium of varying consistencies to ingest. There was laryngeal penetration with silent aspiration during ingestion of thin barium liquids, nectar, honey.  There was no laryngeal penetration or aspiration with ingestion of pudding.  However, there was a significant amount of residue present to the vallecula and pyriform sinuses with pudding.    CT Head w/o Contrast    Result Date: 5/27/2024  For Patients:  As a result of the 21st Century Cures Act, medical imaging exams and procedure reports are released immediately into your electronic medical record.  You may view this report before your referring provider.  If you have questions, please contact your health care provider. Indication : Stroke follow-up. Technique : CT of the brain without intravenous contrast. Comparison: CT head 05/25/2024. Findings: Imaging is mildly suboptimal due to scattered streak  to motion related artifact. No acute blurring of the gray-white differentiation. There is no intracranial hemorrhage. The ventricles are proportionate to the cerebral sulci. The 4th ventricle is midline. Basal cisterns appear patent. No abnormal extra-axial fluid collection identified. Mild-to-moderate parenchymal volume loss. There is moderate patchy periventricular hypodensity, favored to represent chronic ischemic microvascular disease. Small chronic right frontal lobe infarct. Right inferior basal ganglia dilated perivascular space versus chronic infarct. Scattered intracranial atherosclerotic disease. There is no intracranial mass, mass effect or midline shift identified. No depressed calvarial fracture. Mild paranasal sinus mucosal disease. Impression: 1. No acute intracranial process. 2. Moderate chronic ischemic microvascular disease. 3. Stable small chronic right frontal lobe infarct. Please note that all CT scans at this facility use dose modulation, iterative reconstruction, and/or weight-based dosing when appropriate to reduce radiation dose to as low as reasonably achievable. Dictated by Stan Silva MD @ 2024 11:03:49 AM (Electronically Signed)    XR Abdomen Port 1 View    Result Date: 2024  For Patients:  As a result of the  Cures Act, medical imaging exams and procedure reports are released immediately into your electronic medical record.  You may view this report before your referring provider.  If you have questions, please contact your health care provider. INDICATION: Tube placement. COMPARISON: 2019. FINDINGS: A supine AP view of the abdomen was obtained. There is a feeding tube with the tip in the stomach. Dictated by Esteban Landers MD @ May 26 2024  1:01PM (Electronically Signed) [email or web address]    ECHO TRANSTHORACIC COMPLETE    Result Date: 2024  ECHOCARDIOGRAM GOLDY SHAH PAZLUIS F MRN:    [id number]        Accession#:   [id number] :     1935 88 years Study Date:   5/25/2024 3:34:37 PM Gender: M                 BP:           140/9 mmHg Height: 173.00 cm         BSA:          1.91 mÂ   Weight: 77.00 kg          Tech:         HR-TRVL                           Referring MD: KAMARI RODRÍGUEZ Site:             [facility] [place] Location: [facility] IP Patient Location: Inpatient. Procedure: 2D w/ Contrast. Indication for study: Stroke Cardiac Rhythm: Irregular.Study quality: Technically limited. Imaging limitations: This study was subject to imaging limitations due to lying in a supine position, body habitus, a prominent lung artifact and Frequent uncontrolled movements. Final Impressions:  1. Technically limited exam.  2. Echo contrast was administered to enhance visualization of all left ventricular segments.  3. Normal LV size, normal wall thickness, normal global systolic function with an estimated EF of 60 - 65%.  4. No LV mural thrombus on contrast enhanced images.  5. Grade 2 pattern of LV diastolic filling.  6. Right ventricular cavity size is not well visualized, global systolic RV function is not well visualized. Grossly RV function appears normal.  7. 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  8. The mitral valve exhibits mild prolapse of posterior leaflet, mild to moderate mitral regurgitation.  9. No atrial level intracardiac shunt evident on color Doppler imaging. Comparison Compared to prior exam of 04/07/23, there has been no significant change. Chamber Sizes and Function Normal left ventricular size, normal wall thickness, normal global systolic function with an estimated EF of 60 - 65%. Left atrial size is normal. Right ventricular cavity size is not well visualized, global systolic RV function is not well visualized. The right atrium is normal. The pulmonary artery is not  well visualized. The sinus of Valsalva is normal sized. The ascending aorta is normal sized. Valves, RV Pressures and Diastolic Function The aortic valve is functioning 31 mm CoreValve bioprosthesis replacement, no stenosis and no regurgitation. The mitral valve is mild prolapse of posterior leaflet and sclerotic, mild to moderate mitral regurgitation. Spectral Doppler shows Grade 2 pattern of LV diastolic filling. The tricuspid valve is not well visualized. Tricuspid regurgitation is regurgitation is not evident. The pulmonic valve is not well visualized. Unable to determine pulmonary regurgitation. Masses, Effusion, Shunts There is no pericardial effusion. The inferior vena cava is not well visualized, respiratory size variation not well visualized. No left to right shunting was detected by limited color flow Doppler interrogation of the interatrial septum. MEASUREMENTS AND CALCULATIONS 2-D Measurements and LV Function: LVID (d) 4.6 cm LV FS% (2D)   35 % LVID (s) 3.0 cm LVOT diameter 2.3 cm IVS (d)  1.0 cm HR            90 bpm LVPW (d) 1.0 cm Ao Sinus 2.6 cm Asc Ao   3.0 cm LA       3.3 cm Diastology: Mitral          Tissue Doppler E Peak 0.9 m/s  e', Septum     0.04 m/s A Peak 0.9 m/s  e', Lateral    0.06 m/s E/A    1.0      E/e' Average   17.52 DT     184 msec Aortic Valve: Vmax       1.2 m/s  SARAH (V)   2.32 cmÂ  VTI        0.26 m   SARAH (I)   2.60 cmÂ  LVOT V max 0.7 m/s  Max PG    6 mmHg LVOT VTI   0.16 m   Mean PG   4 mmHg SV         68 ml    Dim Index 0.63 SV index   36 ml/mÂ  CO        6.2 l/min                     CI        3.2 l/min/mÂ  Mitral Valve: MVA      4.1 cmÂ  MV P 1/2 53 msec Tricuspid Valve and estimated PA pressures: Pulmonic Valve: PV Vmax 0.9 m/s Contrast documentation: 2 ml diluted Definity, lot #[id number], NDC# [id number] was administered peripherally to enhance visualization of all left ventricular segments. Electronically signed by Eriberto Bolden MD on 5/25/2024 5:28:02 PM. This study was  interpreted by an [facility] accredited facility.   Final      CT ANGIO HEAD NECK CAROTID STROKE PROTOCOL ANGELA CANDIDAT    Result Date: 5/25/2024  For Patients:  As a result of the 21st Century Cures Act, medical imaging exams and procedure reports are released immediately into your electronic medical record.  You may view this report before your referring provider.  If you have questions, please contact your health care provider. DATE: 5/25/2024 CLINICAL HISTORY: Patient with focal neurological deficits. TECHNIQUE: Standard helical CT image acquisition of the neck up to the skull base after bolus intravenous contrast enhancement. 2D and 3D MIP images for post-processing were performed and interpreted on an independent workstation and 3D images were permanently archived. COMPARISON: CT same day. FINDINGS: The origins of the great vessels from the aortic arch are patent. The origin of the right vertebral artery is patent. The origin of the left vertebral artery is patent. The common carotid arteries are patent. There is no stenosis at the origin of the right internal carotid artery. There is no stenosis at the origin of the left internal carotid artery. The rest of the cervical segments of the internal carotid arteries are patent up to the skull base. The right vertebral artery is dominant. The cervical segments of the vertebral arteries are patent up to the skull base. The visualized lung apices are unremarkable. The thyroid gland is unremarkable. The soft tissues of the neck are unremarkable. There are degenerative changes in the cervical spine.    Patent cervical vasculature. Please note that all CT scans at this facility use dose modulation, iterative reconstruction, and/or weight-based dosing when appropriate to reduce radiation dose to as low as reasonably achievable. Dictated by Keshia Benton MD @ 5/25/2024 1:34:54 PM (Electronically Signed)    CT HEAD STROKE PROTOCOL WITHOUT CONTRAST    Result Date: 5/25/2024  For  Patients:  As a result of the 21st Century Cures Act, medical imaging exams and procedure reports are released immediately into your electronic medical record.  You may view this report before your referring provider.  If you have questions, please contact your health care provider. Indication : Left-sided weakness. Stroke. Technique : CT of the brain without intravenous contrast. Comparison: CT head 01/05/2024. Findings: No acute blurring of the gray-white differentiation. There is no intracranial hemorrhage. The ventricles are proportionate to the cerebral sulci. The 4th ventricle is midline. Basal cisterns appear patent. No abnormal extra-axial fluid collection identified. Mild-to-moderate parenchymal volume loss. There is moderate patchy periventricular hypodensity, favored to represent chronic ischemic microvascular disease. Small chronic right frontal lobe infarct. Right inferior basal ganglia dilated perivascular space versus chronic lacunar infarct. There is no intracranial mass, mass effect or midline shift identified. No depressed calvarial fracture. Mild paranasal sinus mucosal disease. Impression: 1. No acute intracranial process. 2. Moderate chronic ischemic microvascular disease. 3. Small chronic right frontal lobe infarct. The above findings were communicated over the telephone with Dr. Whatley by Dr. Silva at 1143 hours on 05/25/2024. Please note that all CT scans at this facility use dose modulation, iterative reconstruction, and/or weight-based dosing when appropriate to reduce radiation dose to as low as reasonably achievable. Dictated by Stan Silva MD @ 5/25/2024 11:49:41 AM (Electronically Signed)

## 2024-06-21 NOTE — PROGRESS NOTES
[facility]    Medicine Progress Note - Hospitalist Service    Date of Admission:  6/20/2024    Assessment & Plan   Franklyn Sorto is a 88 year old male admitted on 6/20/2024. Recently admitted at [facility] for ischemic stroke.  Presented to ED from TCU after he was noted to have increased white count on labs.  UC growing ESBL.  ID consulted; transition to meropenem today    ESBL UTI   ? CAUTI  Had catheter from recent stroke in place for 2 weeks, removed recently at facility   -UA with positive leukocyte esterase, pyuria, bacteriuria  -Urine culture with ESBL E. Coli  -Started empirically on ceftriaxone; susceptibilities showed resistance  -ID consulted; transition to IV meropenem today (6/21); anticipate 10 to 14 days of IV antibiotics  -WBC significantly elevated; CT AP for possible abscess ordered per ID; follow-up  -Trend CBC  -see below for penile lesion concern- may be nidus of infection     Question aspiration pneumonia  -CXR noting interval development of a left basilar airspace opacity, nonspecific may reflect atelectasis or consolidation  -Viral panel negative  -Was given a dose of Unasyn in the ER for suspected aspiration pneumonia  -IV meropenem as above  -SLP consult; noted mild to moderate dysphagia; recommending soft and bite-size with thin liquids    Sacral ulcer   Penile lesion  - Appreciate WOC consult for penile lesion  - Monitor sacral ulcer    Hx recent acute ischemic stroke (5/25/2024)  Residual left sided weakness   Recent admission 5/25-6/7/2024 at [facility] for ischemic stroke; discharged to TCU   -- PT/OT/CM - anticipate discharge return to TCU  -- SLP and diet as above    History of the left upper extremity DVT with thrombophilia (positive antiphospholipid antibodies, 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)  -- Continue Warfarin per pharmacy      Diabetes mellitus type 2, noninsulin dependent    Last A1c: (5/26/2024): 7.4    -- cont PTA Jardiance   -- add  low sliding scale insulin   -- metered glucose checks; hypoglycemia protocols     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    -- Appears euvolemic.    Essential HTN  -- cont PTA PTA carvedilol    Hyperlipidemia   -- continue PTA statin     Rheumatoid arthritis/Chronic Pain - PTA  PRN Baclofen, PRN methocarbamol    Gout  - Continue PTA allopurinol   GERD (gastroesophageal reflux disease)/Hx PUD - PTA PPI       Observation Goals: -diagnostic tests and consults completed and resulted, -vital signs normal or at patient baseline, -infection is improving, Nurse to notify provider when observation goals have been met and patient is ready for discharge.  Diet: Regular Diet Adult    DVT Prophylaxis: Warfarin  Javier Catheter: Not present  Lines: None     Cardiac Monitoring: None  Code Status: Full Code      Clinically Significant Risk Factors Present on Admission              # Hypoalbuminemia: Lowest albumin = 2.7 g/dL at 6/20/2024  4:04 PM, will monitor as appropriate  # Drug Induced Coagulation Defect: home medication list includes an anticoagulant medication  # Drug Induced Platelet Defect: home medication list includes an antiplatelet medication   # Hypertension: Noted on problem list            # DMII: A1C = N/A within past 6 months               Disposition Plan     Medically Ready for Discharge: Anticipated in 2-4 Days       The patient's care was discussed with the Attending Physician, Dr. Dayanara Villeda who independently met with and assessed the patient and is in agreement with the assessment and plan     Jovanna Calderón PA-C  Hospitalist Service  [facility]  Securely message with [employer] (more info)  Text page via [facility] Paging/Directory    ______________________________________________________________________    Interval History   Patient new to me today; states he was referred to ED TCU after they noted he had significantly elevated white count.  Patient does state that he has had some increased urinary urgency and dysuria.  States he had a Javier catheter removed recently and does have a penile ulcer from this. Denies any fevers, chills, sweats, nausea, vomiting, back pain.     Does have mild cough.  But denies any shortness of breath, wheezing, production from cough.  No chest pain.  No fever chills sweats as above.     Physical Exam   Vital Signs: Temp: 98.8  F (37.1  C) Temp src: Oral BP: (!) 149/70 Pulse: 73   Resp: 19 SpO2: 95 % O2 Device: None (Room air)    Weight: 166 lbs .1 oz    Constitutional: awake, alert, no apparent distress, and appears stated age  HEENT: Baseline left sided facial droop noted  Respiratory: No increased work of breathing, no accessory muscle use, clear to auscultation bilaterally, no crackles or wheezing  Cardiovascular: Regular rate and rhythm, normal S1 and S2, no S3 or S4, and no murmur noted  GI: Soft, non-distended, normal bowel sounds.  Mild tenderness with palpation over suprapubic region  Musculoskeletal: No lower extremity pitting edema present. 2+ DP pulses  Neurologic: Awake, alert. Baseline left facial droop and left sided hemiparesis  Neuropsychiatric: Appropriate mood, affect and eye contact. Cooperative.    Medical Decision Making             Data     I have personally reviewed the following data over the past 24 hrs:    N/A  \   N/A   / N/A     141 105 24.3 (H) /  112 (H)   3.9 28 0.93 \     Procal: 0.12 CRP: N/A Lactic Acid: N/A       INR:  2.96 (H) PTT:  N/A   D-dimer:  N/A Fibrinogen:  N/A       Imaging results reviewed over the past 24 hrs:   Recent Results (from the past 24 hour(s))   XR Chest Port 1 View    Narrative    EXAM: XR CHEST PORT 1 VIEW  LOCATION: [facility]  HOSPITAL  DATE: 6/20/2024    INDICATION: Recent CVA.  Evaluate for any consolidation.  COMPARISON: 4/11/2024      Impression    IMPRESSION: Left subclavian approach pacing device. Interval development left basilar airspace opacity, may reflect pleural effusion, atelectasis, and/or consolidation, singly or in combination. Recommend follow-up to resolution. No pneumothorax. Clear   right lung. Cervical fusion hardware.   XR Video Swallow with SLP or OT    Narrative    EXAM: XR VIDEO SWALLOW WITH SLP OR OT  LOCATION: [facility]  DATE: 6/21/2024    INDICATION: Difficulty swallowing.  COMPARISON: None.    TECHNIQUE: Routine swallow study with speech pathology using multiple barium thicknesses.    RADIATION DOSE: Total Air Kerma 7.4 mGy    FINDINGS:   Swallow study with Speech Pathology using multiple barium thicknesses.     Aspiration with thin liquids (cine S8), which was sensed by the patient with appropriate cough reflex. Penetration was also seen with thin and mildly thickened liquids with chin tuck maneuver, though this improved when the patient's head/neck were more   vertical as opposed to leaning to the right. Vallecular/piriform sinus stasis with pudding/solids, which decreased with swallows of thin liquids.    Cervical spinal fusion hardware noted.      Impression    IMPRESSION:  1. Aspiration with thin liquids, which was sensed by patient with appropriate cough reflex. Penetration was seen with thin and mildly thickened liquids with chin tuck, though this improved when the patient's head and neck were more vertical.  2. Please see speech pathology report for further details and recommendations.

## 2024-06-21 NOTE — CONSULTS
Care Management Initial Consult    General Information  Assessment completed with: Patient, pt  Type of CM/SW Visit: Initial Assessment    Primary Care Provider verified and updated as needed: No   Readmission within the last 30 days: current reason for admission unrelated to previous admission   Return Category: New Diagnosis  Reason for Consult: discharge planning, length of stay, facility placement  Advance Care Planning: Advance Care Planning Reviewed: no concerns identified     General Information Comments: lives w/spouse, came from [facility] in [place] and has bed hold in place and can return on Sunday. discussed VACA.    Communication Assessment  Patient's communication style: spoken language (English or Bilingual)    Hearing Difficulty or Deaf: no   Wear Glasses or Blind: yes    Cognitive  Cognitive/Neuro/Behavioral: WDL                      Living Environment:   People in home: spouse     Current living Arrangements: house      Able to return to prior arrangements: other (see comments)       Family/Social Support:  Care provided by: spouse/significant other  Provides care for: no one, no one, unable/limited ability to care for self  Marital Status:   Wife          Description of Support System: Supportive, Involved    Support Assessment: Adequate family and caregiver support, Adequate social supports    Current Resources:   Patient receiving home care services: No     Community Resources: Transitional Care  Equipment currently used at home: lift device  Supplies currently used at home: None    Employment/Financial:  Employment Status:          Financial Concerns: none   Referral to Financial Worker: No       Does the patient's insurance plan have a 3 day qualifying hospital stay waiver?  No    Lifestyle & Psychosocial Needs:  Social Determinants of Health     Food Insecurity: No Food Insecurity (5/15/2023)    Received from [facility]  [facility]    Food Insecurity     Worried About Running Out of Food in the Last Year: 1   Depression: Not at risk (4/30/2024)    Received from [facility]    PHQ-2     PHQ-2 TOTAL SCORE: 0   Housing Stability: Low Risk  (5/15/2023)    Received from [facility]    Housing Stability     Unable to Pay for Housing in the Last Year: 1   Tobacco Use: Medium Risk (6/11/2024)    Received from [facility]    Patient History     Smoking Tobacco Use: Former     Smokeless Tobacco Use: Never     Passive Exposure: Not on file   Financial Resource Strain: Medium Risk (5/15/2023)    Received from [facility], [facility]    Financial Resource Strain     Difficulty of Paying Living Expenses: 1     Difficulty of Paying Living Expenses: 2   Alcohol Use: Not on file   Transportation Needs: No Transportation Needs (6/13/2024)    Received from [facility]    Transportation Needs     Lack of Transportation (Medical): 1   Physical Activity: Not on file   Interpersonal Safety: Not on file   Stress: Not on file   Social Connections: Unknown (5/15/2024)    Received from [facility]    Social Connections     Frequency of Communication with Friends and Family: Not on file   Health Literacy: Not on file       Functional Status:  Prior to admission patient needed assistance:   Dependent ADLs:: Bathing, Dressing, Grooming, Transfers, Toileting, Wheelchair-with assist  Dependent IADLs:: Cleaning, Cooking, Laundry, Shopping, Meal Preparation, Money Management, Transportation  Assesssment of Functional Status: Not at baseline with mobility, Not at  functional baseline, Needs placement in a SNF/TCF for rehabilitation, Not at baseline with ADL  Functioning    Mental Health Status:  Mental Health Status: No Current Concerns       Chemical Dependency Status:                Values/Beliefs:  Spiritual, Cultural Beliefs, Anabaptist Practices, Values that affect care:                 Additional Information:  Assessed, lives w/spouse, [facility] [place] and has bed hold in place and can return on Sunday. discussed VACA and CM to follow for discharge planning.    Abrahan Macias RN

## 2024-06-21 NOTE — ED NOTES
[facility] ED Handoff Report    ED Chief Complaint: Abnormal labs    ED Diagnosis:  (N30.00) Acute cystitis without hematuria  Comment: None  Plan: See Provider's note    (J69.0) Aspiration pneumonia, unspecified aspiration pneumonia type, unspecified laterality, unspecified part of lung (H)  Comment: Denies SOB for this at this time.  Plan: Antibiotics See MAR    (Z86.73) History of CVA (cerebrovascular accident)  Comment: A month ago. Receiving therapy at assisted living.  Plan: See Provider's note    (T19.0XXA) Urethral foreign body, initial encounter  Comment: None  Plan: See Provider's note    (Z79.01) Anticoagulated on Coumadin  Comment: None  Plan: See Provider's note       PMH:  History reviewed. No pertinent past medical history.     Code Status:  No Order     Falls Risk: Yes Band: Applied    Current Living Situation/Residence: lives in an assisted living facility     Elimination Status: Continent: Yes     Activity Level: Total assist/lift    Patients Preferred Language:  English     Needed: No    Vital Signs:  BP (!) 148/67   Pulse 72   Temp 98.2  F (36.8  C) (Oral)   Resp 18   Wt 78.5 kg (173 lb)   SpO2 94%   BMI 25.55 kg/m       Cardiac Rhythm: NA    Pain Score: 0/10    Is the Patient Confused:  No    Last Food or Drink: 06/20/24    Focused Assessment:  He had a stroke a month ago and has been receiving therapy in assisted living. They felt the need for an xray and labs over the past few days. They discovered an elevated white count and some issue with his chest xray. Turns out he has pneumonia and his white count is elevated. He has denies SOB and pain for me. He does have an ulcer on his buttocks and I placed a pillow there for his discomfort. I did not assess the ulcer as the provider did it while I was not with them. He does have family at bedside. He is alert and oriented and able to make his needs known.    Tests Performed: Done: Labs and Imaging    Treatments Provided:   See MAR    Family Dynamics/Concerns: No    Family Updated On Visitor Policy: Yes    Plan of Care Communicated to Family: Yes    Who Was Updated about Plan of Care: Wife present    Belongings Checklist Done and Signed by Patient: No    Belongings Sent with Patient: None    Medications sent with patient: None    Covid: asymptomatic , negative    Additional Information: None    RN: Castro 6/20/2024 7:02 PM

## 2024-06-21 NOTE — MEDICATION SCRIBE - ADMISSION MEDICATION HISTORY
Medication Scribe Admission Medication History    Admission medication history is complete. The information provided in this note is only as accurate as the sources available at the time of the update.    Information Source(s): Facility ([facility] medication list/MAR via  printout    Pertinent Information: PTA med list updated based on what facility provided.    Changes made to PTA medication list:  Added: Acetaminophen, Creon, Erythromycin, Lansoprazole, Melatonin, Methocarbamol, Nitroglycerin, Nystatin, Ondansetron, Ozempic, Rosuvastatin, Sennoside, Sodium bicarb,  Systane, Vitamin D3, Voltaren, Dialudid (per facility list)  Deleted: Alprazolam, Azathioprine, Furosemide, Remicade, Ketoconazole, multivitamin, Mupirocin, Omega-3, Omeprazole (per facility list)  Changed: None    Allergies reviewed with patient and updates made in EHR: yes    Medication History Completed By: Yan Guadalupe 6/20/2024 10:15 PM    PTA Med List   Medication Sig Last Dose    acetaminophen (TYLENOL) 500 MG tablet Take 1,000 mg by mouth 3 times daily as needed for mild pain Unknown at prn    allopurinol (ZYLOPRIM) 300 MG tablet Take 300 mg by mouth daily 6/20/2024 at am    aspirin 81 MG EC tablet Take 81 mg by mouth daily 6/20/2024 at am    Baclofen (LIORESAL) 5 MG tablet Take 5 mg by mouth 3 times daily 6/20/2024 at am    Baclofen (LIORESAL) 5 MG tablet Take 5 mg by mouth daily as needed for muscle spasms Unknown at prn    bisacodyl (DULCOLAX) 10 MG suppository Place 10 mg rectally daily as needed for constipation Unknown at prn    carvedilol (COREG) 6.25 MG tablet Take 25 mg by mouth 2 times daily 6/20/2024 at Am    COLCRYS 0.6 MG tablet Take 0.6 mg by mouth as needed Unknown at prn    diclofenac (VOLTAREN) 1 % topical gel Apply 2 g topically 4 times daily 6/20/2024 at am    empagliflozin (JARDIANCE) 25 MG TABS tablet Take 1 tablet (25 mg) by mouth daily 6/20/2024 at am    ENTRESTO  MG per tablet Take 1 tablet by mouth 2 times  daily 6/20/2024 at am    erythromycin (ROMYCIN) 5 MG/GM ophthalmic ointment Place into both eyes at bedtime 6/19/2024 at hs    HYDROmorphone (DILAUDID) 2 MG tablet Take 2 mg by mouth 3 times daily as needed for pain Unknown at prn    LANsoprazole (PREVACID SOLUTAB) 30 MG ODT Place 30 mg under the tongue every morning (before breakfast) 6/20/2024 at am    lipase-protease-amylase (CREON 12) 74305-7[address]-23556 units CPEP Take 1 capsule by mouth as needed Unknown at prn    melatonin 3 MG tablet Take 3 mg by mouth nightly as needed for sleep 6/18/2024 at hs    methocarbamol (ROBAXIN) 500 MG tablet Take 500 mg by mouth 3 times daily 6/20/2024 at am    nitroGLYcerin (NITROSTAT) 0.4 MG sublingual tablet Place 0.4 mg under the tongue every 5 minutes as needed for chest pain For chest pain place 1 tablet under the tongue every 5 minutes for 3 doses. If symptoms persist 5 minutes after 1st dose call 911. Unknown at prn    nystatin (MYCOSTATIN) 448435 UNIT/GM external powder Apply topically 2 times daily as needed for other Unknown at prn    ondansetron (ZOFRAN ODT) 4 MG ODT tab Take 4 mg by mouth every 8 hours as needed for nausea Unknown at prn    polyethylene glycol-propylene glycol (SYSTANE) 0.4-0.3 % SOLN ophthalmic solution Apply 2 drops to eye 2 times daily 6/20/2024 at am    rosuvastatin (CRESTOR) 20 MG tablet Take 1 tablet by mouth at bedtime 6/18/2024 at hs    Semaglutide, 1 MG/DOSE, (OZEMPIC, 1 MG/DOSE,) 4 MG/3ML pen Inject 1 mg Subcutaneous once a week 6/18/2024 at am    Sennosides 17.2 MG TABS 17.2 mg by Enteral route daily 6/20/2024 at am    sodium bicarbonate 650 MG tablet 650 mg by Per G Tube route as needed for heartburn Unknown at prn    vitamin D3 (CHOLECALCIFEROL) 50 mcg (2000 units) tablet Take 2 tablets by mouth daily 6/20/2024 at am    warfarin ANTICOAGULANT (COUMADIN) 4 MG tablet Take 4 mg by mouth five times a week Take 4 mg on Monday, Tuesday, Thursday, Friday and Saturday 6/18/2024 at pm    warfarin  ANTICOAGULANT (COUMADIN) 6 MG tablet Take 6 mg by mouth twice a week Take 6 mg on Sunday and Wednesday. 6/19/2024 at pm

## 2024-06-21 NOTE — PLAN OF CARE
PRIMARY DIAGNOSIS: PNEUMONIA  OUTPATIENT/OBSERVATION GOALS TO BE MET BEFORE DISCHARGE:  Dyspnea improved and O2 sats >88% on RA or back to baseline O2 levels: No   SpO2: 95 %, O2 Device: None (Room air)    Tolerating oral abx or appropriate plans made outpatient infusion: Yes    Vitals signs normal or return to baseline: Yes    Short term supplemental O2 needed with activity at home: No    Tolerate oral intake to maintain hydration: Yes    Return to near baseline physical activity: No    Discharge Planner Nurse   Safe discharge environment identified: No  Barriers to discharge: Yes       Entered by: Gisel Stewart RN 06/21/2024 5:09 AM  Pt Alert and oriented, vitals stable, Denies SOB, Left side weakness, pt has difficulty swallowing, ensure to elevate head of the bed to 90 degrees when drinking water. Pace maker left on his chest, Pressure ulcer at the coccyx, Cleansed with NS and dressed with mepilex, wound consult ordered.

## 2024-06-21 NOTE — PROGRESS NOTES
Transferred to P2 at 6:38 PM   Reason for transfer: Inpatient  Report given to: RN  Family Notified: Family present  Detailed list of belongings sent: Cell phone, , dentures, glasses, 2x bags  Comments:

## 2024-06-21 NOTE — PROGRESS NOTES
"Speech-Language Pathology: Video Swallow Study     06/21/24 1500   Appointment Info   Signing Clinician's Name / Credentials (SLP) Radha Kolb MA, CCC-SLP   General Information   Onset of Illness/Injury or Date of Surgery 06/20/24   Referring Physician Federico Cota, DO   Pertinent History of Current Problem Per ordering provider \"Franklyn Sorto is a 88 year old male who is presenting from outpatient lab visit. He is presenting from lab appointment where he was noted to have leukocytosis and told to come to ER. He notes increased urinary frequency. No dysuria. No fevers or chills. No diarrhea, cough, shortness of breath. States he had cutler placed during his stroke which caused some pain and lesion. He had a catheter in place for 2 weeks at time of stroke and it was just removed a few days ago.\".   General Observations Alert and cooperative   Type of Evaluation   Type of Evaluation Swallow Evaluation   General Swallowing Observations   Past History of Dysphagia Pt had VFSS on 5/28/24 and 6/5/24. Results from most recent as follows \" Pt with much improved oropharyngeal swallow function relative to initial evaluation, now judged to present with mild-moderate oropharyngeal dysphagia. Pt with overall improved swallow efficiency, coordination, airway protection and clearance and no aspiration is directly appreciated during this study, however note that visualization somewhat limited during this study d/t pt positioning/field obstruction as this study completed with pt in tilt-inspace wheelchair. Will plan for cautious PO diet initiation with adherence to safe PO strategie and close monitoring of tolerance and respiratory status and repeat VFSS as indicated. Pt may continue to benefit from supplemental non-oral nutrition pending BERNADINE and tolerance.\". Pt reports he was eating very soft food that the TCU prior to admission. He also stated that he couldn't tolerate thickened liquids anymore so they advanced him to " thin with strategies. He reports completing ph ex with SLP at [facility].   Current Diet/Method of Nutritional Intake (General Swallowing Observations, NIS) soft and bite-sized (dysphagia advanced) (level 6);thin liquids (level 0)   Swallowing Evaluation Videofluoroscopic swallow study (VFSS)   VFSS Evaluation   Radiologist Dr. Silevr   Views Taken left lateral   [facility] Tri[facility] thin liquids;mildly thick liquids;pureed;soft & bite-sized   VFSS Eval: Thin Liquid Texture Trial   Mode of Presentation, Thin Liquid cup;straw   Order of Presentation 1,2,3,4,5,6,9,13   Preparatory Phase WFL   Oral Phase, Thin Liquid premature pharyngeal entry   Bolus Location When Swallow Triggered valleculae;pyriforms   Pharyngeal Phase, Thin Liquid impaired tongue base retraction   Rosenbek's Penetration Aspiration Scale: Thin Liquid Trial Results 7 - contrast passes glottis, visible subglottic residue remains despite patient's response (aspiration)   Response to Aspiration unproductive reflexive cough  (Pt did eventually clear aspirated material with cues to cough hard and swallow.)   Strategies and Compensations chin tuck;reduce bolus size  (Head center for chin tuck)   VFSS Eval: Mildly Thick Liquids   Mode of Presentation cup   Order of Presentation 7,8   Preparatory Phase WFL   Oral Phase premature pharyngeal entry   Bolus Location When Swallow Triggered posterior laryngeal surface of epiglottis   Pharyngeal Phase impaired tongue base retraction;residue in vallecula;residue in pyriform sinus   Rosenbek's Penetration Aspiration Scale 2 - contrast enters airway, remains above the vocal cords, no residue remains (penetration)   Strategies and Compensations chin tuck   Diagnostic Statement transient penetration and increased stasis compared to thin   VFSS Evaluation: Puree Solid Texture Trial   Mode of Presentation, Puree spoon   Order of Presentation 10   Preparatory Phase WFL   Oral Phase,  Puree WFL   Bolus Location When Swallow Triggered valleculae   Pharyngeal Phase, Puree impaired tongue base retraction;residue in vallecula;residue in pyriform sinus;impaired pharyngoesophageal segment opening   Rosenbek's Penetration Aspiration Scale: Puree Food Trial Results 1 - no aspiration, contrast does not enter airway   Strategies and Compensations chin tuck   VFSS Eval: Soft & Bite Sized   Mode of Presentation spoon   Order of presentation 11,12   Preparatory Phase WFL   Oral Phase WFL   Bolus Location When Swallow Triggered valleculae   Pharyngeal Phase impaired tongue base retraction;residue in vallecula;residue in pyriform sinus;impaired pharyngoesophageal segment opening   Rosenbek's Penetration Aspiration Scale 1 - no aspiration, contrast does not enter airway   Strategies and Compensations chin tuck;liquid wash   Diagnostic Statement liquid wash clears stasis   Swallowing Recommendations   Diet Consistency Recommendations thin liquids (level 0);soft & bite-sized (level 6)   Supervision Level for Intake distant supervision needed   Mode of Delivery Recommendations bolus size, small   Postural Recommendations chin tuck   Swallowing Maneuver Recommendations alternate food and liquid intake   Monitoring/Assistance Required (Eating/Swallowing) stop eating activities when fatigue is present   Recommended Feeding/Eating Techniques (Swallow Eval) maintain upright sitting position for eating;set-up and prepare tray   Medication Administration Recommendations, Swallowing (SLP) One at a time in [facility]   General Therapy Interventions   Planned Therapy Interventions Dysphagia Treatment   Dysphagia treatment Modified diet education;Instruction of safe swallow strategies;Compensatory strategies for swallowing   Clinical Impression   Criteria for Skilled Therapeutic Interventions Met (SLP Eval) Yes, treatment indicated   SLP Diagnosis dysphagia   Risks & Benefits of therapy have been explained evaluation/treatment  results reviewed;care plan/treatment goals reviewed;participants included;participants voiced agreement with care plan;patient;spouse/significant other   Clinical Impression Comments Videofluoroscopic Swallow Study completed. Patient had aspiration with thin via straw and inadequate use of chin tuck with a cough. Ongoing cues for a hard cough provided at the end of study which did eventually clear aspirated material. Transient penetration with mildly thick and thin with use of the chin tuck.  Pt was cued to hold head midline and then tuck chin to the center, with this posture he has no aspiration or penetration when taking small cup sips of thin. Oral phase is WFL. Tongue base retraction was reduced. Swallow response was delayed, more significant with larger boluses of thin. Epiglottic inversion was complete, slow to invert. Mild stasis occurred with mildly thick. Trace-mild stasis at the valleculae and pyriform sinuses occurred with solids/puree. Stasis clears with liquid wash. Hyolaryngeal elevation and hyolaryngeal excursion were present but unable to adequately assess d/t chin tuck posture.  Mastication WFL. 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 Somewhat improved from last study, ongoing therapy for strategies and exercises.   SLP Total Evaluation Time   Evaluation, videofluoroscopic eval of swallow function Minutes (52684) 20   SLP Discharge Planning   SLP Plan Fri1/5; swallow strats and ph ex   SLP Brief overview of current status  Recommend Soft and Bite sized with Thin liquids. Strategies of small bites/sips, chin tuck, and alternate liquids/solids. NO straws. Pt must be upright and head midline for all intake.   Total Session Time   Total Session Time (sum of timed and untimed services) 20

## 2024-06-22 NOTE — PROGRESS NOTES
"INFECTIOUS DISEASE FOLLOW UP NOTE    Date: 2024   CHIEF COMPLAINT:   Chief Complaint   Patient presents with    Abnormal Labs        ASSESSMENT:    ESBL UTI. Recent cutler catheter. Symptomatic after catheter removal. Leukocytosis higher than expected for cystitis alone. CT with possible cholecystitis, US planned.  Abnormal Chest X ray. Atelectasis vs consolidation vs pleural effusions.   Recent CVA with resulting L hemiplegia.   TAVR, pacemaker, DM, CHF, DVT, anti-phospholipid syndrome.     PLAN:  - meropenem IV for ESBL e coli  - CT with possible cholecystis, no RUQ pain, US planned  - anticipate 10-14d antibiotics pending additional workup  - further recommendations to follow clinical course  - ID will follow, thanks    Rosalina Ramirez MD  [facility]   Office Telephone [phone].  Fax [phone]  [employer] paging    ______________________________________________________________________    SUBJECTIVE / INTERVAL HISTORY:  First visit by me. Feels ok. Tolerating antibiotics. Discussed imaging and micro.     ROS: All other systems negative except as listed above.    SH/FH/Habits/PMH reviewed and unchanged.    OBJECTIVE:  /60 (BP Location: Right arm)   Pulse 70   Temp 97.8  F (36.6  C) (Oral)   Resp 20   Wt 73 kg (160 lb 15 oz)   SpO2 97%   BMI 23.77 kg/m       Resp: 20      Vital Signs  Temp: 97.8  F (36.6  C)  Temp src: Oral  Resp: 20  Pulse: 70  Pulse Rate Source: Monitor  BP: 130/60  BP Location: Right arm    Temp (24hrs), Av.4  F (36.9  C), Min:97.8  F (36.6  C), Max:98.7  F (37.1  C)      GEN: No acute distress.    RESPIRATORY:  Normal breathing pattern. Clear to auscultation bilaterally  CARDIOVASCULAR:  Regular rate and rhythm. No murmur, click, gallop or rub.   ABDOMEN:  Soft, normal bowel sounds, non-tender,   EXTREMITIES: No edema.  SKIN/HAIR/NAILS:  No rashes  IV:  peripheral IV      Antibiotics:  meropenem    Pertinent labs:  No results found for: \"CRP\" "   CBC RESULTS:   Recent Labs   Lab Test 06/22/24  0613   WBC 16.7*   RBC 4.11*   HGB 12.9*   HCT 40.2   MCV 98   MCH 31.4   MCHC 32.1   RDW 13.2         Last Comprehensive Metabolic Panel:  Sodium   Date Value Ref Range Status   06/22/2024 140 135 - 145 mmol/L Final     Comment:     Reference intervals for this test were updated on 09/26/2023 to more accurately reflect our healthy population. There may be differences in the flagging of prior results with similar values performed with this method. Interpretation of those prior results can be made in the context of the updated reference intervals.      Potassium   Date Value Ref Range Status   06/22/2024 3.8 3.4 - 5.3 mmol/L Final   11/29/2019 4.3 3.5 - 5.0 mmol/L Final     Chloride   Date Value Ref Range Status   06/22/2024 104 98 - 107 mmol/L Final   11/29/2019 107 98 - 107 mmol/L Final     Carbon Dioxide (CO2)   Date Value Ref Range Status   06/22/2024 24 22 - 29 mmol/L Final   11/29/2019 26 22 - 31 mmol/L Final     Anion Gap   Date Value Ref Range Status   06/22/2024 12 7 - 15 mmol/L Final   11/29/2019 7 5 - 18 mmol/L Final     Glucose   Date Value Ref Range Status   11/29/2019 126 (H) 70 - 125 mg/dL Final     GLUCOSE BY METER POCT   Date Value Ref Range Status   06/22/2024 127 (H) 70 - 99 mg/dL Final     Urea Nitrogen   Date Value Ref Range Status   06/22/2024 24.8 (H) 8.0 - 23.0 mg/dL Final   11/29/2019 10 8 - 28 mg/dL Final     Creatinine   Date Value Ref Range Status   06/22/2024 0.74 0.67 - 1.17 mg/dL Final     GFR Estimate   Date Value Ref Range Status   06/22/2024 88 >60 mL/min/1.73m2 Final     Comment:     eGFR calculated using 2021 CKD-EPI equation.   11/29/2019 >60 >60 mL/min/1.73m2 Final     Calcium   Date Value Ref Range Status   06/22/2024 8.2 (L) 8.8 - 10.2 mg/dL Final        MICROBIOLOGY DATA:  Personally reviewed.  7-Day Micro Results       Collected Updated Procedure Result Status      06/20/2024 1728 06/21/2024 1112 Urine Culture  [[id number]]   (Abnormal)   Urine, Clean Catch    Final result Component Value   Culture >100,000 CFU/mL Escherichia coli    Susceptibilities done on previous cultures               06/20/2024 1604 06/20/2024 1651 Symptomatic Influenza A/B, RSV, & SARS-CoV2 PCR (COVID-19) Nose [[id number]]    Swab from Nose    Final result Component Value   Influenza A PCR Negative   Influenza B PCR Negative   RSV PCR Negative   SARS CoV2 PCR Negative   NEGATIVE: SARS-CoV-2 (COVID-19) RNA not detected, presumed negative.            06/19/2024 1230 06/20/2024 2211 Urine Culture [[id number]]    (Abnormal)   Urine, NOS    Final result Component Value   Culture >100,000 CFU/mL Escherichia coli ESBL        Susceptibility        Escherichia coli ESBL      MARTITA      Amikacin <=2 ug/mL Susceptible  [*]       Ampicillin >=32 ug/mL Resistant      Ampicillin/ Sulbactam 4 ug/mL Susceptible  [*]       Cefazolin >=64 ug/mL Resistant  [1]       Cefepime  Resistant      Cefotaxime  Resistant  [*]       Cefoxitin <=4 ug/mL Susceptible      Ceftazidime  Resistant      Ceftriaxone  Resistant      Ciprofloxacin >=4 ug/mL Resistant      Extended Spectrum Beta-Lactamase Positive ug/mL ESBL Positive  [*]       Gentamicin <=1 ug/mL Susceptible      Levofloxacin >=8 ug/mL Resistant      Meropenem <=0.25 ug/mL Susceptible  [2]       Nitrofurantoin <=16 ug/mL Susceptible      Piperacillin/Tazobactam <=4 ug/mL Susceptible      Tobramycin <=1 ug/mL Susceptible      Trimethoprim/Sulfamethoxazole >16/304 ug/mL Resistant                   [*]  Suppressed Antibiotic     [1]  Cefazolin MARTITA breakpoints are for the treatment of uncomplicated urinary tract infections. For the treatment of systemic infections, please contact the laboratory for additional testing.     [2]  Enterobacterales that are susceptible to meropenem are usually susceptible to ertapenem.               Susceptibility Comments       Escherichia coli ESBL    ESBL (extended spectrum beta  lactamase) producing organisms require contact precautions.                        RADIOLOGY:  Personally Reviewed.  Recent Results (from the past 24 hour(s))   XR Video Swallow with SLP or OT    Narrative    EXAM: XR VIDEO SWALLOW WITH SLP OR OT  LOCATION: [facility]  DATE: 6/21/2024    INDICATION: Difficulty swallowing.  COMPARISON: None.    TECHNIQUE: Routine swallow study with speech pathology using multiple barium thicknesses.    RADIATION DOSE: Total Air Kerma 7.4 mGy    FINDINGS:   Swallow study with Speech Pathology using multiple barium thicknesses.     Aspiration with thin liquids (cine S8), which was sensed by the patient with appropriate cough reflex. Penetration was also seen with thin and mildly thickened liquids with chin tuck maneuver, though this improved when the patient's head/neck were more   vertical as opposed to leaning to the right. Vallecular/piriform sinus stasis with pudding/solids, which decreased with swallows of thin liquids.    Cervical spinal fusion hardware noted.      Impression    IMPRESSION:  1. Aspiration with thin liquids, which was sensed by patient with appropriate cough reflex. Penetration was seen with thin and mildly thickened liquids with chin tuck, though this improved when the patient's head and neck were more vertical.  2. Please see speech pathology report for further details and recommendations.   CT Abdomen Pelvis w Contrast    Narrative    EXAM: CT ABDOMEN PELVIS W CONTRAST  LOCATION: [facility]  DATE: 6/21/2024    INDICATION: UTI, leukocytosis, evaluate for abscess  COMPARISON: 6/20/2021  TECHNIQUE: CT scan of the abdomen and pelvis was performed following injection of IV contrast. Multiplanar reformats were obtained. Dose reduction techniques were used.  CONTRAST: 81 mL of Isovue-370    FINDINGS:   LOWER CHEST: Trace bilateral pleural effusions. Normal size heart. No pericardial effusion. Prosthetic aortic valves.  Mitral annular calcifications. Pacing leads. Severe coronary artery calcifications.    HEPATOBILIARY: Cholelithiasis. Gallbladder wall thickening.    PANCREAS: Normal.    SPLEEN: Normal.    ADRENAL GLANDS: Normal.    KIDNEYS/BLADDER: No obstructing renal or ureteral stones. No hydroureteronephrosis.    BOWEL: GJ tube with tip in jejunum. Diverticulosis. No diverticulitis, colitis, or obstruction. No free air or fluid. No intra-abdominal abscess.    LYMPH NODES: Normal.    VASCULATURE: No aneurysm. Moderate to severe atherosclerotic vascular calcifications.    PELVIC ORGANS: Normal.    MUSCULOSKELETAL: Mild to moderate multilevel discogenic degenerative change.      Impression    IMPRESSION:   1.  Small bilateral pleural effusions.  2.  GJ tube in situ.  3.  Cholelithiasis with gallbladder wall thickening, correlate to exclude cholecystitis.  4.  Diverticulosis. No diverticulitis, colitis, or obstruction.  5.  Coronary artery disease and atherosclerotic vascular disease.       Principal Problem:    Acute cystitis without hematuria  Active Problems:    History of CVA (cerebrovascular accident)    Aspiration pneumonia, unspecified aspiration pneumonia type, unspecified laterality, unspecified part of lung (H)    Anticoagulated on Coumadin    Urethral foreign body, initial encounter

## 2024-06-22 NOTE — PLAN OF CARE
Pt denied pain.  Pt NPO after breakfast in preparation for an abdominal ultrasound that will be done this afternoon to evaluate pt's gallbladder as his LFTs were recently elevated.  Per speech, pt need to be centered in bed and fully upright for all PO intake.   Significant other, Narda, present this afternoon and updated.

## 2024-06-22 NOTE — PROGRESS NOTES
06/22/24 1309   Appointment Info   Signing Clinician's Name / Credentials (OT) Tabitha Roper OT   Living Environment   People in Home significant other   Current Living Arrangements house  ([facility])   Home Accessibility stairs within home;stairs to enter home   Number of Stairs, Main Entrance 3   Stair Railings, Main Entrance other (see comments)  (grab bars)   Number of Stairs, Within Home, Primary greater than 10 stairs   Stair Railings, Within Home, Primary railings safe and in good condition   Transportation Anticipated car, drives self   Living Environment Comments was independent w/ADLs and mobility   Self-Care   Usual Activity Tolerance good   Current Activity Tolerance fair   Equipment Currently Used at Home cane, straight;walker, rolling;grab bar, tub/shower;shower chair;raised toilet seat   Fall history within last six months yes   Number of times patient has fallen within last six months 3   Instrumental Activities of Daily Living (IADL)   Previous Responsibilities driving  (SO does all IADLs)   General Information   Onset of Illness/Injury or Date of Surgery 06/20/24   Referring Physician Dr Harris   Patient/Family Therapy Goal Statement (OT) go to TCU   Additional Occupational Profile Info/Pertinent History of Current Problem Franklyn Sorto is a 88 year old male admitted on 6/20/2024. Recently admitted at [facility] for ischemic stroke.  Presented to ED from TCU after he was noted to have increased white count on labs.  UC growing ESBL.  Abdominal CT shows cholelithiasis and gallbladder wall thickening.   Existing Precautions/Restrictions fall   Left Upper Extremity (Weight-bearing Status) full weight-bearing (FWB)   Right Upper Extremity (Weight-bearing Status) full weight-bearing (FWB)   Left Lower Extremity (Weight-bearing Status) full weight-bearing (FWB)   Right Lower Extremity (Weight-bearing Status) full weight-bearing (FWB)   Cognitive Status Examination   Orientation Status  orientation to person, place and time   Follows Commands follows multi-step commands;50-74% accuracy   Visual Perception   Visual Impairment/Limitations corrective lenses full-time   Sensory   Sensory Quick Adds left LE;right LE   Left LE Sensory Assessment impaired   Right LE Sensory Assessment impaired   Pain Assessment   Patient Currently in Pain No   Posture   Posture forward head position   Range of Motion Comprehensive   General Range of Motion upper extremity range of motion deficits identified   Comment, General Range of Motion LUE no AROM pt's does have   General Upper Extremity Assessment (Range of Motion)   Comment: Upper Extremity ROM no active ROM  does have shoulder pain asked for a sling   Upper Extremity: Range of Motion shoulder, left: UE ROM   Strength Comprehensive (MMT)   General Manual Muscle Testing (MMT) Assessment upper extremity strength deficits identified   Comment, General Manual Muscle Testing (MMT) Assessment LUE no AROM   Upper Extremity (Manual Muscle Testing)   Upper Extremity: Manual Muscle Testing (MMT) left shoulder strength deficit   Comment, MMT: Upper Extremity no AROM   Muscle Tone Assessment   Muscle Tone Quick Adds No deficits were identified   Coordination   Upper Extremity Coordination Left UE impaired   Fine Motor Coordination LUE due to recent CVA   Bed Mobility   Bed Mobility supine-sit;sit-supine   Supine-Sit [place] (Bed Mobility) maximum assist (25% patient effort);2 person assist   Sit-Supine [place] (Bed Mobility) maximum assist (25% patient effort);2 person assist   Transfers   Transfers sit-stand transfer   Sit-Stand Transfer   Sit-Stand [place] (Transfers) maximum assist (25% patient effort);2 person assist   Assistive Device (Sit-Stand Transfers) other (see comments)  (hand hold assist)   Balance   Balance Assessment standing dynamic balance   Standing Balance: Dynamic maximum assist;2-person assist   Balance Comments unable to come to a full  stand   Activities of Daily Living   BADL Assessment/Intervention lower body dressing   Lower Body Dressing Assessment/Training   [place] Level (Lower Body Dressing) dependent (less than 25% patient effort)   Clinical Impression   Criteria for Skilled Therapeutic Interventions Met (OT) Yes, treatment indicated   OT Diagnosis Cystitis, recent  R CVA   Influenced by the following impairments fatigue, decreased ADLs/balance   OT Problem List-Impairments impacting ADL activity tolerance impaired;balance   Assessment of Occupational Performance 3-5 Performance Deficits   Identified Performance Deficits fatigue, decreased ADLs,sit/standing balance   Planned Therapy Interventions (OT) ADL retraining   Clinical Decision Making Complexity (OT) detailed assessment/moderate complexity   Risk & Benefits of therapy have been explained evaluation/treatment results reviewed;care plan/treatment goals reviewed;risks/benefits reviewed;participants voiced agreement with care plan   OT Total Evaluation Time   OT Eval, Moderate Complexity Minutes (05333) 10   OT Goals   Therapy Frequency (OT) 4 times/week   OT Predicted Duration/Target Date for Goal Attainment 06/28/24   OT Goals Hygiene/Grooming;OT Goal 1;OT Goal 2   OT: Hygiene/Grooming minimal assist  (sitting supported)   OT: Goal 1 pt to complete BUE clasped hand ex for LUE w/written directions only   OT: Goal 2 sit EOB w/CGA  for 5 minutes while completing G/H   Interventions   Interventions Quick Adds Self-Care/Home Management;Neuromuscular Re-education   Self-Care/Home Management   Self-Care/Home Mgmt/ADL, Compensatory, Meal Prep Minutes (16248) 10   Symptoms Noted During/After Treatment (Meal Preparation/Planning Training) fatigue   Treatment Detail/Skilled Intervention STS transfer Max if 2 for 5 seconds-pt unable to straighten body in an upright position, sat EOB w/up to Mod A of 1-unable to sit unsupported, G/H Max A due to fatigue and 1 hand use   OT Discharge Planning    OT Plan sit EOB Ax2, STS Max of 2, G/H, UE clasped hand ex/neuro re-ed, L shoulder pain-nsg checking if pt can have a sling   OT Discharge Recommendation (DC Rec) Transitional Care Facility   OT Rationale for DC Rec pt still needs further therapy at a TCU due recent CVA/decline in all ADLs/mobility   OT Brief overview of current status Max of 2 STS, total assist w/ADLs

## 2024-06-22 NOTE — PROGRESS NOTES
Daily Progress Note    Assessment/Plan:  Franklyn Sorto is a 88 year old male admitted on 6/20/2024. Recently admitted at [facility] for ischemic stroke.  Presented to ED from TCU after he was noted to have increased white count on labs.  UC growing ESBL.  Abdominal CT shows cholelithiasis and gallbladder wall thickening.     ESBL UTI, cholelithiasis with gallbladder wall thickening  ? CAUTI  Had catheter from recent stroke in place for 2 weeks, removed recently at facility   -Urine culture with ESBL E. Coli  -Started empirically on ceftriaxone; susceptibilities showed resistance  -ID consulted; transition to IV meropenem  (6/21); anticipate 10 to 14 days of IV antibiotics  -WBC significantly elevated;   CT AP for possible abscess ordered per ID; showing cholelithiasis with gallbladder wall thickening, correlate for cholecystitis.  LFTs had been normal, will repeat today.  He has no right upper quadrant tenderness.  Will check gallbladder ultrasound for better look at gallbladder.  -see below for penile lesion concern- may be nidus of infection      Question aspiration pneumonia  -CXR noting interval development of a left basilar airspace opacity, nonspecific may reflect atelectasis or consolidation.  Pulmonary portion of the abdominal CT showed no clear aspiration pneumonia  -IV meropenem as above  -SLP consult; noted mild to moderate dysphagia; recommending soft and bite-size with thin liquids     Sacral ulcer   Penile lesion  - Appreciate WOC consult for penile lesion  - Monitor sacral ulcer     Hx recent acute ischemic stroke (5/25/2024)  Residual left sided weakness   Recent admission 5/25-6/7/2024 at [facility] for ischemic stroke; discharged to TCU   -- PT/OT/CM - anticipate discharge return to TCU  -- SLP and diet as above     History of the left upper extremity DVT with thrombophilia (positive antiphospholipid antibodies, 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)  -- Continue Warfarin per pharmacy       Diabetes mellitus type 2, noninsulin dependent    Last A1c: (5/26/2024): 7.4    -- cont PTA Jardiance   -- add low sliding scale insulin   -- metered glucose checks; hypoglycemia protocols      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    -- Appears euvolemic.     Essential HTN  -- cont PTA PTA carvedilol       Rheumatoid arthritis/Chronic Pain - PTA  PRN Baclofen, PRN methocarbamol    Gout  - Continue PTA allopurinol   GERD (gastroesophageal reflux disease)/Hx PUD - PTA PPI  Clinically Significant Risk Factors Present on Admission              # Hypoalbuminemia: Lowest albumin = 2.7 g/dL at 6/20/2024  4:04 PM, will monitor as appropriate  # Drug Induced Coagulation Defect: home medication list includes an anticoagulant medication  # Drug Induced Platelet Defect: home medication list includes an antiplatelet medication   # Hypertension: Noted on problem list            # DMII: A1C = N/A within past 6 months        # Financial/Environmental Concerns: none            Code status:Full Code        Barriers to Discharge: Infectious workup    Disposition: Anticipate discharge back to TCU in 2 to 4 days    Subjective:  Vasyl is new to me today, chart reviewed discussed with staff.  Overall he reports feeling better.  He denies any abdominal pain at any point, including after eating.  No fevers or chills or nausea vomiting or diarrhea.        Current Medications Reviewed via EHR List    Objective:  Vital signs in last 24 hours:  [unfilled]  .prog  Weight:   @THISENCWEIGHTS(1)@  Weight change: -5.472 kg (-12 lb 1 oz)  Body mass index is 23.77 kg/m .    Intake/Output last 3 shifts:  I/O last 3 completed shifts:  In: -   Out: 950 [Urine:950]  Intake/Output this shift:  No intake/output data recorded.    Physical  Exam:  General: No apparent distress, lying in bed  CV: Regular rate and rhythm  Lungs: Clear to auscultation  Abdomen: Soft, nontender, no right upper quadrant tenderness.        Imaging:  Personally Reviewed.  CT Abdomen Pelvis w Contrast    Result Date: 6/21/2024  EXAM: CT ABDOMEN PELVIS W CONTRAST LOCATION: [facility] DATE: 6/21/2024 INDICATION: UTI, leukocytosis, evaluate for abscess COMPARISON: 6/20/2021 TECHNIQUE: CT scan of the abdomen and pelvis was performed following injection of IV contrast. Multiplanar reformats were obtained. Dose reduction techniques were used. CONTRAST: 81 mL of Isovue-370 FINDINGS: LOWER CHEST: Trace bilateral pleural effusions. Normal size heart. No pericardial effusion. Prosthetic aortic valves. Mitral annular calcifications. Pacing leads. Severe coronary artery calcifications. HEPATOBILIARY: Cholelithiasis. Gallbladder wall thickening. PANCREAS: Normal. SPLEEN: Normal. ADRENAL GLANDS: Normal. KIDNEYS/BLADDER: No obstructing renal or ureteral stones. No hydroureteronephrosis. BOWEL: GJ tube with tip in jejunum. Diverticulosis. No diverticulitis, colitis, or obstruction. No free air or fluid. No intra-abdominal abscess. LYMPH NODES: Normal. VASCULATURE: No aneurysm. Moderate to severe atherosclerotic vascular calcifications. PELVIC ORGANS: Normal. MUSCULOSKELETAL: Mild to moderate multilevel discogenic degenerative change.     IMPRESSION: 1.  Small bilateral pleural effusions. 2.  GJ tube in situ. 3.  Cholelithiasis with gallbladder wall thickening, correlate to exclude cholecystitis. 4.  Diverticulosis. No diverticulitis, colitis, or obstruction. 5.  Coronary artery disease and atherosclerotic vascular disease.    XR Video Swallow with SLP or OT    Result Date: 6/21/2024  EXAM: XR VIDEO SWALLOW WITH SLP OR OT LOCATION: [facility] DATE: 6/21/2024 INDICATION: Difficulty swallowing. COMPARISON: None. TECHNIQUE: Routine swallow study  with speech pathology using multiple barium thicknesses. RADIATION DOSE: Total Air Kerma 7.4 mGy FINDINGS: Swallow study with Speech Pathology using multiple barium thicknesses. Aspiration with thin liquids (cine S8), which was sensed by the patient with appropriate cough reflex. Penetration was also seen with thin and mildly thickened liquids with chin tuck maneuver, though this improved when the patient's head/neck were more vertical as opposed to leaning to the right. Vallecular/piriform sinus stasis with pudding/solids, which decreased with swallows of thin liquids. Cervical spinal fusion hardware noted.     IMPRESSION: 1. Aspiration with thin liquids, which was sensed by patient with appropriate cough reflex. Penetration was seen with thin and mildly thickened liquids with chin tuck, though this improved when the patient's head and neck were more vertical. 2. Please see speech pathology report for further details and recommendations.    XR Chest Port 1 View    Result Date: 6/20/2024  EXAM: XR CHEST PORT 1 VIEW LOCATION: [facility] DATE: 6/20/2024 INDICATION: Recent CVA.  Evaluate for any consolidation. COMPARISON: 4/11/2024     IMPRESSION: Left subclavian approach pacing device. Interval development left basilar airspace opacity, may reflect pleural effusion, atelectasis, and/or consolidation, singly or in combination. Recommend follow-up to resolution. No pneumothorax. Clear right lung. Cervical fusion hardware.    Video swallow study with speech path TODAY    Result Date: 6/5/2024  EXAM: XR VIDEO SWALLOW W SPEECH DATE OF EXAM: 6/5/2024 Patient: Franklyn Sorto (1935), MRN [id number] CLINICAL HISTORY: Assess swallow for aspiration. TECHNIQUE: Fluoroscopy was provided for speech pathologist during video swallow   study. FINDINGS: Patient was given barium of varying consistencies to ingest. There was laryngeal penetration during ingestion of thin barium liquids. Aspiration suspected  with thin liquids but unable to confirm due to patient positioning. Patient did cough with thin liquids. There was flash laryngeal penetration but no aspiration during ingestion of nectar thick barium. No laryngeal penetration with pudding thick barium and barium coated cracker. Please see the report by the speech pathologist for further details and recommendations. FLUOROSCOPY TIME: 1 minute and 42 secs Yesi Arias PA-C [employer] Radiology Dept.    XR Abdomen Port 1 View    Result Date: 6/1/2024  Indication: Abdominal pain. Findings: 2 views of the abdomen show no dilated loops of bowel. Percutaneous gastrostomy tube.  Contrast throughout the colon. Degenerative changes of the spine.    IR PERCUTANEOUS TUBE PLACEMENT    Result Date: 5/31/2024  Procedure: Percutaneous gastrojenjunostomy tube placement under fluoroscopic guidance Indication: malnutrition Interventionalist: Esteban Ashley MD Fluoroscopy time: 15.4 minutes. Reference air kerma: 626 mGy. Estimated Blood Loss: <10 mL Medications: 1. 0.5 mg midazolam IV 2. 50 mcg fentanyl IV 3. 25 mL lidocaine SQ 4. 1 mg glucagon IV Sedation: Moderate Conscious sedation: 45 minutes of intraservice time. The sedation was supervised by myself and the patient's vital signs were actively monitored by an independent registered nurse. Complications: None immediate. Contrast: Contrast: Omnipaque 350, 20 mL into the GI tract Implanted device: 22 Ecuadorean MARTITA gastrojejunostomy feeding tube Technique: The procedure, risks, and alternative therapies were discussed in detail, and written informed consent was obtained. A time out was performed to verify correct patient and procedure. Moderate sedation was administered as above. The patient's pulse oximetry, EKG, and blood pressure were monitored by the interventional radiology nurse at all times. The patient was placed supine on the fluoroscopy table. The anterior abdomen was prepped and draped  in the usual sterile fashion. All elements of maximum sterile barrier technique were used. Preliminary fluoroscopy demonstrated barium opacification of the transverse colon. The stomach was insufflated with air via a nasogastric tube. An appropriate puncture site was chosen in the region of the mid gastric body. The skin and subcutaneous tissues were anesthetized with 1% lidocaine.  Using a 18-gauge T fastener deployment device, three T fasteners were inserted into the stomach on opposing sides of the planned incision site and locked in place for gastropexy. Intragastric position was confirmed fluoroscopically by aspiration of air and contrast injection.   A skin nick was made, and an 18-gauge needle was advanced into the stomach. Intragastric position was confirmed by aspiration of air and by contrast injection.  A stiff guidewire was inserted into the needle. A Kumpe catheter was inserted. The catheter and wire were advanced through the pylorus and into the distal duodenum.  The tract was sequentially dilated to a 24 Latvian peel-away sheath.  MARTITA gastrojejunostomy feeding tube was inserted into the peel-away sheath, which was then removed. The balloon was inflated with 9 mL of saline mixed with a tiny amount of contrast.  Injection of the gastrostomy port confirmed correct placement. Injection of jejunostomy port confirmed tip position in the small bowel. The tube and balloon were pulled anteriorly and the external disc was secured.  The exit site was covered with a sterile dressing.  There were no complications and the patient tolerated the procedure well. Impression: Successful placement of a percutaneous gastrojejunostomy tube using fluoroscopic guidance. Please contact me with any questions. Esteban Ashley MD Vascular & Interventional Radiology at [facility] [phone] Pager: [phone]    XR Abdomen Port 1 View    Result Date: 2024  For Patients:  As a result of the  21st Century Cures Act, medical imaging exams and procedure reports are released immediately into your electronic medical record.  You may view this report before your referring provider.  If you have questions, please contact your health care provider. INDICATION: Check opacity of bowel prior to GJ placement COMPARISON: 05/26/2024 TECHNIQUE: 1 view abdomen, supine. FINDINGS: Devices: Pacemaker leads over the lower central chest. Moderate stool burden. Contrast seen in the distal small bowel and in the cecum to the splenic flexure. The appendix is also well opacified with contrast. Moderate stool. No dilated bowel. No unusual mass effect.    Contrast in portions of the small bowel from the cecum to the splenic flexure. Dictated by Tiffanie Navas MD @ May 31 2024 10:11AM (Electronically Signed) [email or web address]    XR Chest Port 1 View    Result Date: 5/28/2024  For Patients:  As a result of the 21st Century Cures Act, medical imaging exams and procedure reports are released immediately into your electronic medical record.  You may view this report before your referring provider.  If you have questions, please contact your health care provider. HISTORY: Evaluate lung infiltrate. TECHNIQUE: One view of the chest. COMPARISON: 04/11/2024. FINDINGS: Feeding tube terminates within the proximal stomach. AICD. TAVR. No lung consolidation or pulmonary edema. No pneumothorax or pleural effusion. Degenerative changes of the spine. Prior cervical fusion.    1.  No focal lung infiltrate or pulmonary edema. Dictated by Lowell Coreas MD @ May 28 2024  4:10PM (Electronically Signed) [email or web address]    MR Brain w/o Contrast    Result Date: 5/28/2024  For Patients:  As a result of the 21st Century Cures Act, medical imaging exams and procedure reports are released immediately into your electronic medical record.  You may view this report before your referring provider.  If you have questions, please  contact your health care provider. INDICATION: Neuro deficits. Comparison 05/27/2024. Technique: Multiplanar T1, T2, FLAIR and diffusion-weighted imaging. FINDINGS: Moderate generalized volume loss. Wedge-shaped T2/FLAIR signal hyperintensity of the right inferior anne taryn with corresponding restricted diffusion (series 8, image 8; series 6, image 33) consistent with the recent, likely subacute infarcts. No other areas of restricted diffusion. No intracranial hemorrhage. Compensatory mild dilatation ventricular system. Intracranial vascular flow voids are preserved. No mass effect. No midline shift. Scattered patchy T2/FLAIR signal hyperintensity within the white matter both supra hemispheres consistent with chronic deep white matter small vessel ischemic changes. Prominent perivascular space inferior right basal ganglia. No susceptibility artifact of remote hemorrhage. Bilateral orbits are unremarkable. Normal appearing sella. Mild mucosal thickening within the maxillary sinuses. Remaining visualized paranasal sinuses and mastoid air cells are unremarkable.    1. Recent, likely subacute infarct of the right inferior anne taryn. 2. No other areas of restricted diffusion. 3. Moderate generalized cerebral volume loss. Chronic deep white matter small vessel ischemic changes 4. No acute or chronic intracranial hemorrhage Dictated by Herman Power MD @ 5/28/2024 12:58:21 PM (Electronically Signed)    Video swallow study with speech path TODAY    Result Date: 5/28/2024  For Patients:  As a result of the 21st Century Cures Act, medical imaging exams and procedure reports are released immediately into your electronic medical record.  You may view this report before your referring provider.   If you have questions, please contact your health care provider. CLINICAL HISTORY: Assess swallow for aspiration. TECHNIQUE: Fluoroscopy was provided for speech pathologist gurvinder during video swallow study. Please see additional report for  full details and recommendations. 1 minutes and 54 seconds of fluoroscopy time was utilized. FINDINGS: Patient was given barium of varying consistencies to ingest. There was laryngeal penetration with silent aspiration during ingestion of thin barium liquids, nectar, honey.  There was no laryngeal penetration or aspiration with ingestion of pudding.  However, there was a significant amount of residue present to the vallecula and pyriform sinuses with pudding.    CT Head w/o Contrast    Result Date: 5/27/2024  For Patients:  As a result of the 21st Century Cures Act, medical imaging exams and procedure reports are released immediately into your electronic medical record.  You may view this report before your referring provider.  If you have questions, please contact your health care provider. Indication : Stroke follow-up. Technique : CT of the brain without intravenous contrast. Comparison: CT head 05/25/2024. Findings: Imaging is mildly suboptimal due to scattered streak to motion related artifact. No acute blurring of the gray-white differentiation. There is no intracranial hemorrhage. The ventricles are proportionate to the cerebral sulci. The 4th ventricle is midline. Basal cisterns appear patent. No abnormal extra-axial fluid collection identified. Mild-to-moderate parenchymal volume loss. There is moderate patchy periventricular hypodensity, favored to represent chronic ischemic microvascular disease. Small chronic right frontal lobe infarct. Right inferior basal ganglia dilated perivascular space versus chronic infarct. Scattered intracranial atherosclerotic disease. There is no intracranial mass, mass effect or midline shift identified. No depressed calvarial fracture. Mild paranasal sinus mucosal disease. Impression: 1. No acute intracranial process. 2. Moderate chronic ischemic microvascular disease. 3. Stable small chronic right frontal lobe infarct. Please note that all CT scans at this facility use dose  modulation, iterative reconstruction, and/or weight-based dosing when appropriate to reduce radiation dose to as low as reasonably achievable. Dictated by Stan Silva MD @ 2024 11:03:49 AM (Electronically Signed)    XR Abdomen Port 1 View    Result Date: 2024  For Patients:  As a result of the  Cures Act, medical imaging exams and procedure reports are released immediately into your electronic medical record.  You may view this report before your referring provider.  If you have questions, please contact your health care provider. INDICATION: Tube placement. COMPARISON: 2019. FINDINGS: A supine AP view of the abdomen was obtained. There is a feeding tube with the tip in the stomach. Dictated by Esteban Landers MD @ May 26 2024  1:01PM (Electronically Signed) [email or web address]    ECHO TRANSTHORACIC COMPLETE    Result Date: 2024  ECHOCARDIOGRAM GOLDY PARSON MRN:    [id number]        Accession#:   [id number] :    1935 88 years Study Date:   2024 3:34:37 PM Gender: M                 BP:           140/9 mmHg Height: 173.00 cm         BSA:          1.91 mÂ   Weight: 77.00 kg          Tech:         HR-TRVL                           Referring MD: KAMARI RODRÍGUEZ Site:             [facility] Reading Location: [facility] Patient Location: Inpatient. Procedure: 2D w/ Contrast. Indication for study: Stroke Cardiac Rhythm: Irregular.Study quality: Technically limited. Imaging limitations: This study was subject to imaging limitations due to lying in a supine position, body habitus, a prominent lung artifact and Frequent uncontrolled movements. Final Impressions:  1. Technically limited exam.  2. Echo contrast was administered to enhance visualization of all left ventricular segments.  3. Normal LV size, normal wall thickness, normal global systolic function with an estimated EF of 60 - 65%.  4. No LV mural thrombus on contrast enhanced images.   5. Grade 2 pattern of LV diastolic filling.  6. Right ventricular cavity size is not well visualized, global systolic RV function is not well visualized. Grossly RV function appears normal.  7. 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  8. The mitral valve exhibits mild prolapse of posterior leaflet, mild to moderate mitral regurgitation.  9. No atrial level intracardiac shunt evident on color Doppler imaging. Comparison Compared to prior exam of 04/07/23, there has been no significant change. Chamber Sizes and Function Normal left ventricular size, normal wall thickness, normal global systolic function with an estimated EF of 60 - 65%. Left atrial size is normal. Right ventricular cavity size is not well visualized, global systolic RV function is not well visualized. The right atrium is normal. The pulmonary artery is not well visualized. The sinus of Valsalva is normal sized. The ascending aorta is normal sized. Valves, RV Pressures and Diastolic Function The aortic valve is functioning 31 mm CoreValve bioprosthesis replacement, no stenosis and no regurgitation. The mitral valve is mild prolapse of posterior leaflet and sclerotic, mild to moderate mitral regurgitation. Spectral Doppler shows Grade 2 pattern of LV diastolic filling. The tricuspid valve is not well visualized. Tricuspid regurgitation is regurgitation is not evident. The pulmonic valve is not well visualized. Unable to determine pulmonary regurgitation. Masses, Effusion, Shunts There is no pericardial effusion. The inferior vena cava is not well visualized, respiratory size variation not well visualized. No left to right shunting was detected by limited color flow Doppler interrogation of the interatrial septum. MEASUREMENTS AND CALCULATIONS 2-D Measurements and LV Function: LVID (d) 4.6 cm LV FS% (2D)   35  % LVID (s) 3.0 cm LVOT diameter 2.3 cm IVS (d)  1.0 cm HR            90 bpm LVPW (d) 1.0 cm Ao Sinus 2.6 cm Asc Ao   3.0 cm LA       3.3 cm Diastology: Mitral          Tissue Doppler E Peak 0.9 m/s  e', Septum     0.04 m/s A Peak 0.9 m/s  e', Lateral    0.06 m/s E/A    1.0      E/e' Average   17.52 DT     184 msec Aortic Valve: Vmax       1.2 m/s  SARAH (V)   2.32 cmÂ  VTI        0.26 m   SARAH (I)   2.60 cmÂ  LVOT V max 0.7 m/s  Max PG    6 mmHg LVOT VTI   0.16 m   Mean PG   4 mmHg SV         68 ml    Dim Index 0.63 SV index   36 ml/mÂ  CO        6.2 l/min                     CI        3.2 l/min/mÂ  Mitral Valve: MVA      4.1 cmÂ  MV P 1/2 53 msec Tricuspid Valve and estimated PA pressures: Pulmonic Valve: PV Vmax 0.9 m/s Contrast documentation: 2 ml diluted Definity, lot #[id number], NDC# [id number] was administered peripherally to enhance visualization of all left ventricular segments. Electronically signed by Eriberto Bolden MD on 5/25/2024 5:28:02 PM. This study was interpreted by an [facility] accredited facility.   Final      CT ANGIO HEAD NECK CAROTID STROKE PROTOCOL ANGELA CANDIDAT    Result Date: 5/25/2024  For Patients:  As a result of the 21st Century Cures Act, medical imaging exams and procedure reports are released immediately into your electronic medical record.  You may view this report before your referring provider.  If you have questions, please contact your health care provider. DATE: 5/25/2024 CLINICAL HISTORY: Patient with focal neurological deficits. TECHNIQUE: Standard helical CT image acquisition of the neck up to the skull base after bolus intravenous contrast enhancement. 2D and 3D MIP images for post-processing were performed and interpreted on an independent workstation and 3D images were permanently archived. COMPARISON: CT same day. FINDINGS: The origins of the great vessels from the aortic arch are patent. The origin of the right vertebral artery is patent. The origin of the left vertebral artery is  patent. The common carotid arteries are patent. There is no stenosis at the origin of the right internal carotid artery. There is no stenosis at the origin of the left internal carotid artery. The rest of the cervical segments of the internal carotid arteries are patent up to the skull base. The right vertebral artery is dominant. The cervical segments of the vertebral arteries are patent up to the skull base. The visualized lung apices are unremarkable. The thyroid gland is unremarkable. The soft tissues of the neck are unremarkable. There are degenerative changes in the cervical spine.    Patent cervical vasculature. Please note that all CT scans at this facility use dose modulation, iterative reconstruction, and/or weight-based dosing when appropriate to reduce radiation dose to as low as reasonably achievable. Dictated by Keshia Benton MD @ 5/25/2024 1:34:54 PM (Electronically Signed)    CT HEAD STROKE PROTOCOL WITHOUT CONTRAST    Result Date: 5/25/2024  For Patients:  As a result of the 21st Century Cures Act, medical imaging exams and procedure reports are released immediately into your electronic medical record.  You may view this report before your referring provider.  If you have questions, please contact your health care provider. Indication : Left-sided weakness. Stroke. Technique : CT of the brain without intravenous contrast. Comparison: CT head 01/05/2024. Findings: No acute blurring of the gray-white differentiation. There is no intracranial hemorrhage. The ventricles are proportionate to the cerebral sulci. The 4th ventricle is midline. Basal cisterns appear patent. No abnormal extra-axial fluid collection identified. Mild-to-moderate parenchymal volume loss. There is moderate patchy periventricular hypodensity, favored to represent chronic ischemic microvascular disease. Small chronic right frontal lobe infarct. Right inferior basal ganglia dilated perivascular space versus chronic lacunar infarct.  "There is no intracranial mass, mass effect or midline shift identified. No depressed calvarial fracture. Mild paranasal sinus mucosal disease. Impression: 1. No acute intracranial process. 2. Moderate chronic ischemic microvascular disease. 3. Small chronic right frontal lobe infarct. The above findings were communicated over the telephone with Dr. Whatley by Dr. Silva at 1143 hours on 05/25/2024. Please note that all CT scans at this facility use dose modulation, iterative reconstruction, and/or weight-based dosing when appropriate to reduce radiation dose to as low as reasonably achievable. Dictated by Stan Silva MD @ 5/25/2024 11:49:41 AM (Electronically Signed)      Lab Results:  Personally Reviewed.   Fingerstick Blood Glucose: @[id number](POCGLUFGR:10)@    Last Hbg A1C: No results found for: \"HGBA1C\"   Lab Results   Component Value Date    INR 3.66 (H) 06/22/2024    INR 2.96 (H) 06/21/2024    INR 3.03 (H) 06/20/2024     Recent Results (from the past 24 hour(s))   Glucose by meter    Collection Time: 06/21/24  5:59 PM   Result Value Ref Range    GLUCOSE BY METER POCT 122 (H) 70 - 99 mg/dL   Glucose by meter    Collection Time: 06/21/24  9:00 PM   Result Value Ref Range    GLUCOSE BY METER POCT 86 70 - 99 mg/dL   INR    Collection Time: 06/22/24  6:13 AM   Result Value Ref Range    INR 3.66 (H) 0.85 - 1.15   CBC with platelets    Collection Time: 06/22/24  6:13 AM   Result Value Ref Range    WBC Count 16.7 (H) 4.0 - 11.0 10e3/uL    RBC Count 4.11 (L) 4.40 - 5.90 10e6/uL    Hemoglobin 12.9 (L) 13.3 - 17.7 g/dL    Hematocrit 40.2 40.0 - 53.0 %    MCV 98 78 - 100 fL    MCH 31.4 26.5 - 33.0 pg    MCHC 32.1 31.5 - 36.5 g/dL    RDW 13.2 10.0 - 15.0 %    Platelet Count 186 150 - 450 10e3/uL   Basic metabolic panel    Collection Time: 06/22/24  6:13 AM   Result Value Ref Range    Sodium 140 135 - 145 mmol/L    Potassium 3.8 3.4 - 5.3 mmol/L    Chloride 104 98 - 107 mmol/L    Carbon Dioxide (CO2) 24 22 - 29 " mmol/L    Anion Gap 12 7 - 15 mmol/L    Urea Nitrogen 24.8 (H) 8.0 - 23.0 mg/dL    Creatinine 0.74 0.67 - 1.17 mg/dL    GFR Estimate 88 >60 mL/min/1.73m2    Calcium 8.2 (L) 8.8 - 10.2 mg/dL    Glucose 85 70 - 99 mg/dL   Glucose by meter    Collection Time: 06/22/24  8:49 AM   Result Value Ref Range    GLUCOSE BY METER POCT 81 70 - 99 mg/dL       Medically Ready for Discharge: Anticipated in 2-4 Days       Advanced Care Planning    Medical Decision Making       50 MINUTES SPENT BY ME on the date of service doing chart review, history, exam, documentation & further activities per the note.      Franck Harris MD  Date: 6/22/2024  Time: 9:16 AM  [facility]  Family Medicine

## 2024-06-23 NOTE — PROGRESS NOTES
Daily Progress Note    Assessment/Plan:  Franklyn Sorto is a 88 year old male admitted on 6/20/2024. Recently admitted at [facility] for ischemic stroke.  Presented to ED from TCU after he was noted to have increased white count on labs.  UC growing ESBL.  Abdominal CT shows cholelithiasis and gallbladder wall thickening.  Also has skin wounds on the coccyx and penis.  White count remains persistently elevated despite appropriate treatment with meropenem for ESBL, ID following. ? If there are other sources contributing to infection including skin and gallbladder although he has no abdominal pain.  Wound care consult also ordered.     ESBL UTI, cholelithiasis with gallbladder wall thickening  ? CAUTI  Had catheter from recent stroke in place for 2 weeks, removed recently at facility   -Urine culture with ESBL E. Coli  -Started empirically on ceftriaxone; susceptibilities showed resistance  -ID consulted; transitioned to IV meropenem  (6/21); anticipate 10 to 14 days of IV antibiotics  -WBC significantly elevated despite antibiotics, up to 17.8 today from 16.7 yesterday.  Was 24.6 the prior day.  CT AP for possible abscess ordered per ID; showing cholelithiasis with gallbladder wall thickening, correlate for cholecystitis.  Confirmed on ultrasound.  LFTs had been normal..  He has no right upper quadrant tenderness.    -Will consult surgery for further recommendations  -see below for penile lesion concern- may be nidus of infection      Question aspiration pneumonia  -CXR noting interval development of a left basilar airspace opacity, nonspecific may reflect atelectasis or consolidation.  Pulmonary portion of the abdominal CT showed no clear aspiration pneumonia  -IV meropenem as above  -SLP consult; noted mild to moderate dysphagia; recommending soft and bite-size with thin liquids     Sacral ulcer   Penile lesion  -Wound care consult     Hx recent acute ischemic stroke (5/25/2024)  Residual left sided  weakness   Recent admission 5/25-6/7/2024 at [facility] for ischemic stroke; discharged to TCU   -- PT/OT/CM - anticipate discharge return to TCU  -- SLP and diet as above  -PT recommending sling for left upper extremity, will order     History of the left upper extremity DVT with thrombophilia (positive antiphospholipid antibodies, 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    -- Appears euvolemic.     Essential HTN  -- cont PTA PTA carvedilol       Rheumatoid arthritis/Chronic Pain - PTA  PRN Baclofen, PRN methocarbamol    Gout  - Continue PTA allopurinol   GERD (gastroesophageal reflux disease)/Hx PUD - PTA PPI  Clinically Significant Risk Factors              # Hypoalbuminemia: Lowest albumin = 2.5 g/dL at 6/22/2024  6:13 AM, will monitor as appropriate     # Hypertension: Noted on problem list             # DMII: A1C = N/A within past 6 months, PRESENT ON ADMISSION      # Financial/Environmental Concerns: none            Code status:Full Code        Barriers to Discharge: IV antibiotics, persistently elevated white count, sacral and penile wounds, still trying to confirm source of infection    Disposition: Anticipate discharge to TCU 2 to 4 days    Subjective:  Vasyl is sitting up in the chair today and has no new complaints.  Denies any fever, no abdominal pain, no cough or shortness of breath.  He reports feeling improved since admission.  I offered to update his wife but he declined stating he will talk with her.        Current  Medications Reviewed via EHR List    Objective:  Vital signs in last 24 hours:  [unfilled]  .prog  Weight:   @THISENCWEIGHTS(1)@  Weight change:   Body mass index is 23.77 kg/m .    Intake/Output last 3 shifts:  I/O last 3 completed shifts:  In: 1068 [P.O.:880; I.V.:128; NG/GT:60]  Out: 450 [Urine:450]  Intake/Output this shift:  No intake/output data recorded.    Physical Exam:  General: No apparent distress, pleasant and conversant  CV: Regular rate rhythm  Lungs: Clear  Neurologic: Persistent left deficit        Imaging:  Personally Reviewed.  US Abdomen Limited    Result Date: 6/22/2024  EXAM: US ABDOMEN LIMITED LOCATION: [facility] DATE: 6/22/2024 INDICATION: Gallstones, gallbladder wall thickening. COMPARISON: CT 6/21/2024. TECHNIQUE: Limited abdominal ultrasound. FINDINGS: GALLBLADDER: Sludge and stone material within the gallbladder. Diffuse gallbladder wall thickening with edema is noted. This is similar to the recent CT. Although there is no sonographic Roper's sign, the ultrasound features would suggest cholecystitis.  Intrinsic liver disease could potentially account for this appearance, however, this should be correlated with the patient's clinical symptoms. BILE DUCTS: No biliary dilatation. The common duct measures 6 mm. LIVER: Normal parenchyma with smooth contour. No focal mass. RIGHT KIDNEY: No hydronephrosis. PANCREAS: The visualized portions are normal. No ascites.     IMPRESSION: 1.  Sludge and stone material within the gallbladder. There is generalized gallbladder wall thickening with some edema. This has a similar appearance to the recent CT. Although there is not a sonographic Roper's sign, cholecystitis could have this appearance. This should be correlated with the patient's clinical symptoms as intrinsic liver disease could also result in the wall thickening. 2.  No evidence for biliary ductal dilatation. 3.  The liver is unremarkable.     CT Abdomen Pelvis w  Contrast    Result Date: 6/21/2024  EXAM: CT ABDOMEN PELVIS W CONTRAST LOCATION: [facility] DATE: 6/21/2024 INDICATION: UTI, leukocytosis, evaluate for abscess COMPARISON: 6/20/2021 TECHNIQUE: CT scan of the abdomen and pelvis was performed following injection of IV contrast. Multiplanar reformats were obtained. Dose reduction techniques were used. CONTRAST: 81 mL of Isovue-370 FINDINGS: LOWER CHEST: Trace bilateral pleural effusions. Normal size heart. No pericardial effusion. Prosthetic aortic valves. Mitral annular calcifications. Pacing leads. Severe coronary artery calcifications. HEPATOBILIARY: Cholelithiasis. Gallbladder wall thickening. PANCREAS: Normal. SPLEEN: Normal. ADRENAL GLANDS: Normal. KIDNEYS/BLADDER: No obstructing renal or ureteral stones. No hydroureteronephrosis. BOWEL: GJ tube with tip in jejunum. Diverticulosis. No diverticulitis, colitis, or obstruction. No free air or fluid. No intra-abdominal abscess. LYMPH NODES: Normal. VASCULATURE: No aneurysm. Moderate to severe atherosclerotic vascular calcifications. PELVIC ORGANS: Normal. MUSCULOSKELETAL: Mild to moderate multilevel discogenic degenerative change.     IMPRESSION: 1.  Small bilateral pleural effusions. 2.  GJ tube in situ. 3.  Cholelithiasis with gallbladder wall thickening, correlate to exclude cholecystitis. 4.  Diverticulosis. No diverticulitis, colitis, or obstruction. 5.  Coronary artery disease and atherosclerotic vascular disease.    XR Video Swallow with SLP or OT    Result Date: 6/21/2024  EXAM: XR VIDEO SWALLOW WITH SLP OR OT LOCATION: [facility] DATE: 6/21/2024 INDICATION: Difficulty swallowing. COMPARISON: None. TECHNIQUE: Routine swallow study with speech pathology using multiple barium thicknesses. RADIATION DOSE: Total Air Kerma 7.4 mGy FINDINGS: Swallow study with Speech Pathology using multiple barium thicknesses. Aspiration with thin liquids (cine S8), which was  sensed by the patient with appropriate cough reflex. Penetration was also seen with thin and mildly thickened liquids with chin tuck maneuver, though this improved when the patient's head/neck were more vertical as opposed to leaning to the right. Vallecular/piriform sinus stasis with pudding/solids, which decreased with swallows of thin liquids. Cervical spinal fusion hardware noted.     IMPRESSION: 1. Aspiration with thin liquids, which was sensed by patient with appropriate cough reflex. Penetration was seen with thin and mildly thickened liquids with chin tuck, though this improved when the patient's head and neck were more vertical. 2. Please see speech pathology report for further details and recommendations.    XR Chest Port 1 View    Result Date: 6/20/2024  EXAM: XR CHEST PORT 1 VIEW LOCATION: [facility] DATE: 6/20/2024 INDICATION: Recent CVA.  Evaluate for any consolidation. COMPARISON: 4/11/2024     IMPRESSION: Left subclavian approach pacing device. Interval development left basilar airspace opacity, may reflect pleural effusion, atelectasis, and/or consolidation, singly or in combination. Recommend follow-up to resolution. No pneumothorax. Clear right lung. Cervical fusion hardware.    Video swallow study with speech path TODAY    Result Date: 6/5/2024  EXAM: XR VIDEO SWALLOW W SPEECH DATE OF EXAM: 6/5/2024 Patient: Franklyn Sorto (1935), MRN [id number] CLINICAL HISTORY: Assess swallow for aspiration. TECHNIQUE: Fluoroscopy was provided for speech pathologist during video swallow   study. FINDINGS: Patient was given barium of varying consistencies to ingest. There was laryngeal penetration during ingestion of thin barium liquids. Aspiration suspected with thin liquids but unable to confirm due to patient positioning. Patient did cough with thin liquids. There was flash laryngeal penetration but no aspiration during ingestion of nectar thick barium. No laryngeal penetration  with pudding thick barium and barium coated cracker. Please see the report by the speech pathologist for further details and recommendations. FLUOROSCOPY TIME: 1 minute and 42 secs Yesi Arias PA-C [employer] [facility] Radiology Dept.    XR Abdomen Port 1 View    Result Date: 6/1/2024  Indication: Abdominal pain. Findings: 2 views of the abdomen show no dilated loops of bowel. Percutaneous gastrostomy tube.  Contrast throughout the colon. Degenerative changes of the spine.    IR PERCUTANEOUS TUBE PLACEMENT    Result Date: 5/31/2024  Procedure: Percutaneous gastrojenjunostomy tube placement under fluoroscopic guidance Indication: malnutrition Interventionalist: Esteban Ashley MD Fluoroscopy time: 15.4 minutes. Reference air kerma: 626 mGy. Estimated Blood Loss: <10 mL Medications: 1. 0.5 mg midazolam IV 2. 50 mcg fentanyl IV 3. 25 mL lidocaine SQ 4. 1 mg glucagon IV Sedation: Moderate Conscious sedation: 45 minutes of intraservice time. The sedation was supervised by myself and the patient's vital signs were actively monitored by an independent registered nurse. Complications: None immediate. Contrast: Contrast: Omnipaque 350, 20 mL into the GI tract Implanted device: 22 Icelandic MARTITA gastrojejunostomy feeding tube Technique: The procedure, risks, and alternative therapies were discussed in detail, and written informed consent was obtained. A time out was performed to verify correct patient and procedure. Moderate sedation was administered as above. The patient's pulse oximetry, EKG, and blood pressure were monitored by the interventional radiology nurse at all times. The patient was placed supine on the fluoroscopy table. The anterior abdomen was prepped and draped in the usual sterile fashion. All elements of maximum sterile barrier technique were used. Preliminary fluoroscopy demonstrated barium opacification of the transverse colon. The stomach was insufflated with air via a  nasogastric tube. An appropriate puncture site was chosen in the region of the mid gastric body. The skin and subcutaneous tissues were anesthetized with 1% lidocaine.  Using a 18-gauge T fastener deployment device, three T fasteners were inserted into the stomach on opposing sides of the planned incision site and locked in place for gastropexy. Intragastric position was confirmed fluoroscopically by aspiration of air and contrast injection.   A skin nick was made, and an 18-gauge needle was advanced into the stomach. Intragastric position was confirmed by aspiration of air and by contrast injection.  A stiff guidewire was inserted into the needle. A Kumpe catheter was inserted. The catheter and wire were advanced through the pylorus and into the distal duodenum.  The tract was sequentially dilated to a 24 Albanian peel-away sheath.  MARTITA gastrojejunostomy feeding tube was inserted into the peel-away sheath, which was then removed. The balloon was inflated with 9 mL of saline mixed with a tiny amount of contrast.  Injection of the gastrostomy port confirmed correct placement. Injection of jejunostomy port confirmed tip position in the small bowel. The tube and balloon were pulled anteriorly and the external disc was secured.  The exit site was covered with a sterile dressing.  There were no complications and the patient tolerated the procedure well. Impression: Successful placement of a percutaneous gastrojejunostomy tube using fluoroscopic guidance. Please contact me with any questions. Esteban Ashley MD Vascular & Interventional Radiology at [facility] [phone] Pager: [phone]    XR Abdomen Port 1 View    Result Date: 2024  For Patients:  As a result of the 21st Century Cures Act, medical imaging exams and procedure reports are released immediately into your electronic medical record.  You may view this report before your referring provider.  If you have questions, please  contact your health care provider. INDICATION: Check opacity of bowel prior to GJ placement COMPARISON: 05/26/2024 TECHNIQUE: 1 view abdomen, supine. FINDINGS: Devices: Pacemaker leads over the lower central chest. Moderate stool burden. Contrast seen in the distal small bowel and in the cecum to the splenic flexure. The appendix is also well opacified with contrast. Moderate stool. No dilated bowel. No unusual mass effect.    Contrast in portions of the small bowel from the cecum to the splenic flexure. Dictated by Tiffanie Navas MD @ May 31 2024 10:11AM (Electronically Signed) [email or web address]    XR Chest Port 1 View    Result Date: 5/28/2024  For Patients:  As a result of the 21st Century Cures Act, medical imaging exams and procedure reports are released immediately into your electronic medical record.  You may view this report before your referring provider.  If you have questions, please contact your health care provider. HISTORY: Evaluate lung infiltrate. TECHNIQUE: One view of the chest. COMPARISON: 04/11/2024. FINDINGS: Feeding tube terminates within the proximal stomach. AICD. TAVR. No lung consolidation or pulmonary edema. No pneumothorax or pleural effusion. Degenerative changes of the spine. Prior cervical fusion.    1.  No focal lung infiltrate or pulmonary edema. Dictated by Lowell Coreas MD @ May 28 2024  4:10PM (Electronically Signed) [email or web address]    MR Brain w/o Contrast    Result Date: 5/28/2024  For Patients:  As a result of the 21st Century Cures Act, medical imaging exams and procedure reports are released immediately into your electronic medical record.  You may view this report before your referring provider.  If you have questions, please contact your health care provider. INDICATION: Neuro deficits. Comparison 05/27/2024. Technique: Multiplanar T1, T2, FLAIR and diffusion-weighted imaging. FINDINGS: Moderate generalized volume loss. Wedge-shaped  T2/FLAIR signal hyperintensity of the right inferior anne taryn with corresponding restricted diffusion (series 8, image 8; series 6, image 33) consistent with the recent, likely subacute infarcts. No other areas of restricted diffusion. No intracranial hemorrhage. Compensatory mild dilatation ventricular system. Intracranial vascular flow voids are preserved. No mass effect. No midline shift. Scattered patchy T2/FLAIR signal hyperintensity within the white matter both supra hemispheres consistent with chronic deep white matter small vessel ischemic changes. Prominent perivascular space inferior right basal ganglia. No susceptibility artifact of remote hemorrhage. Bilateral orbits are unremarkable. Normal appearing sella. Mild mucosal thickening within the maxillary sinuses. Remaining visualized paranasal sinuses and mastoid air cells are unremarkable.    1. Recent, likely subacute infarct of the right inferior anne taryn. 2. No other areas of restricted diffusion. 3. Moderate generalized cerebral volume loss. Chronic deep white matter small vessel ischemic changes 4. No acute or chronic intracranial hemorrhage Dictated by Herman Power MD @ 5/28/2024 12:58:21 PM (Electronically Signed)    Video swallow study with speech path TODAY    Result Date: 5/28/2024  For Patients:  As a result of the 21st Century Cures Act, medical imaging exams and procedure reports are released immediately into your electronic medical record.  You may view this report before your referring provider.   If you have questions, please contact your health care provider. CLINICAL HISTORY: Assess swallow for aspiration. TECHNIQUE: Fluoroscopy was provided for speech pathologist gurvinder during video swallow study. Please see additional report for full details and recommendations. 1 minutes and 54 seconds of fluoroscopy time was utilized. FINDINGS: Patient was given barium of varying consistencies to ingest. There was laryngeal penetration with silent  aspiration during ingestion of thin barium liquids, nectar, honey.  There was no laryngeal penetration or aspiration with ingestion of pudding.  However, there was a significant amount of residue present to the vallecula and pyriform sinuses with pudding.    CT Head w/o Contrast    Result Date: 5/27/2024  For Patients:  As a result of the 21st Century Cures Act, medical imaging exams and procedure reports are released immediately into your electronic medical record.  You may view this report before your referring provider.  If you have questions, please contact your health care provider. Indication : Stroke follow-up. Technique : CT of the brain without intravenous contrast. Comparison: CT head 05/25/2024. Findings: Imaging is mildly suboptimal due to scattered streak to motion related artifact. No acute blurring of the gray-white differentiation. There is no intracranial hemorrhage. The ventricles are proportionate to the cerebral sulci. The 4th ventricle is midline. Basal cisterns appear patent. No abnormal extra-axial fluid collection identified. Mild-to-moderate parenchymal volume loss. There is moderate patchy periventricular hypodensity, favored to represent chronic ischemic microvascular disease. Small chronic right frontal lobe infarct. Right inferior basal ganglia dilated perivascular space versus chronic infarct. Scattered intracranial atherosclerotic disease. There is no intracranial mass, mass effect or midline shift identified. No depressed calvarial fracture. Mild paranasal sinus mucosal disease. Impression: 1. No acute intracranial process. 2. Moderate chronic ischemic microvascular disease. 3. Stable small chronic right frontal lobe infarct. Please note that all CT scans at this facility use dose modulation, iterative reconstruction, and/or weight-based dosing when appropriate to reduce radiation dose to as low as reasonably achievable. Dictated by Stan Silva MD @ 5/27/2024 11:03:49 AM  (Electronically Signed)    XR Abdomen Port 1 View    Result Date: 2024  For Patients:  As a result of the  Cures Act, medical imaging exams and procedure reports are released immediately into your electronic medical record.  You may view this report before your referring provider.  If you have questions, please contact your health care provider. INDICATION: Tube placement. COMPARISON: 2019. FINDINGS: A supine AP view of the abdomen was obtained. There is a feeding tube with the tip in the stomach. Dictated by Esteban Landers MD @ May 26 2024  1:01PM (Electronically Signed) [email or web address]    ECHO TRANSTHORACIC COMPLETE    Result Date: 2024  ECHOCARDIOGRAM GOLDY PARSON MRN:    [id number]        Accession#:   [id number] :    1935 88 years Study Date:   2024 3:34:37 PM Gender: M                 BP:           140/9 mmHg Height: 173.00 cm         BSA:          1.91 mÂ   Weight: 77.00 kg          Tech:         HR-TRVL                           Referring MD: KAMARI RODRÍGUEZ Site:             [facility] Reading Location: [facility] Patient Location: Inpatient. Procedure: 2D w/ Contrast. Indication for study: Stroke Cardiac Rhythm: Irregular.Study quality: Technically limited. Imaging limitations: This study was subject to imaging limitations due to lying in a supine position, body habitus, a prominent lung artifact and Frequent uncontrolled movements. Final Impressions:  1. Technically limited exam.  2. Echo contrast was administered to enhance visualization of all left ventricular segments.  3. Normal LV size, normal wall thickness, normal global systolic function with an estimated EF of 60 - 65%.  4. No LV mural thrombus on contrast enhanced images.  5. Grade 2 pattern of LV diastolic filling.  6. Right ventricular cavity size is not well visualized, global systolic RV function is not well visualized. Grossly RV function appears normal.  7. The  aortic valve is a functioning 31 mm CoreValve bioprosthesis AVR, no stenosis (peak velocity 1.2 m/s, mean gradient 4 mmHg, EOA 2.60 cm^2, DI 0.63, SVI 36 ml/m^2) and no regurgitation.  8. The mitral valve exhibits mild prolapse of posterior leaflet, mild to moderate mitral regurgitation.  9. No atrial level intracardiac shunt evident on color Doppler imaging. Comparison Compared to prior exam of 04/07/23, there has been no significant change. Chamber Sizes and Function Normal left ventricular size, normal wall thickness, normal global systolic function with an estimated EF of 60 - 65%. Left atrial size is normal. Right ventricular cavity size is not well visualized, global systolic RV function is not well visualized. The right atrium is normal. The pulmonary artery is not well visualized. The sinus of Valsalva is normal sized. The ascending aorta is normal sized. Valves, RV Pressures and Diastolic Function The aortic valve is functioning 31 mm CoreValve bioprosthesis replacement, no stenosis and no regurgitation. The mitral valve is mild prolapse of posterior leaflet and sclerotic, mild to moderate mitral regurgitation. Spectral Doppler shows Grade 2 pattern of LV diastolic filling. The tricuspid valve is not well visualized. Tricuspid regurgitation is regurgitation is not evident. The pulmonic valve is not well visualized. Unable to determine pulmonary regurgitation. Masses, Effusion, Shunts There is no pericardial effusion. The inferior vena cava is not well visualized, respiratory size variation not well visualized. No left to right shunting was detected by limited color flow Doppler interrogation of the interatrial septum. MEASUREMENTS AND CALCULATIONS 2-D Measurements and LV Function: LVID (d) 4.6 cm LV FS% (2D)   35 % LVID (s) 3.0 cm LVOT diameter 2.3 cm IVS (d)  1.0 cm HR            90 bpm LVPW (d) 1.0 cm Ao Sinus 2.6 cm Asc Ao   3.0 cm LA       3.3 cm Diastology: Mitral          Tissue Doppler E Peak 0.9 m/s   e', Septum     0.04 m/s A Peak 0.9 m/s  e', Lateral    0.06 m/s E/A    1.0      E/e' Average   17.52 DT     184 msec Aortic Valve: Vmax       1.2 m/s  SARAH (V)   2.32 cmÂ  VTI        0.26 m   SARAH (I)   2.60 cmÂ  LVOT V max 0.7 m/s  Max PG    6 mmHg LVOT VTI   0.16 m   Mean PG   4 mmHg SV         68 ml    Dim Index 0.63 SV index   36 ml/mÂ  CO        6.2 l/min                     CI        3.2 l/min/mÂ  Mitral Valve: MVA      4.1 cmÂ  MV P 1/2 53 msec Tricuspid Valve and estimated PA pressures: Pulmonic Valve: PV Vmax 0.9 m/s Contrast documentation: 2 ml diluted Definity, lot #[id number], [id number]# [id number] was administered peripherally to enhance visualization of all left ventricular segments. Electronically signed by Eriberto Bolden MD on 5/25/2024 5:28:02 PM. This study was interpreted by an [facility] accredited facility.   Final      CT ANGIO HEAD NECK CAROTID STROKE PROTOCOL ANGELA CANDIDAT    Result Date: 5/25/2024  For Patients:  As a result of the 21st Century Cures Act, medical imaging exams and procedure reports are released immediately into your electronic medical record.  You may view this report before your referring provider.  If you have questions, please contact your health care provider. DATE: 5/25/2024 CLINICAL HISTORY: Patient with focal neurological deficits. TECHNIQUE: Standard helical CT image acquisition of the neck up to the skull base after bolus intravenous contrast enhancement. 2D and 3D MIP images for post-processing were performed and interpreted on an independent workstation and 3D images were permanently archived. COMPARISON: CT same day. FINDINGS: The origins of the great vessels from the aortic arch are patent. The origin of the right vertebral artery is patent. The origin of the left vertebral artery is patent. The common carotid arteries are patent. There is no stenosis at the origin of the right internal carotid artery. There is no stenosis at the origin of the left internal carotid artery. The  rest of the cervical segments of the internal carotid arteries are patent up to the skull base. The right vertebral artery is dominant. The cervical segments of the vertebral arteries are patent up to the skull base. The visualized lung apices are unremarkable. The thyroid gland is unremarkable. The soft tissues of the neck are unremarkable. There are degenerative changes in the cervical spine.    Patent cervical vasculature. Please note that all CT scans at this facility use dose modulation, iterative reconstruction, and/or weight-based dosing when appropriate to reduce radiation dose to as low as reasonably achievable. Dictated by Keshia Benton MD @ 5/25/2024 1:34:54 PM (Electronically Signed)    CT HEAD STROKE PROTOCOL WITHOUT CONTRAST    Result Date: 5/25/2024  For Patients:  As a result of the 21st Century Cures Act, medical imaging exams and procedure reports are released immediately into your electronic medical record.  You may view this report before your referring provider.  If you have questions, please contact your health care provider. Indication : Left-sided weakness. Stroke. Technique : CT of the brain without intravenous contrast. Comparison: CT head 01/05/2024. Findings: No acute blurring of the gray-white differentiation. There is no intracranial hemorrhage. The ventricles are proportionate to the cerebral sulci. The 4th ventricle is midline. Basal cisterns appear patent. No abnormal extra-axial fluid collection identified. Mild-to-moderate parenchymal volume loss. There is moderate patchy periventricular hypodensity, favored to represent chronic ischemic microvascular disease. Small chronic right frontal lobe infarct. Right inferior basal ganglia dilated perivascular space versus chronic lacunar infarct. There is no intracranial mass, mass effect or midline shift identified. No depressed calvarial fracture. Mild paranasal sinus mucosal disease. Impression: 1. No acute intracranial process. 2.  "Moderate chronic ischemic microvascular disease. 3. Small chronic right frontal lobe infarct. The above findings were communicated over the telephone with Dr. Whatley by Dr. Silva at 1143 hours on 05/25/2024. Please note that all CT scans at this facility use dose modulation, iterative reconstruction, and/or weight-based dosing when appropriate to reduce radiation dose to as low as reasonably achievable. Dictated by Stan Silva MD @ 5/25/2024 11:49:41 AM (Electronically Signed)      Lab Results:  Personally Reviewed.   Fingerstick Blood Glucose: @[id number](POCGLUFGR:10)@    Last Hbg A1C: No results found for: \"HGBA1C\"   Lab Results   Component Value Date    INR 3.31 (H) 06/23/2024    INR 3.66 (H) 06/22/2024    INR 2.96 (H) 06/21/2024     Recent Results (from the past 24 hour(s))   Glucose by meter    Collection Time: 06/22/24  8:49 AM   Result Value Ref Range    GLUCOSE BY METER POCT 81 70 - 99 mg/dL   Glucose by meter    Collection Time: 06/22/24 11:49 AM   Result Value Ref Range    GLUCOSE BY METER POCT 127 (H) 70 - 99 mg/dL   Glucose by meter    Collection Time: 06/22/24  4:54 PM   Result Value Ref Range    GLUCOSE BY METER POCT 70 70 - 99 mg/dL   Glucose by meter    Collection Time: 06/22/24  9:37 PM   Result Value Ref Range    GLUCOSE BY METER POCT 151 (H) 70 - 99 mg/dL   Glucose by meter    Collection Time: 06/23/24  3:35 AM   Result Value Ref Range    GLUCOSE BY METER POCT 119 (H) 70 - 99 mg/dL   INR    Collection Time: 06/23/24  6:25 AM   Result Value Ref Range    INR 3.31 (H) 0.85 - 1.15   CBC with platelets    Collection Time: 06/23/24  6:25 AM   Result Value Ref Range    WBC Count 17.8 (H) 4.0 - 11.0 10e3/uL    RBC Count 4.45 4.40 - 5.90 10e6/uL    Hemoglobin 13.9 13.3 - 17.7 g/dL    Hematocrit 42.9 40.0 - 53.0 %    MCV 96 78 - 100 fL    MCH 31.2 26.5 - 33.0 pg    MCHC 32.4 31.5 - 36.5 g/dL    RDW 13.2 10.0 - 15.0 %    Platelet Count 198 150 - 450 10e3/uL   Extra Green Top (Lithium Heparin) " Tube    Collection Time: 06/23/24  6:25 AM   Result Value Ref Range    Hold Specimen JIC        Medically Ready for Discharge: Anticipated in 2-4 Days       Advanced Care Planning    Medical Decision Making       50 MINUTES SPENT BY ME on the date of service doing chart review, history, exam, documentation & further activities per the note.      Franck Harris MD  Date: 6/23/2024  Time: 7:24 AM  [facility]

## 2024-06-23 NOTE — PLAN OF CARE
No prn medication needed/given this shift.  Pt sat up in the chair x1.  Pt reports feeling constipated and states he wants to take prune juice in the morning.   Pt continues on IV antibiotics. ID following.  Significant other, Ronda, present and updated.

## 2024-06-23 NOTE — PLAN OF CARE
Goal Outcome Evaluation:      Plan of Care Reviewed With: patient    Overall Patient Progress: improvingOverall Patient Progress: improving    Outcome Evaluation: CHG wipes to penile lesion. Primofit leaked x2, cleaned, and replaced. Two tan smear incontinent BM, sacral Mepiplex replaced as blanchable redness. Pt repositioned every 2 hours. Sleeve on left arm.

## 2024-06-23 NOTE — PLAN OF CARE
Problem: Adult Inpatient Plan of Care  Goal: Plan of Care Review  Description: The Plan of Care Review/Shift note should be completed every shift.  The Outcome Evaluation is a brief statement about your assessment that the patient is improving, declining, or no change.  This information will be displayed automatically on your shift  note.  Outcome: Progressing  Flowsheets (Taken 6/23/2024 0730)  Plan of Care Reviewed With: patient  Overall Patient Progress: improving     Problem: UTI (Urinary Tract Infection)  Goal: Improved Infection Symptoms  Outcome: Progressing     Problem: Diabetes  Goal: Optimal Coping  Outcome: Progressing     Problem: Stroke Rehabilitation  Goal: Safe and Effective Swallow  Outcome: Progressing  Intervention: Optimize Eating and Swallowing  Recent Flowsheet Documentation  Taken 6/23/2024 0048 by Sudha Paz RN  Aspiration Precautions:   awake/alert before oral intake   distractions minimized during oral intake   food consistency adjusted   medication route adjusted   upright posture maintained  Goal: Effective Urinary Elimination/Continence  Outcome: Progressing   Goal Outcome Evaluation:      Plan of Care Reviewed With: patient    Overall Patient Progress: improvingOverall Patient Progress: improving     Pt A/OX3, Left side left arm paralysis, left leg weakness can move slightly, L sided facial droop, G/J clamped, Coccyx--open sore, dressing in place, demi area and buttocks red, lotion applied; left a message for provider to obtain order for barrier cream. Pt needs turning q2. Edemas left arm elevated.  Needs sling, no sling available in stores

## 2024-06-23 NOTE — PROGRESS NOTES
"INFECTIOUS DISEASE FOLLOW UP NOTE    Date: 2024   CHIEF COMPLAINT:   Chief Complaint   Patient presents with    Abnormal Labs        ASSESSMENT:    ESBL UTI. Recent cutler catheter. Symptomatic after catheter removal. Leukocytosis higher than expected for cystitis alone. CT with possible cholecystitis, US with same, surgery consulted for next steps.  Abnormal Chest X ray. Atelectasis vs consolidation vs pleural effusions.   Recent CVA with resulting L hemiplegia.   TAVR, pacemaker, DM, CHF, DVT, anti-phospholipid syndrome.     PLAN:  - meropenem IV for ESBL e coli  - anticipate 10-14d antibiotics pending additional workup  - trend WBC, still remains elevated. Was normal last month. Can recheck differential  - further recommendations to follow clinical course  - ID will follow, thanks    Rosalina Ramirez MD  [facility]   Office Telephone [phone].  Fax [phone]  [employer] paging    ______________________________________________________________________    SUBJECTIVE / INTERVAL HISTORY:  Doing ok. Leukocytosis. Tolerating PO.     ROS: All other systems negative except as listed above.    SH/FH/Habits/PMH reviewed and unchanged.    OBJECTIVE:  /69 (BP Location: Right arm)   Pulse 80   Temp 98.5  F (36.9  C) (Oral)   Resp 20   Ht 1.753 m (5' 9\")   Wt 73 kg (160 lb 15 oz)   SpO2 94%   BMI 23.77 kg/m       Resp: 20      Vital Signs  Temp: 97.8  F (36.6  C)  Temp src: Oral  Resp: 20  Pulse: 70  Pulse Rate Source: Monitor  BP: 130/60  BP Location: Right arm    Temp (24hrs), Av.4  F (36.9  C), Min:97.8  F (36.6  C), Max:98.7  F (37.1  C)      GEN: No acute distress.    RESPIRATORY:  Normal breathing pattern. Clear to auscultation bilaterally  CARDIOVASCULAR:  Regular rate and rhythm. No murmur, click, gallop or rub.   ABDOMEN:  Soft, normal bowel sounds, non-tender,   EXTREMITIES: No edema.  SKIN/HAIR/NAILS:  No rashes. Superficial skin findings groin.   IV:  peripheral " "IV      Antibiotics:  meropenem    Pertinent labs:  No results found for: \"CRP\"   CBC RESULTS:   Recent Labs   Lab Test 06/22/24  0613   WBC 16.7*   RBC 4.11*   HGB 12.9*   HCT 40.2   MCV 98   MCH 31.4   MCHC 32.1   RDW 13.2         Last Comprehensive Metabolic Panel:  Sodium   Date Value Ref Range Status   06/22/2024 140 135 - 145 mmol/L Final     Comment:     Reference intervals for this test were updated on 09/26/2023 to more accurately reflect our healthy population. There may be differences in the flagging of prior results with similar values performed with this method. Interpretation of those prior results can be made in the context of the updated reference intervals.      Potassium   Date Value Ref Range Status   06/22/2024 3.8 3.4 - 5.3 mmol/L Final   11/29/2019 4.3 3.5 - 5.0 mmol/L Final     Chloride   Date Value Ref Range Status   06/22/2024 104 98 - 107 mmol/L Final   11/29/2019 107 98 - 107 mmol/L Final     Carbon Dioxide (CO2)   Date Value Ref Range Status   06/22/2024 24 22 - 29 mmol/L Final   11/29/2019 26 22 - 31 mmol/L Final     Anion Gap   Date Value Ref Range Status   06/22/2024 12 7 - 15 mmol/L Final   11/29/2019 7 5 - 18 mmol/L Final     Glucose   Date Value Ref Range Status   11/29/2019 126 (H) 70 - 125 mg/dL Final     GLUCOSE BY METER POCT   Date Value Ref Range Status   06/23/2024 99 70 - 99 mg/dL Final     Urea Nitrogen   Date Value Ref Range Status   06/22/2024 24.8 (H) 8.0 - 23.0 mg/dL Final   11/29/2019 10 8 - 28 mg/dL Final     Creatinine   Date Value Ref Range Status   06/22/2024 0.74 0.67 - 1.17 mg/dL Final     GFR Estimate   Date Value Ref Range Status   06/22/2024 88 >60 mL/min/1.73m2 Final     Comment:     eGFR calculated using 2021 CKD-EPI equation.   11/29/2019 >60 >60 mL/min/1.73m2 Final     Calcium   Date Value Ref Range Status   06/22/2024 8.2 (L) 8.8 - 10.2 mg/dL Final        MICROBIOLOGY DATA:  Personally reviewed.  7-Day Micro Results       Collected Updated Procedure " Result Status      06/20/2024 1728 06/21/2024 1112 Urine Culture [[id number]]   (Abnormal)   Urine, Clean Catch    Final result Component Value   Culture >100,000 CFU/mL Escherichia coli    Susceptibilities done on previous cultures               06/20/2024 1604 06/20/2024 1651 Symptomatic Influenza A/B, RSV, & SARS-CoV2 PCR (COVID-19) Nose [[id number]]    Swab from Nose    Final result Component Value   Influenza A PCR Negative   Influenza B PCR Negative   RSV PCR Negative   SARS CoV2 PCR Negative   NEGATIVE: SARS-CoV-2 (COVID-19) RNA not detected, presumed negative.            06/19/2024 1230 06/20/2024 2211 Urine Culture [[id number]]    (Abnormal)   Urine, NOS    Final result Component Value   Culture >100,000 CFU/mL Escherichia coli ESBL        Susceptibility        Escherichia coli ESBL      MARTITA      Amikacin <=2 ug/mL Susceptible  [*]       Ampicillin >=32 ug/mL Resistant      Ampicillin/ Sulbactam 4 ug/mL Susceptible  [*]       Cefazolin >=64 ug/mL Resistant  [1]       Cefepime  Resistant      Cefotaxime  Resistant  [*]       Cefoxitin <=4 ug/mL Susceptible      Ceftazidime  Resistant      Ceftriaxone  Resistant      Ciprofloxacin >=4 ug/mL Resistant      Extended Spectrum Beta-Lactamase Positive ug/mL ESBL Positive  [*]       Gentamicin <=1 ug/mL Susceptible      Levofloxacin >=8 ug/mL Resistant      Meropenem <=0.25 ug/mL Susceptible  [2]       Nitrofurantoin <=16 ug/mL Susceptible      Piperacillin/Tazobactam <=4 ug/mL Susceptible      Tobramycin <=1 ug/mL Susceptible      Trimethoprim/Sulfamethoxazole >16/304 ug/mL Resistant                   [*]  Suppressed Antibiotic     [1]  Cefazolin MARTITA breakpoints are for the treatment of uncomplicated urinary tract infections. For the treatment of systemic infections, please contact the laboratory for additional testing.     [2]  Enterobacterales that are susceptible to meropenem are usually susceptible to ertapenem.               Susceptibility Comments        Escherichia coli ESBL    ESBL (extended spectrum beta lactamase) producing organisms require contact precautions.                        RADIOLOGY:  Personally Reviewed.  Recent Results (from the past 24 hour(s))   XR Video Swallow with SLP or OT    Narrative    EXAM: XR VIDEO SWALLOW WITH SLP OR OT  LOCATION: [facility]  DATE: 6/21/2024    INDICATION: Difficulty swallowing.  COMPARISON: None.    TECHNIQUE: Routine swallow study with speech pathology using multiple barium thicknesses.    RADIATION DOSE: Total Air Kerma 7.4 mGy    FINDINGS:   Swallow study with Speech Pathology using multiple barium thicknesses.     Aspiration with thin liquids (cine S8), which was sensed by the patient with appropriate cough reflex. Penetration was also seen with thin and mildly thickened liquids with chin tuck maneuver, though this improved when the patient's head/neck were more   vertical as opposed to leaning to the right. Vallecular/piriform sinus stasis with pudding/solids, which decreased with swallows of thin liquids.    Cervical spinal fusion hardware noted.      Impression    IMPRESSION:  1. Aspiration with thin liquids, which was sensed by patient with appropriate cough reflex. Penetration was seen with thin and mildly thickened liquids with chin tuck, though this improved when the patient's head and neck were more vertical.  2. Please see speech pathology report for further details and recommendations.   CT Abdomen Pelvis w Contrast    Narrative    EXAM: CT ABDOMEN PELVIS W CONTRAST  LOCATION: [facility]  DATE: 6/21/2024    INDICATION: UTI, leukocytosis, evaluate for abscess  COMPARISON: 6/20/2021  TECHNIQUE: CT scan of the abdomen and pelvis was performed following injection of IV contrast. Multiplanar reformats were obtained. Dose reduction techniques were used.  CONTRAST: 81 mL of Isovue-370    FINDINGS:   LOWER CHEST: Trace bilateral pleural effusions. Normal size  heart. No pericardial effusion. Prosthetic aortic valves. Mitral annular calcifications. Pacing leads. Severe coronary artery calcifications.    HEPATOBILIARY: Cholelithiasis. Gallbladder wall thickening.    PANCREAS: Normal.    SPLEEN: Normal.    ADRENAL GLANDS: Normal.    KIDNEYS/BLADDER: No obstructing renal or ureteral stones. No hydroureteronephrosis.    BOWEL: GJ tube with tip in jejunum. Diverticulosis. No diverticulitis, colitis, or obstruction. No free air or fluid. No intra-abdominal abscess.    LYMPH NODES: Normal.    VASCULATURE: No aneurysm. Moderate to severe atherosclerotic vascular calcifications.    PELVIC ORGANS: Normal.    MUSCULOSKELETAL: Mild to moderate multilevel discogenic degenerative change.      Impression    IMPRESSION:   1.  Small bilateral pleural effusions.  2.  GJ tube in situ.  3.  Cholelithiasis with gallbladder wall thickening, correlate to exclude cholecystitis.  4.  Diverticulosis. No diverticulitis, colitis, or obstruction.  5.  Coronary artery disease and atherosclerotic vascular disease.       Principal Problem:    Acute cystitis without hematuria  Active Problems:    History of CVA (cerebrovascular accident)    Aspiration pneumonia, unspecified aspiration pneumonia type, unspecified laterality, unspecified part of lung (H)    Anticoagulated on Coumadin    Urethral foreign body, initial encounter

## 2024-06-24 NOTE — PROVIDER NOTIFICATION
Notified MD at 1720 PM regarding pt spouse wanting results of hepatobiliary scan.  Spoke with: Dr. Harris    Orders were obtained.    Comments: MD gave ok to tell spouse results were normal.

## 2024-06-24 NOTE — PLAN OF CARE
Problem: Adult Inpatient Plan of Care  Goal: Plan of Care Review  Description: The Plan of Care Review/Shift note should be completed every shift.  The Outcome Evaluation is a brief statement about your assessment that the patient is improving, declining, or no change.  This information will be displayed automatically on your shift  note.  Outcome: Progressing   Goal Outcome Evaluation:         Pt tolerated breakfast, no episodes of coughing noted. Was npo at 0930 for hepatobiliary scan ordered for this afternoon. WOC nurse saw the pt and made adjustments to skin care orders. Blood sugars were 90 and 160.  Pt is discouraged about recent cva and weakness in the left side of his body. Encouraged to express concerns and support provided.

## 2024-06-24 NOTE — CONSULTS
[facility]  WO Nurse Inpatient Assessment     Consulted for: scrotum, coccyx, penile lesion    Summary: POI penile mucosal ulcer    Patient History (according to provider note(s):          Assessment:      Pressure Injury Location: sacrum        Last photo: 6/24  Wound type: Pressure Injury     Pressure Injury Stage: Unstageable, present on admission      This is a Medical Device Related Pressure Injury (MDRPI) due to  n/a  Wound history/plan of care:   patient reported he spends a lot of time in bed since his stroke    Wound base: 100 % slough,      Palpation of the wound bed: boggy      Drainage: scant     Description of drainage: serosanguinous     Measurements (length x width x depth, in cm) 2.5 cm  x 2 cm      Tunneling N/A     Undermining N/A  Periwound skin: Intact and Erythema- blanchable      Color: normal and consistent with surrounding tissue and pink      Temperature: normal   Odor: none  Pain: denies , none  Pain intervention prior to dressing change: N/A  Treatment goal: Heal , Protection, Promote epidermal migration, and Remove necrotic tissue  STATUS: initial assessment  Supplies ordered: ordered honey and supplies stored on unit    My PI Risk Assessment     Sensory Perception: 3 - Slightly Limited     Moisture: 2 - Very moist      Activity: 2 - Chairfast     Mobility: 2 - Very limited     Nutrition: 2 - Probably inadequate      Friction/Shear: 2 - Potential problem      TOTAL: 13    Skin Injury Location: scrotum    Last photo: unable to obtain  Skin injury due to: Moisture associated skin damage (MASD)  Skin history and plan of care:   patient wearing brief and spends a lot of time in bed  Affected area:      Skin assessment: Intact and Erythema     Measurements (length x width x depth, in cm) unable to obtain     Color: pink     Temperature  warm     Drainage: none .      Color: none      Odor: mild  Pain: denies , none  Pain interventions prior to dressing change:  "N/A  Treatment goal: Heal , Decrease moisture, and Protection  STATUS: initial assessment  Supplies ordered: ordered critic aid and supplies stored on unit      Areas visualized during today's visit: Focused:    Wound location: glans penis    Last photo: NA  Wound due to: Pressure Injury, POI, mucosal injury from when pt had catheter while he was IP at [facility]  Wound history/plan of care: as above  Wound base: 100 %  white mucousy tissue     Palpation of the wound bed: normal      Drainage:  unable to determine due to location and having primofit in place         Measurements (length x width x depth, in cm): 0.4  x 0.4  x  0.2 cm      Tunneling: N/A     Undermining: N/A  Periwound skin: Intact      Color: normal and consistent with surrounding tissue      Temperature: normal   Odor: none  Pain: denies ,   Pain interventions prior to dressing change: patient tolerated well  Treatment goal: Heal  and Maintain (prevention of deterioration)  STATUS: evolving  Supplies ordered: supplies stored on unit       Treatment Plan:     Sacrum wound Daily or PRN if dressing soiled, saturated or falls off  Cleanse with soap and water or bath wipe and pat dry  Apply nickel thick amount of honey to wound bed and cover with mepilex    Scrotum/groin BID and with brief changes  Cleanse area with soap and water or bath wipes and dry completely  Apply critic aid to any and all affected areas    Penile wound(s): BID and with brief changes, all demi cares  Gently cleanse the glans penis with incontinence wipes.      Pressure Injury Prevention (PIP) Plan:  If patient is declining pressure injury prevention interventions: Explore reason why and address patient's concerns, Educate on pressure injury risk and prevention intervention(s), If patient is still declining, document \"informed refusal\" , and Ensure Care team is aware ( provider, charge nurse, etc)  Mattress: Follow bed algorithm, reassess daily and order specialty mattress, if " indicated.  HOB: Maintain at or below 30 degrees, unless contraindicated  Repositioning in bed: Every 1-2 hours  and Raise foot of bed prior to raising head of bed, to reduce patient sliding down (shear)  Heels: Keep elevated off mattress and Pillows under calves  Protective Dressing: Sacral Mepilex for prevention (#[id number]),  especially for the agitated patient   Positioning Equipment:None  Chair positioning: Chair cushion (#[id number]) , Assist patient to reposition hourly, and Do NOT use a donut for sitting (this increases pressure to smaller area and creates a higher potential for injury)    If patient has a buttock pressure injury, or high risk for PI use chair cushion or SPS.  Moisture Management: Perineal cleansing /protection: Follow Incontinence Protocol and Moisturize dry skin  Under Devices: Inspect skin under all medical devices during skin inspection , Ensure tubes are stabilized without tension, and Ensure patient is not lying on medical devices or equipment when repositioned  Ask provider to discontinue device when no longer needed.      Orders: Reviewed and Written    RECOMMEND PRIMARY TEAM ORDER: None, at this time  Education provided: plan of care  Discussed plan of care with: Patient  WOC nurse follow-up plan: weekly  Notify WOC if wound(s) deteriorate.  Nursing to notify the Provider(s) and re-consult the WOC Nurse if new skin concern.    DATA:     Current support surface: Standard  Standard Isoflex gel  Containment of urine/stool: Brief and Suction based external urinary catheter   BMI: Body mass index is 23.77 kg/m .   Active diet order: Orders Placed This Encounter      Combination Diet Soft and Bite Sized Diet (level 6); Thin Liquids (level 0)     Output: I/O last 3 completed shifts:  In: 710 [P.O.:650; NG/GT:60]  Out: 850 [Urine:850]     Labs:   Recent Labs   Lab 06/24/24  0554 06/23/24  0625 06/22/24  0613   ALBUMIN  --   --  2.5*   HGB 14.7   < > 12.9*   INR 3.02*   < > 3.66*   WBC 16.4*   < >  16.7*    < > = values in this interval not displayed.     Pressure injury risk assessment:   Sensory Perception: 2-->very limited  Moisture: 3-->occasionally moist  Activity: 2-->chairfast  Mobility: 2-->very limited  Nutrition: 2-->probably inadequate  Friction and Shear: 2-->potential problem  Dilan Score: 13    SUZANNE GhotraN RN CWOCN  Pager no longer in use, please contact through [employer] group: [facility]

## 2024-06-24 NOTE — PHARMACY-VANCOMYCIN DOSING SERVICE
Pharmacy Vancomycin Initial Note  Date of Service 2024  Patient's  1935  88 year old, male    Indication: Skin and Soft Tissue Infection    Current estimated CrCl = Estimated Creatinine Clearance: 74 mL/min (based on SCr of 0.74 mg/dL).    Creatinine for last 3 days  2024:  6:13 AM Creatinine 0.74 mg/dL    Recent Vancomycin Level(s) for last 3 days  No results found for requested labs within last 3 days.      Vancomycin IV Administrations (past 72 hours)        No vancomycin orders with administrations in past 72 hours.                    Nephrotoxins and other renal medications (From now, onward)      Start     Dose/Rate Route Frequency Ordered Stop    24 1800  vancomycin (VANCOCIN) 1,500 mg in sodium chloride 0.9 % 250 mL intermittent infusion         1,500 mg  over 90 Minutes Intravenous EVERY 24 HOURS 24 1721      24 0830  empagliflozin (JARDIANCE) tablet 25 mg        Note to Pharmacy: PTA Sig:Take 1 tablet (25 mg) by mouth daily      25 mg Oral DAILY 24 0824              Contrast Orders - past 72 hours (72h ago, onward)      Start     Dose/Rate Route Frequency Stop    24 1430  sincalide (KINEVAC) injection 1.5 mcg         0.02 mcg/kg × 73 kg Intravenous ONCE 24 1450    24 1330  technetium Tc 99m mebrofenin (CHOLETEC) radioisotope injection 5-10 millicurie         5-10 millicurie Intravenous ONCE 24 1351    24 1900  iopamidol (ISOVUE-370) solution 81 mL         81 mL Intravenous ONCE 24 1837            [employer] Prediction of Planned Initial Vancomycin Regimen  Loading dose: N/A  Regimen: 1500 mg IV every 24 hours.  Start time: 18:00 on 2024  Exposure target: AUC24 (range)400-600 mg/L.hr   AUC24,ss: 466 mg/L.hr  Probability of AUC24 > 400: 68 %  Ctrough,ss: 12.7 mg/L  Probability of Ctrough,ss > 20: 13 %  Probability of nephrotoxicity (Lodise MYRIAM ): 8 %          Plan:  Start vancomycin  1500 mg IV q24h.   Vancomycin  monitoring method: AUC  Vancomycin therapeutic monitoring goal: 400-600 mg*h/L  Pharmacy will check vancomycin levels as appropriate in 1-3 Days.    Serum creatinine levels will be ordered daily for the first week of therapy and at least twice weekly for subsequent weeks.      Dayanara Trejo RPH

## 2024-06-24 NOTE — CONSULTS
General Surgery Consultation  Franklyn Sorto MRN# [id number]   Age/Sex: 88 year old male YOB: 1935     Reason for consult: 1. Acute cystitis without hematuria    2. Aspiration pneumonia, unspecified aspiration pneumonia type, unspecified laterality, unspecified part of lung (H)    3. History of CVA (cerebrovascular accident)    4. Urethral foreign body, initial encounter    5. Anticoagulated on Coumadin            Requesting physician: Dr. Harris                   Assessment and Plan:   Assessment:  Leukocytosis likely secondary to urinary source, but patient concern raised about a gallbladder source given gallbladder wall thickening and cholelithiasis on CT. This was demonstrated on a previous scan and there is no clinical correlation that would support acute cholecystitis. 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 Given the continued leukocytosis on antibiotics, we will get a HIDA scan to assess definitively for acute cholecystitis.    Plan:  HIDA today  If HIDA negative for cholecystitis, surgery will sign off          Chief Complaint:     Chief Complaint   Patient presents with    Abnormal Labs        History is obtained from the patient and electronic health record    HPI:   Franklyn Sorto is a 88 year old male who presents with leukocytosis from a TCU. He has a PMH significant for TAVR, PPM, DM, CKD, CHF, DVT, and anti-phospholipid syndrome. He has noted some urinary frequency and difficulty with urination, but no issues with oral intake or changes to his bowels. He denies fever, chills, abdominal pain, postprandial pain, nausea or vomiting.           Past Medical History:   History reviewed. No pertinent past medical history.           Past Surgical History:   History reviewed. No pertinent surgical history.          Social History:    reports that he quit smoking  about 49 years ago. His smoking use included cigarettes. He has never used smokeless tobacco. He reports current alcohol use.           Family History:   No family history on file.           Allergies:     Allergies   Allergen Reactions    Clopidogrel Hives    Hydrocodone      Other reaction(s): sick to his stomach    Hydrocodone-Acetaminophen Nausea and Vomiting    Levofloxacin Other (See Comments)     Other reaction(s): tendonitis  Tendonitis right calf      Spironolactone      Other reaction(s): Hyperkalemia              Medications:     Prior to Admission medications    Medication Sig Start Date End Date Taking? Authorizing Provider   acetaminophen (TYLENOL) 500 MG tablet Take 1,000 mg by mouth 3 times daily as needed for mild pain   Yes Reported, Patient   allopurinol (ZYLOPRIM) 300 MG tablet Take 300 mg by mouth daily 6/6/18  Yes Reported, Patient   aspirin 81 MG EC tablet Take 81 mg by mouth daily   Yes Reported, Patient   Baclofen (LIORESAL) 5 MG tablet Take 5 mg by mouth 3 times daily 6/6/24  Yes Reported, Patient   Baclofen (LIORESAL) 5 MG tablet Take 5 mg by mouth daily as needed for muscle spasms   Yes Reported, Patient   bisacodyl (DULCOLAX) 10 MG suppository Place 10 mg rectally daily as needed for constipation 6/6/24  Yes Reported, Patient   carvedilol (COREG) 6.25 MG tablet Take 25 mg by mouth 2 times daily 6/5/18  Yes Reported, Patient   COLCRYS 0.6 MG tablet Take 0.6 mg by mouth as needed 4/13/18  Yes Reported, Patient   diclofenac (VOLTAREN) 1 % topical gel Apply 2 g topically 4 times daily   Yes Reported, Patient   empagliflozin (JARDIANCE) 25 MG TABS tablet Take 1 tablet (25 mg) by mouth daily 11/13/23  Yes Merly Gutiérrez PA-C   ENTRESTO  MG per tablet Take 1 tablet by mouth 2 times daily 6/22/18  Yes Reported, Patient   erythromycin (ROMYCIN) 5 MG/GM ophthalmic ointment Place into both eyes at bedtime 12/27/23  Yes Reported, Patient   HYDROmorphone (DILAUDID) 2 MG tablet Take 2 mg by  mouth 3 times daily as needed for pain 6/7/24  Yes Reported, Patient   LANsoprazole (PREVACID SOLUTAB) 30 MG ODT Place 30 mg under the tongue every morning (before breakfast) 6/7/24  Yes Reported, Patient   lipase-protease-amylase (CREON 12) 20930-94[address]-87639 units CPEP Take 1 capsule by mouth as needed   Yes Reported, Patient   melatonin 3 MG tablet Take 3 mg by mouth nightly as needed for sleep   Yes Reported, Patient   methocarbamol (ROBAXIN) 500 MG tablet Take 500 mg by mouth 3 times daily 6/6/24  Yes Reported, Patient   nitroGLYcerin (NITROSTAT) 0.4 MG sublingual tablet Place 0.4 mg under the tongue every 5 minutes as needed for chest pain For chest pain place 1 tablet under the tongue every 5 minutes for 3 doses. If symptoms persist 5 minutes after 1st dose call 911.   Yes Reported, Patient   nystatin (MYCOSTATIN) 092967 UNIT/GM external powder Apply topically 2 times daily as needed for other 6/6/24  Yes Reported, Patient   ondansetron (ZOFRAN ODT) 4 MG ODT tab Take 4 mg by mouth every 8 hours as needed for nausea   Yes Reported, Patient   polyethylene glycol-propylene glycol (SYSTANE) 0.4-0.3 % SOLN ophthalmic solution Apply 2 drops to eye 2 times daily 6/6/24  Yes Reported, Patient   rosuvastatin (CRESTOR) 20 MG tablet Take 1 tablet by mouth at bedtime 6/6/24  Yes Reported, Patient   Semaglutide, 1 MG/DOSE, (OZEMPIC, 1 MG/DOSE,) 4 MG/3ML pen Inject 1 mg Subcutaneous once a week 4/17/23  Yes Merly Gutiérrez PA-C   Sennosides 17.2 MG TABS 17.2 mg by Enteral route daily 6/7/24  Yes Reported, Patient   sodium bicarbonate 650 MG tablet 650 mg by Per G Tube route as needed for heartburn 6/6/24  Yes Reported, Patient   vitamin D3 (CHOLECALCIFEROL) 50 mcg (2000 units) tablet Take 2 tablets by mouth daily   Yes Reported, Patient   warfarin ANTICOAGULANT (COUMADIN) 6 MG tablet Take 4-6 mg by mouth See Admin Instructions Take 6 mg on Sunday, take 4 mg daily rest of the week (dose change on 6/20/24). 6/15/18  Yes  Reported, Patient              Review of Systems:   The Review of Systems is negative other than noted in the HPI            Physical Exam:   Patient Vitals for the past 24 hrs:   BP Temp Temp src Pulse Resp SpO2   06/24/24 0741 (!) 140/62 98.2  F (36.8  C) Oral 76 16 94 %   06/24/24 0448 (!) 147/65 98  F (36.7  C) Oral 79 18 96 %   06/24/24 0026 (!) 144/64 98.9  F (37.2  C) Oral 70 18 97 %   06/23/24 1900 (!) 163/81 98.2  F (36.8  C) Oral 79 20 95 %          Intake/Output Summary (Last 24 hours) at 6/24/2024 1013  Last data filed at 6/24/2024 0900  Gross per 24 hour   Intake 710 ml   Output 850 ml   Net -140 ml      Constitutional:   awake, alert, cooperative, no apparent distress, and appears stated age       Eyes:   PERRL, conjunctiva/corneas clear, EOM's intact; no scleral edema or icterus noted        ENT:   Normocephalic, without obvious abnormality, atraumatic, Lips, mucosa, and tongue normal        Lungs:   Normal respiratory effort, no accessory muscle use       Cardiovascular:   Regular rate and rhythm       Abdomen:   Soft, not tender, not distended; feeding tube in place and clamped; negative Roper's sign       Musculoskeletal:   No obvious swelling, bruising or deformity       Skin:   Skin color and texture normal for patient, no rashes or lesions              Data:         All imaging studies reviewed by me.    Results for orders placed or performed during the hospital encounter of 06/20/24 (from the past 24 hour(s))   Glucose by meter   Result Value Ref Range    GLUCOSE BY METER POCT 164 (H) 70 - 99 mg/dL   Glucose by meter   Result Value Ref Range    GLUCOSE BY METER POCT 124 (H) 70 - 99 mg/dL   Glucose by meter   Result Value Ref Range    GLUCOSE BY METER POCT 133 (H) 70 - 99 mg/dL   Glucose by meter   Result Value Ref Range    GLUCOSE BY METER POCT 90 70 - 99 mg/dL   INR   Result Value Ref Range    INR 3.02 (H) 0.85 - 1.15   CBC with platelets   Result Value Ref Range    WBC Count 16.4 (H) 4.0 -  11.0 10e3/uL    RBC Count 4.64 4.40 - 5.90 10e6/uL    Hemoglobin 14.7 13.3 - 17.7 g/dL    Hematocrit 45.2 40.0 - 53.0 %    MCV 97 78 - 100 fL    MCH 31.7 26.5 - 33.0 pg    MCHC 32.5 31.5 - 36.5 g/dL    RDW 13.1 10.0 - 15.0 %    Platelet Count 235 150 - 450 10e3/uL   Extra Tube    Narrative    The following orders were created for panel order Extra Tube.  Procedure                               Abnormality         Status                     ---------                               -----------         ------                     Extra Green Top (Lithium...[[id number]]                      Final result                 Please view results for these tests on the individual orders.   Extra Green Top (Lithium Heparin) Tube   Result Value Ref Range    Hold Specimen [facility]    Glucose by meter   Result Value Ref Range    GLUCOSE BY METER POCT 99 70 - 99 mg/dL        FER Cm CNP

## 2024-06-24 NOTE — PLAN OF CARE
Problem: Risk for Delirium  Goal: Optimal Coping  Outcome: Progressing     Problem: UTI (Urinary Tract Infection)  Goal: Improved Infection Symptoms  Outcome: Progressing     Problem: Diabetes  Goal: Optimal Coping  Outcome: Progressing   Goal Outcome Evaluation:       Alert and oriented. Denies pain in this shift. Changed, turned and repositioned. Bs within normal range.

## 2024-06-24 NOTE — PROGRESS NOTES
"INFECTIOUS DISEASE FOLLOW UP NOTE    Date: 2024   CHIEF COMPLAINT:   Chief Complaint   Patient presents with    Abnormal Labs        ASSESSMENT:    ESBL UTI. Recent cutler catheter at TCU. Symptomatic after catheter removal. Leukocytosis higher than expected for cystitis alone.   CT with possible cholecystitis, US with same, surgery consulted for next steps. Not tender.  Abnormal Chest X ray. Atelectasis vs consolidation vs pleural effusions.   Recent CVA with resulting L hemiplegia.   TAVR, pacemaker, DM, CHF, DVT, anti-phospholipid syndrome.     PLAN:  - meropenem IV for ESBL e coli  - anticipate 14d antibiotics pending additional workup  - trend WBC, still remains elevated. Was normal last month. Can recheck differential  - further recommendations to follow clinical course  - noted plans for JENNIFER Bnun M.D.      ______________________________________________________________________    SUBJECTIVE / INTERVAL HISTORY:  Doing ok. Leukocytosis. Tolerating PO.     ROS: All other systems negative except as listed above.    SH/FH/Habits/PMH reviewed and unchanged.    OBJECTIVE:  BP (!) 144/67 (BP Location: Right arm)   Pulse 80   Temp 97.8  F (36.6  C) (Oral)   Resp 16   Ht 1.753 m (5' 9\")   Wt 73 kg (160 lb 15 oz)   SpO2 98%   BMI 23.77 kg/m       Resp: 16      Vital Signs  Temp: 97.8  F (36.6  C)  Temp src: Oral  Resp: 20  Pulse: 70  Pulse Rate Source: Monitor  BP: 130/60  BP Location: Right arm    Temp (24hrs), Av.4  F (36.9  C), Min:97.8  F (36.6  C), Max:98.7  F (37.1  C)      GEN: No acute distress.    RESPIRATORY:  Normal breathing pattern. Clear to auscultation bilaterally  CARDIOVASCULAR:  Regular rate and rhythm. No murmur, click, gallop or rub.   ABDOMEN:  Soft, normal bowel sounds, non-tender,   EXTREMITIES: No edema.  SKIN/HAIR/NAILS:  No rashes. Superficial skin findings groin.   IV:  peripheral IV      Antibiotics:  meropenem    Pertinent labs:  No results found for: \"CRP\" "   CBC RESULTS:   Recent Labs   Lab Test 06/22/24  0613   WBC 16.7*   RBC 4.11*   HGB 12.9*   HCT 40.2   MCV 98   MCH 31.4   MCHC 32.1   RDW 13.2         Last Comprehensive Metabolic Panel:  Sodium   Date Value Ref Range Status   06/22/2024 140 135 - 145 mmol/L Final     Comment:     Reference intervals for this test were updated on 09/26/2023 to more accurately reflect our healthy population. There may be differences in the flagging of prior results with similar values performed with this method. Interpretation of those prior results can be made in the context of the updated reference intervals.      Potassium   Date Value Ref Range Status   06/22/2024 3.8 3.4 - 5.3 mmol/L Final   11/29/2019 4.3 3.5 - 5.0 mmol/L Final     Chloride   Date Value Ref Range Status   06/22/2024 104 98 - 107 mmol/L Final   11/29/2019 107 98 - 107 mmol/L Final     Carbon Dioxide (CO2)   Date Value Ref Range Status   06/22/2024 24 22 - 29 mmol/L Final   11/29/2019 26 22 - 31 mmol/L Final     Anion Gap   Date Value Ref Range Status   06/22/2024 12 7 - 15 mmol/L Final   11/29/2019 7 5 - 18 mmol/L Final     Glucose   Date Value Ref Range Status   11/29/2019 126 (H) 70 - 125 mg/dL Final     GLUCOSE BY METER POCT   Date Value Ref Range Status   06/24/2024 160 (H) 70 - 99 mg/dL Final     Urea Nitrogen   Date Value Ref Range Status   06/22/2024 24.8 (H) 8.0 - 23.0 mg/dL Final   11/29/2019 10 8 - 28 mg/dL Final     Creatinine   Date Value Ref Range Status   06/22/2024 0.74 0.67 - 1.17 mg/dL Final     GFR Estimate   Date Value Ref Range Status   06/22/2024 88 >60 mL/min/1.73m2 Final     Comment:     eGFR calculated using 2021 CKD-EPI equation.   11/29/2019 >60 >60 mL/min/1.73m2 Final     Calcium   Date Value Ref Range Status   06/22/2024 8.2 (L) 8.8 - 10.2 mg/dL Final        MICROBIOLOGY DATA:  Personally reviewed.  7-Day Micro Results       Collected Updated Procedure Result Status      06/20/2024 1728 06/21/2024 1112 Urine Culture  [[id number]]   (Abnormal)   Urine, Clean Catch    Final result Component Value   Culture >100,000 CFU/mL Escherichia coli    Susceptibilities done on previous cultures               06/20/2024 1604 06/20/2024 1651 Symptomatic Influenza A/B, RSV, & SARS-CoV2 PCR (COVID-19) Nose [[id number]]    Swab from Nose    Final result Component Value   Influenza A PCR Negative   Influenza B PCR Negative   RSV PCR Negative   SARS CoV2 PCR Negative   NEGATIVE: SARS-CoV-2 (COVID-19) RNA not detected, presumed negative.            06/19/2024 1230 06/20/2024 2211 Urine Culture [[id number]]    (Abnormal)   Urine, NOS    Final result Component Value   Culture >100,000 CFU/mL Escherichia coli ESBL        Susceptibility        Escherichia coli ESBL      MARTITA      Amikacin <=2 ug/mL Susceptible  [*]       Ampicillin >=32 ug/mL Resistant      Ampicillin/ Sulbactam 4 ug/mL Susceptible  [*]       Cefazolin >=64 ug/mL Resistant  [1]       Cefepime  Resistant      Cefotaxime  Resistant  [*]       Cefoxitin <=4 ug/mL Susceptible      Ceftazidime  Resistant      Ceftriaxone  Resistant      Ciprofloxacin >=4 ug/mL Resistant      Extended Spectrum Beta-Lactamase Positive ug/mL ESBL Positive  [*]       Gentamicin <=1 ug/mL Susceptible      Levofloxacin >=8 ug/mL Resistant      Meropenem <=0.25 ug/mL Susceptible  [2]       Nitrofurantoin <=16 ug/mL Susceptible      Piperacillin/Tazobactam <=4 ug/mL Susceptible      Tobramycin <=1 ug/mL Susceptible      Trimethoprim/Sulfamethoxazole >16/304 ug/mL Resistant                   [*]  Suppressed Antibiotic     [1]  Cefazolin MARTITA breakpoints are for the treatment of uncomplicated urinary tract infections. For the treatment of systemic infections, please contact the laboratory for additional testing.     [2]  Enterobacterales that are susceptible to meropenem are usually susceptible to ertapenem.               Susceptibility Comments       Escherichia coli ESBL    ESBL (extended spectrum beta  lactamase) producing organisms require contact precautions.                        RADIOLOGY:  Personally Reviewed.  Recent Results (from the past 24 hour(s))   XR Video Swallow with SLP or OT    Narrative    EXAM: XR VIDEO SWALLOW WITH SLP OR OT  LOCATION: [facility]  DATE: 6/21/2024    INDICATION: Difficulty swallowing.  COMPARISON: None.    TECHNIQUE: Routine swallow study with speech pathology using multiple barium thicknesses.    RADIATION DOSE: Total Air Kerma 7.4 mGy    FINDINGS:   Swallow study with Speech Pathology using multiple barium thicknesses.     Aspiration with thin liquids (cine S8), which was sensed by the patient with appropriate cough reflex. Penetration was also seen with thin and mildly thickened liquids with chin tuck maneuver, though this improved when the patient's head/neck were more   vertical as opposed to leaning to the right. Vallecular/piriform sinus stasis with pudding/solids, which decreased with swallows of thin liquids.    Cervical spinal fusion hardware noted.      Impression    IMPRESSION:  1. Aspiration with thin liquids, which was sensed by patient with appropriate cough reflex. Penetration was seen with thin and mildly thickened liquids with chin tuck, though this improved when the patient's head and neck were more vertical.  2. Please see speech pathology report for further details and recommendations.   CT Abdomen Pelvis w Contrast    Narrative    EXAM: CT ABDOMEN PELVIS W CONTRAST  LOCATION: [facility]  DATE: 6/21/2024    INDICATION: UTI, leukocytosis, evaluate for abscess  COMPARISON: 6/20/2021  TECHNIQUE: CT scan of the abdomen and pelvis was performed following injection of IV contrast. Multiplanar reformats were obtained. Dose reduction techniques were used.  CONTRAST: 81 mL of Isovue-370    FINDINGS:   LOWER CHEST: Trace bilateral pleural effusions. Normal size heart. No pericardial effusion. Prosthetic aortic valves.  Mitral annular calcifications. Pacing leads. Severe coronary artery calcifications.    HEPATOBILIARY: Cholelithiasis. Gallbladder wall thickening.    PANCREAS: Normal.    SPLEEN: Normal.    ADRENAL GLANDS: Normal.    KIDNEYS/BLADDER: No obstructing renal or ureteral stones. No hydroureteronephrosis.    BOWEL: GJ tube with tip in jejunum. Diverticulosis. No diverticulitis, colitis, or obstruction. No free air or fluid. No intra-abdominal abscess.    LYMPH NODES: Normal.    VASCULATURE: No aneurysm. Moderate to severe atherosclerotic vascular calcifications.    PELVIC ORGANS: Normal.    MUSCULOSKELETAL: Mild to moderate multilevel discogenic degenerative change.      Impression    IMPRESSION:   1.  Small bilateral pleural effusions.  2.  GJ tube in situ.  3.  Cholelithiasis with gallbladder wall thickening, correlate to exclude cholecystitis.  4.  Diverticulosis. No diverticulitis, colitis, or obstruction.  5.  Coronary artery disease and atherosclerotic vascular disease.       Principal Problem:    Acute cystitis without hematuria  Active Problems:    History of CVA (cerebrovascular accident)    Aspiration pneumonia, unspecified aspiration pneumonia type, unspecified laterality, unspecified part of lung (H)    Anticoagulated on Coumadin    Urethral foreign body, initial encounter

## 2024-06-24 NOTE — PLAN OF CARE
Patient is alert and oriented times 4. 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 Patient has a new primo-fit. Meplex on buttock for blanchable redness. Patient repositioned every 2 hours.     Garland Peñaloza RN        Problem: Adult Inpatient Plan of Care  Goal: Plan of Care Review  Description: The Plan of Care Review/Shift note should be completed every shift.  The Outcome Evaluation is a brief statement about your assessment that the patient is improving, declining, or no change.  This information will be displayed automatically on your shift  note.  Outcome: Progressing   Goal Outcome Evaluation:

## 2024-06-24 NOTE — PROGRESS NOTES
Daily Progress Note    Assessment/Plan:  Franklyn Sorto is a 88 year old male admitted on 6/20/2024. Recently admitted at [facility] for ischemic stroke.  Presented to ED from TCU after he was noted to have increased white count on labs.  UC growing ESBL.  Abdominal CT shows cholelithiasis and gallbladder wall thickening.  Also has skin wounds on the coccyx and penis.  White count remains persistently elevated despite appropriate treatment with meropenem for ESBL, ID following. ? If there are other sources contributing to infection including skin and gallbladder although he has no abdominal pain.  Wound care consult also ordered.     ESBL UTI, cholelithiasis with gallbladder wall thickening  ? CAUTI  Had catheter from recent stroke in place for 2 weeks, removed recently at facility   -Urine culture with ESBL E. Coli  -ID consulted  IV meropenem  (6/21); anticipate 10 to 14 days of IV antibiotics  -WBC is still significantly elevated despite antibiotics, 16.4 today.  Last white count on file prior to this illness was in May 2024 and it was normal at that time.  CT AP for possible abscess ordered per ID; showing cholelithiasis with gallbladder wall thickening, correlate for cholecystitis.  Confirmed on ultrasound.  LFTs had been normal..  He has no right upper quadrant tenderness.    -Will consult surgery for further recommendations  -see below for penile lesion concern- may be nidus of infection ,   --coccygeal skin rash worsening, Add Vanco in the event this may be the source.     Question aspiration pneumonia  -CXR noting interval development of a left basilar airspace opacity, nonspecific may reflect atelectasis or consolidation.  Pulmonary portion of the abdominal CT showed no clear aspiration pneumonia  -IV meropenem as above  -SLP consult; noted mild to moderate dysphagia; recommending soft and bite-size with thin liquids     Sacral ulcer   Penile lesion  -Wound care consult, will add Calmoseptine  until wound care sees     Hx recent acute ischemic stroke (5/25/2024)  Residual left sided weakness   Recent admission 5/25-6/7/2024 at [facility] for ischemic stroke; discharged to TCU   -- PT/OT/CM - anticipate discharge return to TCU  -- SLP and diet as above  -PT recommending sling for left upper extremity, order placed June 23     History of the left upper extremity DVT with thrombophilia (positive antiphospholipid antibodies, 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    -- Appears euvolemic.     Essential HTN  -- cont PTA PTA carvedilol       Rheumatoid arthritis/Chronic Pain - PTA  PRN Baclofen, PRN methocarbamol    Gout  - Continue PTA allopurinol   GERD (gastroesophageal reflux disease)/Hx PUD - PTA PPI  Clinically Significant Risk Factors              # Hypoalbuminemia: Lowest albumin = 2.5 g/dL at 6/22/2024  6:13 AM, will monitor as appropriate     # Hypertension: Noted on problem list             # DMII: A1C = N/A within past 6 months, PRESENT ON ADMISSION      # Financial/Environmental Concerns: none            Code status:Full Code        Barriers to Discharge: Persistently elevated white count with concerns for multifocal infection of undetermined etiology    Disposition: Anticipate discharge in 1 to 2 days    Subjective:  He has no new complaints today, does not feel poorly although he states he feels more tired than his most recent baseline..  No fevers or chills, no chest pain or shortness of breath.  Each day he has declined  my offer to contact his wife as he states he will update her.        Current Medications Reviewed via EHR List    Objective:  Vital signs in last 24 hours:  [unfilled]  .prog  Weight:   @THISENCWEIGHTS(1)@  Weight change:   Body mass index is 23.77 kg/m .    Intake/Output last 3 shifts:  I/O last 3 completed shifts:  In: 710 [P.O.:650; NG/GT:60]  Out: 850 [Urine:850]  Intake/Output this shift:  No intake/output data recorded.    Physical Exam:  General: No apparent distress, lying in bed  CV: Rate rate rhythm  Lungs: Clear  Neurologic: Persistent left-sided deficits        Imaging:  Personally Reviewed.  US Abdomen Limited    Result Date: 6/22/2024  EXAM: US ABDOMEN LIMITED LOCATION: [facility] DATE: 6/22/2024 INDICATION: Gallstones, gallbladder wall thickening. COMPARISON: CT 6/21/2024. TECHNIQUE: Limited abdominal ultrasound. FINDINGS: GALLBLADDER: Sludge and stone material within the gallbladder. Diffuse gallbladder wall thickening with edema is noted. This is similar to the recent CT. Although there is no sonographic Roper's sign, the ultrasound features would suggest cholecystitis.  Intrinsic liver disease could potentially account for this appearance, however, this should be correlated with the patient's clinical symptoms. BILE DUCTS: No biliary dilatation. The common duct measures 6 mm. LIVER: Normal parenchyma with smooth contour. No focal mass. RIGHT KIDNEY: No hydronephrosis. PANCREAS: The visualized portions are normal. No ascites.     IMPRESSION: 1.  Sludge and stone material within the gallbladder. There is generalized gallbladder wall thickening with some edema. This has a similar appearance to the recent CT. Although there is not a sonographic Roper's sign, cholecystitis could have this appearance. This should be correlated with the patient's clinical symptoms as intrinsic liver disease could also result in the wall thickening. 2.  No evidence for biliary ductal  dilatation. 3.  The liver is unremarkable.     CT Abdomen Pelvis w Contrast    Result Date: 6/21/2024  EXAM: CT ABDOMEN PELVIS W CONTRAST LOCATION: [facility] DATE: 6/21/2024 INDICATION: UTI, leukocytosis, evaluate for abscess COMPARISON: 6/20/2021 TECHNIQUE: CT scan of the abdomen and pelvis was performed following injection of IV contrast. Multiplanar reformats were obtained. Dose reduction techniques were used. CONTRAST: 81 mL of Isovue-370 FINDINGS: LOWER CHEST: Trace bilateral pleural effusions. Normal size heart. No pericardial effusion. Prosthetic aortic valves. Mitral annular calcifications. Pacing leads. Severe coronary artery calcifications. HEPATOBILIARY: Cholelithiasis. Gallbladder wall thickening. PANCREAS: Normal. SPLEEN: Normal. ADRENAL GLANDS: Normal. KIDNEYS/BLADDER: No obstructing renal or ureteral stones. No hydroureteronephrosis. BOWEL: GJ tube with tip in jejunum. Diverticulosis. No diverticulitis, colitis, or obstruction. No free air or fluid. No intra-abdominal abscess. LYMPH NODES: Normal. VASCULATURE: No aneurysm. Moderate to severe atherosclerotic vascular calcifications. PELVIC ORGANS: Normal. MUSCULOSKELETAL: Mild to moderate multilevel discogenic degenerative change.     IMPRESSION: 1.  Small bilateral pleural effusions. 2.  GJ tube in situ. 3.  Cholelithiasis with gallbladder wall thickening, correlate to exclude cholecystitis. 4.  Diverticulosis. No diverticulitis, colitis, or obstruction. 5.  Coronary artery disease and atherosclerotic vascular disease.    XR Video Swallow with SLP or OT    Result Date: 6/21/2024  EXAM: XR VIDEO SWALLOW WITH SLP OR OT LOCATION: [facility] DATE: 6/21/2024 INDICATION: Difficulty swallowing. COMPARISON: None. TECHNIQUE: Routine swallow study with speech pathology using multiple barium thicknesses. RADIATION DOSE: Total Air Kerma 7.4 mGy FINDINGS: Swallow study with Speech Pathology using multiple barium  thicknesses. Aspiration with thin liquids (cine S8), which was sensed by the patient with appropriate cough reflex. Penetration was also seen with thin and mildly thickened liquids with chin tuck maneuver, though this improved when the patient's head/neck were more vertical as opposed to leaning to the right. Vallecular/piriform sinus stasis with pudding/solids, which decreased with swallows of thin liquids. Cervical spinal fusion hardware noted.     IMPRESSION: 1. Aspiration with thin liquids, which was sensed by patient with appropriate cough reflex. Penetration was seen with thin and mildly thickened liquids with chin tuck, though this improved when the patient's head and neck were more vertical. 2. Please see speech pathology report for further details and recommendations.    XR Chest Port 1 View    Result Date: 6/20/2024  EXAM: XR CHEST PORT 1 VIEW LOCATION: [facility] DATE: 6/20/2024 INDICATION: Recent CVA.  Evaluate for any consolidation. COMPARISON: 4/11/2024     IMPRESSION: Left subclavian approach pacing device. Interval development left basilar airspace opacity, may reflect pleural effusion, atelectasis, and/or consolidation, singly or in combination. Recommend follow-up to resolution. No pneumothorax. Clear right lung. Cervical fusion hardware.    Video swallow study with speech path TODAY    Result Date: 6/5/2024  EXAM: XR VIDEO SWALLOW W SPEECH DATE OF EXAM: 6/5/2024 Patient: Franklyn Sorto (1935), MRN [id number] CLINICAL HISTORY: Assess swallow for aspiration. TECHNIQUE: Fluoroscopy was provided for speech pathologist during video swallow   study. FINDINGS: Patient was given barium of varying consistencies to ingest. There was laryngeal penetration during ingestion of thin barium liquids. Aspiration suspected with thin liquids but unable to confirm due to patient positioning. Patient did cough with thin liquids. There was flash laryngeal penetration but no aspiration  during ingestion of nectar thick barium. No laryngeal penetration with pudding thick barium and barium coated cracker. Please see the report by the speech pathologist for further details and recommendations. FLUOROSCOPY TIME: 1 minute and 42 secs Yesi Arias PA-C [employer] [facility] Radiology Dept.    XR Abdomen Port 1 View    Result Date: 6/1/2024  Indication: Abdominal pain. Findings: 2 views of the abdomen show no dilated loops of bowel. Percutaneous gastrostomy tube.  Contrast throughout the colon. Degenerative changes of the spine.    IR PERCUTANEOUS TUBE PLACEMENT    Result Date: 5/31/2024  Procedure: Percutaneous gastrojenjunostomy tube placement under fluoroscopic guidance Indication: malnutrition Interventionalist: Esteban Ashley MD Fluoroscopy time: 15.4 minutes. Reference air kerma: 626 mGy. Estimated Blood Loss: <10 mL Medications: 1. 0.5 mg midazolam IV 2. 50 mcg fentanyl IV 3. 25 mL lidocaine SQ 4. 1 mg glucagon IV Sedation: Moderate Conscious sedation: 45 minutes of intraservice time. The sedation was supervised by myself and the patient's vital signs were actively monitored by an independent registered nurse. Complications: None immediate. Contrast: Contrast: Omnipaque 350, 20 mL into the GI tract Implanted device: 22 [place]an MARTITA gastrojejunostomy feeding tube Technique: The procedure, risks, and alternative therapies were discussed in detail, and written informed consent was obtained. A time out was performed to verify correct patient and procedure. Moderate sedation was administered as above. The patient's pulse oximetry, EKG, and blood pressure were monitored by the interventional radiology nurse at all times. The patient was placed supine on the fluoroscopy table. The anterior abdomen was prepped and draped in the usual sterile fashion. All elements of maximum sterile barrier technique were used. Preliminary fluoroscopy demonstrated barium opacification of the  transverse colon. The stomach was insufflated with air via a nasogastric tube. An appropriate puncture site was chosen in the region of the mid gastric body. The skin and subcutaneous tissues were anesthetized with 1% lidocaine.  Using a 18-gauge T fastener deployment device, three T fasteners were inserted into the stomach on opposing sides of the planned incision site and locked in place for gastropexy. Intragastric position was confirmed fluoroscopically by aspiration of air and contrast injection.   A skin nick was made, and an 18-gauge needle was advanced into the stomach. Intragastric position was confirmed by aspiration of air and by contrast injection.  A stiff guidewire was inserted into the needle. A Kumpe catheter was inserted. The catheter and wire were advanced through the pylorus and into the distal duodenum.  The tract was sequentially dilated to a 24 [place]an peel-away sheath.  MARTITA gastrojejunostomy feeding tube was inserted into the peel-away sheath, which was then removed. The balloon was inflated with 9 mL of saline mixed with a tiny amount of contrast.  Injection of the gastrostomy port confirmed correct placement. Injection of jejunostomy port confirmed tip position in the small bowel. The tube and balloon were pulled anteriorly and the external disc was secured.  The exit site was covered with a sterile dressing.  There were no complications and the patient tolerated the procedure well. Impression: Successful placement of a percutaneous gastrojejunostomy tube using fluoroscopic guidance. Please contact me with any questions. Esteban Ashley MD Vascular & Interventional Radiology at [facility] [phone] Pager: [phone]    XR Abdomen Port 1 View    Result Date: 2024  For Patients:  As a result of the 21st Century Cures Act, medical imaging exams and procedure reports are released immediately into your electronic medical record.  You may view this report  before your referring provider.  If you have questions, please contact your health care provider. INDICATION: Check opacity of bowel prior to GJ placement COMPARISON: 05/26/2024 TECHNIQUE: 1 view abdomen, supine. FINDINGS: Devices: Pacemaker leads over the lower central chest. Moderate stool burden. Contrast seen in the distal small bowel and in the cecum to the splenic flexure. The appendix is also well opacified with contrast. Moderate stool. No dilated bowel. No unusual mass effect.    Contrast in portions of the small bowel from the cecum to the splenic flexure. Dictated by Tiffanie Navas MD @ May 31 2024 10:11AM (Electronically Signed) [email or web address]    XR Chest Port 1 View    Result Date: 5/28/2024  For Patients:  As a result of the 21st Century Cures Act, medical imaging exams and procedure reports are released immediately into your electronic medical record.  You may view this report before your referring provider.  If you have questions, please contact your health care provider. HISTORY: Evaluate lung infiltrate. TECHNIQUE: One view of the chest. COMPARISON: 04/11/2024. FINDINGS: Feeding tube terminates within the proximal stomach. AICD. TAVR. No lung consolidation or pulmonary edema. No pneumothorax or pleural effusion. Degenerative changes of the spine. Prior cervical fusion.    1.  No focal lung infiltrate or pulmonary edema. Dictated by Lowell Coreas MD @ May 28 2024  4:10PM (Electronically Signed) [email or web address]    MR Brain w/o Contrast    Result Date: 5/28/2024  For Patients:  As a result of the 21st Century Cures Act, medical imaging exams and procedure reports are released immediately into your electronic medical record.  You may view this report before your referring provider.  If you have questions, please contact your health care provider. INDICATION: Neuro deficits. Comparison 05/27/2024. Technique: Multiplanar T1, T2, FLAIR and diffusion-weighted imaging.  FINDINGS: Moderate generalized volume loss. Wedge-shaped T2/FLAIR signal hyperintensity of the right inferior anne taryn with corresponding restricted diffusion (series 8, image 8; series 6, image 33) consistent with the recent, likely subacute infarcts. No other areas of restricted diffusion. No intracranial hemorrhage. Compensatory mild dilatation ventricular system. Intracranial vascular flow voids are preserved. No mass effect. No midline shift. Scattered patchy T2/FLAIR signal hyperintensity within the white matter both supra hemispheres consistent with chronic deep white matter small vessel ischemic changes. Prominent perivascular space inferior right basal ganglia. No susceptibility artifact of remote hemorrhage. Bilateral orbits are unremarkable. Normal appearing sella. Mild mucosal thickening within the maxillary sinuses. Remaining visualized paranasal sinuses and mastoid air cells are unremarkable.    1. Recent, likely subacute infarct of the right inferior anne taryn. 2. No other areas of restricted diffusion. 3. Moderate generalized cerebral volume loss. Chronic deep white matter small vessel ischemic changes 4. No acute or chronic intracranial hemorrhage Dictated by Herman Power MD @ 5/28/2024 12:58:21 PM (Electronically Signed)    Video swallow study with speech path TODAY    Result Date: 5/28/2024  For Patients:  As a result of the 21st Century Cures Act, medical imaging exams and procedure reports are released immediately into your electronic medical record.  You may view this report before your referring provider.   If you have questions, please contact your health care provider. CLINICAL HISTORY: Assess swallow for aspiration. TECHNIQUE: Fluoroscopy was provided for speech pathologist gurvinder during video swallow study. Please see additional report for full details and recommendations. 1 minutes and 54 seconds of fluoroscopy time was utilized. FINDINGS: Patient was given barium of varying consistencies  to ingest. There was laryngeal penetration with silent aspiration during ingestion of thin barium liquids, nectar, honey.  There was no laryngeal penetration or aspiration with ingestion of pudding.  However, there was a significant amount of residue present to the vallecula and pyriform sinuses with pudding.    CT Head w/o Contrast    Result Date: 5/27/2024  For Patients:  As a result of the 21st Century Cures Act, medical imaging exams and procedure reports are released immediately into your electronic medical record.  You may view this report before your referring provider.  If you have questions, please contact your health care provider. Indication : Stroke follow-up. Technique : CT of the brain without intravenous contrast. Comparison: CT head 05/25/2024. Findings: Imaging is mildly suboptimal due to scattered streak to motion related artifact. No acute blurring of the gray-white differentiation. There is no intracranial hemorrhage. The ventricles are proportionate to the cerebral sulci. The 4th ventricle is midline. Basal cisterns appear patent. No abnormal extra-axial fluid collection identified. Mild-to-moderate parenchymal volume loss. There is moderate patchy periventricular hypodensity, favored to represent chronic ischemic microvascular disease. Small chronic right frontal lobe infarct. Right inferior basal ganglia dilated perivascular space versus chronic infarct. Scattered intracranial atherosclerotic disease. There is no intracranial mass, mass effect or midline shift identified. No depressed calvarial fracture. Mild paranasal sinus mucosal disease. Impression: 1. No acute intracranial process. 2. Moderate chronic ischemic microvascular disease. 3. Stable small chronic right frontal lobe infarct. Please note that all CT scans at this facility use dose modulation, iterative reconstruction, and/or weight-based dosing when appropriate to reduce radiation dose to as low as reasonably achievable. Dictated  by Stan Silva MD @ 2024 11:03:49 AM (Electronically Signed)    XR Abdomen Port 1 View    Result Date: 2024  For Patients:  As a result of the  Cures Act, medical imaging exams and procedure reports are released immediately into your electronic medical record.  You may view this report before your referring provider.  If you have questions, please contact your health care provider. INDICATION: Tube placement. COMPARISON: 2019. FINDINGS: A supine AP view of the abdomen was obtained. There is a feeding tube with the tip in the stomach. Dictated by Esteban Landers MD @ May 26 2024  1:01PM (Electronically Signed) [email or web address]    ECHO TRANSTHORACIC COMPLETE    Result Date: 2024  ECHOCARDIOGRAM GOLDY PARSON MRN:    [id number]        Accession#:   [id number] :    1935 88 years Study Date:   2024 3:34:37 PM Gender: M                 BP:           140/9 mmHg Height: 173.00 cm         BSA:          1.91 mÂ   Weight: 77.00 kg          Tech:         HR-TRVL                           Referring MD: KAMARI RODRÍGUEZ Site:             [facility] Location: [facility] IP Patient Location: Inpatient. Procedure: 2D w/ Contrast. Indication for study: Stroke Cardiac Rhythm: Irregular.Study quality: Technically limited. Imaging limitations: This study was subject to imaging limitations due to lying in a supine position, body habitus, a prominent lung artifact and Frequent uncontrolled movements. Final Impressions:  1. Technically limited exam.  2. Echo contrast was administered to enhance visualization of all left ventricular segments.  3. Normal LV size, normal wall thickness, normal global systolic function with an estimated EF of 60 - 65%.  4. No LV mural thrombus on contrast enhanced images.  5. Grade 2 pattern of LV diastolic filling.  6. Right ventricular cavity size is not well visualized, global systolic RV function is not well visualized.  Grossly RV function appears normal.  7. 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  8. The mitral valve exhibits mild prolapse of posterior leaflet, mild to moderate mitral regurgitation.  9. No atrial level intracardiac shunt evident on color Doppler imaging. Comparison Compared to prior exam of 04/07/23, there has been no significant change. Chamber Sizes and Function Normal left ventricular size, normal wall thickness, normal global systolic function with an estimated EF of 60 - 65%. Left atrial size is normal. Right ventricular cavity size is not well visualized, global systolic RV function is not well visualized. The right atrium is normal. The pulmonary artery is not well visualized. The sinus of Valsalva is normal sized. The ascending aorta is normal sized. Valves, RV Pressures and Diastolic Function The aortic valve is functioning 31 mm CoreValve bioprosthesis replacement, no stenosis and no regurgitation. The mitral valve is mild prolapse of posterior leaflet and sclerotic, mild to moderate mitral regurgitation. Spectral Doppler shows Grade 2 pattern of LV diastolic filling. The tricuspid valve is not well visualized. Tricuspid regurgitation is regurgitation is not evident. The pulmonic valve is not well visualized. Unable to determine pulmonary regurgitation. Masses, Effusion, Shunts There is no pericardial effusion. The inferior vena cava is not well visualized, respiratory size variation not well visualized. No left to right shunting was detected by limited color flow Doppler interrogation of the interatrial septum. MEASUREMENTS AND CALCULATIONS 2-D Measurements and LV Function: LVID (d) 4.6 cm LV FS% (2D)   35 % LVID (s) 3.0 cm LVOT diameter 2.3 cm IVS (d)  1.0 cm HR            90 bpm LVPW (d) 1.0 cm Ao Sinus 2.6 cm Asc Ao   3.0 cm LA       3.3 cm Diastology:  Mitral          Tissue Doppler E Peak 0.9 m/s  e', Septum     0.04 m/s A Peak 0.9 m/s  e', Lateral    0.06 m/s E/A    1.0      E/e' Average   17.52 DT     184 msec Aortic Valve: Vmax       1.2 m/s  SARAH (V)   2.32 cmÂ  VTI        0.26 m   SARAH (I)   2.60 cmÂ  LVOT V max 0.7 m/s  Max PG    6 mmHg LVOT VTI   0.16 m   Mean PG   4 mmHg SV         68 ml    Dim Index 0.63 SV index   36 ml/mÂ  CO        6.2 l/min                     CI        3.2 l/min/mÂ  Mitral Valve: MVA      4.1 cmÂ  MV P 1/2 53 msec Tricuspid Valve and estimated PA pressures: Pulmonic Valve: PV Vmax 0.9 m/s Contrast documentation: 2 ml diluted Definity, lot #[id number], NDC# [id number] was administered peripherally to enhance visualization of all left ventricular segments. Electronically signed by Eriberto Bolden MD on 5/25/2024 5:28:02 PM. This study was interpreted by an [facility] accredited facility.   Final      CT ANGIO HEAD NECK CAROTID STROKE PROTOCOL ANGELA CANDIDAT    Result Date: 5/25/2024  For Patients:  As a result of the 21st Century Cures Act, medical imaging exams and procedure reports are released immediately into your electronic medical record.  You may view this report before your referring provider.  If you have questions, please contact your health care provider. DATE: 5/25/2024 CLINICAL HISTORY: Patient with focal neurological deficits. TECHNIQUE: Standard helical CT image acquisition of the neck up to the skull base after bolus intravenous contrast enhancement. 2D and 3D MIP images for post-processing were performed and interpreted on an independent workstation and 3D images were permanently archived. COMPARISON: CT same day. FINDINGS: The origins of the great vessels from the aortic arch are patent. The origin of the right vertebral artery is patent. The origin of the left vertebral artery is patent. The common carotid arteries are patent. There is no stenosis at the origin of the right internal carotid artery. There is no stenosis at the  origin of the left internal carotid artery. The rest of the cervical segments of the internal carotid arteries are patent up to the skull base. The right vertebral artery is dominant. The cervical segments of the vertebral arteries are patent up to the skull base. The visualized lung apices are unremarkable. The thyroid gland is unremarkable. The soft tissues of the neck are unremarkable. There are degenerative changes in the cervical spine.    Patent cervical vasculature. Please note that all CT scans at this facility use dose modulation, iterative reconstruction, and/or weight-based dosing when appropriate to reduce radiation dose to as low as reasonably achievable. Dictated by Keshia Benton MD @ 5/25/2024 1:34:54 PM (Electronically Signed)    CT HEAD STROKE PROTOCOL WITHOUT CONTRAST    Result Date: 5/25/2024  For Patients:  As a result of the 21st Century Cures Act, medical imaging exams and procedure reports are released immediately into your electronic medical record.  You may view this report before your referring provider.  If you have questions, please contact your health care provider. Indication : Left-sided weakness. Stroke. Technique : CT of the brain without intravenous contrast. Comparison: CT head 01/05/2024. Findings: No acute blurring of the gray-white differentiation. There is no intracranial hemorrhage. The ventricles are proportionate to the cerebral sulci. The 4th ventricle is midline. Basal cisterns appear patent. No abnormal extra-axial fluid collection identified. Mild-to-moderate parenchymal volume loss. There is moderate patchy periventricular hypodensity, favored to represent chronic ischemic microvascular disease. Small chronic right frontal lobe infarct. Right inferior basal ganglia dilated perivascular space versus chronic lacunar infarct. There is no intracranial mass, mass effect or midline shift identified. No depressed calvarial fracture. Mild paranasal sinus mucosal disease.  "Impression: 1. No acute intracranial process. 2. Moderate chronic ischemic microvascular disease. 3. Small chronic right frontal lobe infarct. The above findings were communicated over the telephone with Dr. Whatley by Dr. Silva at 1143 hours on 05/25/2024. Please note that all CT scans at this facility use dose modulation, iterative reconstruction, and/or weight-based dosing when appropriate to reduce radiation dose to as low as reasonably achievable. Dictated by Stan Silva MD @ 5/25/2024 11:49:41 AM (Electronically Signed)      Lab Results:  Personally Reviewed.   Fingerstick Blood Glucose: @[id number](POCGLUFGR:10)@    Last Hbg A1C: No results found for: \"HGBA1C\"   Lab Results   Component Value Date    INR 3.02 (H) 06/24/2024    INR 3.31 (H) 06/23/2024    INR 3.66 (H) 06/22/2024     Recent Results (from the past 24 hour(s))   Glucose by meter    Collection Time: 06/23/24  8:02 AM   Result Value Ref Range    GLUCOSE BY METER POCT 99 70 - 99 mg/dL   Glucose by meter    Collection Time: 06/23/24 11:47 AM   Result Value Ref Range    GLUCOSE BY METER POCT 164 (H) 70 - 99 mg/dL   Glucose by meter    Collection Time: 06/23/24  4:14 PM   Result Value Ref Range    GLUCOSE BY METER POCT 124 (H) 70 - 99 mg/dL   Glucose by meter    Collection Time: 06/23/24  9:09 PM   Result Value Ref Range    GLUCOSE BY METER POCT 133 (H) 70 - 99 mg/dL   Glucose by meter    Collection Time: 06/24/24  2:11 AM   Result Value Ref Range    GLUCOSE BY METER POCT 90 70 - 99 mg/dL   INR    Collection Time: 06/24/24  5:54 AM   Result Value Ref Range    INR 3.02 (H) 0.85 - 1.15   CBC with platelets    Collection Time: 06/24/24  5:54 AM   Result Value Ref Range    WBC Count 16.4 (H) 4.0 - 11.0 10e3/uL    RBC Count 4.64 4.40 - 5.90 10e6/uL    Hemoglobin 14.7 13.3 - 17.7 g/dL    Hematocrit 45.2 40.0 - 53.0 %    MCV 97 78 - 100 fL    MCH 31.7 26.5 - 33.0 pg    MCHC 32.5 31.5 - 36.5 g/dL    RDW 13.1 10.0 - 15.0 %    Platelet Count 235 150 - " 450 10e3/uL       Medically Ready for Discharge: Anticipated in 2-4 Days       Advanced Care Planning    Medical Decision Making       50 MINUTES SPENT BY ME on the date of service doing chart review, history, exam, documentation & further activities per the note.      Franck Harris MD  Date: 6/24/2024  Time: 7:50 AM  [facility]

## 2024-06-25 NOTE — PROGRESS NOTES
"INFECTIOUS DISEASE FOLLOW UP NOTE    Date: 2024   CHIEF COMPLAINT:   Chief Complaint   Patient presents with    Abnormal Labs        ASSESSMENT:    ESBL UTI. Recent cutler catheter at TCU. Symptomatic after catheter removal. Leukocytosis higher than expected for cystitis alone.   CT with possible cholecystitis, US with same, surgery consulted for next steps. Not tender.  Abnormal Chest X ray. Atelectasis vs consolidation vs pleural effusions.   Recent CVA with resulting L hemiplegia.   TAVR, pacemaker, DM, CHF, DVT, anti-phospholipid syndrome.   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        ______________________________________________________________________    SUBJECTIVE / INTERVAL HISTORY:  Doing ok. Tolerating PO.   No cutler    ROS: All other systems negative except as listed above.    SH/FH/Habits/PMH reviewed and unchanged.    OBJECTIVE:  BP (!) 147/63 (BP Location: Right arm)   Pulse 82   Temp 97.8  F (36.6  C) (Oral)   Resp 16   Ht 1.753 m (5' 9\")   Wt 73 kg (160 lb 15 oz)   SpO2 97%   BMI 23.77 kg/m       Resp: 16      Vital Signs  Temp: 97.8  F (36.6  C)  Temp src: Oral  Resp: 20  Pulse: 70  Pulse Rate Source: Monitor  BP: 130/60  BP Location: Right arm    Temp (24hrs), Av.4  F (36.9  C), Min:97.8  F (36.6  C), Max:98.7  F (37.1  C)      GEN: No acute distress.    RESPIRATORY:  Normal breathing pattern. Clear    ABDOMEN:  Soft, normal bowel sounds, non-tender,   EXTREMITIES: No edema.  SKIN/HAIR/NAILS:  No rashes. Superficial skin findings groin.   IV:  peripheral IV      Antibiotics:  meropenem    Pertinent labs:  No results found for: \"CRP\"   CBC RESULTS:   Recent Labs   Lab Test 24  0613   WBC 16.7*   RBC 4.11*   HGB 12.9*   HCT 40.2   MCV 98   MCH 31.4   MCHC 32.1   RDW 13.2         Last Comprehensive Metabolic " Panel:  Sodium   Date Value Ref Range Status   06/22/2024 140 135 - 145 mmol/L Final     Comment:     Reference intervals for this test were updated on 09/26/2023 to more accurately reflect our healthy population. There may be differences in the flagging of prior results with similar values performed with this method. Interpretation of those prior results can be made in the context of the updated reference intervals.      Potassium   Date Value Ref Range Status   06/22/2024 3.8 3.4 - 5.3 mmol/L Final   11/29/2019 4.3 3.5 - 5.0 mmol/L Final     Chloride   Date Value Ref Range Status   06/22/2024 104 98 - 107 mmol/L Final   11/29/2019 107 98 - 107 mmol/L Final     Carbon Dioxide (CO2)   Date Value Ref Range Status   06/22/2024 24 22 - 29 mmol/L Final   11/29/2019 26 22 - 31 mmol/L Final     Anion Gap   Date Value Ref Range Status   06/22/2024 12 7 - 15 mmol/L Final   11/29/2019 7 5 - 18 mmol/L Final     Glucose   Date Value Ref Range Status   11/29/2019 126 (H) 70 - 125 mg/dL Final     GLUCOSE BY METER POCT   Date Value Ref Range Status   06/25/2024 222 (H) 70 - 99 mg/dL Final     Urea Nitrogen   Date Value Ref Range Status   06/22/2024 24.8 (H) 8.0 - 23.0 mg/dL Final   11/29/2019 10 8 - 28 mg/dL Final     Creatinine   Date Value Ref Range Status   06/25/2024 0.66 (L) 0.67 - 1.17 mg/dL Final     GFR Estimate   Date Value Ref Range Status   06/25/2024 >90 >60 mL/min/1.73m2 Final     Comment:     eGFR calculated using 2021 CKD-EPI equation.   11/29/2019 >60 >60 mL/min/1.73m2 Final     Calcium   Date Value Ref Range Status   06/22/2024 8.2 (L) 8.8 - 10.2 mg/dL Final        MICROBIOLOGY DATA:  Personally reviewed.  7-Day Micro Results       Collected Updated Procedure Result Status      06/20/2024 1728 06/21/2024 1112 Urine Culture [[id number]]   (Abnormal)   Urine, Clean Catch    Final result Component Value   Culture >100,000 CFU/mL Escherichia coli    Susceptibilities done on previous cultures                06/20/2024 1604 06/20/2024 1651 Symptomatic Influenza A/B, RSV, & SARS-CoV2 PCR (COVID-19) Nose [[id number]]    Swab from Nose    Final result Component Value   Influenza A PCR Negative   Influenza B PCR Negative   RSV PCR Negative   SARS CoV2 PCR Negative   NEGATIVE: SARS-CoV-2 (COVID-19) RNA not detected, presumed negative.            06/19/2024 1230 06/20/2024 2211 Urine Culture [[id number]]    (Abnormal)   Urine, NOS    Final result Component Value   Culture >100,000 CFU/mL Escherichia coli ESBL        Susceptibility        Escherichia coli ESBL      MARTITA      Amikacin <=2 ug/mL Susceptible  [*]       Ampicillin >=32 ug/mL Resistant      Ampicillin/ Sulbactam 4 ug/mL Susceptible  [*]       Cefazolin >=64 ug/mL Resistant  [1]       Cefepime  Resistant      Cefotaxime  Resistant  [*]       Cefoxitin <=4 ug/mL Susceptible      Ceftazidime  Resistant      Ceftriaxone  Resistant      Ciprofloxacin >=4 ug/mL Resistant      Extended Spectrum Beta-Lactamase Positive ug/mL ESBL Positive  [*]       Gentamicin <=1 ug/mL Susceptible      Levofloxacin >=8 ug/mL Resistant      Meropenem <=0.25 ug/mL Susceptible  [2]       Nitrofurantoin <=16 ug/mL Susceptible      Piperacillin/Tazobactam <=4 ug/mL Susceptible      Tobramycin <=1 ug/mL Susceptible      Trimethoprim/Sulfamethoxazole >16/304 ug/mL Resistant                   [*]  Suppressed Antibiotic     [1]  Cefazolin MARTITA breakpoints are for the treatment of uncomplicated urinary tract infections. For the treatment of systemic infections, please contact the laboratory for additional testing.     [2]  Enterobacterales that are susceptible to meropenem are usually susceptible to ertapenem.               Susceptibility Comments       Escherichia coli ESBL    ESBL (extended spectrum beta lactamase) producing organisms require contact precautions.                        RADIOLOGY:  Personally Reviewed.  Recent Results (from the past 24 hour(s))   XR Video Swallow with SLP or OT     Narrative    EXAM: XR VIDEO SWALLOW WITH SLP OR OT  LOCATION: [facility]  DATE: 6/21/2024    INDICATION: Difficulty swallowing.  COMPARISON: None.    TECHNIQUE: Routine swallow study with speech pathology using multiple barium thicknesses.    RADIATION DOSE: Total Air Kerma 7.4 mGy    FINDINGS:   Swallow study with Speech Pathology using multiple barium thicknesses.     Aspiration with thin liquids (cine S8), which was sensed by the patient with appropriate cough reflex. Penetration was also seen with thin and mildly thickened liquids with chin tuck maneuver, though this improved when the patient's head/neck were more   vertical as opposed to leaning to the right. Vallecular/piriform sinus stasis with pudding/solids, which decreased with swallows of thin liquids.    Cervical spinal fusion hardware noted.      Impression    IMPRESSION:  1. Aspiration with thin liquids, which was sensed by patient with appropriate cough reflex. Penetration was seen with thin and mildly thickened liquids with chin tuck, though this improved when the patient's head and neck were more vertical.  2. Please see speech pathology report for further details and recommendations.   CT Abdomen Pelvis w Contrast    Narrative    EXAM: CT ABDOMEN PELVIS W CONTRAST  LOCATION: [facility]  DATE: 6/21/2024    INDICATION: UTI, leukocytosis, evaluate for abscess  COMPARISON: 6/20/2021  TECHNIQUE: CT scan of the abdomen and pelvis was performed following injection of IV contrast. Multiplanar reformats were obtained. Dose reduction techniques were used.  CONTRAST: 81 mL of Isovue-370    FINDINGS:   LOWER CHEST: Trace bilateral pleural effusions. Normal size heart. No pericardial effusion. Prosthetic aortic valves. Mitral annular calcifications. Pacing leads. Severe coronary artery calcifications.    HEPATOBILIARY: Cholelithiasis. Gallbladder wall thickening.    PANCREAS: Normal.    SPLEEN:  Normal.    ADRENAL GLANDS: Normal.    KIDNEYS/BLADDER: No obstructing renal or ureteral stones. No hydroureteronephrosis.    BOWEL: GJ tube with tip in jejunum. Diverticulosis. No diverticulitis, colitis, or obstruction. No free air or fluid. No intra-abdominal abscess.    LYMPH NODES: Normal.    VASCULATURE: No aneurysm. Moderate to severe atherosclerotic vascular calcifications.    PELVIC ORGANS: Normal.    MUSCULOSKELETAL: Mild to moderate multilevel discogenic degenerative change.      Impression    IMPRESSION:   1.  Small bilateral pleural effusions.  2.  GJ tube in situ.  3.  Cholelithiasis with gallbladder wall thickening, correlate to exclude cholecystitis.  4.  Diverticulosis. No diverticulitis, colitis, or obstruction.  5.  Coronary artery disease and atherosclerotic vascular disease.       Principal Problem:    Acute cystitis without hematuria  Active Problems:    History of CVA (cerebrovascular accident)    Aspiration pneumonia, unspecified aspiration pneumonia type, unspecified laterality, unspecified part of lung (H)    Anticoagulated on Coumadin    Urethral foreign body, initial encounter

## 2024-06-25 NOTE — PROGRESS NOTES
Care Management Follow Up    Length of Stay (days): 4    Expected Discharge Date: 06/25/2024     Concerns to be Addressed:       Patient plan of care discussed at interdisciplinary rounds: Yes    Anticipated Discharge Disposition:       Anticipated Discharge Services:    Anticipated Discharge DME:      Patient/family educated on Medicare website which has current facility and service quality ratings:    Education Provided on the Discharge Plan:    Patient/Family in Agreement with the Plan:      Referrals Placed by CM/SW:    Private pay costs discussed: Not applicable    Additional Information:  2:59 PM   Per hospitalist, patient will likely be ready for discharge tomorrow. JAI spoke with admissions at [facility], they would like patient back before 1pm. SW called to discuss transport with patient's spouse, Narda. Left .       ДМИТРИЙ Fong

## 2024-06-25 NOTE — PLAN OF CARE
Problem: Adult Inpatient Plan of Care  Goal: Optimal Comfort and Wellbeing  Outcome: Progressing     Problem: Stroke Rehabilitation  Goal: Safe and Effective Swallow  Intervention: Optimize Eating and Swallowing  Recent Flowsheet Documentation  Taken 6/24/2024 1615 by Myrna Álvarez, RN  Feeding/Eating Techniques: distant supervision provided  Swallowing Method:   chin tuck   double swallow encouraged   Goal Outcome Evaluation:       Pt denies having any pain this evening. Mepilex on coccyx was soiled, wound care done and new Mepilex applied. Penis and groin area has some erythema and irritation. Pt requested external cath to be on overnight, new one placed @ 2200.  and 223, 1 unit of Novolog given (see MAR). New order for Vanco started this evening. Pt having difficulty chewing and swallowing, remains on a soft/bite sized diet. Pt pills to also be crushed and given with applesauce. New IV placed in Right hand.    Myrna Álvarez, RN

## 2024-06-25 NOTE — PROGRESS NOTES
Daily Progress Note    Assessment/Plan:  Franklyn Sorto is a 88 year old male admitted on 6/20/2024. Recently admitted at [facility] for ischemic stroke.  Presented to ED from TCU after he was noted to have increased white count on labs.  UC growing ESBL.  Abdominal CT shows cholelithiasis and gallbladder wall thickening.  Also has skin wounds on the coccyx and penis.  White count remains persistently elevated despite appropriate treatment with meropenem for ESBL, ID following.  Has gallstones but no evidence of cholecystitis.  Has sacral and perineal erythema, IV vancomycin added June 24 and white count starting to improve.     ESBL UTI, cholelithiasis with gallbladder wall thickening  ? CAUTI  Had catheter from recent stroke in place for 2 weeks, removed recently at facility   -Urine culture with ESBL E. Coli  -ID consulted  IV meropenem  (6/21); anticipate 10 to 14 days of IV antibiotics  -WBC is still significantly elevated despite antibiotics, but now gradually improving over the past couple days to 15.5 last white count on file prior to this illness was in May 2024 and it was normal at that time.  CT AP for possible abscess ordered per ID; showing cholelithiasis with gallbladder wall thickening, correlate for cholecystitis.  Confirmed on ultrasound.  LFTs had been normal..  He has no right upper quadrant tenderness.  Surgery consulted and HIDA scan shows no cholecystitis, no concerns at this point that this is contributing.  -see below for penile lesion concern- may be nidus of infection ,   --coccygeal skin rash worsening, Added  Vanco in the event this may be the source.     Question aspiration pneumonia  -CXR noting interval development of a left basilar airspace opacity, nonspecific may reflect atelectasis or consolidation.  Pulmonary portion of the abdominal CT showed no clear aspiration pneumonia  -IV meropenem as above  -SLP consult; noted mild to moderate dysphagia; recommending soft and  bite-size with thin liquids     Sacral ulcer   Penile lesion  -Wound care consult, will add Calmoseptine until wound care sees     Hx recent acute ischemic stroke (5/25/2024)  Residual left sided weakness   Recent admission 5/25-6/7/2024 at [facility] for ischemic stroke; discharged to TCU   -- PT/OT/CM - anticipate discharge return to TCU  -- SLP and diet as above  -PT recommending sling for left upper extremity, he received this June 23     History of the left upper extremity DVT with thrombophilia (positive antiphospholipid antibodies, 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    -- Appears euvolemic.     Essential HTN  -- cont PTA PTA carvedilol       Rheumatoid arthritis/Chronic Pain - PTA  PRN Baclofen, PRN methocarbamol    Gout  - Continue PTA allopurinol   GERD (gastroesophageal reflux disease)/Hx PUD - PTA PPI  Clinically Significant Risk Factors              # Hypoalbuminemia: Lowest albumin = 2.5 g/dL at 6/22/2024  6:13 AM, will monitor as appropriate     # Hypertension: Noted on problem list             # DMII: A1C = N/A within past 6 months, PRESENT ON ADMISSION      # Financial/Environmental Concerns: none            Code status:Full Code        Barriers to Discharge: Elevated white count with concerns for occult infection, IV antibiotics    Disposition: Anticipate discharge back to TCU in 1 to 2 days.    Subjective:  Franklyn was worried today about losing his TCU bed.  Discussed with nursing staff will contact care management.   He denies any fevers or chills and overall feels much better since admission.  I also spoke with his wife by phone.        Current Medications Reviewed via EHR List    Objective:  Vital signs in last 24 hours:  [unfilled]  .prog  Weight:   @THISENCWEIGHTS(1)@  Weight change:   Body mass index is 23.77 kg/m .    Intake/Output last 3 shifts:  I/O last 3 completed shifts:  In: 340 [P.O.:240; NG/GT:100]  Out: 1300 [Urine:1300]  Intake/Output this shift:  No intake/output data recorded.    Physical Exam:  General: No apparent distress  CV: Regular rate and rhythm  Lungs: Clear  Neurologic: Residual left-sided deficit        Imaging:  Personally Reviewed.  NM Hepatobiliary Scan with GB EF and/or Pharm    Result Date: 6/24/2024  EXAM: NM HEPATOBILIARY SCAN WITH GB EF AND/OR PHARM LOCATION: [facility] DATE: 6/24/2024 INDICATION: rule out cholecystitis COMPARISON: Ultrasound 6/22/2024. TECHNIQUE: 8.0 mCi of technetium-99m mebrofenin, IV. Anterior planar imaging of the abdomen. 1.5 mcg of cholecystokinin analog, IV. Gallbladder imaging for 30-60 minutes. FINDINGS: Normal radionuclide activity in liver, gallbladder, bile ducts, and small bowel. No evidence of cystic or common duct obstruction or intrinsic liver disease. Gallbladder ejection fraction measures 17% (Normal range = 35% or greater). Bile reflux into the stomach.     IMPRESSION: 1.  Negative for acute cholecystitis. 2.  Findings suspicious for biliary dyskinesia.    US Abdomen Limited    Result Date: 6/22/2024  EXAM: US ABDOMEN LIMITED LOCATION: [facility] DATE: 6/22/2024 INDICATION: Gallstones, gallbladder wall thickening. COMPARISON: CT 6/21/2024. TECHNIQUE: Limited abdominal ultrasound. FINDINGS: GALLBLADDER: Sludge and stone material within the gallbladder. Diffuse gallbladder wall thickening with edema is noted. This is similar to the recent CT. Although there is no sonographic Roper's sign, the ultrasound  features would suggest cholecystitis.  Intrinsic liver disease could potentially account for this appearance, however, this should be correlated with the patient's clinical symptoms. BILE DUCTS: No biliary dilatation. The common duct measures 6 mm. LIVER: Normal parenchyma with smooth contour. No focal mass. RIGHT KIDNEY: No hydronephrosis. PANCREAS: The visualized portions are normal. No ascites.     IMPRESSION: 1.  Sludge and stone material within the gallbladder. There is generalized gallbladder wall thickening with some edema. This has a similar appearance to the recent CT. Although there is not a sonographic Roper's sign, cholecystitis could have this appearance. This should be correlated with the patient's clinical symptoms as intrinsic liver disease could also result in the wall thickening. 2.  No evidence for biliary ductal dilatation. 3.  The liver is unremarkable.     CT Abdomen Pelvis w Contrast    Result Date: 6/21/2024  EXAM: CT ABDOMEN PELVIS W CONTRAST LOCATION: [facility] DATE: 6/21/2024 INDICATION: UTI, leukocytosis, evaluate for abscess COMPARISON: 6/20/2021 TECHNIQUE: CT scan of the abdomen and pelvis was performed following injection of IV contrast. Multiplanar reformats were obtained. Dose reduction techniques were used. CONTRAST: 81 mL of Isovue-370 FINDINGS: LOWER CHEST: Trace bilateral pleural effusions. Normal size heart. No pericardial effusion. Prosthetic aortic valves. Mitral annular calcifications. Pacing leads. Severe coronary artery calcifications. HEPATOBILIARY: Cholelithiasis. Gallbladder wall thickening. PANCREAS: Normal. SPLEEN: Normal. ADRENAL GLANDS: Normal. KIDNEYS/BLADDER: No obstructing renal or ureteral stones. No hydroureteronephrosis. BOWEL: GJ tube with tip in jejunum. Diverticulosis. No diverticulitis, colitis, or obstruction. No free air or fluid. No intra-abdominal abscess. LYMPH NODES: Normal. VASCULATURE: No aneurysm. Moderate to severe  atherosclerotic vascular calcifications. PELVIC ORGANS: Normal. MUSCULOSKELETAL: Mild to moderate multilevel discogenic degenerative change.     IMPRESSION: 1.  Small bilateral pleural effusions. 2.  GJ tube in situ. 3.  Cholelithiasis with gallbladder wall thickening, correlate to exclude cholecystitis. 4.  Diverticulosis. No diverticulitis, colitis, or obstruction. 5.  Coronary artery disease and atherosclerotic vascular disease.    XR Video Swallow with SLP or OT    Result Date: 6/21/2024  EXAM: XR VIDEO SWALLOW WITH SLP OR OT LOCATION: [facility] DATE: 6/21/2024 INDICATION: Difficulty swallowing. COMPARISON: None. TECHNIQUE: Routine swallow study with speech pathology using multiple barium thicknesses. RADIATION DOSE: Total Air Kerma 7.4 mGy FINDINGS: Swallow study with Speech Pathology using multiple barium thicknesses. Aspiration with thin liquids (cine S8), which was sensed by the patient with appropriate cough reflex. Penetration was also seen with thin and mildly thickened liquids with chin tuck maneuver, though this improved when the patient's head/neck were more vertical as opposed to leaning to the right. Vallecular/piriform sinus stasis with pudding/solids, which decreased with swallows of thin liquids. Cervical spinal fusion hardware noted.     IMPRESSION: 1. Aspiration with thin liquids, which was sensed by patient with appropriate cough reflex. Penetration was seen with thin and mildly thickened liquids with chin tuck, though this improved when the patient's head and neck were more vertical. 2. Please see speech pathology report for further details and recommendations.    XR Chest Port 1 View    Result Date: 6/20/2024  EXAM: XR CHEST PORT 1 VIEW LOCATION: [facility] DATE: 6/20/2024 INDICATION: Recent CVA.  Evaluate for any consolidation. COMPARISON: 4/11/2024     IMPRESSION: Left subclavian approach pacing device. Interval development left basilar  airspace opacity, may reflect pleural effusion, atelectasis, and/or consolidation, singly or in combination. Recommend follow-up to resolution. No pneumothorax. Clear right lung. Cervical fusion hardware.    Video swallow study with speech path TODAY    Result Date: 6/5/2024  EXAM: XR VIDEO SWALLOW W SPEECH DATE OF EXAM: 6/5/2024 Patient: Franklyn Sorto (1935), MRN [id number] CLINICAL HISTORY: Assess swallow for aspiration. TECHNIQUE: Fluoroscopy was provided for speech pathologist during video swallow   study. FINDINGS: Patient was given barium of varying consistencies to ingest. There was laryngeal penetration during ingestion of thin barium liquids. Aspiration suspected with thin liquids but unable to confirm due to patient positioning. Patient did cough with thin liquids. There was flash laryngeal penetration but no aspiration during ingestion of nectar thick barium. No laryngeal penetration with pudding thick barium and barium coated cracker. Please see the report by the speech pathologist for further details and recommendations. FLUOROSCOPY TIME: 1 minute and 42 secs Yesi Arias PA-C [employer] [facility] Radiology Dept.    XR Abdomen Port 1 View    Result Date: 6/1/2024  Indication: Abdominal pain. Findings: 2 views of the abdomen show no dilated loops of bowel. Percutaneous gastrostomy tube.  Contrast throughout the colon. Degenerative changes of the spine.    IR PERCUTANEOUS TUBE PLACEMENT    Result Date: 5/31/2024  Procedure: Percutaneous gastrojenjunostomy tube placement under fluoroscopic guidance Indication: malnutrition Interventionalist: Esteban Ashley MD Fluoroscopy time: 15.4 minutes. Reference air kerma: 626 mGy. Estimated Blood Loss: <10 mL Medications: 1. 0.5 mg midazolam IV 2. 50 mcg fentanyl IV 3. 25 mL lidocaine SQ 4. 1 mg glucagon IV Sedation: Moderate Conscious sedation: 45 minutes of intraservice time. The sedation was supervised by myself and the  patient's vital signs were actively monitored by an independent registered nurse. Complications: None immediate. Contrast: Contrast: Omnipaque 350, 20 mL into the GI tract Implanted device: 22 [place] MARTITA gastrojejunostomy feeding tube Technique: The procedure, risks, and alternative therapies were discussed in detail, and written informed consent was obtained. A time out was performed to verify correct patient and procedure. Moderate sedation was administered as above. The patient's pulse oximetry, EKG, and blood pressure were monitored by the interventional radiology nurse at all times. The patient was placed supine on the fluoroscopy table. The anterior abdomen was prepped and draped in the usual sterile fashion. All elements of maximum sterile barrier technique were used. Preliminary fluoroscopy demonstrated barium opacification of the transverse colon. The stomach was insufflated with air via a nasogastric tube. An appropriate puncture site was chosen in the region of the mid gastric body. The skin and subcutaneous tissues were anesthetized with 1% lidocaine.  Using a 18-gauge T fastener deployment device, three T fasteners were inserted into the stomach on opposing sides of the planned incision site and locked in place for gastropexy. Intragastric position was confirmed fluoroscopically by aspiration of air and contrast injection.   A skin nick was made, and an 18-gauge needle was advanced into the stomach. Intragastric position was confirmed by aspiration of air and by contrast injection.  A stiff guidewire was inserted into the needle. A Kumpe catheter was inserted. The catheter and wire were advanced through the pylorus and into the distal duodenum.  The tract was sequentially dilated to a 24 [place]an peel-away sheath.  MARTITA gastrojejunostomy feeding tube was inserted into the peel-away sheath, which was then removed. The balloon was inflated with 9 mL of saline mixed with a tiny amount of contrast.  Injection  of the gastrostomy port confirmed correct placement. Injection of jejunostomy port confirmed tip position in the small bowel. The tube and balloon were pulled anteriorly and the external disc was secured.  The exit site was covered with a sterile dressing.  There were no complications and the patient tolerated the procedure well. Impression: Successful placement of a percutaneous gastrojejunostomy tube using fluoroscopic guidance. Please contact me with any questions. Esteban Ashley MD Vascular & Interventional Radiology at [facility] [phone] Pager: [phone]    XR Abdomen Port 1 View    Result Date: 2024  For Patients:  As a result of the  Cures Act, medical imaging exams and procedure reports are released immediately into your electronic medical record.  You may view this report before your referring provider.  If you have questions, please contact your health care provider. INDICATION: Check opacity of bowel prior to GJ placement COMPARISON: 2024 TECHNIQUE: 1 view abdomen, supine. FINDINGS: Devices: Pacemaker leads over the lower central chest. Moderate stool burden. Contrast seen in the distal small bowel and in the cecum to the splenic flexure. The appendix is also well opacified with contrast. Moderate stool. No dilated bowel. No unusual mass effect.    Contrast in portions of the small bowel from the cecum to the splenic flexure. Dictated by Tiffanie Navas MD @ May 31 2024 10:11AM (Electronically Signed) [email or web address]    XR Chest Port 1 View    Result Date: 2024  For Patients:  As a result of the 21st Century Cures Act, medical imaging exams and procedure reports are released immediately into your electronic medical record.  You may view this report before your referring provider.  If you have questions, please contact your health care provider. HISTORY: Evaluate lung infiltrate. TECHNIQUE: One view of the chest. COMPARISON:  04/11/2024. FINDINGS: Feeding tube terminates within the proximal stomach. AICD. TAVR. No lung consolidation or pulmonary edema. No pneumothorax or pleural effusion. Degenerative changes of the spine. Prior cervical fusion.    1.  No focal lung infiltrate or pulmonary edema. Dictated by Lowell Coreas MD @ May 28 2024  4:10PM (Electronically Signed) [email or web address]    MR Brain w/o Contrast    Result Date: 5/28/2024  For Patients:  As a result of the 21st Century Cures Act, medical imaging exams and procedure reports are released immediately into your electronic medical record.  You may view this report before your referring provider.  If you have questions, please contact your health care provider. INDICATION: Neuro deficits. Comparison 05/27/2024. Technique: Multiplanar T1, T2, FLAIR and diffusion-weighted imaging. FINDINGS: Moderate generalized volume loss. Wedge-shaped T2/FLAIR signal hyperintensity of the right inferior anne taryn with corresponding restricted diffusion (series 8, image 8; series 6, image 33) consistent with the recent, likely subacute infarcts. No other areas of restricted diffusion. No intracranial hemorrhage. Compensatory mild dilatation ventricular system. Intracranial vascular flow voids are preserved. No mass effect. No midline shift. Scattered patchy T2/FLAIR signal hyperintensity within the white matter both supra hemispheres consistent with chronic deep white matter small vessel ischemic changes. Prominent perivascular space inferior right basal ganglia. No susceptibility artifact of remote hemorrhage. Bilateral orbits are unremarkable. Normal appearing sella. Mild mucosal thickening within the maxillary sinuses. Remaining visualized paranasal sinuses and mastoid air cells are unremarkable.    1. Recent, likely subacute infarct of the right inferior anne taryn. 2. No other areas of restricted diffusion. 3. Moderate generalized cerebral volume loss. Chronic deep white  matter small vessel ischemic changes 4. No acute or chronic intracranial hemorrhage Dictated by Herman Power MD @ 5/28/2024 12:58:21 PM (Electronically Signed)    Video swallow study with speech path TODAY    Result Date: 5/28/2024  For Patients:  As a result of the 21st Century Cures Act, medical imaging exams and procedure reports are released immediately into your electronic medical record.  You may view this report before your referring provider.   If you have questions, please contact your health care provider. CLINICAL HISTORY: Assess swallow for aspiration. TECHNIQUE: Fluoroscopy was provided for speech pathologist gurvinder during video swallow study. Please see additional report for full details and recommendations. 1 minutes and 54 seconds of fluoroscopy time was utilized. FINDINGS: Patient was given barium of varying consistencies to ingest. There was laryngeal penetration with silent aspiration during ingestion of thin barium liquids, nectar, honey.  There was no laryngeal penetration or aspiration with ingestion of pudding.  However, there was a significant amount of residue present to the vallecula and pyriform sinuses with pudding.    CT Head w/o Contrast    Result Date: 5/27/2024  For Patients:  As a result of the 21st Century Cures Act, medical imaging exams and procedure reports are released immediately into your electronic medical record.  You may view this report before your referring provider.  If you have questions, please contact your health care provider. Indication : Stroke follow-up. Technique : CT of the brain without intravenous contrast. Comparison: CT head 05/25/2024. Findings: Imaging is mildly suboptimal due to scattered streak to motion related artifact. No acute blurring of the gray-white differentiation. There is no intracranial hemorrhage. The ventricles are proportionate to the cerebral sulci. The 4th ventricle is midline. Basal cisterns appear patent. No abnormal extra-axial fluid  "collection identified. Mild-to-moderate parenchymal volume loss. There is moderate patchy periventricular hypodensity, favored to represent chronic ischemic microvascular disease. Small chronic right frontal lobe infarct. Right inferior basal ganglia dilated perivascular space versus chronic infarct. Scattered intracranial atherosclerotic disease. There is no intracranial mass, mass effect or midline shift identified. No depressed calvarial fracture. Mild paranasal sinus mucosal disease. Impression: 1. No acute intracranial process. 2. Moderate chronic ischemic microvascular disease. 3. Stable small chronic right frontal lobe infarct. Please note that all CT scans at this facility use dose modulation, iterative reconstruction, and/or weight-based dosing when appropriate to reduce radiation dose to as low as reasonably achievable. Dictated by Stan Silva MD @ 5/27/2024 11:03:49 AM (Electronically Signed)    XR Abdomen Port 1 View    Result Date: 5/26/2024  For Patients:  As a result of the 21st Century Cures Act, medical imaging exams and procedure reports are released immediately into your electronic medical record.  You may view this report before your referring provider.  If you have questions, please contact your health care provider. INDICATION: Tube placement. COMPARISON: 11/07/2019. FINDINGS: A supine AP view of the abdomen was obtained. There is a feeding tube with the tip in the stomach. Dictated by Esteban Landers MD @ May 26 2024  1:01PM (Electronically Signed) [email or web address]      Lab Results:  Personally Reviewed.   Fingerstick Blood Glucose: @[id number](POCGLUFGR:10)@    Last Hbg A1C: No results found for: \"HGBA1C\"   Lab Results   Component Value Date    INR 3.02 (H) 06/24/2024    INR 3.31 (H) 06/23/2024    INR 3.66 (H) 06/22/2024     Recent Results (from the past 24 hour(s))   Glucose by meter    Collection Time: 06/24/24  8:22 AM   Result Value Ref Range    GLUCOSE BY METER POCT " 99 70 - 99 mg/dL   Glucose by meter    Collection Time: 06/24/24 12:01 PM   Result Value Ref Range    GLUCOSE BY METER POCT 160 (H) 70 - 99 mg/dL   Glucose by meter    Collection Time: 06/24/24  4:36 PM   Result Value Ref Range    GLUCOSE BY METER POCT 115 (H) 70 - 99 mg/dL   Glucose by meter    Collection Time: 06/24/24  9:05 PM   Result Value Ref Range    GLUCOSE BY METER POCT 223 (H) 70 - 99 mg/dL   Glucose by meter    Collection Time: 06/25/24  2:11 AM   Result Value Ref Range    GLUCOSE BY METER POCT 135 (H) 70 - 99 mg/dL       Medically Ready for Discharge: 1 to 2 days, discharge back to TCU       Advanced Care Planning    Medical Decision Making       50 MINUTES SPENT BY ME on the date of service doing chart review, history, exam, documentation & further activities per the note.      Franck Harris MD  Date: 6/25/2024  Time: 7:24 AM  [facility]  Family Medicine

## 2024-06-25 NOTE — PLAN OF CARE
Problem: Adult Inpatient Plan of Care  Goal: Absence of Hospital-Acquired Illness or Injury  Intervention: Prevent Infection  Recent Flowsheet Documentation  Taken 6/25/2024 0030 by Sudha Galloway RN  Infection Prevention:   hand hygiene promoted   rest/sleep promoted     Problem: Risk for Delirium  Goal: Improved Sleep  Outcome: Progressing   Goal Outcome Evaluation:    Patient alert, oriented x 3. Neuro checks intact. Denied chest pain, nausea/vomiting. Denied discomfort overnight. Needed extensive assistance x 2 with cares due to being left sided hemiplegic. Will continue to monitor the patient.

## 2024-06-25 NOTE — PLAN OF CARE
Problem: Diabetes  Goal: Blood Glucose Level Within Target Range  Outcome: Not Progressing   Goal Outcome Evaluation:  Blood sugars were 126 and 222 this shift. Sliding scale as ordered.     Problem: Stroke Rehabilitation  Goal: Safe and Effective Swallow  Intervention: Optimize Eating and Swallowing  Recent Flowsheet Documentation  Taken 6/25/2024 0800 by Dayanara Carlin RN  Feeding/Eating Techniques: monitored for feeding stress cues        Pt was monitored by speech therapy while eating lunch, diet changed to purred consistency. Pt has been tolerating liquids well, no episodes of coughing noted. Pt has not used straws and is upright at 90 degrees.   Wound cares done to sacrum, encouraged to be off back when able.

## 2024-06-25 NOTE — PHARMACY-ANTICOAGULATION SERVICE
Clinical Pharmacy - Warfarin Dosing Consult     Pharmacy has been consulted to manage this patient s warfarin therapy.  Indication: Other - specify in comments (h/o DVT, Factor V)  Therapy Goal: INR 2-3  Provider/Team: [facility] [place]  Warfarin Prior to Admission: Yes  Warfarin PTA Regimen: 6 mg on Sunday, 4 mg ROW (new as of 6/20/24)  Significant drug interactions: None new  Recent documented change in oral intake/nutrition: Unknown  Dose Comments: INR continues to trend down. Will likely see future effects of 90% dose reduction given yesterday. Will give 3 mg today (25% reduction from regular home dose).    INR   Date Value Ref Range Status   06/24/2024 3.02 (H) 0.85 - 1.15 Final   06/23/2024 3.31 (H) 0.85 - 1.15 Final      Pharmacy will monitor Franklyn SHAH Angellien daily and order warfarin doses to achieve specified goal.      Please contact pharmacy as soon as possible if the warfarin needs to be held for a procedure or if the warfarin goals change.      No note written on 6/21/24 when consult was placed, therefore note written today.

## 2024-06-26 NOTE — PROGRESS NOTES
Speech Language Therapy Discharge Summary    Reason for therapy discharge:    Discharged to transitional care facility.    Progress towards therapy goal(s). See goals on Care Plan in [facility] electronic health record for goal details.  Goals partially met.  Barriers to achieving goals:   discharge from facility.    Therapy recommendation(s):    Continued therapy is recommended.  Rationale/Recommendations:  Pt diet changed to puree d/t poor bolus formation and AP transfer of soft solids. Pt continues to need to use small bite/sip with chin tuck strategy. Work on Verna, effortful and CTAR and work on bolus formation and AP transfer of soft textures prior to diet upgrade..

## 2024-06-26 NOTE — PROGRESS NOTES
Physical Therapy Discharge Summary    Reason for therapy discharge:    Discharged to TCU.    Progress towards therapy goal(s). See goals on Care Plan in [facility] electronic health record for goal details.  Goals partially met.  Barriers to achieving goals:   discharge from facility.    Therapy recommendation(s):    Further recommended therapy is related to documented deficits, and is necessary to maximize functional independence in order for patient to return to prior level of function.      Michaela Weston, YAZMIN 6/26/2024

## 2024-06-26 NOTE — PLAN OF CARE
Alert and oriented. Left side weakness in arm and leg, unable to move left side. Left arm elevated on pillow and wrapped for swelling, which is moderate. Sitting up straight vertical for all oral intake. Crushed pills in applesauce. Mepilex in place over coccyx wound that is quarter sized ulcer, white center pink periphery. Vitals stable. Blood sugar 140 this morning. One unit novolog given. Using urinal, good output, urine cloudy yellow. 2 IV's that flush well, reinforced with tape and tan sleeve.

## 2024-06-26 NOTE — DISCHARGE SUMMARY
[facility]  Hospitalist Discharge Summary      Date of Admission:  6/20/2024  Date of Discharge:  6/26/2024  Discharging Provider: Chalino Manzanares MD  Discharge Service: Hospitalist Service    Discharge Diagnoses   ESBL E. coli UTI related to catheter associated UTI  Abnormal chest x-ray atelectasis  Recent CVA with left hemiplegia  TAVR, pacemaker, diabetes, CHF, DVT, antiphospholipid antibody syndrome,  Sacral ulcer and penile lesion  Positive antiphospholipid antibody  Non-insulin-dependent diabetes  Essential hypertension  Clinically Significant Risk Factors     # DMII: A1C = N/A within past 6 months       Follow-ups Needed After Discharge   Follow-up Appointments     Follow Up and recommended labs and tests      Follow up with long term physician.  The following labs/tests are   recommended: Check INR daily and adjust Coumadin accordingly.            Unresulted Labs Ordered in the Past 30 Days of this Admission       No orders found from 5/21/2024 to 6/21/2024.        These results will be followed up by PCP    Discharge Disposition   Discharged to short-term care facility  Condition at discharge: Stable    Hospital Course    Franklyn Sorto is a 88 year old male admitted on 6/20/2024. Recently admitted at [facility] for ischemic stroke.  Presented to ED from TCU after he was noted to have increased white count on labs.  UC growing ESBL.  Abdominal CT shows cholelithiasis and gallbladder wall thickening.  Also has skin wounds on the coccyx and penis.  White count remains persistently elevated despite appropriate treatment with meropenem for ESBL, ID following.  Has gallstones but no evidence of cholecystitis.  Has sacral and perineal erythema,      ESBL UTI, cholelithiasis with gallbladder wall thickening  ? CAUTI  Had catheter from recent stroke in place for 2 weeks, removed recently at facility   -Urine culture with ESBL E. Coli  -ID consulted  IV meropenem  (6/21); changed  to IV ertapenem at discharge stop date on 6/30 as per ID-  WBC was still significantly elevated despite antibiotics, gradually improving  WBC Count   Date Value Ref Range Status   06/26/2024 14.1 (H) 4.0 - 11.0 10e3/uL Final   TCU confirmed that they can administer 4 days of IV antibiotics using the peripheral line.    CT AP for possible abscess ordered per ID; showing cholelithiasis with gallbladder wall thickening, correlate for cholecystitis.  Confirmed on ultrasound.  LFTs had been normal..  He has no right upper quadrant tenderness.  Surgery consulted and HIDA scan shows no cholecystitis, no concerns at this point that this is contributing.  -see below for penile lesion concern- may be nidus of infection ,   --coccygeal skin rash worsening, DC vancomycin at discharge if not recommended by ID.    Question aspiration pneumonia  -CXR noting interval development of a left basilar airspace opacity, nonspecific may reflect atelectasis or consolidation.  Pulmonary portion of the abdominal CT showed no clear aspiration pneumonia  -IV meropenem as above  -SLP consult; noted mild to moderate dysphagia; recommending soft and bite-size with thin liquids     Sacral ulcer   Penile lesion  -Wound care consult, continue Calmoseptine until wound care sees     Hx recent acute ischemic stroke (5/25/2024)  Residual left sided weakness   Recent admission 5/25-6/7/2024 at 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  Further adjustment as per TCU provider.        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    -- Appears euvolemic.     Essential HTN  -- cont PTA PTA carvedilol       Rheumatoid arthritis/Chronic Pain - PTA  PRN Baclofen, PRN methocarbamol    Gout  - Continue PTA allopurinol   GERD (gastroesophageal reflux disease)/Hx PUD - PTA PPI    Consultations This Hospital Stay   WOUND OSTOMY CONTINENCE NURSE  IP CONSULT  SPEECH LANGUAGE PATH ADULT IP CONSULT  PHYSICAL THERAPY ADULT IP CONSULT  OCCUPATIONAL THERAPY ADULT IP CONSULT  PHARMACY TO DOSE WARFARIN  INFECTIOUS DISEASES IP CONSULT  CARE MANAGEMENT / SOCIAL WORK IP CONSULT  WOUND OSTOMY CONTINENCE NURSE  IP CONSULT  SURGERY GENERAL IP CONSULT  PHARMACY TO DOSE VANCO  PHYSICAL THERAPY ADULT IP CONSULT  OCCUPATIONAL THERAPY ADULT IP CONSULT    Code Status   Full Code    Time Spent on this Encounter   I, Chalino Manzanares MD, personally saw the patient today and spent greater than 30 minutes discharging this patient.       Chalino Manzanares MD  [address] [address]  Phone: [phone]  Fax: [phone]  ______________________________________________________________________    Physical Exam   Vital Signs: Temp: 98  F (36.7  C) Temp src: Oral BP: 139/81 Pulse: 76   Resp: 14 SpO2: 94 % O2 Device: None (Room air)    Weight: 160 lbs 14.97 oz  No apparent distress       Primary Care Physician   Physician No Ref-Primary    Discharge Orders      General info for SNF    Length of Stay Estimate: Short Term Care: Estimated # of Days <30  Condition at Discharge: Improving  Level of care:skilled    Rehabilitation Potential: Good  Admission H&P remains valid and up-to-date: Yes  Recent Chemotherapy: N/A  Use Nursing Home Standing Orders: Yes     Mantoux instructions    Give two-step Mantoux (PPD) Per Facility Policy Yes     Follow Up and recommended labs and tests    Follow up with retirement physician.  The following labs/tests are recommended: Check INR daily and adjust Coumadin accordingly.     Reason for your hospital stay    Catheter associated UTI     Glucose monitor nursing POCT    Before meals and at bedtime     Bladder scan    X 2 for post void residual.  Straight cath if PVR greater than 400 mL.     Activity - Up ad alexandra     Wound care    Scrotum/groin BID and with brief changes   1. Cleanse area with soap and water or bath wipes and dry completely   2. Apply critic aid to any and all affected areas       Sacrum wound Daily or PRN if dressing soiled, saturated or falls off   1. Cleanse with soap and water or bath wipe and pat dry   2. Apply nickel thick amount of honey to wound bed and cover with mepilex     cleanse with penis/penis wound with incontinence wipes at least twice a day and as needed     Physical Therapy Adult Consult    Evaluate and treat as clinically indicated.-PT recommending sling for left upper extremity,    Reason: Previous stroke.     Occupational Therapy Adult Consult    Evaluate and treat as clinically indicated.    Reason: Previous stroke     Contact Isolation    ESBL E. coli UTI     Fall precautions     Diet    Follow this diet upon discharge: Orders Placed This Encounter      Combination Diet Pureed Diet (level 4); Thin Liquids (level 0), diabetic       Significant Results and Procedures   Results for orders placed or performed during the hospital encounter of 06/20/24   XR Chest Port 1 View    Narrative    EXAM: XR CHEST PORT 1 VIEW  LOCATION: [facility]  DATE: 6/20/2024    INDICATION: Recent CVA.  Evaluate for any consolidation.  COMPARISON:  4/11/2024      Impression    IMPRESSION: Left subclavian approach pacing device. Interval development left basilar airspace opacity, may reflect pleural effusion, atelectasis, and/or consolidation, singly or in combination. Recommend follow-up to resolution. No pneumothorax. Clear   right lung. Cervical fusion hardware.   XR Video Swallow with SLP or OT    Narrative    EXAM: XR VIDEO SWALLOW WITH SLP OR OT  LOCATION: [facility]  DATE: 6/21/2024    INDICATION: Difficulty swallowing.  COMPARISON: None.    TECHNIQUE: Routine swallow study with speech pathology using multiple barium thicknesses.    RADIATION DOSE: Total Air Kerma 7.4 mGy    FINDINGS:   Swallow study with Speech Pathology using multiple barium thicknesses.     Aspiration with thin liquids (cine S8), which was sensed by the patient with appropriate cough reflex. Penetration was also seen with thin and mildly thickened liquids with chin tuck maneuver, though this improved when the patient's head/neck were more   vertical as opposed to leaning to the right. Vallecular/piriform sinus stasis with pudding/solids, which decreased with swallows of thin liquids.    Cervical spinal fusion hardware noted.      Impression    IMPRESSION:  1. Aspiration with thin liquids, which was sensed by patient with appropriate cough reflex. Penetration was seen with thin and mildly thickened liquids with chin tuck, though this improved when the patient's head and neck were more vertical.  2. Please see speech pathology report for further details and recommendations.   CT Abdomen Pelvis w Contrast    Narrative    EXAM: CT ABDOMEN PELVIS W CONTRAST  LOCATION: [facility]  DATE: 6/21/2024    INDICATION: UTI, leukocytosis, evaluate for abscess  COMPARISON: 6/20/2021  TECHNIQUE: CT scan of the abdomen and pelvis was performed following injection of IV contrast. Multiplanar reformats were obtained. Dose reduction techniques were  used.  CONTRAST: 81 mL of Isovue-370    FINDINGS:   LOWER CHEST: Trace bilateral pleural effusions. Normal size heart. No pericardial effusion. Prosthetic aortic valves. Mitral annular calcifications. Pacing leads. Severe coronary artery calcifications.    HEPATOBILIARY: Cholelithiasis. Gallbladder wall thickening.    PANCREAS: Normal.    SPLEEN: Normal.    ADRENAL GLANDS: Normal.    KIDNEYS/BLADDER: No obstructing renal or ureteral stones. No hydroureteronephrosis.    BOWEL: GJ tube with tip in jejunum. Diverticulosis. No diverticulitis, colitis, or obstruction. No free air or fluid. No intra-abdominal abscess.    LYMPH NODES: Normal.    VASCULATURE: No aneurysm. Moderate to severe atherosclerotic vascular calcifications.    PELVIC ORGANS: Normal.    MUSCULOSKELETAL: Mild to moderate multilevel discogenic degenerative change.      Impression    IMPRESSION:   1.  Small bilateral pleural effusions.  2.  GJ tube in situ.  3.  Cholelithiasis with gallbladder wall thickening, correlate to exclude cholecystitis.  4.  Diverticulosis. No diverticulitis, colitis, or obstruction.  5.  Coronary artery disease and atherosclerotic vascular disease.   US Abdomen Limited    Narrative    EXAM: US ABDOMEN LIMITED  LOCATION: [facility]  DATE: 6/22/2024    INDICATION: Gallstones, gallbladder wall thickening.  COMPARISON: CT 6/21/2024.  TECHNIQUE: Limited abdominal ultrasound.    FINDINGS:    GALLBLADDER: Sludge and stone material within the gallbladder. Diffuse gallbladder wall thickening with edema is noted. This is similar to the recent CT. Although there is no sonographic Roper's sign, the ultrasound features would suggest cholecystitis.   Intrinsic liver disease could potentially account for this appearance, however, this should be correlated with the patient's clinical symptoms.    BILE DUCTS: No biliary dilatation. The common duct measures 6 mm.    LIVER: Normal parenchyma with smooth contour. No focal  mass.    RIGHT KIDNEY: No hydronephrosis.    PANCREAS: The visualized portions are normal.    No ascites.      Impression    IMPRESSION:  1.  Sludge and stone material within the gallbladder. There is generalized gallbladder wall thickening with some edema. This has a similar appearance to the recent CT. Although there is not a sonographic Roper's sign, cholecystitis could have this   appearance. This should be correlated with the patient's clinical symptoms as intrinsic liver disease could also result in the wall thickening.  2.  No evidence for biliary ductal dilatation.  3.  The liver is unremarkable.       NM Hepatobiliary Scan with GB EF and/or Pharm    Narrative    EXAM: NM HEPATOBILIARY SCAN WITH GB EF AND/OR PHARM  LOCATION: [facility]  DATE: 6/24/2024    INDICATION: rule out cholecystitis  COMPARISON: Ultrasound 6/22/2024.  TECHNIQUE: 8.0 mCi of technetium-99m mebrofenin, IV. Anterior planar imaging of the abdomen. 1.5 mcg of cholecystokinin analog, IV. Gallbladder imaging for 30-60 minutes.    FINDINGS: Normal radionuclide activity in liver, gallbladder, bile ducts, and small bowel. No evidence of cystic or common duct obstruction or intrinsic liver disease. Gallbladder ejection fraction measures 17% (Normal range = 35% or greater). Bile   reflux into the stomach.      Impression    IMPRESSION:     1.  Negative for acute cholecystitis.     2.  Findings suspicious for biliary dyskinesia.       Discharge Medications   Current Discharge Medication List        START taking these medications    Details   ertapenem (INVANZ) 1 GM vial Inject 1 g into the vein every 24 hours Weekly cbc cmp crp.  Stop date on 6/30/2024    Associated Diagnoses: S/P TAVR (transcatheter aortic valve replacement)      insulin aspart (NOVOLOG PEN) 100 UNIT/ML pen Inject 1-3 Units Subcutaneous 3 times daily (before meals) Correction Scale - LOW INSULIN RESISTANCE DOSING   Do Not give Correction Insulin if  Pre-Meal BG less than 140. For Pre-Meal  - 239 give 1 unit. For Pre-Meal  - 339 give 2 units. For Pre-Meal BG greater than or equal to 340 give 3 units. To be given with prandial insulin, and based on pre-meal blood glucose. Administering insulin within 5 minutes of the start of the meal is ideal. Administer insulin no more than 30 minutes after the start of the meal, unless directed otherwise by provider. Notify provider if glucose greater than or equal to 350 mg/dL after administration of correction dose.    Associated Diagnoses: Controlled type 2 diabetes mellitus with diabetic neuropathy, without long-term current use of insulin (H)      menthol-zinc oxide (CALMOSEPTINE) 0.44-20.6 % OINT ointment Apply topically 4 times daily as needed for skin protection Apply to sacrum/groin    Associated Diagnoses: Wound of sacral region, sequela           CONTINUE these medications which have CHANGED    Details   HYDROmorphone (DILAUDID) 2 MG tablet Take 1 tablet (2 mg) by mouth 3 times daily as needed for pain  Qty: 10 tablet, Refills: 0    Associated Diagnoses: Arthralgia of lower leg, unspecified laterality      methocarbamol (ROBAXIN) 500 MG tablet Take 1 tablet (500 mg) by mouth 3 times daily as needed for muscle spasms    Associated Diagnoses: Arthralgia of lower leg, unspecified laterality      warfarin ANTICOAGULANT (COUMADIN) 6 MG tablet Take 6 mg on Sunday, take 4 mg daily rest of the week (dose change on 6/20/24).  Dose to be adjusted by TCU provider since INR was 4.08 at discharge    Associated Diagnoses: Thrombophilia (H24)           CONTINUE these medications which have NOT CHANGED    Details   acetaminophen (TYLENOL) 500 MG tablet Take 1,000 mg by mouth 3 times daily as needed for mild pain      allopurinol (ZYLOPRIM) 300 MG tablet Take 300 mg by mouth daily  Refills: 0      aspirin 81 MG EC tablet Take 81 mg by mouth daily      !! Baclofen (LIORESAL) 5 MG tablet Take 5 mg by mouth 3 times daily       !! Baclofen (LIORESAL) 5 MG tablet Take 5 mg by mouth daily as needed for muscle spasms      bisacodyl (DULCOLAX) 10 MG suppository Place 10 mg rectally daily as needed for constipation      carvedilol (COREG) 6.25 MG tablet Take 25 mg by mouth 2 times daily  Refills: 3      COLCRYS 0.6 MG tablet Take 0.6 mg by mouth as needed  Refills: 0      diclofenac (VOLTAREN) 1 % topical gel Apply 2 g topically 4 times daily      empagliflozin (JARDIANCE) 25 MG TABS tablet Take 1 tablet (25 mg) by mouth daily  Qty: 90 tablet, Refills: 3    Associated Diagnoses: Type 2 diabetes mellitus with hyperglycemia, without long-term current use of insulin (H)      ENTRESTO  MG per tablet Take 1 tablet by mouth 2 times daily  Refills: 3      erythromycin (ROMYCIN) 5 MG/GM ophthalmic ointment Place into both eyes at bedtime      LANsoprazole (PREVACID SOLUTAB) 30 MG ODT Place 30 mg under the tongue every morning (before breakfast)      lipase-protease-amylase (CREON 12) 58325-[address] units CPEP Take 1 capsule by mouth as needed      melatonin 3 MG tablet Take 3 mg by mouth nightly as needed for sleep      nitroGLYcerin (NITROSTAT) 0.4 MG sublingual tablet Place 0.4 mg under the tongue every 5 minutes as needed for chest pain For chest pain place 1 tablet under the tongue every 5 minutes for 3 doses. If symptoms persist 5 minutes after 1st dose call 911.      nystatin (MYCOSTATIN) 986071 UNIT/GM external powder Apply topically 2 times daily as needed for other      ondansetron (ZOFRAN ODT) 4 MG ODT tab Take 4 mg by mouth every 8 hours as needed for nausea      polyethylene glycol-propylene glycol (SYSTANE) 0.4-0.3 % SOLN ophthalmic solution Apply 2 drops to eye 2 times daily      rosuvastatin (CRESTOR) 20 MG tablet Take 1 tablet by mouth at bedtime      Semaglutide, 1 MG/DOSE, (OZEMPIC, 1 MG/DOSE,) 4 MG/3ML pen Inject 1 mg Subcutaneous once a week  Qty: 9 mL, Refills: 3    Associated Diagnoses: Type 2 diabetes mellitus with  hyperglycemia, without long-term current use of insulin (H)      Sennosides 17.2 MG TABS 17.2 mg by Enteral route daily      sodium bicarbonate 650 MG tablet 650 mg by Per G Tube route as needed for heartburn      vitamin D3 (CHOLECALCIFEROL) 50 mcg (2000 units) tablet Take 2 tablets by mouth daily       !! - Potential duplicate medications found. Please discuss with provider.        Allergies   Allergies   Allergen Reactions    Clopidogrel Hives    Hydrocodone      Other reaction(s): sick to his stomach    Hydrocodone-Acetaminophen Nausea and Vomiting    Levofloxacin Other (See Comments)     Other reaction(s): tendonitis  Tendonitis right calf      Spironolactone      Other reaction(s): Hyperkalemia

## 2024-06-26 NOTE — PLAN OF CARE
Occupational Therapy Discharge Summary    Reason for therapy discharge:    Discharged to transitional care facility.    Progress towards therapy goal(s). See goals on Care Plan in [facility] electronic health record for goal details.  Goals partially met.  Barriers to achieving goals:   discharge from facility.    Therapy recommendation(s):    Continued therapy is recommended.  Rationale/Recommendations:  Pt is still below baseline w/ ADLs/IADLs, would benefit from continued therapy at TCU setting to improve IND w transfers, functional mobility, ADLs.    Valeria Elliott OTR/L. 6/26/2024, 11:27 AM

## 2024-06-26 NOTE — PLAN OF CARE
Problem: Risk for Delirium  Goal: Improved Sleep  Outcome: Progressing     Problem: Adult Inpatient Plan of Care  Goal: Optimal Comfort and Wellbeing  Outcome: Progressing   Goal Outcome Evaluation:       Pt denies any pain today. Uneventful shift, pt stayed in bed watching television with wife. Pt is now on a pureed diet, tolerating well.  and 147, Novolog given per MAR. New external cath placed this evening per pt request. PRN Melatonin given x1, pt now resting comfortably.    Myrna Álvarez RN

## 2024-06-26 NOTE — PLAN OF CARE
[id number]    Goal Outcome Evaluation: Vss. Patient has been sleeping most of the night. Not reporting pain. Incontinent bm x1. Male purewick in place. Changed mepilex on coccyx due to bm. Quarter sized pressure ulcer noted, area clean.       Problem: Diabetes  Goal: Optimal Functional Ability  Intervention: Optimize Functional Ability  Recent Flowsheet Documentation  Taken 6/25/2024 2347 by Marquita Miranda, RN  Assistive Device Utilized: lift device  Activity Management: activity adjusted per tolerance  Activity Assistance Provided: assistance, 2 people     Problem: Risk for Delirium  Goal: Improved Behavioral Control  Intervention: Minimize Safety Risk  Recent Flowsheet Documentation  Taken 6/25/2024 2347 by Marquita Miranda, RN  Enhanced Safety Measures: pain management

## 2024-06-26 NOTE — PROGRESS NOTES
Care Management Discharge Note    Discharge Date: 06/26/2024       Discharge Disposition:  [facility]     Discharge Services:  none    Discharge DME:  none    Discharge Transportation: [facility]- 12:08-12:53pm    Private pay costs discussed: transportation costs    Does the patient's insurance plan have a 3 day qualifying hospital stay waiver?  No    PAS Confirmation Code:  NA- Returning to same facility   Patient/family educated on Medicare website which has current facility and service quality ratings:  yes    Education Provided on the Discharge Plan:  yes  Persons Notified of Discharge Plans: patient, nursing, hospitalist  Patient/Family in Agreement with the Plan:  yes    Handoff Referral Completed: Yes    Additional Information:  Patient is discharging to [facility] via [facility]; 12:08-12:53pm  window. Transportation costs discussed with patient, all parties agreeable.     ДМИТРИЙ Fong     Care Management Follow Up    Length of Stay (days): 5    Expected Discharge Date: 06/26/2024     Concerns to be Addressed:       Patient plan of care discussed at interdisciplinary rounds: Yes    Anticipated Discharge Disposition:       Anticipated Discharge Services:    Anticipated Discharge DME:      Patient/family educated on Medicare website which has current facility and service quality ratings:    Education Provided on the Discharge Plan:    Patient/Family in Agreement with the Plan:      Referrals Placed by CM/JAI:    Private pay costs discussed: Not applicable    Additional Information:  Patient is medically ready for discharge as of today and has a bed hold at [facility].   JAI called patient's wife to discuss transport; left VM.    9:04 AM   Hospitalist spoke with JAI regarding whether TCU can administer IV ABX via a peripheral line instead of a midline. JAI called and spoke with Vida in admissions. She is going to check  and call writer back.     ДМИТРИЙ Fong

## 2024-06-27 NOTE — PROGRESS NOTES
Connected Care Resource [place]    Background: Transitional Care Management program identified per system criteria and reviewed by [facility] team for possible outreach.    Assessment: Upon chart review, [facility] Team member will not proceed with patient outreach related to this episode of Transitional Care Management program due to reason below:    Patient has discharged to a Memory Care, Long-term Care, Assisted Living or Group Home where patient is receiving on-site support with their daily cares, including support with hospital follow up plan.    Plan: Transitional Care Management episode addressed appropriately per reason noted above.      JAIME Aguilar  Connected Care Resource [facility]    *Connected Care Resource Team does NOT follow patient ongoing. Referrals are identified based on internal discharge reports and the outreach is to ensure patient has an understanding of their discharge instructions.

## 2024-08-05 PROBLEM — L89.153 PRESSURE INJURY OF SACRAL REGION, STAGE 3 (H): Status: ACTIVE | Noted: 2024-01-01

## 2024-08-05 PROBLEM — N17.0 SEPSIS WITH ACUTE RENAL FAILURE AND TUBULAR NECROSIS WITHOUT SEPTIC SHOCK (H): Status: ACTIVE | Noted: 2024-01-01

## 2024-08-05 PROBLEM — R65.20 SEPSIS WITH ACUTE RENAL FAILURE AND TUBULAR NECROSIS WITHOUT SEPTIC SHOCK (H): Status: ACTIVE | Noted: 2024-01-01

## 2024-08-05 PROBLEM — D72.829 LEUKOCYTOSIS, UNSPECIFIED TYPE: Status: ACTIVE | Noted: 2024-01-01

## 2024-08-05 PROBLEM — A41.9 SEPSIS WITH ACUTE RENAL FAILURE AND TUBULAR NECROSIS WITHOUT SEPTIC SHOCK (H): Status: ACTIVE | Noted: 2024-01-01

## 2024-08-05 PROBLEM — I95.9 HYPOTENSION, UNSPECIFIED HYPOTENSION TYPE: Status: ACTIVE | Noted: 2024-01-01

## 2024-08-05 PROBLEM — R09.02 HYPOXIA: Status: ACTIVE | Noted: 2024-01-01

## 2024-08-05 PROBLEM — J18.9 COMMUNITY ACQUIRED PNEUMONIA OF LEFT LOWER LOBE OF LUNG: Status: ACTIVE | Noted: 2024-01-01

## 2024-08-05 NOTE — ED NOTES
Bed: JNEDH-D  Expected date: 8/5/24  Expected time: 11:23 AM  Means of arrival: Ambulance  Comments:  Carol LEYVA  [facility]

## 2024-08-05 NOTE — ED PROVIDER NOTES
"Emergency Department Encounter   NAME: Franklyn Sorto ; AGE: 88 year old male ; YOB: 1935 ; MRN: [id number] ; PCP: No Ref-Primary, Physician   ED PROVIDER: Shirlene Redd PA-C    Evaluation Date & Time:   8/5/2024 11:49 AM    CHIEF COMPLAINT:  Altered Mental Status and Hypotension        Impression and Plan   FINAL IMPRESSION:    ICD-10-CM    1. Community acquired pneumonia of left lower lobe of lung  J18.9       2. Pressure injury of sacral region, stage 3 (H)  L89.153       3. Hypotension, unspecified hypotension type  I95.9       4. 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 Patient's family was visiting today and states when they got there \"5 staff members were around the patient and he was not responding\". They called EMS who initially got a BP with 90 systolic, however, repeat on the way over was 65 systolic. Per patient's family, patient had feeding tube placed following his stroke as he was having difficulty swallowing.  Patient has improved a fair amount and is now able to swallow, has not used his feeding tube in over a month.  Patient's family states that they are quite adamant about him only drinking and eating well sitting up but they are nervous that his facility may have given him some fluid while laying down that could have resulted in an aspiration pneumonia. Patient denies any recent fevers. States he has been coughing a bit more, no sputum production.    Vitals reviewed, initial BP upon arrival to the ER was 69/48. Temp 97.5F. SpO2 89% on room air. 2L nasal cannula was placed and patient quickly improved to 98% on room air On exam he is pale but nontoxic-appearing.  He is speaking in full sentences and is alert. Differential diagnosis includes but not limited to UTI, pyelonephritis, bronchitis, pneumonia, PE, ACS, hypovolemia, " cardiogenic shock, septic shock. After initial 500 mL fluid bolus his BP has improved to 87/47. He was given an additional 500 mL and improved to 90/66.     Lab work finds a new leukocytosis of 23.7, this was 10.8 one-week ago. Hemoglobin is also down slightly at 10.5, this was 11.6 one-week ago. No vomiting or dark stools.  His EKG shows a fib with occasional PVCs. He has prolonged QTc of 546. No evidence of significant ST changes. His first HS troponin is 72, repeat down trended to 63. I suspect this is likely elevated due to infection and increased stress and not due to ACS. BNP is also elevated at 9,693 indicating potential heart failure exacerbation. He does not appear significantly fluid overloaded on exam. His procalcitonin is elevated at 65. In addition to his leukocytosis this is certainly concerning for infectious etiology. VBG finds a normal pH.  Lactic acid within normal range.  Blood cultures were obtained and IV Zosyn was started to cover for possible aspiration pneumonia.    He was given 1000 mL of fluid in total in the ED, BP remained stable with systolics in the mid to high 80s. I performed manual bedside blood pressure finding 82/56. His MAPs have remained around 60-65 so ultimately did not start pressors in the ED. CT chest finds the central airway to the left lower lobe is opacified, could represent aspiration/infection/atelectasis. There is also small volume of pleural effusion on the left as well. Given his history and lab work concerning for infection I suspect this is likely aspiration pneumonia. No evidence of PE. CT abdomen pelvis also mention cholelithiasis with gallbladder wall thickening. Overall low concern for cholecystitis at this time as patient is not endorsing any abdominal pain. Could consider ultrasound inpatient to further evaluate.  There is bladder wall thickening and pericystic stranding that can be seen with cystitis, correlate with urinalysis. Straight cath order placed to  obtain UA.    At this time I was notified by a nursing staff member who came to the ER from his TCU that patient has a large pressure ulcer present on his buttocks. I was able to visualize the ulcer very briefly with the help of nursing staff rolling the patient while he is in a hallway bed. There is a fairly deep, nonblanchable, sacral ulcer present that is about 2 cm wide. No surrounding erythema or drainage on my brief exam. I do think patient would benefit from inpatient wound eval and care as well. Cannot rule out osteomyelitis as potential source of infection as well at this point.    I spoke with hospitalist and admitted patient for further antibiotics, fluid replacement, and wound care.  Will plan for inpatient MRI of the pelvis to evaluate for osteomyelitis.        History:  Supplemental history from: Documented in chart and Family Member/Significant Other  External Record(s) reviewed: Documented in chart    Work Up:  Chart documentation includes differential considered and any EKGs or imaging independently interpreted by provider, where specified.  In additional to work up documented, I considered the following work up: Abdominal US    External consultation:  Discussion of management with another provider: Dr. Steen    Complicating factors:  Care impacted by chronic illness: Diabetes and Hypertension  Care affected by social determinants of health: N/A    Disposition considerations: Admit.      ED COURSE:  12:00 PM I met and introduced myself to the patient. I gathered initial history and performed my physical exam. We discussed plan for initial workup.   12:11 PM I have staffed the patient with Dr. Steen, ED MD, who has evaluated the patient and agrees with all aspects of today's care.   2:00 PM BP 90/69  4:20 PM I spoke with Dr. Casas and patient was accepted for admission  .     At the conclusion of the encounter I discussed the results of all the tests and the disposition. The questions were  answered. The patient or family acknowledged understanding and was agreeable with the care plan.      Critical Care     Performed by: Shirlene Redd PA-C  Authorized by: Dr Candice Steen  Total critical care time: 50 minutes  Critical care was necessary to treat or prevent imminent or life-threatening deterioration of the following conditions: hypotension with SBP <90  Critical care was time spent personally by me on the following activities: development of treatment plan with patient or surrogate, discussions with consultants, examination of patient, evaluation of patient's response to treatment, obtaining history from patient or surrogate, ordering and performing treatments and interventions, ordering and review of laboratory studies, ordering and review of radiographic studies, re-evaluation of patient's condition and monitoring for potential decompensation.  Critical care time was exclusive of separately billable procedures and treating other patients.        MEDICATIONS GIVEN IN THE EMERGENCY DEPARTMENT:  Medications   vancomycin (VANCOCIN) 1,000 mg in 200 mL dextrose intermittent infusion (has no administration in time range)   lactated ringers BOLUS 1,000 mL (1,000 mLs Intravenous $New Bag 8/5/24 1830)   acetaminophen (TYLENOL) tablet 650 mg (has no administration in time range)   bisacodyl (DULCOLAX) suppository 10 mg (has no administration in time range)   bisacodyl (DULCOLAX) EC tablet 5 mg (has no administration in time range)   prochlorperazine (COMPAZINE) injection 5 mg (has no administration in time range)   senna-docusate (SENOKOT-S/PERICOLACE) 8.6-50 MG per tablet 1 tablet (has no administration in time range)   sodium chloride 0.9% BOLUS 500 mL (0 mLs Intravenous Stopped 8/5/24 1311)   sodium chloride 0.9% BOLUS 500 mL (0 mLs Intravenous Stopped 8/5/24 1357)   piperacillin-tazobactam (ZOSYN) 3.375 g vial to attach to  mL bag (0 g Intravenous Stopped 8/5/24 1523)   iopamidol (ISOVUE-370) solution  "75 mL (75 mLs Intravenous $Given 24 1438)   vancomycin (VANCOCIN) 1,250 mg in sodium chloride 0.9 % 250 mL intermittent infusion (0 mg Intravenous Stopped 24 1847)   albumin human 25 % injection 25 g (25 g Intravenous $New Bag 24 1907)         NEW PRESCRIPTIONS STARTED AT TODAY'S ED VISIT:  New Prescriptions    No medications on file         HPI   Patient information was obtained from: patient, patient's POA and patient's son  Use of Intrepreter: N/A     Franklyn is an 88 year old male with PMH T2DM, GERD, HTN, CVA 24 currently in TCU presenting to the ER for evaluation of fatigue and altered mental status. Patient's family was visiting today and states when they got there \"5 staff members were around the patient and he was not responding\". They called EMS who initially got a BP with 90 systolic, however, repeat on the way over was 65 systolic. Per patient's family, patient had feeding tube placed following his stroke as he was having difficulty swallowing.  Patient has improved a fair amount and is now able to swallow, has not used his feeding tube in over a month.  Patient's family states that they are quite adamant about him only drinking and eating well sitting up but they are nervous that his facility may have given him some fluid while laying down that could have resulted in an aspiration pneumonia.       Medical History     No past medical history on file.    No past surgical history on file.    No family history on file.    Social History     Tobacco Use    Smoking status: Former     Current packs/day: 0.00     Types: Cigarettes     Quit date: 1975     Years since quittin.6    Smokeless tobacco: Never   Substance Use Topics    Alcohol use: Yes       acetaminophen (TYLENOL) 500 MG tablet  allopurinol (ZYLOPRIM) 300 MG tablet  aspirin 81 MG EC tablet  Baclofen (LIORESAL) 5 MG tablet  Baclofen (LIORESAL) 5 MG tablet  bisacodyl (DULCOLAX) 10 MG suppository  carvedilol (COREG) 6.25 MG " tablet  COLCRYS 0.6 MG tablet  diclofenac (VOLTAREN) 1 % topical gel  empagliflozin (JARDIANCE) 25 MG TABS tablet  ENTRESTO  MG per tablet  ertapenem (INVANZ) 1 GM vial  erythromycin (ROMYCIN) 5 MG/GM ophthalmic ointment  HYDROmorphone (DILAUDID) 2 MG tablet  insulin aspart (NOVOLOG PEN) 100 UNIT/ML pen  LANsoprazole (PREVACID SOLUTAB) 30 MG ODT  lipase-protease-amylase (CREON 12) 88267-87[address]-03225 units CPEP  melatonin 3 MG tablet  menthol-zinc oxide (CALMOSEPTINE) 0.44-20.6 % OINT ointment  methocarbamol (ROBAXIN) 500 MG tablet  nitroGLYcerin (NITROSTAT) 0.4 MG sublingual tablet  nystatin (MYCOSTATIN) 232710 UNIT/GM external powder  ondansetron (ZOFRAN ODT) 4 MG ODT tab  polyethylene glycol-propylene glycol (SYSTANE) 0.4-0.3 % SOLN ophthalmic solution  rosuvastatin (CRESTOR) 20 MG tablet  Semaglutide, 1 MG/DOSE, (OZEMPIC, 1 MG/DOSE,) 4 MG/3ML pen  Sennosides 17.2 MG TABS  sodium bicarbonate 650 MG tablet  vitamin D3 (CHOLECALCIFEROL) 50 mcg (2000 units) tablet  warfarin ANTICOAGULANT (COUMADIN) 6 MG tablet          Physical Exam     First Vitals:  Patient Vitals for the past 24 hrs:   BP Temp Temp src Pulse Resp SpO2 Height Weight   08/05/24 1910 94/52 -- -- 69 -- 98 % -- --   08/05/24 1901 93/53 -- -- 69 -- 99 % -- --   08/05/24 1850 (!) 74/45 -- -- 69 -- 98 % -- --   08/05/24 1840 (!) 73/46 97.7  F (36.5  C) Oral 71 -- 97 % -- --   08/05/24 1830 (!) 68/45 -- -- 70 -- 97 % -- --   08/05/24 1820 (!) 71/42 -- -- 71 -- 96 % -- --   08/05/24 1745 -- -- -- 69 20 95 % -- --   08/05/24 1744 (!) 86/42 -- -- -- -- -- -- --   08/05/24 1701 136/88 -- -- 69 22 100 % -- --   08/05/24 1645 (!) 71/37 -- -- 69 19 100 % -- --   08/05/24 1616 (!) 82/56 -- -- -- -- -- -- --   08/05/24 1559 (!) 68/38 -- -- 69 20 100 % -- --   08/05/24 1545 (!) 78/42 -- -- 69 19 100 % -- --   08/05/24 1540 (!) 82/46 -- -- 69 23 100 % -- --   08/05/24 1536 (!) 80/52 -- -- 70 23 99 % -- --   08/05/24 1525 (!) 84/48 -- -- 69 24 99 % -- --  "  08/05/24 1520 (!) 82/46 -- -- 69 20 99 % -- --   08/05/24 1515 (!) 80/50 -- -- 69 28 100 % -- --   08/05/24 1511 (!) 77/48 -- -- 69 25 98 % -- --   08/05/24 1506 (!) 77/45 -- -- 69 20 99 % -- --   08/05/24 1501 (!) 82/50 -- -- 69 16 99 % -- --   08/05/24 1414 (!) 73/41 -- -- 69 20 100 % -- --   08/05/24 1409 (!) 89/51 -- -- 69 14 100 % -- --   08/05/24 1400 90/66 -- -- 69 26 100 % -- --   08/05/24 1355 90/69 -- -- 69 21 100 % -- --   08/05/24 1338 (!) 73/50 -- -- 69 22 100 % -- --   08/05/24 1333 (!) 76/53 -- -- 69 24 100 % -- --   08/05/24 1328 92/43 -- -- 69 21 100 % -- --   08/05/24 1323 111/50 -- -- 69 25 100 % -- --   08/05/24 1318 (!) 87/59 -- -- 69 21 100 % -- --   08/05/24 1306 (!) 72/49 -- -- 69 26 96 % -- --   08/05/24 1301 (!) 87/47 -- -- 69 21 96 % -- --   08/05/24 1256 (!) 84/45 -- -- 69 27 97 % -- --   08/05/24 1251 (!) 82/44 -- -- 69 24 98 % -- --   08/05/24 1246 (!) 72/40 -- -- 69 26 98 % -- --   08/05/24 1241 (!) 78/39 -- -- 69 (!) 34 98 % -- --   08/05/24 1224 (!) 70/43 -- -- 69 24 97 % -- --   08/05/24 1220 (!) 76/41 -- -- 69 29 -- -- --   08/05/24 1215 (!) 80/42 -- -- 69 20 95 % -- --   08/05/24 1209 (!) 81/43 -- -- 69 19 98 % -- --   08/05/24 1204 (!) 79/43 -- -- 69 18 100 % -- --   08/05/24 1159 (!) 69/48 97.5  F (36.4  C) Oral 69 22 (!) 89 % 1.753 m (5' 9\") 72.6 kg (160 lb)   08/05/24 1154 (!) 69/48 -- -- -- -- -- -- --         PHYSICAL EXAM    General Appearance:  Alert, cooperative, no distress, appears stated age. Non-toxic appearing.  HENT: Normocephalic without obvious deformity, atraumatic. Mucous membranes moist   Eyes: Conjunctiva clear, Lids normal. No discharge.   Respiratory: No distress. Lungs clear to ausculation throughout, possible diminished sounds left lower lobe. No wheezing, rhonchi, or stridor.  Cardiovascular: Regular rate and rhythm, no murmur. Normal cap refill. No peripheral edema  GI: Abdomen soft, nontender. G tube in place. No erythema, edema, or drainage from the " G tube insertion site.  : No CVA tenderness  Musculoskeletal: Moving all extremities. No gross deformities  Integument: Warm, dry. There is a fairly deep, nonblanchable, sacral ulcer present that is about 2 cm wide. No eschar. No surrounding erythema or drainage.   Neurologic: Alert and orientated x3. No focal deficits.  Psych: Normal mood and affect        Results     LAB:  All pertinent labs reviewed and interpreted  Labs Ordered and Resulted from Time of ED Arrival to Time of ED Departure   TROPONIN T, HIGH SENSITIVITY - Abnormal       Result Value    Troponin T, High Sensitivity 72 (*)    NT PROBNP INPATIENT - Abnormal    N terminal Pro BNP Inpatient 9,693 (*)    PROCALCITONIN - Abnormal    Procalcitonin 65.78 (*)    CBC WITH PLATELETS AND DIFFERENTIAL - Abnormal    WBC Count 23.7 (*)     RBC Count 3.44 (*)     Hemoglobin 10.5 (*)     Hematocrit 32.9 (*)     MCV 96      MCH 30.5      MCHC 31.9      RDW 14.5      Platelet Count 154      % Neutrophils 87      % Lymphocytes 6      % Monocytes 6      % Eosinophils 0      % Basophils 0      % Immature Granulocytes 1      NRBCs per 100 WBC 0      Absolute Neutrophils 20.6 (*)     Absolute Lymphocytes 1.5      Absolute Monocytes 1.3      Absolute Eosinophils 0.1      Absolute Basophils 0.1      Absolute Immature Granulocytes 0.2      Absolute NRBCs 0.0     COMPREHENSIVE METABOLIC PANEL - Abnormal    Sodium 137      Potassium 3.7      Carbon Dioxide (CO2) 25      Anion Gap 12      Urea Nitrogen 31.0 (*)     Creatinine 1.38 (*)     GFR Estimate 50 (*)     Calcium 7.8 (*)     Chloride 100      Glucose 232 (*)     Alkaline Phosphatase 96      AST 30      ALT 12      Protein Total 6.1 (*)     Albumin 2.2 (*)     Bilirubin Total 0.4     ROUTINE UA WITH MICROSCOPIC REFLEX TO CULTURE - Abnormal    Color Urine Orange (*)     Appearance Urine Cloudy (*)     Glucose Urine >1000 (*)     Bilirubin Urine Negative      Ketones Urine Negative      Specific Gravity Urine 1.028       Blood Urine 0.5 mg/dL (*)     pH Urine 6.0      Protein Albumin Urine 300 (*)     Urobilinogen Urine <2.0      Nitrite Urine Negative      Leukocyte Esterase Urine 500 Mynor/uL (*)     Bacteria Urine Moderate (*)     WBC Clumps Urine Present (*)     Budding Yeast Urine Moderate (*)     RBC Urine 111 (*)     WBC Urine >182 (*)    BLOOD GAS VENOUS - Abnormal    pH Venous 7.36      pCO2 Venous 50      pO2 Venous 14 (*)     Bicarbonate Venous 28      Base Excess/Deficit Venous 2.5      FIO2 28      Oxyhemoglobin Venous 13 (*)     O2 Sat, Venous 13.3 (*)    INR - Abnormal    INR 4.84 (*)    PARTIAL THROMBOPLASTIN TIME - Abnormal    aPTT 54 (*)    TROPONIN T, HIGH SENSITIVITY - Abnormal    Troponin T, High Sensitivity 63 (*)    LACTIC ACID WHOLE BLOOD - Normal    Lactic Acid 1.3     MAGNESIUM - Normal    Magnesium 2.1     COVID-19 VIRUS (CORONAVIRUS) BY PCR - Normal    SARS CoV2 PCR Negative     BLOOD CULTURE   URINE CULTURE   RESPIRATORY PANEL PCR       RADIOLOGY:  Chest XR,  PA & LAT   Final Result   IMPRESSION: Again seen is opacification of the left lower lung which could represent atelectasis, infection, and/or aspiration with a small amount of pleural fluid. No pneumothorax. The heart is not well assessed. A TAVR stent is present. There is a left    chest wall biventricular ICD. Cervical fusion hardware is present.      CT Abdomen Pelvis w Contrast   Final Result   IMPRESSION:    1.  Bladder wall thickening and pericystic stranding, which can be seen with cystitis. Correlate with urinalysis.   2.  Otherwise no acute abnormality in the abdomen or pelvis with additional details in the findings.   3.  Chest reported separately.      CT Chest Pulmonary Embolism w Contrast   Final Result   IMPRESSION:   1.  No PE. Persistent perfusion of the opacified left lower lobe represents a shunt and could contribute to hypoxemia. Enlargement of the main pulmonary artery can be seen with pulmonary hypertension.   2.  The central  airway to the left lower lobe is opacified. Opacification of the left lower lobe could represent aspiration, infection, and/or atelectasis. There is a small volume of left pleural fluid. The left hemidiaphragm is elevated.   3.  Cholelithiasis. Gallbladder wall thickening. Recommend ultrasound if there is concern for cholecystitis.   4.  Coronary artery disease.      MR Sacrum and Coccyx wwo Contrast    (Results Pending)         ECG:  Performed at: 12:03    Impression: atrial fibrillation with occasional PVCs and prolonged QT    Rate: 73  Rhythm: atrial fibrillation  Axis: left  OK Interval: unable to calculate  QRS Interval: 114  QTc Interval: 546  ST Changes: none  Comparison: prolonged QT    EKG results reviewed and interpreted by Dr. Enio ED MD.       PROCEDURES:  N/A               Shirlene Redd PA-C   Emergency Medicine   [facility] EMERGENCY DEPARTMENT       Shirlene Redd PA-C  08/05/24 1938       Shirlene Redd PA-C  08/05/24 1938

## 2024-08-05 NOTE — ED PROVIDER NOTES
"Emergency Department Staff Physician Note     I had a face to face encounter with this patient seen by the Advanced Practice Provider (ACE). I personally made/approved the management plan and take responsibility for the patient management. I personally saw patient and performed a substantive portion of the visit including all aspects of the medical decision making.    Relevant HPI:     Franklyn Sorto is a 88 year old male who presents to the Emergency Department for evaluation after the patient had decreased responsiveness this morning. Here the patient feels short of breath. The patient has a G tube in place but occasionally takes liquids by mouth, so the patient's family is concerned he may have aspirated.    I, Aureliano Salazar, am serving as a scribe to document services personally performed by Dr. Candice Steen, based on my observations and the provider's statements to me.   I, Candice Steen MD, attest that Aureliano Salazar was acting in a scribe capacity, has observed my performance of the services and has documented them in accordance with my direction.    ED Course:  12:13 PM I received the patient report from the ACE, Candice Redd. I agree with their assessment and plan of management, and I will see the patient.  1:06 PM I met with the patient to introduce myself, gather additional history, perform my initial exam, and discuss the plan.     Brief Physical Exam:  VITAL SIGNS: /57   Pulse 81   Temp 99.4  F (37.4  C) (Oral)   Resp 18   Ht 1.753 m (5' 9\")   Wt 72.6 kg (160 lb)   SpO2 98%   BMI 23.63 kg/m    Generalized: does not appear toxic, interactive and in no apparent distress.  Chronically ill appearing.  HEENT: no nasal drainage or bleeding.   Resp: Equal work of breathing with bilateral chest rise present.  CV: Warm extremities, regular rate. 1+ radial pulse present bilaterally.  Neuro: Interactive, no aphasia or dysarthria, no facial droop.  MSK: Atraumatic, no trauma to UE noted. Sacral ulcer " present.   Psych: Cooperative, does not appear to be hallucinating or responding to internal stimuli.     Impression / ED Plan:  I had a face to face encounter with this patient seen by the Advanced Practice Provider (ACE).  I have seen, examined, and discussed the patient with the ACE and agree with their assessment and plan of management. I personally saw the patient and performed a substantive portion of the visit including all aspects of the medical decision making.    Echocardiogram 5/25/2024  Final Impressions:   1. Technically limited exam.   2. Echo contrast was administered to enhance visualization of all left ventricular segments.   3. Normal LV size, normal wall thickness, normal global systolic function with an estimated EF of 60 - 65%.   4. No LV mural thrombus on contrast enhanced images.   5. Grade 2 pattern of LV diastolic filling.   6. Right ventricular cavity size is not well visualized, global systolic RV function is not well visualized. Grossly RV function appears normal.   7. 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   8. The mitral valve exhibits mild prolapse of posterior leaflet, mild to moderate mitral regurgitation.   9. No atrial level intracardiac shunt evident on color Doppler imaging.    1. AMS.  Differentials considered include infectious etiology such as pneumonia, UTI, sacral ulcer infection, electrolyte disturbance, cardiac arrhythmia or ACS.  CVA or intracranial hemorrhage considered but unlikely, normal mentation and no new focal neurologic deficits present.  Blood work, CT PE, CT CAP ordered per PA.  INR elevated, no acute bleeding on exam.  LLL infiltrate seen on CT PE, WBC elevation to 23. UA shows UTI.  Treated with cautious IVF boluses given tenuous o2 sat and hx of CHF, hypotensive but MAP around normal range, no pressors required.  Bedside US of IVC (performed at bedside by myself) seen to be non plethoric after 1L IVF administered.  Broad spectrum antibiotics given.  Admitted to medicine.    1. Community acquired pneumonia of left lower lobe of lung    2. Pressure injury of sacral region, stage 3 (H)    3. Hypotension, unspecified hypotension type    4. Hypoxia        Candice Steen MD  Staff Physician  [facility] Emergency Department      Candice Steen MD  08/06/24 [id number]

## 2024-08-05 NOTE — ED NOTES
Pt able to urinate in bed by using urinal with family help. No need straight cath UA by SALLIE Redd.

## 2024-08-05 NOTE — H&P
"Admission History and Physical   Franklyn Sorto, 1935, [id number]  [facility]  Community acquired pneumonia of left lower lobe of lung  Pressure injury of sacral region, stage 3 (H)  Hypotension, unspecified hypotension type  Hypoxia  PCP:No Ref-Primary, Physician, None   Admitting provider: Vonnie Casas MD.    Code status:  Full Code          Extended Emergency Contact Information  Primary Emergency Contact: Narda Valdez   [facility]  Home Phone: [phone]  Relation: Spouse       Assessment and Plan  Principal Problem:    Sepsis with acute renal failure and tubular necrosis without septic shock (H)  Active Problems:    Hypotension, unspecified hypotension type    Leukocytosis, unspecified type    Benign prostatic hyperplasia    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) (H)    Hyperlipidemia    Hypertension    Long term current use of anticoagulant therapy    S/P cardiac pacemaker procedure    S/P TAVR (transcatheter aortic valve replacement)    Thrombophilia (H24)    Diabetes mellitus type 2, noninsulin dependent (H)    History of CVA (cerebrovascular accident)    Hypoxia    Community acquired pneumonia of left lower lobe of lung    Pressure injury of sacral region, stage 3 (H)    Franklyn Sorto is a 88 year old male presenting with T2DM, GERD, HTN, CVA 5/25/24 currently in TCU presenting to the ER for evaluation of fatigue and altered mental status. Patient's family was visiting today and states when they got there \"5 staff members were around the patient and he was not responding\". They called EMS who initially got a BP with 90 systolic, however, repeat on the way over was 65 systolic     Sepsis with hypotension and ARIADNE secondary to aspiration pna vs PNA, vs wound infection vs UTI   Hypotension patient appears very dry on exam   - 1 liter of LR bolus, 25 of albumin and repeating with 5% getting 1 liter  - procal elevated 65, lactic normal  - Ucx and blood cx  - able to " evaluate wound and this is likely the source large purulent drainage and necrotic tissue boggy on exam, likely deeper then what's seen concern for osteo.   - ICU monitoring   - surgery and Intensivsit consulted discussed case with Dr Vidales   - cutler placement   - MRI of sacrum and W/WO contrast ordered but unable to perform until tomorrow since PM in place   - WOC consult  - ID tagged   - telemetry monitoring  - 2 IVs in place  - unable to place PICC at this time due to hours may need central line   - Q4hr vitals  - IV Vanco and meropenem      Supratherapeutic INR   - IV vitk 5mg given  - pharmacy to trend     Previous h/o ESBL E. Coli  - was previously sent out on ertapenem in the past, given zosyn and change to meropenem   - UA is dirty and cutler placed and purulent   - IVF  - Ucx pending  - Blood cx     Aspiration PNA vs CAP LLL infiltrate but H/O aspiration after CVA   - NPO   -SLP to reassess in am  - IV vanco for meropenem    Leukocytosis   - treating as above   - trend labs     Recent acute ischemic stroke (5/25/2024)  Residual left Hemiparesis   Recent admission 5/25-6/7/2024 at [facility] for ischemic stroke; discharged to TCU   - no improvement in left hemiparesis   - previous h/o aspiration and dysphagia but was stable with techniques per patient   - NPO for now SLP to reassess in am   - PT/OT to see      History of the left upper extremity DVT with thrombophilia (positive antiphospholipid antibodies, 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)  - Pharmacy to dose warfarin goal 2-3   - INR elevated today      Diabetes mellitus type 2, noninsulin dependent    - currently NPO  - novolog sliding scale Q4hrs for now  - holding home oral meds      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   H/o HFrEF   CAD   - History of distal RCA stent, last echo 8/2019 with EF 50-55%,   - BNP is elevated but does appear to be fluid overloaded actually appears very dry on exam   - hold all BP meds  "since hypotensive      Rheumatoid arthritis/Chronic Pain -   - holding homemeds  for now since hypotensive     Gout  - holding meds until SLP assess   GERD (gastroesophageal reflux disease)/Hx PUD   - IV PPI         Clinically Significant Risk Factors Present on Admission              # Hypoalbuminemia: Lowest albumin = 2.2 g/dL at 8/5/2024 11:55 AM, will monitor as appropriate    # Drug Induced Coagulation Defect: home medication list includes an anticoagulant medication  # Drug Induced Platelet Defect: home medication list includes an antiplatelet medication  # Acute Kidney Injury, unspecified: based on a >150% or 0.3 mg/dL increase in last creatinine compared to past 90 day average, will monitor renal function  # Hypertension: Noted on problem list    # Anemia: based on hgb <11      # DMII: A1C = N/A within past 6 months         # Financial/Environmental Concerns:           COVID-19 PCR Results          6/20/2024    16:04 8/5/2024    17:22   COVID-19 PCR Results   SARS CoV2 PCR Negative  Negative      COVID-19 Antibody Results, Testing for Immunity           No data to display              VTE prophylaxis:  Warfarin  DIET: Orders Placed This Encounter      NPO for Medical/Clinical Reasons Except for: Meds    Drains/Lines: none  Weight bearing status: WBAt  Code Status:Full Code  Medically Ready for Discharge: Anticipated in 2-4 Days    Barriers to discharge:     Expected Discharge Date: 08/07/2024            # DMII: A1C = N/A within past 6 months    HPI: Franklyn Sorto is a 88 year old male with h/o T2DM, GERD, HTN, CVA 5/25/24 currently in TCU presenting to the ER for evaluation of fatigue and altered mental status. Patient's family was visiting today and states when they got there \"5 staff members were around the patient and he was not responding\". They called EMS who initially got a BP with 90 systolic, however, repeat on the way over was 65 systolic. Per patient's family, patient had feeding tube placed " following his stroke as he was having difficulty swallowing.  Patient has improved a fair amount and is now able to swallow, has not used his feeding tube in over a month.  Patient's family states that they are quite adamant about him only drinking and eating well sitting up but they are nervous that his facility may have given him some fluid while laying down that could have resulted in an aspiration pneumonia. Patient denies any recent fevers. States he has been coughing a bit more, no sputum production.     Vitals reviewed, initial BP upon arrival to the ER was 69/48. Temp 97.5F. SpO2 89% on room air. 2L nasal cannula was placed and patient quickly improved to 98% on room air On exam he is pale but nontoxic-appearing.  He is speaking in full sentences and is alert. Differential diagnosis includes but not limited to UTI, pyelonephritis, bronchitis, pneumonia, PE, ACS, hypovolemia, cardiogenic shock, septic shock. After initial 500 mL fluid bolus his BP has improved to 87/47. He was given an additional 500 mL and improved to 90/66.     Past Medical History:   Diagnosis Date    Abnormal lateral conjugate gaze 07/11/2023    Anticoagulated on Coumadin 06/21/2024    Arteriosclerosis of coronary artery 09/19/2014    Formatting of this note might be different from the original.  Angiogram/PCI 9/19/14:  Distal RCA 90%, treated with MARÍA.  Mild LAD and LCx disease.      Aspiration pneumonia, unspecified aspiration pneumonia type, unspecified laterality, unspecified part of lung (H) 06/20/2024    Benign essential HTN 07/11/2023    Benign prostatic hyperplasia 10/20/2011    Formatting of this note might be different from the original.  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  Ulcers in lat 90's. Reflux symptoms if he   proton pump inhibitor.      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) (H) 09/02/2015    History of CVA (cerebrovascular accident) 06/20/2024    Hypertension 07/11/2023    Formatting of this note might be different from the original.  Created by Conversion     Replacement Utility updated for latest IMO load  Formatting of this note might be different from the original.  ECG 8/18/06 sinus bradycardia otherwise normal  Stress Echo 8/24/06 Normal, sub-max HR      Long term current use of anticoagulant therapy 01/11/2013    Lumbar post-laminectomy syndrome 11/29/2012    Neck pain 11/11/2013    OA (osteoarthritis) of knee 07/11/2023    Peripheral vascular disease (H24) 01/14/2013    Formatting of this note might be different from the original.  Created by Conversion      Pressure injury of sacral region, stage 3 (H) 08/05/2024    Rheumatoid arthritis (H) 07/11/2023    Formatting of this note might be different from the original.  Created by Conversion  Formatting of this note might be different from the original.  Dr. Gaxiola. 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  Antiphospholipid antibodies- positiive beta 2 glycoprotein and DRVVT confirmation x's 2 12 weeks apart 2013.  Heterozygous Leiden Factor V  Follow by Dr Oly Hunter      Transient vision disturbance, bilateral 10/19/2011    Formatting of this note might be different from the original.  Episode of decreased vision in both eyes on 10/15/2011, lasting 10-15 minutes.       No past surgical history on file.  No family history on file.  Social History     Socioeconomic History    Marital status:      Spouse name: Not on file    Number  of children: Not on file    Years of education: Not on file    Highest education level: Not on file   Occupational History    Not on file   Tobacco Use    Smoking status: Former     Current packs/day: 0.00     Types: Cigarettes     Quit date: 1975     Years since quittin.6    Smokeless tobacco: Never   Substance and Sexual Activity    Alcohol use: Yes    Drug use: Not on file    Sexual activity: Not on file   Other Topics Concern    Not on file   Social History Narrative    Not on file     Social Determinants of Health     Financial Resource Strain: Low Risk  (2024)    [facility]    Financial Resource Strain     Difficulty of Paying Living Expenses: 3     Difficulty of Paying Living Expenses: Not on file   Food Insecurity: No Food Insecurity (2024)    [facility]    Food Insecurity     Worried About Running Out of Food in the Last Year: 1   Transportation Needs: No Transportation Needs (2024)    Received from [facility]    Transportation Needs     Lack of Transportation (Medical): 1   Physical Activity: Not on file   Stress: Not on file   Social Connections: Socially Integrated (2024)    Received from [facility]    Social Connections     Frequency of Communication with Friends and Family: 0   Interpersonal Safety: Not on file   Housing Stability: Low Risk  (2024)    Received from [facility]    Housing Stability     Unable to Pay for Housing in the Last Year: 1     Allergies   Allergen Reactions    Clopidogrel Hives    Hydrocodone      Other reaction(s): sick to his stomach    Hydrocodone-Acetaminophen Nausea and Vomiting    Levofloxacin Other (See Comments)     Other reaction(s): tendonitis  Tendonitis right calf      Spironolactone      Other reaction(s): Hyperkalemia       PRIOR TO ADMISSION  MEDICATIONS   Prior to Admission medications    Medication Sig Last Dose Taking? Auth Provider Long Term End Date   acetaminophen (TYLENOL) 500 MG tablet Take 1,000 mg by mouth 3 times daily as needed for mild pain 8/5/2024 at 080 Yes Reported, Patient     allopurinol (ZYLOPRIM) 300 MG tablet Take 300 mg by mouth daily 8/5/2024 at 0800 Yes Reported, Patient     artificial saliva (BIOTENE MT) AERS spray Take 2 sprays by mouth 4 times daily 8/5/2024 at 0800 Yes Unknown, Entered By History     aspirin 81 MG EC tablet Take 81 mg by mouth daily 8/5/2024 at 0800 Yes Reported, Patient     bacitracin 500 UNIT/GM external ointment Apply topically 2 times daily 8/5/2024 at 1230 Yes Unknown, Entered By History     Baclofen (LIORESAL) 5 MG tablet Take 5 mg by mouth 3 times daily 8/4/2024 at 2000 Yes Reported, Patient     bisacodyl (DULCOLAX) 10 MG suppository Place 10 mg rectally daily as needed for constipation Unknown at PRN Yes Reported, Patient     carvedilol (COREG) 6.25 MG tablet Take 25 mg by mouth 2 times daily 8/5/2024 at 0800 Yes Reported, Patient Yes    COLCRYS 0.6 MG tablet Take 0.6 mg by mouth daily as needed Unknown at PRN Yes Reported, Patient     Dextromethorphan-guaiFENesin  MG/5ML syrup Take 5 mLs by mouth every 4 hours as needed for cough 8/5/2024 at 0800 Yes Unknown, Entered By History     diclofenac (VOLTAREN) 1 % topical gel Apply 2 g topically 4 times daily 8/5/2024 at 0800 Yes Reported, Patient     empagliflozin (JARDIANCE) 25 MG TABS tablet Take 1 tablet (25 mg) by mouth daily 8/5/2024 at 0800 Yes Merly Gutiérrez PA-C     ENTRESTO  MG per tablet Take 1 tablet by mouth 2 times daily 8/5/2024 at 0800 Yes Reported, Patient     erythromycin (ROMYCIN) 5 MG/GM ophthalmic ointment Place into both eyes at bedtime 8/5/2024 at 2000 Yes Reported, Patient     HYDROmorphone (DILAUDID) 2 MG tablet Take 1 tablet (2 mg) by mouth 3 times daily as needed for pain More than a month Yes Chalino Manzanares MD      insulin aspart (NOVOLOG PEN) 100 UNIT/ML pen Inject 1-3 Units Subcutaneous 3 times daily (before meals) Correction Scale - LOW INSULIN RESISTANCE DOSING   Do Not give Correction Insulin if Pre-Meal BG less than 140. For Pre-Meal  - 239 give 1 unit. For Pre-Meal  - 339 give 2 units. For Pre-Meal BG greater than or equal to 340 give 3 units. To be given with prandial insulin, and based on pre-meal blood glucose. Administering insulin within 5 minutes of the start of the meal is ideal. Administer insulin no more than 30 minutes after the start of the meal, unless directed otherwise by provider. Notify provider if glucose greater than or equal to 350 mg/dL after administration of correction dose. 8/5/2024 at 0800 Yes Chalino Manzanares MD Yes    LANsoprazole (PREVACID SOLUTAB) 30 MG ODT Place 30 mg under the tongue every morning (before breakfast) 8/5/2024 at 0800 Yes Reported, Patient     lipase-protease-amylase (CREON 12) 16148-39[address]-05265 units CPEP Take 1 capsule by mouth as needed Unknown Yes Reported, Patient     melatonin 3 MG tablet Take 3 mg by mouth at bedtime 8/4/2024 at 2100 Yes Reported, Patient     menthol-zinc oxide (CALMOSEPTINE) 0.44-20.6 % OINT ointment Apply topically 4 times daily as needed for skin protection Apply to sacrum/groin Unknown Yes Chalino Manzanares MD     nitroGLYcerin (NITROSTAT) 0.4 MG sublingual tablet Place 0.4 mg under the tongue every 5 minutes as needed for chest pain For chest pain place 1 tablet under the tongue every 5 minutes for 3 doses. If symptoms persist 5 minutes after 1st dose call 911. Unknown at PRN Yes Reported, Patient Yes    nystatin (MYCOSTATIN) 932959 UNIT/GM external powder Apply topically 2 times daily as needed for other Unknown at PRN Yes Reported, Patient     ondansetron (ZOFRAN ODT) 4 MG ODT tab Take 4 mg by mouth every 8 hours as needed for nausea Unknown at PRN Yes Reported, Patient     polyethylene glycol-propylene glycol (SYSTANE) 0.4-0.3 % SOLN  ophthalmic solution Apply 2 drops to eye 2 times daily 8/5/2024 at 0800 Yes Reported, Patient     rosuvastatin (CRESTOR) 20 MG tablet Take 1 tablet by mouth at bedtime 8/4/2024 at 2000 Yes Reported, Patient Yes    Sennosides 17.2 MG TABS 17.2 mg by Enteral route daily 8/5/2024 at 0800 Yes Reported, Patient     sodium bicarbonate 650 MG tablet 650 mg by Per G Tube route as needed for heartburn Unknown Yes Reported, Patient     vitamin D3 (CHOLECALCIFEROL) 50 mcg (2000 units) tablet Take 2 tablets by mouth daily 8/5/2024 at 0800 Yes Reported, Patient     warfarin ANTICOAGULANT (COUMADIN) 2 MG tablet Take 2-4 mg by mouth daily Take 4mg on Saturday. Take 2mg all other days of week 8/4/2024 at 2000 (2mg) Yes Unknown, Entered By History No         REVIEW OF SYSTEMS:  12 point reviewed pertinent negatives and positives in HPI all others negative     PHYSICAL EXAM  Temp:  [97.5  F (36.4  C)-97.7  F (36.5  C)] 97.7  F (36.5  C)  Pulse:  [69-71] 69  Resp:  [14-36] 28  BP: ()/(37-88) 80/44  SpO2:  [89 %-100 %] 96 %  Wt Readings from Last 1 Encounters:   08/05/24 72.6 kg (160 lb)     Body mass index is 23.63 kg/m .  Physical Exam  Vitals and nursing note reviewed.   Constitutional:       Appearance: He is ill-appearing.   HENT:      Head: Normocephalic and atraumatic.      Mouth/Throat:      Comments: Severely dry and cracked lips   Eyes:      Extraocular Movements: Extraocular movements intact.      Conjunctiva/sclera: Conjunctivae normal.   Cardiovascular:      Rate and Rhythm: Normal rate. Rhythm irregular.      Pulses: Normal pulses.      Comments: ABRAHAM, PM in place  Pulmonary:      Effort: Pulmonary effort is normal.      Comments: Unlabored breathing, coarse BS on left wet cough  Abdominal:      General: Bowel sounds are normal. There is no distension.      Palpations: Abdomen is soft.      Tenderness: There is no abdominal tenderness. There is no guarding.   Musculoskeletal:      Right lower leg: No edema.      Left  lower leg: No edema.      Comments: Sacral/coccyx wound large approx 4-5 cm deep wound with necrotic tissue, purulent  drainage, large area of erythema approximately 10cm x9. Boggy and warm   Skin:     General: Skin is warm and dry.      Capillary Refill: Capillary refill takes less than 2 seconds.   Neurological:      Mental Status: He is alert and oriented to person, place, and time.      Sensory: Sensation is intact.      Comments: Left facial droop and slurred speech  Left hemiparesis unchanged upper and lower    Gait not evaluated    Psychiatric:         Mood and Affect: Mood normal.         Behavior: Behavior normal.         PERTINENT LABS and RADIOLOGY   Results for orders placed or performed during the hospital encounter of 08/05/24   Chest XR,  PA & LAT    Impression    IMPRESSION: Again seen is opacification of the left lower lung which could represent atelectasis, infection, and/or aspiration with a small amount of pleural fluid. No pneumothorax. The heart is not well assessed. 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 Cervical fusion hardware is present.   CT Chest Pulmonary Embolism w Contrast    Impression    IMPRESSION:  1.  No PE. Persistent perfusion of the opacified left lower lobe represents a shunt and could contribute to hypoxemia. Enlargement of the main pulmonary artery can be seen with pulmonary hypertension.  2.  The central airway to the left lower lobe is opacified. Opacification of the left lower lobe could represent aspiration, infection, and/or atelectasis. There is a small volume of left pleural fluid. The left hemidiaphragm is elevated.  3.  Cholelithiasis. Gallbladder wall thickening. Recommend ultrasound if there is concern for cholecystitis.  4.  Coronary artery disease.   CT Abdomen Pelvis w Contrast    Impression    IMPRESSION:   1.  Bladder wall thickening and pericystic stranding, which can be seen with cystitis. Correlate with urinalysis.  2.   Otherwise no acute abnormality in the abdomen or pelvis with additional details in the findings.  3.  Chest reported separately.       Imaging results reviewed over the past 24 hrs:   Recent Results (from the past 24 hour(s))   CT Chest Pulmonary Embolism w Contrast    Narrative    EXAM: CT CHEST PULMONARY EMBOLISM W CONTRAST  LOCATION: [facility]  DATE: 8/5/2024    INDICATION: SOB, hypoxia, hypotensive  COMPARISON: None.  TECHNIQUE: CT chest pulmonary angiogram during arterial phase injection of IV contrast. Multiplanar reformats and MIP reconstructions were performed. Dose reduction techniques were used.   CONTRAST: isovue 370 75ml    FINDINGS:  ANGIOGRAM CHEST: The main pulmonary artery is 35 mm in diameter. No pulmonary emboli. There is persistent perfusion of the left lower lobe. Thoracic aorta is negative for dissection. A TAVR stent is present.    LUNGS AND PLEURA: Atelectasis and airspace disease in much of the left lower lobe. The central airway to the left lower lobe is opacified. Small left pleural effusion. Elevated left hemidiaphragm.    MEDIASTINUM/AXILLAE: A left chest wall biventricular ICD is present.    CORONARY ARTERY CALCIFICATION: Severe.    UPPER ABDOMEN: Cholelithiasis. Gallbladder wall thickening. A percutaneous GJ tube is present.    MUSCULOSKELETAL: There is surgical hardware at C6-C7.      Impression    IMPRESSION:  1.  No PE. Persistent perfusion of the opacified left lower lobe represents a shunt and could contribute to hypoxemia. Enlargement of the main pulmonary artery can be seen with pulmonary hypertension.  2.  The central airway to the left lower lobe is opacified. Opacification of the left lower lobe could represent aspiration, infection, and/or atelectasis. There is a small volume of left pleural fluid. The left hemidiaphragm is elevated.  3.  Cholelithiasis. Gallbladder wall thickening. Recommend ultrasound if there is concern for cholecystitis.  4.   Coronary artery disease.   CT Abdomen Pelvis w Contrast    Narrative    EXAM: CT ABDOMEN PELVIS W CONTRAST  LOCATION: [facility]  DATE: 8/5/2024    INDICATION: elevated WBC count, hypotensive, hypoxic  COMPARISON: CT abdomen pelvis 6/21/2024  TECHNIQUE: CT scan of the abdomen and pelvis was performed following injection of IV contrast. Multiplanar reformats were obtained. Dose reduction techniques were used.  CONTRAST: isovue 370 75ml    FINDINGS:   LOWER CHEST: Dictated separately.    HEPATOBILIARY: Unchanged liver contours. No worrisome liver lesions. Gallbladder is contracted with multiple gallstones and similar mild wall thickening to prior. No biliary ductal dilation.    PANCREAS: Normal.    SPLEEN: Normal.    ADRENAL GLANDS: Normal.    KIDNEYS/BLADDER: Kidneys enhance symmetrically with bilateral benign cysts, which require no follow-up. No urolithiasis or collecting system dilation. Bladder is thick-walled with pericystic stranding.    BOWEL: No bowel obstruction. Percutaneous gastrojejunostomy tube appears well-positioned. Colonic diverticulosis without evidence of diverticulitis. No free air or organized fluid collection. Incontinence.    LYMPH NODES: No lymphadenopathy.    VASCULATURE: Abdominal aorta is nonaneurysmal with severe circumferential atherosclerotic vascular calcifications.    PELVIC ORGANS: No pelvic mass.    MUSCULOSKELETAL: Degenerative disc disease and facet osteoarthritis throughout the visualized spine. No worrisome bone lesions. Left hip lipoma.      Impression    IMPRESSION:   1.  Bladder wall thickening and pericystic stranding, which can be seen with cystitis. Correlate with urinalysis.  2.  Otherwise no acute abnormality in the abdomen or pelvis with additional details in the findings.  3.  Chest reported separately.   Chest XR,  PA & LAT    Narrative    EXAM: XR CHEST 2 VIEWS  LOCATION: [facility]  DATE: 8/5/2024    INDICATION:  shortness of breath, hypotensive, hypoxic  COMPARISON: CTA 8/5/2024 at 1418 hours. Ultrasound 6/20/2024.      Impression    IMPRESSION: Again seen is opacification of the left lower lung which could represent atelectasis, infection, and/or aspiration with a small amount of pleural fluid. No pneumothorax. The heart is not well assessed. 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 Cervical fusion hardware is present.     Recent Labs   Lab 08/05/24  2114 08/05/24  2104 08/05/24  1155 07/31/24  0815   WBC  --   --  23.7*  --    HGB  --   --  10.5*  --    MCV  --   --  96  --    PLT  --   --  154  --    INR 6.09*  --  4.84*  --      --  137 140   POTASSIUM 3.5  --  3.7 4.3   CHLORIDE 105  --  100 104   CO2 23  --  25 23   BUN 28.9*  --  31.0* 17.3   CR 1.22*  --  1.38* 0.70   ANIONGAP 13  --  12 13   JÚNIOR 7.4*  --  7.8* 7.8*   * 145* 232* 152*   ALBUMIN  --   --  2.2*  --    PROTTOTAL  --   --  6.1*  --    BILITOTAL  --   --  0.4  --    ALKPHOS  --   --  96  --    ALT  --   --  12  --    AST  --   --  30  --      EKG:pacer spikes, afib rate controlled, incomplete RBB, prolonged QT       Vonnie Casas MD  [facility] Medicine Service  [phone]

## 2024-08-05 NOTE — ED TRIAGE NOTES
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 Today family came ot visit Pt and found him unresponsive in bed, looking pale and having irregular breathing. Family sat Pt upright and this woke him up. Family is also concerned about aspiration as they say they have seen nursing staff give Pt fluids while Pt laying flat. EMS called and placed an IV. BP's have been low, 65 - 90 systolic.

## 2024-08-05 NOTE — PHARMACY-VANCOMYCIN DOSING SERVICE
Pharmacy Vancomycin Initial Note  Date of Service 2024  Patient's  1935  88 year old, male    Indication: Aspiration Pneumonia and Skin and Soft Tissue Infection    Current estimated CrCl = Estimated Creatinine Clearance: 39.5 mL/min (A) (based on SCr of 1.38 mg/dL (H)).    Creatinine for last 3 days  2024: 11:55 AM Creatinine 1.38 mg/dL    Recent Vancomycin Level(s) for last 3 days  No results found for requested labs within last 3 days.      Vancomycin IV Administrations (past 72 hours)        No vancomycin orders with administrations in past 72 hours.                    Nephrotoxins and other renal medications (From now, onward)      Start     Dose/Rate Route Frequency Ordered Stop    24 1200  vancomycin (VANCOCIN) 1,000 mg in 200 mL dextrose intermittent infusion         1,000 mg  200 mL/hr over 1 Hours Intravenous EVERY 24 HOURS 24 1633      24 1700  vancomycin (VANCOCIN) 1,250 mg in sodium chloride 0.9 % 250 mL intermittent infusion         1,250 mg  over 90 Minutes Intravenous ONCE 24 1631              Contrast Orders - past 72 hours (72h ago, onward)      Start     Dose/Rate Route Frequency Stop    24 1500  iopamidol (ISOVUE-370) solution 75 mL         75 mL Intravenous ONCE 24 1438            [employer] Prediction of Planned Initial Vancomycin Regimen  Loading dose: 1250 mg at 17:00 2024.  Regimen: 1000 mg IV every 24 hours.  Start time: 12:00 on 2024  Exposure target: AUC24 (range)400-600 mg/L.hr   AUC24,ss: 525 mg/L.hr  Probability of AUC24 > 400: 79 %  Ctrough,ss: 16.9 mg/L  Probability of Ctrough,ss > 20: 34 %  Probability of nephrotoxicity (Lodise MYRIAM ): 13 %        Plan:  Load vancomycin 1250 mg IV x1, then start vancomycin 1000 mg IV q24h tomorrow, 24.  Vancomycin monitoring method: AUC  Vancomycin therapeutic monitoring goal: 400-600 mg*h/L  Pharmacy will check vancomycin levels as appropriate in 1-3 Days.    Serum  creatinine levels will be ordered daily for the first week of therapy and at least twice weekly for subsequent weeks.      Nicky Mancilla, AmandaD

## 2024-08-06 NOTE — PROGRESS NOTES
Critical care progress note    \Clinically Significant Risk Factors              # Hypoalbuminemia: Lowest albumin = 2.2 g/dL at 8/5/2024 11:55 AM, will monitor as appropriate    # Coagulation Defect: INR = 2.03 (Ref range: 0.85 - 1.15) and/or PTT = 54 Seconds (Ref range: 22 - 38 Seconds), will monitor for bleeding    # Hypertension: Noted on problem list           # DMII: A1C = N/A within past 6 months         # Financial/Environmental Concerns:          Code Status: No CPR- Do NOT Intubate         Assessment and plan: 88-year-old man with complicated medical history including urinary tract infection, previous CVA, deep sacral decubitus ulcer transferred to the ICU for hypotension in the setting of sepsis.    Neuro: At high risk for delirium but currently with very sharp mental status.  Monitor closely.    Cardiovascular: Hypotension concerning for distributive cause.  Normal lactic acid.  Continue moderate fluid resuscitation and monitor urine output.  Pt did not require pressors.    Renal: Acute kidney injury with a creatinine of 1.38 previously around 0.7.  Likely prerenal azotemia with a component of ATN.  Monitor after resuscitation. Improved.    Heme: Monitor blood levels.  Coagulopathy likely due to vitamin K antagonist in the setting of sepsis.  Vitamin K ordered by primary.    GI: He has what appears to be a PEG tube that he does not use.  Placed for a CVA that has improved.  Possibly some purulent exudate around that.  It is unclear whether that could just be removed.  Swallow eval    ID: bacteremia with E-coli ESBL. Treat for a broad range of organisms including ESBL and MRSA.  He has been appropriately fluid resuscitated with a normal lactate and improving creatinine suggesting good endorgan perfusion.  Primary has consulted surgery for debridement of decubitus ulcer which is likely the source of his bacteremia. This was done at bedside.    Endo: Monitor blood sugar.      While in the ICU, patient  "remained stable.  Patient did not require pressors.  Blood pressure was stable.  Patient stable for transfer out of the ICU.  Orders placed. Pulmonary and critical care will sign off.  Hospitalist/nursing notified.  Please let us know if you have any questions or patient's clinical condition changes.      Yanick Beltran MD  8/6/2024      Chief complaint: Hypotension    HPI: 88-year-old man with a history of diabetes, reflux, recent CVA came into the ER for fatigue and altered mental status.  He was hypotensive and acting abnormally.  He was brought into the hospital and was hypotensive but otherwise doing okay.  He received some gentle fluids out of concern for heart failure but remained hypotensive.  He was transferred to the ICU.    Medications, allergies and history reviewed    On exam  /55   Pulse 74   Temp 97.9  F (36.6  C) (Oral)   Resp 18   Ht 1.753 m (5' 9\")   Wt 72.6 kg (160 lb)   SpO2 98%   BMI 23.63 kg/m    Awake and alert, sharp mental status  Breathing comfortably without obvious wheezes or crackles  Regular rate and rhythm  Unstageable sacral decub.  Reported ulcer on penis.  Very purulent urine.  Abdomen soft  Extremities cool    Labs reviewed including decrease in creatinine, normal lactate    D/w patient and nurse and ICU team.      "

## 2024-08-06 NOTE — CONSULTS
Consultation - INFECTIOUS DISEASE CONSULTATION  Franklyn Sorto ,  1935, MRN [id number]      Hypoxia [R09.02]  Hypotension, unspecified hypotension type [I95.9]  Community acquired pneumonia of left lower lobe of lung [J18.9]  Pressure injury of sacral region, stage 3 (H) [L89.153]    PCP: No Ref-Primary, Physician, None   Code status:  Full Code               Assessment:  E coli ESBL bacteremia: 2/2 sacral ulcer. S/p bedside debridement with surgery. Necrotic tissue noted cultures sent.  GPC pairs/chains bacteremia: noted on admission. No ID on verigene. Likely 2/2 above  Sacral ulcer s/p bedside debridement, cultures pending.   ARIADNE on CKD  Hx CVA with residual dysphagia: increased risk for aspiration events, on room air. 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) (H)    Hyperlipidemia    Hypertension    Long term current use of anticoagulant therapy    S/P cardiac pacemaker procedure    S/P TAVR (transcatheter aortic valve replacement)    Thrombophilia (H24)    Diabetes mellitus type 2, noninsulin dependent (H)    History of CVA (cerebrovascular accident)    Hypoxia    Hypotension, unspecified hypotension type    Community acquired pneumonia of left lower lobe of lung    Pressure injury of sacral region, stage 3 (H)    Leukocytosis, unspecified type        Recommendations:   - meropenem IV  - vanc IV pending ID on GPC  - follow BC, wound cultures from debridement  - agree with plans for MRI to evaluate for osteomyelitis as that would require a longer duration of antibiotics  - surgery following, anticipate repeat debridement   - further recommendations to follow clinical course  - ID will follow, thanks    Rosalina Ramirez MD  [facility]  Office Telephone [phone].  Fax [phone]  [facility] paging      HPI:    Franklyn Sorto is a 88 year old male. History is provided by patient and chart.  Recently hospitalized for CAUTI ESBL completed ertapenem 6/30. Was at TCU were states multiple times he did not get good care. Had a buttocks wound for unclear duration of time. Yesterday, had fever and was encephalopathic so sent to ED. Hypotensive, hypoxic in ED. Noted to have sacral decubitus ulcer with necrotic tissue and purulent drainage. Leukocytosis, ARIADNE, elevated BNP. CT with possible aspiration. Surgery did bedside debridement. Today, he isn't sure if he feels better. He states \"they're very worried about his pneumonia\" and answers most questions with TCU didn't take good care of him.  Admission BC with ESBL e coli and GPC yet to be IDed.     Chief complaint: Principal Problem:    Sepsis with acute renal failure and tubular necrosis without septic shock (H)  Active Problems:    Benign prostatic hyperplasia    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) (H)    Hyperlipidemia    Hypertension    Long term current use of anticoagulant therapy    S/P cardiac pacemaker procedure    S/P TAVR (transcatheter aortic valve replacement)    Thrombophilia (H24)    Diabetes mellitus type 2, noninsulin dependent (H)    History of CVA (cerebrovascular accident)    Hypoxia    Hypotension, unspecified hypotension type    Community acquired pneumonia of left lower lobe of lung    Pressure injury of sacral region, stage 3 (H)    Leukocytosis, unspecified type      Medical History  Active Ambulatory Problems     Diagnosis Date Noted    Neck pain 11/11/2013    Cervicalgia 11/11/2013    Diabetes mellitus, type 2 (H) 04/17/2023    Benign prostatic hyperplasia 10/20/2011    Chronic systolic congestive heart failure (H) 11/18/2021    Abnormal lateral conjugate gaze 07/11/2023    Arteriosclerosis of coronary artery 09/19/2014    Benign essential HTN " 07/11/2023    DM (diabetes mellitus) type II controlled, neurological manifestation (H) 07/11/2023    Frequent PVCs 04/26/2015    GERD (gastroesophageal reflux disease) 01/05/2013    Health care maintenance 09/21/2015    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) (H) 09/02/2015    Hyperlipidemia 04/04/2007    Hypertension 07/11/2023    Peripheral vascular disease (H24) 01/14/2013    Long term current use of anticoagulant therapy 01/11/2013    Lumbar post-laminectomy syndrome 11/29/2012    Microalbuminuria 07/11/2023    Mucopurulent chronic bronchitis (H) 04/28/2016    OA (osteoarthritis) of knee 07/11/2023    Other chronic nonalcoholic liver disease 07/11/2023    Rheumatoid arthritis (H) 07/11/2023    S/P cardiac pacemaker procedure 11/06/2014    S/P TAVR (transcatheter aortic valve replacement) 11/05/2014    Spinal stenosis, unspecified region other than cervical 11/12/2010    Stage 3 chronic kidney disease, unspecified whether stage 3a or 3b CKD (H) 01/17/2023    Thrombocytopenia (H24) 10/20/2011    Thrombophilia (H24) 02/11/2014    Transient vision disturbance, bilateral 10/19/2011    Diabetes mellitus type 2, noninsulin dependent (H) 09/21/2015    Type II or unspecified type diabetes mellitus without mention of complication, uncontrolled 07/11/2023    Pain in joint, lower leg 10/20/2011    History of CVA (cerebrovascular accident) 06/20/2024    Acute cystitis without hematuria 06/20/2024    Aspiration pneumonia, unspecified aspiration pneumonia type, unspecified laterality, unspecified part of lung (H) 06/20/2024    Anticoagulated on Coumadin 06/21/2024    Urethral foreign body, initial encounter 06/21/2024     Resolved Ambulatory Problems     Diagnosis Date Noted    Sprain and strain of other specified sites of hip and thigh 12/07/2011    Cervicalgia 01/13/2012    Pain in joint, pelvic region and thigh 07/09/2012    Pain in joint, lower leg 04/16/2013    Other postprocedural status(V45.89) 04/16/2013      Past Medical History:   Diagnosis Date    Pressure injury of sacral region, stage 3 (H) 08/05/2024         Surgical History  He  has no past surgical history on file.       Social History  Reviewed, and he  reports that he quit smoking about 49 years ago. His smoking use included cigarettes. He has never used smokeless tobacco. He reports current alcohol use.        Family History  Reviewed and noncontributory to present problem, and family history is not on file.    Psychosocial Needs  Social History     Social History Narrative    Not on file     Additional psychosocial needs reviewed per nursing assessment.       Allergies   Allergen Reactions    Clopidogrel Hives    Hydrocodone      Other reaction(s): sick to his stomach    Hydrocodone-Acetaminophen Nausea and Vomiting    Levofloxacin Other (See Comments)     Other reaction(s): tendonitis  Tendonitis right calf      Spironolactone      Other reaction(s): Hyperkalemia      Medications Prior to Admission   Medication Sig Dispense Refill Last Dose    acetaminophen (TYLENOL) 500 MG tablet Take 1,000 mg by mouth 3 times daily as needed for mild pain   8/5/2024 at 080    allopurinol (ZYLOPRIM) 300 MG tablet Take 300 mg by mouth daily  0 8/5/2024 at 0800    artificial saliva (BIOTENE MT) AERS spray Take 2 sprays by mouth 4 times daily   8/5/2024 at 0800    aspirin 81 MG EC tablet Take 81 mg by mouth daily   8/5/2024 at 0800    bacitracin 500 UNIT/GM external ointment Apply topically 2 times daily   8/5/2024 at 1230    Baclofen (LIORESAL) 5 MG tablet Take 5 mg by mouth 3 times daily   8/4/2024 at 2000    bisacodyl (DULCOLAX) 10 MG suppository Place 10 mg rectally daily as needed for constipation   Unknown at PRN    carvedilol (COREG) 6.25 MG tablet Take 25 mg by mouth 2 times daily  3 8/5/2024 at 0800    COLCRYS 0.6 MG tablet Take 0.6 mg by mouth daily as needed  0 Unknown at PRN    Dextromethorphan-guaiFENesin  MG/5ML syrup Take 5 mLs by mouth every 4 hours  as needed for cough   8/5/2024 at 0800    diclofenac (VOLTAREN) 1 % topical gel Apply 2 g topically 4 times daily   8/5/2024 at 0800    empagliflozin (JARDIANCE) 25 MG TABS tablet Take 1 tablet (25 mg) by mouth daily 90 tablet 3 8/5/2024 at 0800    ENTRESTO  MG per tablet Take 1 tablet by mouth 2 times daily  3 8/5/2024 at 0800    erythromycin (ROMYCIN) 5 MG/GM ophthalmic ointment Place into both eyes at bedtime   8/5/2024 at 2000    HYDROmorphone (DILAUDID) 2 MG tablet Take 1 tablet (2 mg) by mouth 3 times daily as needed for pain 10 tablet 0 More than a month    insulin aspart (NOVOLOG PEN) 100 UNIT/ML pen Inject 1-3 Units Subcutaneous 3 times daily (before meals) Correction Scale - LOW INSULIN RESISTANCE DOSING   Do Not give Correction Insulin if Pre-Meal BG less than 140. For Pre-Meal  - 239 give 1 unit. For Pre-Meal  - 339 give 2 units. For Pre-Meal BG greater than or equal to 340 give 3 units. To be given with prandial insulin, and based on pre-meal blood glucose. Administering insulin within 5 minutes of the start of the meal is ideal. Administer insulin no more than 30 minutes after the start of the meal, unless directed otherwise by provider. Notify provider if glucose greater than or equal to 350 mg/dL after administration of correction dose.   8/5/2024 at 0800    LANsoprazole (PREVACID SOLUTAB) 30 MG ODT Place 30 mg under the tongue every morning (before breakfast)   8/5/2024 at 0800    lipase-protease-amylase (CREON 12) [id number] units CPEP Take 1 capsule by mouth as needed   Unknown    melatonin 3 MG tablet Take 3 mg by mouth at bedtime   8/4/2024 at 2100    menthol-zinc oxide (CALMOSEPTINE) 0.44-20.6 % OINT ointment Apply topically 4 times daily as needed for skin protection Apply to sacrum/groin   Unknown    nitroGLYcerin (NITROSTAT) 0.4 MG sublingual tablet Place 0.4 mg under the tongue every 5 minutes as needed for chest pain For chest pain place 1 tablet under the tongue  every 5 minutes for 3 doses. If symptoms persist 5 minutes after 1st dose call 911.   Unknown at PRN    nystatin (MYCOSTATIN) 888567 UNIT/GM external powder Apply topically 2 times daily as needed for other   Unknown at PRN    ondansetron (ZOFRAN ODT) 4 MG ODT tab Take 4 mg by mouth every 8 hours as needed for nausea   Unknown at PRN    polyethylene glycol-propylene glycol (SYSTANE) 0.4-0.3 % SOLN ophthalmic solution Apply 2 drops to eye 2 times daily   8/5/2024 at 0800    rosuvastatin (CRESTOR) 20 MG tablet Take 1 tablet by mouth at bedtime   8/4/2024 at 2000    Sennosides 17.2 MG TABS 17.2 mg by Enteral route daily   8/5/2024 at 0800    sodium bicarbonate 650 MG tablet 650 mg by Per G Tube route as needed for heartburn   Unknown    vitamin D3 (CHOLECALCIFEROL) 50 mcg (2000 units) tablet Take 2 tablets by mouth daily   8/5/2024 at 0800    warfarin ANTICOAGULANT (COUMADIN) 2 MG tablet Take 2-4 mg by mouth daily Take 4mg on Saturday. Take 2mg all other days of week   8/4/2024 at 2000 (2mg)        Review of Systems:  A 12 point comprehensive review of systems was negative except as noted. Physical Exam:  Temp:  [97.5  F (36.4  C)-98  F (36.7  C)] 98  F (36.7  C)  Pulse:  [69-84] 84  Resp:  [9-41] 20  BP: ()/(37-88) 133/66  SpO2:  [87 %-100 %] 91 %    GEN: alert and oriented x3, NAD  HEAD: atraumatic  ENT: moist membranes, no thrush, anicteric sclera   NECK: supple, no nuchal rigidity  CARDIOVASCULAR: regular rate and rhythm, no murmurs, rubs, or gallops  PULMONARY: lungs clear to ausculation bilaterally  ABDOMEN: soft, nontender, nondistended. Normal bowel sounds  SKIN: see below  for sacral wound. no rashes or lesions. No stigma of endocarditis  PSYCH: grossly intact  MUSCULOSKELETAL: no synovitis, cva                     Pertinent Labs  personally reviewed.   CBC RESULTS:   Recent Labs   Lab Test 08/06/24  0527   WBC 20.9*   RBC 3.65*   HGB 11.3*   HCT 35.1*   MCV 96   MCH 31.0   MCHC 32.2   RDW 14.5   PLT  "146*        Last Comprehensive Metabolic Panel:  Sodium   Date Value Ref Range Status   08/06/2024 141 135 - 145 mmol/L Final     Potassium   Date Value Ref Range Status   08/06/2024 3.8 3.4 - 5.3 mmol/L Final   11/29/2019 4.3 3.5 - 5.0 mmol/L Final     Chloride   Date Value Ref Range Status   08/06/2024 104 98 - 107 mmol/L Final   11/29/2019 107 98 - 107 mmol/L Final     Carbon Dioxide (CO2)   Date Value Ref Range Status   08/06/2024 24 22 - 29 mmol/L Final   11/29/2019 26 22 - 31 mmol/L Final     Anion Gap   Date Value Ref Range Status   08/06/2024 13 7 - 15 mmol/L Final   11/29/2019 7 5 - 18 mmol/L Final     Glucose   Date Value Ref Range Status   08/06/2024 104 (H) 70 - 99 mg/dL Final   11/29/2019 126 (H) 70 - 125 mg/dL Final     GLUCOSE BY METER POCT   Date Value Ref Range Status   08/06/2024 105 (H) 70 - 99 mg/dL Final     Urea Nitrogen   Date Value Ref Range Status   08/06/2024 26.8 (H) 8.0 - 23.0 mg/dL Final   11/29/2019 10 8 - 28 mg/dL Final     Creatinine   Date Value Ref Range Status   08/06/2024 1.12 0.67 - 1.17 mg/dL Final     GFR Estimate   Date Value Ref Range Status   08/06/2024 64 >60 mL/min/1.73m2 Final     Comment:     eGFR calculated using 2021 CKD-EPI equation.   11/29/2019 >60 >60 mL/min/1.73m2 Final     Calcium   Date Value Ref Range Status   08/06/2024 7.8 (L) 8.8 - 10.4 mg/dL Final     Comment:     Reference intervals for this test were updated on 7/16/2024 to reflect our healthy population more accurately. There may be differences in the flagging of prior results with similar values performed with this method. Those prior results can be interpreted in the context of the updated reference intervals.       No results found for: \"CRP\"     The following microbiology studies were personally reviewed:  No results found for: \"CULT\"    Urine Studies    Recent Labs   Lab Test 08/05/24  1554 07/17/24  1940 06/20/24  1728 06/19/24  1230   LEUKEST 500 Mynor/uL* Large* 500 Mynor/uL* Large*   WBCU >182* " ">182* >182* >182*       Vancomycin Levels  No lab results found.    Invalid input(s): \"VANCO\"    MICROBIOLOGY DATA:    All cultures:  7-Day Micro Results       Collected Updated Procedure Result Status      08/06/2024 1135 08/06/2024 1135 Anaerobic Bacterial Culture Routine [[id number]]   Tissue from Buttock, Gluteal Cleft    In process Component Value   No component results               08/06/2024 1133 08/06/2024 1133 Wound Aerobic Bacterial Culture Routine With Gram Stain [[id number]]   Wound from Buttock, Gluteal Cleft    In process Component Value   No component results               08/06/2024 0416 08/06/2024 0949 Wound Aerobic Bacterial Culture Routine With Gram Stain [[id number]]   (Abnormal)   Wound from Spine, Coccyx    Preliminary result Component Value   Gram Stain Result 4+ Gram positive cocci  [P]     1+ Gram positive bacilli  [P]     4+ WBC seen  [P]     Predominantly PMNs               08/06/2024 0416 08/06/2024 0429 Anaerobic Bacterial Culture Routine [[id number]]   Wound from Spine, Coccyx    In process Component Value   No component results               08/05/2024 1722 08/05/2024 1817 Asymptomatic COVID-19 Virus (Coronavirus) by PCR Nasopharyngeal [[id number]]    Swab from Nasopharyngeal    Final result Component Value   SARS CoV2 PCR Negative   NEGATIVE: SARS-CoV-2 (COVID-19) RNA not detected, presumed negative.            08/05/2024 1722 08/06/2024 0005 Respiratory Panel PCR [[id number]]    Swab from Nasopharyngeal    Final result Component Value   Adenovirus Not Detected   Coronavirus Not Detected   This test detects Coronavirus 229E, HKU1, NL63 and OC43 but does not distinguish between them. It does not detect MERS ( Respiratory Syndrome), SARS (Severe Acute Respiratory Syndrome) or 2019-nCoV (Novel 2019) Coronavirus.   Human Metapneumovirus Not Detected   Human Rhin/Enterovirus Not Detected   Influenza A Not Detected   Influenza A, H1 Not Detected   Influenza A 2009 " H1N1 Not Detected   Influenza A, H3 Not Detected   Influenza B Not Detected   Parainfluenza Virus 1 Not Detected   Parainfluenza Virus 2 Not Detected   Parainfluenza Virus 3 Not Detected   Parainfluenza Virus 4 Not Detected   Respiratory Syncytial Virus A Not Detected   Respiratory Syncytial Virus B Not Detected   Chlamydia Pneumoniae Not Detected   Mycoplasma Pneumoniae Not Detected            08/05/2024 1554 08/05/2024 1622 Urine Culture [[id number]]   Urine, Clean Catch    In process Component Value   No component results               08/05/2024 1318 08/06/2024 0604 Blood Culture Peripheral Blood [[id number]]   (Abnormal)   Peripheral Blood    Preliminary result Component Value   Culture Positive on the 1st day of incubation  [P]     Gram negative bacilli  [P]     1 of 2 bottles    Gram positive cocci in pairs and chains  [P]     1 of 2 bottles               08/05/2024 1318 08/06/2024 0909 Verigene GP Panel [[id number]]    Peripheral Blood    Final result Component Value   Staphylococcus species Not Detected   Staphylococcus aureus Not Detected   Staphylococcus epidermidis Not Detected   Staphylococcus lugdunensis Not Detected   Enterococcus faecalis Not Detected   Enterococcus faecium Not Detected   Streptococcus species Not Detected   Streptococcus agalactiae Not Detected   Streptococcus anginosus group Not Detected   Streptococcus pneumoniae Not Detected   Streptococcus pyogenes Not Detected   Listeria species Not Detected            08/05/2024 1318 08/06/2024 0848 Verigene GN Panel [[id number]]    (Abnormal)   Peripheral Blood    Final result Component Value   Acinetobacter species Not Detected   Citrobacter species Not Detected   Enterobacter species Not Detected   Proteus species Not Detected   Escherichia coli Detected   Positive for Escherichia coli by [employer]map multiplex nucleic acid test. Final identification and antimicrobial susceptibility testing will be verified by standard methods. Roadmap  test will not distinguish E. coli from Shigella species including Shigella dysenteriae, Shigella flexneri, Shigella boydii, and Shigella sonnei. Specimens containing Shigella species or E. coli will be reported as positive for E. coli.   Klebsiella pneumoniae Not Detected   Klebsiella oxytoca Not Detected   Pseudomonas aeruginosa Not Detected   CTX-M Detected   Positive for CTX-M Class A Extended Spectrum beta-lactamase (ESBL) resistance marker by Creditable[employer] multiplex nucleic acid test. CTX-M confers resistance to penicillins, cephalosporins and variable resistance to beta-lactamase inhibitor combinations.  Best empiric antibiotic choice is meropenem. Specific susceptibility testing will be performed.   KPC Not Detected   NDM Not Detected   VIM Not Detected   IMP Not Detected   OXA Not Detected                     Pertinent Radiology  personally reviewed.       Chest XR,  PA & LAT    Result Date: 8/5/2024  EXAM: XR CHEST 2 VIEWS LOCATION: [facility] DATE: 8/5/2024 INDICATION: shortness of breath, hypotensive, hypoxic COMPARISON: CTA 8/5/2024 at 1418 hours. Ultrasound 6/20/2024.     IMPRESSION: Again seen is opacification of the left lower lung which could represent atelectasis, infection, and/or aspiration with a small amount of pleural fluid. No pneumothorax. The heart is not well assessed. A TAVR stent is present. There is a left  chest wall biventricular ICD. Cervical fusion hardware is present.    CT Abdomen Pelvis w Contrast    Result Date: 8/5/2024  EXAM: CT ABDOMEN PELVIS W CONTRAST LOCATION: [facility] DATE: 8/5/2024 INDICATION: elevated WBC count, hypotensive, hypoxic COMPARISON: CT abdomen pelvis 6/21/2024 TECHNIQUE: CT scan of the abdomen and pelvis was performed following injection of IV contrast. Multiplanar reformats were obtained. Dose reduction techniques were used. CONTRAST: isovue 370 75ml FINDINGS: LOWER CHEST: Dictated separately. HEPATOBILIARY:  Unchanged liver contours. No worrisome liver lesions. Gallbladder is contracted with multiple gallstones and similar mild wall thickening to prior. No biliary ductal dilation. PANCREAS: Normal. SPLEEN: Normal. ADRENAL GLANDS: Normal. KIDNEYS/BLADDER: Kidneys enhance symmetrically with bilateral benign cysts, which require no follow-up. No urolithiasis or collecting system dilation. Bladder is thick-walled with pericystic stranding. BOWEL: No bowel obstruction. Percutaneous gastrojejunostomy tube appears well-positioned. Colonic diverticulosis without evidence of diverticulitis. No free air or organized fluid collection. Incontinence. LYMPH NODES: No lymphadenopathy. VASCULATURE: Abdominal aorta is nonaneurysmal with severe circumferential atherosclerotic vascular calcifications. PELVIC ORGANS: No pelvic mass. MUSCULOSKELETAL: Degenerative disc disease and facet osteoarthritis throughout the visualized spine. No worrisome bone lesions. Left hip lipoma.     IMPRESSION: 1.  Bladder wall thickening and pericystic stranding, which can be seen with cystitis. Correlate with urinalysis. 2.  Otherwise no acute abnormality in the abdomen or pelvis with additional details in the findings. 3.  Chest reported separately.    CT Chest Pulmonary Embolism w Contrast    Result Date: 8/5/2024  EXAM: CT CHEST PULMONARY EMBOLISM W CONTRAST LOCATION: [facility] DATE: 8/5/2024 INDICATION: SOB, hypoxia, hypotensive COMPARISON: None. TECHNIQUE: CT chest pulmonary angiogram during arterial phase injection of IV contrast. Multiplanar reformats and MIP reconstructions were performed. Dose reduction techniques were used. CONTRAST: isovue 370 75ml FINDINGS: ANGIOGRAM CHEST: The main pulmonary artery is 35 mm in diameter. No pulmonary emboli. There is persistent perfusion of the left lower lobe. Thoracic aorta is negative for dissection. A TAVR stent is present. LUNGS AND PLEURA: Atelectasis and airspace disease in much  of the left lower lobe. The central airway to the left lower lobe is opacified. Small left pleural effusion. Elevated left hemidiaphragm. MEDIASTINUM/AXILLAE: A left chest wall biventricular ICD is present. CORONARY ARTERY CALCIFICATION: Severe. UPPER ABDOMEN: Cholelithiasis. Gallbladder wall thickening. A percutaneous GJ tube is present. MUSCULOSKELETAL: There is surgical hardware at C6-C7.     IMPRESSION: 1.  No PE. Persistent perfusion of the opacified left lower lobe represents a shunt and could contribute to hypoxemia. Enlargement of the main pulmonary artery can be seen with pulmonary hypertension. 2.  The central airway to the left lower lobe is opacified. Opacification of the left lower lobe could represent aspiration, infection, and/or atelectasis. There is a small volume of left pleural fluid. The left hemidiaphragm is elevated. 3.  Cholelithiasis. Gallbladder wall thickening. Recommend ultrasound if there is concern for cholecystitis. 4.  Coronary artery disease.

## 2024-08-06 NOTE — SIGNIFICANT EVENT
Significant Event Note    Time of event: 9:44 PM August 5, 2024    Description of event:  Rapid response event called overnight. In brief, pt is an 87 yo M w/ pmh of T2DM, GERD, HTN, CVA admitted earlier today w/ septic shock (source unclear, possibly CAP vs UTI vs decubitus ulcer). Has been having continued hypotension while boarding in the ED this evening. On my arrival to bedside pt is awake and alert, able to make his needs known. BP is 80/44, sounds as if MAPs have remained below 65 for some time now. IV Albumin is running.    Shortly after my arrival to bedside pt's primary physician Dr. Emy Zarate arrived and house officer was dismissed. Plan for transfer to ICU.    Agustin Keller MD

## 2024-08-06 NOTE — PROGRESS NOTES
"Speech-Language Pathology: Clinical Swallow Evaluation     08/06/24 1000   Appointment Info   Signing Clinician's Name / Credentials (SLP) KATERIN Carolina   General Information   Onset of Illness/Injury or Date of Surgery 08/05/24   Pertinent History of Current Problem Probable aspiration pna, has long hx oropharyngeal dysphagia. Per H&P: Franklyn Sorto is a 88 year old male presenting with T2DM, GERD, HTN, CVA 5/25/24 currently in TCU presenting to the ER for evaluation of fatigue and altered mental status. Patient's family was visiting today and states when they got there \"5 staff members were around the patient and he was not responding\". They called EMS who initially got a BP with 90 systolic, however, repeat on the way over was 65 systolic      Sepsis with hypotension and ARIADNE secondary to aspiration pna vs PNA, vs wound infection vs UTI.   General Observations Alert and immediatly begging for a drink of water upon entering room   Type of Evaluation   Type of Evaluation Swallow Evaluation   Oral Motor   Oral Musculature anomalies present   Mucosal Quality dry   Oral Motor Deficits Observed Facial Symmetry (Oral Motor) (Group)   Dentition (Oral Motor)   Dentition (Oral Motor) adequate dentition   Facial Symmetry (Oral Motor)   Facial Symmetry (Oral Motor) left side impairment   Left Side Facial Asymmetry minimal impairment   Lip Function (Oral Motor)   Lip Range of Motion (Oral Motor) retraction impairment   Lip Strength (Oral Motor) left side;mild impairment   Lip Coordination (Oral Motor) minimal impairment   Retraction, Lip Range of Motion other (see comments)  (trace weakness on left)   Tongue Function (Oral Motor)   Tongue Strength (Oral Motor) WNL   Tongue Coordination/Speed (Oral Motor) reduced rate   Tongue ROM (Oral Motor) WNL   Jaw Function (Oral Motor)   Jaw Function (Oral Motor) WNL   Cough/Swallow/Gag Reflex (Oral Motor)   Volitional Throat Clear/Cough (Oral Motor) reduced strength   Volitional " "Swallow (Oral Motor) effortful;other (see comments)  (dry mouth most likely contributing to effortfulness)   Vocal Quality/Secretion Management (Oral Motor)   Vocal Quality (Oral Motor) hoarse;WNL;other (see comments)  (I believe this is his baseline voice)   Secretion Management (Oral Motor) WNL   General Swallowing Observations   Past History of Dysphagia Long dysphagia hx. His diet prior to admit was soft food and thin liquids with strategies of sitting fully upright at 90 degrees and chin tuck with each swallow of thin liquids. His last VFSS was 6/21/24: \"Videofluoroscopic Swallow Study completed. Patient had aspiration with thin via straw and inadequate use of chin tuck with a cough. Ongoing cues for a hard cough provided at the end of study which did eventually clear aspirated material. Transient penetration with mildly thick and thin with use of the chin tuck.  Pt was cued to hold head midline and then tuck chin to the center, with this posture he has no aspiration or penetration when taking small cup sips of thin. Oral phase is WFL. Tongue base retraction was reduced. Swallow response was delayed, more significant with larger boluses of thin. Epiglottic inversion was complete, slow to invert. Mild stasis occurred with mildly thick. Trace-mild stasis at the valleculae and pyriform sinuses occurred with solids/puree. Stasis clears with liquid wash. Hyolaryngeal elevation and hyolaryngeal excursion were present but unable to adequately assess d/t chin tuck posture.  Mastication WFL. 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 Somewhat improved from last study, ongoing therapy for strategies and exercises.\" When he dc'd from hospital back in June he was on puree/thin diet d/t poor bolus formation with regular solids   Current Diet/Method of Nutritional Intake (General Swallowing Observations, NIS) NPO   Swallowing Evaluation Clinical swallow evaluation   Clinical Swallow Evaluation   Feeding Assistance " "minimal assistance required   Clinical Swallow Evaluation Textures Trialed thin liquids;pureed   Clinical Swallow Eval: Thin Liquid Texture Trial   Mode of Presentation, Thin Liquids cup;spoon;self-fed;fed by clinician   Volume of Liquid or Food Presented 6 oz   Oral Phase of Swallow delayed AP movement;other (see comments)  (anterior loss)   Pharyngeal Phase of Swallow coughing/choking;no awareness of problems;impaired;throat clearing;wet vocal quality after swallow   Successful Strategies Trialed During Procedure other (see comments)  (spoon fed amounts)   Diagnostic Statement Pt is impulsive and takes multiple small sips with independent chin tuck. He has inconsistent wet vocal quality and coughing throat clearing. Despite these overt sx's pt states that everything is \"going down fine\". Spoonful amounts seemed to be best in eliminating overt sx's aspiration however patient was not able to replicate this amount with cup drinking.   Clinical Swallow Evaluation: Puree Solid Texture Trial   Mode of Presentation, Puree spoon;fed by clinician   Volume of Puree Presented 5 bites   Oral Phase, Puree effortful AP movement   Pharyngeal Phase, Puree repeated swallows   Diagnostic Statement no overt sx's observed, however pt had the appearance of effortful AP movement(lingual pumping) with what appeared to be a delayed swallow trigger. Will further assess during VFSS that is scheduled later today.   Esophageal Phase of Swallow   Patient reports or presents with symptoms of esophageal dysphagia No   Swallowing Recommendations   Comment, Swallowing Recommendations Recommend NPO until repeat VFSS can be completed later today.   Clinical Impression   Clinical Impression Comments Pt showing sx's aspiraiton with thin liquids via throat clearing/cough and wet vocal quality despite pt using \"small\" sip from cup with chin tuck strategy. Recommend VFSS to determine if pt cough/throat clearing is effective in clearing airway, if he is " aspirating. Pt is impulsive appearing with drinking thin liquids. No overt sx's aspiration observed with puree.   SLP Total Evaluation Time   Eval: oral/pharyngeal swallow function, clinical swallow Minutes (30793) 30   Total Session Time   Total Session Time (sum of timed and untimed services) 30

## 2024-08-06 NOTE — CONSULTS
General Surgery Consultation  Franklyn Sorto MRN# [id number]   Age/Sex: 88 year old male YOB: 1935     Reason for consult: 1. Community acquired pneumonia of left lower lobe of lung    2. Pressure injury of sacral region, stage 3 (H)    3. Hypotension, unspecified hypotension type    4. Hypoxia            Requesting physician: Dr. Vonnie Casas                   Assessment and Plan:   Assessment:  Sacral decubitus ulcer  Mr. Sorto is a 87 yo M with PMH of CVA, HTN, DM type II who was admitted from TCU with generalized weakness and altered mental status and concern for sepsis with hypotension with concern for aspiration pneumonia and wound infection. Hypotensive on admission improved with IV hydration. Labs with leukocytosis (WBC 20.9 < 23.7), normal lactic acid, ARIADNE (now resolved), troponin 72 > 63, BNP 9693. CT chest negative for PE, left lower lobe opacification and small left pleural effusion. Sacral and coccyx MRI for osteomyelitis evaluation pending. Sacral wound with necrotic tissue and purulent drainage. Plan for bedside debridement of wound this afternoon.    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  Patient reports he was discharged to a TCU and states that care was poor while he was there.  He reports being left incontinent in his wheelchair for extended periods of time.  Also  reports that he was not in an upright position when eating and drinking at all times. Overall, reports decline and deconditioning while at the TCU. Currently, endorses sacral discomfort.  States that the sacral wound has been present since his last admission.  Denies fever, chills, nausea, vomiting.  Endorses intermittent diarrhea.  Last dose of Coumadin was evening of 8/4.          Past Medical History:     Past Medical History:   Diagnosis Date    Abnormal lateral conjugate gaze 07/11/2023    Anticoagulated on Coumadin 06/21/2024    Arteriosclerosis of coronary artery 09/19/2014    Formatting of this note might be different from the original.  Angiogram/PCI 9/19/14:  Distal RCA 90%, treated with MARÍA.  Mild LAD and LCx disease.      Aspiration pneumonia, unspecified aspiration pneumonia type, unspecified laterality, unspecified part of lung (H) 06/20/2024    Benign essential HTN 07/11/2023    Benign prostatic hyperplasia 10/20/2011    Formatting of this note might be different from the original.  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  Ulcers in lat 90's. Reflux symptoms if he misses proton pump inhibitor.      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) (H) 09/02/2015    History of CVA (cerebrovascular accident) 06/20/2024    Hypertension 07/11/2023    Formatting of this note might be different from the original.  Created by Conversion     Replacement Utility updated for latest IMO load  Formatting of this note might be different from the original.  ECG 8/18/06 sinus bradycardia otherwise normal  Stress Echo 8/24/06 Normal, sub-max HR      Long term current use of anticoagulant therapy 01/11/2013    Lumbar post-laminectomy syndrome 11/29/2012    Neck pain 11/11/2013    OA (osteoarthritis) of knee  07/11/2023    Peripheral vascular disease (H24) 01/14/2013    Formatting of this note might be different from the original.  Created by Conversion      Pressure injury of sacral region, stage 3 (H) 08/05/2024    Rheumatoid arthritis (H) 07/11/2023    Formatting of this note might be different from the original.  Created by Conversion  Formatting of this note might be different from the original.  Dr. Gaxiola. 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  Antiphospholipid antibodies- positiive beta 2 glycoprotein and DRVVT confirmation x's 2 12 weeks apart 2013.  Heterozygous Leiden Factor V  Follow by Dr Oly Hunter      Transient vision disturbance, bilateral 10/19/2011    Formatting of this note might be different from the original.  Episode of decreased vision in both eyes on 10/15/2011, lasting 10-15 minutes.                Past Surgical History:   No past surgical history on file.          Social History:    reports that he quit smoking about 49 years ago. His smoking use included cigarettes. He has never used smokeless tobacco. He reports current alcohol use.           Family History:   No family history on file.           Allergies:     Allergies   Allergen Reactions    Clopidogrel Hives    Hydrocodone      Other reaction(s): sick to his stomach    Hydrocodone-Acetaminophen Nausea and Vomiting    Levofloxacin Other (See Comments)     Other reaction(s): tendonitis  Tendonitis right calf      Spironolactone      Other reaction(s): Hyperkalemia              Medications:     Prior to Admission medications    Medication Sig Start Date End Date Taking? Authorizing Provider   acetaminophen (TYLENOL) 500 MG tablet  Take 1,000 mg by mouth 3 times daily as needed for mild pain   Yes Reported, Patient   allopurinol (ZYLOPRIM) 300 MG tablet Take 300 mg by mouth daily 6/6/18  Yes Reported, Patient   artificial saliva (BIOTENE MT) AERS spray Take 2 sprays by mouth 4 times daily   Yes Unknown, Entered By History   aspirin 81 MG EC tablet Take 81 mg by mouth daily   Yes Reported, Patient   bacitracin 500 UNIT/GM external ointment Apply topically 2 times daily   Yes Unknown, Entered By History   Baclofen (LIORESAL) 5 MG tablet Take 5 mg by mouth 3 times daily 6/6/24  Yes Reported, Patient   bisacodyl (DULCOLAX) 10 MG suppository Place 10 mg rectally daily as needed for constipation 6/6/24  Yes Reported, Patient   carvedilol (COREG) 6.25 MG tablet Take 25 mg by mouth 2 times daily 6/5/18  Yes Reported, Patient   COLCRYS 0.6 MG tablet Take 0.6 mg by mouth daily as needed 4/13/18  Yes Reported, Patient   Dextromethorphan-guaiFENesin  MG/5ML syrup Take 5 mLs by mouth every 4 hours as needed for cough   Yes Unknown, Entered By History   diclofenac (VOLTAREN) 1 % topical gel Apply 2 g topically 4 times daily   Yes Reported, Patient   empagliflozin (JARDIANCE) 25 MG TABS tablet Take 1 tablet (25 mg) by mouth daily 11/13/23  Yes Merly Gutiérrez PA-C   ENTRESTO  MG per tablet Take 1 tablet by mouth 2 times daily 6/22/18  Yes Reported, Patient   erythromycin (ROMYCIN) 5 MG/GM ophthalmic ointment Place into both eyes at bedtime 12/27/23  Yes Reported, Patient   HYDROmorphone (DILAUDID) 2 MG tablet Take 1 tablet (2 mg) by mouth 3 times daily as needed for pain 6/26/24  Yes Chalino Manzanares MD   insulin aspart (NOVOLOG PEN) 100 UNIT/ML pen Inject 1-3 Units Subcutaneous 3 times daily (before meals) Correction Scale - LOW INSULIN RESISTANCE DOSING   Do Not give Correction Insulin if Pre-Meal BG less than 140. For Pre-Meal  - 239 give 1 unit. For Pre-Meal  - 339 give 2 units. For Pre-Meal BG greater than or equal to 340 give 3  units. To be given with prandial insulin, and based on pre-meal blood glucose. Administering insulin within 5 minutes of the start of the meal is ideal. Administer insulin no more than 30 minutes after the start of the meal, unless directed otherwise by provider. Notify provider if glucose greater than or equal to 350 mg/dL after administration of correction dose. 6/26/24  Yes Chalino Manzanares MD   LANsoprazole (PREVACID SOLUTAB) 30 MG ODT Place 30 mg under the tongue every morning (before breakfast) 6/7/24  Yes Reported, Patient   lipase-protease-amylase (CREON 12) 96898-39[address]-58900 units CPEP Take 1 capsule by mouth as needed   Yes Reported, Patient   melatonin 3 MG tablet Take 3 mg by mouth at bedtime   Yes Reported, Patient   menthol-zinc oxide (CALMOSEPTINE) 0.44-20.6 % OINT ointment Apply topically 4 times daily as needed for skin protection Apply to sacrum/groin 6/26/24  Yes Chalino Manzanares MD   nitroGLYcerin (NITROSTAT) 0.4 MG sublingual tablet Place 0.4 mg under the tongue every 5 minutes as needed for chest pain For chest pain place 1 tablet under the tongue every 5 minutes for 3 doses. If symptoms persist 5 minutes after 1st dose call 911.   Yes Reported, Patient   nystatin (MYCOSTATIN) 088785 UNIT/GM external powder Apply topically 2 times daily as needed for other 6/6/24  Yes Reported, Patient   ondansetron (ZOFRAN ODT) 4 MG ODT tab Take 4 mg by mouth every 8 hours as needed for nausea   Yes Reported, Patient   polyethylene glycol-propylene glycol (SYSTANE) 0.4-0.3 % SOLN ophthalmic solution Apply 2 drops to eye 2 times daily 6/6/24  Yes Reported, Patient   rosuvastatin (CRESTOR) 20 MG tablet Take 1 tablet by mouth at bedtime 6/6/24  Yes Reported, Patient   Sennosides 17.2 MG TABS 17.2 mg by Enteral route daily 6/7/24  Yes Reported, Patient   sodium bicarbonate 650 MG tablet 650 mg by Per G Tube route as needed for heartburn 6/6/24  Yes Reported, Patient   vitamin D3 (CHOLECALCIFEROL) 50 mcg (2000 units)  tablet Take 2 tablets by mouth daily   Yes Reported, Patient   warfarin ANTICOAGULANT (COUMADIN) 2 MG tablet Take 2-4 mg by mouth daily Take 4mg on Saturday. Take 2mg all other days of week   Yes Unknown, Entered By History              Review of Systems:   The Review of Systems is negative other than noted in the HPI            Physical Exam:   Patient Vitals for the past 24 hrs:   BP Temp Temp src Pulse Resp SpO2 Height Weight   08/06/24 0900 133/66 -- -- 80 19 (!) 87 % -- --   08/06/24 0630 122/80 -- -- 83 24 96 % -- --   08/06/24 0600 129/74 -- -- 81 18 99 % -- --   08/06/24 0530 118/65 -- -- 70 17 100 % -- --   08/06/24 0500 117/60 -- -- 74 18 99 % -- --   08/06/24 0428 135/64 98  F (36.7  C) Oral 74 22 91 % -- --   08/06/24 0330 122/57 -- -- 73 18 96 % -- --   08/06/24 0300 104/59 -- -- 71 18 96 % -- --   08/06/24 0230 110/56 -- -- 69 28 97 % -- --   08/06/24 0200 113/56 -- -- 71 (!) 38 97 % -- --   08/06/24 0130 111/59 -- -- 69 (!) 32 97 % -- --   08/06/24 0100 109/59 -- -- 69 (!) 41 97 % -- --   08/06/24 0030 108/56 -- -- 70 (!) 37 98 % -- --   08/06/24 0000 109/54 98  F (36.7  C) Oral 71 25 100 % -- --   08/05/24 2345 117/57 -- -- 69 (!) 9 99 % -- --   08/05/24 2330 111/58 -- -- 71 11 99 % -- --   08/05/24 2315 110/54 -- -- 70 17 100 % -- --   08/05/24 2300 114/57 97.8  F (36.6  C) Oral 83 25 96 % -- --   08/05/24 2245 110/54 -- -- 69 23 100 % -- --   08/05/24 2230 124/68 -- -- 73 29 95 % -- --   08/05/24 2215 126/79 -- -- 70 24 97 % -- --   08/05/24 2145 (!) 81/47 -- -- -- -- -- -- --   08/05/24 2130 (!) 80/44 -- -- 69 28 96 % -- --   08/05/24 2120 (!) 79/46 -- -- 69 (!) 36 96 % -- --   08/05/24 2100 (!) 82/40 -- -- 69 23 97 % -- --   08/05/24 2045 (!) 82/43 -- -- 69 20 98 % -- --   08/05/24 2030 (!) 83/44 -- -- 69 18 98 % -- --   08/05/24 2024 (!) 86/46 -- -- 69 20 98 % -- --   08/05/24 2020 (!) 85/46 -- -- 70 20 99 % -- --   08/05/24 1941 (!) 83/45 -- -- 69 18 99 % -- --   08/05/24 1940 (!) 87/50 -- --  69 20 99 % -- --   08/05/24 1930 93/45 -- -- 69 20 99 % -- --   08/05/24 1920 104/48 -- -- 69 (!) 33 100 % -- --   08/05/24 1910 94/52 -- -- 69 -- 98 % -- --   08/05/24 1901 93/53 -- -- 69 -- 99 % -- --   08/05/24 1850 (!) 74/45 -- -- 69 -- 98 % -- --   08/05/24 1840 (!) 73/46 97.7  F (36.5  C) Oral 71 -- 97 % -- --   08/05/24 1830 (!) 68/45 -- -- 70 -- 97 % -- --   08/05/24 1820 (!) 71/42 -- -- 71 -- 96 % -- --   08/05/24 1745 -- -- -- 69 20 95 % -- --   08/05/24 1744 (!) 86/42 -- -- -- -- -- -- --   08/05/24 1701 136/88 -- -- 69 22 100 % -- --   08/05/24 1645 (!) 71/37 -- -- 69 19 100 % -- --   08/05/24 1616 (!) 82/56 -- -- -- -- -- -- --   08/05/24 1559 (!) 68/38 -- -- 69 20 100 % -- --   08/05/24 1545 (!) 78/42 -- -- 69 19 100 % -- --   08/05/24 1540 (!) 82/46 -- -- 69 23 100 % -- --   08/05/24 1536 (!) 80/52 -- -- 70 23 99 % -- --   08/05/24 1525 (!) 84/48 -- -- 69 24 99 % -- --   08/05/24 1520 (!) 82/46 -- -- 69 20 99 % -- --   08/05/24 1515 (!) 80/50 -- -- 69 28 100 % -- --   08/05/24 1511 (!) 77/48 -- -- 69 25 98 % -- --   08/05/24 1506 (!) 77/45 -- -- 69 20 99 % -- --   08/05/24 1501 (!) 82/50 -- -- 69 16 99 % -- --   08/05/24 1414 (!) 73/41 -- -- 69 20 100 % -- --   08/05/24 1409 (!) 89/51 -- -- 69 14 100 % -- --   08/05/24 1400 90/66 -- -- 69 26 100 % -- --   08/05/24 1355 90/69 -- -- 69 21 100 % -- --   08/05/24 1338 (!) 73/50 -- -- 69 22 100 % -- --   08/05/24 1333 (!) 76/53 -- -- 69 24 100 % -- --   08/05/24 1328 92/43 -- -- 69 21 100 % -- --   08/05/24 1323 111/50 -- -- 69 25 100 % -- --   08/05/24 1318 (!) 87/59 -- -- 69 21 100 % -- --   08/05/24 1306 (!) 72/49 -- -- 69 26 96 % -- --   08/05/24 1301 (!) 87/47 -- -- 69 21 96 % -- --   08/05/24 1256 (!) 84/45 -- -- 69 27 97 % -- --   08/05/24 1251 (!) 82/44 -- -- 69 24 98 % -- --   08/05/24 1246 (!) 72/40 -- -- 69 26 98 % -- --   08/05/24 1241 (!) 78/39 -- -- 69 (!) 34 98 % -- --   08/05/24 1224 (!) 70/43 -- -- 69 24 97 % -- --   08/05/24 1220 (!)  "76/41 -- -- 69 29 -- -- --   08/05/24 1215 (!) 80/42 -- -- 69 20 95 % -- --   08/05/24 1209 (!) 81/43 -- -- 69 19 98 % -- --   08/05/24 1204 (!) 79/43 -- -- 69 18 100 % -- --   08/05/24 1159 (!) 69/48 97.5  F (36.4  C) Oral 69 22 (!) 89 % 1.753 m (5' 9\") 72.6 kg (160 lb)   08/05/24 1154 (!) 69/48 -- -- -- -- -- -- --          Intake/Output Summary (Last 24 hours) at 8/6/2024 0929  Last data filed at 8/6/2024 0424  Gross per 24 hour   Intake 2424.2 ml   Output 325 ml   Net 2099.2 ml      Constitutional:   awake, alert, cooperative, no apparent distress, and appears stated age       Eyes:   PERRL, conjunctiva/corneas clear, EOM's intact; no scleral edema or icterus noted        ENT:   Normocephalic, without obvious abnormality, atraumatic, Lips, mucosa, and tongue normal        Lungs:   Normal respiratory effort, no accessory muscle use       Cardiovascular:   Regular rate and rhythm       Abdomen:   Soft, nontender.  GJ tube in place and clamped.       Musculoskeletal:   No obvious swelling, bruising or deformity       Skin:   Sacral decubitus ulcer with necrotic tissue and purulent, malodorous drainage from area inferior aspect of the wound.  Surrounding tissue erythematous and indurated.             Data:         All imaging studies reviewed by me.    Results for orders placed or performed during the hospital encounter of 08/05/24 (from the past 24 hour(s))   [facility] Draw    Narrative    The following orders were created for panel order [facility] Draw.  Procedure                               Abnormality         Status                     ---------                               -----------         ------                     Extra Blue Top Tube[[id number]]                              Final result               Extra Red Top Tube[[id number]]                               Final result               Extra Green Top (Lithium...[[id number]]                      Final result               Extra Purple Top Tube[[id number]]      "                       Final result                 Please view results for these tests on the individual orders.   Lactic acid whole blood   Result Value Ref Range    Lactic Acid 1.3 0.7 - 2.0 mmol/L   Extra Blue Top Tube   Result Value Ref Range    Hold Specimen JIC    Extra Red Top Tube   Result Value Ref Range    Hold Specimen JIC    Extra Green Top (Lithium Heparin) Tube   Result Value Ref Range    Hold Specimen JIC    Extra Purple Top Tube   Result Value Ref Range    Hold Specimen JIC    Troponin T, High Sensitivity (now)   Result Value Ref Range    Troponin T, High Sensitivity 72 (H) <=22 ng/L   Nt probnp inpatient   Result Value Ref Range    N terminal Pro BNP Inpatient 9,693 (H) 0 - 1,800 pg/mL   Procalcitonin   Result Value Ref Range    Procalcitonin 65.78 (HH) <0.50 ng/mL   CBC with platelets + differential    Narrative    The following orders were created for panel order CBC with platelets + differential.  Procedure                               Abnormality         Status                     ---------                               -----------         ------                     CBC with platelets and d...[[id number]]  Abnormal            Final result                 Please view results for these tests on the individual orders.   CBC with platelets and differential   Result Value Ref Range    WBC Count 23.7 (H) 4.0 - 11.0 10e3/uL    RBC Count 3.44 (L) 4.40 - 5.90 10e6/uL    Hemoglobin 10.5 (L) 13.3 - 17.7 g/dL    Hematocrit 32.9 (L) 40.0 - 53.0 %    MCV 96 78 - 100 fL    MCH 30.5 26.5 - 33.0 pg    MCHC 31.9 31.5 - 36.5 g/dL    RDW 14.5 10.0 - 15.0 %    Platelet Count 154 150 - 450 10e3/uL    % Neutrophils 87 %    % Lymphocytes 6 %    % Monocytes 6 %    % Eosinophils 0 %    % Basophils 0 %    % Immature Granulocytes 1 %    NRBCs per 100 WBC 0 <1 /100    Absolute Neutrophils 20.6 (H) 1.6 - 8.3 10e3/uL    Absolute Lymphocytes 1.5 0.8 - 5.3 10e3/uL    Absolute Monocytes 1.3 0.0 - 1.3 10e3/uL    Absolute  Eosinophils 0.1 0.0 - 0.7 10e3/uL    Absolute Basophils 0.1 0.0 - 0.2 10e3/uL    Absolute Immature Granulocytes 0.2 <=0.4 10e3/uL    Absolute NRBCs 0.0 10e3/uL   Magnesium   Result Value Ref Range    Magnesium 2.1 1.7 - 2.3 mg/dL   Comprehensive metabolic panel   Result Value Ref Range    Sodium 137 135 - 145 mmol/L    Potassium 3.7 3.4 - 5.3 mmol/L    Carbon Dioxide (CO2) 25 22 - 29 mmol/L    Anion Gap 12 7 - 15 mmol/L    Urea Nitrogen 31.0 (H) 8.0 - 23.0 mg/dL    Creatinine 1.38 (H) 0.67 - 1.17 mg/dL    GFR Estimate 50 (L) >60 mL/min/1.73m2    Calcium 7.8 (L) 8.8 - 10.4 mg/dL    Chloride 100 98 - 107 mmol/L    Glucose 232 (H) 70 - 99 mg/dL    Alkaline Phosphatase 96 40 - 150 U/L    AST 30 0 - 45 U/L    ALT 12 0 - 70 U/L    Protein Total 6.1 (L) 6.4 - 8.3 g/dL    Albumin 2.2 (L) 3.5 - 5.2 g/dL    Bilirubin Total 0.4 <=1.2 mg/dL   INR   Result Value Ref Range    INR 4.84 (H) 0.85 - 1.15   PTT   Result Value Ref Range    aPTT 54 (H) 22 - 38 Seconds   Blood gas venous   Result Value Ref Range    pH Venous 7.36 7.32 - 7.43    pCO2 Venous 50 40 - 50 mm Hg    pO2 Venous 14 (L) 25 - 47 mm Hg    Bicarbonate Venous 28 21 - 28 mmol/L    Base Excess/Deficit Venous 2.5 -3.0 - 3.0 mmol/L    FIO2 28     Oxyhemoglobin Venous 13 (L) 70 - 75 %    O2 Sat, Venous 13.3 (L) 70.0 - 75.0 %    Narrative    In healthy individuals, oxyhemoglobin (O2Hb) and oxygen saturation (SO2) are approximately equal. In the presence of dyshemoglobins, oxyhemoglobin can be considerably lower than oxygen saturation.   Blood Culture Peripheral Blood    Specimen: Peripheral Blood   Result Value Ref Range    Culture Positive on the 1st day of incubation (A)     Culture Gram negative bacilli (AA)     Culture Gram positive cocci in pairs and chains (AA)    [facility] Panel    Specimen: Peripheral Blood   Result Value Ref Range    Staphylococcus species Not Detected Not Detected    Staphylococcus aureus Not Detected Not Detected    Staphylococcus epidermidis  Not Detected Not Detected    Staphylococcus lugdunensis Not Detected Not Detected    Enterococcus faecalis Not Detected Not Detected    Enterococcus faecium Not Detected Not Detected    Streptococcus species Not Detected Not Detected    Streptococcus agalactiae Not Detected Not Detected    Streptococcus anginosus group Not Detected Not Detected    Streptococcus pneumoniae Not Detected Not Detected    Streptococcus pyogenes Not Detected Not Detected    Listeria species Not Detected Not Detected    Narrative    Specimen tested with Floxxigene multiplex, gram-positive blood culture nucleic acid test for the following targets: Staphylococcus aureus, Staphylococcus epidermidis, Staphylococcus lugdunensis, other Staphylococcus species, Enterococcus faecalis, Enterococcus faecium, Streptococcus species, Streptococcus agalactiae, Streptococcus anginosus group, Streptococcus pneumoniae, Streptococcus pyogenes, Listeria species, mecA (methicillin resistance), and Leia/vanB (vancomycin resistance).  Final identification and antimicrobial susceptibility testing will be verified by standard methods.     [email or web address] Panel    Specimen: Peripheral Blood   Result Value Ref Range    Acinetobacter species Not Detected Not Detected    Citrobacter species Not Detected Not Detected    Enterobacter species Not Detected Not Detected    Proteus species Not Detected Not Detected    Escherichia coli Detected (A) Not Detected    Klebsiella pneumoniae Not Detected Not Detected    Klebsiella oxytoca Not Detected Not Detected    Pseudomonas aeruginosa Not Detected Not Detected    CTX-M Detected (A) Not Detected, NA    KPC Not Detected Not Detected, NA    NDM Not Detected Not Detected, NA    VIM Not Detected Not Detected, NA    IMP Not Detected Not Detected, NA    OXA Not Detected Not Detected, NA    Narrative    Specimen tested with Floxxigene multiplex, gram-negative blood culture nucleic acid test for the following targets: Acinetobacter species,  Citrobacter species, Enterobacter species, Proteus species, Escherichia coli, Klebsiella pneumoniae, Klebsiella oxytoca, Pseudomonas aeruginosa, and the following resistance markers: CTX-M, KPC, NDM, VIM, IMP and OXA.   Troponin T, High Sensitivity (now)   Result Value Ref Range    Troponin T, High Sensitivity 63 (H) <=22 ng/L   CT Chest Pulmonary Embolism w Contrast    Narrative    EXAM: CT CHEST PULMONARY EMBOLISM W CONTRAST  LOCATION: [facility]  DATE: 8/5/2024    INDICATION: SOB, hypoxia, hypotensive  COMPARISON: None.  TECHNIQUE: CT chest pulmonary angiogram during arterial phase injection of IV contrast. Multiplanar reformats and MIP reconstructions were performed. Dose reduction techniques were used.   CONTRAST: isovue 370 75ml    FINDINGS:  ANGIOGRAM CHEST: The main pulmonary artery is 35 mm in diameter. No pulmonary emboli. There is persistent perfusion of the left lower lobe. Thoracic aorta is negative for dissection. A TAVR stent is present.    LUNGS AND PLEURA: Atelectasis and airspace disease in much of the left lower lobe. The central airway to the left lower lobe is opacified. Small left pleural effusion. Elevated left hemidiaphragm.    MEDIASTINUM/AXILLAE: A left chest wall biventricular ICD is present.    CORONARY ARTERY CALCIFICATION: Severe.    UPPER ABDOMEN: Cholelithiasis. Gallbladder wall thickening. A percutaneous GJ tube is present.    MUSCULOSKELETAL: There is surgical hardware at C6-C7.      Impression    IMPRESSION:  1.  No PE. Persistent perfusion of the opacified left lower lobe represents a shunt and could contribute to hypoxemia. Enlargement of the main pulmonary artery can be seen with pulmonary hypertension.  2.  The central airway to the left lower lobe is opacified. Opacification of the left lower lobe could represent aspiration, infection, and/or atelectasis. There is a small volume of left pleural fluid. The left hemidiaphragm is elevated.  3.   Cholelithiasis. Gallbladder wall thickening. Recommend ultrasound if there is concern for cholecystitis.  4.  Coronary artery disease.   CT Abdomen Pelvis w Contrast    Narrative    EXAM: CT ABDOMEN PELVIS W CONTRAST  LOCATION: [facility]  DATE: 8/5/2024    INDICATION: elevated WBC count, hypotensive, hypoxic  COMPARISON: CT abdomen pelvis 6/21/2024  TECHNIQUE: CT scan of the abdomen and pelvis was performed following injection of IV contrast. Multiplanar reformats were obtained. Dose reduction techniques were used.  CONTRAST: isovue 370 75ml    FINDINGS:   LOWER CHEST: Dictated separately.    HEPATOBILIARY: Unchanged liver contours. No worrisome liver lesions. Gallbladder is contracted with multiple gallstones and similar mild wall thickening to prior. No biliary ductal dilation.    PANCREAS: Normal.    SPLEEN: Normal.    ADRENAL GLANDS: Normal.    KIDNEYS/BLADDER: Kidneys enhance symmetrically with bilateral benign cysts, which require no follow-up. No urolithiasis or collecting system dilation. Bladder is thick-walled with pericystic stranding.    BOWEL: No bowel obstruction. Percutaneous gastrojejunostomy tube appears well-positioned. Colonic diverticulosis without evidence of diverticulitis. No free air or organized fluid collection. Incontinence.    LYMPH NODES: No lymphadenopathy.    VASCULATURE: Abdominal aorta is nonaneurysmal with severe circumferential atherosclerotic vascular calcifications.    PELVIC ORGANS: No pelvic mass.    MUSCULOSKELETAL: Degenerative disc disease and facet osteoarthritis throughout the visualized spine. No worrisome bone lesions. Left hip lipoma.      Impression    IMPRESSION:   1.  Bladder wall thickening and pericystic stranding, which can be seen with cystitis. Correlate with urinalysis.  2.  Otherwise no acute abnormality in the abdomen or pelvis with additional details in the findings.  3.  Chest reported separately.   Chest XR,  PA & LAT    Narrative     EXAM: XR CHEST 2 VIEWS  LOCATION: [facility]  DATE: 8/5/2024    INDICATION: shortness of breath, hypotensive, hypoxic  COMPARISON: CTA 8/5/2024 at 1418 hours. Ultrasound 6/20/2024.      Impression    IMPRESSION: Again seen is opacification of the left lower lung which could represent atelectasis, infection, and/or aspiration with a small amount of pleural fluid. No pneumothorax. The heart is not well assessed. 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 Cervical fusion hardware is present.   UA with Microscopic reflex to Culture    Specimen: Urine, Clean Catch   Result Value Ref Range    Color Urine Orange (A) Colorless, Straw, Light Yellow, Yellow    Appearance Urine Cloudy (A) Clear    Glucose Urine >1000 (A) Negative mg/dL    Bilirubin Urine Negative Negative    Ketones Urine Negative Negative mg/dL    Specific Gravity Urine 1.028 1.001 - 1.030    Blood Urine 0.5 mg/dL (A) Negative    pH Urine 6.0 5.0 - 7.0    Protein Albumin Urine 300 (A) Negative mg/dL    Urobilinogen Urine <2.0 <2.0 mg/dL    Nitrite Urine Negative Negative    Leukocyte Esterase Urine 500 Mynor/uL (A) Negative    Bacteria Urine Moderate (A) None Seen /HPF    WBC Clumps Urine Present (A) None Seen /HPF    Budding Yeast Urine Moderate (A) None Seen /HPF    RBC Urine 111 (H) <=2 /HPF    WBC Urine >182 (H) <=5 /HPF    Narrative    Urine Culture ordered based on laboratory criteria   Asymptomatic COVID-19 Virus (Coronavirus) by PCR Nasopharyngeal    Specimen: Nasopharyngeal; Swab   Result Value Ref Range    SARS CoV2 PCR Negative Negative    Narrative    Testing was performed using the Xpert Xpress SARS-CoV-2 Assay on the Cepheid Gene-Xpert Instrument Systems. Additional information about this Emergency Use Authorization (EUA) assay can be found via the Lab Guide. This test should be ordered for the detection of SARS-CoV-2 in individuals who meet SARS-CoV-2 clinical and/or epidemiological  criteria as well as from individuals without symptoms or other reasons to suspect COVID-19. Test performance for asymptomatic patients has only been established in anterior nasal swab specimens. This test is for in vitro diagnostic use under the FDA EUA for laboratories certified under CLIA to perform high complexity testing. This test has not been FDA cleared or approved. A negative result does not rule out the presence of PCR inhibitors in the specimen or target RNA concentration below the limit of detection for the assay. The possibility of a false negative should be considered if the patient's recent exposure or clinical presentation suggests COVID-19.. This test was validated by the [facility]. This laboratory is certified under the Clinical Laboratory Improvement Amendments (CLIA) as qualified to perform high complexity laboratory testing.     Respiratory Panel PCR    Specimen: Nasopharyngeal; Swab   Result Value Ref Range    Adenovirus Not Detected Not Detected    Coronavirus Not Detected Not Detected    Human Metapneumovirus Not Detected Not Detected    Human Rhin/Enterovirus Not Detected Not Detected    Influenza A Not Detected Not Detected    Influenza A, H1 Not Detected Not Detected    Influenza A 2009 H1N1 Not Detected Not Detected    Influenza A, H3 Not Detected Not Detected    Influenza B Not Detected Not Detected    Parainfluenza Virus 1 Not Detected Not Detected    Parainfluenza Virus 2 Not Detected Not Detected    Parainfluenza Virus 3 Not Detected Not Detected    Parainfluenza Virus 4 Not Detected Not Detected    Respiratory Syncytial Virus A Not Detected Not Detected    Respiratory Syncytial Virus B Not Detected Not Detected    Chlamydia Pneumoniae Not Detected Not Detected    Mycoplasma Pneumoniae Not Detected Not Detected    Narrative    The ePlex Respiratory Panel is a qualitative nucleic acid, multiplex, in vitro diagnostic test for the simultaneous  detection and identification of multiple respiratory viral and bacterial nucleic acids in nasopharyngeal swabs collected in viral transport media from individual exhibiting signs and symptoms of respiratory infection. The assay has received FDA approval for the testing of nasopharyngeal (NP) swabs only. This test is used for clinical purposes and should not be regarded as investigational or for research. This laboratory is certified under the Clinical Laboratory Improvement Amendments of 1988 (CLIA-88) as qualified to perform high complexity clinical laboratory testing.   Glucose by meter   Result Value Ref Range    GLUCOSE BY METER POCT 145 (H) 70 - 99 mg/dL   INR   Result Value Ref Range    INR 6.09 (HH) 0.85 - 1.15   Basic metabolic panel   Result Value Ref Range    Sodium 141 135 - 145 mmol/L    Potassium 3.5 3.4 - 5.3 mmol/L    Chloride 105 98 - 107 mmol/L    Carbon Dioxide (CO2) 23 22 - 29 mmol/L    Anion Gap 13 7 - 15 mmol/L    Urea Nitrogen 28.9 (H) 8.0 - 23.0 mg/dL    Creatinine 1.22 (H) 0.67 - 1.17 mg/dL    GFR Estimate 58 (L) >60 mL/min/1.73m2    Calcium 7.4 (L) 8.8 - 10.4 mg/dL    Glucose 147 (H) 70 - 99 mg/dL   Glucose by meter   Result Value Ref Range    GLUCOSE BY METER POCT 101 (H) 70 - 99 mg/dL   Glucose by meter   Result Value Ref Range    GLUCOSE BY METER POCT 105 (H) 70 - 99 mg/dL   Basic metabolic panel   Result Value Ref Range    Sodium 141 135 - 145 mmol/L    Potassium 3.8 3.4 - 5.3 mmol/L    Chloride 104 98 - 107 mmol/L    Carbon Dioxide (CO2) 24 22 - 29 mmol/L    Anion Gap 13 7 - 15 mmol/L    Urea Nitrogen 26.8 (H) 8.0 - 23.0 mg/dL    Creatinine 1.12 0.67 - 1.17 mg/dL    GFR Estimate 64 >60 mL/min/1.73m2    Calcium 7.8 (L) 8.8 - 10.4 mg/dL    Glucose 104 (H) 70 - 99 mg/dL   CBC with platelets   Result Value Ref Range    WBC Count 20.9 (H) 4.0 - 11.0 10e3/uL    RBC Count 3.65 (L) 4.40 - 5.90 10e6/uL    Hemoglobin 11.3 (L) 13.3 - 17.7 g/dL    Hematocrit 35.1 (L) 40.0 - 53.0 %    MCV 96 78 -  100 fL    MCH 31.0 26.5 - 33.0 pg    MCHC 32.2 31.5 - 36.5 g/dL    RDW 14.5 10.0 - 15.0 %    Platelet Count 146 (L) 150 - 450 10e3/uL   Magnesium   Result Value Ref Range    Magnesium 2.0 1.7 - 2.3 mg/dL   Phosphorus   Result Value Ref Range    Phosphorus 2.8 2.5 - 4.5 mg/dL   Lactic acid whole blood   Result Value Ref Range    Lactic Acid 1.5 0.7 - 2.0 mmol/L   Procalcitonin   Result Value Ref Range    Procalcitonin 39.57 (HH) <0.50 ng/mL   INR   Result Value Ref Range    INR 2.03 (H) 0.85 - 1.15           Lesley Zarate PA-C  [facility]  [address] [place] [id number] [phone]

## 2024-08-06 NOTE — PLAN OF CARE
[facility] - ICU    RN Progress Note:            Pertinent Assessments:      Please refer to flowsheet rows for full assessment     VSS, afebrile. Pt Aox4, uses call light to make needs known. BP stable thoughout the night not requiring any pressors.     UOP adequate, small smear bowel movement. Wound dressing changed and cultures sent            Key Events - This Shift:       Uneventful               Barriers to Discharge / Downgrade:     None         Point of Contact Update: YES-OR-NO: No  If No, reason: no events warrant family notification        Goal Outcome Evaluation:      Plan of Care Reviewed With: patient    Overall Patient Progress: improving Overall Patient Progress: improving    Outcome Evaluation: Pt BP remained stable throughout the night. No pressors needed. pt not requiring ICU cares

## 2024-08-06 NOTE — ED NOTES
MD notified of the latest BP which was 79/46, MAP 58; Albumin 5% infusing at 120ml/hr. MD said not to check BP until after the albumin infusion is completed. Explained to her that we were giving the patient a total of 4 bags of albumin, each bag runs for 2hours and so we'll be done with the infusion in total of 8 hours.

## 2024-08-06 NOTE — PROGRESS NOTES
"Pt received from ER w/hypotension. Upon arrival, pt settled and stabilized after 250 albumin finished infusing, cutler catheter placed for retention and deep coccyx wound assessed as well as penile wound and crusted, macerated G/J tube site cleaned. Pt kept saying \"that place was [facility]\" and \"they left me dirty in my wheelchair for hours on end\", \"I can't go back there\" --noted social work consult. Family updated by MD.  "

## 2024-08-06 NOTE — PHARMACY-VANCOMYCIN DOSING SERVICE
Pharmacy Vancomycin Initial Note  Date of Service 2024  Patient's  1935  88 year old, male    Indication: Skin and Soft Tissue Infection    Current estimated CrCl = Estimated Creatinine Clearance: 48.6 mL/min (based on SCr of 1.12 mg/dL).    Creatinine for last 3 days  2024: 11:55 AM Creatinine 1.38 mg/dL;  9:14 PM Creatinine 1.22 mg/dL  2024:  5:27 AM Creatinine 1.12 mg/dL    Recent Vancomycin Level(s) for last 3 days  No results found for requested labs within last 3 days.      Vancomycin IV Administrations (past 72 hours)                     vancomycin (VANCOCIN) 1,250 mg in sodium chloride 0.9 % 250 mL intermittent infusion (mg) 1,250 mg New Bag 24 1711                    Nephrotoxins and other renal medications (From now, onward)      Start     Dose/Rate Route Frequency Ordered Stop    24 1400  vancomycin (VANCOCIN) 1,250 mg in sodium chloride 0.9 % 250 mL intermittent infusion         1,250 mg  over 90 Minutes Intravenous EVERY 24 HOURS 24 1247              Contrast Orders - past 72 hours (72h ago, onward)      Start     Dose/Rate Route Frequency Stop    24 1500  iopamidol (ISOVUE-370) solution 75 mL         75 mL Intravenous ONCE 24 1438            InsightRX Prediction of Planned Initial Vancomycin Regimen  Regimen: 1250 mg IV every 24 hours.  Start time: 05:11 on 2024  Exposure target: AUC24 (range)400-600 mg/L.hr   AUC24,ss: 554 mg/L.hr  Probability of AUC24 > 400: 83 %  Ctrough,ss: 17.1 mg/L  Probability of Ctrough,ss > 20: 35 %  Probability of nephrotoxicity (Lodise MYRIAM ): 13 %          Plan:  Start vancomycin  1250 mg IV q24h slightly less than 24 hours after loading dose with improvement in creatinine overnight   Vancomycin monitoring method: AUC  Vancomycin therapeutic monitoring goal: 400-600 mg*h/L  Pharmacy will check vancomycin levels as appropriate in 1-3 Days.    Serum creatinine levels will be ordered  daily x 3 days then reassess  .      Aster Ritter, [facility]

## 2024-08-06 NOTE — ED NOTES
Spoke with MRI tech to inform them that pt has a pacemaker. They stated they cannot do MRI today and will have to do it tomorrow.

## 2024-08-06 NOTE — PHARMACY-ANTICOAGULATION SERVICE
Clinical Pharmacy - Warfarin Dosing Consult     Pharmacy has been consulted to manage this patient s warfarin therapy.  Indication: Other - specify in comments (history of DVT with thrombophilia (positive antiphospholipid antibodies, heterozygous factor V Leiden mutation)  Therapy Goal: INR 2-3  [place] [facility] [place]  [facility]  Warfarin Prior to Admission: Yes  Warfarin PTA Regimen: 4 mg every Sat, 2 mg all other days  Significant drug interactions: zosyn  Recent documented change in oral intake/nutrition: Unknown  Dose Comments: INR supratherapeutic, hold warfarin dose today.    INR   Date Value Ref Range Status   08/05/2024 4.84 (H) 0.85 - 1.15 Final   07/29/2024 2.54 (H) 0.85 - 1.15 Final     Recommend warfarin 0 mg (hold dose) today.  Pharmacy will monitor Franklyn Sorto daily and order warfarin doses to achieve specified goal.      Please contact pharmacy as soon as possible if the warfarin needs to be held for a procedure or if the warfarin goals change.

## 2024-08-06 NOTE — ED NOTES
Pt for Albumin 5% infusion. As per Pharmacist Maryam, we can run it to 60mL-120ml/hr. Started Albumin 5% to 120ml/hr. MD updated.

## 2024-08-06 NOTE — CONSULTS
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 Patient's family was visiting today and states when they got there \"5 staff members were around the patient and he was not responding\". They called EMS who initially got a BP with 90 systolic, however, repeat on the way over was 65 systolic. Per patient's family, patient had feeding tube placed following his stroke as he was having difficulty swallowing.  Patient has improved a fair amount and is now able to swallow, has not used his feeding tube in over a month.  Patient's family states that they are quite adamant about him only drinking and eating well sitting up but they are nervous that his facility may have given him some fluid while laying down that could have resulted in an aspiration pneumonia. Patient denies any recent fevers. States he has been coughing a bit more, no sputum production.     Vitals reviewed, initial BP upon arrival to the ER was 69/48. Temp 97.5F. SpO2 89% on room air. 2L nasal cannula was placed and patient quickly improved to 98% on room air On exam he is pale but nontoxic-appearing.  He is speaking in full sentences and is alert. Differential diagnosis includes but not limited to UTI, pyelonephritis, bronchitis, pneumonia, PE, ACS, hypovolemia, cardiogenic shock, septic shock. After initial 500 mL fluid bolus his BP has improved to 87/47. He was given an additional 500 mL and improved to 90/66.        Assessment:      Pressure Injury Location: Sacrum    8/6    Last photo: 8/6  Wound type: Pressure Injury     Pressure Injury Stage: Unstageable, " "present on admission   Wound history/plan of care:   Came from TCU    Wound base: 100 % eschar,      Palpation of the wound bed: boggy and fluctuance      Drainage: moderate     Description of drainage: purulent     Measurements (length x width x depth, in cm) 5  x 3.5  x  1.5 cm      Tunneling N/A     Undermining N/A  Periwound skin: Denuded, Erythema- blanchable, and Skin stripping      Color: dusky, purple, and red      Temperature: warm  Odor: offensive  Pain: moderate and during dressing change, tender  Pain intervention prior to dressing change: slow and gentle cares   Treatment goal: Drainage control, Infection control/prevention, Increase granulation, Protection, Remove necrotic tissue, and Soften necrotic tissue  STATUS: initial assessment  Supplies ordered: ordered vashe    My PI Risk Assessment     Sensory Perception: 3 - Slightly Limited     Moisture: 2 - Very moist      Activity: 1 - Bedfast      Mobility: 2 - Very limited     Nutrition: 2 - Probably inadequate      Friction/Shear: 2 - Potential problem      TOTAL: 12     Treatment Plan:     Sacrum  1. Flush wound with NS, wipe clean, pat dry  2. Moisten 4x4 gauze with vashe (#[id number])  3. Fill wound, cover with abd pad  4. Secure with small amount of tape  5. Change BID + PRN if soiled, saturated, falls off  6. Call MD for wound decline     Pressure Injury Prevention (PIP) Plan:  If patient is declining pressure injury prevention interventions: Explore reason why and address patient's concerns, Educate on pressure injury risk and prevention intervention(s), If patient is still declining, document \"informed refusal\" , and Ensure Care team is aware ( provider, charge nurse, etc)  Mattress: Follow bed algorithm, reassess daily and order specialty mattress, if indicated.  HOB: Maintain at or below 30 degrees, unless contraindicated  Repositioning in bed: Every 1-2 hours , Left/right positioning; avoid supine, Raise foot of bed prior to raising head of bed, " to reduce patient sliding down (shear), and Frequent microturns using positioning wedges, as patient tolerates  Heels: Keep elevated off mattress, Pillows under calves, and Heel lift boots  Protective Dressing:  wound care to sacrum  Positioning Equipment:Positioning wedges (#368034) to help maintain 30 degree side lying position   Chair positioning: Chair cushion (#[id number])    If patient has a buttock pressure injury, or high risk for PI use chair cushion or SPS.  Moisture Management: Perineal cleansing /protection: Follow Incontinence Protocol, Avoid brief in bed, Clean and dry skin folds with bathing , Consider InterDry (#[id number]) between folds, and Moisturize dry skin  Under Devices: Inspect skin under all medical devices during skin inspection , Ensure tubes are stabilized without tension, and Ensure patient is not lying on medical devices or equipment when repositioned  Ask provider to discontinue device when no longer needed.      Orders: Written    RECOMMEND PRIMARY TEAM ORDER: None, at this time  Education provided: importance of repositioning, plan of care, and wound progress  Discussed plan of care with: Patient, Nurse, and Physicians Assistant  WOC nurse follow-up plan: twice weekly  Notify WOC if wound(s) deteriorate.  Nursing to notify the Provider(s) and re-consult the WOC Nurse if new skin concern.    DATA:     Current support surface: Standard  Low air loss (TORY pump, Isolibrium, Pulsate)  Containment of urine/stool: Incontinence Protocol  BMI: Body mass index is 23.63 kg/m .   Active diet order: Orders Placed This Encounter      NPO for Medical/Clinical Reasons Except for: Meds     Output: I/O last 3 completed shifts:  In: 2424.2 [I.V.:224.2; IV Piggyback:2100]  Out: 325 [Urine:325]     Labs:   Recent Labs   Lab 08/06/24  0527 08/05/24  2114 08/05/24  1155   ALBUMIN  --   --  2.2*   HGB 11.3*  --  10.5*   INR 2.03*   < > 4.84*   WBC 20.9*  --  23.7*    < > = values in this interval not displayed.      Pressure injury risk assessment:   Sensory Perception: 3-->slightly limited  Moisture: 4-->rarely moist  Activity: 1-->bedfast  Mobility: 2-->very limited  Nutrition: 2-->probably inadequate  Friction and Shear: 1-->problem  Dilan Score: 13    SUZANNE GarciaN RN CWOCN  Pager no longer in use, please contact through [employer] group: [facility] [employer]

## 2024-08-06 NOTE — CONSULTS
Clinically Significant Risk Factors Present on Admission              # Hypoalbuminemia: Lowest albumin = 2.2 g/dL at 8/5/2024 11:55 AM, will monitor as appropriate  # Drug Induced Coagulation Defect: home medication list includes an anticoagulant medication  # Drug Induced Platelet Defect: home medication list includes an antiplatelet medication  # Acute Kidney Injury, unspecified: based on a >150% or 0.3 mg/dL increase in last creatinine compared to past 90 day average, will monitor renal function  # Hypertension: Noted on problem list    # Anemia: based on hgb <11      # DMII: A1C = N/A within past 6 months       # Financial/Environmental Concerns:        # Anemia: based on hgb <11       Critical care consult    Assessment and plan: 88-year-old man with complicated medical history including urinary tract infection, previous CVA, deep sacral decubitus ulcer transferred to the ICU for hypotension in the setting of sepsis.    Neuro: At high risk for delirium but currently with very sharp mental status.  Monitor closely.    Cardiovascular: Hypotension concerning for distributive cause.  Normal lactic acid.  Continue moderate fluid resuscitation and monitor urine output.    Renal: Acute kidney injury with a creatinine of 1.38 previously around 0.7.  Likely prerenal azotemia with a component of ATN.  Monitor after resuscitation.    Heme: Monitor blood levels.  Coagulopathy likely due to vitamin K antagonist in the setting of sepsis.  Vitamin K ordered by primary.    GI: He has what appears to be a PEG tube that he does not use.  Placed for a CVA that has improved.  Possibly some purulent exudate around that.  It is unclear whether that could just be removed.    ID: Treat for a broad range of organisms including ESBL and MRSA.  He has been appropriately fluid resuscitated with a normal lactate and improving creatinine suggesting good endorgan perfusion.  Primary has consulted surgery for possible debridement of  "decubitus ulcer.    Endo: Monitor blood sugar.    This patient is critically ill at high risk of decompensation and death.  30 minutes of critical care time thus far today.  This includes bedside assessment and discussion with primary nurse and referring provider.    His condition raises concern for his outpatient care.  Social work consult ordered.    Chief complaint: Hypotension    HPI: 88-year-old man with a history of diabetes, reflux, recent CVA came into the ER for fatigue and altered mental status.  He was hypotensive and acting abnormally.  He was brought into the hospital and was hypotensive but otherwise doing okay.  He received some gentle fluids out of concern for heart failure but remained hypotensive.  He was transferred to the ICU.    Medications, allergies and history reviewed    On exam  /57   Pulse 83   Temp 97.7  F (36.5  C) (Oral)   Resp 25   Ht 1.753 m (5' 9\")   Wt 72.6 kg (160 lb)   SpO2 96%   BMI 23.63 kg/m    Awake and alert, sharp mental status  Breathing comfortably without obvious wheezes or crackles  Regular rate and rhythm  Unstageable sacral decub.  Reported ulcer on penis.  Very purulent urine.  Abdomen soft  Extremities cool    Labs reviewed including decrease in creatinine, normal lactate      "

## 2024-08-06 NOTE — PROGRESS NOTES
[facility]    Medicine Progress Note - Hospitalist Service    Date of Admission:  8/5/2024    Assessment & Plan   Franklyn Sorto is 88 year old male with history of HFrEF, hyperlipidemia, hypertension, aortic stenosis status post TAVR, thrombophilia, type 2 diabetes, stage III sacral ulcer, CAD, prior CVA admitted on 6/8/2024 with altered mental status hypotension secondary to sepsis possibly from urinary tract infection, aspiration pneumonia versus infected sacral ulcer.    Hypotension and ARIADNE improved with IV hydration and albumin infusion.  Urine culture grew E. coli.  Blood culture grew gram-negative bacilli with gram-positive cocci in pairs and chains.  She is receiving IV meropenem and vancomycin per ID recommendation.  Debridement of sacral ulcer was performed by surgery service on 8/6/2024.     Sepsis with hypotension and ARIADNE secondary to aspiration pna vs PNA, vs wound infection vs UTI   Hypotension patient appears very dry on exam   Hypotension and ARIADNE improved with IV hydration and albumin infusion.  Urine culture grew E. coli.  Blood culture grew gram-negative bacilli with gram-positive cocci in pairs and chains.  She is receiving IV meropenem and vancomycin per ID recommendation.  Debridement of sacral ulcer was performed by surgery service on 8/6/2024.     MRI of the sacrum could not be obtained due to presence of intracardiac lead.    Continue meropenem and vancomycin  Follow-up blood and urine culture  Intensivist following-appreciate assistance  ID following-appreciate assistance  Surgery follow up-appreciate assistance; wound debridement done at bedside-surgery service unable to apply wound VAC for now     Supratherapeutic INR   -Received IV 5mg given in the ER  -Monitor INR       Aspiration PNA vs CAP LLL infiltrate but H/O aspiration after CVA   - NPO   -Video swallow study today by speech therapist  - IV vanco for meropenem       Recent acute ischemic stroke  (5/25/2024)  Residual left Hemiparesis   Recent admission 5/25-6/7/2024 at [facility] for ischemic stroke; discharged to TCU   - no improvement in left hemiparesis   - previous h/o aspiration and dysphagia but was stable with techniques per patient   - NPO for now SLP to reassess in am   - PT/OT to see      History of the left upper extremity DVT with thrombophilia (positive antiphospholipid antibodies, 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)  - Pharmacy to dose warfarin goal 2-3   - INR-improved     Diabetes mellitus type 2, noninsulin dependent    - currently NPO  - novolog sliding scale Q4hrs for now  - holding home oral meds      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   H/o HFrEF   CAD   - History of distal RCA stent, last echo 8/2019 with EF 50-55%,   - BNP is elevated but does appear to be fluid overloaded actually appears very dry on exam   - hold all BP meds since hypotensive      Rheumatoid arthritis/Chronic Pain -   - holding homemeds  for now since hypotensive      Gout  - holding meds until SLP assess   GERD (gastroesophageal reflux disease)/Hx PUD   - IV PPI               Clinically Significant Risk Factors Present on Admission              # Hypoalbuminemia: Lowest albumin = 2.2 g/dL at 8/5/2024 11:55 AM, will monitor as appropriate     # Drug Induced Coagulation Defect: home medication list includes an anticoagulant medication  # Drug Induced Platelet Defect: home medication list includes an antiplatelet medication  # Acute Kidney Injury, unspecified: based on a >150% or 0.3 mg/dL increase in last creatinine compared to past 90 day average, will monitor renal function  # Hypertension: Noted on problem list    # Anemia: based on hgb <11    Diet: NPO for Medical/Clinical Reasons Except for: Meds    DVT Prophylaxis: Warfarin  Javier Catheter: PRESENT, indication: ICU only: hourly urine output needed for patient care;Acute retention or obstruction  Lines: None     Cardiac  Monitoring: ACTIVE order. Indication: sepsis  Code Status: No CPR- Do NOT Intubate      Clinically Significant Risk Factors Present on Admission              # Hypoalbuminemia: Lowest albumin = 2.2 g/dL at 8/5/2024 11:55 AM, will monitor as appropriate  # Drug Induced Coagulation Defect: home medication list includes an anticoagulant medication  # Drug Induced Platelet Defect: home medication list includes an antiplatelet medication   # Hypertension: Noted on problem list        # DMII: A1C = N/A within past 6 months       # Financial/Environmental Concerns:           Disposition Plan     Medically Ready for Discharge: Anticipated in 2-4 Days    Arlyn Mccann MD  Hospitalist Service  [facility]  Securely message with [email or web address] (more info)  Text page via [email or web address] Paging/Directory   ______________________________________________________________________    Interval History   No new complaints today. Pain is well-controlled.  He is breathing comfortably on room air.  As per therapist, patient has significant swallowing difficulties and at risk of aspiration.  Speech therapist plans to proceed with video swallow study today. Patient opted to be DNR/DNI during evaluation in the presence of his POA/friend and also caregiver.    Physical Exam   Vital Signs: Temp: 99.4  F (37.4  C) Temp src: Oral BP: 124/57 Pulse: 81   Resp: 18 SpO2: 98 % O2 Device: None (Room air)    Weight: 160 lbs 0 oz    General appearance: Frail, awake, Alert, Cooperative, not in any obvious distress and appears stated age   HEENT: Normocephalic, atraumatic, conjunctiva clear without icterus and ears without discharge  Lungs: Bilateral rhonchi  Cardiovascular: Regular Rate and Rythm, normal apical impulse, normal S1 and S2, no lower extremity edema bilaterally  Abdomen: Soft, non-tender and Non-distended, active bowel sounds  : Javier catheter in place, draining clear urine.   Skin: Ecchymosis in the right upper extremity,  unstageable sacral ulcer-the media section for details.   Musculoskeletal: No bony deformities or joint tenderness. Normal ROM upon flexion & extension.   Neurologic: Alert & Oriented X 3, Facial symmetry preserved and upper & lower extremities moving well with symmetry  Psychiatric: Unable to assess      Medical Decision Making       40 MINUTES SPENT BY ME on the date of service doing chart review, history, exam, documentation & further activities per the note.      Data     I have personally reviewed the following data over the past 24 hrs:    20.9 (H)  \   11.3 (L)   / 146 (L)     141 104 26.8 (H) /  117 (H)   3.8 24 1.12 \     Trop: 63 (H) BNP: N/A     Procal: 39.57 (HH) CRP: N/A Lactic Acid: 1.5       INR:  2.03 (H) PTT:  N/A   D-dimer:  N/A Fibrinogen:  N/A       Imaging results reviewed over the past 24 hrs:   Recent Results (from the past 24 hour(s))   CT Chest Pulmonary Embolism w Contrast    Narrative    EXAM: CT CHEST PULMONARY EMBOLISM W CONTRAST  LOCATION: [facility]  DATE: 8/5/2024    INDICATION: SOB, hypoxia, hypotensive  COMPARISON: None.  TECHNIQUE: CT chest pulmonary angiogram during arterial phase injection of IV contrast. Multiplanar reformats and MIP reconstructions were performed. Dose reduction techniques were used.   CONTRAST: isovue 370 75ml    FINDINGS:  ANGIOGRAM CHEST: The main pulmonary artery is 35 mm in diameter. No pulmonary emboli. There is persistent perfusion of the left lower lobe. Thoracic aorta is negative for dissection. A TAVR stent is present.    LUNGS AND PLEURA: Atelectasis and airspace disease in much of the left lower lobe. The central airway to the left lower lobe is opacified. Small left pleural effusion. Elevated left hemidiaphragm.    MEDIASTINUM/AXILLAE: A left chest wall biventricular ICD is present.    CORONARY ARTERY CALCIFICATION: Severe.    UPPER ABDOMEN: Cholelithiasis. Gallbladder wall thickening. A percutaneous GJ tube is  present.    MUSCULOSKELETAL: There is surgical hardware at C6-C7.      Impression    IMPRESSION:  1.  No PE. Persistent perfusion of the opacified left lower lobe represents a shunt and could contribute to hypoxemia. Enlargement of the main pulmonary artery can be seen with pulmonary hypertension.  2.  The central airway to the left lower lobe is opacified. Opacification of the left lower lobe could represent aspiration, infection, and/or atelectasis. There is a small volume of left pleural fluid. The left hemidiaphragm is elevated.  3.  Cholelithiasis. Gallbladder wall thickening. Recommend ultrasound if there is concern for cholecystitis.  4.  Coronary artery disease.   CT Abdomen Pelvis w Contrast    Narrative    EXAM: CT ABDOMEN PELVIS W CONTRAST  LOCATION: [facility]  DATE: 8/5/2024    INDICATION: elevated WBC count, hypotensive, hypoxic  COMPARISON: CT abdomen pelvis 6/21/2024  TECHNIQUE: CT scan of the abdomen and pelvis was performed following injection of IV contrast. Multiplanar reformats were obtained. Dose reduction techniques were used.  CONTRAST: isovue 370 75ml    FINDINGS:   LOWER CHEST: Dictated separately.    HEPATOBILIARY: Unchanged liver contours. No worrisome liver lesions. Gallbladder is contracted with multiple gallstones and similar mild wall thickening to prior. No biliary ductal dilation.    PANCREAS: Normal.    SPLEEN: Normal.    ADRENAL GLANDS: Normal.    KIDNEYS/BLADDER: Kidneys enhance symmetrically with bilateral benign cysts, which require no follow-up. No urolithiasis or collecting system dilation. Bladder is thick-walled with pericystic stranding.    BOWEL: No bowel obstruction. Percutaneous gastrojejunostomy tube appears well-positioned. Colonic diverticulosis without evidence of diverticulitis. No free air or organized fluid collection. Incontinence.    LYMPH NODES: No lymphadenopathy.    VASCULATURE: Abdominal aorta is nonaneurysmal with severe  circumferential atherosclerotic vascular calcifications.    PELVIC ORGANS: No pelvic mass.    MUSCULOSKELETAL: Degenerative disc disease and facet osteoarthritis throughout the visualized spine. No worrisome bone lesions. Left hip lipoma.      Impression    IMPRESSION:   1.  Bladder wall thickening and pericystic stranding, which can be seen with cystitis. Correlate with urinalysis.  2.  Otherwise no acute abnormality in the abdomen or pelvis with additional details in the findings.  3.  Chest reported separately.   Chest XR,  PA & LAT    Narrative    EXAM: XR CHEST 2 VIEWS  LOCATION: [facility]  DATE: 8/5/2024    INDICATION: shortness of breath, hypotensive, hypoxic  COMPARISON: CTA 8/5/2024 at 1418 hours. Ultrasound 6/20/2024.      Impression    IMPRESSION: Again seen is opacification of the left lower lung which could represent atelectasis, infection, and/or aspiration with a small amount of pleural fluid. No pneumothorax. The heart is not well assessed. 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 Cervical fusion hardware is present.

## 2024-08-06 NOTE — PLAN OF CARE
Goal Outcome Evaluation:          Call to P4 report to RN. pt going to room 419 / bed with 2 aids , on tele box . Son called with new room number . Update given . Pt belongings with pt.

## 2024-08-06 NOTE — PROGRESS NOTES
"Speech-Language Pathology: Video Swallow Study     08/06/24 1500   Appointment Info   Signing Clinician's Name / Credentials (SLP) KATERIN Carolina   General Information   Onset of Illness/Injury or Date of Surgery 08/05/24   Pertinent History of Current Problem Probable aspiration pna, has long hx oropharyngeal dysphagia. Per H&P: Franklyn Sorto is a 88 year old male presenting with T2DM, GERD, HTN, CVA 5/25/24 currently in TCU presenting to the ER for evaluation of fatigue and altered mental status. Patient's family was visiting today and states when they got there \"5 staff members were around the patient and he was not responding\". They called EMS who initially got a BP with 90 systolic, however, repeat on the way over was 65 systolic      Sepsis with hypotension and ARIADNE secondary to aspiration pna vs PNA, vs wound infection vs UTI.   Type of Evaluation   Type of Evaluation Swallow Evaluation   General Swallowing Observations   Past History of Dysphagia Long dysphagia hx. His diet prior to admit was soft food and thin liquids with strategies of sitting fully upright at 90 degrees and chin tuck with each swallow of thin liquids. His last VFSS was 6/21/24: \"Videofluoroscopic Swallow Study completed. Patient had aspiration with thin via straw and inadequate use of chin tuck with a cough. Ongoing cues for a hard cough provided at the end of study which did eventually clear aspirated material. Transient penetration with mildly thick and thin with use of the chin tuck.  Pt was cued to hold head midline and then tuck chin to the center, with this posture he has no aspiration or penetration when taking small cup sips of thin. Oral phase is WFL. Tongue base retraction was reduced. Swallow response was delayed, more significant with larger boluses of thin. Epiglottic inversion was complete, slow to invert. Mild stasis occurred with mildly thick. Trace-mild stasis at the valleculae and pyriform sinuses occurred with " "solids/puree. Stasis clears with liquid wash. Hyolaryngeal elevation and hyolaryngeal excursion were present but unable to adequately assess d/t chin tuck posture.  Mastication WFL. 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 Somewhat improved from last study, ongoing therapy for strategies and exercises.\" When he dc'd from hospital back in June he was on puree/thin diet d/t poor bolus formation with regular solids   Current Diet/Method of Nutritional Intake (General Swallowing Observations, NIS) NPO   Swallowing Evaluation Videofluoroscopic swallow study (VFSS)   VFSS Evaluation   Radiologist Dr. Phalen   Physical Location of Procedure [facility]   VFSS Textures Trialed thin liquids;mildly thick liquids;moderately thick liquids/liquidized;pureed;solid foods   VFSS Eval: Thin Liquid Texture Trial   Mode of Presentation, Thin Liquid cup;spoon;self-fed;fed by clinician   Order of Presentation 2(small sip cup/chin tuck), 3 & 7(spoon/chin tuck); 12 (small sip cup without chin tuck)   Preparatory Phase poor bolus control;anterior loss of bolus   Oral Phase, Thin Liquid impaired AP movement;premature pharyngeal entry   Bolus Location When Swallow Triggered pyriforms   Pharyngeal Phase, Thin Liquid impaired epiglottic movement;other (see comments)  (very delayed epiglottic inversion)   Rosenbek's Penetration Aspiration Scale: Thin Liquid Trial Results 8 - contrast passes glottis, visible subglottic residue remains, absent patient response (aspiration)   Response to Aspiration absent response   Diagnostic Statement premature pharyngeal entry to level of pyriforms with significantly delayed epiglottic inversion causing silent aspiration with and without chin tuck. Spoonful amount helped reduce the amount of pourover but did not eliminate aspiration risk.   VFSS Eval: Mildly Thick Liquids   Mode of Presentation cup;self-fed;other (see comments)  (Pt independently uses chin tuck)   Order of Presentation 8,9 "   Preparatory Phase poor bolus control   Oral Phase impaired AP movement;premature pharyngeal entry   Bolus Location When Swallow Triggered pyriforms   Pharyngeal Phase impaired epiglottic movement;other (see comments)  (delayed epiglottic inversion)   Rosenbek's Penetration Aspiration Scale 1 - no aspiration, contrast does not enter airway   VFSS Evaluation: Puree Solid Texture Trial   Mode of Presentation, Puree spoon;fed by clinician   Order of Presentation 11   Preparatory Phase poor bolus control   Oral Phase, Puree premature pharyngeal entry   Bolus Location When Swallow Triggered valleculae   Pharyngeal Phase, Puree impaired epiglottic movement  (delayed epiglottic inversion)   Rosenbek's Penetration Aspiration Scale: Puree Food Trial Results 1 - no aspiration, contrast does not enter airway   VFSS Evaluation: Solid Food Texture Trial   Mode of Presentation, Solid fed by clinician   Order of Presentation 10   Preparatory Phase prolonged bolus preparation   Oral Phase, Solid premature pharyngeal entry   Bolus Location When Swallow Triggered valleculae   Pharyngeal Phase, Solid impaired epiglottic movement;other (see comments)  (delayed epiglottic inversion)   Rosenbek's Penetration Aspiration Scale: Solid Food Trial Results 1 - no aspiration, contrast does not enter airway   Swallowing Recommendations   Diet Consistency Recommendations mildly thick liquids (level 2);easy to chew (level 7);free water protocol   Supervision Level for Intake distant supervision needed   Comment, Swallowing Recommendations Easy to Chew with mildly thick liquids with pt continuing to use his independent strategies of sitting fully upright at 90 degrees and using chin tuck with liquids; OK for water protocol posted in room. NO THIN WATER WITH MEALS OR MEDS, only between meals after cleaning mouth   General Therapy Interventions   Planned Therapy Interventions Dysphagia Treatment   Clinical Impression   Criteria for Skilled  Therapeutic Interventions Met (SLP Eval) Yes, treatment indicated   SLP Diagnosis oropharyngeal dysphagia   Clinical Impression Comments VFSS completed using thin, mildly thick, puree and solid textures. Pt has a significant oral phase dysphgia causing anterior loss of thin bolus and decreased bolus control with all textures causing pre spill of all textures into pharynx and delayed swallow trigger that shows complete epiglottic inversion, however swallow is triggered once the bolus is past the tip of t he epiglottis. This caused silent aspiration with thin liquids and no penetration or aspiration with thicker(slower) liquids/textures such as mildly thick, puree and chewed up solid. To compensate for oropharyngeal dysphagia and reduce aspiration risk, it is recommended he start diet of Easy to Chew with mildly thick liquids with pt continuing to use his independent strategies of sitting fully upright at 90 degrees and using chin tuck with liquids; OK for water protocol posted in room. NO THIN WATER WITH MEALS OR MEDS, only between meals after cleaning mouth. SLP will continue to see re: adherence to water protocol. This may be his baseline diet as pt has been in therapy for months re: his swallow with no improvement.   SLP Total Evaluation Time   Evaluation, videofluoroscopic eval of swallow function Minutes (75754) 30   SLP Goals   Therapy Frequency (SLP Eval) 5 times/week   SLP Predicted Duration/Target Date for Goal Attainment 08/13/24   SLP Goals SLP Goal 1   SLP: Goal 1 Pt will learn water protocol independently as well as understand reason for mildly thick liquids.   Interventions   Interventions Quick Adds Swallowing Dysfunction   Swallowing Dysfunction &/or Oral Function for Feeding   Treatment of Swallowing Dysfunction &/or Oral Function for Feeding Minutes (40896) 15   Treatment Detail/Skilled Intervention Saw pt following VFSS with nurse in room. Reviewed results of VFSS with pt and nurse stating he  "silently aspirated thin liquids with every swallow, even with chin tuck strategy and mildly thick liquids were recommended. Introduced water protocol as pt is constantly asking for ice water. Pt and nurse given printed copy of water protocol and pt cleaned out mouth with toothbrush and swish and spit method. He drank 6 oz water with chin tuck with inconsistent throat clearing/cough. Pt was put through verbal scenario's re: water protocol to ensure his understanding, such as: \"what would you do if your food tray came with unthickened water on it\" and he stated that he has to have thick water when eating. Pt seems to understand, however when initiating this protocol it is important for consistency of understanding therefore skilled ST is warranted to continue. Nurse stated she would verbally pass on information to next shift and direct them to the printed hand out.   Total Session Time   Total Session Time (sum of timed and untimed services) 45     "

## 2024-08-06 NOTE — PHARMACY-ADMISSION MEDICATION HISTORY
Pharmacist Admission Medication History    Admission medication history is complete. The information provided in this note is only as accurate as the sources available at the time of the update.    Information Source(s): Facility ([facility]/) medication list/MAR via N/A    Pertinent Information:     Changes made to PTA medication list:  Added: bacitraicin, biotene spray, dextromethorphan-guaifenesin   Deleted: baclofen PRN, methocarbamol, ozempic,   Changed: warfarin dose,     Allergies reviewed with patient and updates made in EHR: unable to assess    Medication History Completed By: Maryam Roe [facility] 8/5/2024 8:48 PM    PTA Med List   Medication Sig Last Dose    acetaminophen (TYLENOL) 500 MG tablet Take 1,000 mg by mouth 3 times daily as needed for mild pain 8/5/2024 at 080    allopurinol (ZYLOPRIM) 300 MG tablet Take 300 mg by mouth daily 8/5/2024 at 0800    artificial saliva (BIOTENE MT) AERS spray Take 2 sprays by mouth 4 times daily 8/5/2024 at 0800    aspirin 81 MG EC tablet Take 81 mg by mouth daily 8/5/2024 at 0800    bacitracin 500 UNIT/GM external ointment Apply topically 2 times daily 8/5/2024 at 1230    Baclofen (LIORESAL) 5 MG tablet Take 5 mg by mouth 3 times daily 8/4/2024 at 2000    bisacodyl (DULCOLAX) 10 MG suppository Place 10 mg rectally daily as needed for constipation Unknown at PRN    carvedilol (COREG) 6.25 MG tablet Take 25 mg by mouth 2 times daily 8/5/2024 at 0800    COLCRYS 0.6 MG tablet Take 0.6 mg by mouth daily as needed Unknown at PRN    Dextromethorphan-guaiFENesin  MG/5ML syrup Take 5 mLs by mouth every 4 hours as needed for cough 8/5/2024 at 0800    diclofenac (VOLTAREN) 1 % topical gel Apply 2 g topically 4 times daily 8/5/2024 at 0800    empagliflozin (JARDIANCE) 25 MG TABS tablet Take 1 tablet (25 mg) by mouth daily 8/5/2024 at 0800    ENTRESTO  MG per tablet Take 1 tablet by mouth 2 times daily 8/5/2024 at 0800    erythromycin (ROMYCIN) 5 MG/GM ophthalmic ointment  Place into both eyes at bedtime 8/5/2024 at 2000    HYDROmorphone (DILAUDID) 2 MG tablet Take 1 tablet (2 mg) by mouth 3 times daily as needed for pain More than a month    insulin aspart (NOVOLOG PEN) 100 UNIT/ML pen Inject 1-3 Units Subcutaneous 3 times daily (before meals) Correction Scale - LOW INSULIN RESISTANCE DOSING   Do Not give Correction Insulin if Pre-Meal BG less than 140. For Pre-Meal  - 239 give 1 unit. For Pre-Meal  - 339 give 2 units. For Pre-Meal BG greater than or equal to 340 give 3 units. To be given with prandial insulin, and based on pre-meal blood glucose. Administering insulin within 5 minutes of the start of the meal is ideal. Administer insulin no more than 30 minutes after the start of the meal, unless directed otherwise by provider. Notify provider if glucose greater than or equal to 350 mg/dL after administration of correction dose. 8/5/2024 at 0800    LANsoprazole (PREVACID SOLUTAB) 30 MG ODT Place 30 mg under the tongue every morning (before breakfast) 8/5/2024 at 0800    lipase-protease-amylase (CREON 12) 07625-90622-90975 units CPEP Take 1 capsule by mouth as needed Unknown    melatonin 3 MG tablet Take 3 mg by mouth at bedtime 8/4/2024 at 2100    menthol-zinc oxide (CALMOSEPTINE) 0.44-20.6 % OINT ointment Apply topically 4 times daily as needed for skin protection Apply to sacrum/groin Unknown    nitroGLYcerin (NITROSTAT) 0.4 MG sublingual tablet Place 0.4 mg under the tongue every 5 minutes as needed for chest pain For chest pain place 1 tablet under the tongue every 5 minutes for 3 doses. If symptoms persist 5 minutes after 1st dose call 911. Unknown at PRN    nystatin (MYCOSTATIN) 337276 UNIT/GM external powder Apply topically 2 times daily as needed for other Unknown at PRN    ondansetron (ZOFRAN ODT) 4 MG ODT tab Take 4 mg by mouth every 8 hours as needed for nausea Unknown at PRN    polyethylene glycol-propylene glycol (SYSTANE) 0.4-0.3 % SOLN ophthalmic solution  Apply 2 drops to eye 2 times daily 8/5/2024 at 0800    rosuvastatin (CRESTOR) 20 MG tablet Take 1 tablet by mouth at bedtime 8/4/2024 at 2000    Sennosides 17.2 MG TABS 17.2 mg by Enteral route daily 8/5/2024 at 0800    sodium bicarbonate 650 MG tablet 650 mg by Per G Tube route as needed for heartburn Unknown    vitamin D3 (CHOLECALCIFEROL) 50 mcg (2000 units) tablet Take 2 tablets by mouth daily 8/5/2024 at 0800    warfarin ANTICOAGULANT (COUMADIN) 2 MG tablet Take 2-4 mg by mouth daily Take 4mg on Saturday. Take 2mg all other days of week 8/4/2024 at 2000 (2mg)

## 2024-08-06 NOTE — PROGRESS NOTES
This device does not meet the FDA criteria for MRI approval at this facility. Please do not schedule this patient for MRI at this time.     Reason: capped (abandoned) lead.        Patient has the option of further review at the [facility] where they have a process and staffing in place to perform off-label scans on non-conditional devices.    Selwyn Wing RN

## 2024-08-06 NOTE — ED NOTES
Pt MAP is continuously dropping (MAP 56) even with ongoing Albumin infusion. Pt is alert and responding. Charge nurse called RRT.

## 2024-08-06 NOTE — PROGRESS NOTES
Care Management Initial Consult    General Information  Assessment completed with: Patient,    Type of CM/SW Visit: Initial Assessment    Primary Care Provider verified and updated as needed:  (hasn't been to pcp for a long while)   Readmission within the last 30 days:           Advance Care Planning: Advance Care Planning Reviewed: present on chart          Communication Assessment  Patient's communication style: spoken language (English or Bilingual)    Hearing Difficulty or Deaf: no   Wear Glasses or Blind: yes    Cognitive  Cognitive/Neuro/Behavioral: WDL                      Living Environment:   People in home:  (from [facility])     Current living Arrangements:        Able to return to prior arrangements:         Family/Social Support:  Care provided by:    Provides care for:       Children          Description of Support System:           Current Resources:   Patient receiving home care services: No     Community Resources: None  Equipment currently used at home: lift device, wheelchair, manual  Supplies currently used at home:      Employment/Financial:  Employment Status:          Financial Concerns:             Does the patient's insurance plan have a 3 day qualifying hospital stay waiver?  No    Lifestyle & Psychosocial Needs:  Social Determinants of Health     Food Insecurity: No Food Insecurity (7/5/2024)    Received from [facility]    Food Insecurity     Worried About Running Out of Food in the Last Year: 1   Depression: Not at risk (7/5/2024)    Received from [facility]    PHQ-2     PHQ-2 TOTAL SCORE: 1   Housing Stability: Low Risk  (7/5/2024)    Received from [facility]    Housing Stability     Unable to Pay for Housing in the Last Year: 1   Tobacco Use: Medium Risk (7/30/2024)    Received from [facility]    Patient History     Smoking Tobacco Use: Former     Smokeless  Tobacco Use: Never     Passive Exposure: Not on file   Financial Resource Strain: Low Risk  (7/5/2024)    Received from [facility]    Financial Resource Strain     Difficulty of Paying Living Expenses: 3     Difficulty of Paying Living Expenses: Not on file   Alcohol Use: Not on file   Transportation Needs: No Transportation Needs (6/13/2024)    Received from [facility]    Transportation Needs     Lack of Transportation (Medical): 1   Physical Activity: Not on file   Interpersonal Safety: Not on file   Stress: Not on file   Social Connections: Socially Integrated (7/5/2024)    Received from [facility]    Social Connections     Frequency of Communication with Friends and Family: 0   Health Literacy: Not on file       Functional Status:  Prior to admission patient needed assistance:   Dependent ADLs:: Bathing, Dressing, Grooming, Incontinence, Transfers, Positioning, Wheelchair-with assist, Toileting  Dependent IADLs:: Cleaning, Cooking, Laundry, Shopping, Meal Preparation, Medication Management, Money Management, Transportation, Incontinence         Additional Information:    Assessment completed with patient.  Patient has been in TCU at [facility] since June. He requires assistance with ADLs/IADLs, not ambulating and uses caroline lift for transfers.  He states goal is to discharge to new facility called [facility].  Patient states he would like Narda removed as contact; however, Epic will not allow name to be removed. Son Abdoulaye is primary family contact.     Met with Abdoulaye and friend-Herman and [facility] owner Nusrat Sandoval. They report plan is for patient to discharge to an apartment at [facility]. They state patient will have 24/7 care and are able to support total care assist and patient is not ready for hospice care. Nusrat states she has a Palliative home care agency that can  provide nurse for wound cares.  She will provide palliative care agency name. She states she was working with Karla Franco regarding DME needed (wheelchair, hospital bed and caroline lift). She states the TCU was providing orders and has measured patient for wheelchair. Nusrat confirms she will be working on securing home DME. Patient has PEG tube.    Nusrat Sandoval [phone]    Patient will need [facility] at discharge. Notified Abdoulaye of potential cost.    Provided Abdoulaye and Herman with [facility] contact information to report their concerns they witnessed with care and lack of care being provided at TCU.       Valentina Page RN

## 2024-08-06 NOTE — PROGRESS NOTES
08/06/24 1130   Appointment Info   Signing Clinician's Name / Credentials (PT) Yenny King, DAVID   Living Environment   Transportation Anticipated car, drives self   General Information   Onset of Illness/Injury or Date of Surgery 08/05/24   Referring Physician Vonnie Casas MD   Patient/Family Therapy Goals Statement (PT) None stated.   Pertinent History of Current Problem (include personal factors and/or comorbidities that impact the POC) Per the chart, Franklyn Sorto is 88 year old male with history of HFrEF, hyperlipidemia, hypertension, aortic stenosis status post TAVR, thrombophilia, type 2 diabetes, stage III sacral ulcer, CAD, prior CVA admitted on 6/8/2024 with altered mental status hypotension secondary to sepsis possibly from urinary tract infection, aspiration pneumonia versus infected sacral ulcer.   Existing Precautions/Restrictions fall  (sacral sore.)   Weight-Bearing Status - LLE weight-bearing as tolerated   Weight-Bearing Status - RLE weight-bearing as tolerated   Cognition   Orientation Status (Cognition) oriented to;person;situation  (Pt followed all directions.)   Pain Assessment   Patient Currently in Pain   (sacral sore pain.  Time taken to position in bed.)   Integumentary/Edema   Integumentary/Edema Comments sacral sore per the nursing  (R eye covered to help his vision.)   Posture    Posture Comments mod A with sit bal at the EOB. Pt leans backwards.   Range of Motion (ROM)   Range of Motion ROM deficits secondary to weakness   ROM Comment PROM WFL bilat LEs with tight DF L foot.   Strength (Manual Muscle Testing)   Strength Comments L LE hip ext 2-/5, hip ext 2+.5 hip abd 1+/5, quads 2-/5, PF 2/5. ,   Bed Mobility   Comment, (Bed Mobility) Supine>sit with max A x 1..   Transfers   Comment, (Transfers) not hanna.   Gait/Stairs (Locomotion)   Comment, (Gait/Stairs) standing not hanna.   Balance   Balance Comments dyn sit bal w max A x 1.   Sensory Examination   Sensory Perception  Comments dec proprioception L foot.   Muscle Tone   Muscle Tone Comments dec tone L UE and LEs   Clinical Impression   Criteria for Skilled Therapeutic Intervention Yes, treatment indicated   PT Diagnosis (PT) Impaired functional mobility.   Influenced by the following impairments L hemiparesis, dec bal, sacral sore, dec endurance.   Functional limitations due to impairments bed mobility, sit bal, transfers. WC mobility. gait   Clinical Presentation (PT Evaluation Complexity) evolving   Clinical Presentation Rationale Pt presents medically diagnosed.   Clinical Decision Making (Complexity) moderate complexity   Planned Therapy Interventions (PT) balance training;bed mobility training;ROM (range of motion);strengthening;neuromuscular re-education;transfer training;wheelchair management/propulsion training   Risk & Benefits of therapy have been explained evaluation/treatment results reviewed;care plan/treatment goals reviewed;risks/benefits reviewed;patient;participants voiced agreement with care plan   PT Total Evaluation Time   PT Eval, Moderate Complexity Minutes (91498) 15   Physical Therapy Goals   PT Frequency 5x/week   PT Predicted Duration/Target Date for Goal Attainment 08/11/24   PT Goals Bed Mobility;Transfers;PT Goal 1;PT Goal 2;PT Goal 3   PT: Bed Mobility Supine to/from sit;Moderate assist;Supervision/stand-by assist   PT: Transfers Maximum assist;Sit to/from stand;Bed to/from chair  (HHA and transfer belt with max A x 2.)   PT: Goal 1 Pt to roll bilat with rail min A x 1 to assist with skin care and mobility.   PT: Goal 2 Pt to hanna bilat LE ex x 15 reps to increase strength for mobility.   Interventions   Interventions Quick Adds Therapeutic Activity;Therapeutic Procedure   Therapeutic Activity   Therapeutic Activities: dynamic activities to improve functional performance Minutes (88765) 23   Treatment Detail/Skilled Intervention Pt worked on rolling bilat with max A x 1 with cues and assist for hand  placement. Pt used the rial.   Max assist with supine>sit and sit scoot.  Mas A with static sit bal.  Pt sat at the EOB for about 3 minutes.  Pt tends to lean backwards.  Sit>supine with max A x 2.  Increased time needed for bed positioning.  Hand off to nursing.   PT Discharge Planning   PT Plan bed mobility, sit bal, LE ex.   PT Discharge Recommendation (DC Rec) Long term care facility   PT Rationale for DC Rec Pt does need max with all mobility. He uses a lift for transfers. He plans to heve 24 hour assist at an Apt and assist with all ADLs   Home PT in that setting is recommended. If he cannot get 24 hour assist kumar, LTC.   PT Brief overview of current status The pt does need max A with bed mobility and needed mod A x1 sit bal. Sit>supine with max Ax 2.   PT Equipment Needed at Discharge lift device;hospital bed;wheelchair;wheelchair cushion   Total Session Time   Timed Code Treatment Minutes 23   Total Session Time (sum of timed and untimed services) 38

## 2024-08-06 NOTE — PROGRESS NOTES
08/06/24 1000   Appointment Info   Signing Clinician's Name / Credentials (OT) Edith Adair MEGAN OTR/L CLT   Living Environment   Living Environment Comments Pt. came from a TCU. Prior to Stroke in May lived [facility] in a [facility]. Patient states has an apartment lined up and hoping for 24hr nursing care (per family/friend in room)   Self-Care   Activity/Exercise/Self-Care Comment While in TCU states they were using a caroline lift   General Information   Onset of Illness/Injury or Date of Surgery 08/05/24   Referring Physician    Additional Occupational Profile Info/Pertinent History of Current Problem Franklyn Sorto is 88 year old male with history of HFrEF, hyperlipidemia, hypertension, aortic stenosis status post TAVR, thrombophilia, type 2 diabetes, stage III sacral ulcer, CAD, prior CVA admitted on 6/8/2024 with altered mental status hypotension secondary to sepsis possibly from urinary tract infection, aspiration pneumonia versus infected sacral ulcer.   Cognitive Status Examination   Affect/Mental Status (Cognitive) WNL   Follows Commands WNL   Range of Motion Comprehensive   Comment, General Range of Motion LUE no AROM, Able to reach full guarded range with PROM   Bed Mobility   Bed Mobility supine-sit;sit-supine   Supine-Sit [place] (Bed Mobility) 2 person assist;maximum assist (25% patient effort);dependent (less than 25% patient effort)   Balance   Balance Comments Moderate A of 2 to sit EOB   Activities of Daily Living   BADL Assessment/Intervention lower body dressing   Lower Body Dressing Assessment/Training   [place] Level (Lower Body Dressing) maximum assist (25% patient effort)   Clinical Impression   Criteria for Skilled Therapeutic Interventions Met (OT) Yes, treatment indicated   OT Diagnosis decreased strength   OT Problem List-Impairments impacting ADL problems related to;activity tolerance impaired;balance;strength   Assessment of Occupational Performance 3-5 Performance  Deficits   Identified Performance Deficits drsg, bed mob, standing, trsfs   Planned Therapy Interventions (OT) ADL retraining;strengthening;transfer training   Clinical Decision Making Complexity (OT) problem focused assessment/low complexity   Risk & Benefits of therapy have been explained care plan/treatment goals reviewed   OT Total Evaluation Time   OT Eval, Low Complexity Minutes (08391) 8   OT Goals   Therapy Frequency (OT) 3 times/week   OT Predicted Duration/Target Date for Goal Attainment 08/13/24   OT Goals OT Goal 1;Hygiene/Grooming   OT: Hygiene/Grooming minimal assist;from wheelchair   OT: Goal 1 Pt. will sit EOB with SBA for 3 minutes in prep for mechanical lift   Interventions   Interventions Quick Adds Self-Care/Home Management   Self-Care/Home Management   Self-Care/Home Mgmt/ADL, Compensatory, Meal Prep Minutes (97213) 8   Treatment Detail/Skilled Intervention Additional EOB sitting for 5 minutes with Mod to CGA of 2. Proper R hand placement to promote I'dence with sitting. Patient weak and fearful of falling   OT Discharge Planning   OT Plan EOB sitting A of 2, supported while sitting g/h, LUE PROM?   OT Discharge Recommendation (DC Rec) Long term care facility   OT Rationale for DC Rec Per CM note patient plans to go to apt with 24hr nursing care. Pt. would need a caroline lift for all trsfs, A with all ADLs, meds, meals, cleaning. Would need a LTC if this can't be provided   OT Brief overview of current status max of 2   Total Session Time   Timed Code Treatment Minutes 8   Total Session Time (sum of timed and untimed services) 16

## 2024-08-07 NOTE — PROGRESS NOTES
"General Surgery Progress Note:    Hospital Day # 2    ASSESSMENT:   1. Pressure injury of sacral region, unstageable (H)    2. Community acquired pneumonia of left lower lobe of lung    3. Pressure injury of sacral region, stage 3 (H)    4. Hypotension, unspecified hypotension type    5. Hypoxia        Franklyn Sorto is a 88 year old male with history of multiple comorbidities including CVA and DMT2 who currently admitted for sepsis 2/2 UTI vs aspiration PNA vs infected decubitus sacral ulcer s/p bedside debridement yesterday. He had been downgraded to floor status with soft pressures this morning but without tachycardia and/or fever. Prelim results for Aerobic Cx revealing Gram+/- cocci and baccili, ID following currently on IV meropenum and Vancomycin. Further bedside debridement performed today with central sloughing necrotic tissue with small volume purulence. WOC consulted with veraflow WV placed at bedside.     PLAN:   -S/p further bedside debridement with wound appearing amenable to WV debridement  -WOC consulted with WV placed at bedside    -anticipate WV change on 08/09 with WOC  -ID following, continue abx per ID  -Continue mgmt per primary team  -Surgery to continue to follow      SUBJECTIVE:   Franklyn Sorto is reporting \"butt\" (sacral) discomfort improved with offloading and worsened during wound evaluation and WV placement.  Displays frustration that he associates with his thickened diet as well as stating his breakfast is cold but molified when breakfast is reordered for wound evaluation.  He denies fever, chills, nausea or vomiting.  Per chart review recent stools overnight.     Patient Vitals for the past 24 hrs:   BP Temp Temp src Pulse Resp SpO2   08/07/24 0740 95/46 97.8  F (36.6  C) Oral 71 20 96 %   08/07/24 0424 90/53 97.4  F (36.3  C) Oral 70 20 98 %   08/07/24 0008 120/58 97.7  F (36.5  C) Oral 71 20 98 %   08/06/24 1827 (!) 88/52 -- -- 71 -- 96 %   08/06/24 1819 (!) 74/41 98.1  F (36.7 "  C) Oral 69 20 95 %   08/06/24 1700 112/58 -- -- 75 -- 98 %   08/06/24 1600 118/55 97.9  F (36.6  C) Oral 74 -- 98 %   08/06/24 1400 115/62 -- -- 78 29 98 %   08/06/24 1300 124/57 -- -- 81 18 98 %   08/06/24 1200 -- 99.4  F (37.4  C) Oral 78 19 96 %       Physical Exam:  General: patient seen resting in bed, no acute distress  Resp: no respiratory distress, breathing comfortably on room air  Abdomen: soft, nondistended without TTP.   Extremities: warm and well perfused  Skin: Annular sacral wound that is 5 cm in diameter and depth of 1.5 cm however with 4.5 cm of right caudal tunneling. Central sloughing necrotic tissue that is boggy with small volume pulurlent fluid expressed with debridment but no palpable flucutance. Central wound without TTP but TTP on peripheral wound. WV placed at bedside.         Admission on 08/05/2024   Component Date Value    Lactic Acid 08/05/2024 1.3     Hold Specimen 08/05/2024 JIC     Hold Specimen 08/05/2024 JIC     Hold Specimen 08/05/2024 JIC     Hold Specimen 08/05/2024 [facility]     Troponin T, High Sensiti* 08/05/2024 72 (H)     N terminal Pro BNP Inpat* 08/05/2024 9,693 (H)     Procalcitonin 08/05/2024 65.78 (HH)     WBC Count 08/05/2024 23.7 (H)     RBC Count 08/05/2024 3.44 (L)     Hemoglobin 08/05/2024 10.5 (L)     Hematocrit 08/05/2024 32.9 (L)     MCV 08/05/2024 96     MCH 08/05/2024 30.5     MCHC 08/05/2024 31.9     RDW 08/05/2024 14.5     Platelet Count 08/05/2024 154     % Neutrophils 08/05/2024 87     % Lymphocytes 08/05/2024 6     % Monocytes 08/05/2024 6     % Eosinophils 08/05/2024 0     % Basophils 08/05/2024 0     % Immature Granulocytes 08/05/2024 1     NRBCs per 100 WBC 08/05/2024 0     Absolute Neutrophils 08/05/2024 20.6 (H)     Absolute Lymphocytes 08/05/2024 1.5     Absolute Monocytes 08/05/2024 1.3     Absolute Eosinophils 08/05/2024 0.1     Absolute Basophils 08/05/2024 0.1     Absolute Immature Granul* 08/05/2024 0.2     Absolute NRBCs 08/05/2024 0.0      Magnesium 08/05/2024 2.1     Sodium 08/05/2024 137     Potassium 08/05/2024 3.7     Carbon Dioxide (CO2) 08/05/2024 25     Anion Gap 08/05/2024 12     Urea Nitrogen 08/05/2024 31.0 (H)     Creatinine 08/05/2024 1.38 (H)     GFR Estimate 08/05/2024 50 (L)     Calcium 08/05/2024 7.8 (L)     Chloride 08/05/2024 100     Glucose 08/05/2024 232 (H)     Alkaline Phosphatase 08/05/2024 96     AST 08/05/2024 30     ALT 08/05/2024 12     Protein Total 08/05/2024 6.1 (L)     Albumin 08/05/2024 2.2 (L)     Bilirubin Total 08/05/2024 0.4     Color Urine 08/05/2024 Orange (A)     Appearance Urine 08/05/2024 Cloudy (A)     Glucose Urine 08/05/2024 >1000 (A)     Bilirubin Urine 08/05/2024 Negative     Ketones Urine 08/05/2024 Negative     Specific Gravity Urine 08/05/2024 1.028     Blood Urine 08/05/2024 0.5 mg/dL (A)     pH Urine 08/05/2024 6.0     Protein Albumin Urine 08/05/2024 300 (A)     Urobilinogen Urine 08/05/2024 <2.0     Nitrite Urine 08/05/2024 Negative     Leukocyte Esterase Urine 08/05/2024 500 Mynor/uL (A)     Bacteria Urine 08/05/2024 Moderate (A)     WBC Clumps Urine 08/05/2024 Present (A)     Budding Yeast Urine 08/05/2024 Moderate (A)     RBC Urine 08/05/2024 111 (H)     WBC Urine 08/05/2024 >182 (H)     pH Venous 08/05/2024 7.36     pCO2 Venous 08/05/2024 50     pO2 Venous 08/05/2024 14 (L)     Bicarbonate Venous 08/05/2024 28     Base Excess/Deficit Veno* 08/05/2024 2.5     FIO2 08/05/2024 28     Oxyhemoglobin Venous 08/05/2024 13 (L)     O2 Sat, Venous 08/05/2024 13.3 (L)     INR 08/05/2024 4.84 (H)     aPTT 08/05/2024 54 (H)     Culture 08/05/2024 Positive on the 1st day of incubation (A)     Culture 08/05/2024 Gram negative bacilli (AA)     Culture 08/05/2024 Streptococcus salivarius group (AA)     Troponin T, High Sensiti* 08/05/2024 63 (H)     Culture 08/05/2024 >100,000 CFU/mL Escherichia coli ESBL (A)     SARS CoV2 PCR 08/05/2024 Negative     Adenovirus 08/05/2024 Not Detected     Coronavirus 08/05/2024  Not Detected     Human Metapneumovirus 08/05/2024 Not Detected     Human Rhin/Enterovirus 08/05/2024 Not Detected     Influenza A 08/05/2024 Not Detected     Influenza A, H1 08/05/2024 Not Detected     Influenza A 2009 H1N1 08/05/2024 Not Detected     Influenza A, H3 08/05/2024 Not Detected     Influenza B 08/05/2024 Not Detected     Parainfluenza Virus 1 08/05/2024 Not Detected     Parainfluenza Virus 2 08/05/2024 Not Detected     Parainfluenza Virus 3 08/05/2024 Not Detected     Parainfluenza Virus 4 08/05/2024 Not Detected     Respiratory Syncytial Vi* 08/05/2024 Not Detected     Respiratory Syncytial Vi* 08/05/2024 Not Detected     Chlamydia Pneumoniae 08/05/2024 Not Detected     Mycoplasma Pneumoniae 08/05/2024 Not Detected     INR 08/05/2024 6.09 (HH)     Sodium 08/05/2024 141     Potassium 08/05/2024 3.5     Chloride 08/05/2024 105     Carbon Dioxide (CO2) 08/05/2024 23     Anion Gap 08/05/2024 13     Urea Nitrogen 08/05/2024 28.9 (H)     Creatinine 08/05/2024 1.22 (H)     GFR Estimate 08/05/2024 58 (L)     Calcium 08/05/2024 7.4 (L)     Glucose 08/05/2024 147 (H)     GLUCOSE BY METER POCT 08/05/2024 145 (H)     Culture 08/06/2024 Culture in progress     Culture 08/06/2024 2+ Escherichia coli (A)     Gram Stain Result 08/06/2024 4+ Gram positive cocci (A)     Gram Stain Result 08/06/2024 1+ Gram positive bacilli (A)     Gram Stain Result 08/06/2024 4+ WBC seen (A)     Culture 08/06/2024 No anaerobic organisms isolated after 1 day     Sodium 08/06/2024 141     Potassium 08/06/2024 3.8     Chloride 08/06/2024 104     Carbon Dioxide (CO2) 08/06/2024 24     Anion Gap 08/06/2024 13     Urea Nitrogen 08/06/2024 26.8 (H)     Creatinine 08/06/2024 1.12     GFR Estimate 08/06/2024 64     Calcium 08/06/2024 7.8 (L)     Glucose 08/06/2024 104 (H)     WBC Count 08/06/2024 20.9 (H)     RBC Count 08/06/2024 3.65 (L)     Hemoglobin 08/06/2024 11.3 (L)     Hematocrit 08/06/2024 35.1 (L)     MCV 08/06/2024 96     MCH  08/06/2024 31.0     MCHC 08/06/2024 32.2     RDW 08/06/2024 14.5     Platelet Count 08/06/2024 146 (L)     Magnesium 08/06/2024 2.0     Phosphorus 08/06/2024 2.8     Lactic Acid 08/06/2024 1.5     Procalcitonin 08/06/2024 39.57 (HH)     INR 08/06/2024 2.03 (H)     GLUCOSE BY METER POCT 08/06/2024 101 (H)     GLUCOSE BY METER POCT 08/06/2024 105 (H)     Staphylococcus species 08/05/2024 Not Detected     Staphylococcus aureus 08/05/2024 Not Detected     Staphylococcus epidermid* 08/05/2024 Not Detected     Staphylococcus lugdunens* 08/05/2024 Not Detected     Enterococcus faecalis 08/05/2024 Not Detected     Enterococcus faecium 08/05/2024 Not Detected     Streptococcus species 08/05/2024 Not Detected     Streptococcus agalactiae 08/05/2024 Not Detected     Streptococcus anginosus * 08/05/2024 Not Detected     Streptococcus pneumoniae 08/05/2024 Not Detected     Streptococcus pyogenes 08/05/2024 Not Detected     Listeria species 08/05/2024 Not Detected     Acinetobacter species 08/05/2024 Not Detected     Citrobacter species 08/05/2024 Not Detected     Enterobacter species 08/05/2024 Not Detected     Proteus species 08/05/2024 Not Detected     Escherichia coli 08/05/2024 Detected (A)     Klebsiella pneumoniae 08/05/2024 Not Detected     Klebsiella oxytoca 08/05/2024 Not Detected     Pseudomonas aeruginosa 08/05/2024 Not Detected     CTX-M 08/05/2024 Detected (A)     KPC 08/05/2024 Not Detected     NDM 08/05/2024 Not Detected     VIM 08/05/2024 Not Detected     IMP 08/05/2024 Not Detected     OXA 08/05/2024 Not Detected     Gram Stain Result 08/06/2024 4+ Gram positive cocci (A)     Gram Stain Result 08/06/2024 4+ WBC seen (A)     GLUCOSE BY METER POCT 08/06/2024 117 (H)     Gram Stain Result 08/06/2024 4+ Gram positive cocci (A)     Gram Stain Result 08/06/2024 4+ Gram negative cocci (A)     Gram Stain Result 08/06/2024 3+ Gram positive bacilli (A)     Gram Stain Result 08/06/2024 4+ Gram negative bacilli (A)     Gram  Stain Result 08/06/2024 4+ WBC seen (A)     GLUCOSE BY METER POCT 08/06/2024 135 (H)     GLUCOSE BY METER POCT 08/06/2024 176 (H)     INR 08/07/2024 1.35 (H)     Creatinine 08/07/2024 0.98     GFR Estimate 08/07/2024 75     GLUCOSE BY METER POCT 08/07/2024 130 (H)     GLUCOSE BY METER POCT 08/07/2024 116 (H)         Malu Bermudez PA-C  [facility]  [address]  [address]  [place] [address]

## 2024-08-07 NOTE — PROGRESS NOTES
[facility]    Medicine Progress Note - Hospitalist Service    Date of Admission:  8/5/2024    Assessment & Plan   Franklyn Sorto is 88 year old male with history of HFrEF, hyperlipidemia, hypertension, aortic stenosis status post TAVR, thrombophilia, type 2 diabetes, stage III sacral ulcer, CAD, prior CVA admitted on 6/8/2024 with altered mental status hypotension secondary to sepsis possibly from urinary tract infection, aspiration pneumonia and suspected infected, unstageable sacral ulcer.    He was managed briefly at the ICU by instensivist on admission but did not require vasopressor. Hypotension and ARIADNE improved with IV hydration and albumin infusion.  Urine culture grew E. coli.  Blood culture grew gram-negative bacilli with Streptococcus salivarius. He is receiving IV meropenem and vancomycin per ID recommendation. Debridement of sacral ulcer was performed by surgery service on 8/6/2024. He was transferred to the medical floor on 8/6/24 given improved BP. He became hypotensive again on 8/7/24, therefore intensivist was re-consulted for possible septic shock and management with vasopressor.     Sepsis with hypotension and ARIADNE secondary to aspiration pna vs PNA, vs wound infection vs UTI   Hypotension patient appears very dry on exam   Hypotension and ARIADNE improved with IV hydration and albumin infusion.  Urine culture grew E. coli.  Blood culture grew gram-negative bacilli with Streptococcus salivarius. He is receiving IV meropenem and vancomycin per ID recommendation. Debridement of sacral ulcer was performed by surgery service on 8/6/2024.     MRI of the sacrum could not be obtained due to presence of intracardiac lead.    Continue meropenem and vancomycin per ID   Follow-up blood and urine culture  ID following-appreciate assistance  Surgery follow up-appreciate assistance; wound debridement done at bedside-surgery service unable to apply wound VAC for now     Supratherapeutic INR    -Received IV 5mg given in the ER  -Monitor INR  -Warfarin per pharmacist     History of aspiration and dysphagia   Aspiration PNA vs CAP LLL infiltrate but H/O aspiration after CVA   -Mildly thick diet per speech therapist   -Video swallow study revealed silent aspiration with thin liquid, including with chin tuck   -IV vancomycin and meropenem       Recent acute ischemic stroke (5/25/2024)  Residual left Hemiparesis   Recent admission 5/25-6/7/2024 at 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   H/o HFrEF   CAD   - History of distal RCA stent, last echo 8/2019 with EF 50-55%,   - BNP is elevated but does appear to be fluid overloaded actually appears very dry on exam   - hold all BP meds since hypotensive   -Received IV fluid - give additional 250 mL bolus given low BP  -Follow up echocardiogram      Rheumatoid arthritis/Chronic Pain -   - holding homemeds  for now since hypotensive      Gout  - holding meds until SLP assess   GERD (gastroesophageal reflux disease)/Hx PUD   - IV PPI      Clinically Significant Risk Factors Present on Admission              # Hypoalbuminemia: Lowest albumin = 2.2 g/dL at 8/5/2024 11:55 AM, will monitor as appropriate     # Drug Induced Coagulation Defect: home medication list includes an anticoagulant medication  # Drug Induced Platelet Defect: home medication list includes an antiplatelet medication  # Acute Kidney Injury, unspecified: based on a >150% or 0.3 mg/dL increase in last creatinine compared to past 90 day  average, will monitor renal function  # Hypertension: Noted on problem list    # Anemia: based on hgb <11    Diet:      DVT Prophylaxis: Warfarin  Javier Catheter: PRESENT, indication: Acute retention or obstruction  Lines: None     Cardiac Monitoring: ACTIVE order. Indication: sepsis  Code Status: No CPR- Do NOT Intubate      Clinically Significant Risk Factors              # Hypoalbuminemia: Lowest albumin = 2.2 g/dL at 8/5/2024 11:55 AM, will monitor as appropriate    # Coagulation Defect: INR = 1.35 (Ref range: 0.85 - 1.15) and/or PTT = 54 Seconds (Ref range: 22 - 38 Seconds), will monitor for bleeding    # Hypertension: Noted on problem list           # DMII: A1C = N/A within past 6 months         # Financial/Environmental Concerns:           Disposition Plan     Medically Ready for Discharge: Anticipated in 2-4 Days    Arlyn Mccann MD  Hospitalist Service  [facility]  Securely message with Se[employer] (more info)  Text page via "[employer]" Paging/Directory   ______________________________________________________________________    Interval History   No new complaints today. Pain is well-controlled.  He is breathing comfortably on room air.  As per therapist, patient has significant swallowing difficulties and at risk of aspiration.  Speech therapist plans to proceed with video swallow study today. Patient opted to be DNR/DNI during evaluation in the presence of his POA/friend and also caregiver.    Physical Exam   Vital Signs: Temp: 97.8  F (36.6  C) Temp src: Oral BP: 95/46 Pulse: 71   Resp: 20 SpO2: 96 % O2 Device: Nasal cannula    Weight: 160 lbs 0 oz    General appearance: Frail, awake, Alert, Cooperative, not in any obvious distress and appears stated age   HEENT: Normocephalic, atraumatic, conjunctiva clear without icterus and ears without discharge  Lungs: Bilateral rhonchi  Cardiovascular: Regular Rate and Rythm, normal apical impulse, normal S1 and S2, no lower extremity edema  bilaterally  Abdomen: Soft, non-tender and Non-distended, active bowel sounds  : Javier catheter in place, draining clear urine.   Skin: Ecchymosis in the right upper extremity, unstageable sacral ulcer-the media section for details.   Musculoskeletal: No bony deformities or joint tenderness. Normal ROM upon flexion & extension.   Neurologic: Alert & Oriented X 3, Facial symmetry preserved and upper & lower extremities moving well with symmetry  Psychiatric: Unable to assess      Medical Decision Making       40 MINUTES SPENT BY ME on the date of service doing chart review, history, exam, documentation & further activities per the note.      Data     I have personally reviewed the following data over the past 24 hrs:    N/A  \   N/A   / N/A     N/A N/A N/A /  116 (H)   N/A N/A 0.98 \     INR:  1.35 (H) PTT:  N/A   D-dimer:  N/A Fibrinogen:  N/A       Imaging results reviewed over the past 24 hrs:   Recent Results (from the past 24 hour(s))   XR Video Swallow with SLP or OT    Narrative    EXAM: XR VIDEO SWALLOW WITH SLP OR OT  LOCATION: [facility]  DATE: 8/6/2024    INDICATION: Difficulty swallowing.  COMPARISON: None.    TECHNIQUE: Routine swallow study with speech pathology using multiple barium thicknesses.    RADIATION DOSE: Total Air Kerma 5.1 mGy    FINDINGS:   Swallow study with Speech Pathology using multiple barium thicknesses. Silent aspiration with thin liquid, including with chin tuck. Aspiration also occurred with thin liquid and smaller drink without chin tuck. No other significant penetration or   aspiration.

## 2024-08-07 NOTE — PLAN OF CARE
Problem: Adult Inpatient Plan of Care  Goal: Optimal Comfort and Wellbeing  8/7/2024 1444 by Ev Rodriguez RN  Outcome: Progressing  8/7/2024 1444 by Ev Rodriguez RN  Outcome: Progressing  Intervention: Provide Person-Centered Care  Recent Flowsheet Documentation  Taken 8/7/2024 0900 by Ev Rodriguez RN  Trust Relationship/Rapport: care explained     Problem: Risk for Delirium  Goal: Improved Attention and Thought Clarity  8/7/2024 1444 by Ev Rodriguez RN  Outcome: Progressing  8/7/2024 1444 by Ev Rodriguez RN  Outcome: Progressing  Intervention: Maximize Cognitive Function  Recent Flowsheet Documentation  Taken 8/7/2024 0900 by Ev Rodriguez RN  Reorientation Measures: clock in view   Goal Outcome Evaluation:       Pt A/O , MILD PAIN WITH MOVEMENT BUT OVERALL DENIED PAIN AND FEELS NUMBESS AT THE wound site in the coccyx, dresing change and vacuum was put in, pt is frequently being repositioned, eating fine, cutler in place, had a one time morphine during vacuum placement, BP was low systolic in the lower 80's, 250 fluid bolus put in twice.

## 2024-08-07 NOTE — PROGRESS NOTES
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) (H)    Hyperlipidemia    Hypertension    Long term current use of anticoagulant therapy    S/P cardiac pacemaker procedure    S/P TAVR (transcatheter aortic valve replacement)    Thrombophilia (H24)    Diabetes mellitus type 2, noninsulin dependent (H)    History of CVA (cerebrovascular accident)    Hypoxia    Hypotension, unspecified hypotension type    Community acquired pneumonia of left lower lobe of lung    Pressure injury of sacral region, stage 3 (H)    Leukocytosis, unspecified type      Franklyn Sorto is a 88 year old male with complicated medical history including urinary tract infection, heart failure with preserved ejection fraction, CAD, aortic stenosis status post TAVR, hyperlipidemia, hypertension, sick sinus syndrome status post pacemaker, DM type II, previous CVA with residual left hemiparesis, history of upper extremity DVT with thrombophilia  (antiphospholipid antibodies, 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  Pt was transferred to floor yesterday for the same.  He did not require pressors.  He was evaluated by surgery and underwent bedside surgical debridement of his sacral wounds.  Patient was also found to have ESBL E. coli in the urine, E. coli and strep salivarius in the blood.   Chest imaging showed left lower lobe atelectasis versus opacity, elevated left hemidiaphragm, .  He was evaluated by ID.     Plan:   Gentle hydration  Check lactic acid  Pressors if needed to keep MAP above 65.  start low-dose midodrine  Continue antibiotics per infectious disease.  Patient is currently on vancomycin, meropenem covering for bacteremia, urosepsis,  and possible aspiration pneumonia.  Follow-up BNP  Follow-up echocardiogram  Follow-up ai/o, daily weights.  Follow up trop and bnp  Video swallow  ---> silent aspiration with thin liquid including with thin tuck.    Advance Directives:  No CPR- Do NOT Intubate      TTS 45 min in critical care (exclusive of procedures) providing critical care services at the bedside.  Patient has a high probability of imminent or life-threatening deterioration, requiring my direct attention, intervention, and personal management including evaluating critically ill patient, directing his/her care and reviewing laboratory values and radiologic reports.      Yanick Beltran MD  Pulmonary and Critical Care Medicine  Electronically Signed on 08/07/2024    Clinically Significant Risk Factors              # Hypoalbuminemia: Lowest albumin = 2.2 g/dL at 8/5/2024 11:55 AM, will monitor as appropriate  # Coagulation Defect: INR = 1.35 (Ref range: 0.85 - 1.15) and/or PTT = 54 Seconds (Ref range: 22 - 38 Seconds), will monitor for bleeding    # Hypertension: Noted on problem list      # Acute Hypoxic Respiratory Failure: Documented O2 saturation < 90%. Continue supplemental oxygen as needed        # DMII: A1C = N/A within past 6 months       # Financial/Environmental Concerns:                Code Status: No CPR- Do NOT Intubate           History:     HPI: 88-year-old man with a history of DM type II, GERD, recent CVA came into the ER for generalized fatigue, altered mental status, and concern for sepsis.  Hypotension, concern for aspiration pneumonia, and sacral wound infection. He was found to be hypotensive and acting abnormally. He received some gentle fluids out of concern for heart failure but remained hypotensive. He was transferred to the ICU.  He did not require pressors.  Patient was found to have ESBL E. coli bacteremia likely secondary to sacral ulcer.  He underwent bedside debridement with surgery.  Patient was then transferred out  "of the ICU on 8/6/2024.  This afternoon, patient became hypotensive.  He was given IV fluids.  Patient was then transferred to the ICU for possible need of pressors.    Exam/Data:   BP (!) 84/45   Pulse 66   Temp 97.6  F (36.4  C) (Oral)   Resp 18   Ht 1.753 m (5' 9\")   Wt 72.6 kg (160 lb)   SpO2 99%   BMI 23.63 kg/m  , Body mass index is 23.63 kg/m .      BP (!) 84/45   Pulse 66   Temp 97.6  F (36.4  C) (Oral)   Resp 18   Ht 1.753 m (5' 9\")   Wt 72.6 kg (160 lb)   SpO2 99%   BMI 23.63 kg/m    BP - Mean:  [79-82] 82  I/O last 3 completed shifts:  In: 690 [P.O.:570; NG/GT:120]  Out: 750 [Urine:750]  Weight change:   Resp: 18      Physical Exam:  GEN: comfortable, NAD  HEENT: NCAT, EMOI, dry mm  CVS: S1S2, RRR  Lung: good air entry bilaterally,  Abd: Soft, NT, ND,  + BS.  Back: Unstageable sacral decub  Ext: no c/c/e  Neuro: nonfocal  Musculoskeletal: FROM all extremities  Psych: appropriate        Data:   Labs and Imaging personally reviewed.        CMP  Recent Labs   Lab 08/07/24  1136 08/07/24  0750 08/07/24  0606 08/07/24  0208 08/06/24  2110 08/06/24  1234 08/06/24  0527 08/06/24  0005 08/05/24 2114 08/05/24 2104 08/05/24  1155   NA  --   --   --   --   --   --  141  --  141  --  137   POTASSIUM  --   --   --   --   --   --  3.8  --  3.5  --  3.7   CHLORIDE  --   --   --   --   --   --  104  --  105  --  100   CO2  --   --   --   --   --   --  24  --  23  --  25   ANIONGAP  --   --   --   --   --   --  13  --  13  --  12   * 116*  --  130* 176*   < > 104*   < > 147*   < > 232*   BUN  --   --   --   --   --   --  26.8*  --  28.9*  --  31.0*   CR  --   --  0.98  --   --   --  1.12  --  1.22*  --  1.38*   GFRESTIMATED  --   --  75  --   --   --  64  --  58*  --  50*   JÚNIOR  --   --   --   --   --   --  7.8*  --  7.4*  --  7.8*   MAG  --   --   --   --   --   --  2.0  --   --   --  2.1   PHOS  --   --   --   --   --   --  2.8  --   --   --   --    PROTTOTAL  --   --   --   --   --   --   --   -- "   --   --  6.1*   ALBUMIN  --   --   --   --   --   --   --   --   --   --  2.2*   BILITOTAL  --   --   --   --   --   --   --   --   --   --  0.4   ALKPHOS  --   --   --   --   --   --   --   --   --   --  96   AST  --   --   --   --   --   --   --   --   --   --  30   ALT  --   --   --   --   --   --   --   --   --   --  12    < > = values in this interval not displayed.     CBC  Recent Labs   Lab 08/06/24 0527 08/05/24  1155   WBC 20.9* 23.7*   RBC 3.65* 3.44*   HGB 11.3* 10.5*   HCT 35.1* 32.9*   MCV 96 96   MCH 31.0 30.5   MCHC 32.2 31.9   RDW 14.5 14.5   * 154     INR  Recent Labs   Lab 08/07/24  0606 08/06/24 0527 08/05/24 2114 08/05/24  1155   INR 1.35* 2.03* 6.09* 4.84*     Arterial Blood Gas  Recent Labs   Lab 08/05/24  1318   O2PER 28       IMAGING: personally reviewed images. Formal radiology interpretation noted below.    XR Video Swallow with SLP or OT    Result Date: 8/6/2024  EXAM: XR VIDEO SWALLOW WITH SLP OR OT LOCATION: [facility] DATE: 8/6/2024 INDICATION: Difficulty swallowing. COMPARISON: None. TECHNIQUE: Routine swallow study with speech pathology using multiple barium thicknesses. RADIATION DOSE: Total Air Kerma 5.1 mGy FINDINGS: Swallow study with Speech Pathology using multiple barium thicknesses. Silent aspiration with thin liquid, including with chin tuck. Aspiration also occurred with thin liquid and smaller drink without chin tuck. No other significant penetration or aspiration.     Chest XR,  PA & LAT    Result Date: 8/5/2024  EXAM: XR CHEST 2 VIEWS LOCATION: [facility] DATE: 8/5/2024 INDICATION: shortness of breath, hypotensive, hypoxic COMPARISON: CTA 8/5/2024 at 1418 hours. Ultrasound 6/20/2024.     IMPRESSION: Again seen is opacification of the left lower lung which could represent atelectasis, infection, and/or aspiration with a small amount of pleural fluid. No pneumothorax. The heart is not well assessed. A TAVR stent  is present. There is a left  chest wall biventricular ICD. Cervical fusion hardware is present.    CT Abdomen Pelvis w Contrast    Result Date: 8/5/2024  EXAM: CT ABDOMEN PELVIS W CONTRAST LOCATION: [facility] DATE: 8/5/2024 INDICATION: elevated WBC count, hypotensive, hypoxic COMPARISON: CT abdomen pelvis 6/21/2024 TECHNIQUE: CT scan of the abdomen and pelvis was performed following injection of IV contrast. Multiplanar reformats were obtained. Dose reduction techniques were used. CONTRAST: isovue 370 75ml FINDINGS: LOWER CHEST: Dictated separately. HEPATOBILIARY: Unchanged liver contours. No worrisome liver lesions. Gallbladder is contracted with multiple gallstones and similar mild wall thickening to prior. No biliary ductal dilation. PANCREAS: Normal. SPLEEN: Normal. ADRENAL GLANDS: Normal. KIDNEYS/BLADDER: Kidneys enhance symmetrically with bilateral benign cysts, which require no follow-up. No urolithiasis or collecting system dilation. Bladder is thick-walled with pericystic stranding. BOWEL: No bowel obstruction. Percutaneous gastrojejunostomy tube appears well-positioned. Colonic diverticulosis without evidence of diverticulitis. No free air or organized fluid collection. Incontinence. LYMPH NODES: No lymphadenopathy. VASCULATURE: Abdominal aorta is nonaneurysmal with severe circumferential atherosclerotic vascular calcifications. PELVIC ORGANS: No pelvic mass. MUSCULOSKELETAL: Degenerative disc disease and facet osteoarthritis throughout the visualized spine. No worrisome bone lesions. Left hip lipoma.     IMPRESSION: 1.  Bladder wall thickening and pericystic stranding, which can be seen with cystitis. Correlate with urinalysis. 2.  Otherwise no acute abnormality in the abdomen or pelvis with additional details in the findings. 3.  Chest reported separately.    CT Chest Pulmonary Embolism w Contrast    Result Date: 8/5/2024  EXAM: CT CHEST PULMONARY EMBOLISM W CONTRAST LOCATION:   [facility] DATE: 8/5/2024 INDICATION: SOB, hypoxia, hypotensive COMPARISON: None. TECHNIQUE: CT chest pulmonary angiogram during arterial phase injection of IV contrast. Multiplanar reformats and MIP reconstructions were performed. Dose reduction techniques were used. CONTRAST: isovue 370 75ml FINDINGS: ANGIOGRAM CHEST: The main pulmonary artery is 35 mm in diameter. No pulmonary emboli. There is persistent perfusion of the left lower lobe. Thoracic aorta is negative for dissection. A TAVR stent is present. LUNGS AND PLEURA: Atelectasis and airspace disease in much of the left lower lobe. The central airway to the left lower lobe is opacified. Small left pleural effusion. Elevated left hemidiaphragm. MEDIASTINUM/AXILLAE: A left chest wall biventricular ICD is present. CORONARY ARTERY CALCIFICATION: Severe. UPPER ABDOMEN: Cholelithiasis. Gallbladder wall thickening. A percutaneous GJ tube is present. MUSCULOSKELETAL: There is surgical hardware at C6-C7.     IMPRESSION: 1.  No PE. Persistent perfusion of the opacified left lower lobe represents a shunt and could contribute to hypoxemia. Enlargement of the main pulmonary artery can be seen with pulmonary hypertension. 2.  The central airway to the left lower lobe is opacified. Opacification of the left lower lobe could represent aspiration, infection, and/or atelectasis. There is a small volume of left pleural fluid. The left hemidiaphragm is elevated. 3.  Cholelithiasis. Gallbladder wall thickening. Recommend ultrasound if there is concern for cholecystitis. 4.  Coronary artery disease.      No current outpatient medications on file.     Allergies   Allergen Reactions    Clopidogrel Hives    Hydrocodone      Other reaction(s): sick to his stomach    Hydrocodone-Acetaminophen Nausea and Vomiting    Levofloxacin Other (See Comments)     Other reaction(s): tendonitis  Tendonitis right calf      Spironolactone      Other reaction(s): Hyperkalemia

## 2024-08-07 NOTE — PROGRESS NOTES
"CLINICAL NUTRITION SERVICES - ASSESSMENT NOTE     Nutrition Prescription    RECOMMENDATIONS FOR MDs/PROVIDERS TO ORDER:    Malnutrition Status:    Unable to determine due to need for new weight this admit, pt likely meets criteria     Recommendations already ordered by Registered Dietitian (RD):  ONS- Magic Cup TID + Expedite cup for wound   Room service per pt request     Future/Additional Recommendations:  Monitor po intake, weight, labs, supplement hanna., wound healing.      REASON FOR ASSESSMENT  Franklyn Sorto is a/an 88 year old male assessed by the dietitian for Admission Nutrition Risk Screen for unintentional weight loss.     HPI: Pt with history of HFrEF, hyperlipidemia, hypertension, aortic stenosis status post TAVR, thrombophilia, type 2 diabetes, stage III sacral ulcer, CAD, prior CVA admitted on 6/8/2024 with altered mental status hypotension secondary to sepsis possibly from urinary tract infection, aspiration pneumonia and suspected infected, unstageable sacral ulcer.     NUTRITION HISTORY  Met with pt and pt friend at bedside this afternoon. Pt with fair oral intakes and appetite PTA. Pt with Hx EN with dysphagia, swallow improved and taking po. Pt has G-tube in place- per chart review plan to keep FT in place until oral intakes adequate. Pt requesting Magic Cup TID with meals + agreeable to expedite bottle. Pt able to provide food likes/dislikes but requesting dietary clerks to call for meals. Pt requesting breakfast order for tomorrow.     CURRENT NUTRITION ORDERS  Diet: Level 7: Easy to Chew Dysphagia Diet  and Mildly Thick Thickened Liquids   Intake/Tolerance:   Pt taking 25-75% po this admit     LABS  Labs reviewed  -276 last 24 hrs   8/6: BUN 26.8(H)    MEDICATIONS  Medications reviewed  Scheduled biotene, romycin, novolog, merrem, protonix, vancomycin, warfarin     ANTHROPOMETRICS  Height: 175.3 cm (5' 9\")  Most Recent Weight: 72.6 kg (160 lb)  - likely stated weight   IBW: 72.7 " kg  BMI: Normal BMI  Weight History: No clinically significant wt loss per chart   Wt Readings from Last 10 Encounters:   08/05/24 72.6 kg (160 lb)   06/21/24 73 kg (160 lb 15 oz)   03/01/24 75.8 kg (167 lb)   11/13/23 76.2 kg (168 lb)   07/11/23 74.4 kg (164 lb)   07/07/23 74.4 kg (164 lb)   04/17/23 81.2 kg (179 lb)   01/06/23 83.9 kg (185 lb)   10/07/22 89.7 kg (197 lb 11.2 oz)   09/02/22 92.3 kg (203 lb 8 oz)      Dosing Weight: 72.6 kg    ASSESSED NUTRITION NEEDS  Estimated Energy Needs: 7918-3721 kcals/day (25 - 30+ kcals/kg)  Justification: Increased needs and Wound healing  Estimated Protein Needs:  grams protein/day (1.2 - 1.5 grams of pro/kg)  Justification: Wound healing  Estimated Fluid Needs: 1815 mL/day (25 mL/kg)   Justification: Maintenance    PHYSICAL FINDINGS  See malnutrition section below.  GJ tube, clamped   BM: x2 this AM  Skin: PI- penis, Stg 3 PI- sacral   Edema: Trace ankles, feet  Dysphagia per SLP     MALNUTRITION:  % Weight Loss:  Need new weight   % Intake:  Decreased intake does not meet criteria for malnutrition   Subcutaneous Fat Loss:  Orbital region moderate depletion and Upper arm region moderate depletion  Muscle Loss:  Temporal region moderate depletion, Clavicle bone region moderate depletion, Acromion bone region moderate depletion, and Scapular bone region moderate depletion  Fluid Retention:  Trace ankles, feet    Malnutrition Diagnosis: Unable to determine due to need for new weight this admit     NUTRITION DIAGNOSIS  Increased nutrient needs related to healing as evidenced by pressure injuries       INTERVENTIONS  Implementation  Medical food supplement therapy - Magic Cup TID + Expedite cup for wound   Room service per pt request     Emphasized adequate oral intakes calories and protein for wound healing    Provided Easy to chew menu     Goals  Wound healing   Pt to meet >75% nutritional needs   Electrolytes WNL   BG < 180      Monitoring/Evaluation  Progress toward  goals will be monitored and evaluated per protocol.

## 2024-08-07 NOTE — PROGRESS NOTES
"[facility] Nurse Inpatient Assessment     Consulted for: Sacral    Summary: Surgery team performed bedside debrisemnt, then irrigatin vac was placed.    Patient History (according to provider note(s):      HPI: Franklyn Sorto is a 88 year old male with h/o T2DM, GERD, HTN, CVA 5/25/24 currently in TCU presenting to the ER for evaluation of fatigue and altered mental status. Patient's family was visiting today and states when they got there \"5 staff members were around the patient and he was not responding\". They called EMS who initially got a BP with 90 systolic, however, repeat on the way over was 65 systolic. Per patient's family, patient had feeding tube placed following his stroke as he was having difficulty swallowing.  Patient has improved a fair amount and is now able to swallow, has not used his feeding tube in over a month.  Patient's family states that they are quite adamant about him only drinking and eating well sitting up but they are nervous that his facility may have given him some fluid while laying down that could have resulted in an aspiration pneumonia. Patient denies any recent fevers. States he has been coughing a bit more, no sputum production.     Vitals reviewed, initial BP upon arrival to the ER was 69/48. Temp 97.5F. SpO2 89% on room air. 2L nasal cannula was placed and patient quickly improved to 98% on room air On exam he is pale but nontoxic-appearing.  He is speaking in full sentences and is alert. Differential diagnosis includes but not limited to UTI, pyelonephritis, bronchitis, pneumonia, PE, ACS, hypovolemia, cardiogenic shock, septic shock. After initial 500 mL fluid bolus his BP has improved to 87/47. He was given an additional 500 mL and improved to 90/66.        Assessment:      Pressure Injury Location: Sacrum     8/6 8/7    Last photo: 8/6  Wound type: Pressure Injury     Pressure Injury Stage: Unstageable, present on admission   Wound " history/plan of care:   Came from TCU    Wound base: 100 % eschar and slough, discolored black/grey      Palpation of the wound bed: boggy and fluctuance      Drainage: moderate     Description of drainage: purulent     Measurements (length x width x depth, in cm) 5  x 3.5  x  1.5 cm      Tunneling 5-6 o'clock 4.2cm     Undermining all around, 1-2cm  Periwound skin: Erythema- blanchable and discolored      Color: dusky, purple, and red      Temperature: warm  Odor: offensive  Pain: moderate and during dressing change, tender  Pain intervention prior to dressing change: slow and gentle cares   Treatment goal: Drainage control, Infection control/prevention, Increase granulation, Protection, Remove necrotic tissue, and Soften necrotic tissue  STATUS: evolving    Negative pressure wound therapy applied to: Coccyx   Last photo: 8/7   Wound due to: Pressure Injury   Wound history/plan of care:    Surgical date: bedside debridement 8/6 and 8/7   Service following: surgery  Date Negative Pressure Wound Therapy initiated: 8/7   Interventions in place: repositioning  Is patient s nutritional status compromised? no   If yes, what interventions are in place? N/A  Reason for initiating vac therapy? Presence of co-morbidities and Need for accelerated granulation tissue  Which?of?the?following?co-morbidities?apply? Diabetes  If diabetic is patient on a diabetic management program? Yes   Is osteomyelitis present in wound? no   If yes what treatments are in place? N/A    Number of foam pieces removed from a wound (excluding foam for bridge) :  NA    Verified this matched the number of foam pieces applied last dressing change: N/A   Number of foam pieces packed into wound (excluding foam for bridge) :  1 CleanseChoice Foam and 1 CleanseChoice Contact layer       Treatment Plan:     Negative pressure wound therapy plan:  Wound location: coccyx   Change Days: Mon/Wed/Fri by WOC RN    Supplies (including all accessories) used: medium  "Veraflo cleanse choice gray foam  Cleanse with Vashe prior to replacing NPWT  Suction setting: -125   Methods used: Window paned all periwound skin with vac drape prior to applying sponge, Placed barrier ring into periwound creases to improve seal, and used cleanse choice and contact layers    Staff RN to assess integrity of dressing and ensure suction is set at appropriate level every shift.   Date canister. Chart canister output every shift. Change cannister weekly and PRN if full/occluded     Remove foam dressing and replace with BID normal saline moist gauze dressing if:   -a dressing failure which cannot be repaired within 2 hours   -patient is discharging to home without a home pump   -patient is discharging to a facility outside the local area   -if a dressing is a \"Silver Foam\", remove before Radiation Therapy or MRI     The hospital VAC pump is not to be discharged with the patient.?Ensure to disconnect patient from machine prior to discharge. Then,    - If a home KCI VAC pump has been delivered, connect home cannister to dressing tubing then connect cannister to home pump and turn on machine    - If transferring to a nearby facility with a KCI vac, can disconnect and clamp tubing then cover end with glove so can be reconnected within 2 hours        Pressure Injury Prevention (PIP) Plan:  If patient is declining pressure injury prevention interventions: Explore reason why and address patient's concerns, Educate on pressure injury risk and prevention intervention(s), If patient is still declining, document \"informed refusal\" , and Ensure Care team is aware ( provider, charge nurse, etc)  Mattress: Follow bed algorithm, reassess daily and order specialty mattress, if indicated.  HOB: Maintain at or below 30 degrees, unless contraindicated  Repositioning in bed: Every 1-2 hours , Left/right positioning; avoid supine, Raise foot of bed prior to raising head of bed, to reduce patient sliding down (shear), and " Frequent microturns using positioning wedges, as patient tolerates  Heels: Keep elevated off mattress, Pillows under calves, and Heel lift boots  Protective Dressing:  wound care to sacrum  Positioning Equipment:Positioning wedges (#[id number]) to help maintain 30 degree side lying position   Chair positioning: Chair cushion (#[id number])    If patient has a buttock pressure injury, or high risk for PI use chair cushion or SPS.  Moisture Management: Perineal cleansing /protection: Follow Incontinence Protocol, Avoid brief in bed, Clean and dry skin folds with bathing , Consider InterDry (#[id number]) between folds, and Moisturize dry skin  Under Devices: Inspect skin under all medical devices during skin inspection , Ensure tubes are stabilized without tension, and Ensure patient is not lying on medical devices or equipment when repositioned  Ask provider to discontinue device when no longer needed.      Orders: Updated    RECOMMEND PRIMARY TEAM ORDER: None, at this time  Education provided: importance of repositioning, plan of care, and wound progress  Discussed plan of care with: Patient, Nurse, and Physicians Assistant  WO nurse follow-up plan: twice weekly  Notify WOC if wound(s) deteriorate.  Nursing to notify the Provider(s) and re-consult the WOC Nurse if new skin concern.    DATA:     Current support surface: Standard  Low air loss (TORY pump, Isolibrium, Pulsate)  Containment of urine/stool: Incontinence Protocol  BMI: Body mass index is 23.63 kg/m .   Active diet order: Orders Placed This Encounter      Easy to Chew Diet (level 7) Mildly Thick (level 2) (ok for water protocol placed in room)     Output: I/O last 3 completed shifts:  In: 690 [P.O.:570; NG/GT:120]  Out: 750 [Urine:750]     Labs:   Recent Labs   Lab 08/07/24  0606 08/06/24  0527 08/05/24  2114 08/05/24  1155   ALBUMIN  --   --   --  2.2*   HGB  --  11.3*  --  10.5*   INR 1.35* 2.03*   < > 4.84*   WBC  --  20.9*  --  23.7*    < > = values in this interval  not displayed.     Pressure injury risk assessment:   Sensory Perception: 3-->slightly limited  Moisture: 3-->occasionally moist  Activity: 1-->bedfast  Mobility: 1-->completely immobile  Nutrition: 2-->probably inadequate  Friction and Shear: 1-->problem  Dilan Score: 11    SUZANNE JeffersonN, RN, PHN, HNB-BC, CWOCN  Pager no longer is use, please contact through [employer]: [facility]

## 2024-08-07 NOTE — PROGRESS NOTES
"INFECTIOUS DISEASE FOLLOW UP NOTE    Date: 2024   CHIEF COMPLAINT:   Chief Complaint   Patient presents with    Altered Mental Status    Hypotension        ASSESSMENT:    E coli ESBL bacteremia: 2/2 sacral ulcer. S/p bedside debridement with surgery. Necrotic tissue noted cultures sent-these will have polymicrobial growth. Repeat bedside I&D  small purulence. Vac currently in place.   strep salivarius bacteremia: noted on admission.  Likely 2/2 above  Sacral ulcer s/p bedside debridement, cultures pending.   ARIADNE on CKD  Hx CVA with residual dysphagia: increased risk for aspiration events, on room air. Unclear if actual infection however will be covered with treatment of sacral ulcer  DM2    PLAN:  - continue meropenem IV  - vanc IV  - follow BC, wound cultures from debridement  - can't get MRI  - surgery following, anticipate repeat debridement   - further recommendations to follow clinical course  - ID will follow, thanks    Rosalina Ramirez MD  [facility]   Office Telephone [phone].  Fax [phone]  [employer] paging    ______________________________________________________________________    SUBJECTIVE / INTERVAL HISTORY:  No fevers or chills. Out of ICU. Tolerating antibiotics.     ROS: All other systems negative except as listed above.    SH/FH/Habits/PMH reviewed and unchanged.    OBJECTIVE:  BP (!) 81/45 (BP Location: Right arm)   Pulse 66   Temp 97.6  F (36.4  C) (Oral)   Resp 18   Ht 1.753 m (5' 9\")   Wt 72.6 kg (160 lb)   SpO2 99%   BMI 23.63 kg/m       Resp: 18      Vital Signs  Temp: 97.6  F (36.4  C)  Temp src: Oral  Resp: 18  Pulse: 66  Pulse Rate Source: Monitor  BP: (!) 81/45  BP Location: Right arm    Temp (24hrs), Av  F (36.7  C), Min:97.4  F (36.3  C), Max:99.4  F (37.4  C)      GEN: No acute distress.    RESPIRATORY:  Normal breathing pattern.    EXTREMITIES: No edema.  SKIN/HAIR/NAILS: vac in place.   IV:  peripheral " "IV    Antibiotics:  Meropenem  Vanc IV    Pertinent labs:  No results found for: \"CRP\"   CBC RESULTS:   Recent Labs   Lab Test 08/06/24  0527   WBC 20.9*   RBC 3.65*   HGB 11.3*   HCT 35.1*   MCV 96   MCH 31.0   MCHC 32.2   RDW 14.5   *      Last Comprehensive Metabolic Panel:  Sodium   Date Value Ref Range Status   08/06/2024 141 135 - 145 mmol/L Final     Potassium   Date Value Ref Range Status   08/06/2024 3.8 3.4 - 5.3 mmol/L Final   11/29/2019 4.3 3.5 - 5.0 mmol/L Final     Chloride   Date Value Ref Range Status   08/06/2024 104 98 - 107 mmol/L Final   11/29/2019 107 98 - 107 mmol/L Final     Carbon Dioxide (CO2)   Date Value Ref Range Status   08/06/2024 24 22 - 29 mmol/L Final   11/29/2019 26 22 - 31 mmol/L Final     Anion Gap   Date Value Ref Range Status   08/06/2024 13 7 - 15 mmol/L Final   11/29/2019 7 5 - 18 mmol/L Final     Glucose   Date Value Ref Range Status   11/29/2019 126 (H) 70 - 125 mg/dL Final     GLUCOSE BY METER POCT   Date Value Ref Range Status   08/07/2024 116 (H) 70 - 99 mg/dL Final     Urea Nitrogen   Date Value Ref Range Status   08/06/2024 26.8 (H) 8.0 - 23.0 mg/dL Final   11/29/2019 10 8 - 28 mg/dL Final     Creatinine   Date Value Ref Range Status   08/07/2024 0.98 0.67 - 1.17 mg/dL Final     GFR Estimate   Date Value Ref Range Status   08/07/2024 75 >60 mL/min/1.73m2 Final     Comment:     eGFR calculated using 2021 CKD-EPI equation.   11/29/2019 >60 >60 mL/min/1.73m2 Final     Calcium   Date Value Ref Range Status   08/06/2024 7.8 (L) 8.8 - 10.4 mg/dL Final     Comment:     Reference intervals for this test were updated on 7/16/2024 to reflect our healthy population more accurately. There may be differences in the flagging of prior results with similar values performed with this method. Those prior results can be interpreted in the context of the updated reference intervals.        MICROBIOLOGY DATA:  Personally reviewed.  7-Day Micro Results       Collected Updated " Procedure Result Status      08/06/2024 1135 08/06/2024 1135 Anaerobic Bacterial Culture Routine [[id number]]   Tissue from Buttock, Gluteal Cleft    In process Component Value   No component results               08/06/2024 1135 08/07/2024 1135 Tissue Aerobic Bacterial Culture Routine With Gram Stain [[id number]]   (Abnormal)   Tissue from Buttock, Gluteal Cleft    Preliminary result Component Value   Culture Culture in progress  [P]    Gram Stain Result 4+ Gram positive cocci  [P]     4+ Gram negative cocci  [P]     3+ Gram positive bacilli  [P]     4+ Gram negative bacilli  [P]     4+ WBC seen  [P]     Predominantly PMNs               08/06/2024 1133 08/06/2024 1624 Wound Aerobic Bacterial Culture Routine With Gram Stain [[id number]]   (Abnormal)   Wound from Buttock, Gluteal Cleft    Preliminary result Component Value   Gram Stain Result 4+ Gram positive cocci  [P]     4+ WBC seen  [P]     Predominantly PMNs               08/06/2024 1133 08/06/2024 1437 Anaerobic Bacterial Culture Routine [[id number]]   Wound from Buttock, Gluteal Cleft    In process Component Value   No component results               08/06/2024 0416 08/07/2024 1009 Wound Aerobic Bacterial Culture Routine With Gram Stain [[id number]]   (Abnormal)   Wound from Spine, Coccyx    Preliminary result Component Value   Culture Culture in progress  [P]     2+ Escherichia coli  [P]    Gram Stain Result 4+ Gram positive cocci  [P]     1+ Gram positive bacilli  [P]     4+ WBC seen  [P]     Predominantly PMNs               08/06/2024 0416 08/07/2024 0916 Anaerobic Bacterial Culture Routine [[id number]]   Wound from Spine, Coccyx    Preliminary result Component Value   Culture No anaerobic organisms isolated after 1 day  [P]                08/05/2024 1722 08/05/2024 1817 Asymptomatic COVID-19 Virus (Coronavirus) by PCR Nasopharyngeal [[id number]]    Swab from Nasopharyngeal    Final result Component Value   SARS CoV2 PCR Negative   NEGATIVE:  SARS-CoV-2 (COVID-19) RNA not detected, presumed negative.            08/05/2024 1722 08/06/2024 0005 Respiratory Panel PCR [[id number]]    Swab from Nasopharyngeal    Final result Component Value   Adenovirus Not Detected   Coronavirus Not Detected   This test detects Coronavirus 229E, HKU1, NL63 and OC43 but does not distinguish between them. It does not detect MERS ( Respiratory Syndrome), SARS (Severe Acute Respiratory Syndrome) or 2019-nCoV (Novel 2019) Coronavirus.   Human Metapneumovirus Not Detected   Human Rhin/Enterovirus Not Detected   Influenza A Not Detected   Influenza A, H1 Not Detected   Influenza A 2009 H1N1 Not Detected   Influenza A, H3 Not Detected   Influenza B Not Detected   Parainfluenza Virus 1 Not Detected   Parainfluenza Virus 2 Not Detected   Parainfluenza Virus 3 Not Detected   Parainfluenza Virus 4 Not Detected   Respiratory Syncytial Virus A Not Detected   Respiratory Syncytial Virus B Not Detected   Chlamydia Pneumoniae Not Detected   Mycoplasma Pneumoniae Not Detected            08/05/2024 1554 08/06/2024 2132 Urine Culture [[id number]]    (Abnormal)   Urine, Clean Catch    Final result Component Value   Culture >100,000 CFU/mL Escherichia coli ESBL        Susceptibility        Escherichia coli ESBL      MARTITA      Amikacin <=2 ug/mL Susceptible  [*]       Ampicillin >=32 ug/mL Resistant      Ampicillin/ Sulbactam 4 ug/mL Susceptible  [*]       Cefazolin >=64 ug/mL Resistant  [1]       Cefepime  Resistant      Cefotaxime  Resistant  [*]       Cefoxitin <=4 ug/mL Susceptible      Ceftazidime  Resistant      Ceftriaxone  Resistant      Ciprofloxacin >=4 ug/mL Resistant      Extended Spectrum Beta-Lactamase Positive ug/mL ESBL Positive  [*]       Gentamicin <=1 ug/mL Susceptible      Levofloxacin >=8 ug/mL Resistant      Meropenem <=0.25 ug/mL Susceptible  [2]       Nitrofurantoin <=16 ug/mL Susceptible      Piperacillin/Tazobactam <=4 ug/mL Susceptible      Tobramycin  <=1 ug/mL Susceptible      Trimethoprim/Sulfamethoxazole >16/304 ug/mL Resistant                   [*]  Suppressed Antibiotic     [1]  Cefazolin MARTITA breakpoints are for the treatment of uncomplicated urinary tract infections. For the treatment of systemic infections, please contact the laboratory for additional testing.     [2]  Enterobacterales that are susceptible to meropenem are usually susceptible to ertapenem.               Susceptibility Comments       Escherichia coli ESBL    ESBL (extended spectrum beta lactamase) producing organisms require contact precautions.               08/05/2024 1318 08/07/2024 1036 Blood Culture Peripheral Blood [[id number]]   (Abnormal)   Peripheral Blood    Preliminary result Component Value   Culture Positive on the 1st day of incubation  [P]     Gram negative bacilli  [P]     1 of 2 bottles    Streptococcus salivarius group  [P]     1 of 2 bottles               08/05/2024 1318 08/06/2024 0909 Verigene GP Panel [[id number]]    Peripheral Blood    Final result Component Value   Staphylococcus species Not Detected   Staphylococcus aureus Not Detected   Staphylococcus epidermidis Not Detected   Staphylococcus lugdunensis Not Detected   Enterococcus faecalis Not Detected   Enterococcus faecium Not Detected   Streptococcus species Not Detected   Streptococcus agalactiae Not Detected   Streptococcus anginosus group Not Detected   Streptococcus pneumoniae Not Detected   Streptococcus pyogenes Not Detected   Listeria species Not Detected            08/05/2024 1318 08/06/2024 0848 Verigene GN Panel [[id number]]    (Abnormal)   Peripheral Blood    Final result Component Value   Acinetobacter species Not Detected   Citrobacter species Not Detected   Enterobacter species Not Detected   Proteus species Not Detected   Escherichia coli Detected   Positive for Escherichia coli by [employer] [employer]igene multiplex nucleic acid test. Final identification and antimicrobial susceptibility testing will be  verified by standard methods. Verigene test will not distinguish E. coli from Shigella species including Shigella dysenteriae, Shigella flexneri, Shigella boydii, and Shigella sonnei. Specimens containing Shigella species or E. coli will be reported as positive for E. coli.   Klebsiella pneumoniae Not Detected   Klebsiella oxytoca Not Detected   Pseudomonas aeruginosa Not Detected   CTX-M Detected   Positive for CTX-M Class A Extended Spectrum beta-lactamase (ESBL) resistance marker by Verigene multiplex nucleic acid test. CTX-M confers resistance to penicillins, cephalosporins and variable resistance to beta-lactamase inhibitor combinations.  Best empiric antibiotic choice is meropenem. Specific susceptibility testing will be performed.   KPC Not Detected   NDM Not Detected   VIM Not Detected   IMP Not Detected   OXA Not Detected                     RADIOLOGY:  Personally Reviewed.  Recent Results (from the past 24 hour(s))   XR Video Swallow with SLP or OT    Narrative    EXAM: XR VIDEO SWALLOW WITH SLP OR OT  LOCATION: [facility]  DATE: 8/6/2024    INDICATION: Difficulty swallowing.  COMPARISON: None.    TECHNIQUE: Routine swallow study with speech pathology using multiple barium thicknesses.    RADIATION DOSE: Total Air Kerma 5.1 mGy    FINDINGS:   Swallow study with Speech Pathology using multiple barium thicknesses. Silent aspiration with thin liquid, including with chin tuck. Aspiration also occurred with thin liquid and smaller drink without chin tuck. No other significant penetration or   aspiration.         Principal Problem:    Sepsis with acute renal failure and tubular necrosis without septic shock (H)  Active Problems:    Benign prostatic hyperplasia    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) (H)    Hyperlipidemia    Hypertension    Long term current use of anticoagulant therapy    S/P cardiac pacemaker procedure    S/P TAVR (transcatheter aortic valve  replacement)    Thrombophilia (H24)    Diabetes mellitus type 2, noninsulin dependent (H)    History of CVA (cerebrovascular accident)    Hypoxia    Hypotension, unspecified hypotension type    Community acquired pneumonia of left lower lobe of lung    Pressure injury of sacral region, stage 3 (H)    Leukocytosis, unspecified type  ;

## 2024-08-07 NOTE — PLAN OF CARE
Problem: Adult Inpatient Plan of Care  Goal: Absence of Hospital-Acquired Illness or Injury  Intervention: Identify and Manage Fall Risk  Recent Flowsheet Documentation  Taken 8/6/2024 1815 by Paula Aguiar  Safety Promotion/Fall Prevention:   assistive device/personal items within reach   increased rounding and observation   increase visualization of patient   safety round/check completed  Intervention: Prevent Skin Injury  Recent Flowsheet Documentation  Taken 8/6/2024 1815 by Paula Aguiar  Body Position:   turned   right  Intervention: Prevent Infection  Recent Flowsheet Documentation  Taken 8/6/2024 1815 by Paula Aguiar  Infection Prevention:   hand hygiene promoted   single patient room provided   equipment surfaces disinfected   personal protective equipment utilized   rest/sleep promoted  Goal: Optimal Comfort and Wellbeing  8/6/2024 2253 by Paula Aguiar  Outcome: Progressing  8/6/2024 2251 by Paula Aguiar  Outcome: Progressing     Problem: Risk for Delirium  Goal: Improved Attention and Thought Clarity  8/6/2024 2253 by Paula Aguiar  Outcome: Progressing  8/6/2024 2251 by Paula Aguiar  Outcome: Progressing     Problem: Skin Injury Risk Increased  Goal: Skin Health and Integrity  Intervention: Plan: Nurse Driven Intervention: Moisture Management  Recent Flowsheet Documentation  Taken 8/6/2024 1815 by Paula Aguiar  Moisture Interventions:   No brief in bed   Incontinence pad   Urinary collection device   Perineal cleanser  Intervention: Plan: Nurse Driven Intervention: Friction and Shear  Recent Flowsheet Documentation  Taken 8/6/2024 1815 by Paula Aguiar  Friction/Shear Interventions:   HOB 30 degrees or less   Assistive lifting device (portable/ceiling lift, etc.)  Intervention: Optimize Skin Protection  Recent Flowsheet Documentation  Taken 8/6/2024 1815 by Paula Aguiar  Activity Management: activity adjusted per tolerance  Head of Bed (HOB) Positioning: HOB at 20-30  degrees     Problem: Comorbidity Management  Goal: Blood Glucose Levels Within Targeted Range  Intervention: Monitor and Manage Glycemia  Recent Flowsheet Documentation  Taken 8/6/2024 1815 by Paula Aguiar  Medication Review/Management:   medications reviewed   high-risk medications identified  Goal: Maintenance of Heart Failure Symptom Control  Intervention: Maintain Heart Failure Management  Recent Flowsheet Documentation  Taken 8/6/2024 1815 by Paula Aguiar  Medication Review/Management:   medications reviewed   high-risk medications identified  Goal: Blood Pressure in Desired Range  Intervention: Maintain Blood Pressure Management  Recent Flowsheet Documentation  Taken 8/6/2024 1815 by Paula Aguiar  Medication Review/Management:   medications reviewed   high-risk medications identified     Problem: Infection  Goal: Absence of Infection Signs and Symptoms  Intervention: Prevent or Manage Infection  Recent Flowsheet Documentation  Taken 8/6/2024 1815 by Paula Aguiar  Isolation Precautions: contact precautions maintained   Goal Outcome Evaluation:  Patient alert and orientated x4. Soft BP. RA. Easy to chew, mildly thick and water protocol. Unable to move L side of body due to CVA. . G/J tube in place. Coude catheter in place. IV antibiotics. Wound care done on Coccyx. Turned Q2h.

## 2024-08-08 NOTE — PHARMACY-VANCOMYCIN DOSING SERVICE
Pharmacy Vancomycin Note  Date of Service 2024  Patient's  1935   88 year old, male    Indication: Skin and Soft Tissue Infection  Day of Therapy: 3  Current vancomycin regimen:  1250 mg IV q24h  Current vancomycin monitoring method: AUC  Current vancomycin therapeutic monitoring goal: 400-600 mg*h/L    InsightRX Prediction of Current Vancomycin Regimen  Regimen: 1250 mg IV every 24 hours.  Start time: 13:29 on 2024  Exposure target: AUC24 (range)400-600 mg/L.hr   AUC24,ss: 494 mg/L.hr  Probability of AUC24 > 400: 93 %  Ctrough,ss: 14.5 mg/L  Probability of Ctrough,ss > 20: 8 %  Probability of nephrotoxicity (Lodise MYRIAM ): 10 %      Current estimated CrCl = Estimated Creatinine Clearance: 60.2 mL/min (based on SCr of 0.9 mg/dL).    Creatinine for last 3 days  2024: 11:55 AM Creatinine 1.38 mg/dL;  9:14 PM Creatinine 1.22 mg/dL  2024:  5:27 AM Creatinine 1.12 mg/dL  2024:  6:06 AM Creatinine 0.98 mg/dL  2024:  4:32 AM Creatinine 0.90 mg/dL    Recent Vancomycin Levels (past 3 days)  2024:  5:41 AM Vancomycin 16.1 ug/mL    Vancomycin IV Administrations (past 72 hours)                     vancomycin (VANCOCIN) 1,250 mg in sodium chloride 0.9 % 250 mL intermittent infusion (mg) 1,250 mg New Bag 24 1329     1,250 mg New Bag 24 1405    vancomycin (VANCOCIN) 1,250 mg in sodium chloride 0.9 % 250 mL intermittent infusion (mg) 1,250 mg New Bag 24 1711                    Nephrotoxins and other renal medications (From now, onward)      Start     Dose/Rate Route Frequency Ordered Stop    24 1400  vancomycin (VANCOCIN) 1,250 mg in sodium chloride 0.9 % 250 mL intermittent infusion         1,250 mg  over 90 Minutes Intravenous EVERY 24 HOURS 24 1247                 Contrast Orders - past 72 hours (72h ago, onward)      Start     Dose/Rate Route Frequency Stop    24 1630  perflutren lipid microsphere (DEFINITY) injection SUSP 3 mL         3 mL  Intravenous ONCE 08/07/24 1628    08/06/24 1500  barium sulfate (VARIBAR THIN Liquid) 40 % oral suspension 24 g         60 mL Oral ONCE 08/06/24 1452    08/06/24 1500  barium sulfate 40% (VARIBAR NECTAR) oral suspension          Oral ONCE 08/06/24 1452    08/06/24 1500  barium sulfate 40% (VARIBAR THIN HONEY) oral suspension          Oral ONCE 08/06/24 1452    08/06/24 1500  barium sulfate (VARIBAR) 40 % pudding/paste 20 mL         20 mL Oral ONCE 08/06/24 1451    08/05/24 1500  iopamidol (ISOVUE-370) solution 75 mL         75 mL Intravenous ONCE 08/05/24 1438            Interpretation of levels and current regimen:  Vancomycin level is reflective of -600    Has serum creatinine changed greater than 50% in last 72 hours: No    Urine output:  good urine output    Renal Function: Stable    Plan:  Continue Current Dose  Vancomycin monitoring method: AUC  Vancomycin therapeutic monitoring goal: 400-600 mg*h/L  Pharmacy will check vancomycin levels as appropriate in 1-3 Days.  Serum creatinine levels will be ordered daily for the first week of therapy and at least twice weekly for subsequent weeks.    Taisha Fernandez, [facility]

## 2024-08-08 NOTE — PROGRESS NOTES
Care Management Follow Up    Length of Stay (days): 3    Expected Discharge Date: 08/13/2024    Anticipated Discharge Plan:   new apartment with 24/7 home care    Transportation: Confirmed medical transport    PT Recommendations: Long term care facility  OT Recommendations:  Long term care facility     Barriers to Discharge: medical stability, wound, infection    Prior Living Situation:     Patient has been in TCU at [facility] since June. He requires assistance with ADLs/IADLs, not ambulating and uses caroline lift for transfers.  He states goal is to discharge to new facility called [facility].  Patient states he would like Narda removed as contact; however, Epic will not allow name to be removed. Son Abdoulaye is primary family contact.      Met with Abdoulaye and friend-Herman and [facility] owner Nusrat Sandoval. They report plan is for patient to discharge to an apartment at [facility]. They state patient will have 24/7 care and are able to support total care assist and patient is not ready for hospice care. Nusrat states she has a Palliative home care agency that can provide nurse for wound cares.  She will provide palliative care agency name. She states she was working with [facility] regarding DME needed (wheelchair, hospital bed and caroline lift). She states the TCU was providing orders and has measured patient for wheelchair. Nusrat confirms she will be working on securing home DME. Patient has PEG tube.     Nusrat Sandoval [phone]     Patient will need [facility] Transportation at discharge. Notified Abdoulaye of potential cost.     Provided Abdoulaye and Herman with [facility] contact information to report their concerns they witnessed with care and lack of care being provided at TCU.       Patient/Spokesperson Updated: Yes. Who? 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  Pt was transferred to floor yesterday for the same.  He did not require pressors.  He was evaluated by surgery and underwent bedside surgical debridement of his sacral wounds.  Patient was also found to have ESBL E. coli in the urine, E. coli and strep salivarius in the blood.   Chest imaging showed left lower lobe atelectasis versus opacity, elevated left hemidiaphragm.    Surgery following for infected decubitus sacral ulcer s/p bedside debridement 8/6 and irrigating wound VAC placed on 8/7.   WOC RN following.  ID following: currently on IV on meropenem and vancomycin.      8/8/24:  [facility]. He will be paying privately for 24/7 home care with [facility]  Nusrat Sandoval [phone].  Nusrat has been working with [facility] on securing hospital bed, caroline lift and wheelchair.  She will be utilizing a Palliative home care agency for home care. She will be coordinating this home care. She will just need orders.  [facility] transportation needed at discharge-stretcher due to wound.     Wound vac placed yesterday.  Monitoring status and if continued will need wound vac order placed for home use.        Valentina Page RN

## 2024-08-08 NOTE — PLAN OF CARE
Goal Outcome Evaluation:      Plan of Care Reviewed With: patient          Outcome Evaluation: Pt BP stable no pressors. turned every 2 hours    [facility] - ICU    RN Progress Note:            Pertinent Assessments:      Please refer to flowsheet rows for full assessment     Blood pressure, wound, pain control/comfort           Key Events - This Shift:       Blood pressure monitored every hour and was WNL no pressors start.   Patient was turned every 2 hours tylenol given for pain times 1 with good relieve.                Barriers to Discharge / Downgrade:     wound

## 2024-08-08 NOTE — PLAN OF CARE
"  Problem: Skin Injury Risk Increased  Goal: Skin Health and Integrity  Outcome: Progressing  Intervention: Plan: Nurse Driven Intervention: Moisture Management  Recent Flowsheet Documentation  Taken 8/8/2024 1300 by Taisha Estrada RN  Moisture Interventions:   No brief in bed   Incontinence pad   Urinary collection device  Taken 8/8/2024 1145 by Taisha Estrada RN  Bathing/Skin Care:   back care   linen changed   bath, partial   incontinence care  Intervention: Plan: Nurse Driven Intervention: Friction and Shear  Recent Flowsheet Documentation  Taken 8/8/2024 1300 by Taisha Estrada RN  Friction/Shear Interventions: HOB 30 degrees or less  Intervention: Optimize Skin Protection  Recent Flowsheet Documentation  Taken 8/8/2024 1300 by Taisha Estrada RN  Pressure Reduction Techniques:   frequent weight shift encouraged   heels elevated off bed   positioned off wounds   pressure points protected   weight shift assistance provided  Pressure Reduction Devices: (pillows to offload) heel offloading device utilized  Skin Protection:   incontinence pads utilized   adhesive use limited  Taken 8/8/2024 1145 by Taisha Estrada RN  Activity Management: (Q2 repositioned) activity adjusted per tolerance  Intervention: Promote and Optimize Oral Intake  Recent Flowsheet Documentation  Taken 8/8/2024 1300 by Taisha Estrada RN  Nutrition Interventions:   food preferences provided   supplemental foods provided  Oral Nutrition Promotion:   calorie-dense liquids provided   rest periods promoted   Goal Outcome Evaluation:       /59 (BP Location: Right arm)   Pulse 69   Temp 98.2  F (36.8  C) (Oral)   Resp 19   Ht 1.753 m (5' 9\")   Wt 72.2 kg (159 lb 2.8 oz)   SpO2 98%   BMI 23.51 kg/m    Pt has transferred from ICU to .  A&O x4 family has been bedside.  Vanco scheduled for 1400 has been rescheduled  Medication not on hand, pharmacy to send, endorsed to oncoming RN.  WOC following, general surgery " "following  Telemetry remains in place, pt is V paced, SR.  Calorie count remains.   Contact precautions remain.  Wound vac patent and functioning well. 1700 CT table time (zoom cart required). \"Water protocol\" orders, follow dietary orders. Q2 repositioning. Pt is in bed appearing comfortable upon shift change. Zoey Estrada RN                  "

## 2024-08-08 NOTE — PROGRESS NOTES
"CLINICAL NUTRITION SERVICES - REASSESSMENT NOTE     Nutrition Prescription    RECOMMENDATIONS FOR MDs/PROVIDERS TO ORDER:      Malnutrition Status:    Moderate in chronic    Recommendations already ordered by Registered Dietitian (RD):  Calorie count  Add Thickened Ensure Enlive daily.    Future/Additional Recommendations:       EVALUATION OF THE PROGRESS TOWARD GOALS   Diet: Level 7: Easy to Chew Dysphagia Diet, mildly thick liquids (level 2).  Magic cup with meals  Expedite cup daily    Has G-J tube, not using.  Intake at meals varies 25-75%.    RN reports the Polish toast was too hard this am and patient not able to eat it.  Patient likes the magic cup.      NEW FINDINGS   Patient with G-J tube not currently used.  This was placed 5/31/24 at [facility] after CVA.  Per RD note, that admission, patient was on Jevity 1.5 nocturnal feeding 70 ml/hr x 12 hrs plus on po diet.  Patient then transferred to TCU.  Not able to reach the RD at the TCU by phone today.     Patient with PI-sacral, unstageable per WOC note of 8/7/24.     Met patient and patient's son.  Patient states the eggs were not soft today though the omelet has been soft.  Patient needing soft foods.  Patient reports that the Gastric tube has not been used for about a month.  The g-j was placed after patient's stroke due to dysphagia. Patient has been working on eating more.  Patient was losing some weight due to being on Ozempic but he is now off of Ozempic and his weight is more stable.  Patient likes the Expedite cup.     ANTHROPOMETRICS  Height: 175.3 cm (5' 9\")  Most Recent Weight: 72.2 kg (159 lb 2.8 oz)  -8/8/24  Admit wt 72.6 kg (160 lb) 8/5/24.  No significant wt loss found.  See RD note of 8/7/24.      Dosing Weight: 72.6 kg     ASSESSED NUTRITION NEEDS 8/8/24  Estimated Energy Needs: 1086-5816+ kcals/day (25 - 30+ kcals/kg)  Justification: Increased needs and Wound healing  Estimated Protein Needs: 109-145 grams protein/day (1.5- 2 grams of " pro/kg)  Justification: Wound healing  Estimated Fluid Needs: 1815+ mL/day (25+ mL/kg)   Justification: Maintenance        PHYSICAL FINDINGS  See malnutrition section below.  Pressure injury -sacral, unstageable.  WOC RN following.  PI-penis    GI CONCERNS  G-J tube placed 5/31/24.  -not currently used.  Last Bm 8/7/24.    LABS  Glucose range 144-197  Reviewed    MEDICATIONS  Reviewed    MALNUTRITION:  % Weight Loss:  None noted  % Intake:  Decreased intake does not meet criteria for malnutrition.  Not clear if po intake is decreased. Starting calorie counts today.  Subcutaneous Fat Loss:  Orbital region moderate depletion and Upper arm region moderate depletion-per RD note 8/7/24  Muscle Loss:  Temporal region moderate depletion, Clavicle bone region moderate depletion, Acromion bone region moderate depletion, and Scapular bone region moderate depletion-per RD note 8/7/24  Fluid Retention:  Trace ankles, feet-per RD note 8/7/24.    Malnutrition Diagnosis: Moderate malnutrition  In Context of:  Chronic illness or disease    Goals   Wound healing   Pt to meet >75% nutritional needs   Electrolytes WNL   BG < 180  Evaluation: progressing      CURRENT NUTRITION DIAGNOSIS  Increased nutrient needs related to healing as evidenced by wounds    INTERVENTIONS  Implementation  Collaboration with other providers-discussed plan with RN and patient.  Start calorie counts to document po intake.    Increase supplements-continue the magic cups with meals and sending Expedite cup 1x/day.   Start Thickened Ensure Enlive with meals.  Recommend fluid flush per G-J for tube patency.     Consider nocturnal TF.    Monitoring/Evaluation  Progress toward goals will be monitored and evaluated per protocol.

## 2024-08-08 NOTE — PROGRESS NOTES
Speech Language Therapy Discharge Summary    Reason for therapy discharge:    All goals and outcomes met, no further needs identified.    Progress towards therapy goal(s). See goals on Care Plan in [facility] electronic health record for goal details.  Goals met    Therapy recommendation(s):    No further therapy is recommended.Continue LRD of ETC/mildly thick with water protocol.Written instructions for diet, strategies, and water protocol are in the room. Family was also given handouts and sample thickener.

## 2024-08-08 NOTE — PROGRESS NOTES
"INFECTIOUS DISEASE FOLLOW UP NOTE    Date: 2024   CHIEF COMPLAINT:   Chief Complaint   Patient presents with    Altered Mental Status    Hypotension        ASSESSMENT:    E coli ESBL bacteremia: 2/2 sacral ulcer. S/p bedside debridement with surgery. Necrotic tissue noted. Repeat bedside I&D  small purulence. Vac currently in place.   strep salivarius bacteremia: noted on admission.  Likely 2/2 above  Sacral ulcer s/p bedside debridement, cultures with ecoli, staph aureus, bacteroides so far.   ARIADNE on CKD  Hx CVA with residual dysphagia: increased risk for aspiration events, on room air. Unclear if actual infection however will be covered with treatment of sacral ulcer  DM2    PLAN:  - continue meropenem IV  - continue vanc IV  - follow BC, wound cultures from debridement  - can't get MRI, will try CT to evaluate for osteo  - wound vac in place  - further recommendations to follow clinical course  - ID will follow, thanks    Rosalina Ramirez MD  [facility]   Office Telephone [phone].  Fax [phone]  Amcom paging    ______________________________________________________________________    SUBJECTIVE / INTERVAL HISTORY:  Into ICU yesterday now back out. Tolerating antibiotics. Buttocks feeling a lot better today.     ROS: All other systems negative except as listed above.    SH/FH/Habits/PMH reviewed and unchanged.    OBJECTIVE:  /58 (BP Location: Right arm)   Pulse 69   Temp 98  F (36.7  C) (Axillary)   Resp 20   Ht 1.753 m (5' 9\")   Wt 72.2 kg (159 lb 2.8 oz)   SpO2 98%   BMI 23.51 kg/m       Resp: 20      Vital Signs  Temp: 97.6  F (36.4  C)  Temp src: Oral  Resp: 18  Pulse: 66  Pulse Rate Source: Monitor  BP: (!) 81/45  BP Location: Right arm    Temp (24hrs), Av  F (36.7  C), Min:97.4  F (36.3  C), Max:99.4  F (37.4  C)      GEN: No acute distress.    RESPIRATORY:  Normal breathing pattern.    EXTREMITIES: No edema.  SKIN/HAIR/NAILS: vac in place.   IV:  " "peripheral IV    Antibiotics:  Meropenem  Vanc IV    Pertinent labs:  No results found for: \"CRP\"   CBC RESULTS:   Recent Labs   Lab Test 08/06/24  0527   WBC 20.9*   RBC 3.65*   HGB 11.3*   HCT 35.1*   MCV 96   MCH 31.0   MCHC 32.2   RDW 14.5   *      Last Comprehensive Metabolic Panel:  Sodium   Date Value Ref Range Status   08/06/2024 141 135 - 145 mmol/L Final     Potassium   Date Value Ref Range Status   08/06/2024 3.8 3.4 - 5.3 mmol/L Final   11/29/2019 4.3 3.5 - 5.0 mmol/L Final     Chloride   Date Value Ref Range Status   08/06/2024 104 98 - 107 mmol/L Final   11/29/2019 107 98 - 107 mmol/L Final     Carbon Dioxide (CO2)   Date Value Ref Range Status   08/06/2024 24 22 - 29 mmol/L Final   11/29/2019 26 22 - 31 mmol/L Final     Anion Gap   Date Value Ref Range Status   08/06/2024 13 7 - 15 mmol/L Final   11/29/2019 7 5 - 18 mmol/L Final     Glucose   Date Value Ref Range Status   11/29/2019 126 (H) 70 - 125 mg/dL Final     GLUCOSE BY METER POCT   Date Value Ref Range Status   08/08/2024 197 (H) 70 - 99 mg/dL Final     Urea Nitrogen   Date Value Ref Range Status   08/06/2024 26.8 (H) 8.0 - 23.0 mg/dL Final   11/29/2019 10 8 - 28 mg/dL Final     Creatinine   Date Value Ref Range Status   08/08/2024 0.90 0.67 - 1.17 mg/dL Final     GFR Estimate   Date Value Ref Range Status   08/08/2024 83 >60 mL/min/1.73m2 Final     Comment:     eGFR calculated using 2021 CKD-EPI equation.   11/29/2019 >60 >60 mL/min/1.73m2 Final     Calcium   Date Value Ref Range Status   08/06/2024 7.8 (L) 8.8 - 10.4 mg/dL Final     Comment:     Reference intervals for this test were updated on 7/16/2024 to reflect our healthy population more accurately. There may be differences in the flagging of prior results with similar values performed with this method. Those prior results can be interpreted in the context of the updated reference intervals.        MICROBIOLOGY DATA:  Personally reviewed.  7-Day Micro Results       Collected " Updated Procedure Result Status      08/07/2024 1651 08/08/2024 1101 Blood Culture Peripheral Blood [[id number]]    Peripheral Blood    Preliminary result Component Value   Culture No growth after 12 hours  [P]                08/06/2024 1135 08/08/2024 1337 Anaerobic Bacterial Culture Routine [[id number]]    (Abnormal)   Tissue from Buttock, Gluteal Cleft    Preliminary result Component Value   Culture Culture in progress  [P]     4+ Bacteroides fragilis  [P]     Susceptibilities not routinely done, refer to antibiogram to view typical susceptibility profiles               08/06/2024 1135 08/08/2024 1143 Tissue Aerobic Bacterial Culture Routine With Gram Stain [[id number]]   (Abnormal)   Tissue from Buttock, Gluteal Cleft    Final result Component Value   Culture 2+ Escherichia coli    Susceptibilities done on previous cultures    1+ Staphylococcus aureus    Susceptibilities done on previous cultures    3+ Normal surinder   Gram Stain Result 4+ Gram positive cocci    4+ Gram negative cocci    3+ Gram positive bacilli    4+ Gram negative bacilli    4+ WBC seen    Predominantly PMNs               08/06/2024 1133 08/08/2024 1124 Wound Aerobic Bacterial Culture Routine With Gram Stain [[id number]]   (Abnormal)   Wound from Buttock, Gluteal Cleft    Preliminary result Component Value   Culture 2+ Escherichia coli  [P]     Susceptibilities done on previous cultures    1+ Staphylococcus aureus  [P]     2+ Normal surinder  [P]    Gram Stain Result 4+ Gram positive cocci  [P]     4+ WBC seen  [P]     Predominantly PMNs               08/06/2024 1133 08/08/2024 1338 Anaerobic Bacterial Culture Routine [[id number]]   (Abnormal)   Wound from Buttock, Gluteal Cleft    Preliminary result Component Value   Culture Culture in progress  [P]     4+ Bacteroides fragilis  [P]     Susceptibilities not routinely done, refer to antibiogram to view typical susceptibility profiles               08/06/2024 0416 08/08/2024 0744 Wound  Aerobic Bacterial Culture Routine With Gram Stain [[id number]]    (Abnormal)   Wound from Spine, Coccyx    Final result Component Value   Culture 2+ Escherichia coli ESBL    3+ Normal surinder   Gram Stain Result 4+ Gram positive cocci    1+ Gram positive bacilli    4+ WBC seen    Predominantly PMNs        Susceptibility        Escherichia coli ESBL      MARTITA      Amikacin <=2 ug/mL Susceptible  [*]       Ampicillin >=32 ug/mL Resistant      Ampicillin/ Sulbactam 4 ug/mL Susceptible  [*]       Cefepime  Resistant      Cefotaxime  Resistant  [*]       Cefoxitin <=4 ug/mL Susceptible  [*]       Ceftazidime  Resistant      Ceftriaxone  Resistant      Ciprofloxacin >=4 ug/mL Resistant      Extended Spectrum Beta-Lactamase Positive ug/mL ESBL Positive  [*]       Gentamicin <=1 ug/mL Susceptible      Levofloxacin >=8 ug/mL Resistant      Meropenem <=0.25 ug/mL Susceptible  [1]       Nitrofurantoin <=16 ug/mL Susceptible  [*]       Piperacillin/Tazobactam <=4 ug/mL Susceptible      Tobramycin <=1 ug/mL Susceptible      Trimethoprim/Sulfamethoxazole <=1/19 ug/mL Susceptible                   [*]  Suppressed Antibiotic     [1]  Enterobacterales that are susceptible to meropenem are usually susceptible to ertapenem.               Susceptibility Comments       Escherichia coli ESBL    ESBL (extended spectrum beta lactamase) producing organisms require contact precautions.               08/06/2024 0416 08/08/2024 1328 Anaerobic Bacterial Culture Routine [[id number]]   (Abnormal)   Wound from Spine, Coccyx    Preliminary result Component Value   Culture Culture in progress  [P]     4+ Bacteroides fragilis  [P]     Susceptibilities not routinely done, refer to antibiogram to view typical susceptibility profiles               08/05/2024 1722 08/05/2024 1817 Asymptomatic COVID-19 Virus (Coronavirus) by PCR Nasopharyngeal [[id number]]    Swab from Nasopharyngeal    Final result Component Value   SARS CoV2 PCR Negative   NEGATIVE:  SARS-CoV-2 (COVID-19) RNA not detected, presumed negative.            08/05/2024 1722 08/06/2024 0005 Respiratory Panel PCR [[id number]]    Swab from Nasopharyngeal    Final result Component Value   Adenovirus Not Detected   Coronavirus Not Detected   This test detects Coronavirus 229E, HKU1, NL63 and OC43 but does not distinguish between them. It does not detect MERS ( Respiratory Syndrome), SARS (Severe Acute Respiratory Syndrome) or 2019-nCoV (Novel 2019) Coronavirus.   Human Metapneumovirus Not Detected   Human Rhin/Enterovirus Not Detected   Influenza A Not Detected   Influenza A, H1 Not Detected   Influenza A 2009 H1N1 Not Detected   Influenza A, H3 Not Detected   Influenza B Not Detected   Parainfluenza Virus 1 Not Detected   Parainfluenza Virus 2 Not Detected   Parainfluenza Virus 3 Not Detected   Parainfluenza Virus 4 Not Detected   Respiratory Syncytial Virus A Not Detected   Respiratory Syncytial Virus B Not Detected   Chlamydia Pneumoniae Not Detected   Mycoplasma Pneumoniae Not Detected            08/05/2024 1554 08/06/2024 2132 Urine Culture [[id number]]    (Abnormal)   Urine, Clean Catch    Final result Component Value   Culture >100,000 CFU/mL Escherichia coli ESBL        Susceptibility        Escherichia coli ESBL      MARTITA      Amikacin <=2 ug/mL Susceptible  [*]       Ampicillin >=32 ug/mL Resistant      Ampicillin/ Sulbactam 4 ug/mL Susceptible  [*]       Cefazolin >=64 ug/mL Resistant  [1]       Cefepime  Resistant      Cefotaxime  Resistant  [*]       Cefoxitin <=4 ug/mL Susceptible      Ceftazidime  Resistant      Ceftriaxone  Resistant      Ciprofloxacin >=4 ug/mL Resistant      Extended Spectrum Beta-Lactamase Positive ug/mL ESBL Positive  [*]       Gentamicin <=1 ug/mL Susceptible      Levofloxacin >=8 ug/mL Resistant      Meropenem <=0.25 ug/mL Susceptible  [2]       Nitrofurantoin <=16 ug/mL Susceptible      Piperacillin/Tazobactam <=4 ug/mL Susceptible      Tobramycin  <=1 ug/mL Susceptible      Trimethoprim/Sulfamethoxazole >16/304 ug/mL Resistant                   [*]  Suppressed Antibiotic     [1]  Cefazolin MARTITA breakpoints are for the treatment of uncomplicated urinary tract infections. For the treatment of systemic infections, please contact the laboratory for additional testing.     [2]  Enterobacterales that are susceptible to meropenem are usually susceptible to ertapenem.               Susceptibility Comments       Escherichia coli ESBL    ESBL (extended spectrum beta lactamase) producing organisms require contact precautions.               08/05/2024 1318 08/08/2024 0813 Blood Culture Peripheral Blood [[id number]]    (Abnormal)   Peripheral Blood    Final result Component Value   Culture Positive on the 1st day of incubation    Escherichia coli ESBL    1 of 2 bottles    Streptococcus salivarius group    1 of 2 bottles        Susceptibility        Escherichia coli ESBL      MARTITA      Amikacin <=2 ug/mL Susceptible  [*]       Ampicillin >=32 ug/mL Resistant      Ampicillin/ Sulbactam 4 ug/mL Susceptible  [*]       Cefepime  Resistant      Cefotaxime  Resistant  [*]       Cefoxitin <=4 ug/mL Susceptible  [*]       Ceftazidime  Resistant      Ceftriaxone  Resistant      Ciprofloxacin >=4 ug/mL Resistant      Extended Spectrum Beta-Lactamase Positive ug/mL ESBL Positive  [*]       Gentamicin <=1 ug/mL Susceptible      Levofloxacin >=8 ug/mL Resistant      Meropenem <=0.25 ug/mL Susceptible  [1]       Nitrofurantoin <=16 ug/mL Susceptible  [*]       Piperacillin/Tazobactam <=4 ug/mL Susceptible      Tobramycin <=1 ug/mL Susceptible      Trimethoprim/Sulfamethoxazole <=1/19 ug/mL Susceptible                   [*]  Suppressed Antibiotic     [1]  Enterobacterales that are susceptible to meropenem are usually susceptible to ertapenem.               Susceptibility        Streptococcus salivarius group      MARTITA      Ampicillin 1 ug/mL Intermediate      Cefotaxime 1 ug/mL  Susceptible      Ceftriaxone 1 ug/mL Susceptible      Clindamycin <=0.06 ug/mL Susceptible      Erythromycin <=0.06 ug/mL Susceptible  [*]       Meropenem 0.12 ug/mL Susceptible      Penicillin 1 ug/mL Intermediate      Vancomycin 0.5 ug/mL Susceptible                   [*]  Suppressed Antibiotic               Susceptibility Comments       Escherichia coli ESBL    ESBL (extended spectrum beta lactamase) producing organisms require contact precautions.               08/05/2024 1318 08/06/2024 0909 Verigene GP Panel [[id number]]    Peripheral Blood    Final result Component Value   Staphylococcus species Not Detected   Staphylococcus aureus Not Detected   Staphylococcus epidermidis Not Detected   Staphylococcus lugdunensis Not Detected   Enterococcus faecalis Not Detected   Enterococcus faecium Not Detected   Streptococcus species Not Detected   Streptococcus agalactiae Not Detected   Streptococcus anginosus group Not Detected   Streptococcus pneumoniae Not Detected   Streptococcus pyogenes Not Detected   Listeria species Not Detected            08/05/2024 1318 08/06/2024 0848 Verigene GN Panel [[id number]]    (Abnormal)   Peripheral Blood    Final result Component Value   Acinetobacter species Not Detected   Citrobacter species Not Detected   Enterobacter species Not Detected   Proteus species Not Detected   Escherichia coli Detected   Positive for Escherichia coli by Verigene multiplex nucleic acid test. Final identification and antimicrobial susceptibility testing will be verified by standard methods. Verigene test will not distinguish E. coli from Shigella species including Shigella dysenteriae, Shigella flexneri, Shigella boydii, and Shigella sonnei. Specimens containing Shigella species or E. coli will be reported as positive for E. coli.   Klebsiella pneumoniae Not Detected   Klebsiella oxytoca Not Detected   Pseudomonas aeruginosa Not Detected   CTX-M Detected   Positive for CTX-M Class A Extended Spectrum  beta-lactamase (ESBL) resistance marker by [employer] multiplex nucleic acid test. CTX-M confers resistance to penicillins, cephalosporins and variable resistance to beta-lactamase inhibitor combinations.  Best empiric antibiotic choice is meropenem. Specific susceptibility testing will be performed.   KPC Not Detected   NDM Not Detected   VIM Not Detected   IMP Not Detected   OXA Not Detected                     RADIOLOGY:  Personally Reviewed.  Recent Results (from the past 24 hour(s))   XR Video Swallow with SLP or OT    Narrative    EXAM: XR VIDEO SWALLOW WITH SLP OR OT  LOCATION: [facility]  DATE: 8/6/2024    INDICATION: Difficulty swallowing.  COMPARISON: None.    TECHNIQUE: Routine swallow study with speech pathology using multiple barium thicknesses.    RADIATION DOSE: Total Air Kerma 5.1 mGy    FINDINGS:   Swallow study with Speech Pathology using multiple barium thicknesses. Silent aspiration with thin liquid, including with chin tuck. Aspiration also occurred with thin liquid and smaller drink without chin tuck. No other significant penetration or   aspiration.         Principal Problem:    Sepsis with acute renal failure and tubular necrosis without septic shock (H)  Active Problems:    Benign prostatic hyperplasia    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) (H)    Hyperlipidemia    Hypertension    Long term current use of anticoagulant therapy    S/P cardiac pacemaker procedure    S/P TAVR (transcatheter aortic valve replacement)    Thrombophilia (H24)    Diabetes mellitus type 2, noninsulin dependent (H)    History of CVA (cerebrovascular accident)    Hypoxia    Hypotension, unspecified hypotension type    Community acquired pneumonia of left lower lobe of lung    Pressure injury of sacral region, stage 3 (H)    Leukocytosis, unspecified type  ;

## 2024-08-08 NOTE — PROGRESS NOTES
[facility] - ICU    RN Progress Note:            Pertinent Assessments:      Please refer to flowsheet rows for full assessment     A/Ox4, some confusion noted - easily reorientable. No c/o pain. Wound vac to coccyx with no drainage noted. Javier with 250mL, sediment/mucous noted. GJT clamped - dressing changed. Tolerated PO intake.           Key Events - This Shift:       Transferred from  approx 1800 - VSS on RA.                 Barriers to Discharge / Downgrade:     Transferred to ICU in evening.         Point of Contact Update: YES-OR-NO: Yes  Son updated at bedside

## 2024-08-08 NOTE — PROGRESS NOTES
"General Surgery Progress Note:    Hospital Day # 3    ASSESSMENT:   1. Pressure injury of sacral region, unstageable (H)    2. Community acquired pneumonia of left lower lobe of lung    3. Pressure injury of sacral region, stage 3 (H)    4. Hypotension, unspecified hypotension type    5. Hypoxia        Franklyn Sorto is a 88 year old male with history of multiple comorbidities including CVA and DMT2 who currently admitted for sepsis 2/2 UTI vs aspiration PNA vs infected decubitus sacral ulcer s/p bedside debridement 8/6 and irrigating wound VAC placed on 8/7.  Patient with hypotension yesterday afternoon requiring transfer to ICU for close monitoring and possible vasopressors. Hemodynamically stable off pressors this morning. Wound and tissue cultures growing ESBL E coli, 4+ GPC's, 4+ GN cocci, 3+ GP bacilli, 4+ GNR's. Urine culture and blood cultures grew ESBL E coli and Strep salivarius group. Patient on meropenem and vancomycin, ID following.     PLAN:   - Wound vac per WOC. Plan for next vac change, tomorrow 8/9  - Continue antibiotics per ID  - Appreciate excellent ICU care  - General surgery will continue to follow    SUBJECTIVE:   Franklyn Sorto is doing okay. Endorses mild sacral discomfort. Denies fever, chills, nausea, vomiting. Frustrated with diet and having thickened liquids. Looking forward to trying Georgian toast for breakfast. Wound vac in place holding suction.    Patient Vitals for the past 24 hrs:   BP Temp Temp src Pulse Resp SpO2 Height Weight   08/08/24 0700 103/51 -- -- 69 13 97 % -- --   08/08/24 0600 112/55 -- -- 69 13 93 % -- --   08/08/24 0500 104/55 -- -- 69 11 96 % -- --   08/08/24 0400 109/54 98  F (36.7  C) Oral 69 15 96 % 1.753 m (5' 9\") 72.2 kg (159 lb 2.8 oz)   08/08/24 0300 113/54 -- -- 69 16 97 % -- --   08/08/24 0200 94/55 -- -- 69 15 96 % -- --   08/08/24 0100 (!) 89/54 -- -- 68 13 95 % -- --   08/08/24 0000 95/49 98.2  F (36.8  C) Oral 69 13 96 % -- --   08/07/24 2300 95/49 " -- -- 69 13 95 % -- --   08/07/24 2200 91/52 -- -- 69 16 97 % -- --   08/07/24 2145 -- -- -- 69 16 96 % -- --   08/07/24 2130 93/50 -- -- 69 16 96 % -- --   08/07/24 2100 96/54 -- -- 73 16 96 % -- --   08/07/24 2030 102/57 -- -- 70 25 98 % -- --   08/07/24 2000 91/50 97.6  F (36.4  C) Oral 69 (!) 34 96 % -- --   08/07/24 1930 102/55 -- -- 69 21 96 % -- --   08/07/24 1900 103/51 -- -- 69 19 97 % -- --   08/07/24 1830 98/53 -- -- 69 13 96 % -- --   08/07/24 1800 97/55 -- -- 70 16 96 % -- --   08/07/24 1754 104/60 98.7  F (37.1  C) Oral 70 19 96 % -- --   08/07/24 1634 98/51 -- -- 69 -- -- -- --   08/07/24 1514 96/65 98.1  F (36.7  C) Oral 70 18 94 % -- --   08/07/24 1443 (!) 84/45 -- -- -- -- -- -- --   08/07/24 1131 (!) 81/45 97.6  F (36.4  C) Oral 66 18 99 % -- --       Physical Exam:  General: patient seen resting in bed, no acute distress  Resp: no respiratory distress, breathing comfortably on room air  Abdomen: Soft, non-tender.  Sacrum: Irrigating wound vac in place holding suction.  Extremities: warm and well perfused    No results displayed because visit has over 200 results.           Lesley Zarate PA-C  [facility] General Surgery  [address]  [place] [address]

## 2024-08-08 NOTE — PROGRESS NOTES
[facility]    Medicine Progress Note - Hospitalist Service    Date of Admission:  8/5/2024    Assessment & Plan   Franklyn Sorto is 88 year old male with history of HFrEF, hyperlipidemia, hypertension, aortic stenosis status post TAVR, thrombophilia, type 2 diabetes, stage III sacral ulcer, CAD, prior CVA admitted on 6/8/2024 with altered mental status & hypotension secondary to sepsis possibly from urinary tract infection, aspiration pneumonia and suspected infected, unstageable sacral ulcer.    He was managed briefly at the ICU by instensivist on admission but did not require vasopressor. Hypotension and ARIADNE improved with IV hydration and albumin infusion.  Urine culture grew E. coli.  Blood culture grew ESBL E Coli & Streptococcus salivarius. He is receiving IV meropenem and vancomycin per ID recommendation. Debridement of sacral ulcer was performed by surgery service on 8/6/2024. He was transferred to the medical floor on 8/6/24 given improved BP. He became hypotensive again on 8/7/24, therefore intensivist was re-consulted for possible septic shock and management with vasopressor. On 8/8/2024, he was stable prompting transfer to floor    Sepsis  Infected decubitus ulcer   UTI  Polymicrobial bacteremia  - S/p debridement 8/6/2024 with Cx polymicrobial (E Coli, B Fragilis, Staph Aureus)  - Ucx ESBLI E Coli & Strep salivarius  - Unable to obtain MRI secondary to intracardiac lead. CT Pelvis W without any signs of osteo or abscess  Plan  - Continue vanc IV (pharm to dose) + meropenem 1g q8h IV per ID  - General surgery with plans for wound vac change tomorrow, 8/9/2024  - Follow repeat Bcx 8/7/2024    Supratherapeutic INR   -Received IV 5mg given in the ER  -Monitor INR  -Warfarin per pharmacist     History of aspiration and dysphagia   Aspiration PNA vs CAP LLL infiltrate but H/O aspiration after CVA   -Mildly thick diet per speech therapist   -Video swallow study revealed silent  aspiration with thin liquid, including with chin tuck   -IV vancomycin and meropenem as above     Recent acute ischemic stroke (5/25/2024)  Residual left Hemiparesis   Recent admission 5/25-6/7/2024 at [facility] for ischemic stroke; discharged to TCU   - no improvement in left hemiparesis   - PT/OT      History of the left upper extremity DVT with thrombophilia (positive antiphospholipid antibodies, 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)  - Pharmacy to dose warfarin goal 2-3   - INR-improved     Diabetes mellitus type 2, noninsulin dependent    - novolog sliding scale Q4hrs for now  - holding home oral meds      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   H/o HFrEF   CAD   - History of distal RCA stent, last echo 8/2019 with EF 50-55%,   - BNP is elevated but does appear to be fluid overloaded actually appears very dry on exam   - hold all BP meds since hypotensive   -Follow up echocardiogram      Rheumatoid arthritis/Chronic Pain -   - holding homemeds  for now since hypotensive      Gout  - holding meds until SLP assess   GERD (gastroesophageal reflux disease)/Hx PUD   - IV PPI      Clinically Significant Risk Factors Present on Admission              # Hypoalbuminemia: Lowest albumin = 2.2 g/dL at 8/5/2024 11:55 AM, will monitor as appropriate     # Drug Induced Coagulation Defect: home medication list includes an anticoagulant medication  # Drug Induced Platelet Defect: home medication list includes an antiplatelet medication  # Acute Kidney Injury, unspecified: based on a >150% or 0.3 mg/dL increase in last creatinine compared to past 90 day average, will monitor renal function  # Hypertension: Noted on problem list    # Anemia: based on hgb <11    Diet: Easy to Chew Diet (level 7) Mildly Thick (level 2) (ok for water protocol placed in room)  Snacks/Supplements Adult: Magic Cup; With Meals  Snacks/Supplements Adult: Expedite Cup; With Meals  Room Service  Calorie  Counts  Snacks/Supplements Adult: Ensure Enlive; With Meals    DVT Prophylaxis: Warfarin  Javier Catheter: PRESENT, indication: Acute retention or obstruction  Lines: None     Cardiac Monitoring: ACTIVE order. Indication: sepsis  Code Status: No CPR- Do NOT Intubate      Clinically Significant Risk Factors              # Hypoalbuminemia: Lowest albumin = 2.2 g/dL at 8/5/2024 11:55 AM, will monitor as appropriate    # Coagulation Defect: INR = 1.44 (Ref range: 0.85 - 1.15) and/or PTT = 54 Seconds (Ref range: 22 - 38 Seconds), will monitor for bleeding  # Thrombocytopenia: Lowest platelets = 92 in last 2 days, will monitor for bleeding   # Hypertension: Noted on problem list           # DMII: A1C = N/A within past 6 months         # Financial/Environmental Concerns:           Disposition Plan     Medically Ready for Discharge: Anticipated in 2-4 Days    Carlie Tavares MD  Hospitalist Service  [facility]  Securely message with [employer]rie[employer] (more info)  Text page via tabulate Paging/Directory   ______________________________________________________________________    Interval History   No overnight events. On evaluation this morning while in ICU, he is resting in the bed comfortably accompanied by his friend at bedside. He reports doing well aside from some sacral pain. No fevers, chills, HA, dizziness, new back pain/joint pain, dysuria, diarrhea, rashes.     Physical Exam   Vital Signs: Temp: 98  F (36.7  C) Temp src: Oral BP: 103/51 Pulse: 69   Resp: 13 SpO2: 97 % O2 Device: None (Room air)    Weight: 159 lbs 2.75 oz    General appearance: Frail, awake, Alert, Cooperative, not in any obvious distress and appears stated age   HEENT: Normocephalic, atraumatic, conjunctiva clear without icterus and ears without discharge  Lungs: Bilateral rhonchi  Cardiovascular: Regular Rate and Rythm, normal apical impulse, normal S1 and S2, no lower extremity edema bilaterally  Abdomen: Soft, non-tender and  Non-distended, active bowel sounds  : Javier catheter in place, draining clear urine.   Skin: Ecchymosis in the right upper extremity, unstageable sacral ulcer-the media section for details.   Musculoskeletal: No bony deformities or joint tenderness. Normal ROM upon flexion & extension.   Neurologic: Alert & Oriented X 3, Facial symmetry preserved and upper & lower extremities moving well with symmetry  Psychiatric: Unable to assess      Medical Decision Making       40 MINUTES SPENT BY ME on the date of service doing chart review, history, exam, documentation & further activities per the note.      Data     I have personally reviewed the following data over the past 24 hrs:    16.1 (H)  \   10.8 (L)   / 92 (L)     N/A N/A N/A /  144 (H)   N/A N/A 0.90 \     Trop: N/A BNP: N/A     Procal: N/A CRP: N/A Lactic Acid: 1.4       INR:  1.44 (H) PTT:  N/A   D-dimer:  N/A Fibrinogen:  N/A       Imaging results reviewed over the past 24 hrs:   Recent Results (from the past 24 hour(s))   Echocardiogram Complete    Narrative    [id number]  [id number]  [id number]  [id number]^CHRIS^ESTEBAN^[place] [address]     Name: GOLDY PARSON  MRN: [id number]  : 1935  Study Date: 2024 03:56 PM  Age: 88 yrs  Gender: Male  Patient Location: [facility]  Reason For Study: CHF  Ordering Physician: ESTEBAN PEREZ  Performed By: KS     BSA: 1.9 m2  Height: 69 in  Weight: 160 lb  HR: 70  BP: 96/65 mmHg  ______________________________________________________________________________  Procedure  Complete Portable Echo Adult. Definity (NDC #00433-103) given intravenously.  ______________________________________________________________________________  Interpretation Summary     1. Technically difficult study despite utilization of ultrasound enhancing  agent.  2. The left ventricle is normal in size. Image quality does not provide for  detailed assessment of LV systolic function, but is  felt to be normal with a  visually estimated ejection fraction of roughly 55-60%.  3. There is abnormal septal motion consistent with ventricular paced rhythm.  4. There is a bio-prosthetic aortic valve (documented 31 mm Medtronic  CoreValve tissue valve).  Â  Normal aortic valve prosthesis metrics with a mean systolic gradient of 8  mmHg and a peak anterograde velocity of 1.7 m/sec.  Â  No aortic insufficiency is detected.  5. Probable normal right ventricular size and systolic performance though  right-sided structures are not clearly visualized on all views on this study.  6. The left atrium appears enlarged though difficult to quantify due to  suboptimal acoustic imaging.     The acoustic quality of this study is exceedingly poor. If a more accurate  assessment of left ventricular systolicperformance, regional wall motion, and  other morphology/function is required; would recommend cardiac MRI or other  alternative imaging modality for further evaluation.  ______________________________________________________________________________  Left ventricle:  The left ventricle is normal in size. Image quality does not provide for  detailed assessment of LV systolic function, but is felt to be normal with a  visually estimated ejection fraction of roughly 55-60%. There is abnormal  septal motion consistent with ventricular paced rhythm. Left ventricular wall  thickness is normal.     Assessment of LV Diastolic Function: The cumulative findings are indeterminate  in the evaluation of diastolic function     Right ventricle:  Probable normal right ventricular size and systolic performance though right-  sided structures are not clearly visualized on all views on this study. There  is a pacing electrode noted in the right-sided chambers.     Left atrium:  The left atrium appears enlarged though difficult to quantify due to  suboptimal acoustic imaging.     Right atrium:  The right atrium is not well-visualized.      IVC:  The IVC is mildly dilated.     Aortic valve:  There is a bio-prosthetic aortic valve (documented 31 mm Medtronic CoreValve  tissue valve). Normal aortic valve prosthesis metrics with a mean systolic  gradient of 8 mmHg and a peak anterograde velocity of 1.7 m/sec. The Ao  Acceleration Time is 0.08 sec. No aortic insufficiency is detected.     Mitral valve:  There is mild mitral insufficiency. No mitral insufficiency of significance  detected.     Tricuspid valve:  The tricuspid valve is not well-visualized.     Pulmonic valve:  The pulmonic valve is not well-visualized.     Thoracic aorta:  The aortic root and proximal ascending aorta are of normal dimension.     Pericardium:  There is no significant pericardial effusion.  ______________________________________________________________________________  Aortic Valve  There is a unknown bioprosthetic valve present in aortic valve position.  ______________________________________________________________________________  MMode/2D Measurements & Calculations  IVSd: 1.1 cm  LVIDd: 4.1 cm  LVIDs: 3.4 cm  LVPWd: 1.00 cm  FS: 18.0 %  LV mass(C)d: 141.3 grams  LV mass(C)dI: 75.2 grams/m2  LVOT diam: 2.3 cm  LVOT area: 4.0 cm2  RWT: 0.49     Doppler Measurements & Calculations  MV E max julio: 87.4 cm/sec  MV A max julio: 76.4 cm/sec  MV E/A: 1.1  MV max PG: 3.7 mmHg  MV mean P.7 mmHg  MV V2 VTI: 31.9 cm  MVA(VTI): 3.0 cm2  MV dec slope: 337.7 cm/sec2  MV dec time: 0.26 sec     Ao V2 max: 174.1 cm/sec  Ao max P.0 mmHg  Ao V2 mean: 128.6 cm/sec  Ao mean P.6 mmHg  Ao V2 VTI: 35.0 cm  SARAH(I,D): 2.7 cm2  SARAH(V,D): 2.9 cm2  Ao acc time: 0.08 sec  LV V1 max P.3 mmHg  LV V1 max: 125.8 cm/sec  LV V1 VTI: 23.7 cm  SV(LVOT): 95.3 ml  SI(LVOT): 50.7 ml/m2  PA V2 max: 63.5 cm/sec  PA max P.6 mmHg  AV Julio Ratio (DI): 0.72  SARAH Index (cm2/m2): 1.5  E/E': 14.1  E/E' avg: 15.7  Lateral E/e': 14.0  Medial E/e': 17.5  Peak E' Julio: 6.2 cm/sec  RV S Julio: 10.4 cm/sec      ______________________________________________________________________________  Report approved by: Luciano Louis 08/07/2024 05:05 PM         XR Chest 1 View    Narrative    EXAM: XR CHEST 1 VIEW  LOCATION: [facility]  DATE: 8/8/2024    INDICATION: penumonia  COMPARISON: 8/5/2024      Impression    IMPRESSION: Mild low lung volumes. Marked elevation of the left hemidiaphragm. AICD with lead tips stable. Numerous EKG wires and leads projecting over the chest obscure some details. Right basilar atelectasis. Heart size upper limits of normal.   Endovascular aortic valve. Linear atelectasis left lung base.

## 2024-08-08 NOTE — PROGRESS NOTES
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) (H)    Hyperlipidemia    Hypertension    Long term current use of anticoagulant therapy    S/P cardiac pacemaker procedure    S/P TAVR (transcatheter aortic valve replacement)    Thrombophilia (H24)    Diabetes mellitus type 2, noninsulin dependent (H)    History of CVA (cerebrovascular accident)    Hypoxia    Hypotension, unspecified hypotension type    Community acquired pneumonia of left lower lobe of lung    Pressure injury of sacral region, stage 3 (H)    Leukocytosis, unspecified type      Franklyn Sorto is a 88 year old male with complicated medical history including urinary tract infection, heart failure with preserved ejection fraction, CAD, aortic stenosis status post TAVR, hyperlipidemia, hypertension, sick sinus syndrome status post pacemaker, DM type II, previous CVA with residual left hemiparesis, history of upper extremity DVT with thrombophilia  (antiphospholipid antibodies, 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  Pt was transferred to floor yesterday for the same.  He did not require pressors.  He was evaluated by surgery and underwent bedside surgical debridement of his sacral wounds.  Patient was also found to have ESBL E. coli in the urine, E. coli and strep salivarius in the blood.   Chest imaging showed left lower lobe atelectasis versus opacity, elevated left hemidiaphragm.    Plan:   Gentle hydration. Hold IVF for now   Check lactic acid --> normal  Pressors if needed to keep MAP above 65.    On midodrine 5 mg tid.  Continue antibiotics per infectious disease.  Patient is currently on vancomycin, meropenem covering for bacteremia, urosepsis,  and possible aspiration pneumonia.  Follow up repeat blood cultures --> pending.  FFollow-up echocardiogram --> echo with EF of 55-60%  Follow-up ai/o, daily weights.  Video swallow  ---> silent aspiration with thin liquid including with thin tuck.    Advance Directives:  No CPR- Do NOT Intubate      Pt will be transferred out of icu. Pulm and critical care will sign off. Please call with questions.    Yanick Beltran MD  Pulmonary and Critical Care Medicine  Electronically Signed on 08/08/2024    Clinically Significant Risk Factors              # Hypoalbuminemia: Lowest albumin = 2.2 g/dL at 8/5/2024 11:55 AM, will monitor as appropriate    # Coagulation Defect: INR = 1.44 (Ref range: 0.85 - 1.15) and/or PTT = 54 Seconds (Ref range: 22 - 38 Seconds), will monitor for bleeding    # Hypertension: Noted on problem list      # Acute Hypoxic Respiratory Failure: Documented O2 saturation < 90%. Continue supplemental oxygen as needed        # DMII: A1C = N/A within past 6 months         # Financial/Environmental Concerns:                Code Status: No CPR- Do NOT Intubate           History:     HPI: 88-year-old man with a history of DM type II, GERD, recent CVA came into the ER for generalized fatigue, altered mental status, and concern for sepsis.  Hypotension, concern for aspiration pneumonia, and sacral wound infection. He was found to be hypotensive and acting abnormally. He received some gentle fluids out of concern for heart failure but remained hypotensive. He was transferred to the ICU.  He did not require pressors.  Patient was found to have ESBL E. coli bacteremia likely secondary to sacral ulcer.  He underwent bedside debridement with surgery.  Patient was then transferred out of the ICU on 8/6/2024.  This afternoon, patient became hypotensive.  He was given IV fluids.  Patient was then transferred to the ICU for possible need of pressors.    Exam/Data:   /51   Pulse 69   Temp 98  F (36.7  C) (Oral)   " Resp 13   Ht 1.753 m (5' 9\")   Wt 72.2 kg (159 lb 2.8 oz)   SpO2 97%   BMI 23.51 kg/m  , Body mass index is 23.51 kg/m .      /51   Pulse 69   Temp 98  F (36.7  C) (Oral)   Resp 13   Ht 1.753 m (5' 9\")   Wt 72.2 kg (159 lb 2.8 oz)   SpO2 97%   BMI 23.51 kg/m    BP - Mean:  [66-79] 73  I/O last 3 completed shifts:  In: 1409 [P.O.:60; I.V.:1349]  Out: 1300 [Urine:1300]  Weight change:   Resp: 13      Physical Exam:  GEN: comfortable, NAD  HEENT: NCAT, EMOI, dry mm  CVS: S1S2, RRR  Lung: good air entry bilaterally,  Abd: Soft, NT, ND,  + BS.  Back: Unstageable sacral decub  Ext: no c/c/e  Neuro: nonfocal  Musculoskeletal: FROM all extremities  Psych: appropriate        Data:   Labs and Imaging personally reviewed.        CMP  Recent Labs   Lab 08/08/24  0432 08/08/24  0224 08/07/24 2011 08/07/24  1733 08/07/24  1136 08/07/24  0750 08/07/24  0606 08/06/24  1234 08/06/24  0527 08/06/24  0005 08/05/24  2114 08/05/24  2104 08/05/24  1155   NA  --   --   --   --   --   --   --   --  141  --  141  --  137   POTASSIUM  --   --   --   --   --   --   --   --  3.8  --  3.5  --  3.7   CHLORIDE  --   --   --   --   --   --   --   --  104  --  105  --  100   CO2  --   --   --   --   --   --   --   --  24  --  23  --  25   ANIONGAP  --   --   --   --   --   --   --   --  13  --  13  --  12   GLC  --  176* 197* 176* 198*   < >  --    < > 104*   < > 147*   < > 232*   BUN  --   --   --   --   --   --   --   --  26.8*  --  28.9*  --  31.0*   CR 0.90  --   --   --   --   --  0.98  --  1.12  --  1.22*  --  1.38*   GFRESTIMATED 83  --   --   --   --   --  75  --  64  --  58*  --  50*   JÚNIOR  --   --   --   --   --   --   --   --  7.8*  --  7.4*  --  7.8*   MAG  --   --   --   --   --   --   --   --  2.0  --   --   --  2.1   PHOS  --   --   --   --   --   --   --   --  2.8  --   --   --   --    PROTTOTAL  --   --   --   --   --   --   --   --   --   --   --   --  6.1*   ALBUMIN  --   --   --   --   --   --   --   --   --  "  --   --   --  2.2*   BILITOTAL  --   --   --   --   --   --   --   --   --   --   --   --  0.4   ALKPHOS  --   --   --   --   --   --   --   --   --   --   --   --  96   AST  --   --   --   --   --   --   --   --   --   --   --   --  30   ALT  --   --   --   --   --   --   --   --   --   --   --   --  12    < > = values in this interval not displayed.     CBC  Recent Labs   Lab 08/06/24 0527 08/05/24  1155   WBC 20.9* 23.7*   RBC 3.65* 3.44*   HGB 11.3* 10.5*   HCT 35.1* 32.9*   MCV 96 96   MCH 31.0 30.5   MCHC 32.2 31.9   RDW 14.5 14.5   * 154     INR  Recent Labs   Lab 08/08/24  0432 08/07/24  0606 08/06/24  0527 08/05/24  2114   INR 1.44* 1.35* 2.03* 6.09*     Arterial Blood Gas  Recent Labs   Lab 08/05/24  1318   O2PER 28       IMAGING: personally reviewed images. Formal radiology interpretation noted below.    XR Video Swallow with SLP or OT    Result Date: 8/6/2024  EXAM: XR VIDEO SWALLOW WITH SLP OR OT LOCATION: [facility] DATE: 8/6/2024 INDICATION: Difficulty swallowing. COMPARISON: None. TECHNIQUE: Routine swallow study with speech pathology using multiple barium thicknesses. RADIATION DOSE: Total Air Kerma 5.1 mGy FINDINGS: Swallow study with Speech Pathology using multiple barium thicknesses. Silent aspiration with thin liquid, including with chin tuck. Aspiration also occurred with thin liquid and smaller drink without chin tuck. No other significant penetration or aspiration.     Chest XR,  PA & LAT    Result Date: 8/5/2024  EXAM: XR CHEST 2 VIEWS LOCATION: [facility] DATE: 8/5/2024 INDICATION: shortness of breath, hypotensive, hypoxic COMPARISON: CTA 8/5/2024 at 1418 hours. Ultrasound 6/20/2024.     IMPRESSION: Again seen is opacification of the left lower lung which could represent atelectasis, infection, and/or aspiration with a small amount of pleural fluid. No pneumothorax. The heart is not well assessed. A TAVR stent is present. There is a  left  chest wall biventricular ICD. Cervical fusion hardware is present.    CT Abdomen Pelvis w Contrast    Result Date: 8/5/2024  EXAM: CT ABDOMEN PELVIS W CONTRAST LOCATION: [facility] DATE: 8/5/2024 INDICATION: elevated WBC count, hypotensive, hypoxic COMPARISON: CT abdomen pelvis 6/21/2024 TECHNIQUE: CT scan of the abdomen and pelvis was performed following injection of IV contrast. Multiplanar reformats were obtained. Dose reduction techniques were used. CONTRAST: isovue 370 75ml FINDINGS: LOWER CHEST: Dictated separately. HEPATOBILIARY: Unchanged liver contours. No worrisome liver lesions. Gallbladder is contracted with multiple gallstones and similar mild wall thickening to prior. No biliary ductal dilation. PANCREAS: Normal. SPLEEN: Normal. ADRENAL GLANDS: Normal. KIDNEYS/BLADDER: Kidneys enhance symmetrically with bilateral benign cysts, which require no follow-up. No urolithiasis or collecting system dilation. Bladder is thick-walled with pericystic stranding. BOWEL: No bowel obstruction. Percutaneous gastrojejunostomy tube appears well-positioned. Colonic diverticulosis without evidence of diverticulitis. No free air or organized fluid collection. Incontinence. LYMPH NODES: No lymphadenopathy. VASCULATURE: Abdominal aorta is nonaneurysmal with severe circumferential atherosclerotic vascular calcifications. PELVIC ORGANS: No pelvic mass. MUSCULOSKELETAL: Degenerative disc disease and facet osteoarthritis throughout the visualized spine. No worrisome bone lesions. Left hip lipoma.     IMPRESSION: 1.  Bladder wall thickening and pericystic stranding, which can be seen with cystitis. Correlate with urinalysis. 2.  Otherwise no acute abnormality in the abdomen or pelvis with additional details in the findings. 3.  Chest reported separately.    CT Chest Pulmonary Embolism w Contrast    Result Date: 8/5/2024  EXAM: CT CHEST PULMONARY EMBOLISM W CONTRAST LOCATION: [facility]  [facility] DATE: 8/5/2024 INDICATION: SOB, hypoxia, hypotensive COMPARISON: None. TECHNIQUE: CT chest pulmonary angiogram during arterial phase injection of IV contrast. Multiplanar reformats and MIP reconstructions were performed. Dose reduction techniques were used. CONTRAST: isovue 370 75ml FINDINGS: ANGIOGRAM CHEST: The main pulmonary artery is 35 mm in diameter. No pulmonary emboli. There is persistent perfusion of the left lower lobe. Thoracic aorta is negative for dissection. A TAVR stent is present. LUNGS AND PLEURA: Atelectasis and airspace disease in much of the left lower lobe. The central airway to the left lower lobe is opacified. Small left pleural effusion. Elevated left hemidiaphragm. MEDIASTINUM/AXILLAE: A left chest wall biventricular ICD is present. CORONARY ARTERY CALCIFICATION: Severe. UPPER ABDOMEN: Cholelithiasis. Gallbladder wall thickening. A percutaneous GJ tube is present. MUSCULOSKELETAL: There is surgical hardware at C6-C7.     IMPRESSION: 1.  No PE. Persistent perfusion of the opacified left lower lobe represents a shunt and could contribute to hypoxemia. Enlargement of the main pulmonary artery can be seen with pulmonary hypertension. 2.  The central airway to the left lower lobe is opacified. Opacification of the left lower lobe could represent aspiration, infection, and/or atelectasis. There is a small volume of left pleural fluid. The left hemidiaphragm is elevated. 3.  Cholelithiasis. Gallbladder wall thickening. Recommend ultrasound if there is concern for cholecystitis. 4.  Coronary artery disease.      No current outpatient medications on file.     Allergies   Allergen Reactions    Clopidogrel Hives    Hydrocodone      Other reaction(s): sick to his stomach    Hydrocodone-Acetaminophen Nausea and Vomiting    Levofloxacin Other (See Comments)     Other reaction(s): tendonitis  Tendonitis right calf      Spironolactone      Other reaction(s): Hyperkalemia

## 2024-08-09 NOTE — PROGRESS NOTES
Problem: At Risk for Falls  Goal: # Patient does not fall  Outcome: Outcome Met, Continue evaluating goal progress toward completion     Problem: Activity Intolerance  Goal: # Functional status is maintained or returned to baseline  Outcome: Outcome Met, Continue evaluating goal progress toward completion  Goal: # Tolerates activity for d/c setting with no clinical problems  Outcome: Outcome Met, Continue evaluating goal progress toward completion     Problem: Pain  Goal: #Acceptable pain level achieved/maintained at rest using NRS/Faces  Description: This goal is used for patients who can self-report.  Acceptable means the level is at or below the identified comfort/function goal.  Outcome: Outcome Met, Continue evaluating goal progress toward completion  Goal: # Acceptable pain level achieved/maintained with activity using NRS/Faces  Description: This goal is used for patients who can self-report and are not achieving acceptable pain control during activity.  Outcome: Outcome Met, Continue evaluating goal progress toward completion     Problem: Delirium, Risk for  Goal: # No symptoms of delirium  Description: Evaluate delirium symptoms under active problem when present  Outcome: Outcome Met, Continue evaluating goal progress toward completion     Problem: VTE, Risk for  Goal: # Verbalizes understanding of VTE risk factors and prevention  Description: Document education using the patient education activity.   Outcome: Outcome Met, Continue evaluating goal progress toward completion      "CALORIE COUNT      Approximate Oral Intake for:   8/8/24- Dinner meal only   Calories: 540   Protein: 24.5 g      Estimated Needs:    Calories: 7423-3668+   Protein: 109-145       Summary:    X1 meal documented per calorie count- was initiated 8/8/24 in afternoon.   Per flowsheets, Lunch po: pt consuming tomato soup +  est. 50% magic cup at lunch.     Met with pt, pt family at bedside this AM. Pt reports okay po intakes and appetite. Pt reports liking ensure enlive and magic cup. Ordered lunch meal for pt- requesting magic cup and ensure. Pt reports omelet was \"too hard\" this AM. Discussed alternative menu options and provided Easy to Chew Menu for facility.   "

## 2024-08-09 NOTE — PLAN OF CARE
Problem: Infection  Goal: Absence of Infection Signs and Symptoms  Outcome: Progressing  Intervention: Prevent or Manage Infection  Recent Flowsheet Documentation  Taken 8/9/2024 1800 by Mike Brown RN  Isolation Precautions: contact precautions maintained   Goal Outcome Evaluation:       Pt alert and oriented x4. Javier discontinued. Pt on primofit. And voiding. Lung sounds coarse. Oxygen on RA. Afebrile. On iv abx. Wound vac in place. Complains of left shoulder pain.

## 2024-08-09 NOTE — PROGRESS NOTES
"INFECTIOUS DISEASE FOLLOW UP NOTE    Date: 2024   CHIEF COMPLAINT:   Chief Complaint   Patient presents with    Altered Mental Status    Hypotension        ASSESSMENT:    E coli ESBL bacteremia: 2/2 sacral ulcer. S/p bedside debridement with surgery. Necrotic tissue noted. Repeat bedside I&D  small purulence and again necrotic tissue . Vac currently in place.   strep salivarius bacteremia: noted on admission.  Likely 2/2 above  Sacral ulcer s/p bedside debridement, cultures with ecoli, staph aureus, bacteroides so far. CT with ulceration to periosteal margin.   ARIADNE on CKD  Hx CVA with residual dysphagia: increased risk for aspiration events, on room air. Unclear if actual infection however will be covered with treatment of sacral ulcer  DM2    PLAN:  - continue meropenem IV  - stop vanc IV  - follow BC, wound cultures from debridement  - wound vac in place  - continues to have necrotic tissue. Concerned that will require OR for source control, surgery following.   - Please call with questions or change in clinical status over the weekend. ID will follow up next week    Rosalina Ramirez MD  [facility]   Office Telephone [phone].  Fax [phone]  [facility] paging    ______________________________________________________________________    SUBJECTIVE / INTERVAL HISTORY:  Still debriding necrotic tissue at bedside. Overall he feels ok.     ROS: All other systems negative except as listed above.    SH/FH/Habits/PMH reviewed and unchanged.    OBJECTIVE:  /60 (BP Location: Right arm)   Pulse 78   Temp 98.3  F (36.8  C) (Oral)   Resp 18   Ht 1.753 m (5' 9\")   Wt 72.2 kg (159 lb 2.8 oz)   SpO2 98%   BMI 23.51 kg/m       Resp: 18      Vital Signs  Temp: 97.6  F (36.4  C)  Temp src: Oral  Resp: 18  Pulse: 66  Pulse Rate Source: Monitor  BP: (!) 81/45  BP Location: Right arm    Temp (24hrs), Av  F (36.7  C), Min:97.4  F (36.3  C), Max:99.4  F (37.4  C)      GEN: No " "acute distress.    RESPIRATORY:  Normal breathing pattern.    EXTREMITIES: No edema.  SKIN/HAIR/NAILS: vac in place.   IV:  peripheral IV          Antibiotics:  Meropenem      Pertinent labs:  No results found for: \"CRP\"   CBC RESULTS:   Recent Labs   Lab Test 08/06/24  0527   WBC 20.9*   RBC 3.65*   HGB 11.3*   HCT 35.1*   MCV 96   MCH 31.0   MCHC 32.2   RDW 14.5   *      Last Comprehensive Metabolic Panel:  Sodium   Date Value Ref Range Status   08/06/2024 141 135 - 145 mmol/L Final     Potassium   Date Value Ref Range Status   08/06/2024 3.8 3.4 - 5.3 mmol/L Final   11/29/2019 4.3 3.5 - 5.0 mmol/L Final     Chloride   Date Value Ref Range Status   08/06/2024 104 98 - 107 mmol/L Final   11/29/2019 107 98 - 107 mmol/L Final     Carbon Dioxide (CO2)   Date Value Ref Range Status   08/06/2024 24 22 - 29 mmol/L Final   11/29/2019 26 22 - 31 mmol/L Final     Anion Gap   Date Value Ref Range Status   08/06/2024 13 7 - 15 mmol/L Final   11/29/2019 7 5 - 18 mmol/L Final     Glucose   Date Value Ref Range Status   11/29/2019 126 (H) 70 - 125 mg/dL Final     GLUCOSE BY METER POCT   Date Value Ref Range Status   08/09/2024 246 (H) 70 - 99 mg/dL Final     Urea Nitrogen   Date Value Ref Range Status   08/06/2024 26.8 (H) 8.0 - 23.0 mg/dL Final   11/29/2019 10 8 - 28 mg/dL Final     Creatinine   Date Value Ref Range Status   08/09/2024 0.68 0.67 - 1.17 mg/dL Final     GFR Estimate   Date Value Ref Range Status   08/09/2024 >90 >60 mL/min/1.73m2 Final     Comment:     eGFR calculated using 2021 CKD-EPI equation.   11/29/2019 >60 >60 mL/min/1.73m2 Final     Calcium   Date Value Ref Range Status   08/06/2024 7.8 (L) 8.8 - 10.4 mg/dL Final     Comment:     Reference intervals for this test were updated on 7/16/2024 to reflect our healthy population more accurately. There may be differences in the flagging of prior results with similar values performed with this method. Those prior results can be interpreted in the context of " the updated reference intervals.        MICROBIOLOGY DATA:  Personally reviewed.  7-Day Micro Results       Collected Updated Procedure Result Status      08/07/2024 1651 08/08/2024 2302 Blood Culture Peripheral Blood [[id number]]    Peripheral Blood    Preliminary result Component Value   Culture No growth after 1 day  [P]                08/06/2024 1135 08/09/2024 1345 Anaerobic Bacterial Culture Routine [[id number]]    (Abnormal)   Tissue from Buttock, Gluteal Cleft    Final result Component Value   Culture 4+ Bacteroides fragilis    Susceptibilities not routinely done, refer to antibiogram to view typical susceptibility profiles    4+ Mixed Aerobic and Anaerobic surinder               08/06/2024 1135 08/08/2024 1143 Tissue Aerobic Bacterial Culture Routine With Gram Stain [[id number]]   (Abnormal)   Tissue from Buttock, Gluteal Cleft    Final result Component Value   Culture 2+ Escherichia coli    Susceptibilities done on previous cultures    1+ Staphylococcus aureus    Susceptibilities done on previous cultures    3+ Normal surinder   Gram Stain Result 4+ Gram positive cocci    4+ Gram negative cocci    3+ Gram positive bacilli    4+ Gram negative bacilli    4+ WBC seen    Predominantly PMNs               08/06/2024 1133 08/09/2024 0348 Wound Aerobic Bacterial Culture Routine With Gram Stain [[id number]]    (Abnormal)   Wound from Buttock, Gluteal Cleft    Final result Component Value   Culture 2+ Escherichia coli    Susceptibilities done on previous cultures    1+ Staphylococcus aureus    2+ Normal surinder   Gram Stain Result 4+ Gram positive cocci    4+ WBC seen    Predominantly PMNs        Susceptibility        Staphylococcus aureus      MARTITA      Ciprofloxacin <=0.5 ug/mL Susceptible  [*]       Clindamycin 0.25 ug/mL Susceptible      Daptomycin 0.25 ug/mL Susceptible      Doxycycline <=0.5 ug/mL Susceptible      Erythromycin <=0.25 ug/mL Susceptible      Gentamicin <=0.5 ug/mL Susceptible      Inducible  macrolide resistance test Negative ug/mL Negative  [*]       Levofloxacin 0.25 ug/mL Susceptible  [*]       Linezolid 2 ug/mL Susceptible  [*]       Moxifloxacin <=0.25 ug/mL Susceptible  [*]       Nitrofurantoin <=16 ug/mL Susceptible  [*]       Oxacillin <=0.25 ug/mL Susceptible  [1]       Rifampin <=0.5 ug/mL Susceptible  [*]       Tetracycline <=1 ug/mL Susceptible      Tigecycline <=0.12 ug/mL Susceptible  [*]       Trimethoprim/Sulfamethoxazole <=0.5/9.5 ug/mL Susceptible      Vancomycin <=0.5 ug/mL Susceptible                   [*]  Suppressed Antibiotic     [1]  Oxacillin susceptible isolates are susceptible to cephalosporins (example: cefazolin and cephalexin) and beta lactam combination agents. Oxacillin resistant isolates are resistant to these agents.                    08/06/2024 1133 08/09/2024 1334 Anaerobic Bacterial Culture Routine [[id number]]   (Abnormal)   Wound from Buttock, Gluteal Cleft    Preliminary result Component Value   Culture Culture in progress  [P]     4+ Bacteroides fragilis  [P]     Susceptibilities not routinely done, refer to antibiogram to view typical susceptibility profiles    4+ Mixed Aerobic and Anaerobic surinder  [P]                08/06/2024 0416 08/08/2024 0744 Wound Aerobic Bacterial Culture Routine With Gram Stain [[id number]]    (Abnormal)   Wound from Spine, Coccyx    Final result Component Value   Culture 2+ Escherichia coli ESBL    3+ Normal surinder   Gram Stain Result 4+ Gram positive cocci    1+ Gram positive bacilli    4+ WBC seen    Predominantly PMNs        Susceptibility        Escherichia coli ESBL      MARTITA      Amikacin <=2 ug/mL Susceptible  [*]       Ampicillin >=32 ug/mL Resistant      Ampicillin/ Sulbactam 4 ug/mL Susceptible  [*]       Cefepime  Resistant      Cefotaxime  Resistant  [*]       Cefoxitin <=4 ug/mL Susceptible  [*]       Ceftazidime  Resistant      Ceftriaxone  Resistant      Ciprofloxacin >=4 ug/mL Resistant      Extended Spectrum  Beta-Lactamase Positive ug/mL ESBL Positive  [*]       Gentamicin <=1 ug/mL Susceptible      Levofloxacin >=8 ug/mL Resistant      Meropenem <=0.25 ug/mL Susceptible  [1]       Nitrofurantoin <=16 ug/mL Susceptible  [*]       Piperacillin/Tazobactam <=4 ug/mL Susceptible      Tobramycin <=1 ug/mL Susceptible      Trimethoprim/Sulfamethoxazole <=1/19 ug/mL Susceptible                   [*]  Suppressed Antibiotic     [1]  Enterobacterales that are susceptible to meropenem are usually susceptible to ertapenem.               Susceptibility Comments       Escherichia coli ESBL    ESBL (extended spectrum beta lactamase) producing organisms require contact precautions.               08/06/2024 0416 08/09/2024 1119 Anaerobic Bacterial Culture Routine [[id number]]   (Abnormal)   Wound from Spine, Coccyx    Final result Component Value   Culture 1+ Staphylococcus aureus    Not isolated or reported on routine aerobic culture  Susceptibilities done on previous cultures    4+ Bacteroides fragilis    Susceptibilities not routinely done, refer to antibiogram to view typical susceptibility profiles    4+ Mixed Aerobic and Anaerobic surinder               08/05/2024 1722 08/05/2024 1817 Asymptomatic COVID-19 Virus (Coronavirus) by PCR Nasopharyngeal [[id number]]    Swab from Nasopharyngeal    Final result Component Value   SARS CoV2 PCR Negative   NEGATIVE: SARS-CoV-2 (COVID-19) RNA not detected, presumed negative.            08/05/2024 1722 08/06/2024 0005 Respiratory Panel PCR [[id number]]    Swab from Nasopharyngeal    Final result Component Value   Adenovirus Not Detected   Coronavirus Not Detected   This test detects Coronavirus 229E, HKU1, NL63 and OC43 but does not distinguish between them. It does not detect MERS ( Respiratory Syndrome), SARS (Severe Acute Respiratory Syndrome) or 2019-nCoV (Novel 2019) Coronavirus.   Human Metapneumovirus Not Detected   Human Rhin/Enterovirus Not Detected   Influenza A Not  Detected   Influenza A, H1 Not Detected   Influenza A 2009 H1N1 Not Detected   Influenza A, H3 Not Detected   Influenza B Not Detected   Parainfluenza Virus 1 Not Detected   Parainfluenza Virus 2 Not Detected   Parainfluenza Virus 3 Not Detected   Parainfluenza Virus 4 Not Detected   Respiratory Syncytial Virus A Not Detected   Respiratory Syncytial Virus B Not Detected   Chlamydia Pneumoniae Not Detected   Mycoplasma Pneumoniae Not Detected            08/05/2024 1554 08/06/2024 2132 Urine Culture [[id number]]    (Abnormal)   Urine, Clean Catch    Final result Component Value   Culture >100,000 CFU/mL Escherichia coli ESBL        Susceptibility        Escherichia coli ESBL      MARTITA      Amikacin <=2 ug/mL Susceptible  [*]       Ampicillin >=32 ug/mL Resistant      Ampicillin/ Sulbactam 4 ug/mL Susceptible  [*]       Cefazolin >=64 ug/mL Resistant  [1]       Cefepime  Resistant      Cefotaxime  Resistant  [*]       Cefoxitin <=4 ug/mL Susceptible      Ceftazidime  Resistant      Ceftriaxone  Resistant      Ciprofloxacin >=4 ug/mL Resistant      Extended Spectrum Beta-Lactamase Positive ug/mL ESBL Positive  [*]       Gentamicin <=1 ug/mL Susceptible      Levofloxacin >=8 ug/mL Resistant      Meropenem <=0.25 ug/mL Susceptible  [2]       Nitrofurantoin <=16 ug/mL Susceptible      Piperacillin/Tazobactam <=4 ug/mL Susceptible      Tobramycin <=1 ug/mL Susceptible      Trimethoprim/Sulfamethoxazole >16/304 ug/mL Resistant                   [*]  Suppressed Antibiotic     [1]  Cefazolin MARTITA breakpoints are for the treatment of uncomplicated urinary tract infections. For the treatment of systemic infections, please contact the laboratory for additional testing.     [2]  Enterobacterales that are susceptible to meropenem are usually susceptible to ertapenem.               Susceptibility Comments       Escherichia coli ESBL    ESBL (extended spectrum beta lactamase) producing organisms require contact precautions.                08/05/2024 1318 08/08/2024 0813 Blood Culture Peripheral Blood [[id number]]    (Abnormal)   Peripheral Blood    Final result Component Value   Culture Positive on the 1st day of incubation    Escherichia coli ESBL    1 of 2 bottles    Streptococcus salivarius group    1 of 2 bottles        Susceptibility        Escherichia coli ESBL      MARTITA      Amikacin <=2 ug/mL Susceptible  [*]       Ampicillin >=32 ug/mL Resistant      Ampicillin/ Sulbactam 4 ug/mL Susceptible  [*]       Cefepime  Resistant      Cefotaxime  Resistant  [*]       Cefoxitin <=4 ug/mL Susceptible  [*]       Ceftazidime  Resistant      Ceftriaxone  Resistant      Ciprofloxacin >=4 ug/mL Resistant      Extended Spectrum Beta-Lactamase Positive ug/mL ESBL Positive  [*]       Gentamicin <=1 ug/mL Susceptible      Levofloxacin >=8 ug/mL Resistant      Meropenem <=0.25 ug/mL Susceptible  [1]       Nitrofurantoin <=16 ug/mL Susceptible  [*]       Piperacillin/Tazobactam <=4 ug/mL Susceptible      Tobramycin <=1 ug/mL Susceptible      Trimethoprim/Sulfamethoxazole <=1/19 ug/mL Susceptible                   [*]  Suppressed Antibiotic     [1]  Enterobacterales that are susceptible to meropenem are usually susceptible to ertapenem.               Susceptibility        Streptococcus salivarius group      MARTITA      Ampicillin 1 ug/mL Intermediate      Cefotaxime 1 ug/mL Susceptible      Ceftriaxone 1 ug/mL Susceptible      Clindamycin <=0.06 ug/mL Susceptible      Erythromycin <=0.06 ug/mL Susceptible  [*]       Meropenem 0.12 ug/mL Susceptible      Penicillin 1 ug/mL Intermediate      Vancomycin 0.5 ug/mL Susceptible                   [*]  Suppressed Antibiotic               Susceptibility Comments       Escherichia coli ESBL    ESBL (extended spectrum beta lactamase) producing organisms require contact precautions.               08/05/2024 1318 08/06/2024 0909 Verigene GP Panel [[id number]]    Peripheral Blood    Final result Component Value    Staphylococcus species Not Detected   Staphylococcus aureus Not Detected   Staphylococcus epidermidis Not Detected   Staphylococcus lugdunensis Not Detected   Enterococcus faecalis Not Detected   Enterococcus faecium Not Detected   Streptococcus species Not Detected   Streptococcus agalactiae Not Detected   Streptococcus anginosus group Not Detected   Streptococcus pneumoniae Not Detected   Streptococcus pyogenes Not Detected   Listeria species Not Detected            08/05/2024 1318 08/06/2024 0848 Verigene GN Panel [[id number]]    (Abnormal)   Peripheral Blood    Final result Component Value   Acinetobacter species Not Detected   Citrobacter species Not Detected   Enterobacter species Not Detected   Proteus species Not Detected   Escherichia coli Detected   Positive for Escherichia coli by Verigene multiplex nucleic acid test. Final identification and antimicrobial susceptibility testing will be verified by standard methods. Verigene test will not distinguish E. coli from Shigella species including Shigella dysenteriae, Shigella flexneri, Shigella boydii, and Shigella sonnei. Specimens containing Shigella species or E. coli will be reported as positive for E. coli.   Klebsiella pneumoniae Not Detected   Klebsiella oxytoca Not Detected   Pseudomonas aeruginosa Not Detected   CTX-M Detected   Positive for CTX-M Class A Extended Spectrum beta-lactamase (ESBL) resistance marker by Verigene multiplex nucleic acid test. CTX-M confers resistance to penicillins, cephalosporins and variable resistance to beta-lactamase inhibitor combinations.  Best empiric antibiotic choice is meropenem. Specific susceptibility testing will be performed.   KPC Not Detected   NDM Not Detected   VIM Not Detected   IMP Not Detected   OXA Not Detected                     RADIOLOGY:  Personally Reviewed.  Recent Results (from the past 24 hour(s))   XR Video Swallow with SLP or OT    Narrative    EXAM: XR VIDEO SWALLOW WITH SLP OR  miguel all pertinent systems normal OT  LOCATION: [facility]  DATE: 8/6/2024    INDICATION: Difficulty swallowing.  COMPARISON: None.    TECHNIQUE: Routine swallow study with speech pathology using multiple barium thicknesses.    RADIATION DOSE: Total Air Kerma 5.1 mGy    FINDINGS:   Swallow study with Speech Pathology using multiple barium thicknesses. Silent aspiration with thin liquid, including with chin tuck. Aspiration also occurred with thin liquid and smaller drink without chin tuck. No other significant penetration or   aspiration.         Principal Problem:    Sepsis with acute renal failure and tubular necrosis without septic shock (H)  Active Problems:    Benign prostatic hyperplasia    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) (H)    Hyperlipidemia    Hypertension    Long term current use of anticoagulant therapy    S/P cardiac pacemaker procedure    S/P TAVR (transcatheter aortic valve replacement)    Thrombophilia (H24)    Diabetes mellitus type 2, noninsulin dependent (H)    History of CVA (cerebrovascular accident)    Hypoxia    Hypotension, unspecified hypotension type    Community acquired pneumonia of left lower lobe of lung    Pressure injury of sacral region, stage 3 (H)    Leukocytosis, unspecified type  ;

## 2024-08-09 NOTE — PROGRESS NOTES
General Surgery Progress Note:    Hospital Day # 4    ASSESSMENT:   1. Pressure injury of sacral region, unstageable (H)    2. Community acquired pneumonia of left lower lobe of lung    3. Pressure injury of sacral region, stage 3 (H)    4. Hypotension, unspecified hypotension type    5. Hypoxia        Franklyn Sorto is a 88 year old male with history of multiple comorbidities including CVA and DMT2 who was admitted with multiple source sepsis including UTI, aspiration pneumonia and infected sacral decubitus ulcer. Sacral wound s/p bedside debridement 8/6 and irrigating wound VAC placed on 8/7. Wound and tissue cultures growing ESBL E coli, Staph aureus, B. Fragilis. Urine culture and blood cultures grew ESBL E coli and Strep salivarius group. Patient on meropenem and vancomycin, ID following. Wound vac removed today. Wound with ongoing necrotic tissue and fibrinous debris which some was sharply debrided at bedside, no further purulent drainage. Will continue with irrigating wound vac over the weekend with re-assessment Monday, 8/12. Patient may require further debridement in the OR.    PLAN:   - Wound vac per WOC. Plan for next vac change Monday, 8/12  - Continue antibiotics per ID  - Continue diet as tolerated and protein supplements as able to help promote wound healing  - General surgery will continue to follow    SUBJECTIVE:   Franklyn Sorto is doing okay. Sacral wound pain tolerable and tolerated bedside vac change with tylenol. Denies fever, chills.     Patient Vitals for the past 24 hrs:   BP Temp Temp src Pulse Resp SpO2   08/09/24 1156 132/60 98.3  F (36.8  C) Oral -- 18 98 %   08/09/24 0859 135/82 97.9  F (36.6  C) Oral 78 18 99 %   08/09/24 0405 110/58 98.9  F (37.2  C) Axillary 76 16 96 %   08/08/24 2325 129/60 98.2  F (36.8  C) Oral 78 16 95 %   08/08/24 1856 135/75 98.2  F (36.8  C) Axillary -- 18 94 %   08/08/24 1522 129/59 98.2  F (36.8  C) Oral -- 19 98 %       Physical Exam:  General: patient  seen resting in bed, no acute distress  Resp: no respiratory distress, breathing comfortably on room air  Wound: Sacral wound with surrounding erythema and induration, no fluctuance. Wound bed with sloughing fibrinous exudate and necrotic tissue, some sharply debrided at bedside. No evidence of purulent drainage. Wound undermining and tunneling most at the lateral and inferior aspects. Skin around edge of wound necrotic. Veraflo wound vac replaced.   Extremities: warm and well perfused    No results displayed because visit has over 200 results.           Lesley Zarate PA-C  [facility] General Surgery  [facility]  [address] [place] [id number]

## 2024-08-09 NOTE — PROGRESS NOTES
"[facility] Nurse Inpatient Assessment     Consulted for: Sacral    Summary: Surgery team performed bedside debridement todau, then irrigating vac was replaced. Potential OR next week, possible diverting colostomy.     Patient History (according to provider note(s):      HPI: Franklyn Sorto is a 88 year old male with h/o T2DM, GERD, HTN, CVA 5/25/24 currently in TCU presenting to the ER for evaluation of fatigue and altered mental status. Patient's family was visiting today and states when they got there \"5 staff members were around the patient and he was not responding\". They called EMS who initially got a BP with 90 systolic, however, repeat on the way over was 65 systolic. Per patient's family, patient had feeding tube placed following his stroke as he was having difficulty swallowing.  Patient has improved a fair amount and is now able to swallow, has not used his feeding tube in over a month.  Patient's family states that they are quite adamant about him only drinking and eating well sitting up but they are nervous that his facility may have given him some fluid while laying down that could have resulted in an aspiration pneumonia. Patient denies any recent fevers. States he has been coughing a bit more, no sputum production.     Vitals reviewed, initial BP upon arrival to the ER was 69/48. Temp 97.5F. SpO2 89% on room air. 2L nasal cannula was placed and patient quickly improved to 98% on room air On exam he is pale but nontoxic-appearing.  He is speaking in full sentences and is alert. Differential diagnosis includes but not limited to UTI, pyelonephritis, bronchitis, pneumonia, PE, ACS, hypovolemia, cardiogenic shock, septic shock. After initial 500 mL fluid bolus his BP has improved to 87/47. He was given an additional 500 mL and improved to 90/66.        Assessment:      Pressure Injury Location: Sacrum     8/6 8/7 8/9    Last photo: 8/9  Wound type: Pressure " Injury     Pressure Injury Stage: Unstageable, present on admission   Wound history/plan of care:   Came from TCU    Wound base: 100 % eschar and slough, discolored black/grey      Palpation of the wound bed: boggy and fluctuance      Drainage: moderate     Description of drainage: purulent     Measurements (length x width x depth, in cm) 5  x 3.5  x  1.5 cm      Tunneling 5-6 o'clock 5cm     Undermining all around, 1-2cm  Periwound skin: Erythema- blanchable and discolored      Color: dusky, purple, and red      Temperature: warm  Odor: offensive  Pain: moderate and during dressing change, tender  Pain intervention prior to dressing change: slow and gentle cares   Treatment goal: Drainage control, Infection control/prevention, Increase granulation, Protection, Remove necrotic tissue, and Soften necrotic tissue  STATUS: evolving    Negative pressure wound therapy applied to: Coccyx    Wound due to: Pressure Injury   Wound history/plan of care:    Surgical date: bedside debridement 8/6 and 8/7, 8/9  Service following: surgery  Date Negative Pressure Wound Therapy initiated: 8/7   Interventions in place: repositioning  Is patient s nutritional status compromised? no   If yes, what interventions are in place? N/A  Reason for initiating vac therapy? Presence of co-morbidities and Need for accelerated granulation tissue  Which?of?the?following?co-morbidities?apply? Diabetes  If diabetic is patient on a diabetic management program? Yes   Is osteomyelitis present in wound? no   If yes what treatments are in place? N/A    Number of foam pieces removed from a wound (excluding foam for bridge) :  1 CleanseChoice Foam and 1 CleanseChoice Contact layer   Verified this matched the number of foam pieces applied last dressing change: N/A   Number of foam pieces packed into wound (excluding foam for bridge) :  1 CleanseChoice Foam and 1 CleanseChoice Contact layer - bridge to R hip      Treatment Plan:     Negative pressure wound  "therapy plan:  Wound location: coccyx   Change Days: Mon/Wed/Fri by [facility] RN    Supplies (including all accessories) used: medium Veraflo cleanse choice gray foam  Cleanse with Vashe prior to replacing NPWT  Suction setting: -125   Methods used: Window paned all periwound skin with vac drape prior to applying sponge, Placed barrier ring into periwound creases to improve seal, and used cleanse choice and contact layers    Staff RN to assess integrity of dressing and ensure suction is set at appropriate level every shift.   Date canister. Chart canister output every shift. Change cannister weekly and PRN if full/occluded     Remove foam dressing and replace with BID normal saline moist gauze dressing if:   -a dressing failure which cannot be repaired within 2 hours   -patient is discharging to home without a home pump   -patient is discharging to a facility outside the local area   -if a dressing is a \"Silver Foam\", remove before Radiation Therapy or MRI     The hospital VAC pump is not to be discharged with the patient.?Ensure to disconnect patient from machine prior to discharge. Then,    - If a home KCI VAC pump has been delivered, connect home cannister to dressing tubing then connect cannister to home pump and turn on machine    - If transferring to a nearby facility with a KCI vac, can disconnect and clamp tubing then cover end with glove so can be reconnected within 2 hours        Pressure Injury Prevention (PIP) Plan:  If patient is declining pressure injury prevention interventions: Explore reason why and address patient's concerns, Educate on pressure injury risk and prevention intervention(s), If patient is still declining, document \"informed refusal\" , and Ensure Care team is aware ( provider, charge nurse, etc)  Mattress: Follow bed algorithm, reassess daily and order specialty mattress, if indicated.  HOB: Maintain at or below 30 degrees, unless contraindicated  Repositioning in bed: Every 1-2 hours , " Left/right positioning; avoid supine, Raise foot of bed prior to raising head of bed, to reduce patient sliding down (shear), and Frequent microturns using positioning wedges, as patient tolerates  Heels: Keep elevated off mattress, Pillows under calves, and Heel lift boots  Protective Dressing:  wound care to sacrum  Positioning Equipment:Positioning wedges (#124854) to help maintain 30 degree side lying position   Chair positioning: Chair cushion (#[id number])    If patient has a buttock pressure injury, or high risk for PI use chair cushion or SPS.  Moisture Management: Perineal cleansing /protection: Follow Incontinence Protocol, Avoid brief in bed, Clean and dry skin folds with bathing , Consider InterDry (#[id number]) between folds, and Moisturize dry skin  Under Devices: Inspect skin under all medical devices during skin inspection , Ensure tubes are stabilized without tension, and Ensure patient is not lying on medical devices or equipment when repositioned  Ask provider to discontinue device when no longer needed.      Orders: Updated    RECOMMEND PRIMARY TEAM ORDER: None, at this time  Education provided: importance of repositioning, plan of care, and wound progress  Discussed plan of care with: Patient, Nurse, and Physicians Assistant  WO nurse follow-up plan: twice weekly  Notify WOC if wound(s) deteriorate.  Nursing to notify the Provider(s) and re-consult the WOC Nurse if new skin concern.    DATA:     Current support surface: Standard  Low air loss (TORY pump, Isolibrium, Pulsate)  Containment of urine/stool: Incontinence Protocol  BMI: Body mass index is 23.51 kg/m .   Active diet order: Orders Placed This Encounter      Easy to Chew Diet (level 7) Mildly Thick (level 2) (ok for water protocol placed in room)     Output: I/O last 3 completed shifts:  In: 1150 [P.O.:1000; I.V.:150]  Out: 1600 [Urine:1600]     Labs:   Recent Labs   Lab 08/09/24  0706 08/05/24  2114 08/05/24  1155   ALBUMIN  --   --  2.2*   HGB  11.0*   < > 10.5*   INR 1.70*   < > 4.84*   WBC 13.9*   < > 23.7*    < > = values in this interval not displayed.     Pressure injury risk assessment:   Sensory Perception: 3-->slightly limited  Moisture: 3-->occasionally moist  Activity: 1-->bedfast  Mobility: 2-->very limited  Nutrition: 2-->probably inadequate  Friction and Shear: 1-->problem  Dilan Score: 12    VIDAL Garcia RN CWOCN  Pager no longer in use, please contact through [employer] group: [facility] [employer]

## 2024-08-09 NOTE — PROGRESS NOTES
[facility]    Medicine Progress Note - Hospitalist Service    Date of Admission:  8/5/2024    Assessment & Plan   Franklyn Sorto is 88 year old male with history of HFrEF, hyperlipidemia, hypertension, aortic stenosis status post TAVR, thrombophilia, type 2 diabetes, stage III sacral ulcer, CAD, prior CVA admitted on 6/8/2024 with altered mental status & hypotension secondary to sepsis possibly from urinary tract infection, aspiration pneumonia and suspected infected, unstageable sacral ulcer.     He was managed briefly at the ICU by instensivist on admission but did not require vasopressor. Hypotension and ARIADNE improved with IV hydration and albumin infusion.  Urine culture grew E. coli.  Blood culture grew ESBL E Coli & Streptococcus salivarius. He is receiving IV meropenem and vancomycin per ID recommendation. Debridement of sacral ulcer was performed by surgery service on 8/6/2024. He was transferred to the medical floor on 8/6/24 given improved BP. He became hypotensive again on 8/7/24, therefore intensivist was re-consulted for possible septic shock and management with vasopressor. On 8/8/2024, he was stable prompting transfer to floor     Sepsis  Infected decubitus ulcer   UTI  Polymicrobial bacteremia  - S/p debridement 8/6/2024 with Cx polymicrobial (E Coli, B Fragilis, Staph Aureus)  - Ucx ESBLI E Coli & Strep salivarius  - Unable to obtain MRI secondary to intracardiac lead. CT Pelvis W without any signs of osteo or abscess  Plan  - Continue vanc IV (pharm to dose) + meropenem 1g q8h IV per ID  - General surgery with plans for wound vac change for Monday 08/12  - wound vac per WOC  - Follow repeat Bcx 8/7/2024 - NGTD     Supratherapeutic INR   -Received IV 5mg given in the ER  -Monitor INR  -Warfarin per pharmacist     History of aspiration and dysphagia   Aspiration PNA vs CAP LLL infiltrate but H/O aspiration after CVA   -Mildly thick diet per speech therapist   -Video swallow  study revealed silent aspiration with thin liquid, including with chin tuck   -IV vancomycin and meropenem as above     Recent acute ischemic stroke (5/25/2024)  Residual left Hemiparesis   Recent admission 5/25-6/7/2024 at [facility] for ischemic stroke; discharged to TCU   - no improvement in left hemiparesis   - PT/OT      History of the left upper extremity DVT with thrombophilia (positive antiphospholipid antibodies, 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)  - Pharmacy to dose warfarin goal 2-3   - INR-improved     Diabetes mellitus type 2, noninsulin dependent    - Consistently elevated  - Start Lantus 5u at bedtime, sliding scale insulin low to medium  - Monitor blood glucose     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   H/o HFrEF   CAD   - History of distal RCA stent, last echo 8/2019 with EF 50-55%,   - BNP is elevated but does appear to be fluid overloaded actually appears very dry on exam   - Echo shows technically difficult study.  EF 55 to 60%.  Abnormal septal motion consistent with ventricular paced rhythm.  Bioprosthetic arctic valve  - hold all BP meds since hypotensive , On Midodrine     Rheumatoid arthritis/Chronic Pain -   - holding homemeds  for now since hypotensive      Gout  - holding meds until SLP assess   GERD (gastroesophageal reflux disease)/Hx PUD   - IV PPI     Javier's placed 08/05 - TOV today          Diet: Easy to Chew Diet (level 7) Mildly Thick (level 2) (ok for water protocol placed in room)  Snacks/Supplements Adult: Magic Cup; With Meals  Snacks/Supplements Adult: Expedite Cup; With Meals  Room Service  Calorie Counts  Snacks/Supplements Adult: Ensure Enlive; With Meals    DVT Prophylaxis: Warfarin  Javier Catheter: PRESENT, indication: Acute retention or obstruction  Lines: None     Cardiac Monitoring: ACTIVE order. Indication: Electrolyte Imbalance (24 hours)- Magnesium <1.3 mg/ml; Potassium < =2.8 or > 5.5 mg/ml  Code Status: No CPR- Do NOT  Intubate      Clinically Significant Risk Factors              # Hypoalbuminemia: Lowest albumin = 2.2 g/dL at 8/5/2024 11:55 AM, will monitor as appropriate   # Thrombocytopenia: Lowest platelets = 92 in last 2 days, will monitor for bleeding   # Hypertension: Noted on problem list          # DMII: A1C = N/A within past 6 months    # Moderate Malnutrition: based on nutrition assessment, PRESENT ON ADMISSION   # Financial/Environmental Concerns:                 Disposition Plan     Medically Ready for Discharge: Anticipated in 2-4 Days             Hailey Brown MD  Hospitalist Service  [facility]  Securely message with [employer] (more info)  Text page via [facility] Paging/Directory   ______________________________________________________________________    Interval History   Was examined at the bedside, new to me today.  States he feels he has been improving and denies any active complaints today.    Physical Exam   Vital Signs: Temp: 97.9  F (36.6  C) Temp src: Oral BP: 135/82 Pulse: 78   Resp: 18 SpO2: 99 % O2 Device: None (Room air)    Weight: 159 lbs 2.75 oz    General appearance: Frail, awake, Alert, Cooperative, not in any obvious distress and appears stated age   Lungs: Bilateral rhonchi  Cardiovascular: Regular Rate and Rythm, normal apical impulse, normal S1 and S2, no lower extremity edema bilaterally  Abdomen: Soft, non-tender and Non-distended, active bowel sounds  : Javier catheter in place, draining clear urine.   Skin: Ecchymosis in the right upper extremity, unstageable sacral ulcer-the media section for details.   Musculoskeletal: No bony deformities or joint tenderness. Normal ROM upon flexion & extension.   Neurologic: Alert & Oriented X 3, Facial symmetry preserved and upper & lower extremities moving well with symmetry    Medical Decision Making       60 MINUTES SPENT BY ME on the date of service doing chart review, history, exam, documentation & further activities per the  note.      Data     I have personally reviewed the following data over the past 24 hrs:    13.9 (H)  \   11.0 (L)   / 148 (L)     N/A N/A N/A /  246 (H)   N/A N/A 0.68 \     INR:  1.70 (H) PTT:  N/A   D-dimer:  N/A Fibrinogen:  N/A       Imaging results reviewed over the past 24 hrs:   Recent Results (from the past 24 hour(s))   CT Pelvis Bone w Contrast    Narrative    EXAM: CT PELVIS BONE W CONTRAST  LOCATION: [facility]  DATE: 8/8/2024    INDICATION: bacteremia, sacral infection. please evaluate for osteomyelitis  COMPARISON: None.  TECHNIQUE: CT scan of the pelvis was performed with IV contrast. Multiplanar reformats were obtained. Dose reduction techniques were used.  CONTRAST: isovue 370 75ml    FINDINGS:    Both hips are negative for fracture or CT evidence of avascular necrosis. There is degenerative change at both hip joints with joint space narrowing and some osteophytic spurring along the margins of the acetabula.    The bony pelvis is negative for fracture. There are additional degenerative changes at the SI joints with areas of nicolette ankylosis. Degenerative change at the symphysis pubis. Degenerative change lower lumbar spine. There is angulation change at the   sacrococcygeal junction. There is no evidence for bony destruction to suggest osteomyelitis but there is gas tracking in through surface ulceration which is seen immediately adjacent to the periosteal margin of the penultimate coccygeal segment on series   2, image 94 and partially seen on series 5 image 16 making osteomyelitis difficult to completely exclude.    Edema or cellulitis surrounding the margins of the ulceration but no evidence for undrained fluid collection to suggest abscess. The intrapelvic contents are negative for abnormal mass. Catheter within the urinary bladder. Edema or cellulitis external to   the pelvis. Benign fatty lipoma within the left tensor fascia uzair musculature.      Impression     IMPRESSION:  1.  Both hips negative for fracture or CT evidence of avascular necrosis.  2.  Bony pelvis negative for fracture with degenerative change and areas of nicolette ankylosis of the SI joints and additional degenerative change at the symphysis pubis.  3.  There is a soft tissue ulceration along the midline posteriorly and slightly eccentric to the left posteriorly. Surrounding edema or cellulitis. This extends down to the periosteal margin of the penultimate coccygeal segment making osteomyelitis   difficult to completely exclude but there is no evidence for bone destruction and no evidence for undrained abscess.  4.  Catheter within the urinary bladder. Intrapelvic contents otherwise negative.  5.  Benign lipoma within the left tensor fascia uzair.  6.  Edema or cellulitis external to the pelvis.

## 2024-08-09 NOTE — PLAN OF CARE
Problem: Adult Inpatient Plan of Care  Goal: Absence of Hospital-Acquired Illness or Injury  Intervention: Prevent Skin Injury  Recent Flowsheet Documentation  Taken 8/8/2024 1627 by Sofie Talbert RN  Skin Protection:   incontinence pads utilized   adhesive use limited  Device Skin Pressure Protection:   absorbent pad utilized/changed   positioning supports utilized   pressure points protected   Goal Outcome Evaluation:      Plan of Care Reviewed With: patient      Patient denied pain. Had two Bms close together. Ate 75% of dinner, tolerated well. Needs help with cares and eating. Isolation precautions maintained. Bed alarm on. V-paced on tele.

## 2024-08-09 NOTE — PLAN OF CARE
Problem: Adult Inpatient Plan of Care  Goal: Plan of Care Review  Description: The Plan of Care Review/Shift note should be completed every shift.  The Outcome Evaluation is a brief statement about your assessment that the patient is improving, declining, or no change.  This information will be displayed automatically on your shift  note.  Outcome: Progressing   Goal Outcome Evaluation:  Denies pain.  Eating 50% of meals with assistance.  Verbalizes needs.  Isolation precautions maintained.  Repositioning every 2 hours.    Problem: Skin Injury Risk Increased  Goal: Skin Health and Integrity  Intervention: Optimize Skin Protection  Recent Flowsheet Documentation  Taken 8/9/2024 1300 by Michaela Turner, RN  Head of Bed (HOB) Positioning: HOB at 20-30 degrees  Taken 8/9/2024 0907 by Michaela Turner, RN  Pressure Reduction Techniques: frequent weight shift encouraged  Pressure Reduction Devices: heel offloading device utilized  Skin Protection: incontinence pads utilized

## 2024-08-09 NOTE — PLAN OF CARE
Problem: Comorbidity Management  Goal: Blood Glucose Levels Within Targeted Range  Outcome: Progressing  Intervention: Monitor and Manage Glycemia  Recent Flowsheet Documentation  Taken 8/9/2024 0000 by Eron Khalil RN  Glycemic Management: blood glucose monitored  Medication Review/Management: medications reviewed  Taken 8/8/2024 2000 by Eron Khalil RN  Glycemic Management: blood glucose monitored     Problem: Comorbidity Management  Goal: Blood Glucose Levels Within Targeted Range  Intervention: Monitor and Manage Glycemia  Recent Flowsheet Documentation  Taken 8/9/2024 0000 by Eron Khalil RN  Glycemic Management: blood glucose monitored  Medication Review/Management: medications reviewed  Taken 8/8/2024 2000 by Eron Khalil RN  Glycemic Management: blood glucose monitored     Problem: Fall Injury Risk  Goal: Absence of Fall and Fall-Related Injury  Outcome: Progressing  Intervention: Identify and Manage Contributors  Recent Flowsheet Documentation  Taken 8/9/2024 0000 by Eron Khalil RN  Medication Review/Management: medications reviewed  Intervention: Promote Injury-Free Environment  Recent Flowsheet Documentation  Taken 8/9/2024 0000 by Eron Khalil RN  Safety Promotion/Fall Prevention:   nonskid shoes/slippers when out of bed   lighting adjusted   room door open   room near nurse's station   treat reversible contributory factors   treat underlying cause  Taken 8/8/2024 2000 by Eron Khalil RN  Safety Promotion/Fall Prevention: (lift used)   treat underlying cause   treat reversible contributory factors   patient and family education   lighting adjusted   increased rounding and observation   activity supervised   other (see comments)       Patient is Alert and  oriented , Left-side weakness. RA . Q2 Repo.  Wound vac  in placed no issues. Patient slept well. Antibiotics infusing Assist of 2 for cares .

## 2024-08-10 NOTE — PROGRESS NOTES
Care Management Follow Up    Length of Stay (days): 5    Expected Discharge Date: 08/13/2024    Anticipated Discharge Plan:   new apartment with 24/7 home care    Transportation: Confirmed medical transport    PT Recommendations: Long term care facility  OT Recommendations:  Long term care facility     Barriers to Discharge: medical stability, wound, infection    Prior Living Situation:     Patient has been in TCU at [facility] since June. He requires assistance with ADLs/IADLs, not ambulating and uses caroline lift for transfers.  He states goal is to discharge to new facility called [facility].  Patient states he would like Narda removed as contact; however, Epic will not allow name to be removed. Son Abdoulaye is primary family contact.      Met with Abdoulaye and friend-Herman and [facility] owner Nusrat Sandoval. They report plan is for patient to discharge to an apartment at [facility]. They state patient will have 24/7 care and are able to support total care assist and patient is not ready for hospice care. Nusrat states she has a Palliative home care agency that can provide nurse for wound cares.  She will provide palliative care agency name. She states she was working with [facility] regarding DME needed (wheelchair, hospital bed and caroline lift). She states the TCU was providing orders and has measured patient for wheelchair. Nusrat confirms she will be working on securing home DME. Patient has PEG tube.     Nusrat Sandoval [phone]     Patient will need [facility] at discharge. Notified Abdoulaye of potential cost.     Provided Abdoulaye and [facility] contact information to report their concerns they witnessed with care and lack of care being provided at TCU.       Patient/Spokesperson Updated: Yes. Who? Patient, son Abdoulaye and friend Herman    Additional Information:  RNCM received phone call from Nusrat regarding discharge planning for patient. Update provided  best as able at this time.     CM to continue to monitor for wound vac/ IV abx needs at discharge.     Per Nusrat patient's previous facility is no longer going to assist with obtaining DME for patient upon discharge. Patient will need a bed, caroline, and wheelchair. Additional request for Outpatient Palliative care to follow upon discharge also. MD updated.     CM will continue to follow care progression and aide in discharge planning as needed.     Sofie Mukherjee RN

## 2024-08-10 NOTE — PROVIDER NOTIFICATION
Messaged hospitalist notifying about lab results for wound culture which included 4+ bacteroides fragiles and 1+ VRE.

## 2024-08-10 NOTE — PROGRESS NOTES
GENERAL SURGERY CHART CHECK:    Franklyn Sorto is a 88 year old male who presents with a sacral wound s/p bedside debridement 8/6 and irrigating wound VAC placed on 8/7. Further bedside debridement done yesterday 8/9 during wound vac change. Wound and tissue cultures growing ESBL E coli, Staph aureus, B. Fragilis. Antibiotics per ID. Surgery is following peripherally over the weekend. No issues with wound VAC noted during chart review today.     Patient not seen - chart check only    Plan:  - Wound VAC per WOC, will re-assess on Monday 8/12 for possible further debridement  - Abx per ID  - Medical management per primary team  - Surgery will follow peripherally, please contact us with any questions or concerns    Katerina Lloyd PA-C  [facility] General Surgery

## 2024-08-10 NOTE — PROGRESS NOTES
[facility]    Medicine Progress Note - Hospitalist Service    Date of Admission:  8/5/2024    Assessment & Plan   Franklyn Sorto is 88 year old male with history of HFrEF, hyperlipidemia, hypertension, aortic stenosis status post TAVR, thrombophilia, type 2 diabetes, stage III sacral ulcer, CAD, prior CVA admitted on 8/5/2024 with altered mental status & hypotension secondary to sepsis possibly from urinary tract infection, aspiration pneumonia and suspected infected, unstageable sacral ulcer.     He was managed briefly at the ICU by instensivist on admission but did not require vasopressor. Hypotension and ARIADNE improved with IV hydration and albumin infusion.  Urine culture grew E. coli.  Blood culture grew ESBL E Coli & Streptococcus salivarius. He is receiving IV meropenem and vancomycin per ID recommendation. Debridement of sacral ulcer was performed by surgery service on 8/6/2024. He was transferred to the medical floor on 8/6/24 given improved BP. He became hypotensive again on 8/7/24, therefore intensivist was re-consulted for possible septic shock and management with vasopressor. On 8/8/2024, he was stable prompting transfer to floor.      Sepsis, resolved   Infected decubitus ulcer with polymicrobial growth   Polymicrobial bacteremia with ESBL and Strep salivarius   UTI  - S/p debridement 8/6/2024 with Cx polymicrobial (E Coli, B Fragilis, Staph Aureus)  - Ucx ESBLI E Coli & Strep salivarius  - Unable to obtain MRI secondary to intracardiac lead. CT Pelvis W without any signs of osteo or abscess    Plan  - Abx by ID, currently on meropenem 1g q8h IV per ID (vancomycin d/c'ed on 8/9)  - General surgery with plans for wound vac change and debridement for Monday 08/12  - Wound vac per WOC  - Follow repeat Bcx 8/7/2024 - NGTD  - Midodrine 5mg TID was started during this admission for hypotension, will wean (5mg BID today)     Supratherapeutic INR   -Received IV 5mg given in the  ER  -Monitor INR  -Warfarin per pharmacist  -Notified surgery about Warfarin given possible debridement Monday     History of aspiration and dysphagia   Aspiration PNA vs CAP LLL infiltrate but H/O aspiration after CVA   -Mildly thick diet per speech therapist   -Video swallow study revealed silent aspiration with thin liquid, including with chin tuck   -On broad Abx as above     Recent acute ischemic stroke (5/25/2024)  Residual left Hemiparesis   Recent admission 5/25-6/7/2024 at [facility] for ischemic stroke; discharged to TCU   - no improvement in left hemiparesis   - PT/OT      History of the left upper extremity DVT with thrombophilia (positive antiphospholipid antibodies, 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)  - Pharmacy to dose warfarin with INR goal 2-3      Diabetes mellitus type 2, noninsulin dependent    - Consistently elevated  - Start Lantus 5u at bedtime 8/9, sliding scale insulin medium before meals and low at bedtime  - Monitor blood glucose     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   H/o HFrEF   CAD   - History of distal RCA stent, last echo 8/2019 with EF 50-55%  - BNP is elevated but does appear to be fluid overloaded actually appears very dry on exam   - Echo shows technically difficult study.  EF 55 to 60%.  Abnormal septal motion consistent with ventricular paced rhythm.  Bioprosthetic arctic valve  - Hold home coreg 25mg BID since hypotensive      Rheumatoid arthritis/Chronic Pain -   - Tylenol prn and Voltaren      Gout  - holding meds until SLP assess   GERD (gastroesophageal reflux disease)/Hx PUD   - PPI PO qAM           Diet: Easy to Chew Diet (level 7) Mildly Thick (level 2) (ok for water protocol placed in room)  Snacks/Supplements Adult: Magic Cup; With Meals  Snacks/Supplements Adult: Expedite Cup; With Meals  Room Service  Calorie Counts  Snacks/Supplements Adult: Ensure Enlive; With Meals    DVT Prophylaxis: Warfarin  Javier Catheter: Not  present  Lines: None     Cardiac Monitoring: ACTIVE order. Indication: Electrolyte Imbalance (24 hours)- Magnesium <1.3 mg/ml; Potassium < =2.8 or > 5.5 mg/ml  Code Status: No CPR- Do NOT Intubate      Clinically Significant Risk Factors              # Hypoalbuminemia: Lowest albumin = 2.2 g/dL at 8/5/2024 11:55 AM, will monitor as appropriate     # Hypertension: Noted on problem list          # DMII: A1C = N/A within past 6 months    # Moderate Malnutrition: based on nutrition assessment      # Financial/Environmental Concerns:             Disposition Plan     Medically Ready for Discharge: Anticipated in 2-4 Days           Jessica Malave MD  Hospitalist Service  [facility]  Securely message with [employer] (more info)  Text page via [employer] Paging/Directory   ______________________________________________________________________    Interval History   Reports doing better but still feels weak. Eating well. Has BM. No pain.     Physical Exam   Vital Signs: Temp: 98.1  F (36.7  C) Temp src: Axillary BP: 116/59 Pulse: 73   Resp: 18 SpO2: 96 % O2 Device: None (Room air)    Weight: 159 lbs 2.75 oz    GEN: NAD, chronically ill   HEENT: EOMI, PERRLA, MMM, hearing grossly intact  Neck: full ROM  Cardiopulmonary: non labored, comfortable on RA, nondiaphoretic  Abdominal: soft, nontender, nondistended, no HSM, no rebound, no guarding, normoactive BS  Skin: no jaundice, no gross lesions on visible skin  Neuro: A&Ox3, mild slurred speech   MSK: no gross deformity  Psych: normal affect, congruent with mood     Medical Decision Making       40 MINUTES SPENT BY ME on the date of service doing chart review, history, exam, documentation & further activities per the note.      Data     I have personally reviewed the following data over the past 24 hrs:    N/A  \   N/A   / N/A     N/A N/A N/A /  224 (H)   N/A N/A N/A \       Imaging results reviewed over the past 24 hrs:   No results found for this or any previous  visit (from the past 24 hour(s)).

## 2024-08-10 NOTE — PLAN OF CARE
Problem: Adult Inpatient Plan of Care  Goal: Absence of Hospital-Acquired Illness or Injury  Intervention: Prevent Skin Injury  Recent Flowsheet Documentation  Taken 8/10/2024 1500 by Michaela Turner RN  Body Position:   turned   supine     Problem: Adult Inpatient Plan of Care  Goal: Absence of Hospital-Acquired Illness or Injury  Intervention: Prevent Skin Injury  Recent Flowsheet Documentation  Taken 8/10/2024 1500 by Michaela Turner RN  Body Position:   turned   supine     Problem: Adult Inpatient Plan of Care  Goal: Absence of Hospital-Acquired Illness or Injury  Intervention: Prevent Skin Injury  Recent Flowsheet Documentation  Taken 8/10/2024 1500 by Michaela Turner RN  Body Position:   turned   supine   Goal Outcome Evaluation:

## 2024-08-10 NOTE — PLAN OF CARE
Problem: Skin Injury Risk Increased  Goal: Skin Health and Integrity  Outcome: Progressing     Problem: Comorbidity Management  Goal: Blood Glucose Levels Within Targeted Range  Intervention: Monitor and Manage Glycemia  Recent Flowsheet Documentation  Taken 8/10/2024 0027 by Lorena Cornejo RN  Medication Review/Management: medications reviewed   Goal Outcome Evaluation:        Patient A/O x4 overnight, Denied pain, VSS on RA. Turned and Reposition Q2 hours. Patient had 2 loose stools overnight. Wound vac in place draining well. Blood sugar stable overnight Premofit in place and draining urine well. No acute events overnight. Call light within reach and bed alarm activated.

## 2024-08-10 NOTE — PROGRESS NOTES
CALORIE COUNT      Approximate Oral Intake for:  8/9/24.  Ate lunch and supper.   No documentation found for breakfast.     Calories: ~1060 kcal  Protein: ~36 g        Estimated Needs:    Calories:  5841-5644+ kcal/day (25-30+ kcal/kg)  Protein: 109-145 g/day (1.5-2 g/kg)      Summary: Met 58% kcal needs and 33% protein needs as estimated above for 8/9/24.      Patient likes the Magic cups and drinking some of the Ensure.  For lunch today he ordered Magic cups, pudding and soup.      Consider supplemental TF for patient's nutrition needs, especially protein needs.    Calorie counts continue.

## 2024-08-10 NOTE — PLAN OF CARE
Goal Outcome Evaluation:  Continues on wound vac for sacral wound and left hip wound.  Turning and repositioning q 2 hours.  Tolerating diet.  Feeding pt at bedside.  Eating at least 50% of meals. Will continue to monitor closely.

## 2024-08-10 NOTE — PROGRESS NOTES
"INFECTIOUS DISEASE FOLLOW UP NOTE    Date: 08/10/2024   CHIEF COMPLAINT:   Chief Complaint   Patient presents with    Altered Mental Status    Hypotension        ASSESSMENT:    E coli ESBL bacteremia: 2/2 sacral ulcer. S/p bedside debridement with surgery. Necrotic tissue noted. Repeat bedside I&D 8/7 small purulence and again necrotic tissue 8/9. Vac currently in place.   strep salivarius bacteremia: noted on admission.  Likely 2/2 above  Sacral ulcer s/p bedside debridement, cultures with ecoli, MSSA, bacteroides, VRE. CT with ulceration to periosteal margin.   ARIADNE on CKD  Hx CVA with residual dysphagia: increased risk for aspiration events, on room air. Unclear if actual infection however will be covered with treatment of sacral ulcer  DM2    PLAN:  - continue meropenem IV  - add daptomycin  - baseline CK level  - wound vac in place  - continues to have necrotic tissue. Concerned that will require OR for source control, surgery following - they will re-evaluate on Monday  - difficulty coping, consider psych consult     Brian Fried MD  [facility]  Direct messaging: [employer] Paging   On-Call ID provider: [phone], option: 9 ;  ______________________________________________________________________    SUBJECTIVE / INTERVAL HISTORY:  First visit by me. Very worried about medical situation. Worried about his wife.     SH/FH/Habits/PMH reviewed and unchanged.    OBJECTIVE:  /60 (BP Location: Right arm)   Pulse 70   Temp 97.9  F (36.6  C) (Oral)   Resp 18   Ht 1.753 m (5' 9\")   Wt 72.2 kg (159 lb 2.8 oz)   SpO2 97%   BMI 23.51 kg/m       Resp: 18      GEN: No acute distress.    RESPIRATORY:  clear bilaterally  CV: regular  EXTREMITIES: No edema.  SKIN/HAIR/NAILS: vac in place.   IV:  peripheral IV          Antibiotics:  Meropenem      Pertinent labs:  No results found for: \"CRP\"   CBC RESULTS:   Recent Labs   Lab Test 08/06/24  0527   WBC 20.9*   RBC 3.65*   HGB 11.3*   HCT " 35.1*   MCV 96   MCH 31.0   MCHC 32.2   RDW 14.5   *      Last Comprehensive Metabolic Panel:  Sodium   Date Value Ref Range Status   08/06/2024 141 135 - 145 mmol/L Final     Potassium   Date Value Ref Range Status   08/06/2024 3.8 3.4 - 5.3 mmol/L Final   11/29/2019 4.3 3.5 - 5.0 mmol/L Final     Chloride   Date Value Ref Range Status   08/06/2024 104 98 - 107 mmol/L Final   11/29/2019 107 98 - 107 mmol/L Final     Carbon Dioxide (CO2)   Date Value Ref Range Status   08/06/2024 24 22 - 29 mmol/L Final   11/29/2019 26 22 - 31 mmol/L Final     Anion Gap   Date Value Ref Range Status   08/06/2024 13 7 - 15 mmol/L Final   11/29/2019 7 5 - 18 mmol/L Final     Glucose   Date Value Ref Range Status   11/29/2019 126 (H) 70 - 125 mg/dL Final     GLUCOSE BY METER POCT   Date Value Ref Range Status   08/10/2024 248 (H) 70 - 99 mg/dL Final     Urea Nitrogen   Date Value Ref Range Status   08/06/2024 26.8 (H) 8.0 - 23.0 mg/dL Final   11/29/2019 10 8 - 28 mg/dL Final     Creatinine   Date Value Ref Range Status   08/09/2024 0.68 0.67 - 1.17 mg/dL Final     GFR Estimate   Date Value Ref Range Status   08/09/2024 >90 >60 mL/min/1.73m2 Final     Comment:     eGFR calculated using 2021 CKD-EPI equation.   11/29/2019 >60 >60 mL/min/1.73m2 Final     Calcium   Date Value Ref Range Status   08/06/2024 7.8 (L) 8.8 - 10.4 mg/dL Final     Comment:     Reference intervals for this test were updated on 7/16/2024 to reflect our healthy population more accurately. There may be differences in the flagging of prior results with similar values performed with this method. Those prior results can be interpreted in the context of the updated reference intervals.        MICROBIOLOGY DATA:  Personally reviewed.  7-Day Micro Results       Collected Updated Procedure Result Status      08/07/2024 1651 08/09/2024 2302 Blood Culture Peripheral Blood [[id number]]    Peripheral Blood    Preliminary result Component Value   Culture No growth after  2 days  [P]                08/06/2024 1135 08/09/2024 1345 Anaerobic Bacterial Culture Routine [[id number]]    (Abnormal)   Tissue from Buttock, Gluteal Cleft    Final result Component Value   Culture 4+ Bacteroides fragilis    Susceptibilities not routinely done, refer to antibiogram to view typical susceptibility profiles    4+ Mixed Aerobic and Anaerobic surinder               08/06/2024 1135 08/08/2024 1143 Tissue Aerobic Bacterial Culture Routine With Gram Stain [[id number]]   (Abnormal)   Tissue from Buttock, Gluteal Cleft    Final result Component Value   Culture 2+ Escherichia coli    Susceptibilities done on previous cultures    1+ Staphylococcus aureus    Susceptibilities done on previous cultures    3+ Normal surinder   Gram Stain Result 4+ Gram positive cocci    4+ Gram negative cocci    3+ Gram positive bacilli    4+ Gram negative bacilli    4+ WBC seen    Predominantly PMNs               08/06/2024 1133 08/09/2024 0348 Wound Aerobic Bacterial Culture Routine With Gram Stain [[id number]]    (Abnormal)   Wound from Buttock, Gluteal Cleft    Final result Component Value   Culture 2+ Escherichia coli    Susceptibilities done on previous cultures    1+ Staphylococcus aureus    2+ Normal surinder   Gram Stain Result 4+ Gram positive cocci    4+ WBC seen    Predominantly PMNs        Susceptibility        Staphylococcus aureus      MARTITA      Ciprofloxacin <=0.5 ug/mL Susceptible  [*]       Clindamycin 0.25 ug/mL Susceptible      Daptomycin 0.25 ug/mL Susceptible      Doxycycline <=0.5 ug/mL Susceptible      Erythromycin <=0.25 ug/mL Susceptible      Gentamicin <=0.5 ug/mL Susceptible      Inducible macrolide resistance test Negative ug/mL Negative  [*]       Levofloxacin 0.25 ug/mL Susceptible  [*]       Linezolid 2 ug/mL Susceptible  [*]       Moxifloxacin <=0.25 ug/mL Susceptible  [*]       Nitrofurantoin <=16 ug/mL Susceptible  [*]       Oxacillin <=0.25 ug/mL Susceptible  [1]       Rifampin <=0.5 ug/mL  Susceptible  [*]       Tetracycline <=1 ug/mL Susceptible      Tigecycline <=0.12 ug/mL Susceptible  [*]       Trimethoprim/Sulfamethoxazole <=0.5/9.5 ug/mL Susceptible      Vancomycin <=0.5 ug/mL Susceptible                   [*]  Suppressed Antibiotic     [1]  Oxacillin susceptible isolates are susceptible to cephalosporins (example: cefazolin and cephalexin) and beta lactam combination agents. Oxacillin resistant isolates are resistant to these agents.                    08/06/2024 1133 08/10/2024 1239 Anaerobic Bacterial Culture Routine [[id number]]   (Abnormal)   Wound from Buttock, Gluteal Cleft    Preliminary result Component Value   Culture 4+ Bacteroides fragilis  [P]     Susceptibilities not routinely done, refer to antibiogram to view typical susceptibility profiles    1+ Enterococcus faecium VRE  [P]     Not isolated or reported on routine aerobic culture  Identification is preliminary, confirmation in progress    4+ Mixed Aerobic and Anaerobic surinder  [P]                08/06/2024 0416 08/08/2024 0744 Wound Aerobic Bacterial Culture Routine With Gram Stain [[id number]]    (Abnormal)   Wound from Spine, Coccyx    Final result Component Value   Culture 2+ Escherichia coli ESBL    3+ Normal surinder   Gram Stain Result 4+ Gram positive cocci    1+ Gram positive bacilli    4+ WBC seen    Predominantly PMNs        Susceptibility        Escherichia coli ESBL      MARTITA      Amikacin <=2 ug/mL Susceptible  [*]       Ampicillin >=32 ug/mL Resistant      Ampicillin/ Sulbactam 4 ug/mL Susceptible  [*]       Cefepime  Resistant      Cefotaxime  Resistant  [*]       Cefoxitin <=4 ug/mL Susceptible  [*]       Ceftazidime  Resistant      Ceftriaxone  Resistant      Ciprofloxacin >=4 ug/mL Resistant      Extended Spectrum Beta-Lactamase Positive ug/mL ESBL Positive  [*]       Gentamicin <=1 ug/mL Susceptible      Levofloxacin >=8 ug/mL Resistant      Meropenem <=0.25 ug/mL Susceptible  [1]       Nitrofurantoin <=16  ug/mL Susceptible  [*]       Piperacillin/Tazobactam <=4 ug/mL Susceptible      Tobramycin <=1 ug/mL Susceptible      Trimethoprim/Sulfamethoxazole <=1/19 ug/mL Susceptible                   [*]  Suppressed Antibiotic     [1]  Enterobacterales that are susceptible to meropenem are usually susceptible to ertapenem.               Susceptibility Comments       Escherichia coli ESBL    ESBL (extended spectrum beta lactamase) producing organisms require contact precautions.               08/06/2024 0416 08/09/2024 1119 Anaerobic Bacterial Culture Routine [[id number]]   (Abnormal)   Wound from Spine, Coccyx    Final result Component Value   Culture 1+ Staphylococcus aureus    Not isolated or reported on routine aerobic culture  Susceptibilities done on previous cultures    4+ Bacteroides fragilis    Susceptibilities not routinely done, refer to antibiogram to view typical susceptibility profiles    4+ Mixed Aerobic and Anaerobic surinder               08/05/2024 1722 08/05/2024 1817 Asymptomatic COVID-19 Virus (Coronavirus) by PCR Nasopharyngeal [[id number]]    Swab from Nasopharyngeal    Final result Component Value   SARS CoV2 PCR Negative   NEGATIVE: SARS-CoV-2 (COVID-19) RNA not detected, presumed negative.            08/05/2024 1722 08/06/2024 0005 Respiratory Panel PCR [[id number]]    Swab from Nasopharyngeal    Final result Component Value   Adenovirus Not Detected   Coronavirus Not Detected   This test detects Coronavirus 229E, HKU1, NL63 and OC43 but does not distinguish between them. It does not detect MERS ( Respiratory Syndrome), SARS (Severe Acute Respiratory Syndrome) or 2019-nCoV (Novel 2019) Coronavirus.   Human Metapneumovirus Not Detected   Human Rhin/Enterovirus Not Detected   Influenza A Not Detected   Influenza A, H1 Not Detected   Influenza A 2009 H1N1 Not Detected   Influenza A, H3 Not Detected   Influenza B Not Detected   Parainfluenza Virus 1 Not Detected   Parainfluenza Virus 2  Not Detected   Parainfluenza Virus 3 Not Detected   Parainfluenza Virus 4 Not Detected   Respiratory Syncytial Virus A Not Detected   Respiratory Syncytial Virus B Not Detected   Chlamydia Pneumoniae Not Detected   Mycoplasma Pneumoniae Not Detected            08/05/2024 1554 08/06/2024 2132 Urine Culture [[id number]]    (Abnormal)   Urine, Clean Catch    Final result Component Value   Culture >100,000 CFU/mL Escherichia coli ESBL        Susceptibility        Escherichia coli ESBL      MARTITA      Amikacin <=2 ug/mL Susceptible  [*]       Ampicillin >=32 ug/mL Resistant      Ampicillin/ Sulbactam 4 ug/mL Susceptible  [*]       Cefazolin >=64 ug/mL Resistant  [1]       Cefepime  Resistant      Cefotaxime  Resistant  [*]       Cefoxitin <=4 ug/mL Susceptible      Ceftazidime  Resistant      Ceftriaxone  Resistant      Ciprofloxacin >=4 ug/mL Resistant      Extended Spectrum Beta-Lactamase Positive ug/mL ESBL Positive  [*]       Gentamicin <=1 ug/mL Susceptible      Levofloxacin >=8 ug/mL Resistant      Meropenem <=0.25 ug/mL Susceptible  [2]       Nitrofurantoin <=16 ug/mL Susceptible      Piperacillin/Tazobactam <=4 ug/mL Susceptible      Tobramycin <=1 ug/mL Susceptible      Trimethoprim/Sulfamethoxazole >16/304 ug/mL Resistant                   [*]  Suppressed Antibiotic     [1]  Cefazolin MARTITA breakpoints are for the treatment of uncomplicated urinary tract infections. For the treatment of systemic infections, please contact the laboratory for additional testing.     [2]  Enterobacterales that are susceptible to meropenem are usually susceptible to ertapenem.               Susceptibility Comments       Escherichia coli ESBL    ESBL (extended spectrum beta lactamase) producing organisms require contact precautions.               08/05/2024 1318 08/08/2024 0813 Blood Culture Peripheral Blood [[id number]]    (Abnormal)   Peripheral Blood    Final result Component Value   Culture Positive on the 1st day of incubation     Escherichia coli ESBL    1 of 2 bottles    Streptococcus salivarius group    1 of 2 bottles        Susceptibility        Escherichia coli ESBL      MARTITA      Amikacin <=2 ug/mL Susceptible  [*]       Ampicillin >=32 ug/mL Resistant      Ampicillin/ Sulbactam 4 ug/mL Susceptible  [*]       Cefepime  Resistant      Cefotaxime  Resistant  [*]       Cefoxitin <=4 ug/mL Susceptible  [*]       Ceftazidime  Resistant      Ceftriaxone  Resistant      Ciprofloxacin >=4 ug/mL Resistant      Extended Spectrum Beta-Lactamase Positive ug/mL ESBL Positive  [*]       Gentamicin <=1 ug/mL Susceptible      Levofloxacin >=8 ug/mL Resistant      Meropenem <=0.25 ug/mL Susceptible  [1]       Nitrofurantoin <=16 ug/mL Susceptible  [*]       Piperacillin/Tazobactam <=4 ug/mL Susceptible      Tobramycin <=1 ug/mL Susceptible      Trimethoprim/Sulfamethoxazole <=1/19 ug/mL Susceptible                   [*]  Suppressed Antibiotic     [1]  Enterobacterales that are susceptible to meropenem are usually susceptible to ertapenem.               Susceptibility        Streptococcus salivarius group      MARTITA      Ampicillin 1 ug/mL Intermediate      Cefotaxime 1 ug/mL Susceptible      Ceftriaxone 1 ug/mL Susceptible      Clindamycin <=0.06 ug/mL Susceptible      Erythromycin <=0.06 ug/mL Susceptible  [*]       Meropenem 0.12 ug/mL Susceptible      Penicillin 1 ug/mL Intermediate      Vancomycin 0.5 ug/mL Susceptible                   [*]  Suppressed Antibiotic               Susceptibility Comments       Escherichia coli ESBL    ESBL (extended spectrum beta lactamase) producing organisms require contact precautions.               08/05/2024 1318 08/06/2024 0909 Verigene GP Panel [[id number]]    Peripheral Blood    Final result Component Value   Staphylococcus species Not Detected   Staphylococcus aureus Not Detected   Staphylococcus epidermidis Not Detected   Staphylococcus lugdunensis Not Detected   Enterococcus faecalis Not Detected    Enterococcus faecium Not Detected   Streptococcus species Not Detected   Streptococcus agalactiae Not Detected   Streptococcus anginosus group Not Detected   Streptococcus pneumoniae Not Detected   Streptococcus pyogenes Not Detected   Listeria species Not Detected            08/05/2024 1318 08/06/2024 0848 Verigene GN Panel [[id number]]    (Abnormal)   Peripheral Blood    Final result Component Value   Acinetobacter species Not Detected   Citrobacter species Not Detected   Enterobacter species Not Detected   Proteus species Not Detected   Escherichia coli Detected   Positive for Escherichia coli by Verigene multiplex nucleic acid test. Final identification and antimicrobial susceptibility testing will be verified by standard methods. Verigene test will not distinguish E. coli from Shigella species including Shigella dysenteriae, Shigella flexneri, Shigella boydii, and Shigella sonnei. Specimens containing Shigella species or E. coli will be reported as positive for E. coli.   Klebsiella pneumoniae Not Detected   Klebsiella oxytoca Not Detected   Pseudomonas aeruginosa Not Detected   CTX-M Detected   Positive for CTX-M Class A Extended Spectrum beta-lactamase (ESBL) resistance marker by Verigene multiplex nucleic acid test. CTX-M confers resistance to penicillins, cephalosporins and variable resistance to beta-lactamase inhibitor combinations.  Best empiric antibiotic choice is meropenem. Specific susceptibility testing will be performed.   KPC Not Detected   NDM Not Detected   VIM Not Detected   IMP Not Detected   OXA Not Detected                     RADIOLOGY:  Personally Reviewed.  Recent Results (from the past 24 hour(s))   XR Video Swallow with SLP or OT    Narrative    EXAM: XR VIDEO SWALLOW WITH SLP OR OT  LOCATION: [facility]  DATE: 8/6/2024    INDICATION: Difficulty swallowing.  COMPARISON: None.    TECHNIQUE: Routine swallow study with speech pathology using multiple barium  thicknesses.    RADIATION DOSE: Total Air Kerma 5.1 mGy    FINDINGS:   Swallow study with Speech Pathology using multiple barium thicknesses. Silent aspiration with thin liquid, including with chin tuck. Aspiration also occurred with thin liquid and smaller drink without chin tuck. No other significant penetration or   aspiration.         Principal Problem:    Sepsis with acute renal failure and tubular necrosis without septic shock (H)  Active Problems:    Benign prostatic hyperplasia    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) (H)    Hyperlipidemia    Hypertension    Long term current use of anticoagulant therapy    S/P cardiac pacemaker procedure    S/P TAVR (transcatheter aortic valve replacement)    Thrombophilia (H24)    Diabetes mellitus type 2, noninsulin dependent (H)    History of CVA (cerebrovascular accident)    Hypoxia    Hypotension, unspecified hypotension type    Community acquired pneumonia of left lower lobe of lung    Pressure injury of sacral region, stage 3 (H)    Leukocytosis, unspecified type  ;

## 2024-08-11 NOTE — PLAN OF CARE
Goal Outcome Evaluation:                        Problem: Adult Inpatient Plan of Care  Goal: Plan of Care Review  Description: The Plan of Care Review/Shift note should be completed every shift.  The Outcome Evaluation is a brief statement about your assessment that the patient is improving, declining, or no change.  This information will be displayed automatically on your shift  note.  Outcome: Progressing   Pt c/o pain but declined pain medications. Pt alert and oriented x4. Wound wet to dry dressing changed. Pt turned every two hours. Pt needs to be fed meals. BG was checked with sliding scale insulin given. Iv antibiotic infused per orders. Pt family at the bedside visiting. Pt on the falls program with call light within reach and bed alarm on for safety.

## 2024-08-11 NOTE — PLAN OF CARE
Goal Outcome Evaluation:      Plan of Care Reviewed With: patient, child          Outcome Evaluation: pt has been repositioned to a spot of comfort on sides throughout shift. pt son here and concerns being addressed by him regarding his care when he leaves here. tolerating cares with staff and eating meals. wound dressing intact with green serous drainage.

## 2024-08-11 NOTE — PLAN OF CARE
Problem: Wound  Goal: Optimal Functional Ability  Outcome: Not Progressing  Intervention: Optimize Functional Ability  Recent Flowsheet Documentation  Taken 8/11/2024 0250 by Lorena Cornejo RN  Assistive Device Utilized: lift device  Activity Management:   activity adjusted per tolerance   activity encouraged  Activity Assistance Provided: assistance, 2 people  Taken 8/11/2024 0045 by Lorena Cornejo RN  Assistive Device Utilized: lift device  Activity Management:   activity adjusted per tolerance   activity encouraged  Activity Assistance Provided: assistance, 2 people     Problem: Comorbidity Management  Goal: Blood Pressure in Desired Range  Outcome: Progressing  Intervention: Maintain Blood Pressure Management  Recent Flowsheet Documentation  Taken 8/11/2024 0045 by Lorena Cornejo, RN  Medication Review/Management: medications reviewed   Goal Outcome Evaluation:    Patient A/O x4. Denied pain. VSS on RA. Turned and Repositioned Q2 hours. Wound Vac removed eves 8/10 due to leaking, wet to dry dressing in place C/D/I. Premo-fit in place. Blood sugar stable overnight. No acute events overnight. Call light within reach and bed alarm activated.        no

## 2024-08-11 NOTE — PROGRESS NOTES
[facility]    Medicine Progress Note - Hospitalist Service    Date of Admission:  8/5/2024    Assessment & Plan   Franklyn Sorto is 88 year old male with history of HFrEF, hyperlipidemia, hypertension, aortic stenosis status post TAVR, thrombophilia, type 2 diabetes, stage III sacral ulcer, CAD, prior CVA admitted on 8/5/2024 with altered mental status & hypotension secondary to sepsis possibly from urinary tract infection, aspiration pneumonia and suspected infected, unstageable sacral ulcer.     He was managed briefly at the ICU by instensivist on admission but did not require vasopressor. Hypotension and ARIADNE improved with IV hydration and albumin infusion.  Urine culture grew E. coli.  Blood culture grew ESBL E Coli & Streptococcus salivarius. He is receiving IV meropenem and vancomycin per ID recommendation. Debridement of sacral ulcer was performed by surgery service on 8/6/2024. He was transferred to the medical floor on 8/6/24 given improved BP. He became hypotensive again on 8/7/24, therefore intensivist was re-consulted for possible septic shock and management with vasopressor. On 8/8/2024, he was stable prompting transfer to floor.      Sepsis, resolved   Infected decubitus ulcer with polymicrobial growth   Polymicrobial bacteremia with ESBL and Strep salivarius   UTI  - S/p debridement 8/6/2024 with Cx polymicrobial (E Coli, B Fragilis, Staph Aureus, E faecium)  - Ucx ESBLI E Coli & Strep salivarius  - Unable to obtain MRI secondary to intracardiac lead. CT Pelvis W without any signs of osteo or abscess    Plan  - Abx by ID, currently on meropenem (8/7- )  added daptomycin for VRE (8/10- ), vancomycin (8/5-8/8)  - General surgery with plans for wound vac change and debridement for Monday 08/12 (surgery manage anticoagulation plan and NPO and I notified surgery about him being on warfarin)   - Wound vac per WOC  - Follow repeat Bcx 8/7/2024 - NGTD  - Midodrine 5mg TID was started  during this admission for hypotension, will wean (5mg BID 8/10 and 5mg daily 8/11 and then off)     History of aspiration and dysphagia   Aspiration PNA vs CAP LLL infiltrate but H/O aspiration after CVA   -Mildly thick diet per speech therapist   -Video swallow study revealed silent aspiration with thin liquid, including with chin tuck   -On broad Abx as above     Recent acute ischemic stroke (5/25/2024)  Residual left Hemiparesis   Recent admission 5/25-6/7/2024 at [facility] for ischemic stroke; discharged to TCU   - No improvement in left hemiparesis   - PT/OT      History of the left upper extremity DVT with thrombophilia (positive antiphospholipid antibodies, 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)  - Pharmacy to dose warfarin with INR goal 2-3      Diabetes mellitus type 2, noninsulin dependent    - Start Lantus 5u at bedtime 8/9, sliding scale insulin medium before meals and low at bedtime  - Monitor blood glucose     Malnutrition  S/p PEG   Nutrition is following. Calorie count ongoing. Not meeting calorie on 8/9.   - If still not meeting calorie needs, consider TF next week via existing PEG    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   H/o HFrEF   CAD   - History of distal RCA stent, last echo 8/2019 with EF 50-55%  - BNP is elevated but does appear to be fluid overloaded actually appears very dry on exam   - Echo shows technically difficult study.  EF 55 to 60%.  Abnormal septal motion consistent with ventricular paced rhythm.  Bioprosthetic arctic valve  - Hold home coreg 25mg BID since hypotensive      Rheumatoid arthritis/Chronic Pain -   - Tylenol prn and Voltaren      GERD (gastroesophageal reflux disease)/Hx PUD   - PPI PO qAM     Dispo:  -Ordered DME (Kevin lift, hospital bed, wheelchair)  -Outpatient palliative care consult placed         Diet: Easy to Chew Diet (level 7) Mildly Thick (level 2) (ok for water protocol placed in room)  Snacks/Supplements Adult: Magic  Cup; With Meals  Snacks/Supplements Adult: Expedite Cup; With Meals  Room Service  Calorie Counts  Snacks/Supplements Adult: Ensure Enlive; With Meals    DVT Prophylaxis: Warfarin  Javier Catheter: Not present  Lines: None     Cardiac Monitoring: None  Code Status: No CPR- Do NOT Intubate      Clinically Significant Risk Factors              # Hypoalbuminemia: Lowest albumin = 2.2 g/dL at 8/5/2024 11:55 AM, will monitor as appropriate     # Hypertension: Noted on problem list          # DMII: A1C = N/A within past 6 months    # Moderate Malnutrition: based on nutrition assessment      # Financial/Environmental Concerns:             Disposition Plan     Medically Ready for Discharge: Anticipated in 2-4 Days         Jessica Malave MD  Hospitalist Service  [facility]  Securely message with [employer] (more info)  Text page via [employer] Paging/Directory   ______________________________________________________________________    Interval History   Reports doing better but still feels weak. Needs RN assist with eating given hemiparesis with stroke.  Has intermittent soft stools. Nutrition documented that he is not meeting calorie count yet.     Physical Exam   Vital Signs: Temp: 98.5  F (36.9  C) Temp src: Axillary BP: 133/71 Pulse: 70   Resp: 18 SpO2: 97 % O2 Device: None (Room air)    Weight: 159 lbs 2.75 oz    GEN: NAD, chronically ill   HEENT: EOMI, PERRLA, MMM, hearing grossly intact  Neck: full ROM  Cardiopulmonary: non labored, comfortable on RA, nondiaphoretic  Abdominal: soft, nontender, nondistended, no HSM, no rebound, no guarding, normoactive BS  Skin: no jaundice, no gross lesions on visible skin  Neuro: A&Ox3, mild slurred speech   MSK: no gross deformity  Psych: normal affect, congruent with mood     Medical Decision Making       45 MINUTES SPENT BY ME on the date of service doing chart review, history, exam, documentation & further activities per the note.      Data     I have personally  reviewed the following data over the past 24 hrs:    13.1 (H)  \   10.8 (L)   / 158     N/A N/A N/A /  166 (H)   N/A N/A N/A \     INR:  1.76 (H) PTT:  N/A   D-dimer:  N/A Fibrinogen:  N/A       Imaging results reviewed over the past 24 hrs:   No results found for this or any previous visit (from the past 24 hour(s)).

## 2024-08-11 NOTE — PROGRESS NOTES
During meal conversation, pt reporting he feels very depressed. Concerned to return to facility he was admitted here from due to lack of enough care for him, as he is now totally disabled. Concerned for the wound on his bottom that is now infected. Pt given emotional support and allowed to vent. Assured that care management will follow up with him as well as WOC RN tomorrow for the wound vac orders & care.

## 2024-08-11 NOTE — PLAN OF CARE
Problem: Adult Inpatient Plan of Care  Goal: Absence of Hospital-Acquired Illness or Injury  Intervention: Prevent Skin Injury  Recent Flowsheet Documentation  Taken 8/10/2024 2000 by Sabine Stevenson, RN  Body Position: weight shifting  Taken 8/10/2024 1702 by Sabine Stevenson, RN  Body Position:   right   turned  Skin Protection: incontinence pads utilized  Device Skin Pressure Protection:   absorbent pad utilized/changed   positioning supports utilized     Problem: Wound  Goal: Optimal Functional Ability  Outcome: Progressing  Intervention: Optimize Functional Ability  Recent Flowsheet Documentation  Taken 8/10/2024 1702 by Sabine Stevenson, RN  Activity Assistance Provided: assistance, 2 people   Goal Outcome Evaluation:       Pt in bed, having frequent soft incontinent Bms. Noted pt had 2 sacral mepilex over wound vac dressing and dressing leaking. Removed dressing, irrigated wound and packed with wet to dry per order. Cont turning pt [id number], insuring skin clean and dry. All extrems elevated on pillows.

## 2024-08-11 NOTE — PROGRESS NOTES
CALORIE COUNT      Diet RX:  Easy to Chew (level 7), Mildly thick liquids (level 2)  Supplements:  Expedite cup daily, Magic cup and Ensure Enlive (thickened) with meals.    Approximate Oral Intake for:  8/10/24.   Data collected for lunch and supper.  No documentation of breakfast intake.    Calories:  ~855 kcals  Protein: ~37 g        Estimated Needs:    Calories:  9279-3012+ kcal/day (25-30+ kcal/kg)    Protein: 109-145 g/day (1.5-2 g/kg)        Summary:  above meets ~50% estimated kcal needs and 33% estimated protein needs.    Patient likes the Magic cups.  Patient taking the Expedite cup and Ensure.    Consider supplemental TF for patient's nutrition needs, especially protein needs for wound healing.    Consider MVI and 500 mg Vitamin C daily for wound healing needs    Continue to encourage po intake.

## 2024-08-12 NOTE — PLAN OF CARE
Goal Outcome Evaluation:         Problem: Adult Inpatient Plan of Care  Goal: Absence of Hospital-Acquired Illness or Injury  Intervention: Prevent Skin Injury  Recent Flowsheet Documentation  Taken 8/12/2024 0500 by Redd Sevilla RN  Body Position: (Patient in pain when turning right. Readjusted the degree of turn to left)   left   turned  Taken 8/12/2024 0240 by Redd Sevilla, RN  Body Position: left  Skin Protection: incontinence pads utilized  Device Skin Pressure Protection: absorbent pad utilized/changed  Taken 8/12/2024 0110 by Redd Sevilla RN  Body Position:   turned   left   A&O x 4. Patient is wheelchair bound and hemiplegic. Admitted for sepsis with acute renal failure tubular necrosis. IV access right lower forearm. No acute changes. Patient having difficulty tolerating turns to right side due to pain. As a result, patient is being shifted varying degrees to left. Anticipated discharge to LTC. Bed in low position, call light within reach. Non-slip footwear in use. Patient calls appropriately.   Visit Vitals  /69 (BP Location: Right arm)   Pulse 72   Temp 98.3  F (36.8  C) (Oral)   Resp 18

## 2024-08-12 NOTE — PROGRESS NOTES
General Surgery Progress Note:    Hospital Day # 7    ASSESSMENT:   1. Pressure injury of sacral region, unstageable (H)    2. Community acquired pneumonia of left lower lobe of lung    3. Pressure injury of sacral region, stage 3 (H)    4. Hypotension, unspecified hypotension type    5. Hypoxia    6. History of stroke        Franklyn Sorto is a 88 year old male with history of multiple comorbidities including CVA and DMT2 who was admitted with multiple source sepsis including UTI, aspiration pneumonia and infected sacral decubitus ulcer. Sacral wound s/p bedside debridement and irrigating wound vac placed on 8/9 and removed due to leaking on 8/10 and replaced with wet-to-dry packing. Wound and tissue cultures growing ESBL E coli, MSSA, VRE, B. Fragilis, ID following. Wound continues to have necrotic tissue and fibrinous exudate, no further purulent drainage, will require OR debridement. Discussed diverting colostomy given fecal contamination with wound. Further discussion with patient's family pending.     PLAN:   - Okay for diet as tolerated. NPO at midnight for possible procedure tomorrow  - Continue BID wet-to-dry dressing changes with Vashe solution. Change as needed if soiled.  - Hold warfarin  - Antibiotics per ID  - Tentative plan for OR wound I&D +/- diverting colostomy tomorrow, 8/13 pending further discussion later today with patient and his family  - General surgery following    SUBJECTIVE:   Franklyn Sorto is doing okay. Continues to endorse sacral soreness. Overall feeling depressed with his current situation. Denies fever, chills. Inquiring when he can order breakfast.     Patient Vitals for the past 24 hrs:   BP Temp Temp src Pulse Resp SpO2   08/12/24 0742 125/58 97.7  F (36.5  C) Oral 70 17 99 %   08/12/24 0500 130/69 98.3  F (36.8  C) Oral 72 18 99 %   08/11/24 1533 129/70 98.1  F (36.7  C) Oral 69 18 98 %       Physical Exam:  General: patient seen resting in bed, no acute distress  Resp: no  respiratory distress, breathing comfortably on room air  Skin: Sacral wound with necrotic skin edge with eschar and ongoing necrotic tissue and fibrinous exudate in the wound bed. Wound with stable surrounding erythema and induration. No purulent drainage. Wound tunneling inferiorly and laterally. Repacked wound with Vashe-moistened gauze.   Extremities: warm and well perfused    No results displayed because visit has over 200 results.           Lesley Zarate PA-C  [facility] General Surgery  [address]  [address] [place] [id number]

## 2024-08-12 NOTE — PROGRESS NOTES
"[facility] Nurse Inpatient Assessment     Consulted for: Sacral    Summary: NPWT was changed 8/9 and per staff removed 8/10 due to leaking. Patient has had Vashe moistened gauze wet to moist dressings.  Plan is to go to ER for debridement 8/12, and have a diverting colostomy.    Patient History (according to provider note(s):      HPI: Franklyn Sorto is a 88 year old male with h/o T2DM, GERD, HTN, CVA 5/25/24 currently in TCU presenting to the ER for evaluation of fatigue and altered mental status. Patient's family was visiting today and states when they got there \"5 staff members were around the patient and he was not responding\". They called EMS who initially got a BP with 90 systolic, however, repeat on the way over was 65 systolic. Per patient's family, patient had feeding tube placed following his stroke as he was having difficulty swallowing.  Patient has improved a fair amount and is now able to swallow, has not used his feeding tube in over a month.  Patient's family states that they are quite adamant about him only drinking and eating well sitting up but they are nervous that his facility may have given him some fluid while laying down that could have resulted in an aspiration pneumonia. Patient denies any recent fevers. States he has been coughing a bit more, no sputum production.     Vitals reviewed, initial BP upon arrival to the ER was 69/48. Temp 97.5F. SpO2 89% on room air. 2L nasal cannula was placed and patient quickly improved to 98% on room air On exam he is pale but nontoxic-appearing.  He is speaking in full sentences and is alert. Differential diagnosis includes but not limited to UTI, pyelonephritis, bronchitis, pneumonia, PE, ACS, hypovolemia, cardiogenic shock, septic shock. After initial 500 mL fluid bolus his BP has improved to 87/47. He was given an additional 500 mL and improved to 90/66.        Assessment:      Pressure Injury Location: Sacrum   " "  8/6 8/7 8/9 8/12  Last photo: 8/9  Wound type: Pressure Injury     Pressure Injury Stage: Unstageable, present on admission   Wound history/plan of care:   Came from TCU    Wound base: 100 % eschar and slough, discolored black/grey      Palpation of the wound bed: boggy and fluctuance      Drainage: moderate     Description of drainage: purulent     Measurements (length x width x depth, in cm) 5  x 3.5  x  1.5 cm      Tunneling 5-6 o'clock 5cm     Undermining all around, 1-2cm  Periwound skin:  discolored      Color: dusky, purple, and red      Temperature: warm  Odor: offensive  Pain: moderate and during dressing change, tender  Pain intervention prior to dressing change: slow and gentle cares   Treatment goal: Drainage control, Infection control/prevention, Increase granulation, Protection, Remove necrotic tissue, and Soften necrotic tissue  STATUS: stable          Treatment Plan:     Coccyx wound:  Sacral wound:  BID wet-to-dry dressing change with Vashe-moistened 2\" Kerlix and covered with dry gauze and Mepilex dressing. Change as needed if soiled.    Pressure Injury Prevention (PIP) Plan:  If patient is declining pressure injury prevention interventions: Explore reason why and address patient's concerns, Educate on pressure injury risk and prevention intervention(s), If patient is still declining, document \"informed refusal\" , and Ensure Care team is aware ( provider, charge nurse, etc)  Mattress: Follow bed algorithm, reassess daily and order specialty mattress, if indicated.  HOB: Maintain at or below 30 degrees, unless contraindicated  Repositioning in bed: Every 1-2 hours , Left/right positioning; avoid supine, Raise foot of bed prior to raising head of bed, to reduce patient sliding down (shear), and Frequent microturns using positioning wedges, as patient tolerates  Heels: Keep elevated off mattress, Pillows under calves, and Heel lift boots  Protective Dressing:  wound care to " sacrum  Positioning Equipment:Positioning wedges (#320907) to help maintain 30 degree side lying position   Chair positioning: Chair cushion (#[id number])    If patient has a buttock pressure injury, or high risk for PI use chair cushion or SPS.  Moisture Management: Perineal cleansing /protection: Follow Incontinence Protocol, Avoid brief in bed, Clean and dry skin folds with bathing , Consider InterDry (#[id number]) between folds, and Moisturize dry skin  Under Devices: Inspect skin under all medical devices during skin inspection , Ensure tubes are stabilized without tension, and Ensure patient is not lying on medical devices or equipment when repositioned  Ask provider to discontinue device when no longer needed.      Orders: Updated    RECOMMEND PRIMARY TEAM ORDER: None, at this time  Education provided: importance of repositioning, plan of care, and wound progress  Discussed plan of care with: Patient, Nurse, and Physicians Assistant  WOC nurse follow-up plan: twice weekly  Notify WOC if wound(s) deteriorate.  Nursing to notify the Provider(s) and re-consult the WOC Nurse if new skin concern.    DATA:     Current support surface: Standard  Low air loss (TORY pump, Isolibrium, Pulsate)  Containment of urine/stool: Incontinence Protocol  BMI: Body mass index is 23.51 kg/m .   Active diet order: Orders Placed This Encounter      NPO per Anesthesia Guidelines for Procedure/Surgery Except for: Meds      Easy to Chew Diet (level 7) Mildly Thick (level 2)     Output: I/O last 3 completed shifts:  In: 2667 [P.O.:2125; I.V.:542]  Out: 750 [Urine:750]     Labs:   Recent Labs   Lab 08/12/24  0716 08/11/24  0748 08/11/24  0747   HGB  --   --  10.9*   INR 2.07*   < >  --    WBC  --   --  12.7*    < > = values in this interval not displayed.     Pressure injury risk assessment:   Sensory Perception: 2-->very limited  Moisture: 2-->very moist  Activity: 1-->bedfast  Mobility: 2-->very limited  Nutrition: 3-->adequate  Friction and  Shear: 1-->problem  Dilan Score: 11    Tenisha Recinos, SUZANNEN, RN, PHN, HNB-BC, CWOCN  Pager no longer is use, please contact through [employer] Code group: [facility] [employer]

## 2024-08-12 NOTE — PROGRESS NOTES
"INFECTIOUS DISEASE FOLLOW UP NOTE    Date: 08/12/2024   CHIEF COMPLAINT:   Chief Complaint   Patient presents with    Altered Mental Status    Hypotension        ASSESSMENT:    E coli ESBL bacteremia: 2/2 sacral ulcer. S/p bedside debridement with surgery. Necrotic tissue noted. Repeat bedside I&D 8/7 small purulence and again necrotic tissue 8/9. Vac currently in place.   strep salivarius bacteremia: noted on admission.  Likely 2/2 above  Sacral ulcer s/p bedside debridement, cultures with ecoli, MSSA, bacteroides, VRE. CT with ulceration to periosteal margin.   ARIADNE on CKD  Hx CVA with residual dysphagia: increased risk for aspiration events, on room air. Unclear if actual infection however will be covered with treatment of sacral ulcer  DM2    PLAN:  - continue meropenem IV, daptomycin  - plan I+D of wound with diverting colostomy 8/13  - operative findings can help determine duration of antibiotics (osteomyelitis vs not)    Eriberto Verdugo MD   [facility]  Direct messaging: [facility] Paging   ______________________________________________________________________    SUBJECTIVE / INTERVAL HISTORY:  Friend present.     Pain at wound improved. Reviewed plan, discussed results.     SH/FH/Habits/PMH reviewed and unchanged.    OBJECTIVE:  BP (!) 143/66 (BP Location: Right arm)   Pulse 75   Temp 98.2  F (36.8  C) (Oral)   Resp 19   Ht 1.753 m (5' 9\")   Wt 72.2 kg (159 lb 2.8 oz)   SpO2 99%   BMI 23.51 kg/m       Resp: 19      GEN: No acute distress.    RESPIRATORY:  clear bilaterally  CV: regular  EXTREMITIES: No edema.  SKIN/HAIR/NAILS: vac in place.   IV:  peripheral IV          Antibiotics:  Meropenem      Pertinent labs:  reviewed  Last Comprehensive Metabolic Panel:  Sodium   Date Value Ref Range Status   08/11/2024 141 135 - 145 mmol/L Final     Potassium   Date Value Ref Range Status   08/11/2024 4.0 3.4 - 5.3 mmol/L Final   11/29/2019 4.3 3.5 - 5.0 mmol/L Final     Chloride   Date " Value Ref Range Status   08/11/2024 109 (H) 98 - 107 mmol/L Final   11/29/2019 107 98 - 107 mmol/L Final     Carbon Dioxide (CO2)   Date Value Ref Range Status   08/11/2024 28 22 - 29 mmol/L Final   11/29/2019 26 22 - 31 mmol/L Final     Anion Gap   Date Value Ref Range Status   08/11/2024 4 (L) 7 - 15 mmol/L Final   11/29/2019 7 5 - 18 mmol/L Final     Glucose   Date Value Ref Range Status   11/29/2019 126 (H) 70 - 125 mg/dL Final     GLUCOSE BY METER POCT   Date Value Ref Range Status   08/12/2024 117 (H) 70 - 99 mg/dL Final     Urea Nitrogen   Date Value Ref Range Status   08/11/2024 20.7 8.0 - 23.0 mg/dL Final   11/29/2019 10 8 - 28 mg/dL Final     Creatinine   Date Value Ref Range Status   08/11/2024 0.55 (L) 0.67 - 1.17 mg/dL Final     GFR Estimate   Date Value Ref Range Status   08/11/2024 >90 >60 mL/min/1.73m2 Final     Comment:     eGFR calculated using 2021 CKD-EPI equation.   11/29/2019 >60 >60 mL/min/1.73m2 Final     Calcium   Date Value Ref Range Status   08/11/2024 7.8 (L) 8.8 - 10.4 mg/dL Final     Comment:     Reference intervals for this test were updated on 7/16/2024 to reflect our healthy population more accurately. There may be differences in the flagging of prior results with similar values performed with this method. Those prior results can be interpreted in the context of the updated reference intervals.        MICROBIOLOGY DATA:  Personally reviewed.  7-Day Micro Results       Collected Updated Procedure Result Status      08/07/2024 1651 08/11/2024 2302 Blood Culture Peripheral Blood [[id number]]    Peripheral Blood    Preliminary result Component Value   Culture No growth after 4 days  [P]                08/06/2024 1135 08/09/2024 1345 Anaerobic Bacterial Culture Routine [[id number]]    (Abnormal)   Tissue from Buttock, Gluteal Cleft    Final result Component Value   Culture 4+ Bacteroides fragilis    Susceptibilities not routinely done, refer to antibiogram to view typical  susceptibility profiles    4+ Mixed Aerobic and Anaerobic surinder               08/06/2024 1135 08/08/2024 1143 Tissue Aerobic Bacterial Culture Routine With Gram Stain [[id number]]   (Abnormal)   Tissue from Buttock, Gluteal Cleft    Final result Component Value   Culture 2+ Escherichia coli    Susceptibilities done on previous cultures    1+ Staphylococcus aureus    Susceptibilities done on previous cultures    3+ Normal surinder   Gram Stain Result 4+ Gram positive cocci    4+ Gram negative cocci    3+ Gram positive bacilli    4+ Gram negative bacilli    4+ WBC seen    Predominantly PMNs               08/06/2024 1133 08/09/2024 0348 Wound Aerobic Bacterial Culture Routine With Gram Stain [[id number]]    (Abnormal)   Wound from Buttock, Gluteal Cleft    Final result Component Value   Culture 2+ Escherichia coli    Susceptibilities done on previous cultures    1+ Staphylococcus aureus    2+ Normal surinder   Gram Stain Result 4+ Gram positive cocci    4+ WBC seen    Predominantly PMNs        Susceptibility        Staphylococcus aureus      MARTITA      Ciprofloxacin <=0.5 ug/mL Susceptible  [*]       Clindamycin 0.25 ug/mL Susceptible      Daptomycin 0.25 ug/mL Susceptible      Doxycycline <=0.5 ug/mL Susceptible      Erythromycin <=0.25 ug/mL Susceptible      Gentamicin <=0.5 ug/mL Susceptible      Inducible macrolide resistance test Negative ug/mL Negative  [*]       Levofloxacin 0.25 ug/mL Susceptible  [*]       Linezolid 2 ug/mL Susceptible  [*]       Moxifloxacin <=0.25 ug/mL Susceptible  [*]       Nitrofurantoin <=16 ug/mL Susceptible  [*]       Oxacillin <=0.25 ug/mL Susceptible  [1]       Rifampin <=0.5 ug/mL Susceptible  [*]       Tetracycline <=1 ug/mL Susceptible      Tigecycline <=0.12 ug/mL Susceptible  [*]       Trimethoprim/Sulfamethoxazole <=0.5/9.5 ug/mL Susceptible      Vancomycin <=0.5 ug/mL Susceptible                   [*]  Suppressed Antibiotic     [1]  Oxacillin susceptible isolates are susceptible  "to cephalosporins (example: cefazolin and cephalexin) and beta lactam combination agents. Oxacillin resistant isolates are resistant to these agents.                    08/06/2024 1133 08/11/2024 0711 Anaerobic Bacterial Culture Routine [[id number]]    (Abnormal)   Wound from Buttock, Gluteal Cleft    Final result Component Value   Culture 4+ Bacteroides fragilis    Susceptibilities not routinely done, refer to antibiogram to view typical susceptibility profiles    1+ Enterococcus faecium VRE    Not isolated or reported on routine aerobic culture    4+ Mixed Aerobic and Anaerobic surinder        Susceptibility        Enterococcus faecium VRE      MARTITA      Ampicillin >=32 ug/mL Resistant      Ciprofloxacin >=8 ug/mL Resistant  [*]       Daptomycin 3 ug/mL Susceptible Dose Dependent      Doxycycline <=0.5 ug/mL Susceptible  [*]       Gentamicin Synergy Susceptible ug/mL Susceptible  [1]       Levofloxacin >=8 ug/mL Resistant  [*]       Linezolid 2 ug/mL Susceptible      Nitrofurantoin 64 ug/mL Intermediate  [*]       Streptomycin Synergy Resistant ug/mL Resistant  [*]       Tigecycline <=0.12 ug/mL No interpretation available  [*]       Vancomycin >=32 ug/mL Resistant                   [*]  Suppressed Antibiotic     [1]  No high level gentamicin resistance found - therefore combination therapy with an aminoglycoside may be indicated for serious enterococcal infections such as bacteremia and endocarditis.               Susceptibility Comments       Enterococcus faecium VRE    Antibiotics listed as \"No Interpretation\" have no regulatory guidelines for susceptibility/resistance available.  VRE requires contact precautions.               08/06/2024 0416 08/08/2024 0744 Wound Aerobic Bacterial Culture Routine With Gram Stain [[id number]]    (Abnormal)   Wound from Spine, Coccyx    Final result Component Value   Culture 2+ Escherichia coli ESBL    3+ Normal surinder   Gram Stain Result 4+ Gram positive cocci    1+ Gram " positive bacilli    4+ WBC seen    Predominantly PMNs        Susceptibility        Escherichia coli ESBL      MARTITA      Amikacin <=2 ug/mL Susceptible  [*]       Ampicillin >=32 ug/mL Resistant      Ampicillin/ Sulbactam 4 ug/mL Susceptible  [*]       Cefepime  Resistant      Cefotaxime  Resistant  [*]       Cefoxitin <=4 ug/mL Susceptible  [*]       Ceftazidime  Resistant      Ceftriaxone  Resistant      Ciprofloxacin >=4 ug/mL Resistant      Extended Spectrum Beta-Lactamase Positive ug/mL ESBL Positive  [*]       Gentamicin <=1 ug/mL Susceptible      Levofloxacin >=8 ug/mL Resistant      Meropenem <=0.25 ug/mL Susceptible  [1]       Nitrofurantoin <=16 ug/mL Susceptible  [*]       Piperacillin/Tazobactam <=4 ug/mL Susceptible      Tobramycin <=1 ug/mL Susceptible      Trimethoprim/Sulfamethoxazole <=1/19 ug/mL Susceptible                   [*]  Suppressed Antibiotic     [1]  Enterobacterales that are susceptible to meropenem are usually susceptible to ertapenem.               Susceptibility Comments       Escherichia coli ESBL    ESBL (extended spectrum beta lactamase) producing organisms require contact precautions.               08/06/2024 0416 08/09/2024 1119 Anaerobic Bacterial Culture Routine [[id number]]   (Abnormal)   Wound from Spine, Coccyx    Final result Component Value   Culture 1+ Staphylococcus aureus    Not isolated or reported on routine aerobic culture  Susceptibilities done on previous cultures    4+ Bacteroides fragilis    Susceptibilities not routinely done, refer to antibiogram to view typical susceptibility profiles    4+ Mixed Aerobic and Anaerobic surinder               08/05/2024 1722 08/05/2024 1817 Asymptomatic COVID-19 Virus (Coronavirus) by PCR Nasopharyngeal [[id number]]    Swab from Nasopharyngeal    Final result Component Value   SARS CoV2 PCR Negative   NEGATIVE: SARS-CoV-2 (COVID-19) RNA not detected, presumed negative.            08/05/2024 1722 08/06/2024 0005 Respiratory Panel  PCR [[id number]]    Swab from Nasopharyngeal    Final result Component Value   Adenovirus Not Detected   Coronavirus Not Detected   This test detects Coronavirus 229E, HKU1, NL63 and OC43 but does not distinguish between them. It does not detect MERS ( Respiratory Syndrome), SARS (Severe Acute Respiratory Syndrome) or 2019-nCoV (Novel 2019) Coronavirus.   Human Metapneumovirus Not Detected   Human Rhin/Enterovirus Not Detected   Influenza A Not Detected   Influenza A, H1 Not Detected   Influenza A 2009 H1N1 Not Detected   Influenza A, H3 Not Detected   Influenza B Not Detected   Parainfluenza Virus 1 Not Detected   Parainfluenza Virus 2 Not Detected   Parainfluenza Virus 3 Not Detected   Parainfluenza Virus 4 Not Detected   Respiratory Syncytial Virus A Not Detected   Respiratory Syncytial Virus B Not Detected   Chlamydia Pneumoniae Not Detected   Mycoplasma Pneumoniae Not Detected                     RADIOLOGY:  Personally Reviewed.  Recent Results (from the past 24 hour(s))   XR Video Swallow with SLP or OT    Narrative    EXAM: XR VIDEO SWALLOW WITH SLP OR OT  LOCATION: [facility]  DATE: 8/6/2024    INDICATION: Difficulty swallowing.  COMPARISON: None.    TECHNIQUE: Routine swallow study with speech pathology using multiple barium thicknesses.    RADIATION DOSE: Total Air Kerma 5.1 mGy    FINDINGS:   Swallow study with Speech Pathology using multiple barium thicknesses. Silent aspiration with thin liquid, including with chin tuck. Aspiration also occurred with thin liquid and smaller drink without chin tuck. No other significant penetration or   aspiration.         Principal Problem:    Sepsis with acute renal failure and tubular necrosis without septic shock (H)  Active Problems:    Benign prostatic hyperplasia    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) (H)    Hyperlipidemia    Hypertension    Long term current use of anticoagulant therapy    S/P cardiac  pacemaker procedure    S/P TAVR (transcatheter aortic valve replacement)    Thrombophilia (H24)    Diabetes mellitus type 2, noninsulin dependent (H)    History of CVA (cerebrovascular accident)    Hypoxia    Hypotension, unspecified hypotension type    Community acquired pneumonia of left lower lobe of lung    Pressure injury of sacral region, stage 3 (H)    Leukocytosis, unspecified type  ;

## 2024-08-12 NOTE — PROGRESS NOTES
"CLINICAL NUTRITION SERVICES - REASSESSMENT NOTE     Nutrition Prescription    RECOMMENDATIONS FOR MDs/PROVIDERS TO ORDER:  None    Malnutrition Status:    Moderate in chronic    Recommendations already ordered by Registered Dietitian (RD):  -Re-order calorie count x 3 days  -Add GJ flush 30 ml each port q shift for patency    Future/Additional Recommendations:  Monitor intake post colostomy procedure, need for supplemental enteral nutrition support  Adjust supplements pending intake, weight, tolerance, acceptance, labs, bg, wound healing       EVALUATION OF THE PROGRESS TOWARD GOALS   Diet: Level 7: Easy to Chew Dysphagia Diet, mildly thick liquids (level 2).  Water Protocol  1:1 feeding  Magic cup with meals  Expedite cup daily  Thickened ensure enlive daily     Has G-J tube, not using.     Intake: Unable to determine  Calorie count- no meal slips saved   \"Good\" intake documented at breakfast and lunch and 75% of supper. Pt ordered 1172 kcal, 42 g protein over 3 meals    Partial intake of all supplements taken at each meals-  - 5 out of 7 ordered supplements documented-estimate intake from supplements yesterday 1006 kcal, 47 g protein    - pt reports he is hungry but c/o dry mouth and dislike of dysphagia diet is barrier to improved intake. He has been taking supplements well. Likes magic cup. Disliked expedite cup but is willing to try to take for wound healing. Ensure is \"ok\". POA at bedside encouraging pt   -pt very much enjoys water protocol between meals to help moisten mouth       NEW FINDINGS   - Plan for I & D and diverting colostomy tomorrow per surgery    ANTHROPOMETRICS  Height: 175.3 cm (5' 9\")  Most Recent Weight: 72.2 kg (159 lb 2.8 oz)  -8/8/24  Admit wt 72.6 kg (160 lb) 8/5/24.  No significant wt loss found.  See RD note of 8/7/24.    + 3 left arm, writs, hand edema. +1 bilateral feet/ankle edema    Dosing Weight: 72.6 kg     ASSESSED NUTRITION NEEDS 8/8/24  Estimated Energy Needs: 6635-1548+ " kcals/day (25 - 30+ kcals/kg)  Justification: Increased needs and Wound healing  Estimated Protein Needs: 109-145 grams protein/day (1.5- 2 grams of pro/kg)  Justification: Wound healing  Estimated Fluid Needs: 1815+ mL/day (25+ mL/kg)   Justification: Maintenance        SKIN  Pressure injury -sacral, unstageable.  WOC RN following.  PI-penis    GI  G-J tube placed 5/31/24.  -not currently used.  Last Bm 8/7/24.    LABS  Reviewed  -251 mg/dl past 24 hours, in fair control    MEDICATIONS  Reviewed  Biotene qid, iv abx, ssi, lantus daily, protonix, warfarin    MALNUTRITION:  % Weight Loss:  None noted  % Intake:  Decreased intake does not meet criteria for malnutrition.  Not clear if po intake is decreased.   Subcutaneous Fat Loss:  Orbital region moderate depletion and Upper arm region moderate depletion-per RD note 8/7/24  Muscle Loss:  Temporal region moderate depletion, Clavicle bone region moderate depletion, Acromion bone region moderate depletion, and Scapular bone region moderate depletion-per RD note 8/7/24  Fluid Retention:  Trace ankles, feet-per RD note 8/7/24.    Malnutrition Diagnosis: Moderate malnutrition  In Context of:  Chronic illness or disease    Goals   Wound healing   Pt to meet >75% nutritional needs   Electrolytes WNL - met  BG < 180- not progressing      CURRENT NUTRITION DIAGNOSIS  Increased nutrient needs related to healing as evidenced by wounds    INTERVENTIONS  Implementation  -Re-order calorie count  -Add GJ flush 30 ml each port q shift for patency  -Encouraged supplement intake and reviewed protein foods.     Monitoring/Evaluation  Progress toward goals will be monitored and evaluated per protocol.

## 2024-08-12 NOTE — PROGRESS NOTES
Care Management Follow Up    Length of Stay (days): 7    Expected Discharge Date: 08/16/2024    Anticipated Discharge Plan:       Transportation: Anticipate medical transport    PT Recommendations: Long term care facility  OT Recommendations:  Long term care facility     Barriers to Discharge: medical stability    Prior Living Situation:   with  (from TCU)     Patient/Spokesperson Updated: Yes. Who? Herman esteban     Additional Information:    See below     Candice Evans RN        Herman Esteban called requesting a list of notaries for a trust. Emailed him the list of mobile notaries.     Received a call from [facility] care coordinator Amanda [phone]. Updated her via telephone     3:44 PM  Son is here requesting FMLA be completed. Dr Sinclair notified. She suggests he reach out to patients PCP

## 2024-08-12 NOTE — PROGRESS NOTES
Spoke to the patient's son Abdoulaye as well as his other power of  who is Herman Layton.  They have had the discussion with the patient and they all agree that a diverting colostomy is going to be the best option for the patient long-term.  In discussion with his son time, the patient will be living with a 24-hour nurse.  Having the colostomy would be advantageous for the nursing staff to care for him.    Plan  -Hold Coumadin and reverse INR with vitamin K.  -Recheck INR in the afternoon and tomorrow morning  -To the OR for laparoscopic colostomy creation and sacral decubitus wound debridement  -N.p.o. at midnight.    Demetri Garcia DO  General Surgeon  [facility]  Surgery [facility] - [address] [address]?  Office: [phone]  Employed by - [facility]  Pager: [phone]

## 2024-08-12 NOTE — PROGRESS NOTES
[facility]    Medicine Progress Note - Hospitalist Service    Date of Admission:  8/5/2024    Assessment & Plan   Franklyn Sorto is 88 year old male with history of HFrEF, hyperlipidemia, hypertension, aortic stenosis status post TAVR, thrombophilia, type 2 diabetes, stage III sacral ulcer, CAD, prior CVA admitted on 8/5/2024 with altered mental status & hypotension secondary to sepsis possibly from urinary tract infection, aspiration pneumonia and suspected infected, unstageable sacral ulcer.     He was managed briefly at the ICU by instensivist on admission but did not require vasopressor. Hypotension and ARAIDNE improved with IV hydration and albumin infusion.  Urine culture grew E. coli.  Blood culture grew ESBL E Coli & Streptococcus salivarius. He is receiving IV meropenem and vancomycin per ID recommendation. Debridement of sacral ulcer was performed by surgery service on 8/6/2024. He was transferred to the medical floor on 8/6/24 given improved BP. He became hypotensive again on 8/7/24, therefore intensivist was re-consulted for possible septic shock and management with vasopressor. On 8/8/2024, he was stable prompting transfer to floor.      Sepsis, resolved   Infected decubitus ulcer with polymicrobial growth   Polymicrobial bacteremia with ESBL and Strep salivarius   UTI  -S/p debridement 8/6/2024 with Cx polymicrobial (E Coli, B Fragilis, Staph Aureus, E faecium)  -Ucx ESBLI E Coli & Strep salivarius  -Unable to obtain MRI secondary to intracardiac lead. CT Pelvis W without any signs of osteo or abscess  -Abx by ID, currently on meropenem (8/7- )  added daptomycin for VRE (8/10- ), vancomycin (8/5-8/8)  -General surgery with plans for wound vac change and debridement for Tuesday 08/13, will hold warfarin tonight and NPO at midnight  -Wound vac per WOC  -Follow repeat Bcx 8/7/2024 - NGTD  -Midodrine 5mg TID was started during this admission for hypotension, will wean (5mg BID 8/10  and 5mg daily 8/11 and then off)     History of aspiration and dysphagia   Aspiration PNA vs CAP LLL infiltrate but H/O aspiration after CVA   -Mildly thick diet per speech therapist   -Video swallow study revealed silent aspiration with thin liquid, including with chin tuck   -On broad Abx as above     Recent acute ischemic stroke (5/25/2024)  Residual left Hemiparesis   Recent admission 5/25-6/7/2024 at [facility] for ischemic stroke; discharged to TCU   -No improvement in left hemiparesis   -PT/OT      History of the left upper extremity DVT with thrombophilia (positive antiphospholipid antibodies, 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)  -Pharmacy to dose warfarin with INR goal 2-3 , holding tonight     Diabetes mellitus type 2, noninsulin dependent    -Start Lantus 5u at bedtime 8/9, sliding scale insulin medium before meals and low at bedtime  -Monitor blood glucose     Malnutrition  S/p PEG   Nutrition is following. Calorie count ongoing. Not meeting calorie on 8/9.   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  EF 55 to 60%.  Abnormal septal motion consistent with ventricular paced rhythm.  Bioprosthetic arctic valve  -Hold home coreg 25mg BID since hypotensive      Rheumatoid arthritis/Chronic Pain -   -Tylenol prn and Voltaren      GERD (gastroesophageal reflux disease)/Hx PUD   -PPI PO qAM     Dispo:  -Ordered DME (Kevin lift, hospital bed, wheelchair)  -Outpatient palliative care consult placed           Diet: Easy to Chew Diet (level 7) Mildly Thick (level 2) (ok for water protocol placed in room)  Snacks/Supplements Adult: Magic Cup; With Meals  Snacks/Supplements Adult: Expedite Cup;  With Meals  Room Service  Snacks/Supplements Adult: Ensure Enlive; With Meals  Calorie Counts    DVT Prophylaxis: Warfarin  Javier Catheter: Not present  Lines: None     Cardiac Monitoring: None  Code Status: No CPR- Do NOT Intubate      Clinically Significant Risk Factors              # Hypoalbuminemia: Lowest albumin = 2.2 g/dL at 2024 11:55 AM, will monitor as appropriate     # Hypertension: Noted on problem list          # DMII: A1C = N/A within past 6 months    # Moderate Malnutrition: based on nutrition assessment    # Financial/Environmental Concerns:                 Disposition Plan     Medically Ready for Discharge: Anticipated in 2-4 Days             Rosalina Sinclair MD  Hospitalist Service  [facility]  Securely message with [employer] (more info)  Text page via [employer] Paging/Directory   ______________________________________________________________________    Interval History   Mr. Sorto is new to me today. he states that he is okay with having surgery for debridement.  He states that surgery wanted to talk with his son before confirming.  His procedure per surgery will be tomorrow and he will need to be n.p.o. at midnight.  He is not having any new symptoms however he admits to being very depressed.  He states that he wishes he had of  when he had a stroke instead of survived.  He is concerned about being placed in a facility and states that his son wants him to go to a facility when he leaves the hospital.  He declined  or psych.  He declined suicidal ideation stating that he has had no plans nor thoughts of harming himself.  He states that he has family and friends that he can talk to and that he needs to work it out with God.  Will discuss with him further.  He is currently DNI DNR and would like to remain that way.  Discussed with him end-of-life care and he stated that he is already discussed this with his son. Will hold warfarin tonight.    Physical Exam    Vital Signs: Temp: 97.7  F (36.5  C) Temp src: Oral BP: 125/58 Pulse: 70   Resp: 17 SpO2: 99 % O2 Device: None (Room air)    Weight: 159 lbs 2.75 oz    General Appearance: Awake, alert, depressed/sad  Respiratory: CTAB, no wheeze  Cardiovascular: RRR  GI: soft, nontender, non distended, normal bowel sounds  Skin: no jaundice, no rash  Neuro: Left-sided paralysis of extremities and facial drooping      Medical Decision Making       50 MINUTES SPENT BY ME on the date of service doing chart review, history, exam, documentation & further activities per the note.      Data     I have personally reviewed the following data over the past 24 hrs:    INR:  2.07 (H) PTT:  N/A   D-dimer:  N/A Fibrinogen:  N/A       Imaging results reviewed over the past 24 hrs:   No results found for this or any previous visit (from the past 24 hour(s)).

## 2024-08-13 NOTE — PLAN OF CARE
Problem: Wound  Goal: Absence of Infection Signs and Symptoms  Intervention: Prevent or Manage Infection  Recent Flowsheet Documentation  Taken 8/13/2024 0000 by Oliver Humphreys RN  Isolation Precautions: contact precautions maintained     Problem: Infection  Goal: Absence of Infection Signs and Symptoms  Outcome: Progressing  Intervention: Prevent or Manage Infection  Recent Flowsheet Documentation  Taken 8/13/2024 0000 by Oliver Humphreys RN  Isolation Precautions: contact precautions maintained   Goal Outcome Evaluation:  -               Pt A/Ox4, able to communicate needs. Repositioning done, side to side, to prevent him from lying on his coccyx. At 2am, Pt complaint of pain, on his L shoulder and coccyx. PRN tylenol was administered. Wound dressing intact. Pt is NPO, as he is waiting for his surgery today, he continues on IV ATB, Meropenem was administered. O2 sats WNL, pt uses his right hand to reposition in bed.

## 2024-08-13 NOTE — PROGRESS NOTES
[facility]    Medicine Progress Note - Hospitalist Service    Date of Admission:  8/5/2024    Assessment & Plan   Franklyn Sorto is 88 year old male with history of HFrEF, hyperlipidemia, hypertension, aortic stenosis status post TAVR, thrombophilia, type 2 diabetes, stage III sacral ulcer, CAD, prior CVA admitted on 8/5/2024 with altered mental status & hypotension secondary to sepsis possibly from urinary tract infection, aspiration pneumonia and suspected infected, unstageable sacral ulcer.     He was managed briefly at the ICU by instensivist on admission but did not require vasopressor. Hypotension and ARIADNE improved with IV hydration and albumin infusion.  Urine culture grew E. coli.  Blood culture grew ESBL E Coli & Streptococcus salivarius. He is receiving IV meropenem and vancomycin per ID recommendation. Debridement of sacral ulcer was performed by surgery service on 8/6/2024. He was transferred to the medical floor on 8/6/24 given improved BP. He became hypotensive again on 8/7/24, therefore intensivist was re-consulted for possible septic shock and management with vasopressor. On 8/8/2024, he was stable prompting transfer to floor.      Sepsis, resolved   Infected decubitus ulcer with polymicrobial growth   Polymicrobial bacteremia with ESBL and Strep salivarius   UTI  -S/p debridement 8/6/2024 with Cx polymicrobial (E Coli, B Fragilis, Staph Aureus, E faecium)  -Ucx ESBLI E Coli & Strep salivarius  -Unable to obtain MRI secondary to intracardiac lead. CT Pelvis W without any signs of osteo or abscess  -Abx by ID, currently on meropenem (8/7- )  added daptomycin for VRE (8/10- ), vancomycin (8/5-8/8)  -General surgery with plans for wound vac change and debridement for Tuesday 08/13, will hold warfarin tonight and NPO at midnight  -Wound vac per WOC  -Follow repeat Bcx 8/7/2024 - NGTD  -Midodrine 5mg TID was started during this admission for hypotension, will wean (5mg BID 8/10  and 5mg daily 8/11 and then off)     History of aspiration and dysphagia   Aspiration PNA vs CAP LLL infiltrate but H/O aspiration after CVA   -Mildly thick diet per speech therapist   -Video swallow study revealed silent aspiration with thin liquid, including with chin tuck   -On broad Abx as above     Recent acute ischemic stroke (5/25/2024)  Residual left Hemiparesis   Recent admission 5/25-6/7/2024 at [facility] for ischemic stroke; discharged to TCU   -No improvement in left hemiparesis   -PT/OT      History of the left upper extremity DVT with thrombophilia (positive antiphospholipid antibodies, 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)  -Pharmacy to dose warfarin with INR goal 2-3 , holding tonight     Diabetes mellitus type 2, noninsulin dependent    -Start Lantus 5u at bedtime 8/9, sliding scale insulin medium before meals and low at bedtime  -Monitor blood glucose     Malnutrition  S/p PEG   Nutrition is following. Calorie count ongoing. Not meeting calorie on 8/9.   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  EF 55 to 60%.  Abnormal septal motion consistent with ventricular paced rhythm.  Bioprosthetic arctic valve  -Hold home coreg 25mg BID since hypotensive      Rheumatoid arthritis/Chronic Pain -   -Tylenol prn and Voltaren      GERD (gastroesophageal reflux disease)/Hx PUD   -PPI PO qAM     Dispo:  -Ordered DME (Kevin lift, hospital bed, wheelchair)  -Outpatient palliative care consult placed           Diet: Snacks/Supplements Adult: Magic Cup; With Meals  Snacks/Supplements Adult: Expedite Cup; With Meals  Room Service  Snacks/Supplements Adult: Ensure Enlive; With Meals  Calorie  Counts  Clear Liquid Diet    DVT Prophylaxis: Warfarin held  Javier Catheter: PRESENT, indication: Acute retention or obstruction  Lines: None     Cardiac Monitoring: None  Code Status: No CPR- Do NOT Intubate      Clinically Significant Risk Factors              # Hypoalbuminemia: Lowest albumin = 2.2 g/dL at 8/5/2024 11:55 AM, will monitor as appropriate     # Hypertension: Noted on problem list          # DMII: A1C = N/A within past 6 months    # Moderate Malnutrition: based on nutrition assessment    # Financial/Environmental Concerns:                 Disposition Plan     Medically Ready for Discharge: Anticipated in 2-4 Days             Rosalina Sinclair MD  Hospitalist Service  [facility]  Securely message with [employer] (more info)  Text page via [employer] Paging/Directory   ______________________________________________________________________    Interval History   Mr. Sorto is having a lot of pain after surgery.  He was due for pain medication from nursing at the time of my exam.  Other than the pain he is glad to have the diversion so that his sacral wound can heal.  Dropped off [facility] paperwork regarding end-of-life care.  He is not undergoing end-of-life care.  Will have palliative care see them to see if they want end-of-life care.  Patient had expressed to me that he would want that yesterday however nothing is in place at this time.    Physical Exam   Vital Signs: Temp: 97.4  F (36.3  C) Temp src: Oral BP: 128/69 Pulse: 70   Resp: 20 SpO2: 100 % O2 Device: Nasal cannula Oxygen Delivery: 2 LPM  Weight: 159 lbs 2.75 oz    General Appearance: Awake, alert, in no acute distress  Respiratory: CTAB, no wheeze  Cardiovascular: RRR  GI: soft, nontender, non distended, normal bowel sounds  Skin: no jaundice, no rash      Medical Decision Making       50 MINUTES SPENT BY ME on the date of service doing chart review, history, exam, documentation & further activities per the note.      Data     I  have personally reviewed the following data over the past 24 hrs:    10.9  \   11.1 (L)   / 163     144 107 21.1 /  167 (H)   4.0 28 0.56 (L) \     INR:  2.11 (H) PTT:  N/A   D-dimer:  N/A Fibrinogen:  N/A       Imaging results reviewed over the past 24 hrs:   No results found for this or any previous visit (from the past 24 hour(s)).

## 2024-08-13 NOTE — ANESTHESIA POSTPROCEDURE EVALUATION
Patient: Franklyn Sorto    Procedure: Procedure(s):  CREATION, END COLOSTOMY, LAPAROSCOPIC AND  DEBRIDEMENT OF SACRAL DECUBITUS ULCER       Anesthesia Type:  General    Note:  Disposition: Inpatient   Postop Pain Control: Uneventful            Sign Out: Well controlled pain   PONV: No   Neuro/Psych: Uneventful            Sign Out: Acceptable/Baseline neuro status   Airway/Respiratory: Uneventful            Sign Out: Acceptable/Baseline resp. status   CV/Hemodynamics: Uneventful            Sign Out: Acceptable CV status; No obvious hypovolemia; No obvious fluid overload   Other NRE:    DID A NON-ROUTINE EVENT OCCUR?            Last vitals:  Vitals Value Taken Time   /60 08/13/24 1116   Temp 35.7  C (96.26  F) 08/13/24 1128   Pulse 69 08/13/24 1128   Resp 14 08/13/24 1128   SpO2 97 % 08/13/24 1128   Vitals shown include unfiled device data.    Electronically Signed By: Pro Duran MD  August 13, 2024  11:29 AM

## 2024-08-13 NOTE — OP NOTE
[facility]    Operative Note    Pre-operative diagnosis: Pressure injury of sacral region, stage 3 (H) [L89.153]  Post-operative diagnosis Same as pre-operative diagnosis    Procedure: CREATION, END COLOSTOMY, LAPAROSCOPIC AND, N/A - Abdomen  DEBRIDEMENT OF SACRAL DECUBITUS ULCER, N/A - Sacrum    Surgeon: Surgeons and Role:     * Demetri Garcia DO - Primary     * Malu Bermudez PA - Assisting  Anesthesia: General   Estimated Blood Loss: 35 mL     Drains: None  Specimens:   ID Type Source Tests Collected by Time Destination   A : SACRAL WOUND DECUBITOUS ULCER CULTURE SWAB Wound Other ANAEROBIC BACTERIAL CULTURE ROUTINE, GRAM STAIN, AEROBIC BACTERIAL CULTURE ROUTINE Demetri Garcia DO 8/13/2024  8:33 AM      Findings:     -Sacral decubitus ulcer measuring at the beginning 5 x 5 x 2 centimeters.  After sharp excisional debridement measuring 7 x 6 x 3 centimeters.  -Successful creation of laparoscopic sigmoid end colostomy  -Lysis of adhesion    Complications: None.  Implants: * No implants in log *    Indication: 88-year-old male presenting with unstageable sacral decubitus ulcer.  Due to past medical history, the patient is mainly bed bound.  Patient continues to swell into the sacral decubitus ulcer making it worse after debridement.  Offered patient procedure of diverting colostomy and further sharp excisional debridement of the sacral decubitus ulcer. The risks and benefits of the procedure were explained detail to the patient. The risks include infection, bleeding, damage to the surrounding structures. Patient verbalized understanding provided consent to undergo the procedure above.    Procedure: Patient was brought to the operative room placed on operative table in a supine position.  Patient then underwent general anesthesia by the anesthesia team.  Patient was sedated and intubated.  He was then turned over to the left lateral decubitus position exposing the patient's sacrum.  The  procedure, a timeout was completed.  All present were in agreement.  The patient sacrum was prepped and draped in usual sterile fashion.    On examination, we could identify that the decubitus ulcer had been eroded down to fascia and bone making this a stage IV ulcer.  The initial wound measured 5 x 5 x 2 cm there is necrotic tissue superficially on the skin surrounding the opening of the decubitus ulcer.  There is also purulent material that could be identified in the subcutaneous tissue and the surgical wound bed.  Electrocautery was then used to perform sharp excisional debridement through the skin subcutaneous tissue and fascia exposing the muscle and the bone.  The electrocautery was then used to carefully debride all of the necrotic tissue in the subcutaneous tissue around the entire decubitus ulcer.  Wound cultures were obtained and sent off for further analysis.  The wound was then irrigated with copious amounts of sterile saline.  Hemostasis achieved using electrocautery.  The final measurement of the decubitus ulcer measured 7 x 6 x 3 cm.  The wound was packed with Kerlix.  Sterile dressings were applied.  This completed the sacral decubitus ulcer debridement.  At the end of this point of the procedure, a final call was completed.  I was told that all sharps, sponges, instruments were accounted for.     The patient was then turned and placed into a supine position.  Patient's abdomen was prepped and draped in the usual sterile fashion.  Prior to initiate the procedure, a timeout was completed.  All present were in agreement the second half of the procedure which was laparoscopic creation of colostomy.    Procedure was inserted to Ray's point left upper quadrant.  Insufflation was initiated.  Once sufficient insufflation was initiated, a supraumbilical 5 mm trocar was inserted into the abdomen under Visiport guidance.  On general survey, the patient had significant adhesions over the lower quadrant of  the abdomen.  A 5 mm trocar was placed in an open position at the right lower quadrant under direct visualization and a 5 mm trocar was placed in the supra pubic position under direct visualization.    The adhesions were taken down using LigaSure.  Once I could fully visualize the sigmoid colon, I then started to carefully dissect the sigmoid colon away from a pelvic rim and left side of the pelvis.  Patient had a history of diverticulitis and as result there was significant adhesions of the sigmoid colon onto this side of the abdomen.  Dissection was done using LigaSure to take down these adhesions.  Once the sigmoid colon was fully mobilized, I dissected in a medial to lateral fashion through the mesentery using LigaSure.  The 5 mm trocar in the suprapubic region was then upsized to a 12 mm port to accommodate the stapler.  Once I was completely through, a laparoscopic 60 blue load stapler was used to transect across the sigmoid colon.  The mesentery was then taken down to mobilize it in order to reach the anterior abdominal wall using the LigaSure.  Once there was sufficient mobilization, electrocautery was used to create a colostomy opening in the left lower quadrant of the abdomen.  I then dissected through the fascia and the muscle in a muscle-splitting manner.  The sigmoid colon was then brought up through the colostomy opening.     The 12 mm port site was then closed at the fascial level using a suture passer and an 0 Vicryl stitch.  The surgical port sites were then reapproximated using 3-0 Vicryl stitch in a deep dermal fashion and then Dermabond was used to reinforce this incision.    The colostomy was then matured in Jazz's fashion.  3-0 Vicryl pop-off sutures were used to anchor the fascia.  3-0 Vicryl sutures were used to mature the colostomy.  At the end of maturation of the colostomy, I did place a single digit into the colostomy to ensure that there was no significant stricture at the fascial  level.  This completed the procedure.  At the end of the procedure, a final count was completed.  I was told that all sharps, sponges, instruments were accounted for.  Patient was extubated brought back to the PACU in stable condition.    Malu RIZO was present to assist myself in the surgical case.  Her assistance was required for exposure and visualization, leading to decreased operating time, and decreased blood loss.      Demetri Garcia DO  General Surgeon  [facility]  Surgery [facility] - [address] [address]?  Office: [phone]  Employed by - [facility]  Pager: [phone]

## 2024-08-13 NOTE — PROGRESS NOTES
Care Management Follow Up    Length of Stay (days): 8    Expected Discharge Date: 08/14/2024    Anticipated Discharge Plan: Home with 24 hour care.    Transportation: Anticipate medical transport    PT Recommendations: Long term care facility  OT Recommendations:  Long term care facility     Barriers to Discharge: medical stability (to OR today)    Prior Living Situation:   with  (from TCU)     Patient/Spokesperson Updated: Yes. Who? Son Abdoulaye    Additional Information:  Patient has been in TCU at [place] since June. He requires assistance with all activities of daily living and instrumental activities of daily living (IADLs) at baseline. He is not ambulatory and uses caroline lift for transfers.  The goal is to discharge to new facility called [facility] which is not a nursing home. Son Abdoulaye is primary family contact.      Abdoulaye is working with [facility] owner Nusrat Sandoval. They report the plan is for patient to discharge to an apartment at [facility]. They state patient will have 24/7 care and are able to support total care assist and patient is not ready for hospice care. Nusrat states she has a Palliative home care agency that can provide nurse for wound cares.  She will provide palliative care agency name. She states she was working with [facility] regarding DME needed (wheelchair, hospital bed and caroline lift). She states the TCU was providing orders and has measured patient for wheelchair. Nusrat confirms she will be working on securing home DME. Patient has PEG tube. May need to have a home care agency for long term IV antibiotics as well but MD will review Goals of Care with patient and family.      Nusrat Sandoval [phone]     Patient will need [facility] at discharge. Previous RN CM notified Abdoulaye of potential cost.    Candice Lowry RN

## 2024-08-13 NOTE — ANESTHESIA PREPROCEDURE EVALUATION
Anesthesia Pre-Procedure Evaluation    Patient: Franklyn Sorto   MRN: [id number] : 1935        Procedure : Procedure(s):  CREATION, COLOSTOMY, LAPAROSCOPIC AND  DEBRIDEMENT OF SACRAL DECUBITUS ULCER          Past Medical History:   Diagnosis Date    Abnormal lateral conjugate gaze 2023    Anticoagulated on Coumadin 2024    Arteriosclerosis of coronary artery 2014    Formatting of this note might be different from the original.  Angiogram/PCI 14:  Distal RCA 90%, treated with MARÍA.  Mild LAD and LCx disease.      Aspiration pneumonia, unspecified aspiration pneumonia type, unspecified laterality, unspecified part of lung (H) 2024    Benign essential HTN 2023    Benign prostatic hyperplasia 10/20/2011    Formatting of this note might be different from the original.  S/p TURP      Cervicalgia 2013    Diabetes mellitus type 2, noninsulin dependent (H) 2015    Frequent PVCs 2015    GERD (gastroesophageal reflux disease) 2013    Formatting of this note might be different from the original.  Ulcers in lat 90's. Reflux symptoms if he misses proton pump inhibitor.      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) (H) 2015    History of CVA (cerebrovascular accident) 2024    Hypertension 2023    Formatting of this note might be different from the original.  Created by Conversion     Replacement Utility updated for latest IMO load  Formatting of this note might be different from the original.  ECG 06 sinus bradycardia otherwise normal  Stress Echo 06 Normal, sub-max HR      Long term current use of anticoagulant therapy 2013    Lumbar post-laminectomy syndrome 2012    Neck pain 2013    OA (osteoarthritis) of knee 2023    Peripheral vascular disease (H24) 2013    Formatting of this note might be different from the original.  Created by Conversion      Pressure injury of sacral region, stage 3 (H)  2024    Rheumatoid arthritis (H) 2023    Formatting of this note might be different from the original.  Created by Conversion  Formatting of this note might be different from the original.  Dr. Gaxiola. [facility] rheum      S/P cardiac pacemaker procedure 2014    S/P TAVR (transcatheter aortic valve replacement) 2014    Stage 3 chronic kidney disease, unspecified whether stage 3a or 3b CKD (H) 2023    Thrombocytopenia (H24) 10/20/2011    Thrombophilia (H24) 2014    Formatting of this note might be different from the original.  Antiphospholipid antibodies- positiive beta 2 glycoprotein and DRVVT confirmation x's 2 12 weeks apart .  Heterozygous Leiden Factor V  Follow by Dr Oly Hunter      Transient vision disturbance, bilateral 10/19/2011    Formatting of this note might be different from the original.  Episode of decreased vision in both eyes on 10/15/2011, lasting 10-15 minutes.        History reviewed. No pertinent surgical history.   Allergies   Allergen Reactions    Clopidogrel Hives    Hydrocodone      Other reaction(s): sick to his stomach    Hydrocodone-Acetaminophen Nausea and Vomiting    Levofloxacin Other (See Comments)     Other reaction(s): tendonitis  Tendonitis right calf      Spironolactone      Other reaction(s): Hyperkalemia      Social History     Tobacco Use    Smoking status: Former     Current packs/day: 0.00     Types: Cigarettes     Quit date: 1975     Years since quittin.6    Smokeless tobacco: Never   Substance Use Topics    Alcohol use: Yes      Wt Readings from Last 1 Encounters:   24 72.2 kg (159 lb 2.8 oz)        Anesthesia Evaluation            ROS/MED HX  ENT/Pulmonary:       Neurologic:     (+)          CVA,    TIA,                  Cardiovascular:     (+)  hypertension- -  CAD -  - -      CHF                                METS/Exercise Tolerance:     Hematologic:       Musculoskeletal:       GI/Hepatic:     (+) GERD,              "      Renal/Genitourinary:     (+) renal disease,             Endo:     (+) type I DM,                     Psychiatric/Substance Use:       Infectious Disease:       Malignancy:       Other:            Physical Exam    Airway        Mallampati: II    Neck ROM: full     Respiratory Devices and Support         Dental           Cardiovascular   cardiovascular exam normal          Pulmonary   pulmonary exam normal                OUTSIDE LABS:  CBC:   Lab Results   Component Value Date    WBC 12.7 (H) 08/11/2024    WBC 13.1 (H) 08/10/2024    HGB 10.9 (L) 08/11/2024    HGB 10.8 (L) 08/10/2024    HCT 34.2 (L) 08/11/2024    HCT 33.0 (L) 08/10/2024     08/11/2024     08/10/2024     BMP:   Lab Results   Component Value Date     08/11/2024     08/06/2024    POTASSIUM 4.0 08/11/2024    POTASSIUM 3.8 08/06/2024    CHLORIDE 109 (H) 08/11/2024    CHLORIDE 104 08/06/2024    CO2 28 08/11/2024    CO2 24 08/06/2024    BUN 20.7 08/11/2024    BUN 26.8 (H) 08/06/2024    CR 0.55 (L) 08/11/2024    CR 0.68 08/09/2024     (H) 08/13/2024     (H) 08/13/2024     COAGS:   Lab Results   Component Value Date    PTT 54 (H) 08/05/2024    INR 2.32 (H) 08/12/2024     POC: No results found for: \"BGM\", \"HCG\", \"HCGS\"  HEPATIC:   Lab Results   Component Value Date    ALBUMIN 2.2 (L) 08/05/2024    PROTTOTAL 6.1 (L) 08/05/2024    ALT 12 08/05/2024    AST 30 08/05/2024    ALKPHOS 96 08/05/2024    BILITOTAL 0.4 08/05/2024     OTHER:   Lab Results   Component Value Date    LACT 1.4 08/07/2024    A1C 7.2 (H) 03/01/2024    JÚNIOR 7.8 (L) 08/11/2024    PHOS 2.8 08/06/2024    MAG 2.0 08/06/2024    LIPASE 43 06/20/2024    TSH 5.01 (H) 11/13/2023    T4 0.97 11/13/2023       Anesthesia Plan    ASA Status:  4    NPO Status:  NPO Appropriate    Anesthesia Type: General.     - Airway: ETT   Induction: Intravenous.           Consents    Anesthesia Plan(s) and associated risks, benefits, and realistic alternatives discussed. Questions " answered and patient/representative(s) expressed understanding.     - Discussed:     - Discussed with:  Patient            Postoperative Care       PONV prophylaxis: Ondansetron (or other 5HT-3), Dexamethasone or Solumedrol     Comments:               Pro Duran MD    I have reviewed the pertinent notes and labs in the chart from the past 30 days and (re)examined the patient.  Any updates or changes from those notes are reflected in this note.

## 2024-08-13 NOTE — PLAN OF CARE
Problem: Adult Inpatient Plan of Care  Goal: Absence of Hospital-Acquired Illness or Injury  Intervention: Prevent Infection  Recent Flowsheet Documentation  Taken 8/12/2024 1645 by Carleen Mena RN  Infection Prevention:   personal protective equipment utilized   single patient room provided  Taken 8/12/2024 0836 by Carleen Mena RN  Infection Prevention:   personal protective equipment utilized   single patient room provided     Problem: Comorbidity Management  Goal: Blood Glucose Levels Within Targeted Range  Outcome: Progressing  Intervention: Monitor and Manage Glycemia  Recent Flowsheet Documentation  Taken 8/12/2024 1645 by Carleen Mena RN  Glycemic Management: blood glucose monitored  Medication Review/Management: medications reviewed  Taken 8/12/2024 0836 by Carleen Mena RN  Glycemic Management: blood glucose monitored     Problem: Wound  Goal: Optimal Functional Ability  Outcome: Progressing  Intervention: Optimize Functional Ability  Recent Flowsheet Documentation  Taken 8/12/2024 1651 by Carleen Mena RN  Activity Management: activity adjusted per tolerance  Activity Assistance Provided: assistance, 2 people  Taken 8/12/2024 1645 by Carleen Mena RN  Activity Management: activity adjusted per tolerance  Activity Assistance Provided: assistance, 2 people  Taken 8/12/2024 1110 by Carleen Mena RN  Assistive Device Utilized: lift device  Activity Management: activity adjusted per tolerance  Activity Assistance Provided: assistance, 2 people   Goal Outcome Evaluation:         Pt alert and able to make his needs known. Pt's wound was cleaned and redressed today. Pt was assisted with feeding with all his meals. BG were 118, 117 and 232. Pt was given coverage for dinner time. Pt turned and repositioned per tolerance for comfort. Pt had about 3 episodes of loose BM. Kathy care given and bed pad changed as needed. Pt's POA and son visited at bedside, and were attentive to Pt.

## 2024-08-13 NOTE — ANESTHESIA CARE TRANSFER NOTE
Patient: Franklyn Sorto    Procedure: Procedure(s):  CREATION, END COLOSTOMY, LAPAROSCOPIC AND  DEBRIDEMENT OF SACRAL DECUBITUS ULCER       Diagnosis: Pressure injury of sacral region, stage 3 (H) [L89.153]  Diagnosis Additional Information: No value filed.    Anesthesia Type:   General     Note:    Oropharynx: oropharynx clear of all foreign objects and spontaneously breathing  Level of Consciousness: drowsy  Oxygen Supplementation: face mask  Level of Supplemental Oxygen (L/min / FiO2): 8  Independent Airway: airway patency satisfactory and stable  Dentition: dentition unchanged  Vital Signs Stable: post-procedure vital signs reviewed and stable  Report to RN Given: handoff report given  Patient transferred to: PACU    Handoff Report: Identifed the Patient, Identified the Reponsible Provider, Reviewed the pertinent medical history, Discussed the surgical course, Reviewed Intra-OP anesthesia mangement and issues during anesthesia, Set expectations for post-procedure period and Allowed opportunity for questions and acknowledgement of understanding  Vitals:  Vitals Value Taken Time   /76 08/13/24 1022   Temp 35.8  C (96.44  F) 08/13/24 1027   Pulse 69 08/13/24 1027   Resp 14 08/13/24 1027   SpO2 100 % 08/13/24 1027   Vitals shown include unfiled device data.    Electronically Signed By: FER Quick CRNA  August 13, 2024  10:29 AM

## 2024-08-13 NOTE — PROGRESS NOTES
"[facility] Nurse Inpatient Assessment     8.13.24 Pt in OR for planned diverting ostomy and sacral PI debridement. [facility] will continue to follow for ostomy teaching and likely continued VAC therapy. See below for last in person [facility] assessment/information.    Ekta Pearson, MSN RN CWOCN  Pager no longer is use, please contact through [employer] group: [facility] [employer]    Consulted for: Sacral    Summary: NPWT was changed 8/9 and per staff removed 8/10 due to leaking. Patient has had Vashe moistened gauze wet to moist dressings.  Plan is to go to ER for debridement 8/12, and have a diverting colostomy.    Patient History (according to provider note(s):      HPI: Franklyn Sorto is a 88 year old male with h/o T2DM, GERD, HTN, CVA 5/25/24 currently in TCU presenting to the ER for evaluation of fatigue and altered mental status. Patient's family was visiting today and states when they got there \"5 staff members were around the patient and he was not responding\". They called EMS who initially got a BP with 90 systolic, however, repeat on the way over was 65 systolic. Per patient's family, patient had feeding tube placed following his stroke as he was having difficulty swallowing.  Patient has improved a fair amount and is now able to swallow, has not used his feeding tube in over a month.  Patient's family states that they are quite adamant about him only drinking and eating well sitting up but they are nervous that his facility may have given him some fluid while laying down that could have resulted in an aspiration pneumonia. Patient denies any recent fevers. States he has been coughing a bit more, no sputum production.     Vitals reviewed, initial BP upon arrival to the ER was 69/48. Temp 97.5F. SpO2 89% on room air. 2L nasal cannula was placed and patient quickly improved to 98% on room air On exam he is pale but nontoxic-appearing.  He is speaking in full " "sentences and is alert. Differential diagnosis includes but not limited to UTI, pyelonephritis, bronchitis, pneumonia, PE, ACS, hypovolemia, cardiogenic shock, septic shock. After initial 500 mL fluid bolus his BP has improved to 87/47. He was given an additional 500 mL and improved to 90/66.        Assessment:      Pressure Injury Location: Sacrum     8/6 8/7 8/9 8/12  Last photo: 8/9  Wound type: Pressure Injury     Pressure Injury Stage: Unstageable, present on admission   Wound history/plan of care:   Came from TCU    Wound base: 100 % eschar and slough, discolored black/grey      Palpation of the wound bed: boggy and fluctuance      Drainage: moderate     Description of drainage: purulent     Measurements (length x width x depth, in cm) 5  x 3.5  x  1.5 cm      Tunneling 5-6 o'clock 5cm     Undermining all around, 1-2cm  Periwound skin:  discolored      Color: dusky, purple, and red      Temperature: warm  Odor: offensive  Pain: moderate and during dressing change, tender  Pain intervention prior to dressing change: slow and gentle cares   Treatment goal: Drainage control, Infection control/prevention, Increase granulation, Protection, Remove necrotic tissue, and Soften necrotic tissue  STATUS: stable          Treatment Plan:     Coccyx wound:  Sacral wound:  BID wet-to-dry dressing change with Vashe-moistened 2\" Kerlix and covered with dry gauze and Mepilex dressing. Change as needed if soiled.    Pressure Injury Prevention (PIP) Plan:  If patient is declining pressure injury prevention interventions: Explore reason why and address patient's concerns, Educate on pressure injury risk and prevention intervention(s), If patient is still declining, document \"informed refusal\" , and Ensure Care team is aware ( provider, charge nurse, etc)  Mattress: Follow bed algorithm, reassess daily and order specialty mattress, if indicated.  HOB: Maintain at or below 30 degrees, unless " contraindicated  Repositioning in bed: Every 1-2 hours , Left/right positioning; avoid supine, Raise foot of bed prior to raising head of bed, to reduce patient sliding down (shear), and Frequent microturns using positioning wedges, as patient tolerates  Heels: Keep elevated off mattress, Pillows under calves, and Heel lift boots  Protective Dressing:  wound care to sacrum  Positioning Equipment:Positioning wedges (#[id number]) to help maintain 30 degree side lying position   Chair positioning: Chair cushion (#[id number])    If patient has a buttock pressure injury, or high risk for PI use chair cushion or SPS.  Moisture Management: Perineal cleansing /protection: Follow Incontinence Protocol, Avoid brief in bed, Clean and dry skin folds with bathing , Consider InterDry (#[id number]) between folds, and Moisturize dry skin  Under Devices: Inspect skin under all medical devices during skin inspection , Ensure tubes are stabilized without tension, and Ensure patient is not lying on medical devices or equipment when repositioned  Ask provider to discontinue device when no longer needed.      Orders: Updated    RECOMMEND PRIMARY TEAM ORDER: None, at this time  Education provided: importance of repositioning, plan of care, and wound progress  Discussed plan of care with: Patient, Nurse, and Physicians Assistant  WOC nurse follow-up plan: twice weekly  Notify WOC if wound(s) deteriorate.  Nursing to notify the Provider(s) and re-consult the WOC Nurse if new skin concern.    DATA:     Current support surface: Standard  Low air loss (TORY pump, Isolibrium, Pulsate)  Containment of urine/stool: Incontinence Protocol  BMI: Body mass index is 23.51 kg/m .   Active diet order: Orders Placed This Encounter      NPO per Anesthesia Guidelines for Procedure/Surgery Except for: Meds     Output: I/O last 3 completed shifts:  In: 480 [P.O.:480]  Out: 400 [Urine:400]     Labs:   Recent Labs   Lab 08/13/24  0631   HGB 11.1*   INR 2.11*   WBC 10.9      Pressure injury risk assessment:   Sensory Perception: 2-->very limited  Moisture: 2-->very moist  Activity: 1-->bedfast  Mobility: 2-->very limited  Nutrition: 3-->adequate  Friction and Shear: 1-->problem  Dilan Score: 11    Tenisha Recinos, SUZANNEN, RN, PHN, HNB-BC, CWOCN  Pager no longer is use, please contact through [employer] group: [facility] [employer]

## 2024-08-13 NOTE — PROGRESS NOTES
General Surgery Progress Note:    Hospital Day # 8    ASSESSMENT:   1. Pressure injury of sacral region, unstageable (H)    2. Community acquired pneumonia of left lower lobe of lung    3. Pressure injury of sacral region, stage 3 (H)    4. Hypotension, unspecified hypotension type    5. Hypoxia    6. History of stroke        Franklyn Sorto is a 88 year old male presenting with sacral decubitus ulcer requiring further debridement.     PLAN:   -To the OR today for diverting colostomy laparoscopically and further debridement of sacral decubitus ulcer  -The risks and benefits of the procedure were explained detail to the patient. The risks include infection, bleeding, damage to the surrounding structures. Patient verbalized understanding provided consent to undergo the procedure above.        SUBJECTIVE:   Franklyn Sorto was seen on rounds.  Patient has been NPO preparation for surgery today    Patient Vitals for the past 24 hrs:   BP Temp Temp src Pulse Resp SpO2   08/13/24 0625 -- 96.8  F (36  C) Core -- -- --   08/13/24 0621 115/64 (!) 96.4  F (35.8  C) Core 71 20 98 %   08/13/24 0411 124/58 98.2  F (36.8  C) Oral 71 18 100 %   08/12/24 2359 130/60 98.2  F (36.8  C) Oral 72 18 96 %   08/12/24 1549 (!) 143/66 98.2  F (36.8  C) Oral 75 19 99 %   08/12/24 0742 125/58 97.7  F (36.5  C) Oral 70 17 99 %       Physical Exam:  General: NAD, pleasant  CV:RRR  LUNGS:Normal respiratory effort, no accessory muscle use  Skin: Sacral decubitus ulcer with necrotic tissue.      Demetri Garcia DO  General Surgeon  [facility]  Surgery [facility] - [address]  [place] [address]?  Office: [phone]  Employed by - [facility]  Pager: [phone]

## 2024-08-13 NOTE — OR NURSING
"OLIMPIA    Patient ready to go, RANCS complete. Son, Abdoulaye checked in and stated that \"we can't go in for surgery until the trust is notarized. I have a notary coming in at 0740 and Devin (Friend) will be power of  who will need to be here as well.\" OLIMPIA RN clarified with patient and son if this is really necessary delay for surgery. Patient's son, Abdoulaye insistent that the trust be signed and notarized prior to surgery. Surgery delayed. OLIMPIA RN notified to Charge OLIMPIA, Charge OR RN/OR control desk, and Dr. Garcia.    "

## 2024-08-13 NOTE — CONSULTS
"Palliative Care Consultation Note  [facility]      Patient: Franklyn Sorto  Date of Admission:  8/5/2024    Requesting Clinician / Team: Rosalina Sinclair  Reason for consult: \"Outpatient needs\"       Recommendations & Counseling     GOALS OF CARE:   Life-prolonging with limits  - DNR/DNI  Son Abdoulaye has arranged outpatient care through a private agency called \"[facility]\" run by Nusrat Sandoval.  They provide 24-hour skilled nursing care to families and patients in their home residence.  They describe themselves as offering \"palliative care\" in the home.  I encouraged him to continue working with their services at discharge.  Goal is to stabilize following surgery and follow recommended wound care recommendations.  Will continue with all recommended medical treatments to treat infection and promote wound healing.    ADVANCE CARE PLANNING:  No health care directive on file. Per  informed consent policy, next of kin should be involved if patient becomes unable.  Per patient and sonAbdoulaye, legal decision makers are sonAbdoulaye and friend, Herman Layton.  POLST on file and dated 6/7/2024 - DNR/I,comfort focused treatments and would agree with IV/IM antibiotics.  Code status: No CPR- Do NOT Intubate    MEDICAL MANAGEMENT:   #Pain,acute -sacral s/p D sacral wound and lower abdominal s/p laparoscopic end colostomy  Acetaminophen (Tylenol), Scheduled 975 p.o. every 8 hours (ordered for you)  Concur with hydromorphone IV 0.2-0.4 mg IV every 2 hours as needed.  Concur with oxycodone 5 mg p.o. every 4 hours as needed  Patient prefers to avoid opiate related medications.     # Nausea - 2/2 anesthesia  Continue ondansetron 4 mg IV or p.o. every 6 hours as needed  Prochlorperazine 5 mg IV/p.o. every 6 hours as needed    PSYCHOSOCIAL/SPIRITUAL SUPPORT:  Family sonAbdoulaye  Friends friendHerman community: Jainism     Palliative Care will continue to follow. Thank you for the consult and allowing " us to aid in the care of Franklyn Sorto.    These recommendations have been discussed with Dr. Yahir RN and care management.    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  Initial treatment started for sepsis and acute renal failure. 8/13/2024 patient underwent laparoscopic end colostomy and debridement of sacral ulcer. ID consulted and patient being treated with IV meropenum and daptomycin for E coli ESBL bacteremia due to sacral wound. Bedside ID culture revealed MSSA, VRE, EColi and baceroides.    Recent Hospitalizations:  6/20/2024 - 6/26/2024 [facility] admission leukocytosis, UC growong ESBL, coccyx and penile skin wounds  5/25/2024 - 6/7/2024 [facility] hospitalization for ischemic stroke    Today, the patient was seen for:  Introduction to palliative medicine specialty, establish rapport, goals of care discussion, symptom management    History of Present Illness   Met with patient and son, Abdoulaye.   Palliative care team helps patients and families dealing with serious, potentially life-limiting illnesses, navigate their care while focusing on the whole person; providing emotional, social and spiritual support.  Palliative care often assists with symptom management, information sharing about what to expect from the illness, available treatment options and what effect those options may have on the disease course, and provide effective communication and caring support.    Prognosis, Goals, & Planning:   Functional Status just prior to  this current hospitalization:  ECOG4 (Completely disabled and dependent on others for selfcare; bedbound)    Prognosis, Goals, and/or Advance Care Planning:  We discussed general treatment options (full/restorative, selective/conservatives, and comfort only/hospice). We then discussed how these specifically apply to Franklyn.  Based on this discussion, patient decided to pursue laparoscopic end colostomy and sacral wound I&D today to allow for better more optimal tissue healing.  Patient and son expressed grief and desire about care they received at [facility] nursing [facility] ([facility]).    Plan is to continue medical management and ongoing stabilization.  Wish to treat infection.  Plan would be to return to a new 3 bedroom apartment with 24-hour skilled nursing care through [facility] agency.  Patient and son expressed understanding that he could decompensate.  Goal is to see if this wound could heal and provide better quality of life.  Would not be opposed to more comfort focus if his condition decompensated further.  Present goals are restorative.    Code Status was addressed today:   DNR/DNI confirmed    Patient's decision making preferences: shared with support from loved ones        Patient has decision-making capacity today for complex decisions: Intact          Coping, Meaning, & Spirituality:   Mood, coping, and/or meaning in the context of serious illness were addressed today: Yes    Social:   Living situation:resides in a nursing home previously residing in [facility]  Important relationships/caregivers: Son, Abdoulaye, friend, Herman and daughter who lives in [place]  Occupation: Retired  and /port    Medications:  Reviewed this patient's medication profile and medications from this hospitalization.   [place] Board of Pharmacy Data Base Reviewed: Yes:   reviewed - controlled substances prescribed by other outside provider(s).    ROS:  Comprehensive ROS is reviewed and is  negative except as here & per HPI:     Physical Exam   Vital Signs with Ranges  Temp:  [96.4  F (35.8  C)-98.2  F (36.8  C)] 96.6  F (35.9  C)  Pulse:  [69-75] 69  Resp:  [14-20] 20  BP: (115-193)/(58-76) 151/65  SpO2:  [95 %-100 %] 100 %  Wt Readings from Last 10 Encounters:   08/08/24 72.2 kg (159 lb 2.8 oz)   06/21/24 73 kg (160 lb 15 oz)   03/01/24 75.8 kg (167 lb)   11/13/23 76.2 kg (168 lb)   07/11/23 74.4 kg (164 lb)   07/07/23 74.4 kg (164 lb)   04/17/23 81.2 kg (179 lb)   01/06/23 83.9 kg (185 lb)   10/07/22 89.7 kg (197 lb 11.2 oz)   09/02/22 92.3 kg (203 lb 8 oz)     159 lbs 2.75 oz    PHYSICAL EXAM:  Constitutional: alert and no distress   Cardiovascular: negative findings: regular rate and rhythm, S1 normal, S2 normal  Respiratory: Clear to auscultate bilaterally.  Nonlabored, no accessory muscle use noted  Head: Normocephalic.  Left facial droop noted  Abdomen: Soft,+BS, ostomy pink left lower quadrant  : Indwelling Javier catheter in place  NEURO: Alert.  Oriented x 3.  Calm, appropriate.    Data reviewed:  Results for orders placed or performed during the hospital encounter of 08/05/24 (from the past 24 hour(s))   INR   Result Value Ref Range    INR 2.32 (H) 0.85 - 1.15   Extra Tube    Narrative    The following orders were created for panel order Extra Tube.  Procedure                               Abnormality         Status                     ---------                               -----------         ------                     Extra Green Top (Lithium...[[id number]]                      Final result                 Please view results for these tests on the individual orders.   Extra Green Top (Lithium Heparin) Tube   Result Value Ref Range    Hold Specimen JIC    Glucose by meter   Result Value Ref Range    GLUCOSE BY METER POCT 232 (H) 70 - 99 mg/dL   Glucose by meter   Result Value Ref Range    GLUCOSE BY METER POCT 317 (H) 70 - 99 mg/dL   Glucose by meter   Result Value Ref Range    GLUCOSE  BY METER POCT 221 (H) 70 - 99 mg/dL   Glucose by meter   Result Value Ref Range    GLUCOSE BY METER POCT 216 (H) 70 - 99 mg/dL   Basic metabolic panel   Result Value Ref Range    Sodium 144 135 - 145 mmol/L    Potassium 4.0 3.4 - 5.3 mmol/L    Chloride 107 98 - 107 mmol/L    Carbon Dioxide (CO2) 28 22 - 29 mmol/L    Anion Gap 9 7 - 15 mmol/L    Urea Nitrogen 21.1 8.0 - 23.0 mg/dL    Creatinine 0.56 (L) 0.67 - 1.17 mg/dL    GFR Estimate >90 >60 mL/min/1.73m2    Calcium 7.7 (L) 8.8 - 10.4 mg/dL    Glucose 218 (H) 70 - 99 mg/dL   CBC with platelets   Result Value Ref Range    WBC Count 10.9 4.0 - 11.0 10e3/uL    RBC Count 3.68 (L) 4.40 - 5.90 10e6/uL    Hemoglobin 11.1 (L) 13.3 - 17.7 g/dL    Hematocrit 35.3 (L) 40.0 - 53.0 %    MCV 96 78 - 100 fL    MCH 30.2 26.5 - 33.0 pg    MCHC 31.4 (L) 31.5 - 36.5 g/dL    RDW 15.0 10.0 - 15.0 %    Platelet Count 180 150 - 450 10e3/uL   INR   Result Value Ref Range    INR 2.11 (H) 0.85 - 1.15   Glucose by meter   Result Value Ref Range    GLUCOSE BY METER POCT 152 (H) 70 - 99 mg/dL       70 MINUTES SPENT BY ME on the date of service doing chart review, history, exam, documentation & further activities per the note.

## 2024-08-13 NOTE — PROGRESS NOTES
CALORIE COUNT      Approximate Oral Intake for:    8/12  2 of 3 ordered meals documented (breakfast and supper  Calories: 751  Protein: 35 g       Estimated Needs:    Calories: 5565-2636 +  Protein: 109-145 g      Summary:   Pt receiving thickened ensure and magic cup tid and expedite daily for supplements = 1965 kcal, 97 g protein total    Multiple additional ensure and magic cups  ordered by bother yesterday, on top of scheduled supplements d/t brother not knowing they were scheduled    Pt ate 75% of lunch meal  yesterday-supplement intake not documented. Estimate intake over 3 meals and some supplements yesterday 1053 kcal, 46 g protein meeting 55% of estimated kcal, 40% of estimated protein needs    Suspect pt took additional supplements at lunch but these were not documented. Supplements providing significant amount of pt nutrition.   Pt in surgery at this time

## 2024-08-13 NOTE — ANESTHESIA PROCEDURE NOTES
Airway       Patient location during procedure: OR       Procedure Start/Stop Times: 8/13/2024 8:05 AM  Staff -        CRNA: Elio Gonsalez APRN CRNA       Performed By: CRNA  Consent for Airway        Urgency: elective  Indications and Patient Condition       Indications for airway management: demi-procedural       Induction type:intravenous       Mask difficulty assessment: 1 - vent by mask    Final Airway Details       Final airway type: endotracheal airway       Successful airway: ETT - single  Endotracheal Airway Details        ETT size (mm): 8.0       Cuffed: yes       Successful intubation technique: video laryngoscopy       VL Blade Size: Glidescope 4       Grade View of Cords: 1       Adjucts: stylet       Position: Right       Measured from: lips       Secured at (cm): 24       Bite block used: None    Post intubation assessment        Placement verified by: capnometry, equal breath sounds and chest rise        Number of attempts at approach: 1       Number of other approaches attempted: 0       Secured with: silk tape       Ease of procedure: easy       Dentition: Intact    Medication(s) Administered   Medication Administration Time: 8/13/2024 8:05 AM

## 2024-08-13 NOTE — OR NURSING
"OLIMPIA    Report received from KARON Boyd. Patient's blood sugar this  mg/dL. Patient has type II DM. Notified to Dr. Duran. Per Dr. Duran, no correction needed at this time. Patient is alert and oriented x4, able to make his needs known. Patient is able to sign his own consent form. Patient keeps repeating that \"it is very important that Dr. Garcia signs my son's FMLA forms for work, he is taking 1-month off of work and needs to get paid as he is caring for me and trying to find me a better facility in [place].\" RN called to Abdoulaye to notify him of surgical times and inquire if Abdoulaye would be coming to the hospital. Abdoulaye states that there are some forms that need to be notarized, he is on his way in 15 minutes with Devin (friend). Abdoulaye states that Dr. Garcia discussed surgical details and informed consent yesterday with Abdoulaye and the patient. He is in verbal agreement via phone.  "

## 2024-08-14 NOTE — PROGRESS NOTES
"INFECTIOUS DISEASE FOLLOW UP NOTE    Date: 08/14/2024   CHIEF COMPLAINT:   Chief Complaint   Patient presents with    Altered Mental Status    Hypotension        ASSESSMENT:    E coli ESBL bacteremia: 2/2 sacral ulcer. S/p bedside debridement with surgery. Necrotic tissue noted. Repeat bedside I&D 8/7 small purulence and again necrotic tissue 8/9. Vac currently in place. S/p I+D in OR 8/13 with diverting colostomy. Concern for osteomyelitis -- wound down to bone although bone did not appear unhealthy. Would likely plan 4-6 weeks IV antibiotics. Leukocytosis -- post-op. Monitor.   strep salivarius bacteremia: noted on admission.  Likely 2/2 above  Sacral ulcer s/p bedside debridement, cultures with ecoli, MSSA, bacteroides, VRE. CT with ulceration to periosteal margin.   ARIADNE on CKD  Hx CVA with residual dysphagia: increased risk for aspiration events, on room air. Unclear if actual infection however will be covered with treatment of sacral ulcer  DM2    PLAN:  - continue meropenem IV, daptomycin      Eriberto Verdugo MD   [facility]  Direct messaging: [employer] Paging   ______________________________________________________________________    SUBJECTIVE / INTERVAL HISTORY:  OR yesterday -- reviewed findings with Dr. Garcia after the visit. Noted that wound was down to bone although bone did not look brittle.     Post-op pain. Fatigued.     SH/FH/Habits/PMH reviewed and unchanged.    OBJECTIVE:  /65 (BP Location: Right arm)   Pulse 89   Temp 98.6  F (37  C) (Oral)   Resp 19   Ht 1.753 m (5' 9\")   Wt 72.2 kg (159 lb 2.8 oz)   SpO2 90%   BMI 23.51 kg/m       Resp: 19      GEN: No acute distress. Tired.   RESPIRATORY:  clear bilaterally  CV: regular  EXTREMITIES: No edema.  SKIN/HAIR/NAILS: vac in place.   IV:  peripheral IV          Antibiotics:  Meropenem  daptomycin    Pertinent labs:  reviewed  Last Comprehensive Metabolic Panel:  Sodium   Date Value Ref Range Status   08/14/2024 " 138 135 - 145 mmol/L Final     Potassium   Date Value Ref Range Status   08/14/2024 4.2 3.4 - 5.3 mmol/L Final   11/29/2019 4.3 3.5 - 5.0 mmol/L Final     Chloride   Date Value Ref Range Status   08/14/2024 105 98 - 107 mmol/L Final   11/29/2019 107 98 - 107 mmol/L Final     Carbon Dioxide (CO2)   Date Value Ref Range Status   08/14/2024 25 22 - 29 mmol/L Final   11/29/2019 26 22 - 31 mmol/L Final     Anion Gap   Date Value Ref Range Status   08/14/2024 8 7 - 15 mmol/L Final   11/29/2019 7 5 - 18 mmol/L Final     Glucose   Date Value Ref Range Status   11/29/2019 126 (H) 70 - 125 mg/dL Final     GLUCOSE BY METER POCT   Date Value Ref Range Status   08/14/2024 150 (H) 70 - 99 mg/dL Final     Urea Nitrogen   Date Value Ref Range Status   08/14/2024 18.1 8.0 - 23.0 mg/dL Final   11/29/2019 10 8 - 28 mg/dL Final     Creatinine   Date Value Ref Range Status   08/14/2024 0.53 (L) 0.67 - 1.17 mg/dL Final     GFR Estimate   Date Value Ref Range Status   08/14/2024 >90 >60 mL/min/1.73m2 Final     Comment:     eGFR calculated using 2021 CKD-EPI equation.   11/29/2019 >60 >60 mL/min/1.73m2 Final     Calcium   Date Value Ref Range Status   08/14/2024 7.5 (L) 8.8 - 10.4 mg/dL Final     Comment:     Reference intervals for this test were updated on 7/16/2024 to reflect our healthy population more accurately. There may be differences in the flagging of prior results with similar values performed with this method. Those prior results can be interpreted in the context of the updated reference intervals.        MICROBIOLOGY DATA:  Personally reviewed.  7-Day Micro Results       Collected Updated Procedure Result Status      08/13/2024 0833 08/14/2024 1231 Anaerobic Bacterial Culture Routine [[id number]]   Wound from Spine, Sacrum    Preliminary result Component Value   Culture No anaerobic organisms isolated after 1 day  [P]                08/13/2024 0833 08/13/2024 1301 Gram Stain [[id number]]   Wound from Spine, Sacrum    Final  result Component Value   GS Culture See corresponding culture for results   Gram Stain Result No organisms seen   Gram Stain Result 2+ WBC seen            08/13/2024 0833 08/14/2024 1052 Wound Aerobic Bacterial Culture Routine [[id number]]   (Abnormal)   Wound from Spine, Sacrum    Preliminary result Component Value   Culture Culture in progress  [P]     1+ Gram negative bacilli  [P]     1+ Enterococcus faecium  [P]                08/07/2024 1651 08/12/2024 2303 Blood Culture Peripheral Blood [[id number]]    Peripheral Blood    Final result Component Value   Culture No Growth                        RADIOLOGY:  Personally Reviewed.  Recent Results (from the past 24 hour(s))   XR Video Swallow with SLP or OT    Narrative    EXAM: XR VIDEO SWALLOW WITH SLP OR OT  LOCATION: [facility]  DATE: 8/6/2024    INDICATION: Difficulty swallowing.  COMPARISON: None.    TECHNIQUE: Routine swallow study with speech pathology using multiple barium thicknesses.    RADIATION DOSE: Total Air Kerma 5.1 mGy    FINDINGS:   Swallow study with Speech Pathology using multiple barium thicknesses. Silent aspiration with thin liquid, including with chin tuck. Aspiration also occurred with thin liquid and smaller drink without chin tuck. No other significant penetration or   aspiration.         Principal Problem:    Sepsis with acute renal failure and tubular necrosis without septic shock (H)  Active Problems:    Benign prostatic hyperplasia    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) (H)    Hyperlipidemia    Hypertension    Long term current use of anticoagulant therapy    S/P cardiac pacemaker procedure    S/P TAVR (transcatheter aortic valve replacement)    Thrombophilia (H24)    Diabetes mellitus type 2, noninsulin dependent (H)    History of CVA (cerebrovascular accident)    Hypoxia    Hypotension, unspecified hypotension type    Community acquired pneumonia of left lower lobe of lung    Pressure  injury of sacral region, stage 3 (H)    Leukocytosis, unspecified type  ;

## 2024-08-14 NOTE — PLAN OF CARE
Problem: Adult Inpatient Plan of Care  Goal: Plan of Care Review  Description: The Plan of Care Review/Shift note should be completed every shift.  The Outcome Evaluation is a brief statement about your assessment that the patient is improving, declining, or no change.  This information will be displayed automatically on your shift  note.  Outcome: Progressing     Problem: Wound  Goal: Optimal Functional Ability  Intervention: Optimize Functional Ability  Recent Flowsheet Documentation  Taken 8/13/2024 2230 by Sofie Franco RN  Activity Management: activity adjusted per tolerance  Activity Assistance Provided: assistance, 2 people  Taken 8/13/2024 2200 by Sofie Franco RN  Activity Management: activity adjusted per tolerance  Activity Assistance Provided: assistance, 2 people  Taken 8/13/2024 2109 by Sofie Franco RN  Activity Management: activity adjusted per tolerance  Activity Assistance Provided: assistance, 2 people   Goal Outcome Evaluation:         Pt A/O x4, makes needs known, pt reported coccyx pain- Dilaudid 0.4 mg IV x 1- resolves pain,  scheduled tylenol. Repositioning Q 2 hrs, coccyx dressing reinforced- serosang drainage- intact.  Javier catheter- adequate.  Colostomy very small amount of blood--stoma site pinkish/red . G- tube clamped. Clear liquids need to be  Mildly thicken liquids--NO STRAWS .  Continues withIV ABX, VSS. O2 95% RA

## 2024-08-14 NOTE — PROGRESS NOTES
[facility]    Medicine Progress Note - Hospitalist Service    Date of Admission:  8/5/2024    Assessment & Plan   Franklyn Sorto is 88 year old male with history of HFrEF, hyperlipidemia, hypertension, aortic stenosis status post TAVR, thrombophilia, type 2 diabetes, stage III sacral ulcer, CAD, prior CVA admitted on 8/5/2024 with altered mental status & hypotension secondary to sepsis possibly from urinary tract infection, aspiration pneumonia and suspected infected, unstageable sacral ulcer.     He was managed briefly at the ICU by instensivist on admission but did not require vasopressor. Hypotension and ARIADNE improved with IV hydration and albumin infusion.  Urine culture grew E. coli.  Blood culture grew ESBL E Coli & Streptococcus salivarius. He is receiving IV meropenem and vancomycin per ID recommendation. Debridement of sacral ulcer was performed by surgery service on 8/6/2024. He was transferred to the medical floor on 8/6/24 given improved BP. He became hypotensive again on 8/7/24, therefore intensivist was re-consulted for possible septic shock and management with vasopressor. On 8/8/2024, he was stable prompting transfer to floor.      Sepsis, resolved   Infected decubitus ulcer with polymicrobial growth   Polymicrobial bacteremia with ESBL and Strep salivarius   UTI  -S/p debridement 8/6/2024 with Cx polymicrobial (E Coli, B Fragilis, Staph Aureus, E faecium)  -Ucx ESBLI E Coli & Strep salivarius  -Unable to obtain MRI secondary to intracardiac lead. CT Pelvis W without any signs of osteo or abscess  -Abx by ID, currently on meropenem (8/7- )  added daptomycin for VRE (8/10- ), vancomycin (8/5-8/8)  -General surgery with plans for wound vac change and debridement for Tuesday 08/13, will hold warfarin tonight and NPO at midnight  -Wound vac per WOC  -Follow repeat Bcx 8/7/2024 - NGTD  -Midodrine 5mg TID was started during this admission for hypotension, will wean (5mg BID 8/10  and 5mg daily 8/11 and then off)-now 8/14 having hypotension again, will try to treat with fluid bolus, may need to add midodrine again     History of aspiration and dysphagia   Aspiration PNA vs CAP LLL infiltrate but H/O aspiration after CVA   -Mildly thick diet per speech therapist   -Video swallow study revealed silent aspiration with thin liquid, including with chin tuck   -On broad Abx as above     Recent acute ischemic stroke (5/25/2024)  Residual left Hemiparesis   Recent admission 5/25[facility]6/7/20[facility] at [facility] for ischemic stroke; discharged to TCU   -No improvement in left hemiparesis   -PT/OT      History of the left upper extremity DVT with thrombophilia (positive antiphospholipid antibodies, 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)  -Pharmacy to dose warfarin with INR goal 2-3 , holding tonight     Diabetes mellitus type 2, noninsulin dependent    -Start Lantus 5u at bedtime 8/9, sliding scale insulin medium before meals and low at bedtime  -Monitor blood glucose     Malnutrition  S/p PEG   Nutrition is following. Calorie count ongoing. Not meeting calorie on 8/9.   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  EF 55 to 60%.  Abnormal septal motion consistent with ventricular paced rhythm.  Bioprosthetic arctic valve  -Hold home coreg 25mg BID since hypotensive      Rheumatoid arthritis/Chronic Pain -   -Tylenol prn and Voltaren      GERD (gastroesophageal reflux disease)/Hx PUD   -PPI PO qAM     Dispo:  -Ordered DME (Kevin lift, hospital bed, wheelchair)  -Outpatient palliative care consult placed           Diet: Snacks/Supplements Adult: Magic Cup; With Meals  Snacks/Supplements Adult:  Expedite Cup; With Meals  Room Service  Snacks/Supplements Adult: Ensure Enlive; With Meals  Calorie Counts  Full Liquid Diet (Advanced as tolerated)    DVT Prophylaxis: Pneumatic Compression Devices  Javier Catheter: PRESENT, indication: Acute retention or obstruction, Surgical procedure  Lines: None     Cardiac Monitoring: None  Code Status: No CPR- Do NOT Intubate      Clinically Significant Risk Factors              # Hypoalbuminemia: Lowest albumin = 2.2 g/dL at 2024 11:55 AM, will monitor as appropriate     # Hypertension: Noted on problem list          # DMII: A1C = N/A within past 6 months    # Moderate Malnutrition: based on nutrition assessment    # Financial/Environmental Concerns:                 Disposition Plan     Medically Ready for Discharge: Anticipated in 2-4 Days             Rosalina Sinclair MD  Hospitalist Service  [facility]  Securely message with [employer] (more info)  Text page via [employer] Paging/Directory   ______________________________________________________________________    Interval History   Mr. Saha is not feeling great today.  He is having generalized malaise and again is repeating that he wishes he had of  when he had a stroke.  He described the concerns he has about being a burden to his loved ones especially his son.  I filled out the [facility] paperwork for son today.  This afternoon he became hypotensive in the 80s systolic.  He was asymptomatic at the time.  Will give fluid bolus and if that does not improve blood pressures may need to restart on midodrine which she has been on and off since admission.    Physical Exam   Vital Signs: Temp: 98.6  F (37  C) Temp src: Axillary BP: (!) 80/41 Pulse: 87   Resp: (!) 99 SpO2: 95 % O2 Device: None (Room air)    Weight: 159 lbs 2.75 oz    General Appearance: Awake, alert, in no acute distress  Respiratory: CTAB, no wheeze  Cardiovascular: RRR  GI: soft, nontender, non distended, normal bowel sounds  Skin: no  jaundice, no rash      Medical Decision Making       50 MINUTES SPENT BY ME on the date of service doing chart review, history, exam, documentation & further activities per the note.      Data     I have personally reviewed the following data over the past 24 hrs:    16.7 (H)  \   10.1 (L)   / 203     138 105 18.1 /  150 (H)   4.2 25 0.53 (L) \     INR:  2.39 (H) PTT:  N/A   D-dimer:  N/A Fibrinogen:  N/A       Imaging results reviewed over the past 24 hrs:   No results found for this or any previous visit (from the past 24 hour(s)).

## 2024-08-14 NOTE — PLAN OF CARE
Problem: Adult Inpatient Plan of Care  Goal: Absence of Hospital-Acquired Illness or Injury  Intervention: Prevent Infection  Recent Flowsheet Documentation  Taken 8/13/2024 1635 by Carleen Mena RN  Infection Prevention:   personal protective equipment utilized   single patient room provided     Problem: Wound  Goal: Optimal Functional Ability  Outcome: Progressing  Intervention: Optimize Functional Ability  Recent Flowsheet Documentation  Taken 8/13/2024 1635 by Carleen Mena, RN  Activity Management: activity adjusted per tolerance  Activity Assistance Provided: assistance, 2 people   Goal Outcome Evaluation:         Pt back from surgery. Lap sites intact, no drainage noted. Colostomy intact, no output noted yet.Pain controlled with scheduled Tylenol and Prn IV dilaudid which was effective. Pt tolerating clear liquid diets. Pt's son visited at bedside.

## 2024-08-14 NOTE — PROGRESS NOTES
PALLIATIVE CARE PROGRESS NOTE  [facility]     Patient Name: Franklyn Sorto  Date of Admission: 8/5/2024   Today the patient was seen for: Support with goals of care     Recommendations & Counseling     GOALS OF CARE:   Life-prolonging with limits  - DNR/DNI  Son Abdoulaye has describes feeling anxious about discharge planning with regards to DME and home care services through a private agency run by Nusrat Sandoval.  Palliative referred to his questions and concerns to our hospital  for support.   Goal is to stabilize following surgery and follow recommended wound care recommendations.  Will continue with all recommended medical treatments to treat infection and promote wound healing.     ADVANCE CARE PLANNING:  No health care directive on file. Per  informed consent policy, next of kin should be involved if patient becomes unable.  Per patient and sonAbdoulaye, legal decision makers are son, Abdoulaye Martinez and friend, Herman Layton.  POLST on file and dated 6/7/2024 - DNR/I,comfort focused treatments and would agree with IV/IM antibiotics.  Code status: No CPR- Do NOT Intubate     MEDICAL MANAGEMENT:   #Pain,acute -sacral s/p D sacral wound and lower abdominal s/p laparoscopic end colostomy  Acetaminophen (Tylenol), Scheduled 975 p.o. every 8 hours (ordered for you)  Concur with hydromorphone IV 0.2-0.4 mg IV every 2 hours as needed.  Concur with oxycodone 5 mg p.o. every 4 hours as needed  Patient prefers to avoid opiate related medications.     # Nausea - 2/2 anesthesia  Continue ondansetron 4 mg IV or p.o. every 6 hours as needed  Prochlorperazine 5 mg IV/p.o. every 6 hours as needed     PSYCHOSOCIAL/SPIRITUAL SUPPORT:  Family son, Abdoulaye  Friends friendHerman community: [employer]      Palliative Care will continue to follow. Thank you for the consult and allowing us to aid in the care of Franklyn Sorto.    These recommendations have been discussed with team.    Esteban Mckeon,  DO  [facility], Palliative Care  Securely message with the [employer] Web Console (learn more here) or  Text page via [facility] Paging/Directory        Assessment          Franklyn Sorto is a 88 year old male with a past medical history of DM2, ischemic CVA (5/25/2024) and residual left sided weakness,HFpEF (55 - 60%), s/p TAVR (11/2014), s/p dual chamber pacemaker placement (11/6/2024), HTN, RA, chronic pain syndrome, GERD who presented on 8/5/2024 from TCU with altered mental status and worsening fatigue.  Initial treatment started for sepsis and acute renal failure. 8/13/2024 patient underwent laparoscopic end colostomy and debridement of sacral ulcer. ID consulted and patient being treated with IV meropenum and daptomycin for E coli ESBL bacteremia due to sacral wound. Bedside ID culture revealed MSSA, VRE, EColi and baceroides.     Recent Hospitalizations:  6/20/2024 - 6/26/2024 [facility] admission leukocytosis, UC growong ESBL, coccyx and penile skin wounds  5/25/2024 - 6/7/2024 [facility] hospitalization for ischemic stroke       Interval History:     Multidisciplinary collaboration:  Independently reviewed notes within EMR.  Communicated directly with bedside RN and the assigned  regarding care.  Notable medications:  Patient received 1 dose of oxycodone and 3 doses of IV hydromorphone over the past 24 hours, in addition to 3 doses of acetaminophen.  Patient/family narrative  Visit with Franklyn today who describes feeling much better, still a bit sore around the site of the ostomy but overall healing good.  He has no specific questions today, again relays that life has been hard since his CVA and that wound has complicated his remaining quality of life.  He does look forward to watching television continue home with 24-hour care provided from home care service agency.  No questions today or concerns unaddressed.    Subsequently telephone son Abdoulaye who remains frustrated with regards to arranging private home  "care, he is hearing from the agency that hospital must provide orders and documentation before discharge.  His concerns are related to the hospital .  He otherwise is quite satisfied with the care [facility], symptoms adequately managed and no other questions today.    Review of Systems:     Besides above, ROS was reviewed and is unremarkable        Physical Exam:   /65 (BP Location: Right arm)   Pulse 89   Temp 98.6  F (37  C) (Oral)   Resp 19   Ht 1.753 m (5' 9\")   Wt 72.2 kg (159 lb 2.8 oz)   SpO2 90%   BMI 23.51 kg/m    General:  Appears stated age.  NAD.   HENT:  Atraumatic.  Hearing intact.  Moist mucous membranes.    Eyes:  Conjunctivae are clear.  Sclera is nonicteric.    Cardiovascular:  Without cyanosis or mottling.   Respiratory:  Breathing comfortably without increased work of breathing or signs of respiratory distress.  Able to speak in complete sentences.    Skin:  Warm, dry without diaphoresis.   Neuro:  Alert, attentive, speech clear and fluent.   Psych:  Appropriate mood and affect.      Wt Readings from Last 8 Encounters:   08/08/24 72.2 kg (159 lb 2.8 oz)   06/21/24 73 kg (160 lb 15 oz)   03/01/24 75.8 kg (167 lb)   11/13/23 76.2 kg (168 lb)   07/11/23 74.4 kg (164 lb)   07/07/23 74.4 kg (164 lb)   04/17/23 81.2 kg (179 lb)   01/06/23 83.9 kg (185 lb)             Data Reviewed:     Results for orders placed or performed during the hospital encounter of 08/05/24 (from the past 24 hour(s))   Glucose by meter   Result Value Ref Range    GLUCOSE BY METER POCT 167 (H) 70 - 99 mg/dL   Glucose by meter   Result Value Ref Range    GLUCOSE BY METER POCT 183 (H) 70 - 99 mg/dL   Glucose by meter   Result Value Ref Range    GLUCOSE BY METER POCT 165 (H) 70 - 99 mg/dL   INR   Result Value Ref Range    INR 2.39 (H) 0.85 - 1.15   Basic metabolic panel   Result Value Ref Range    Sodium 138 135 - 145 mmol/L    Potassium 4.2 3.4 - 5.3 mmol/L    Chloride 105 98 - 107 mmol/L    Carbon " Dioxide (CO2) 25 22 - 29 mmol/L    Anion Gap 8 7 - 15 mmol/L    Urea Nitrogen 18.1 8.0 - 23.0 mg/dL    Creatinine 0.53 (L) 0.67 - 1.17 mg/dL    GFR Estimate >90 >60 mL/min/1.73m2    Calcium 7.5 (L) 8.8 - 10.4 mg/dL    Glucose 134 (H) 70 - 99 mg/dL   Magnesium   Result Value Ref Range    Magnesium 2.2 1.7 - 2.3 mg/dL   Phosphorus   Result Value Ref Range    Phosphorus 1.4 (L) 2.5 - 4.5 mg/dL   CBC with platelets   Result Value Ref Range    WBC Count 16.7 (H) 4.0 - 11.0 10e3/uL    RBC Count 3.32 (L) 4.40 - 5.90 10e6/uL    Hemoglobin 10.1 (L) 13.3 - 17.7 g/dL    Hematocrit 32.3 (L) 40.0 - 53.0 %    MCV 97 78 - 100 fL    MCH 30.4 26.5 - 33.0 pg    MCHC 31.3 (L) 31.5 - 36.5 g/dL    RDW 15.1 (H) 10.0 - 15.0 %    Platelet Count 203 150 - 450 10e3/uL   Glucose by meter   Result Value Ref Range    GLUCOSE BY METER POCT 127 (H) 70 - 99 mg/dL   Glucose by meter   Result Value Ref Range    GLUCOSE BY METER POCT 150 (H) 70 - 99 mg/dL         Medical Decision Making       55 MINUTES SPENT BY ME on the date of service doing chart review, history, exam, documentation & further activities per the note.

## 2024-08-14 NOTE — PROGRESS NOTES
General Surgery Progress Note:    Hospital Day # 9    ASSESSMENT:  1. Pressure injury of sacral region, unstageable (H)    2. Community acquired pneumonia of left lower lobe of lung    3. Pressure injury of sacral region, stage 3 (H)    4. Hypotension, unspecified hypotension type    5. Hypoxia    6. History of stroke        Franklyn Sorto is a 88 year old male with stage 4 sacral decubitus ulcer who status post sacral decubital ulcer debridement laparoscopic end colostomy creation yesterday. Reporting abdominal discomfort to colostomy site that has been well controlled with tylenol x 2 and IV dilaudid 0.4mg x 2 over the past 24 hours. AM labs notable for leukocytosis (16.7<10.9), he is AF with denying s/o infection. Sacral wound without pustular drainage ut with exudative fibrinous tissue supporting Leukocytosis favored 2/2 demargination. Colostomy without OP at this time. Plan for WV today with WOC.     PLAN:  -Diet as tolerated, currently on FLD (thickened)  -Continue Abx per primary  -multimodal pain control  -Sacral wound without need for bedside debridement  -WV placement today with WOC  -Surgery to continue to follow    SUBJECTIVE:   He is reporting pain localized to the left lower quadrant that is minimal for the localization of pain at this point in time.  Has been tolerating current liquid diet without nausea vomiting.  He denies fever, chills.  Colostomy without evidence of output but with questionable gas within ostomy appliance.      Patient Vitals for the past 24 hrs:   BP Temp Temp src Pulse Resp SpO2   08/14/24 0700 (!) 158/68 97.7  F (36.5  C) Oral 68 16 94 %   08/14/24 0427 128/64 97.8  F (36.6  C) Oral 75 18 99 %   08/13/24 2350 119/53 97.9  F (36.6  C) Oral 70 18 98 %   08/13/24 2109 136/74 97.6  F (36.4  C) Oral 74 18 97 %   08/13/24 1945 (!) 148/72 97.5  F (36.4  C) Oral 71 18 100 %   08/13/24 1540 128/69 97.4  F (36.3  C) Oral 70 20 100 %   08/13/24 1209 (!) 160/72 97.4  F (36.3  C) Axillary  72 -- 97 %   08/13/24 1145 (!) 151/65 (!) 96.6  F (35.9  C) -- 69 20 100 %   08/13/24 1144 (!) 161/65 (!) 96.6  F (35.9  C) -- 69 19 95 %   08/13/24 1115 138/60 (!) 96.4  F (35.8  C) -- 69 15 99 %         PHYSICAL EXAM:  General: patient seen resting in bed, no acute distress  Resp: no respiratory distress, breathing comfortably on room air  Abdomen: Soft, nondistended with left lower quadrant tenderness to palpation adjacent to colostomy site.  Laparoscopic incisions are clean dry and intact with overlying Dermabond.  Stoma with generalized dark red clot with concern for air in appliance but without stool.   Skin: 7 x 6 cm sacral wound with exposed osseous sacral tissue with R>L caudal tunneling that is 4 cm in depth with fibrinous tissue without boggy palpation sensation or purulent drainage. No e/o necrotic tissue.   Output by Drain (mL) 08/12/24 0700 - 08/12/24 1459 08/12/24 1500 - 08/12/24 2259 08/12/24 2300 - 08/13/24 0659 08/13/24 0700 - 08/13/24 1459 08/13/24 1500 - 08/13/24 2259 08/13/24 2300 - 08/14/24 0659 08/14/24 0700 - 08/14/24 1058   Requested LDAs do not have output data documented.      Extremities: warm and well perfused    08/13 0700 - 08/14 0659  In: 933 [P.O.:150; I.V.:603]  Out: 885 [Urine:850]    No results displayed because visit has over 200 results.           Malu Bermudez PA-C  [facility] General Surgery  [address]  [address]  [place] [address]

## 2024-08-14 NOTE — PROGRESS NOTES
Spoke to Nusrat from [facility] . She is with a client right now and will call CM back     12:58 PM  [employer] portal started for wound vac and documents faxed     3:11 PM  Spoke to Nusrat from [facility] . Says they are not skilled care, just private pay support. Patient will still need skilled home care for RN wound care, etc. Nusrat's company will be providing the 24/7 support.     Patient also needs equipment. Per Nusrat- needs, hospital bed, special mattress, caroline lift, broda chair, food tray.

## 2024-08-14 NOTE — PLAN OF CARE
Occupational Therapy Discharge Summary    Reason for therapy discharge:    Change in plan of care, patient now planning to have 24/7 total cares at discharge from skilled nursing services. No skilled need for OT services at this time.    Progress towards therapy goal(s). See goals on Care Plan in [facility] electronic health record for goal details.  Goals not met.  Barriers to achieving goals:   limited tolerance for therapy.    Therapy recommendation(s):    No further therapy is recommended. Please reorder OT if further needs arise.    Sudha Gaytan, OTR/L 8/14/24

## 2024-08-14 NOTE — DISCHARGE INSTRUCTIONS
[facility] DISCHARGE INSTRUCTIONS:  [facility]   WO Nurse Discharge Instructions for New Ileostomy or New Colostomy      When you get home    - Upon arrival home, call the WO Nurses to schedule an outpatient follow up appoint in a few weeks after discharge from hospital.  The appointment is to assess pouching plan, organize supply ordering, etc.   Call the WO office at   [facility]     [phone]    - Supplies- Upon discharge from the hospital you will be sent home with ostomy supplies.      If you have been set up for home care visits the home care nurse will help you coordinate ordering supplies.    If you have not been set up for home care, then make sure to make a follow up appointment with the WO nurse to reassess your abdomen and ostomy for changes and determine the correct plan.  At that time ordering supplies will be discussed.       Pouching    1. Change appliance 2x / wk and as needed for any leakage.  Notify the [facility] Nurse if needing to change pouch more than daily or if skin is itchy.   2. Empty pouch when no more than 1/3 to half full (solid, liquid, gas)  3. May shower with pouch on or off, removing pouch/ barrier down to the skin is ok. Just note that you cannot control output so there may occasionally be clean up if you choose to shower / bathe without a pouch on.     The pouching procedure:   1.  Use the  Pouch Change Instruction  sheet to gather and organize supplies  2.  Trace old pattern onto new pouch and cut out new opening on new barrier.  3.  Remove old pouch, observe for any leakage  4.  Clean skin with water and paper towel   5.  Apply Ring around stoma  6.   Apply prepared barrier to the clean skin and press into place.  7.  Hold hand on pouch/ over stoma to warm pouch into place for 2-5 minutes      Your Pouching Plan / Supplies                      NOTE:  Once your supply company has been determined call your supply company with supply  "issues    POUCH / BARRIER   BRAND of ostomy supplies:  [place]  Supplies: Pouches: 2 pc. Fecal Martha [id number] (will need to order a barrier) PS# [id number], Skin Barriers/Flanges: Flat CTF (52717) PS# [id number], and Accessories: Adapt Ring (7105) PS# [id number]       Follow up    1. Physician - follow instructions from MD for follow ups with them  Contact your physician if you have the following signs or symptoms:  Pain in your incision that is not relieved by a short rest or ordered pain medications  Chills or temperature of 101 or higher  Redness or swelling in your incision    2. WO Nurses (ostomy nurses)        When you get home make an outpatient appointment with the [facility] nurses to assure your pouching plan is still a good plan, etc  [facility] [phone]       Negative pressure wound therapy plan:  Wound location: Sacrococcygeal   Change Days: Tues/ Fri by WOC RN    Supplies (including all accessories) used: medium Black foam , Adaptic/Curity oil emulsion contact layer , Adapt barrier ring, and Cavilon no sting barrier film  Cleanse with Vashe prior to replacing NPWT  Suction setting: -125   Methods used: Window paned all periwound skin with vac drape prior to applying sponge, Bridged trac pad off bony prominences, and Placed barrier ring into periwound creases to improve seal    Staff RN to assess integrity of dressing and ensure suction is set at appropriate level every shift.   Date canister. Chart canister output every shift. Change cannister weekly and PRN if full/occluded     Remove foam dressing and replace with BID normal saline moist gauze dressing if:   -a dressing failure which cannot be repaired within 2 hours   -patient is discharging to home without a home pump   -patient is discharging to a facility outside the local area   -if a dressing is a \"Silver Foam\", remove before Radiation Therapy or MRI     The hospital VAC pump is not to be discharged with the patient.?Ensure to " "disconnect patient from machine prior to discharge. Then,    - If a home KCI VAC pump has been delivered, connect home cannister to dressing tubing then connect cannister to home pump and turn on machine    - If transferring to a nearby facility with a KCI vac, can disconnect and clamp tubing then cover end with glove so can be reconnected within 2 hours       Pressure Injury Prevention (PIP) Plan:  If patient is declining pressure injury prevention interventions: Explore reason why and address patient's concerns, Educate on pressure injury risk and prevention intervention(s), If patient is still declining, document \"informed refusal\" , and Ensure Care team is aware ( provider, charge nurse, etc)  Mattress: Follow bed algorithm, reassess daily and order specialty mattress, if indicated.  HOB: Maintain at or below 30 degrees, unless contraindicated  Repositioning in bed: Every 1-2 hours , Left/right positioning; avoid supine, Raise foot of bed prior to raising head of bed, to reduce patient sliding down (shear), and Frequent microturns using positioning wedges, as patient tolerates  Heels: Keep elevated off mattress, Pillows under calves, and Heel lift boots  Protective Dressing: wound care to sacrum  Positioning Equipment:Positioning wedges (#329557) to help maintain 30 degree side lying position   Chair positioning: Chair cushion (#211544)    If patient has a buttock pressure injury, or high risk for PI use chair cushion or SPS.  Moisture Management: Perineal cleansing /protection: Follow Incontinence Protocol, Avoid brief in bed, Clean and dry skin folds with bathing , Consider InterDry (#184025) between folds, and Moisturize dry skin  Under Devices: Inspect skin under all medical devices during skin inspection , Ensure tubes are stabilized without tension, and Ensure patient is not lying on medical devices or equipment when repositioned  Ask provider to discontinue device when no longer " needed.    _____________________________________________________________________________________________________________________    From your General Surgery Team:  You were seen by [facility] general surgery for your stoma creation.  If you do not have an appointment and would like to would like to schedule a follow up appointment with general surgery in clinic, please call us at [phone] to schedule an appointment at your convenience.  We would encourage making a follow up appointment in person if you notice your stoma darkening in color or you stop passing gas or stool.    Wound care: Your incisions are closed with staples. These should be removed 14 days after your surgery (this will be on 8/8/24, can remove 8/9/24 if wound care only coming on weekdays). It is normal to have a small rim of red present around the incisions. This should not, however, extend beyond 1/4 inch from the incision.  If your incisions become increasingly tender, red, or draining, please contact us or another provider who is caring for you.  To remove staples, wash hands with soap and water. Remove any dressings covering the staple area/incision. You may want to wear protective medical gloves. Open the staple remover kit and hold it so the 2 pronged end is down (single prong up) when looking at the tip. Slide the 2 pronged end just under an individual staple so the bent ends of the staple are on the outside of the prongs. Then squeeze with your fingers so that the staple bends in the middle and the sides release from the skin. Remove staples every other at first, assessing skin edges after and making sure they are well approximated. Then remove remaining staples. If skin edges are just slightly peeling apart, can place steri strips in a perpendicular fashion to the incision. Steri strips can be removed 2 days after this.    Bathing: You may shower after 24 hours from surgery.  It is ok to get your incisions wet, but avoid  rubbing them.  Avoid soaking in bath tubs, or swimming in lakes, pools, or streams for 2 weeks following surgery.

## 2024-08-14 NOTE — PROCEDURES
PICC line ordered, PICC team will not be available to place PICC line tonight. If the line is needed tonight PICC STAT will need to be called for placement. Of note, the patient is restricted to the right arm (CVA left), the right arm is extremely edematous and would recommend US of the right arm prior to placing PICC line. Pt's cephalic vein was non compressible. PIV right AC was placed.

## 2024-08-14 NOTE — PLAN OF CARE
Problem: Adult Inpatient Plan of Care  Goal: Absence of Hospital-Acquired Illness or Injury  Intervention: Prevent Skin Injury  Recent Flowsheet Documentation  Taken 8/14/2024 0930 by Corey Decker RN  Skin Protection: incontinence pads utilized     Problem: Adult Inpatient Plan of Care  Goal: Optimal Comfort and Wellbeing  Outcome: Progressing     Problem: Skin Injury Risk Increased  Goal: Skin Health and Integrity  Intervention: Optimize Skin Protection  Recent Flowsheet Documentation  Taken 8/14/2024 0930 by Corey Decker RN  Pressure Reduction Techniques: frequent weight shift encouraged  Pressure Reduction Devices: feet on footrest/footstool  Skin Protection: incontinence pads utilized   Goal Outcome Evaluation:       Progressing      SUBJECTIVE:  Pt a/o with intermittent confusion and VSS but slight elevated BP. Wound vac in placed and dressing changed this AM; follow wound orders. Thick liquids and full liquid diet. Colostomy in LUQ with dark drainage; MD aware. G/J tube flushed 30ml each. Turned and repo q2hr; restricited to be supine.    Corey Dotson RN

## 2024-08-14 NOTE — PROGRESS NOTES
"[facility] Nurse Inpatient Assessment     8.13.24 Pt in OR for planned diverting ostomy and sacral PI debridement. [facility] will continue to follow for ostomy teaching and likely continued VAC therapy. See below for last in person [facility] assessment/information.    Ekta Pearson, MSN RN CWOCN  Pager no longer is use, please contact through [employer] group: [facility] [employer] [facility]    Consulted for: Sacral    Summary: NPWT was changed 8/9 and per staff removed 8/10 due to leaking. Patient has had Vashe moistened gauze wet to moist dressings.  Plan is to go to ER for debridement 8/12, and have a diverting colostomy.    Patient History (according to provider note(s):      HPI: Franklyn Sorto is a 88 year old male with h/o T2DM, GERD, HTN, CVA 5/25/24 currently in TCU presenting to the ER for evaluation of fatigue and altered mental status. Patient's family was visiting today and states when they got there \"5 staff members were around the patient and he was not responding\". They called EMS who initially got a BP with 90 systolic, however, repeat on the way over was 65 systolic. Per patient's family, patient had feeding tube placed following his stroke as he was having difficulty swallowing.  Patient has improved a fair amount and is now able to swallow, has not used his feeding tube in over a month.  Patient's family states that they are quite adamant about him only drinking and eating well sitting up but they are nervous that his facility may have given him some fluid while laying down that could have resulted in an aspiration pneumonia. Patient denies any recent fevers. States he has been coughing a bit more, no sputum production.     Vitals reviewed, initial BP upon arrival to the ER was 69/48. Temp 97.5F. SpO2 89% on room air. 2L nasal cannula was placed and patient quickly improved to 98% on room air On exam he is pale but nontoxic-appearing.  He is speaking in full " sentences and is alert. Differential diagnosis includes but not limited to UTI, pyelonephritis, bronchitis, pneumonia, PE, ACS, hypovolemia, cardiogenic shock, septic shock. After initial 500 mL fluid bolus his BP has improved to 87/47. He was given an additional 500 mL and improved to 90/66.        Assessment:      Pressure Injury Location: Negative pressure wound therapy applied to: Sacrum     8/6 8/7 8/9 8/12 8/14  Last photo: 8/14  Wound type: Pressure Injury     Pressure Injury Stage: Unstageable, present on admission   Wound history/plan of care:   Came from TCU    Wound base: 75 % non-granular tissue and adipose tissue, 25 % bone/fascia     Palpation of the wound bed: normal      Drainage: small     Description of drainage: serosanguinous and purulent     Measurements (length x width x depth, in cm) 9  x 6  x  2.4 cm - small amount of fascia over coccyx     Tunneling NA     Undermining all around, 1-2cm  Periwound skin:  discolored      Color: dusky, purple, and red      Temperature: warm  Odor: offensive  Pain: moderate and during dressing change, tender  Pain intervention prior to dressing change: slow and gentle cares   Treatment goal: Drainage control, Infection control/prevention, Increase granulation, Protection, Remove necrotic tissue, and Soften necrotic tissue  STATUS: stable    Surgical date: 8/13   Service following: surgery  Date Negative Pressure Wound Therapy initiated: 8/14   Interventions in place: repositioning, pressure redistribution with device or dressing, moisture/incontinence management, and offloading  Is patient s nutritional status compromised? no   If yes, what interventions are in place? Protein supplements  Reason for initiating vac therapy? Presence of co-morbidities and Need for accelerated granulation tissue  Which?of?the?following?co-morbidities?apply? Diabetes  If diabetic is patient on a  diabetic management program? Yes   Is osteomyelitis present in wound? no   If yes what treatments are in place? N/A        Volume in cannister: 0     Last cannister change date: 8/14 initial       Number of foam pieces removed from a wound (excluding foam for bridge) :  0 GranuFoam Black   Verified this matched the number of foam pieces applied last dressing change: Yes   Number of foam pieces packed into wound (excluding foam for bridge) :  1 GranuFoam Black and 1 Oil emulsion dressing - bridge to L hip  ___________________________________________________________________________________________________________________________________________________________________________________________________________________________________________    Assessment of new end Colostomy:  Diagnosis Pertinent to Stoma:  large sacrococcygeal wound       Surgery Date: 8/14  Surgeon:Garcia       Hospital: [facility]  Pouching system in place on assessment today: [place] one piece   Pouch barrier status: intact  Pouch last changed/wear time: post op pouch intact  Reason for pouch change today: pouch not changed today  Effectiveness of current pouching/ supply plan: Effective  Change made with ostomy management today: No  Pouching system placed today: [place] one piece   Supplies: gathered      8/14    Last photo: 8/14  Stoma location: [facility]  Stoma size:   Stoma appearance: dusky, round, and moist - venous congestion, notified surgery team, they are aware  Mucocutaneous junction:  not visualized (barrier in place)  Peristomal complication(s): none   Output: bowel sweat   Output volume emptied during visit: 0ml  Abdominal assessment: Soft  Surgical site(s): dressing dry and intact  NG still in place? No  Pain: Sharp, Dull ache, and Fullness  Is patient still on a PCA? 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          Treatment Plan:     LLQ Colostomy pouching plan:   Pouching system: ostomy supplies pouches: Martha 57 FECAL  "(095108) ostomy supplies barrier: Martha 57mm SOFT CONVEX (987053)  Accessories used: [facility] ostomy accessories: 2\" Cera Barrier Ring (274618) and Cavilon no sting barrier film (581071)   Frequency of pouch changes: Twice weekly and PRN leakage  WO follow up plan: Twice weekly and As needed   Bedside RN interventions: Change pouch PRN if leaking using the supplies above, Empty pouch when 1/3 to 1/2 full, ensure to clean pouch outlet after emptying to prevent odor, Notify [facility] for ongoing pouch leakage, Document stoma appearance and output volume, color, and consistency every shift, Encourage patient to empty pouch with assist, and Assist patient to measure and record output     Negative pressure wound therapy plan:  Wound location: Sacrococcygeal   Change Days: Tues/ Fri by [facility] RN    Supplies (including all accessories) used: medium Black foam , Adaptic/Curity oil emulsion contact layer , Adapt barrier ring, and Cavilon no sting barrier film  Cleanse with Vashe prior to replacing NPWT  Suction setting: -125   Methods used: Window paned all periwound skin with vac drape prior to applying sponge, Bridged trac pad off bony prominences, and Placed barrier ring into periwound creases to improve seal    Staff RN to assess integrity of dressing and ensure suction is set at appropriate level every shift.   Date canister. Chart canister output every shift. Change cannister weekly and PRN if full/occluded     Remove foam dressing and replace with BID normal saline moist gauze dressing if:   -a dressing failure which cannot be repaired within 2 hours   -patient is discharging to home without a home pump   -patient is discharging to a facility outside the local area   -if a dressing is a \"Silver Foam\", remove before Radiation Therapy or MRI     The hospital VAC pump is not to be discharged with the patient.?Ensure to disconnect patient from machine prior to discharge. Then,    - If a home KCI VAC pump has been delivered, " "connect home cannister to dressing tubing then connect cannister to home pump and turn on machine    - If transferring to a nearby facility with a KCI vac, can disconnect and clamp tubing then cover end with glove so can be reconnected within 2 hours       Pressure Injury Prevention (PIP) Plan:  If patient is declining pressure injury prevention interventions: Explore reason why and address patient's concerns, Educate on pressure injury risk and prevention intervention(s), If patient is still declining, document \"informed refusal\" , and Ensure Care team is aware ( provider, charge nurse, etc)  Mattress: Follow bed algorithm, reassess daily and order specialty mattress, if indicated.  HOB: Maintain at or below 30 degrees, unless contraindicated  Repositioning in bed: Every 1-2 hours , Left/right positioning; avoid supine, Raise foot of bed prior to raising head of bed, to reduce patient sliding down (shear), and Frequent microturns using positioning wedges, as patient tolerates  Heels: Keep elevated off mattress, Pillows under calves, and Heel lift boots  Protective Dressing:  wound care to sacrum  Positioning Equipment:Positioning wedges (#738551) to help maintain 30 degree side lying position   Chair positioning: Chair cushion (#[id number])    If patient has a buttock pressure injury, or high risk for PI use chair cushion or SPS.  Moisture Management: Perineal cleansing /protection: Follow Incontinence Protocol, Avoid brief in bed, Clean and dry skin folds with bathing , Consider InterDry (#838100) between folds, and Moisturize dry skin  Under Devices: Inspect skin under all medical devices during skin inspection , Ensure tubes are stabilized without tension, and Ensure patient is not lying on medical devices or equipment when repositioned  Ask provider to discontinue device when no longer needed.      Orders: Updated    RECOMMEND PRIMARY TEAM ORDER: None, at this time  Education provided: importance of repositioning, " plan of care, and wound progress  Discussed plan of care with: Patient, Nurse, and Physicians Assistant  [facility] nurse follow-up plan: twice weekly  Notify WOC if wound(s) deteriorate.  Nursing to notify the Provider(s) and re-consult the [facility] Nurse if new skin concern.    DATA:     Current support surface: Standard  Low air loss (TORY pump, Isolibrium, Pulsate)  Containment of urine/stool: Incontinence Protocol  BMI: Body mass index is 23.51 kg/m .   Active diet order: Orders Placed This Encounter      Full Liquid Diet (Advanced as tolerated)     Output: I/O last 3 completed shifts:  In: 630 [P.O.:450; NG/GT:180]  Out: 700 [Urine:700]     Labs:   Recent Labs   Lab 08/14/24  0624   HGB 10.1*   INR 2.39*   WBC 16.7*     Pressure injury risk assessment:   Sensory Perception: 2-->very limited  Moisture: 2-->very moist  Activity: 1-->bedfast  Mobility: 2-->very limited  Nutrition: 1-->very poor  Friction and Shear: 1-->problem  Dilan Score: 9    VIDAL Garcia RN CWOCN  Pager no longer in use, please contact through [employer] group: [facility] [employer] [facility]

## 2024-08-15 NOTE — PLAN OF CARE
Goal Outcome Evaluation:      Problem: Comorbidity Management  Goal: Blood Glucose Levels Within Targeted Range  Outcome: Progressing  Intervention: Monitor and Manage Glycemia  Recent Flowsheet Documentation  Taken 8/14/2024 1545 by Morgan Tavares, RN  Medication Review/Management: medications reviewed  Goal: Blood Pressure in Desired Range  Outcome: Progressing  Intervention: Maintain Blood Pressure Management  Recent Flowsheet Documentation  Taken 8/14/2024 1545 by Morgan Tavares, KARON  Medication Review/Management: medications reviewed       Problem: Wound  Goal: Optimal Functional Ability  Outcome: Progressing  Intervention: Optimize Functional Ability  Recent Flowsheet Documentation  Taken 8/14/2024 2100 by Morgan Tavares, RN  Activity Management: activity adjusted per tolerance  Activity Assistance Provided: assistance, 2 people  Taken 8/14/2024 1830 by Morgan Tavares, KARON  Activity Management: activity adjusted per tolerance  Activity Assistance Provided: assistance, 2 people  Taken 8/14/2024 1700 by Morgan Tavares, KARON  Activity Management: activity adjusted per tolerance  Activity Assistance Provided: assistance, 2 people  Goal: Absence of Infection Signs and Symptoms  Outcome: Progressing     Alert and oriented x4. BP low in the 80s. Pt verbalized feeling scared with increase of medical complications since stroke. Active listening and reassurance provided. RUE edematous with tingling, numbness, and tightness. MD paged. Order for PICC line, bolus 1L, US of RUE, and PRN midodrine. Late infusion of bolus d/t PIV went bad. PICC RN got called to ED. PIV placed via US instead. Bolus ran low infusion completed. BP improved, 119/57, after midodrine given. G/J tube patent. Wound vac intact. Javier patent with mirlande urine of 275cc. Fair appetite. Total feed. Assist of 2. Pt repo Q2h. Call light within reach.

## 2024-08-15 NOTE — PROGRESS NOTES
CLINICAL NUTRITION SERVICES - REASSESSMENT NOTE     Nutrition Prescription    RECOMMENDATIONS FOR MDs/PROVIDERS TO ORDER:  None    Malnutrition Status:    Moderate in chronic    Recommendations already ordered by Registered Dietitian (RD):  -Re-order calorie count  - Re order mildly thick liquids and water protocol with full liquid diet per SLP recommendations  -Re-order supplements   -Wt today and 2 x weekly  - Add mvi with minerals and vit C 500 mg daily to help meet RDIs and promote wound healing    Future/Additional Recommendations:  Monitor intake post colostomy procedure, need for supplemental enteral nutrition support  Adjust supplements pending intake, weight, tolerance, acceptance, labs, bg, wound healing       EVALUATION OF THE PROGRESS TOWARD GOALS   Diet:Full Liquid ADAT- advanced yesterday from Clear liquids (pt was on thickened liquids prior to NPO)  1:1 feeding  Supplements: Magic cup tid, ensure enlive tid, expedite cup daily - not being sent since 8/13 d/t ordering error    GJ tube - 30 ml H20 each port q shift for patency    Intake: Fair  Calorie count-   no meal slips saved for 8/14-  3 meals ordered  Ate 100% of breakfast and 75% of supper per flowsheets-estimate intake 931 kcal, 45 g protein over 2 of 3 ordered meals, meeting 50% of estimated kcal, 40% of estimated protein needs.  Lunch ordered 337 kcal, 14 g protein, intake not documented  -Pt potentially met up to 65% of estimated kcal, 55% of estimated protein needs yesterday    NPO/Clear liquid 8/13    Pt ate about 1/2 of magic cup and yogurt at breakfast, 100% of soup. Ensure saved for later    -Pt reports no appetite and forces himself to eat. Pt is interested in eggs and upgrading diet. Abd pain at surgery site-just received dilaudid and becoming sleepy. Son at bedside and encouraging pt intake, supporting pt.    Received 1L NS IVF yesterday       NEW FINDINGS   - I & D and diverting colostomy completed 8/14    ANTHROPOMETRICS  Height:  "175.3 cm (5' 9\")  Most Recent Weight: 72.2 kg (159 lb 2.8 oz)  -8/8/24  Admit wt 72.6 kg (160 lb) 8/5/24.  No significant wt loss found.  See RD note of 8/7/24.    + 3 right arm/wrist/hand edema. +2 left arm/hand/wrist, +2 scrotum, +1 bilateral feet/ankle edema    Dosing Weight: 72.6 kg     ASSESSED NUTRITION NEEDS 8/8/24  Estimated Energy Needs: 5204-5830+ kcals/day (25 - 30+ kcals/kg)  Justification: Increased needs and Wound healing  Estimated Protein Needs: 109-145 grams protein/day (1.5- 2 grams of pro/kg)  Justification: Wound healing  Estimated Fluid Needs: 1815+ mL/day (25+ mL/kg)   Justification: Maintenance        SKIN  Pressure injury -sacral, unstageable.- stable per WOC 8/14    GI  G-J tube placed 5/31/24.  -not currently used.  Last Bm 8/12/24.  Abdomen distended, active bowel sounds, passing flatus  New Diverting colostomy- no OP yet    LABS  Reviewed  Phos 1.4 8/14  (L)  Calcium 7.1 (L), trending down  BG  mg/dl past 24 hours, in good control    MEDICATIONS  Reviewed  Biotene qid, iv abx, ssi, lantus daily, protonix, warfarin, miralax added, pericolace bid added    MALNUTRITION:  % Weight Loss:  None noted  % Intake:  Decreased intake does not meet criteria for malnutrition.  Not clear if po intake is decreased.   Subcutaneous Fat Loss:  Orbital region moderate depletion and Upper arm region moderate depletion-per RD note 8/7/24  Muscle Loss:  Temporal region moderate depletion, Clavicle bone region moderate depletion, Acromion bone region moderate depletion, and Scapular bone region moderate depletion-per RD note 8/7/24  Fluid Retention:  Mild +2-+3 arms/scrotum, +1 bilateral LE    Malnutrition Diagnosis: Moderate malnutrition  In Context of:  Chronic illness or disease    Goals   Wound healing - progressing  Pt to meet >75% nutritional needs   Electrolytes WNL - not met, hypophosphatemia  BG < 180- met      CURRENT NUTRITION DIAGNOSIS  Increased nutrient needs related to healing as evidenced " by wounds    INTERVENTIONS  Implementation  -Re-order calorie count  - Re order mildly thick liquids and water protocol with full liquid diet per SLP recommendations  -Re-order supplements   -Wt today and 2 x weekly  -RN advanced diet after visit    Monitoring/Evaluation  Progress toward goals will be monitored and evaluated per protocol.

## 2024-08-15 NOTE — PLAN OF CARE
Problem: Adult Inpatient Plan of Care  Goal: Optimal Comfort and Wellbeing  Outcome: Progressing  Intervention: Provide Person-Centered Care  Recent Flowsheet Documentation  Taken 8/15/2024 1002 by Michaela Turner RN  Trust Relationship/Rapport: care explained   Goal Outcome Evaluation:    C/o bottom hurting and abdomen hurting from surgery yesterday where colostomy was placed.  Turned and repositioned as well as gave IV dilaudid x2.  Pt stated he was much more comfortable after this.  Arms bilaterally swollen.  Left arm currently in lymphedema wraps.   Colostomy emptied earlier by WOC nurse.  Currently has bloody drainage in bag.

## 2024-08-15 NOTE — PROGRESS NOTES
PALLIATIVE CARE PROGRESS NOTE  [facility]     Patient Name: Franklyn Sorto  Date of Admission: 8/5/2024   Today the patient was seen for: CHART REVIEW     Recommendations & Counseling     GOALS OF CARE:   Life-prolonging with limits  - DNR/DNI  Son Abdoulaye has describes feeling anxious about discharge planning with regards to DME and home care services through a private agency run by Nusrat Sandoval.  Palliative referred to his questions and concerns to our hospital  for support.   Goal is to stabilize following surgery and follow recommended wound care recommendations.  Will continue with all recommended medical treatments to treat infection and promote wound healing.     ADVANCE CARE PLANNING:  No health care directive on file. Per  informed consent policy, next of kin should be involved if patient becomes unable.  Per patient and sonAbdoulaye, legal decision makers are son, Abdoulaye Martinez and friend, Herman Layton.  POLST on file and dated 6/7/2024 - DNR/I,comfort focused treatments and would agree with IV/IM antibiotics.  Code status: No CPR- Do NOT Intubate     MEDICAL MANAGEMENT:   #Pain,acute -sacral s/p D sacral wound and lower abdominal s/p laparoscopic end colostomy  Acetaminophen (Tylenol), Scheduled 975 p.o. every 8 hours (ordered for you)  Concur with hydromorphone IV 0.2-0.4 mg IV every 2 hours as needed.  1 dose of 0.4 mg given in last 24 hours  Concur with oxycodone 5 -10 mg p.o. every 4 hours as needed.  10 mg oxycodone given in last 24 hours.  Patient prefers to avoid opiate related medications.     # Nausea - 2/2 anesthesia  Continue ondansetron 4 mg IV or p.o. every 6 hours as needed  Prochlorperazine 5 mg IV/p.o. every 6 hours as needed     PSYCHOSOCIAL/SPIRITUAL SUPPORT:  Family sonAbdoulaye  Friends friendHerman community: Worship     Palliative Care will follow follow along peripherally.  Please reach out with symptom management needs or other questions. Thank  you for the consult and allowing us to aid in the care of Franklyn Sorto.    These recommendations have been discussed with team.    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  Initial treatment started for sepsis and acute renal failure. 8/13/2024 patient underwent laparoscopic end colostomy and debridement of sacral ulcer. ID consulted and patient being treated with IV meropenum and daptomycin for E coli ESBL bacteremia due to sacral wound. Bedside ID culture revealed MSSA, VRE, EColi and baceroides.     Recent Hospitalizations:  6/20/2024 - 6/26/2024 [facility] admission leukocytosis, UC growong ESBL, coccyx and penile skin wounds  5/25/2024 - 6/7/2024 [facility] hospitalization for ischemic stroke    Overnight events:  Patient experienced an episode of hypotension and received 1 L saline bolus with good response.  Pain responsive to current regimen.    PHYSICAL EXAM: Not performed.    Time 30 minutes spent conducting chart review and communicating with team.

## 2024-08-15 NOTE — PROGRESS NOTES
"[facility] Nurse Inpatient Assessment       Consulted for: Sacral    8/15 visit to teach ostomy cares to son and health care worker that will visit patient after discharge.  VAC intact and sacrum not assessed today.    Patient History (according to provider note(s):      HPI: Franklyn Sorto is a 88 year old male with h/o T2DM, GERD, HTN, CVA 5/25/24 currently in TCU presenting to the ER for evaluation of fatigue and altered mental status. Patient's family was visiting today and states when they got there \"5 staff members were around the patient and he was not responding\". They called EMS who initially got a BP with 90 systolic, however, repeat on the way over was 65 systolic. Per patient's family, patient had feeding tube placed following his stroke as he was having difficulty swallowing.  Patient has improved a fair amount and is now able to swallow, has not used his feeding tube in over a month.  Patient's family states that they are quite adamant about him only drinking and eating well sitting up but they are nervous that his facility may have given him some fluid while laying down that could have resulted in an aspiration pneumonia. Patient denies any recent fevers. States he has been coughing a bit more, no sputum production.     Vitals reviewed, initial BP upon arrival to the ER was 69/48. Temp 97.5F. SpO2 89% on room air. 2L nasal cannula was placed and patient quickly improved to 98% on room air On exam he is pale but nontoxic-appearing.  He is speaking in full sentences and is alert. Differential diagnosis includes but not limited to UTI, pyelonephritis, bronchitis, pneumonia, PE, ACS, hypovolemia, cardiogenic shock, septic shock. After initial 500 mL fluid bolus his BP has improved to 87/47. He was given an additional 500 mL and improved to 90/66.        Assessment:      Sacrum not assessed today 8/15, previous assessment:    Pressure Injury Location: Negative pressure wound " therapy applied to: Sacrum     8/6 8/7 8/9 8/12 8/14  Last photo: 8/14  Wound type: Pressure Injury     Pressure Injury Stage: Unstageable, present on admission   Wound history/plan of care:   Came from TCU    Wound base: 75 % non-granular tissue and adipose tissue, 25 % bone/fascia     Palpation of the wound bed: normal      Drainage: small     Description of drainage: serosanguinous and purulent     Measurements (length x width x depth, in cm) 9  x 6  x  2.4 cm - small amount of fascia over coccyx     Tunneling NA     Undermining all around, 1-2cm  Periwound skin:  discolored      Color: dusky, purple, and red      Temperature: warm  Odor: offensive  Pain: moderate and during dressing change, tender  Pain intervention prior to dressing change: slow and gentle cares   Treatment goal: Drainage control, Infection control/prevention, Increase granulation, Protection, Remove necrotic tissue, and Soften necrotic tissue  STATUS: stable    Surgical date: 8/13   Service following: surgery  Date Negative Pressure Wound Therapy initiated: 8/14   Interventions in place: repositioning, pressure redistribution with device or dressing, moisture/incontinence management, and offloading  Is patient s nutritional status compromised? no   If yes, what interventions are in place? Protein supplements  Reason for initiating vac therapy? Presence of co-morbidities and Need for accelerated granulation tissue  Which?of?the?following?co-morbidities?apply? Diabetes  If diabetic is patient on a diabetic management program? Yes   Is osteomyelitis present in wound? no   If yes what treatments are in place? N/A        Volume in cannister: 0     Last cannister change date: 8/14 initial       Number of foam pieces removed from a wound (excluding foam for bridge) :  0 GranuFoam Black   Verified this matched the number of foam pieces applied last dressing change:  "Yes   Number of foam pieces packed into wound (excluding foam for bridge) :  1 GranuFoam Black and 1 Oil emulsion dressing - bridge to L hip  ___________________________________________________________________________________________________________________________________________________________________________________________________________________________________________    Assessment of new end Colostomy:  Diagnosis Pertinent to Stoma:  large sacrococcygeal wound       Surgery Date: 8/14  Surgeon:Radha       [facility]ospital[facility]  Pouching system in place on assessment today: Martha one piece   Pouch barrier status: intact  Pouch last changed/wear time: post op pouch intact  Reason for pouch change today: ostomy education and initial post-op assessment  Effectiveness of current pouching/ supply plan: Effective  Change made with ostomy management today: Yes  Pouching system placed today: Martha two piece flat  Supplies: gathered      8/14    Last photo: 8/14  Stoma [facility]: [facility]  Stoma size:   Stoma appearance: dusky, round, and moist - venous congestion, notified surgery team, they are aware  Mucocutaneous junction:  intact  Peristomal complication(s): none   Output: stool/blood   Output volume emptied during visit: 50ml  Abdominal assessment: Soft  Surgical site(s): dressing dry and intact  NG still in place? No  Pain: Sharp, Dull ache, and Fullness  Is patient still on a PCA? No    Patient's son arranged assistance at home and she came to review pouch change procedures.  Reviewed emptying and changing pouch, stoma and demi skin care  Patient was lethargic and not participating. [facility] discharge orders entered.          Treatment Plan:     LLQ Colostomy pouching plan:   Pouching system: ostomy supplies pouches: [place] 57 FECAL (474401) ostomy supplies barrier: Martha 57mm FLAT (075888)  Accessories used: [facility] ostomy accessories: 2\" Cera Barrier Ring ([id number]) and Cavilon no sting barrier film " "([id number])   Frequency of pouch changes: Twice weekly and PRN leakage  WOC follow up plan: Twice weekly and As needed   Bedside RN interventions: Change pouch PRN if leaking using the supplies above, Empty pouch when 1/3 to 1/2 full, ensure to clean pouch outlet after emptying to prevent odor, Notify WOC for ongoing pouch leakage, Document stoma appearance and output volume, color, and consistency every shift, Encourage patient to empty pouch with assist, and Assist patient to measure and record output     Negative pressure wound therapy plan:  Wound location: Sacrococcygeal   Change Days: Tues/ Fri by WOC RN    Supplies (including all accessories) used: medium Black foam , Adaptic/Curity oil emulsion contact layer , Adapt barrier ring, and Cavilon no sting barrier film  Cleanse with Vashe prior to replacing NPWT  Suction setting: -125   Methods used: Window paned all periwound skin with vac drape prior to applying sponge, Bridged trac pad off bony prominences, and Placed barrier ring into periwound creases to improve seal    Staff RN to assess integrity of dressing and ensure suction is set at appropriate level every shift.   Date canister. Chart canister output every shift. Change cannister weekly and PRN if full/occluded     Remove foam dressing and replace with BID normal saline moist gauze dressing if:   -a dressing failure which cannot be repaired within 2 hours   -patient is discharging to home without a home pump   -patient is discharging to a facility outside the local area   -if a dressing is a \"Silver Foam\", remove before Radiation Therapy or MRI     The hospital VAC pump is not to be discharged with the patient.?Ensure to disconnect patient from machine prior to discharge. Then,    - If a home KCI VAC pump has been delivered, connect home cannister to dressing tubing then connect cannister to home pump and turn on machine    - If transferring to a nearby facility with a KCI vac, can disconnect and clamp " "tubing then cover end with glove so can be reconnected within 2 hours       Pressure Injury Prevention (PIP) Plan:  If patient is declining pressure injury prevention interventions: Explore reason why and address patient's concerns, Educate on pressure injury risk and prevention intervention(s), If patient is still declining, document \"informed refusal\" , and Ensure Care team is aware ( provider, charge nurse, etc)  Mattress: Follow bed algorithm, reassess daily and order specialty mattress, if indicated.  HOB: Maintain at or below 30 degrees, unless contraindicated  Repositioning in bed: Every 1-2 hours , Left/right positioning; avoid supine, Raise foot of bed prior to raising head of bed, to reduce patient sliding down (shear), and Frequent microturns using positioning wedges, as patient tolerates  Heels: Keep elevated off mattress, Pillows under calves, and Heel lift boots  Protective Dressing:  wound care to sacrum  Positioning Equipment:Positioning wedges (#080478) to help maintain 30 degree side lying position   Chair positioning: Chair cushion (#050801)    If patient has a buttock pressure injury, or high risk for PI use chair cushion or SPS.  Moisture Management: Perineal cleansing /protection: Follow Incontinence Protocol, Avoid brief in bed, Clean and dry skin folds with bathing , Consider InterDry (#145154) between folds, and Moisturize dry skin  Under Devices: Inspect skin under all medical devices during skin inspection , Ensure tubes are stabilized without tension, and Ensure patient is not lying on medical devices or equipment when repositioned  Ask provider to discontinue device when no longer needed.      Orders: Updated    RECOMMEND PRIMARY TEAM ORDER: None, at this time  Education provided: importance of repositioning, plan of care, and wound progress  Discussed plan of care with: Patient, Nurse, and Physicians Assistant  WOC nurse follow-up plan: twice weekly  Notify WOC if wound(s) " deteriorate.  Nursing to notify the Provider(s) and re-consult the [facility] Nurse if new skin concern.    DATA:     Current support surface: Standard  Low air loss (TORY pump, Isolibrium, Pulsate)  Containment of urine/stool: Incontinence Protocol  BMI: Body mass index is 23.51 kg/m .   Active diet order: Orders Placed This Encounter      Easy to Chew Diet (level 7) Mildly Thick (level 2) (Water protocol)     Output: I/O last 3 completed shifts:  In: 1483 [P.O.:300; I.V.:1003; NG/GT:180]  Out: 475 [Urine:475]     Labs:   Recent Labs   Lab 08/15/24  0734   HGB 8.6*   INR 2.88*   WBC 16.4*     Pressure injury risk assessment:   Sensory Perception: 2-->very limited  Moisture: 2-->very moist  Activity: 1-->bedfast  Mobility: 2-->very limited  Nutrition: 1-->very poor  Friction and Shear: 1-->problem  Dilan Score: 9    SUZANNE JeffersonN, RN, PHN, HNB-BC, CWOCN  Pager no longer is use, please contact through [employer] group: [facility]

## 2024-08-15 NOTE — PROGRESS NOTES
"    PICC Line Insertion Procedure Note  Pt. Name: Franklyn Sorto  MRN:        [id number]    Procedure: Insertion of a  single Lumen  4 fr  Bard SOLO (valved) Power PICC, Lot number [id number]    Indications: Antibiotics    Contraindications : H/O CVA with left arm weakness and ICD    Procedure Details   Patient identified with 2 identifiers and \"Time Out\" conducted.  .     Central line insertion bundle followed: hand hygeine performed prior to procedure, site cleansed with cholraprep, hat, mask, sterile gloves,sterile gown worn, patient draped with maximum barrier head to toe drape, sterile field maintained.    Vein was assessed and found to be compressible and of adequate size. Two ml 1% Lidocaine administered sq to the insertion site. A 4 Fr PICC was inserted into the brachial vein of the right arm with ultrasound guidance. One attempt(s) required to access vein.   Catheter threaded without difficulty. Good blood return noted.    Modified Seldinger Technique used for insertion.    The 8 sharps that are included in the PICC kit were accounted for and disposed of in the sharps container prior to the breakdown of the sterile field.     Catheter secured with Statlock, biopatch and Tegaderm dressing applied.    Findings:  Total catheter length  47 cm, with 0 cm exposed. Mid upper arm circumference is 33 cm. Catheter was flushed with 10 cc NS. Patient  tolerated procedure well.    Tip placement verified by 3 CG technology    CLABSI prevention brochure left at bedside.    Patient's primary RN notified PICC is ready for use.    Comments:                      Mireya Brewer, PICC RN    [facility]  [phone]    "

## 2024-08-15 NOTE — PROGRESS NOTES
"General Surgery Progress Note:    Hospital Day # 10    ASSESSMENT:  1. Pressure injury of sacral region, unstageable (H)    2. Community acquired pneumonia of left lower lobe of lung    3. Pressure injury of sacral region, stage 3 (H)    4. Hypotension, unspecified hypotension type    5. Hypoxia    6. History of stroke        Franklyn Sorto is a 88 year old male with stage IV sacral decubitus ulcer status post sacral decubitus ulcer debridement and laparoscopic end colostomy creation on 8/13/2024.  Is reporting persistent left lower quadrant abdominal pain near his stoma site that is improved from yesterday and appears well-controlled.  Ostomy with small volume maroon/brown output and gas with stoma with sloughing clot overall with slow progression. Sacral wound with WV in place without e/o leak. AM labs with stable leukocytosis but with Hgb downtrend (8.6<10.1). He has been hemodynamically stable and AF. Plan for repeat Hgb this afternoon for close monitoring d/t worsening anemia and c/f possible bleed.     Plan:   -Repeat Hgb this afternoon d/t Hgb drop overnight  -Diet as tolerated, currently on full liquid diet (thickened)  -Continue antibiotics per ID  -Pain control  -WOC following for ostomy cares and WV     -Ostomy teaching scheduled for today     -WV change scheduled for Friday (08/16/2024), will evaluate wound upon WV change tomorrow  -Continue mgmt per primary team  -Surgery to continue to follow    SUBJECTIVE:   He is reporting pain is \"better\" yesterday but still localized to left lower quadrant near new colostomy creation.  Denies further localizing pain at this point in time.  Denies fever, chills, nausea or vomiting.  Currently on thickened liquid diet and tolerating.       Patient Vitals for the past 24 hrs:   BP Temp Temp src Pulse Resp SpO2   08/15/24 0751 122/59 98.5  F (36.9  C) Oral 71 18 97 %   08/14/24 2346 114/63 -- -- 85 -- --   08/14/24 2225 119/56 98.4  F (36.9  C) Oral -- 18 -- "   08/14/24 2222 (!) 80/58 -- -- 82 -- --   08/14/24 1951 (!) 82/43 97.7  F (36.5  C) Oral 77 18 96 %   08/14/24 1545 (!) 82/80 -- -- -- -- --   08/14/24 1500 (!) 80/41 -- Axillary 87 18 95 %   08/14/24 1253 116/65 98.6  F (37  C) Oral 89 19 90 %         PHYSICAL EXAM:  General: patient seen resting in bed, no acute distress  Resp: no respiratory distress, breathing comfortably on room air.   Abdomen: soft, non distended with mild LLQ TTP without rebound or guarding. Laparoscopic incisions are CDI. Colostomy with small volume maroon/brown stool and gas. Stoma with sloughing dark clot.   Output by Drain (mL) 08/13/24 0700 - 08/13/24 1459 08/13/24 1500 - 08/13/24 2259 08/13/24 2300 - 08/14/24 0659 08/14/24 0700 - 08/14/24 1459 08/14/24 1500 - 08/14/24 2259 08/14/24 2300 - 08/15/24 0659 08/15/24 0700 - 08/15/24 1009   Requested LDAs do not have output data documented.      Extremities: warm and well perfused    08/14 0700 - 08/15 0659  In: 1483 [P.O.:300; I.V.:1003]  Out: 475 [Urine:475]    No results displayed because visit has over 200 results.           Malu Bermudez PA-C  [facility] General Surgery  [address]  [address]  [place] [address]

## 2024-08-15 NOTE — PROGRESS NOTES
"INFECTIOUS DISEASE FOLLOW UP NOTE    Date: 08/15/2024   CHIEF COMPLAINT:   Chief Complaint   Patient presents with    Altered Mental Status    Hypotension        ASSESSMENT:    E coli ESBL bacteremia: 2/2 sacral ulcer. S/p bedside debridement with surgery. Necrotic tissue noted. Repeat bedside I&D 8/7 small purulence and again necrotic tissue 8/9. Vac currently in place. S/p I+D in OR 8/13 with diverting colostomy. Concern for osteomyelitis -- wound down to bone although bone did not appear unhealthy. Would likely plan 4-6 weeks IV antibiotics. Leukocytosis -- post-op. Monitor.   strep salivarius bacteremia: noted on admission.  Likely 2/2 above  Sacral ulcer s/p bedside debridement, cultures with ecoli, MSSA, bacteroides, VRE. CT with ulceration to periosteal margin.   ARIADNE on CKD  Hx CVA with residual dysphagia: increased risk for aspiration events, on room air. Unclear if actual infection however will be covered with treatment of sacral ulcer  DM2  Upper ext edema -- no DVT but there is superficial thrombophlebitis.     PLAN:  - continue meropenem IV, daptomycin, likely 4-6 weeks  Serial labs      Eriberto Verdugo MD   [facility]  Direct messaging: [email or web address] Paging   ______________________________________________________________________    SUBJECTIVE / INTERVAL HISTORY:  Fatigued. Low appetite, trying to eat some lunch. Pain managed. Tolerating antibiotics.      PICC placed.     Post-op pain. Fatigued.     SH/FH/Habits/PMH reviewed and unchanged.    OBJECTIVE:  /59 (BP Location: Right arm)   Pulse 71   Temp 98.5  F (36.9  C) (Oral)   Resp 18   Ht 1.753 m (5' 9\")   Wt 72.2 kg (159 lb 2.8 oz)   SpO2 97%   BMI 23.51 kg/m       Resp: 18      GEN: No acute distress. Tired.   RESPIRATORY:  clear bilaterally  CV: regular  EXTREMITIES: No edema.  SKIN/HAIR/NAILS: vac in place.   IV:  PICC          Antibiotics:  Meropenem  daptomycin    Pertinent labs:  reviewed  Last Comprehensive " Metabolic Panel:  Sodium   Date Value Ref Range Status   08/15/2024 139 135 - 145 mmol/L Final     Potassium   Date Value Ref Range Status   08/15/2024 4.0 3.4 - 5.3 mmol/L Final   11/29/2019 4.3 3.5 - 5.0 mmol/L Final     Chloride   Date Value Ref Range Status   08/15/2024 107 98 - 107 mmol/L Final   11/29/2019 107 98 - 107 mmol/L Final     Carbon Dioxide (CO2)   Date Value Ref Range Status   08/15/2024 26 22 - 29 mmol/L Final   11/29/2019 26 22 - 31 mmol/L Final     Anion Gap   Date Value Ref Range Status   08/15/2024 6 (L) 7 - 15 mmol/L Final   11/29/2019 7 5 - 18 mmol/L Final     Glucose   Date Value Ref Range Status   11/29/2019 126 (H) 70 - 125 mg/dL Final     GLUCOSE BY METER POCT   Date Value Ref Range Status   08/15/2024 90 70 - 99 mg/dL Final     Urea Nitrogen   Date Value Ref Range Status   08/15/2024 18.7 8.0 - 23.0 mg/dL Final   11/29/2019 10 8 - 28 mg/dL Final     Creatinine   Date Value Ref Range Status   08/15/2024 0.55 (L) 0.67 - 1.17 mg/dL Final     GFR Estimate   Date Value Ref Range Status   08/15/2024 >90 >60 mL/min/1.73m2 Final     Comment:     eGFR calculated using 2021 CKD-EPI equation.   11/29/2019 >60 >60 mL/min/1.73m2 Final     Calcium   Date Value Ref Range Status   08/15/2024 7.1 (L) 8.8 - 10.4 mg/dL Final     Comment:     Reference intervals for this test were updated on 7/16/2024 to reflect our healthy population more accurately. There may be differences in the flagging of prior results with similar values performed with this method. Those prior results can be interpreted in the context of the updated reference intervals.        MICROBIOLOGY DATA:  Personally reviewed.  7-Day Micro Results       Collected Updated Procedure Result Status      08/13/2024 0833 08/15/2024 1315 Anaerobic Bacterial Culture Routine [[id number]]   Wound from Spine, Sacrum    Preliminary result Component Value   Culture No anaerobic organisms isolated after 2 days  [P]                08/13/2024 0833  08/13/2024 1301 Gram Stain [[id number]]   Wound from Spine, Sacrum    Final result Component Value   GS Culture See corresponding culture for results   Gram Stain Result No organisms seen   Gram Stain Result 2+ WBC seen            08/13/2024 0833 08/15/2024 0946 Wound Aerobic Bacterial Culture Routine [[id number]]   (Abnormal)   Wound from Spine, Sacrum    Final result Component Value   Culture 1+ Escherichia coli    Susceptibilities done on previous cultures    2+ Enterococcus faecium VRE    Susceptibilities done on previous cultures                        RADIOLOGY:  Personally Reviewed.  Recent Results (from the past 24 hour(s))   XR Video Swallow with SLP or OT    Narrative    EXAM: XR VIDEO SWALLOW WITH SLP OR OT  LOCATION: [facility]  DATE: 8/6/2024    INDICATION: Difficulty swallowing.  COMPARISON: None.    TECHNIQUE: Routine swallow study with speech pathology using multiple barium thicknesses.    RADIATION DOSE: Total Air Kerma 5.1 mGy    FINDINGS:   Swallow study with Speech Pathology using multiple barium thicknesses. Silent aspiration with thin liquid, including with chin tuck. Aspiration also occurred with thin liquid and smaller drink without chin tuck. No other significant penetration or   aspiration.         Principal Problem:    Sepsis with acute renal failure and tubular necrosis without septic shock (H)  Active Problems:    Benign prostatic hyperplasia    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) (H)    Hyperlipidemia    Hypertension    Long term current use of anticoagulant therapy    S/P cardiac pacemaker procedure    S/P TAVR (transcatheter aortic valve replacement)    Thrombophilia (H24)    Diabetes mellitus type 2, noninsulin dependent (H)    History of CVA (cerebrovascular accident)    Hypoxia    Hypotension, unspecified hypotension type    Community acquired pneumonia of left lower lobe of lung    Pressure injury of sacral region, stage 3 (H)     Leukocytosis, unspecified type  ;

## 2024-08-15 NOTE — PLAN OF CARE
"  Problem: Adult Inpatient Plan of Care  Goal: Plan of Care Review  Description: The Plan of Care Review/Shift note should be completed every shift.  The Outcome Evaluation is a brief statement about your assessment that the patient is improving, declining, or no change.  This information will be displayed automatically on your shift  note.  Outcome: Progressing   Goal Outcome Evaluation:    A&O x4 however occasionally forgets about situation and tends to repeat itself. RA. Reports severe pain on sacrum when movement is involved. PRN IV dilaudid given for relief. Q2 turns. Tolerating the repositions well. BP has been a little on the soft side during the evenings however during the night patient's BP has been stable. No interventions needed during this time. Javier remains intact and draining tea colored urine. Colostomy intact with no output just gas. Stoma is red in color. PEG tube flushed, patent and clamped. U/S of right arm for potential DVT done last night at 12AM. Plan for PICC placement this morning. No new changes noted during this time, care plan is ongoing.     /63   Pulse 85   Temp 98.4  F (36.9  C) (Oral)   Resp 18   Ht 1.753 m (5' 9\")   Wt 72.2 kg (159 lb 2.8 oz)   SpO2 96%   BMI 23.51 kg/m            "

## 2024-08-16 NOTE — PLAN OF CARE
Problem: Adult Inpatient Plan of Care  Goal: Optimal Comfort and Wellbeing  Outcome: Progressing     Problem: Wound  Goal: Optimal Functional Ability  Outcome: Progressing  Intervention: Optimize Functional Ability  Recent Flowsheet Documentation  Taken 8/16/2024 1030 by Redd Sevilla RN  Activity Management: activity adjusted per tolerance  Activity Assistance Provided: assistance, 2 people  Taken 8/16/2024 0815 by Redd Sevilla RN  Activity Management: activity adjusted per tolerance  Activity Assistance Provided: assistance, 2 people     Problem: Adult Inpatient Plan of Care  Goal: Absence of Hospital-Acquired Illness or Injury  Intervention: Identify and Manage Fall Risk  Recent Flowsheet Documentation  Taken 8/16/2024 0815 by Redd Sevilla RN  Safety Promotion/Fall Prevention: nonskid shoes/slippers when out of bed  Intervention: Prevent Skin Injury  Recent Flowsheet Documentation  Taken 8/16/2024 1030 by Redd Sevilla RN  Body Position: (repositioned pillows slightly to offload)   turned   left  Taken 8/16/2024 0815 by Redd Sevilla RN  Body Position: supine  Intervention: Prevent and Manage VTE (Venous Thromboembolism) Risk  Recent Flowsheet Documentation  Taken 8/16/2024 0815 by Redd Sevilla RN  VTE Prevention/Management: SCDs on (sequential compression devices)  Intervention: Prevent Infection  Recent Flowsheet Documentation  Taken 8/16/2024 0815 by Redd Sevilla RN  Infection Prevention:   hand hygiene promoted   rest/sleep promoted   single patient room provided     Problem: Risk for Delirium  Goal: Optimal Coping  Intervention: Optimize Psychosocial Adjustment to Delirium  Recent Flowsheet Documentation  Taken 8/16/2024 0815 by Redd Sevilla RN  Supportive Measures:   active listening utilized   verbalization of feelings encouraged  Goal: Improved Behavioral Control  Intervention: Prevent and Manage Agitation  Recent Flowsheet Documentation  Taken 8/16/2024 0815 by Roel  Redd CARTER RN  Environment Familiarity/Consistency: daily routine followed  Intervention: Minimize Safety Risk  Recent Flowsheet Documentation  Taken 8/16/2024 0815 by Redd Sevilla RN  Communication Enhancement Strategies:   call light answered in person   communication board used  Enhanced Safety Measures:   patient/family teach back on injury risk   pain management  Goal: Improved Attention and Thought Clarity  Intervention: Maximize Cognitive Function  Recent Flowsheet Documentation  Taken 8/16/2024 0815 by Redd Sevilla RN  Sensory Stimulation Regulation: quiet environment promoted  Reorientation Measures: clock in view     Problem: Skin Injury Risk Increased  Goal: Skin Health and Integrity  Intervention: Optimize Skin Protection  Recent Flowsheet Documentation  Taken 8/16/2024 1030 by Redd Sevilla RN  Activity Management: activity adjusted per tolerance  Head of Bed (HOB) Positioning: HOB at 20-30 degrees  Taken 8/16/2024 0815 by Redd Sevilla RN  Activity Management: activity adjusted per tolerance  Head of Bed (HOB) Positioning: HOB at 20-30 degrees     Problem: Comorbidity Management  Goal: Blood Glucose Levels Within Targeted Range  Intervention: Monitor and Manage Glycemia  Recent Flowsheet Documentation  Taken 8/16/2024 0815 by Redd Sevilla RN  Medication Review/Management: medications reviewed  Goal: Maintenance of Heart Failure Symptom Control  Intervention: Maintain Heart Failure Management  Recent Flowsheet Documentation  Taken 8/16/2024 0815 by Redd Sevilla RN  Medication Review/Management: medications reviewed  Goal: Blood Pressure in Desired Range  Intervention: Maintain Blood Pressure Management  Recent Flowsheet Documentation  Taken 8/16/2024 0815 by Redd Sevilla RN  Medication Review/Management: medications reviewed     Problem: Fall Injury Risk  Goal: Absence of Fall and Fall-Related Injury  Intervention: Identify and Manage Contributors  Recent Flowsheet  Documentation  Taken 8/16/2024 0815 by Redd Sevilla RN  Medication Review/Management: medications reviewed  Intervention: Promote Injury-Free Environment  Recent Flowsheet Documentation  Taken 8/16/2024 0815 by Redd Sevilla RN  Safety Promotion/Fall Prevention: nonskid shoes/slippers when out of bed     Problem: Infection  Goal: Absence of Infection Signs and Symptoms  Intervention: Prevent or Manage Infection  Recent Flowsheet Documentation  Taken 8/16/2024 0815 by Redd Sevilla RN  Isolation Precautions: contact precautions maintained     Problem: Wound  Goal: Absence of Infection Signs and Symptoms  Intervention: Prevent or Manage Infection  Recent Flowsheet Documentation  Taken 8/16/2024 0815 by Redd Sevilla RN  Isolation Precautions: contact precautions maintained   Goal Outcome Evaluation:       A&O x 4. Patient on bedrest. Admitted for sepsis with acute renal failure and tubular necrosis. PICC access right lower forearm. Colostomy and cutler bag in place. Cutler output 775 ml per shift. No acute changes this shift. Patient calls for pain medications accordingly and takes 0.2mg or 0.4mg Dilaudid available every two hours. Patient's pain reduced to meet pain goal. Bed in low position, call light within reach. Non-slip footwear in use. Patient calls appropriately.   Visit Vitals  /58 (BP Location: Right arm) Comment (BP Location): wrist   Pulse 77   Temp 98.2  F (36.8  C) (Oral)   Resp 21

## 2024-08-16 NOTE — PROGRESS NOTES
General Surgery Progress Note:    Hospital Day # 11    ASSESSMENT:  1. Pressure injury of sacral region, unstageable (H)    2. Community acquired pneumonia of left lower lobe of lung    3. Pressure injury of sacral region, stage 3 (H)    4. Hypotension, unspecified hypotension type    5. Hypoxia    6. History of stroke    7. Colostomy care (H)    8. Pressure injury of sacral region, stage 4 (H) [L89.154]        Franklyn Sorto is a 88 year old male with stage IV sacral decubitus ulcer who is s/p sacral decubitus ulcer debridement and laparoscopic diverting colostomy creation on 8/13/2024. Patient's colostomy remains dusky and productive of flatus and scant bloody stool. Interval CT abdomen/pelvis overnight demonstrated lower quadrant colostomy with moderate colonic stool but no evidence of bowel obstruction and no definite evidence of acute bleeding.  Also noted diffuse body wall edema and likely hematoma in the soft tissue lateral to his left lower quadrant colostomy.  Afebrile and vitals stable this morning. Labs with slight down-trend in leukocytosis (WBC 15.6 < 16.4) and stable hemoglobin (8.5 < 8.7).     PLAN:  - Diet as tolerated from surgical standpoint  - Antibiotics per ID  - Wound vac per WOC and appreciate assistance with ostomy cares  - Monitor colostomy  - Activity as tolerated and PT/OT  - Medical management per primary team  - General surgery will continue to follow    SUBJECTIVE:   He is doing okay. Abdominal pain is controlled this morning. Denies fever, chills, nausea, vomiting. Colostomy remains dusky with some flatus and scant bloody stool in appliance. Patient seen sitting up in bed eating breakfast.      Patient Vitals for the past 24 hrs:   BP Temp Temp src Pulse Resp SpO2 Weight   08/16/24 1212 122/58 98.2  F (36.8  C) Oral 77 21 98 % --   08/16/24 0735 124/53 97.4  F (36.3  C) Oral 72 20 100 % --   08/16/24 0012 125/59 98.1  F (36.7  C) Oral 83 18 100 % --   08/15/24 2245 -- -- -- -- 24 (!)  89 % --   08/15/24 1606 112/49 98.1  F (36.7  C) Oral 73 18 97 % --   08/15/24 1502 -- -- -- -- -- -- 89.2 kg (196 lb 10.4 oz)         PHYSICAL EXAM:  General: patient seen resting in bed, no acute distress  Resp: no respiratory distress, breathing comfortably on room air  Abdomen: Soft, mildly tender to palpation around colostomy. Lap sites clean/dry/intact with overlying Dermabond. LLQ colostomy remains dusky but productive of some flatus and bloody stool. Ecchymosis lateral to the stoma and extends to his flank.  Buttock: Wound vac in place to suction.  Extremities: warm and well perfused. Bilateral lower and upper extremity edema.    08/15 0700 - 08/16 0659  In: 850 [P.O.:400; I.V.:330]  Out: 1500 [Urine:1500]    No results displayed because visit has over 200 results.           Lesley Zarate PA-C  [facility] General Surgery  [facility] [facility]  [address] [place] [id number]

## 2024-08-16 NOTE — PROGRESS NOTES
[facility] Hospitalist Progress Note  [facility]  Admission date: 8/5/2024    Summary:    88M with history of HFrEF, hyperlipidemia, hypertension, aortic stenosis status post TAVR, thrombophilia, type 2 diabetes, stage III sacral ulcer, CAD, prior CVA admitted on 8/5/2024 with altered mental status & hypotension secondary to sepsis possibly from urinary tract infection, aspiration pneumonia and suspected infected, unstageable sacral ulcer.     He was managed briefly at the ICU by instensivist on admission but did not require vasopressor. Hypotension and ARIADNE improved with IV hydration and albumin infusion.  Urine culture grew E. coli.  Blood culture grew ESBL E Coli & Streptococcus salivarius. He is receiving IV meropenem and vancomycin per ID recommendation. Debridement of sacral ulcer was performed by surgery service on 8/6/2024. He was transferred to the medical floor on 8/6/24 given improved BP. He became hypotensive again on 8/7/24, therefore intensivist was re-consulted for possible septic shock and management with vasopressor. On 8/8/2024, he was stable prompting transfer to floor.     Assessment/Plan    8/16:  #Anasarca - start diuresis.  #abdominal pain - suspect this is from L flank abdominal wall hematoma/ecchymosis.  Hb is now stable. Stop subcutaneous heparin as INR is therapeutic.      #Sepsis, resolved   #Infected decubitus ulcer with polymicrobial growth   #Polymicrobial bacteremia with ESBL and Strep salivarius   #UTI  -S/p debridement 8/6/2024 with Cx polymicrobial (E Coli, B Fragilis, Staph Aureus, E faecium)  -Ucx ESBLI E Coli & Strep salivarius  -Unable to obtain MRI secondary to intracardiac lead. CT Pelvis W without any signs of osteo or abscess  -Abx by ID, currently on meropenem (8/7- )  added daptomycin for VRE (8/10- ), vancomycin (8/5-8/8)  -General surgery with plans for wound vac change and debridement for Tuesday 08/13, will hold warfarin tonight and NPO at midnight  -Wound  vac per WOC  -Follow repeat Bcx 8/7/2024 - NGTD  -Midodrine 5mg TID was started during this admission for hypotension, (5mg BID 8/10 and 5mg daily 8/11 and then off)-now 8/14 having hypotension again, added midodrine as needed     #History of aspiration and dysphagia   #Aspiration PNA vs CAP LLL infiltrate but H/O aspiration after CVA   -SLP following, easy to chew level 7 and mildly thick level 2 diet with water protocol  -Video swallow study revealed silent aspiration with thin liquid, including with chin tuck      Recent acute ischemic stroke (5/25/2024)  Residual left Hemiparesis   Recent admission 5/25-6/7/2024 at [facility] for ischemic stroke; discharged to TCU   -No improvement in left hemiparesis   -PT/OT      History of the left upper extremity DVT with thrombophilia (positive antiphospholipid antibodies, 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)  -Coumadin     Diabetes mellitus type 2, noninsulin dependent  -home regimen: jardiance    -inpatient: Lantus 5u, sliding scale insulin     Malnutrition  S/p PEG   Nutrition is following. Calorie count ongoing. Not meeting calorie on 8/9.   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      Rheumatoid arthritis/Chronic Pain -   -Tylenol prn and Voltaren      GERD (gastroesophageal reflux disease)/Hx PUD   -PPI PO qAM      Dispo:  -Ordered DME (Kevin lift, hospital bed, wheelchair)  -Outpatient palliative care consult placed         Checklist:  Code Status: No CPR- Do NOT Intubate    Diet: Snacks/Supplements Adult: Magic Cup; With Meals  Snacks/Supplements Adult: Expedite Cup; With Meals  Room Service  Snacks/Supplements Adult: Ensure Enlive; With Meals  Calorie Counts  Easy to Chew Diet (level 7) Mildly Thick (level  "2) (Water protocol)     DVT px:  on coumadin, stop subcutaneous heparin    Disposition and Discharge Planning    Medically Ready for Discharge: Anticipated in 2-4 Days      System Identified Risk Variables  Auto-populated based on system request - if needs to be addressed in treatment plan they will be addressed above:  \"  Clinically Significant Risk Factors              # Hypoalbuminemia: Lowest albumin = 2.2 g/dL at 8/5/2024 11:55 AM, will monitor as appropriate  # Coagulation Defect: INR = 2.04 (Ref range: 0.85 - 1.15) and/or PTT = 54 Seconds (Ref range: 22 - 38 Seconds), will monitor for bleeding    # Hypertension: Noted on problem list          # DMII: A1C = N/A within past 6 months   # Overweight: Estimated body mass index is 29.04 kg/m  as calculated from the following:    Height as of this encounter: 1.753 m (5' 9\").    Weight as of this encounter: 89.2 kg (196 lb 10.4 oz).   # Moderate Malnutrition: based on nutrition assessment    # Financial/Environmental Concerns:                 \"    Interval Events/Subjective/Notable results:    Noted ON events bleeding from PICC line, L sided abdominal pain and dark stoma output,   Pain controlled with dilaudid    Reviewed: BMP, CBC, CRP, lactate, Bgs, CT A/P without bleeding or obstruction      Objective    Vital signs in last 24 hours  Temp:  [97.4  F (36.3  C)-98.5  F (36.9  C)] 97.4  F (36.3  C)  Pulse:  [71-83] 72  Resp:  [18-24] 20  BP: (112-125)/(49-59) 124/53  SpO2:  [89 %-100 %] 100 % O2 Device: None (Room air)    Weight:   196 lbs 10.41 oz  Body mass index is 29.04 kg/m .    Intake/Output last 3 shifts  I/O last 3 completed shifts:  In: 850 [P.O.:400; I.V.:330; NG/GT:120]  Out: 1500 [Urine:1500]    Physical Exam  General:  Alert, cooperative, no distress, frail, chronically ill appearing    Neurologic:  oriented, facial symmetry preserved, fluent speech.   Psych: calm  HEENT:  Anicteric, MMM  CV: RRR no MRG, normal S1 and S2, anasarca  Lungs: CTAB.  " Easyrespirations  Abd: soft, colostomy dark output.  L flank with ecchymosis and is site of pain  Skin: no rashes noted on exposed skin.    Javier Catheter: PRESENT, indication: Acute retention or obstruction, Acute retention or obstruction;Surgical procedure  Lines: PRESENT      PICC 08/15/24 Single Lumen Right Brachial vein medial IV Antibiotics-Site Assessment: WDL           Medical Decision Making               Tori Storey MD, [facility]  Internal Medicine Hospitalist

## 2024-08-16 NOTE — CONSULTS
"SPIRITUAL HEALTH SERVICES Progress Note  [facility], P1    Saw pt Franklyn Sorto per LOS.    Patient/Family Understanding of Illness and Goals of Care - Abdoulaye relays that he has never been in this bad of shape.    Distress and Loss - Abdoulaye shared that his S.O. would have no place to live due to his having to give up his house. He states that he has lost his dignity through the poor treatment at the previous TCU. \"I wouldn't send my dog there.\"                                          Abdoulaye laments that he didn't die when he had his stroke. Everyone, including him, would be better off.    Strengths, Coping, and Resources - Abdoulaye shared that he is a conservative Rastafari. He does pray..     Meaning, Beliefs, and Spirituality - His theology tends to be pessimistic and fear based. The Bible is not his go to source as much as media. There seems to be a cognitive dissonance between his personal need for healing prayer and his fear for the world.    Plan of Care - Spiritual Care will follow peripherally.    Yoly Leung MDiv  , Spiritual Health Services      Spiritual Health Services is available 24/7 for emergent requests and consults, either by paging the on-call  or by entering an ASAP/STAT consult in [facility], which will also page the on-call .   "

## 2024-08-16 NOTE — PROVIDER NOTIFICATION
8/15/24 2300 Residence paged for stoma looking black, new bruising on lateral left side, difficulty breathing, and pain unresolved.     2315 MD came to see pt. Nothing could be done about stoma and will need to pass this over to the AM team. Heparin dose held. Stat chest x-ray order. Depending on result, a diuretic can be order.

## 2024-08-16 NOTE — PROGRESS NOTES
PALLIATIVE PROGRESS NOTE    Notes reviewed. Plan is to discharge with 24 hour home care.   Continue with IV antibiotics as recommended and sacral wound dressing changes.  Goals are established and symptoms managed - Palliative Care signing off.    SANDRA Figueroa, CNS  [facility], Palliative Care  Securely message with the [facility] Web Console (learn more here) or  Text page via [facility] Paging/Directory

## 2024-08-16 NOTE — PROGRESS NOTES
Therapy: IV ABX (Daptomycin & Meropenem)  Insurance: Self-pay     The patient has all Medicare products, which do not cover IV ABX (Daptomycin & Meropenem) in the home. (Pt would have coverage for short term TCU or IC). Below is what the patient would be responsible for if the patient wanted to go with [facility].  -Drug would go to the Part-D (Prescription Plan)- PT would be responsible for the co-pay per dispense.  -Patient would have to self-pay for the per-mike $97.18 per day.  -If not homebound, nursing would also be self-pay $90.00 per visit.    In reference to hospital admission [facility] on 08/05/2024 and referral from Candice FLORES    Please contact Intake with any questions, [phone] or In Basket pool,  Home Infusion ([phone]).

## 2024-08-16 NOTE — PROVIDER NOTIFICATION
8/15/24 1959 MD paged regarding Bleeding through PICC dressing.     2018 Order to change dressing and apply pressure. Also hold pm heparin dose.

## 2024-08-16 NOTE — PROGRESS NOTES
"BP soft at shift change, IV Albumin administered slowly increasing BP. Patient asymptomatic. Future Coreg held by provider. Pain controlled with IV Dilaudid. Declining oxycodone stating, \"that is too strong for me.\" Wound VAC operating appropriately. G/J tube flushed. Colostomy putting out dark brown/dark red output in small amounts, large amounts of gas noted. Stoma dark/black. Surgery aware and rounded on patient this afternoon.   "

## 2024-08-16 NOTE — PROGRESS NOTES
RN informed cross cover team, patient has been having bleeding around the PICC line dressing  - Recommend applying pressure and changing dressing  - Hold pm dose subcutaneous Heparin  - Monitor Hb AM  - AM team to follow

## 2024-08-16 NOTE — PLAN OF CARE
Goal Outcome Evaluation:      Problem: Comorbidity Management  Goal: Maintenance of Heart Failure Symptom Control  Outcome: Progressing  Intervention: Maintain Heart Failure Management  Recent Flowsheet Documentation  Taken 8/15/2024 1700 by Morgan Tavares, RN  Medication Review/Management: medications reviewed     Problem: Wound  Goal: Optimal Functional Ability  Outcome: Progressing  Intervention: Optimize Functional Ability  Recent Flowsheet Documentation  Taken 8/15/2024 1700 by Morgan Tavares, RN  Activity Management: activity adjusted per tolerance  Activity Assistance Provided: assistance, 2 people  Goal: Absence of Infection Signs and Symptoms  Outcome: Progressing     Javier intact. Urine color improved. Wound vac suctioning. G/J tube flushed.    MD paged re bleeding through PICC dressing. See MD note. Dressing changed. Bleeding stop.    PRN IV dilaudid given for sacral wound pain.     New changes at 2300 post reposition. C/o sharp pain in ABD, mainly on his left side. Upon assessment stoma is black, scant blood in ostomy bag; pt hyperventilating with exp. wheezing, bruising noted on left lateral side, increase edema all over body. 2L NC applied. HOB increased. Residence paged. See provider note.

## 2024-08-16 NOTE — SIGNIFICANT EVENT
Significant Event Note    Time of event: 5:47 AM August 16, 2024    Description of event:  Paged for worsening abdominal pain, decreased and dark stoma output. On exam, diffusely tender but no guarding. Stoma output appears dark/black, which is a change per nursing. Heparin held earlier in the night per hospitalist team due to PICC bleeding. Large area of erythema on the abdomen near the stoma site extending to his left flank that is mildly warmer to touch but not indurated and not more tender than the rest of the abdomen. Currently receiving IV meropenem and daptomycin. Will draw AM labs early in addition to lactate and obtain abdominal CT.     Plan:  - AM labs  - Lactate  - CT abdomen/pelvis    MAXI GOINS MD

## 2024-08-16 NOTE — PROGRESS NOTES
CALORIE COUNT      Approximate Oral Intake for:    8/15  0/3 ordered meals documented. 2 out of 7 supplements documented  Calories: 320  Protein: 14.5        Estimated Needs:    Calories: 7411-8543 +  Protein: 109-145 g      Summary:   Per flow sheets pt ate 75% of lunch and 75% of supper  No other supplement intake documented  Breakfast ticket documented by RN while writer was in the room yesterday but was not placed in calorie count envelope-ticket not found- writer did recall seeing what patient ate. Ensure supplement was saved for later    Estimate intake over 3/3 meals and 3/7 supplements at 727 kcal, 38 g protein with additional 1035 kcal, 59 g protein in supplements unaccounted for    Calorie count incomplete

## 2024-08-16 NOTE — PROGRESS NOTES
"INFECTIOUS DISEASE FOLLOW UP NOTE    Date: 08/16/2024   CHIEF COMPLAINT:   Chief Complaint   Patient presents with    Altered Mental Status    Hypotension        ASSESSMENT:    E coli ESBL bacteremia: 2/2 sacral ulcer. S/p bedside debridement with surgery. Necrotic tissue noted. Repeat bedside I&D 8/7 small purulence and again necrotic tissue 8/9. Vac currently in place. S/p I+D in OR 8/13 with diverting colostomy. Concern for osteomyelitis -- wound down to bone although bone did not appear unhealthy. Would likely plan 4-6 weeks IV antibiotics. Leukocytosis -- post-op. Monitor.   strep salivarius bacteremia: noted on admission.  Likely 2/2 above  Sacral ulcer s/p bedside debridement, cultures with ecoli, MSSA, bacteroides, VRE. CT with ulceration to periosteal margin.   ARIADNE on CKD  Hx CVA with residual dysphagia: increased risk for aspiration events, on room air. Unclear if actual infection however will be covered with treatment of sacral ulcer  DM2  Upper ext edema -- no DVT but there is superficial thrombophlebitis.     PLAN:  - continue meropenem IV, daptomycin, likely 4-6 weeks  Serial labs  Please call over weekend if questions.       Eriberto Verdugo MD   [facility]  Direct messaging: [email or web address] Paging   ______________________________________________________________________    SUBJECTIVE / INTERVAL HISTORY:  CT reviewed. Feels about the same.    Post-op pain. Fatigued.     SH/FH/Habits/PMH reviewed and unchanged.    OBJECTIVE:  BP (!) 89/46 (BP Location: Right arm)   Pulse 81   Temp 98.6  F (37  C) (Oral)   Resp 18   Ht 1.753 m (5' 9\")   Wt 89.2 kg (196 lb 10.4 oz)   SpO2 93%   BMI 29.04 kg/m       Resp: 18      GEN: No acute distress. Tired.   RESPIRATORY:  clear bilaterally  CV: regular  EXTREMITIES: No edema.  SKIN/HAIR/NAILS: vac in place.   IV:  PICC          Antibiotics:  Meropenem  daptomycin    Pertinent labs:  reviewed  Last Comprehensive Metabolic Panel:  Sodium   Date " Value Ref Range Status   08/16/2024 141 135 - 145 mmol/L Final     Potassium   Date Value Ref Range Status   08/16/2024 3.9 3.4 - 5.3 mmol/L Final   11/29/2019 4.3 3.5 - 5.0 mmol/L Final     Chloride   Date Value Ref Range Status   08/16/2024 105 98 - 107 mmol/L Final   11/29/2019 107 98 - 107 mmol/L Final     Carbon Dioxide (CO2)   Date Value Ref Range Status   08/16/2024 28 22 - 29 mmol/L Final   11/29/2019 26 22 - 31 mmol/L Final     Anion Gap   Date Value Ref Range Status   08/16/2024 8 7 - 15 mmol/L Final   11/29/2019 7 5 - 18 mmol/L Final     Glucose   Date Value Ref Range Status   11/29/2019 126 (H) 70 - 125 mg/dL Final     GLUCOSE BY METER POCT   Date Value Ref Range Status   08/16/2024 109 (H) 70 - 99 mg/dL Final     Urea Nitrogen   Date Value Ref Range Status   08/16/2024 19.4 8.0 - 23.0 mg/dL Final   11/29/2019 10 8 - 28 mg/dL Final     Creatinine   Date Value Ref Range Status   08/16/2024 0.52 (L) 0.67 - 1.17 mg/dL Final     GFR Estimate   Date Value Ref Range Status   08/16/2024 >90 >60 mL/min/1.73m2 Final     Comment:     eGFR calculated using 2021 CKD-EPI equation.   11/29/2019 >60 >60 mL/min/1.73m2 Final     Calcium   Date Value Ref Range Status   08/16/2024 7.3 (L) 8.8 - 10.4 mg/dL Final     Comment:     Reference intervals for this test were updated on 7/16/2024 to reflect our healthy population more accurately. There may be differences in the flagging of prior results with similar values performed with this method. Those prior results can be interpreted in the context of the updated reference intervals.        MICROBIOLOGY DATA:  Personally reviewed.  7-Day Micro Results       Collected Updated Procedure Result Status      08/13/2024 0833 08/15/2024 1315 Anaerobic Bacterial Culture Routine [[id number]]   Wound from Spine, Sacrum    Preliminary result Component Value   Culture No anaerobic organisms isolated after 2 days  [P]                08/13/2024 0833 08/13/2024 1301 Gram Stain  [[id number]]   Wound from Spine, Sacrum    Final result Component Value   GS Culture See corresponding culture for results   Gram Stain Result No organisms seen   Gram Stain Result 2+ WBC seen            08/13/2024 0833 08/15/2024 0946 Wound Aerobic Bacterial Culture Routine [[id number]]   (Abnormal)   Wound from Spine, Sacrum    Final result Component Value   Culture 1+ Escherichia coli    Susceptibilities done on previous cultures    2+ Enterococcus faecium VRE    Susceptibilities done on previous cultures                        RADIOLOGY:  Personally Reviewed.  Recent Results (from the past 24 hour(s))   XR Video Swallow with SLP or OT    Narrative    EXAM: XR VIDEO SWALLOW WITH SLP OR OT  LOCATION: [facility]  DATE: 8/6/2024    INDICATION: Difficulty swallowing.  COMPARISON: None.    TECHNIQUE: Routine swallow study with speech pathology using multiple barium thicknesses.    RADIATION DOSE: Total Air Kerma 5.1 mGy    FINDINGS:   Swallow study with Speech Pathology using multiple barium thicknesses. Silent aspiration with thin liquid, including with chin tuck. Aspiration also occurred with thin liquid and smaller drink without chin tuck. No other significant penetration or   aspiration.         Principal Problem:    Sepsis with acute renal failure and tubular necrosis without septic shock (H)  Active Problems:    Benign prostatic hyperplasia    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) (H)    Hyperlipidemia    Hypertension    Long term current use of anticoagulant therapy    S/P cardiac pacemaker procedure    S/P TAVR (transcatheter aortic valve replacement)    Thrombophilia (H24)    Diabetes mellitus type 2, noninsulin dependent (H)    History of CVA (cerebrovascular accident)    Hypoxia    Hypotension, unspecified hypotension type    Community acquired pneumonia of left lower lobe of lung    Pressure injury of sacral region, stage 3 (H)    Leukocytosis, unspecified  type  ;

## 2024-08-16 NOTE — PROGRESS NOTES
Care Management Follow Up    Length of Stay (days): 11    Expected Discharge Date: 08/19/2024     Concerns to be Addressed:  discharge planning     Patient plan of care discussed at interdisciplinary rounds: Yes    Anticipated Discharge Disposition:  home with home care and DME     Anticipated Discharge Services:    Anticipated Discharge DME:      Patient/family educated on Medicare website which has current facility and service quality ratings:    Education Provided on the Discharge Plan:    Patient/Family in Agreement with the Plan:      Referrals Placed by CM/SW:    Private pay costs discussed: Not applicable    Additional Information:  Chart reviewed: plan is for pt to go to [facility] (not an [facility]) [id number] [address] [id number] with 24 hour care from [facility] and per [facility] they are only private pay support and cannot provide skilled nursing home care for this pt. DME equipment needed is Kevin lift, WC, Hospital Bed and orders have been faxed to [facility] by previous CM. Pt's home wound vac has been started in the Critical access hospital portal. Pt will also need IV ABX at discharge.    Per care progression huddle: pt is not medically ready to discharge.     Things pt still needs:  Home care: Skilled nursing for wound/wound vac- [facility] had previously accepted but 8/16 called and said they can no longer accept this pt. CM sent more HC referrals today.  Critical access hospital home wound vac approval- CM went to the Critical access hospital protal to check status and status is pending and needing more documentation so the needed documents were uploaded to the portal.  DME equipment. Needs Kevin lift, WC, Hosp bed, orders were faxed to [facility] by previous CM. 8/16 CM called to check on the status and was told they have not received any orders so CM refaxed these orders.  IV ABX at discharge. Previous CM sent referral to [facility] and they cannot accept due to payer source so CM sent additional referrals for the IV ABX to [facility]  and [facility] for benefit check.    Main family contact is son Abdoulaye  [facility] Sandoval [phone]    Madisyn Doshi RN

## 2024-08-16 NOTE — PROGRESS NOTES
"[facility] Nurse Inpatient Assessment       Consulted for: Sacral    8/15 visit to teach ostomy cares to son and health care worker that will visit patient after discharge.  VAC intact and sacrum not assessed today.    Patient History (according to provider note(s):      HPI: Franklyn Sorto is a 88 year old male with h/o T2DM, GERD, HTN, CVA 5/25/24 currently in TCU presenting to the ER for evaluation of fatigue and altered mental status. Patient's family was visiting today and states when they got there \"5 staff members were around the patient and he was not responding\". They called EMS who initially got a BP with 90 systolic, however, repeat on the way over was 65 systolic. Per patient's family, patient had feeding tube placed following his stroke as he was having difficulty swallowing.  Patient has improved a fair amount and is now able to swallow, has not used his feeding tube in over a month.  Patient's family states that they are quite adamant about him only drinking and eating well sitting up but they are nervous that his facility may have given him some fluid while laying down that could have resulted in an aspiration pneumonia. Patient denies any recent fevers. States he has been coughing a bit more, no sputum production.     Vitals reviewed, initial BP upon arrival to the ER was 69/48. Temp 97.5F. SpO2 89% on room air. 2L nasal cannula was placed and patient quickly improved to 98% on room air On exam he is pale but nontoxic-appearing.  He is speaking in full sentences and is alert. Differential diagnosis includes but not limited to UTI, pyelonephritis, bronchitis, pneumonia, PE, ACS, hypovolemia, cardiogenic shock, septic shock. After initial 500 mL fluid bolus his BP has improved to 87/47. He was given an additional 500 mL and improved to 90/66.        Assessment:      Ostomy not assessed today 8/16: previous assessment below    Pressure Injury Location: Negative pressure " wound therapy applied to: Sacrum     8/6 8/7 8/9 8/12 8/14 8/16    Last photo: 8/16  Wound type: Pressure Injury     Pressure Injury Stage: 4, present on admission   Wound history/plan of care:   Came from TCU    Wound base: 75 % non-granular tissue and adipose tissue, 25 % bone/fascia     Palpation of the wound bed: normal      Drainage: small     Description of drainage: serosanguinous and purulent     Measurements (length x width x depth, in cm) 9  x 6  x  2.4 cm - small amount of fascia over coccyx     Tunneling NA     Undermining all around, 1-2cm  Periwound skin:  discolored      Color: dusky, purple, and red      Temperature: warm  Odor: offensive  Pain: moderate and during dressing change, tender  Pain intervention prior to dressing change: slow and gentle cares   Treatment goal: Drainage control, Infection control/prevention, Increase granulation, Protection, Remove necrotic tissue, and Soften necrotic tissue  STATUS: stable    Surgical date: 8/13   Service following: surgery  Date Negative Pressure Wound Therapy initiated: 8/14   Interventions in place: repositioning, pressure redistribution with device or dressing, moisture/incontinence management, and offloading  Is patient s nutritional status compromised? no   If yes, what interventions are in place? Protein supplements  Reason for initiating vac therapy? Presence of co-morbidities and Need for accelerated granulation tissue  Which?of?the?following?co-morbidities?apply? Diabetes  If diabetic is patient on a diabetic management program? Yes   Is osteomyelitis present in wound? no   If yes what treatments are in place? N/A        Volume in cannister: 0     Last cannister change date: 8/14 initial       Number of foam pieces removed from a wound (excluding foam for bridge) :  1 GranuFoam Black and 1 Oil  emulsion dressing   Verified this matched the number of foam pieces applied last dressing change: Yes   Number of foam pieces packed into wound (excluding foam for bridge) :  1 GranuFoam Black and 1 Oil emulsion dressing - bridge to L hip  ___________________________________________________________________________________________________________________________________________________________________________________________________________________________________________    Assessment of new end Colostomy:  Diagnosis Pertinent to Stoma:  large sacrococcygeal wound       Surgery Date: 8/14  Surgeon:Radha       Hospital: [facility]  Pouching system in place on assessment today: [place] one piece   Pouch barrier status: intact  Pouch last changed/wear time: post op pouch intact  Reason for pouch change today: ostomy education and initial post-op assessment  Effectiveness of current pouching/ supply plan: Effective  Change made with ostomy management today: Yes  Pouching system placed today: Martha two piece flat  Supplies: gathered      8/14    Last photo: 8/14  Stoma location: [facility]  Stoma size:   Stoma appearance: dusky, round, and moist - venous congestion, notified surgery team, they are aware  Mucocutaneous junction:  intact  Peristomal complication(s): none   Output: stool/blood   Output volume emptied during visit: 50ml  Abdominal assessment: Soft  Surgical site(s): dressing dry and intact  NG still in place? No  Pain: Sharp, Dull ache, and Fullness  Is patient still on a PCA? No    Patient's son arranged assistance at home and she came to review pouch change procedures.  Reviewed emptying and changing pouch, stoma and demi skin care  Patient was lethargic and not participating. [facility] discharge orders entered.          Treatment Plan:     LLQ Colostomy pouching plan:   Pouching system: ostomy supplies pouches: [place] 57 FECAL ([id number]) ostomy supplies barrier: [place] 57mm FLAT ([id number])  Accessories  "used: [facility] ostomy accessories: 2\" Cera Barrier Ring ([id number]) and Cavilon no sting barrier film ([id number])   Frequency of pouch changes: Twice weekly and PRN leakage  WO follow up plan: Twice weekly and As needed   Bedside RN interventions: Change pouch PRN if leaking using the supplies above, Empty pouch when 1/3 to 1/2 full, ensure to clean pouch outlet after emptying to prevent odor, Notify [facility] for ongoing pouch leakage, Document stoma appearance and output volume, color, and consistency every shift, Encourage patient to empty pouch with assist, and Assist patient to measure and record output     Negative pressure wound therapy plan:  Wound location: Sacrococcygeal   Change Days: Tues/ Fri by [facility] RN    Supplies (including all accessories) used: medium Black foam , Adaptic/Curity oil emulsion contact layer , Adapt barrier ring, and Cavilon no sting barrier film  Cleanse with Vashe prior to replacing NPWT  Suction setting: -125   Methods used: Window paned all periwound skin with vac drape prior to applying sponge, Bridged trac pad off bony prominences, and Placed barrier ring into periwound creases to improve seal    Staff RN to assess integrity of dressing and ensure suction is set at appropriate level every shift.   Date canister. Chart canister output every shift. Change cannister weekly and PRN if full/occluded     Remove foam dressing and replace with BID normal saline moist gauze dressing if:   -a dressing failure which cannot be repaired within 2 hours   -patient is discharging to home without a home pump   -patient is discharging to a facility outside the local area   -if a dressing is a \"Silver Foam\", remove before Radiation Therapy or MRI     The hospital VAC pump is not to be discharged with the patient.?Ensure to disconnect patient from machine prior to discharge. Then,    - If a home KCI VAC pump has been delivered, connect home cannister to dressing tubing then connect cannister to home pump and turn on " "machine    - If transferring to a nearby facility with a KCI vac, can disconnect and clamp tubing then cover end with glove so can be reconnected within 2 hours       Pressure Injury Prevention (PIP) Plan:  If patient is declining pressure injury prevention interventions: Explore reason why and address patient's concerns, Educate on pressure injury risk and prevention intervention(s), If patient is still declining, document \"informed refusal\" , and Ensure Care team is aware ( provider, charge nurse, etc)  Mattress: Follow bed algorithm, reassess daily and order specialty mattress, if indicated.  HOB: Maintain at or below 30 degrees, unless contraindicated  Repositioning in bed: Every 1-2 hours , Left/right positioning; avoid supine, Raise foot of bed prior to raising head of bed, to reduce patient sliding down (shear), and Frequent microturns using positioning wedges, as patient tolerates  Heels: Keep elevated off mattress, Pillows under calves, and Heel lift boots  Protective Dressing:  wound care to sacrum  Positioning Equipment:Positioning wedges (#[id number]) to help maintain 30 degree side lying position   Chair positioning: Chair cushion (#[id number])    If patient has a buttock pressure injury, or high risk for PI use chair cushion or SPS.  Moisture Management: Perineal cleansing /protection: Follow Incontinence Protocol, Avoid brief in bed, Clean and dry skin folds with bathing , Consider InterDry (#[id number]) between folds, and Moisturize dry skin  Under Devices: Inspect skin under all medical devices during skin inspection , Ensure tubes are stabilized without tension, and Ensure patient is not lying on medical devices or equipment when repositioned  Ask provider to discontinue device when no longer needed.      Orders: Updated    RECOMMEND PRIMARY TEAM ORDER: None, at this time  Education provided: importance of repositioning, plan of care, and wound progress  Discussed plan of care with: Patient, Nurse, and " Physicians Assistant  [facility] nurse follow-up plan: twice weekly  Notify WO if wound(s) deteriorate.  Nursing to notify the Provider(s) and re-consult the [facility] Nurse if new skin concern.    DATA:     Current support surface: Standard  Low air loss (TORY pump, Isolibrium, Pulsate)  Containment of urine/stool: Incontinence Protocol  BMI: Body mass index is 29.04 kg/m .   Active diet order: Orders Placed This Encounter      Easy to Chew Diet (level 7) Mildly Thick (level 2) (Water protocol)     Output: I/O last 3 completed shifts:  In: 850 [P.O.:400; I.V.:330; NG/GT:120]  Out: 1500 [Urine:1500]     Labs:   Recent Labs   Lab 08/16/24  0540   HGB 8.5*   INR 2.04*   WBC 15.6*     Pressure injury risk assessment:   Sensory Perception: 3-->slightly limited  Moisture: 3-->occasionally moist  Activity: 1-->bedfast  Mobility: 2-->very limited  Nutrition: 2-->probably inadequate  Friction and Shear: 1-->problem  Dilan Score: 12    SUZANNE GarciaN RN CWOCN  Pager no longer in use, please contact through Down group: [facility] Vocera Group

## 2024-08-16 NOTE — PROGRESS NOTES
[facility]    Medicine Progress Note - Hospitalist Service    Date of Admission:  8/5/2024    Assessment & Plan   Franklyn Sorto is 88 year old male with history of HFrEF, hyperlipidemia, hypertension, aortic stenosis status post TAVR, thrombophilia, type 2 diabetes, stage III sacral ulcer, CAD, prior CVA admitted on 8/5/2024 with altered mental status & hypotension secondary to sepsis possibly from urinary tract infection, aspiration pneumonia and suspected infected, unstageable sacral ulcer.     He was managed briefly at the ICU by instensivist on admission but did not require vasopressor. Hypotension and ARIADNE improved with IV hydration and albumin infusion.  Urine culture grew E. coli.  Blood culture grew ESBL E Coli & Streptococcus salivarius. He is receiving IV meropenem and vancomycin per ID recommendation. Debridement of sacral ulcer was performed by surgery service on 8/6/2024. He was transferred to the medical floor on 8/6/24 given improved BP. He became hypotensive again on 8/7/24, therefore intensivist was re-consulted for possible septic shock and management with vasopressor. On 8/8/2024, he was stable prompting transfer to floor.      Sepsis, resolved   Infected decubitus ulcer with polymicrobial growth   Polymicrobial bacteremia with ESBL and Strep salivarius   UTI  -S/p debridement 8/6/2024 with Cx polymicrobial (E Coli, B Fragilis, Staph Aureus, E faecium)  -Ucx ESBLI E Coli & Strep salivarius  -Unable to obtain MRI secondary to intracardiac lead. CT Pelvis W without any signs of osteo or abscess  -Abx by ID, currently on meropenem (8/7- )  added daptomycin for VRE (8/10- ), vancomycin (8/5-8/8)  -General surgery with plans for wound vac change and debridement for Tuesday 08/13, will hold warfarin tonight and NPO at midnight  -Wound vac per WOC  -Follow repeat Bcx 8/7/2024 - NGTD  -Midodrine 5mg TID was started during this admission for hypotension, (5mg BID 8/10 and 5mg  daily 8/11 and then off)-now 8/14 having hypotension again, added midodrine as needed     History of aspiration and dysphagia   Aspiration PNA vs CAP LLL infiltrate but H/O aspiration after CVA   -SLP following, easy to chew level 7 and mildly thick level 2 diet with water protocol  -Video swallow study revealed silent aspiration with thin liquid, including with chin tuck   -On broad Abx as above     Recent acute ischemic stroke (5/25/2024)  Residual left Hemiparesis   Recent admission 5/25-6/7/2024 at [facility] for ischemic stroke; discharged to TCU   -No improvement in left hemiparesis   -PT/OT      History of the left upper extremity DVT with thrombophilia (positive antiphospholipid antibodies, 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)  -Pharmacy to dose warfarin with INR goal 2-3 , holding tonight     Diabetes mellitus type 2, noninsulin dependent    -Lantus 5u at bedtime 8/9, sliding scale insulin medium before meals and low at bedtime  -Monitor blood glucose     Malnutrition  S/p PEG   Nutrition is following. Calorie count ongoing. Not meeting calorie on 8/9.   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  EF 55 to 60%.  Abnormal septal motion consistent with ventricular paced rhythm.  Bioprosthetic arctic valve  -Hold home coreg 25mg BID since hypotensive      Rheumatoid arthritis/Chronic Pain -   -Tylenol prn and Voltaren      GERD (gastroesophageal reflux disease)/Hx PUD   -PPI PO qAM     Dispo:  -Ordered DME (Kevin lift, hospital bed, wheelchair)  -Outpatient palliative care consult placed           Diet: Snacks/Supplements Adult: Magic Cup; With Meals  Snacks/Supplements Adult: Expedite  "Cup; With Meals  Room Service  Snacks/Supplements Adult: Ensure Enlive; With Meals  Calorie Counts  Easy to Chew Diet (level 7) Mildly Thick (level 2) (Water protocol)    DVT Prophylaxis: Warfarin  Javier Catheter: PRESENT, indication: Acute retention or obstruction, Surgical procedure  Lines: PRESENT      PICC 08/15/24 Single Lumen Right Brachial vein medial IV Antibiotics-Site Assessment: WDL      Cardiac Monitoring: None  Code Status: No CPR- Do NOT Intubate      Clinically Significant Risk Factors              # Hypoalbuminemia: Lowest albumin = 2.2 g/dL at 8/5/2024 11:55 AM, will monitor as appropriate  # Coagulation Defect: INR = 2.88 (Ref range: 0.85 - 1.15) and/or PTT = 54 Seconds (Ref range: 22 - 38 Seconds), will monitor for bleeding    # Hypertension: Noted on problem list          # DMII: A1C = N/A within past 6 months   # Overweight: Estimated body mass index is 29.04 kg/m  as calculated from the following:    Height as of this encounter: 1.753 m (5' 9\").    Weight as of this encounter: 89.2 kg (196 lb 10.4 oz).   # Moderate Malnutrition: based on nutrition assessment    # Financial/Environmental Concerns:                 Disposition Plan     Medically Ready for Discharge: Anticipated in 2-4 Days             Rosalina Sinclair MD  Hospitalist Service  [facility]  Securely message with [employer] (more info)  Text page via [place] Paging/Directory   ______________________________________________________________________    Interval History   Mr. Sorto is doing better today. His other son present. He seems to be in a better mood today.  Had ostomy teaching today    Physical Exam   Vital Signs: Temp: 98.1  F (36.7  C) Temp src: Oral BP: 112/49 Pulse: 73   Resp: 18 SpO2: 97 % O2 Device: None (Room air)    Weight: 196 lbs 10.41 oz    General Appearance: Awake, alert, in no acute distress  Respiratory: CTAB, no wheeze  Cardiovascular: RRR  GI: Decreased bowel sounds, tender, blood in stool " in colostomy  Skin: no jaundice, no rash      Medical Decision Making       50 MINUTES SPENT BY ME on the date of service doing chart review, history, exam, documentation & further activities per the note.      Data     I have personally reviewed the following data over the past 24 hrs:    16.4 (H)  \   8.7 (L)   / 160     139 107 18.7 /  121 (H)   4.0 26 0.55 (L) \     ALT: 6 AST: N/A AP: N/A TBILI: N/A   ALB: N/A TOT PROTEIN: N/A LIPASE: N/A     INR:  2.88 (H) PTT:  N/A   D-dimer:  N/A Fibrinogen:  N/A       Imaging results reviewed over the past 24 hrs:   Recent Results (from the past 24 hour(s))   US Upper Extremity Venous Duplex Right    Narrative    EXAM: US UPPER EXTREMITY VENOUS DUPLEX RIGHT  LOCATION: [facility]  DATE: 8/15/2024    INDICATION: arm swelling  COMPARISON: None.  TECHNIQUE: Venous Duplex ultrasound of the right upper extremity with (when possible) and without compression, augmentation, and duplex. Color flow and spectral Doppler with waveform analysis performed.    FINDINGS: Ultrasound includes evaluation of the internal jugular vein, innominate vein, subclavian vein, axillary vein, and brachial vein. The superficial cephalic and basilic veins were also evaluated where seen.     RIGHT: No deep venous thrombosis. Superficial thrombus is present within the cephalic vein from mid humerus to distal forearm.      Impression    IMPRESSION:  1.  No deep venous thrombosis in the right upper extremity.  2.  Superficial thrombophlebitis of the right cephalic vein.

## 2024-08-16 NOTE — PLAN OF CARE
"  Problem: Adult Inpatient Plan of Care  Goal: Plan of Care Review  Description: The Plan of Care Review/Shift note should be completed every shift.  The Outcome Evaluation is a brief statement about your assessment that the patient is improving, declining, or no change.  This information will be displayed automatically on your shift  note.  Outcome: Progressing   Goal Outcome Evaluation:    A&O x4. RA. Reported pain in sacrum and then eventually developed pain located around stoma site. Scheduled tylenol given for pain. Javier remains intact, patent and draining well. Tolerating IV abx well. Wound vac remains in place @ 125 continuous.     Beginning of shift patient's stoma was a completely different color, from dark red to black. Bruising was also noted on the LLQ radiating all the way to the left hip. Patient also complained of dyspnea. Notified house officer, CXR was ordered. One time dose of lasix was ordered too, adequate UOP since then.     At around 0400 patient became confused and only oriented to self and time. Continues to report severe pain in abdomen rating the pain a 7/10 on the pain scale. Bruising on the LLQ has also turned a shade darker than it was before. Stoma continues to be black in color with bloody brown drainage. Notified the house officer once more and a CT scan was ordered along with labs.       As of right now no new changes noted, care plan is ongoing.     /53 (BP Location: Right arm)   Pulse 72   Temp 97.4  F (36.3  C) (Oral)   Resp 20   Ht 1.753 m (5' 9\")   Wt 89.2 kg (196 lb 10.4 oz)   SpO2 100%   BMI 29.04 kg/m        "

## 2024-08-17 NOTE — PROGRESS NOTES
General Surgery Progress Note:    Hospital Day # 12    ASSESSMENT:  1. Pressure injury of sacral region, unstageable (H)    2. Community acquired pneumonia of left lower lobe of lung    3. Pressure injury of sacral region, stage 3 (H)    4. Hypotension, unspecified hypotension type    5. Hypoxia    6. History of stroke    7. Colostomy care (H)    8. Pressure injury of sacral region, stage 4 (H) [L89.154]        Franklyn Sorto is a 88 year old male with stage IV sacral decubitus ulcer who is s/p sacral decubitus ulcer debridement and laparoscopic diverting colostomy creation on 8/13/2024. Patient's colostomy remains dusky and productive of flatus and stool. Afebrile and hemodynamically stable. Labs this AM pending.    PLAN:  - Diet as tolerated from surgical standpoint  - Antibiotics per ID  - Wound vac per WOC and appreciate assistance with ostomy cares  - Monitor colostomy  - Activity as tolerated and PT/OT  - Medical management per primary team  - General surgery will continue to follow    SUBJECTIVE:   He is doing okay this morning. Main complaint is buttock pain and requesting repositioning. Abdominal pain controlled. Denies fever, chills, nausea, vomiting. Colostomy remain dusky and productive of flatus and stool this morning, burped bag. Wound vac in place holding suction.      Patient Vitals for the past 24 hrs:   BP Temp Temp src Pulse Resp SpO2   08/17/24 0742 109/55 97.9  F (36.6  C) Oral 81 16 99 %   08/17/24 0026 126/58 98.2  F (36.8  C) Oral 89 16 91 %   08/16/24 2331 94/51 -- -- -- -- --   08/16/24 2206 119/78 -- -- 94 -- 95 %   08/16/24 1731 96/42 -- -- -- -- 99 %   08/16/24 1602 (!) 89/46 98.6  F (37  C) Oral 81 18 93 %   08/16/24 1212 122/58 98.2  F (36.8  C) Oral 77 21 98 %         PHYSICAL EXAM:  General: patient seen resting in bed, no acute distress  Resp: no respiratory distress, breathing comfortably on room air  Abdomen: Soft, non-tender. Lap sites clean/dry/intact with overlying dermabond.  LLQ colostomy dusky and productive of flatus and stool. Stable ecchymosis lateral of stoma and extending to flank. GJ tube in place clamped.   Buttocks: Wound vac in place holding suction.  Extremities: warm and well perfused. Bilateral upper and lower extremity edema.    08/16 0700 - 08/17 0659  In: 1595 [P.O.:1595]  Out: 1200     No results displayed because visit has over 200 results.           Lesley Zarate PA-C  [facility] General Surgery  [facility] [address] [address]

## 2024-08-17 NOTE — PLAN OF CARE
Problem: Comorbidity Management  Goal: Maintenance of Heart Failure Symptom Control  Outcome: Progressing  Intervention: Maintain Heart Failure Management  Recent Flowsheet Documentation  Taken 8/17/2024 0920 by Mireille Feldman RN  Medication Review/Management: medications reviewed     Problem: Infection  Goal: Absence of Infection Signs and Symptoms  Outcome: Progressing     Problem: Wound  Goal: Absence of Infection Signs and Symptoms  Outcome: Progressing   Goal Outcome Evaluation:    Patient alert and oriented. Pain controlled with scheduled Tylenol and PRN oxycodone verbalizes feeling better pain control with using this vs. IV Dilaudid. Eating approximately 75% of meals provided. Continues to be edematous, IV lasix administered with good urinary output. Scrotum and demi area significantly swollen with blister at tip of penis that was not notice upon assessment yesterday, blister is large and intact. Delicate catheter and pericares performed. Also noted rash in groin, order for Maconazole powder obtained and applied. Colostomy having increase in output this shift, Stool is soft and brown, less bloody drainage noted in bag with assessment. Wound vac intact and patent with very little drainage in container. Mepilex dressings on multiple body parts removed and replaced. Turning and repositioning every two hours as patient will allow, continuing to re-educate importance of offloading. Blood pressures WDL this shift. PICC patent and utilized for IV ABX and lab draws.

## 2024-08-17 NOTE — PROGRESS NOTES
[facility] Hospitalist Progress Note  [facility]  Admission date: 8/5/2024    Summary:    88M with history of HFrEF, hyperlipidemia, hypertension, aortic stenosis status post TAVR, thrombophilia, type 2 diabetes, stage III sacral ulcer, CAD, prior CVA admitted on 8/5/2024 with altered mental status & hypotension secondary to sepsis possibly from urinary tract infection, aspiration pneumonia and suspected infected, unstageable sacral ulcer.     He was managed briefly at the ICU by instensivist on admission but did not require vasopressor. Hypotension and ARIADNE improved with IV hydration and albumin infusion.  Urine culture grew E. coli.  Blood culture grew ESBL E Coli & Streptococcus salivarius. He is receiving IV meropenem and vancomycin per ID recommendation. Debridement of sacral ulcer was performed by surgery service on 8/6/2024. He was transferred to the medical floor on 8/6/24 given improved BP. He became hypotensive again on 8/7/24, therefore intensivist was re-consulted for possible septic shock and management with vasopressor. On 8/8/2024, he was stable prompting transfer to floor.     Assessment/Plan    #Anasarca - start diuresis.  Albumin PRN.      #abdominal pain - from L flank abdominal wall hematoma.  Hb is now stable.  Appears stable.    #Sepsis, resolved   #Infected decubitus ulcer with polymicrobial growth   #Polymicrobial bacteremia with ESBL and Strep salivarius   #UTI  -S/p debridement 8/6/2024 with Cx polymicrobial (E Coli, B Fragilis, Staph Aureus, E faecium)  -Ucx ESBLI E Coli & Strep salivarius  -Unable to obtain MRI secondary to intracardiac lead. CT Pelvis W without any signs of osteo or abscess  -Abx by ID, currently on meropenem (8/7- )  added daptomycin for VRE (8/10- ), vancomycin (8/5-8/8)  -Wound vac per WOC  -Midodrine 5mg TID was started during this admission for hypotension, (5mg BID 8/10 and 5mg daily 8/11 and then off)-now 8/14 having hypotension again, added midodrine  as needed  -stoma management per sx    #Acute on chronic pain from above  -tylenol ATC.  Oxycodone 2.5-5mg q4h PRN, dilaudid for breakthrough    #History of aspiration and dysphagia   #Aspiration PNA vs CAP LLL  -SLP following, easy to chew level 7 and mildly thick level 2 diet with water protocol  -Video swallow study revealed silent aspiration with thin liquid, including with chin tuck      #Recent acute ischemic stroke (5/25/2024)  #Residual left Hemiparesis   -Recent admission 5/25-6/7/2024 at [facility] for ischemic stroke; discharged to TCU   -PT/OT      #History of the left upper extremity DVT with thrombophilia (positive antiphospholipid antibodies, 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)  -Coumadin     #Diabetes mellitus type 2, noninsulin dependent  -home regimen: jardiance    -inpatient: Lantus 5u, sliding scale insulin     #Moderate Malnutrition  #S/p PEG   -Nutrition is following. Calorie count ongoing. Not meeting calorie on 8/9.   -If still not meeting calorie needs, consider TF via existing PEG     #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   #H/o HFrEF with EF recovery  #CAD   -History of distal RCA stent, last echo 8/2019 with EF 50-55%  -had hypotension with resumption of low dose coreg, consider low dose toprol-xl as hemodynamics allow.  Currently needs BP for diuresis as quite volume overloaded     #RA     #GERD (gastroesophageal reflux disease)/Hx PUD   -PPI PO qAM      Dispo:  -Ordered DME (Kevin lift, hospital bed, wheelchair)  -Outpatient palliative care consult placed         Checklist:  Code Status: No CPR- Do NOT Intubate    Diet: Snacks/Supplements Adult: Magic Cup; With Meals  Snacks/Supplements Adult: Expedite Cup; With Meals  Room Service  Snacks/Supplements Adult: Ensure Enlive; With Meals  Calorie Counts  Easy to Chew Diet (level 7) Mildly Thick (level 2) (Water protocol)     DVT px:  on coumadin, stop subcutaneous heparin    Disposition and Discharge  "Planning    Medically Ready for Discharge: Anticipated in 2-4 Days      System Identified Risk Variables  Auto-populated based on system request - if needs to be addressed in treatment plan they will be addressed above:  \"  Clinically Significant Risk Factors              # Hypoalbuminemia: Lowest albumin = 2.2 g/dL at 8/5/2024 11:55 AM, will monitor as appropriate    # Coagulation Defect: INR = 1.64 (Ref range: 0.85 - 1.15) and/or PTT = 54 Seconds (Ref range: 22 - 38 Seconds), will monitor for bleeding    # Hypertension: Noted on problem list          # DMII: A1C = N/A within past 6 months   # Overweight: Estimated body mass index is 29.04 kg/m  as calculated from the following:    Height as of this encounter: 1.753 m (5' 9\").    Weight as of this encounter: 89.2 kg (196 lb 10.4 oz).   # Moderate Malnutrition: based on nutrition assessment      # Financial/Environmental Concerns:                 \"    Interval Events/Subjective/Notable results:    Asking if he is healing and what he can do to help.  We discussed his role in trying to taking adequate PO.  Taking frequent IV dilaudid for back pain, reported to RN that oxycodone was too strong (only had a dose of 10mg a few days ago).  Asymptomatic mild hypotension yesterday with re-initiation of coreg and diuresis    Reviewed: BMP, CBC, INR, surgery note      Objective    Vital signs in last 24 hours  Temp:  [98.2  F (36.8  C)-98.6  F (37  C)] 98.2  F (36.8  C)  Pulse:  [77-94] 89  Resp:  [16-21] 16  BP: ()/(42-78) 126/58  SpO2:  [91 %-99 %] 91 % O2 Device: None (Room air)    Weight:   196 lbs 10.41 oz  Body mass index is 29.04 kg/m .    Intake/Output last 3 shifts  I/O last 3 completed shifts:  In: 1595 [P.O.:1595]  Out: 1200 [Emesis/NG output:1200]    Physical Exam  General:  Alert, cooperative, no distress, frail, chronically ill appearing    Neurologic:  oriented, facial symmetry preserved, fluent speech.   Psych: calm  HEENT:  Anicteric, MMM  CV: RRR no " MRG, normal S1 and S2, anasarca  Lungs: CTAB.  Easyrespirations  Abd: soft, colostomy dark output.  L flank with ecchymosis and is site of pain  Skin: no rashes noted on exposed skin.    Javier Catheter: PRESENT, indication: Acute retention or obstruction, Acute retention or obstruction  Lines: PRESENT      PICC 08/15/24 Single Lumen Right Brachial vein medial IV Antibiotics-Site Assessment: WDL           Medical Decision Making               Tori Storey MD, [facility]  Internal Medicine Hospitalist

## 2024-08-17 NOTE — PROVIDER NOTIFICATION
Noted patient starting to get rash in bilateral groin near scrotum. Order obtained for Miconazole powder.

## 2024-08-17 NOTE — PROGRESS NOTES
MD asked about pts possible qualification for LTACH due to previous ICU stay and medical complexity. SW sent referral for review.    KANDI Michaud on 8/17/2024 at 2:48 PM

## 2024-08-17 NOTE — PLAN OF CARE
"  Problem: Adult Inpatient Plan of Care  Goal: Patient-Specific Goal (Individualized)  Description: You can add care plan individualizations to a care plan. Examples of Individualization might be:  \"Parent requests to be called daily at 9am for status\", \"I have a hard time hearing out of my right ear\", or \"Do not touch me to wake me up as it startles  me\".  Outcome: Progressing  Flowsheets (Taken 8/17/2024 0412)  Patient/Family-Specific Goals (Include Timeframe): Pain control, Maintain safety- no falls, Discharge soon, Sleep >50% of night shift.   Goal Outcome Evaluation:    Pain was well controlled with PRN x 2 during night shift.  Safety was maintained with hourly rounding, standard fall precautions, and bed alarm use. Pt remained compliant about calling for assistance.  Pt slept approx. 60% of night shift.  Discharge planning in progress.  "

## 2024-08-17 NOTE — PROGRESS NOTES
CALORIE COUNT      Approximate Oral Intake for:    8/16  3/3 ordered meals documented.  Unsure if pt took supplements at breakfast. Documented well after this point in the day.    Calories: 1010, may have been more if supplements taken at breakfast  Protein: 58 gm, also may have been more    8/17 Breakfast ate 100% of menu items: 123 calories, 3 gm protein, and 1/2 magic cup - keeping magic cup and Ensure to keep working on.      Estimated Needs:    Calories: 8496-6572 +  Protein: 109-145 g      Summary:   Diet: Easy to Chew, Mildly Thick Liquid  Ensure and Magic Cup with each meal, Expedite Cup at lunch    Pt is ordering close to the same items each meal: 4 ounce tomato soup, 4 ounce potato soup, occasional greek yogurt, flavored water.  Supplements are providing ~60% of calories and protein.  Pt's is doing  pretty well with his intake.  He understands he should be eating and drinking for healing.    Menu item intake: 75% breakfast, 100% lunch and dinner  Supplements, unknown at breakfast; 75% Ensure, 100% of magic cup and 100% expedite cup at lunch; 50% magic cup at dinner

## 2024-08-18 NOTE — PROGRESS NOTES
General Surgery Progress Note    Subjective:   Denies any complaints this morning    Vitals:    08/17/24 1500 08/18/24 0029 08/18/24 0259 08/18/24 0742   BP: 133/60 122/51 108/56 129/67   BP Location: Right arm Right arm  Right arm   Pulse: 73 78 70 72   Resp: 18 16 18   Temp: 98.6  F (37  C) 98.4  F (36.9  C)  98.6  F (37  C)   TempSrc: Oral Oral  Oral   SpO2: 96% 96%  99%   Weight:       Height:           08/17 0700 - 08/18 0659  In: 1310.5 [P.O.:1250.5]  Out: 3700 [Urine:3700]    Physical Exam:  General: NAD, pleasant  ABD: Stoma dark but a large amount of stool is within ostomy bag    Assessment:   88-year-old debilitated gentleman with a large sacral pressure ulcer.  Underwent surgical debridement and diverting ostomy on August 13.    Plan:   Continue to monitor ostomy  Change ostomy appliance as needed  Continue with local wound care and offloading    Rosalina Anderson DO  General Surgeon  [facility]  Surgery [address]  [address]  [place] [address]  Office: [phone]  Employed by - [facility]

## 2024-08-18 NOTE — PROGRESS NOTES
[facility] Hospitalist Progress Note  [facility]  Admission date: 8/5/2024    Summary:    88M with history of HFrEF, hyperlipidemia, hypertension, aortic stenosis status post TAVR, thrombophilia, type 2 diabetes, stage III sacral ulcer, CAD, prior CVA admitted on 8/5/2024 with altered mental status & hypotension secondary to sepsis possibly from urinary tract infection, aspiration pneumonia and suspected infected, unstageable sacral ulcer.     He was managed briefly at the ICU by instensivist on admission but did not require vasopressor. Hypotension and ARIADNE improved with IV hydration and albumin infusion.  Urine culture grew E. coli.  Blood culture grew ESBL E Coli & Streptococcus salivarius. He is receiving IV meropenem and vancomycin per ID recommendation. Debridement of sacral ulcer was performed by surgery service on 8/6/2024. He was transferred to the medical floor on 8/6/24 given improved BP. He became hypotensive again on 8/7/24, therefore intensivist was re-consulted for possible septic shock and management with vasopressor. On 8/8/2024, he was stable prompting transfer to floor.     Assessment/Plan    #Anasarca - continue diuresis.  Albumin PRN.      #abdominal pain - from L flank abdominal wall hematoma.  Hb and appearance are stable.     #Sepsis, resolved   #Infected decubitus ulcer with polymicrobial growth   #Polymicrobial bacteremia with ESBL and Strep salivarius   #UTI  -S/p debridement 8/6/2024 with Cx polymicrobial (E Coli, B Fragilis, Staph Aureus, E faecium)  -Ucx ESBLI E Coli & Strep salivarius  -Unable to obtain MRI secondary to intracardiac lead. CT Pelvis W without any signs of osteo or abscess  -Abx by ID, currently on meropenem (8/7- )  added daptomycin for VRE (8/10- ), vancomycin (8/5-8/8)  -Wound vac per WOC  -Midodrine 5mg TID was started during this admission for hypotension, (5mg BID 8/10 and 5mg daily 8/11 and then off)-now 8/14 having hypotension again, added midodrine  as needed  -stoma management per sx    #Acute on chronic pain from above  -tylenol ATC.  Oxycodone 2.5-5mg q4h PRN, dilaudid for breakthrough    #History of aspiration and dysphagia   #Aspiration PNA vs CAP LLL  -SLP following, easy to chew level 7 and mildly thick level 2 diet with water protocol  -Video swallow study revealed silent aspiration with thin liquid, including with chin tuck      #Recent acute ischemic stroke (5/25/2024)  #Residual left Hemiparesis   -Recent admission 5/25-6/7/2024 at [facility] for ischemic stroke; discharged to TCU   -PT/OT      #History of the left upper extremity DVT with thrombophilia (positive antiphospholipid antibodies, 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)  -Coumadin     #Diabetes mellitus type 2, noninsulin dependent  -home regimen: jardiance    -inpatient: Lantus 5u, sliding scale insulin     #Moderate Malnutrition  #S/p PEG   -Nutrition is following. Calorie count ongoing.   -If still not meeting calorie needs, consider TF via existing PEG.     #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   #H/o HFrEF with EF recovery  #CAD   -History of distal RCA stent, last echo 8/2019 with EF 50-55%  -had hypotension with resumption of low dose coreg.  Start low dose toprol-xl today instead.     #RA     #GERD (gastroesophageal reflux disease)/Hx PUD   -PPI PO qAM      Dispo:  -Ordered DME (Kevin lift, hospital bed, wheelchair)  -Outpatient palliative care consult placed         Checklist:  Code Status: No CPR- Do NOT Intubate    Diet: Snacks/Supplements Adult: Magic Cup; With Meals  Snacks/Supplements Adult: Expedite Cup; With Meals  Room Service  Calorie Counts  Easy to Chew Diet (level 7) Mildly Thick (level 2) (Water protocol)     DVT px:  on coumadin, stopped subcutaneous heparin when INR was therapeutic (consider resuming if remains subtherapeutic for awhile)    Disposition and Discharge Planning    Medically Ready for Discharge: Anticipated in 2-4  "Days    ?LTACH candidate      System Identified Risk Variables  Auto-populated based on system request - if needs to be addressed in treatment plan they will be addressed above:  \"  Clinically Significant Risk Factors              # Hypoalbuminemia: Lowest albumin = 2.2 g/dL at 8/5/2024 11:55 AM, will monitor as appropriate       # Hypertension: Noted on problem list          # DMII: A1C = N/A within past 6 months   # Overweight: Estimated body mass index is 29.04 kg/m  as calculated from the following:    Height as of this encounter: 1.753 m (5' 9\").    Weight as of this encounter: 89.2 kg (196 lb 10.4 oz).   # Moderate Malnutrition: based on nutrition assessment      # Financial/Environmental Concerns:                 \"    Interval Events/Subjective/Notable results:    -2.3L.  No new complaints  Caloric intake improving    Reviewed: BMP, CBC, BGs, INR, surgery note      Objective    Vital signs in last 24 hours  Temp:  [98.4  F (36.9  C)-99  F (37.2  C)] 98.6  F (37  C)  Pulse:  [70-80] 72  Resp:  [16-18] 18  BP: (105-133)/(51-72) 129/67  SpO2:  [96 %-99 %] 99 % O2 Device: None (Room air)    Weight:   196 lbs 10.41 oz  Body mass index is 29.04 kg/m .    Intake/Output last 3 shifts  I/O last 3 completed shifts:  In: 1310.5 [P.O.:1250.5; NG/GT:60]  Out: 3700 [Urine:3700]    Physical Exam  General:  Alert, cooperative, no distress, frail, chronically ill appearing    Neurologic:  oriented, facial symmetry preserved, fluent speech.   Psych: calm  HEENT:  Anicteric, MMM  CV: RRR no MRG, normal S1 and S2, anasarca  Lungs: CTAB.  Easyrespirations  Abd: soft, colostomy dark output.  L flank with ecchymosis stable  Skin: no rashes noted on exposed skin.    Javier Catheter: PRESENT, indication: Acute retention or obstruction, Acute retention or obstruction  Lines: PRESENT      PICC 08/15/24 Single Lumen Right Brachial vein medial IV Antibiotics-Site Assessment: WDL           Medical Decision Making               Orred " MD [place], [facility]  Internal Medicine Hospitalist

## 2024-08-18 NOTE — PROGRESS NOTES
CALORIE COUNT      Approximate Oral Intake for:    8/17/24  3/3 ordered meals documented.  Pt tired of Ensure, but, likes magic cup.    Calories: 1301  Protein: 52 gm    Estimated Needs:    Calories: 8666-3214 +  Protein: 109-145 g      Summary:   Diet: Easy to Chew, Mildly Thick Liquid  Magic Cup with each meal, Expedite Cup at lunch    Intake met ~70 % of minimum calorie needs, and 48% of minimum protein needs    Pt is ordering close to the same items each meal: 4 ounce tomato soup, 4 ounce potato soup, occasional greek yogurt, added a cottage cheese to one meal, flavored water.  Supplements provided ~70% of calories and protein.      Menu item intake: % menu items  Supplements: Eating most of 3 magic cups daily and 1 Expedite cup    Continue to encourage ordering foods with calories and protein like yogurt and cottage cheese, Ensure.

## 2024-08-18 NOTE — PLAN OF CARE
"  Problem: Adult Inpatient Plan of Care  Goal: Patient-Specific Goal (Individualized)  Description: You can add care plan individualizations to a care plan. Examples of Individualization might be:  \"Parent requests to be called daily at 9am for status\", \"I have a hard time hearing out of my right ear\", or \"Do not touch me to wake me up as it startles  me\".  Flowsheets (Taken 8/18/2024 2655)  Patient/Family-Specific Goals (Include Timeframe): Pain control, Maintain safety- no falls, Discharge soon, Sleep >50% of night shift.   Goal Outcome Evaluation:    Pain was well controlled with PRN x 2 during night shift.  Safety was maintained with hourly rounding, standard fall precautions, and bed alarm use. Pt remained compliant about calling for assistance.  Pt slept approx. 50% of night shift with minimal interruptions.  Discharge planning in progress.  "

## 2024-08-19 NOTE — PLAN OF CARE
Goal Outcome Evaluation:      Plan of Care Reviewed With: patient    Overall Patient Progress: no changeOverall Patient Progress: no change    Outcome Evaluation: Pt has remained stable today.  He was turned several times.  He is more uncomfortable on his left side.  Voltaren and Oxy given for pain with relief.  Pt has swelling in his right arm below the PICC.  MD is aware and US was negative for DVT.  Elevated arm on a pillow.  There is drainage from the PICC site, but it is from the swelling.  PICC flushes well.  Pt is having stress with his significant other and his home, but his son is helping to take care of it.  The patient just feels helpless. Wound vac remained intact.

## 2024-08-19 NOTE — PLAN OF CARE
Occupational Therapy Discharge Summary    Reason for therapy discharge:    Discharge from OT after reevalution. Defer to PT for skilled therapy     Edith GUZMAN, OTR/L, CLT 8/19/2024 , 2:44 PM

## 2024-08-19 NOTE — PLAN OF CARE
"  Problem: Adult Inpatient Plan of Care  Goal: Patient-Specific Goal (Individualized)  Description: You can add care plan individualizations to a care plan. Examples of Individualization might be:  \"Parent requests to be called daily at 9am for status\", \"I have a hard time hearing out of my right ear\", or \"Do not touch me to wake me up as it startles  me\".  Outcome: Progressing  Flowsheets (Taken 8/19/2024 0443)  Patient/Family-Specific Goals (Include Timeframe): Pain control, Maintain safety- no falls, Discharge soon, Sleep >50% of night shift.   Goal Outcome Evaluation:    Pain was well controlled with PRN x 1 during night shift.  Safety was maintained with hourly rounding, standard fall precautions, and bed alarm use. Pt remained compliant about calling for assistance.  Pt slept approx. 50% of night shift with minimal interruptions.  Discharge planning in progress.  "

## 2024-08-19 NOTE — PROGRESS NOTES
CALORIE COUNT      Approximate Oral Intake for:   8/18/24   Calories: 1083   Protein: 44 g      Estimated Needs:    Calories: 5111-2449+  Protein: 109-145 g      Summary:    Met with pt at bedside this AM. Pt reports consuming at least 50% of magic cup at meals, expedite cup at times. Pt did not expedite cup 8/18.     Pt meeting 59% estimated calorie needs and 40% estimated protein needs 8/18.     Encouraged nutrient dense foods, adequate calories and protein to meet nutrition needs. Pt agreeable trial 4 oz. Strawberry ensure enlive BID.

## 2024-08-19 NOTE — PROGRESS NOTES
General Surgery Progress Note:    Hospital Day # 14    ASSESSMENT:  1. Pressure injury of sacral region, unstageable (H)    2. Community acquired pneumonia of left lower lobe of lung    3. Pressure injury of sacral region, stage 3 (H)    4. Hypotension, unspecified hypotension type    5. Hypoxia    6. History of stroke    7. Colostomy care (H)    8. Pressure injury of sacral region, stage 4 (H) [L89.154]        Franklyn Sorto is a 88 year old male here with stage IV infected sacral decubitus ulcer who is s/p wound debridement and laparoscopic diverting colostomy on 8/13/2024.  Stoma continues to appear dusky though productive of gas and stool, wound VAC continues to question hold suction.  Patient is afebrile and vitally stable downtrending leukocytosis, INR at 1.42 without evidence of bleeding through the stoma however complicated due to pink tinge stools from consuming red foods.    PLAN:  -Diet as tolerated per surgical perspective  -Appreciate nutrition  -Antibiotics per ID  -No further surgical intervention planned, surgery will follow with WOC for wound VAC changes, per note Tuesday Thursday  -Medical management per primary team    SUBJECTIVE:   Franklyn Sorto was seen on rounds.  Per patient states he is still doing a full liquid diet that they have him on a new liquid water protocol.  He is eating mostly soft things and protein Jell-O's, soups.  Per family member is not eating much else.  He is having some anxiety about the lift today and about his condition.  Though he denies any new abdominal pain or severe abdominal pain.  Denies nausea, vomiting, fever, chills.  He is happy that his ostomy is working and is preventing infections from lumbar spine wound.      Patient Vitals for the past 24 hrs:   BP Temp Temp src Pulse Resp SpO2 Weight   08/19/24 0923 -- -- -- -- -- -- 77.6 kg (171 lb 1.2 oz)   08/19/24 0736 116/57 96.9  F (36.1  C) Axillary 78 18 97 % --   08/19/24 0333 115/58 -- -- 63 -- -- --    08/19/24 0031 (!) 140/65 98.3  F (36.8  C) Oral 74 16 95 % --   08/18/24 1605 112/74 98.3  F (36.8  C) Oral 77 20 98 % --   08/18/24 1143 122/58 98.4  F (36.9  C) Oral 74 18 98 % --         PHYSICAL EXAM:  General: patient seen resting in bed in no acute distress, chronically ill appearing  Resp: no increased work of breathing, breathing comfortably on room air  Abdomen: Generally soft and nondistended, some mild tenderness with palpation over the right side and over the stoma.  Stoma appears with bag inflated to the point of bursting with gas and with stool that is lighter brown in color with tinge of red/pink.  Malodorous gas production when bag is burped.  Stoma appears dusky in nature though viable and without overt tenderness upon palpation or manipulation.  Appliance is clean, dry, intact.  Drains: G tube is intact and skin without erythema or infectious appearance  Extremities: No edema or cyanosis visualized on exam, no obvious deformities    08/18 0700 - 08/19 0659  In: 620 [P.O.:620]  Out: 4400 [Urine:4400]    No results displayed because visit has over 200 results.           Helga Veliz PA-C  [facility] General Surgery  [address] [place] [id number]  [facility] [phone]

## 2024-08-19 NOTE — PLAN OF CARE
Problem: Skin Injury Risk Increased  Goal: Skin Health and Integrity  Outcome: Progressing  Intervention: Plan: Nurse Driven Intervention: Moisture Management  Recent Flowsheet Documentation  Taken 8/18/2024 0837 by Mireille Feldman RN  Moisture Interventions:   Incontinence pad   Urinary collection device  Intervention: Plan: Nurse Driven Intervention: Friction and Shear  Recent Flowsheet Documentation  Taken 8/18/2024 0837 by Mireille Feldman, RN  Friction/Shear Interventions: HOB 30 degrees or less  Intervention: Optimize Skin Protection  Recent Flowsheet Documentation  Taken 8/18/2024 1400 by Mireille Feldman, RN  Head of Bed (HOB) Positioning: HOB at 20-30 degrees  Taken 8/18/2024 1000 by Mireille Feldman RN  Head of Bed (HOB) Positioning: HOB at 20-30 degrees     Problem: Infection  Goal: Absence of Infection Signs and Symptoms  Outcome: Progressing     Problem: Wound  Goal: Absence of Infection Signs and Symptoms  Outcome: Progressing   Goal Outcome Evaluation:    Patient Alert and oriented. Pain controlled with scheduled Tylenol and PRN Oxycodone. Wound Vac patent. Javier patent. Colostomy putting out large amounts of gas and small amount of soft brown stool. G Tube flushed. PICC line dressing change completed. Allowing staff to turn and reposition him.

## 2024-08-19 NOTE — PROGRESS NOTES
[facility] Hospitalist Progress Note  [facility]  Admission date: 8/5/2024    Summary:    88M with history of HFrEF, hyperlipidemia, hypertension, aortic stenosis status post TAVR, thrombophilia, type 2 diabetes, stage III sacral ulcer, CAD, prior CVA admitted on 8/5/2024 with altered mental status & hypotension secondary to sepsis possibly from urinary tract infection, aspiration pneumonia and suspected infected, unstageable sacral ulcer.     He was managed briefly at the ICU by instensivist on admission but did not require vasopressor. Hypotension and ARIADNE improved with IV hydration and albumin infusion.  Urine culture grew E. coli.  Blood culture grew ESBL E Coli & Streptococcus salivarius. He is receiving IV meropenem and vancomycin per ID recommendation. Debridement of sacral ulcer was performed by surgery service on 8/6/2024. He was transferred to the medical floor on 8/6/24 given improved BP. He became hypotensive again on 8/7/24, therefore intensivist was re-consulted for possible septic shock and management with vasopressor. On 8/8/2024, he was stable prompting transfer to floor.     Assessment/Plan    #Anasarca - improving but still volume up.  Continue diuresis.  Albumin PRN.      #abdominal pain - from L flank abdominal wall hematoma.  Hb and appearance are stable.     #Sepsis, resolved   #Infected decubitus ulcer with polymicrobial growth   #Polymicrobial bacteremia with ESBL and Strep salivarius   #UTI  -S/p debridement 8/6/2024 with Cx polymicrobial (E Coli, B Fragilis, Staph Aureus, E faecium)  -Ucx ESBLI E Coli & Strep salivarius  -Unable to obtain MRI secondary to intracardiac lead. CT Pelvis W without any signs of osteo or abscess  -Abx by ID, currently on meropenem (8/7- )  added daptomycin for VRE (8/10- ), vancomycin (8/5-8/8)  -Wound vac per WOC  -Midodrine 5mg TID was started during this admission for hypotension, (5mg BID 8/10 and 5mg daily 8/11 and then off)-now 8/14 having  hypotension again, added midodrine as needed  -stoma management per sx    #Acute on chronic pain from above  -tylenol ATC.  Oxycodone 2.5-5mg q4h PRN, dilaudid for breakthrough    #History of aspiration and dysphagia   #Aspiration PNA vs CAP LLL  -SLP following, easy to chew level 7 and mildly thick level 2 diet with water protocol  -Video swallow study revealed silent aspiration with thin liquid, including with chin tuck      #Recent acute ischemic stroke (5/25/2024)  #Residual left Hemiparesis   -Recent admission 5/25-6/7/20[facility] at [facility] for ischemic stroke; discharged to TCU   -PT/OT      #History of the left upper extremity DVT with thrombophilia (positive antiphospholipid antibodies, 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)  -Coumadin     #Diabetes mellitus type 2, noninsulin dependent  -home regimen: jardiance    -inpatient: Lantus 5u, sliding scale insulin     #Moderate Malnutrition  #S/p PEG   -Nutrition is following. Calorie count ongoing.   -If still not meeting calorie needs, consider TF via existing PEG.     #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   #H/o HFrEF with EF recovery  #CAD   -History of distal RCA stent, last echo 8/2019 with EF 50-55%  -had hypotension with resumption of low dose coreg.  Start low dose toprol-xl today instead.     #RA     #GERD (gastroesophageal reflux disease)/Hx PUD   -PPI PO qAM      Dispo:  -Ordered DME (Kevin lift, hospital bed, wheelchair)  -Outpatient palliative care consult placed         Checklist:  Code Status: No CPR- Do NOT Intubate    Diet: Snacks/Supplements Adult: Magic Cup; With Meals  Snacks/Supplements Adult: Expedite Cup; With Meals  Room Service  Easy to Chew Diet (level 7) Mildly Thick (level 2) (Water protocol)     DVT px:  on coumadin, resume lovenox for Px until INR > 2.      Disposition and Discharge Planning    Medically Ready for Discharge: ~2 days (barriers diuresis and calorie count, oral intake)    ?LTACH  "candidate      System Identified Risk Variables  Auto-populated based on system request - if needs to be addressed in treatment plan they will be addressed above:  \"  Clinically Significant Risk Factors              # Hypoalbuminemia: Lowest albumin = 2.2 g/dL at 8/5/2024 11:55 AM, will monitor as appropriate       # Hypertension: Noted on problem list          # DMII: A1C = N/A within past 6 months   # Overweight: Estimated body mass index is 29.04 kg/m  as calculated from the following:    Height as of this encounter: 1.753 m (5' 9\").    Weight as of this encounter: 89.2 kg (196 lb 10.4 oz).   # Moderate Malnutrition: based on nutrition assessment      # Financial/Environmental Concerns:                 \"    Interval Events/Subjective/Notable results:    -4L.  No new complaints  Caloric intake improving    Reviewed: BGs, INR, BMP pending.        Objective    Vital signs in last 24 hours  Temp:  [96.9  F (36.1  C)-98.4  F (36.9  C)] 96.9  F (36.1  C)  Pulse:  [63-78] 78  Resp:  [16-20] 18  BP: (112-140)/(57-74) 116/57  SpO2:  [95 %-98 %] 97 % O2 Device: None (Room air)    Weight:   196 lbs 10.41 oz  Body mass index is 29.04 kg/m .    Intake/Output last 3 shifts  I/O last 3 completed shifts:  In: 620 [P.O.:620]  Out: 4400 [Urine:4400]    Physical Exam  General:  Alert, cooperative, no distress, frail, chronically ill appearing    Neurologic:  oriented, facial symmetry preserved, fluent speech.   Psych: calm  HEENT:  Anicteric, MMM  CV: RRR no MRG, normal S1 and S2, anasarca but improving  Lungs: CTAB.  Easyrespirations  Skin: no rashes noted on exposed skin.    Javier Catheter: PRESENT, indication: Acute retention or obstruction, Acute retention or obstruction  Lines: PRESENT      PICC 08/15/24 Single Lumen Right Brachial vein medial IV Antibiotics-Site Assessment: WDL           Medical Decision Making               Tori Storey MD, [facility]  Internal Medicine Hospitalist    "

## 2024-08-19 NOTE — PROGRESS NOTES
"INFECTIOUS DISEASE FOLLOW UP NOTE    Date: 08/19/2024   CHIEF COMPLAINT:   Chief Complaint   Patient presents with    Altered Mental Status    Hypotension        ASSESSMENT:    E coli ESBL bacteremia: 2/2 sacral ulcer. S/p bedside debridement with surgery. Necrotic tissue noted. Repeat bedside I&D 8/7 small purulence and again necrotic tissue 8/9. Vac currently in place. S/p I+D in OR 8/13 with diverting colostomy. Concern for osteomyelitis -- wound down to bone although bone did not appear unhealthy. Would likely plan 4-6 weeks IV antibiotics. Leukocytosis -- post-op, improved.   strep salivarius bacteremia: noted on admission.  Likely 2/2 above  Sacral ulcer s/p bedside debridement, cultures with ecoli, MSSA, bacteroides, VRE. CT with ulceration to periosteal margin.   ARIADNE on CKD  Hx CVA with residual dysphagia: increased risk for aspiration events, on room air. Unclear if actual infection however will be covered with treatment of sacral ulcer  DM2  Upper ext edema -- no DVT but there is superficial thrombophlebitis.     PLAN:  - continue meropenem IV, daptomycin, likely 4-6 weeks  Check into home infusions      Eriberto Verdugo MD   [facility]  Direct messaging: [employer]   ______________________________________________________________________    SUBJECTIVE / INTERVAL HISTORY:  Seen earlier today. Tired. Pain in abdomen area and at wound.     SH/FH/Habits/PMH reviewed and unchanged.    OBJECTIVE:  BP (!) 141/71 (BP Location: Right arm)   Pulse 74   Temp 98.1  F (36.7  C) (Oral)   Resp 20   Ht 1.753 m (5' 9\")   Wt 77.6 kg (171 lb 1.2 oz)   SpO2 98%   BMI 25.26 kg/m       Resp: 20      GEN: No acute distress. Tired.   RESPIRATORY:  clear bilaterally  CV: regular  EXTREMITIES: No edema.  SKIN/HAIR/NAILS: vac in place.   IV:  PICC      Antibiotics:  Meropenem  daptomycin    Pertinent labs:  reviewed  Last Comprehensive Metabolic Panel:  Sodium   Date Value Ref Range Status "   08/19/2024 136 135 - 145 mmol/L Final     Potassium   Date Value Ref Range Status   08/19/2024 3.5 3.4 - 5.3 mmol/L Final   11/29/2019 4.3 3.5 - 5.0 mmol/L Final     Chloride   Date Value Ref Range Status   08/19/2024 95 (L) 98 - 107 mmol/L Final   11/29/2019 107 98 - 107 mmol/L Final     Carbon Dioxide (CO2)   Date Value Ref Range Status   08/19/2024 36 (H) 22 - 29 mmol/L Final   11/29/2019 26 22 - 31 mmol/L Final     Anion Gap   Date Value Ref Range Status   08/19/2024 5 (L) 7 - 15 mmol/L Final   11/29/2019 7 5 - 18 mmol/L Final     Glucose   Date Value Ref Range Status   11/29/2019 126 (H) 70 - 125 mg/dL Final     GLUCOSE BY METER POCT   Date Value Ref Range Status   08/19/2024 138 (H) 70 - 99 mg/dL Final     Urea Nitrogen   Date Value Ref Range Status   08/19/2024 14.0 8.0 - 23.0 mg/dL Final   11/29/2019 10 8 - 28 mg/dL Final     Creatinine   Date Value Ref Range Status   08/19/2024 0.51 (L) 0.67 - 1.17 mg/dL Final     GFR Estimate   Date Value Ref Range Status   08/19/2024 >90 >60 mL/min/1.73m2 Final     Comment:     eGFR calculated using 2021 CKD-EPI equation.   11/29/2019 >60 >60 mL/min/1.73m2 Final     Calcium   Date Value Ref Range Status   08/19/2024 7.7 (L) 8.8 - 10.4 mg/dL Final     Comment:     Reference intervals for this test were updated on 7/16/2024 to reflect our healthy population more accurately. There may be differences in the flagging of prior results with similar values performed with this method. Those prior results can be interpreted in the context of the updated reference intervals.        MICROBIOLOGY DATA:  Personally reviewed.  7-Day Micro Results       Collected Updated Procedure Result Status      08/13/2024 0833 08/15/2024 1315 Anaerobic Bacterial Culture Routine [[id number]]   Wound from Spine, Sacrum    Preliminary result Component Value   Culture No anaerobic organisms isolated after 2 days  [P]                08/13/2024 0833 08/13/2024 1301 Gram Stain [[id number]]   Wound  from Spine, Sacrum    Final result Component Value   GS Culture See corresponding culture for results   Gram Stain Result No organisms seen   Gram Stain Result 2+ WBC seen            08/13/2024 0833 08/15/2024 0946 Wound Aerobic Bacterial Culture Routine [[id number]]   (Abnormal)   Wound from Spine, Sacrum    Final result Component Value   Culture 1+ Escherichia coli    Susceptibilities done on previous cultures    2+ Enterococcus faecium VRE    Susceptibilities done on previous cultures                        RADIOLOGY:  Personally Reviewed.  Recent Results (from the past 24 hour(s))   XR Video Swallow with SLP or OT    Narrative    EXAM: XR VIDEO SWALLOW WITH SLP OR OT  LOCATION: [facility]  DATE: 8/6/2024    INDICATION: Difficulty swallowing.  COMPARISON: None.    TECHNIQUE: Routine swallow study with speech pathology using multiple barium thicknesses.    RADIATION DOSE: Total Air Kerma 5.1 mGy    FINDINGS:   Swallow study with Speech Pathology using multiple barium thicknesses. Silent aspiration with thin liquid, including with chin tuck. Aspiration also occurred with thin liquid and smaller drink without chin tuck. No other significant penetration or   aspiration.         Principal Problem:    Sepsis with acute renal failure and tubular necrosis without septic shock (H)  Active Problems:    Benign prostatic hyperplasia    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) (H)    Hyperlipidemia    Hypertension    Long term current use of anticoagulant therapy    S/P cardiac pacemaker procedure    S/P TAVR (transcatheter aortic valve replacement)    Thrombophilia (H24)    Diabetes mellitus type 2, noninsulin dependent (H)    History of CVA (cerebrovascular accident)    Hypoxia    Hypotension, unspecified hypotension type    Community acquired pneumonia of left lower lobe of lung    Pressure injury of sacral region, stage 3 (H)    Leukocytosis, unspecified type  ;

## 2024-08-19 NOTE — PROGRESS NOTES
"Care Management Follow Up    Length of Stay (days): 14    Expected Discharge Date: 08/20/2024    Anticipated Discharge Plan:   LTACH vs home with DME/ IV abx/Home wound vac    Transportation: TBD    PT Recommendations: Long term care facility  OT Recommendations:  Long term care facility (vs apartment with 24/7 total cares)     Barriers to Discharge: medical stability/ placement. Per [facility] pt is currently diuresing, may be med ready if LTACH can accept. If not will need further diuresing to get home. It pt returns home still waiting on DME, Set up of home infusion, a home RN to do dressing changes, and approval of wound vac.       Prior Living Situation:  \"RNCM to follow for potential IV antibiotic and wound vac (started [facility] portal). Goal is to d/c to [facility] ([facility] [address] [place] [address] w/24/7 care from [facility] [phone]. Requires assist w/ADLs/IADLs. Transfers w/lift and not ambulating. Son Abdoulaye is primary contact. [facility] stretcher-cost discussed. Will need DME and palliative outpatient to follow. Will also need skilled HC for wound care and ostomy. DME orders sent to [email or web address]. Referral sent to [facility] for benefit check for IV ABX. Per MD request sent referral to Hetal GARCIA on 8/17 to check possible eligibility.\"       Patient/Spokesperson Updated: No    Additional Information:  Chart reviewed.     Cm updates:  Referral placed to [facility], RNCM called them they are currently reviewing , awaiting response.       Home care: Skilled nursing for wound/wound vac- [facility] had previously accepted but 8/16 called and said they can no longer accept this pt. CM sent more HC referrals last week.       [facility] home wound vac - Liaison for [employer] is missing E-Script. RNCM resent e-script to Helga RIZO today, still awaiting wound vac approval.         DME equipment. Needs Kevin lift, WC, Hosp bed, orders were faxed to [facility] by " previous CM. 8/16 CM called to check on the status and was told they have not received any orders so CM refaxed these orders 8/16. RNCM called and left  for [facility] today, awaiting call back.         IV ABX at discharge. Previous CM sent referral to [facility] and they cannot accept due to payer source so CM sent additional referrals for the IV ABX to [facility] for benefit check. [facility] can take pt on ,but pt will have a co pay, still trying to decide what IV abx pt will discharge on, IV abx final plan pending, ID following.         RNCM updated Nusrat Sandoval.       Main family contact is son Abdoulaye  [facility] Nusrat Sandoval [phone]        Cm will continue to follow plan of care,review recommendations, and assist with any discharge needs anticipated.       Christina Palma RN

## 2024-08-19 NOTE — PROGRESS NOTES
08/19/24 1345   Appointment Info   Signing Clinician's Name / Credentials (OT) Edith Adair MOT OTR/L CLT   Living Environment   Living Environment Comments Pt. came from a TCU. Prior to Stroke in May lived [place] in a [facility]. Patient states has an apartment lined up and hoping for 24hr nursing care   Self-Care   Equipment Currently Used at Home lift device;wheelchair, manual   Activity/Exercise/Self-Care Comment Kevin device used while in TCU   General Information   Onset of Illness/Injury or Date of Surgery 08/05/14   Referring Physician    Additional Occupational Profile Info/Pertinent History of Current Problem Franklyn Sorto is 88 year old male with history of HFrEF, hyperlipidemia, hypertension, aortic stenosis status post TAVR, thrombophilia, type 2 diabetes, stage III sacral ulcer, CAD, prior CVA admitted on 6/8/2024 with altered mental status hypotension secondary to sepsis possibly from urinary tract infection, aspiration pneumonia versus infected sacral ulcer.   Cognitive Status Examination   Affect/Mental Status (Cognitive) WNL   Follows Commands WNL   Range of Motion Comprehensive   Comment, General Range of Motion LUE flaccid with no AROM, limited shoulder PROM provided as patient is at risk for subluxation. RUE WFL   Bed Mobility   Supine-Sit Tens[place] (Bed Mobility) dependent (less than 25% patient effort);maximum assist (25% patient effort);2 person assist   Sit-Supine Tensas (Bed Mobility) maximum assist (25% patient effort);dependent (less than 25% patient effort);2 person assist   Balance   Balance Comments EOB sitting with min/mod of 1   Activities of Daily Living   BADL Assessment/Intervention grooming   Grooming Assessment/Training   [place] Level (Grooming) supervision;set up;wash face, hands  (while reclined in bed)   Clinical Impression   Criteria for Skilled Therapeutic Interventions Met (OT) Evaluation only  (and treatment only)   OT Diagnosis Decreased  strength and ADL I'dence   OT Problem List-Impairments impacting ADL problems related to;activity tolerance impaired;balance;strength   Assessment of Occupational Performance 3-5 Performance Deficits   Identified Performance Deficits drsg, bed mob, standing, trsfs   Planned Therapy Interventions (OT) ADL retraining;strengthening;transfer training   Clinical Decision Making Complexity (OT) problem focused assessment/low complexity   Risk & Benefits of therapy have been explained evaluation/treatment results reviewed   OT Total Evaluation Time   OT Eval, Low Complexity Minutes (68101) 8   OT Goals   Therapy Frequency (OT) One time eval and treatment   OT Predicted Duration/Target Date for Goal Attainment 08/19/24   OT: Hygiene/Grooming supervision/stand-by assist;Completed   Interventions   Interventions Quick Adds Therapeutic Activity   Self-Care/Home Management   Self-Care/Home Mgmt/ADL, Compensatory, Meal Prep Minutes (43221) 8   Treatment Detail/Skilled Intervention Rolling in bed several times for positioning with max A. Encouragement to use RUE as much as possible when rolling to left side. Rtn to supine with max A of 2 after sitting task(see below)   Therapeutic Activities   Therapeutic Activity Minutes (98119) 8   Treatment Detail/Skilled Intervention EOB sitting with min/mod A - reaching task with Flashlight using RUE. Min A to maintain midline balance. R UE WB than return to midline positioning   OT Discharge Planning   OT Discharge Recommendation (DC Rec) Long term care facility   OT Rationale for DC Rec Reevaluation completed. Patient continues to require max of 2 for bed mobility and EOB sitting. Would continue to recommend caroline lift for transfers. Recommend LTC or 24/7 Assist with all ADLs, IADLs, caroline lift for transfers. OT will defer to PT during this acute stay to avoid any duplication of skilled services   Total Session Time   Timed Code Treatment Minutes 16   Total Session Time (sum of timed and  untimed services) 24

## 2024-08-20 NOTE — PROGRESS NOTES
General Surgery Progress Note:    Hospital Day # 15    ASSESSMENT:  1. Pressure injury of sacral region, unstageable (H)    2. Community acquired pneumonia of left lower lobe of lung    3. Pressure injury of sacral region, stage 3 (H)    4. Hypotension, unspecified hypotension type    5. Hypoxia    6. History of stroke    7. Colostomy care (H)    8. Pressure injury of sacral region, stage 4 (H) [L89.154]        Franklyn Sorto is a 88 year old male with stage IV infected sacral decubitus ulcer who is s/p wound debridement and laparoscopic diverting colostomy on 8/13/2024.  Stoma continues to appear dusky and thin-walled on change of appliance today though continues to be productive of gas and stool will need to monitor this especially as there is a fluid collection likely under stoma tissue that is sloughing.  Wound VAC changed today, no further large debridements done.     dusky though productive of gas and stool, wound VAC continues to question hold suction.  Patient is afebrile and vitally stable downtrending leukocytosis, INR at 1.42 without evidence of bleeding through the stoma however complicated due to pink tinge stools from consuming red foods.       PLAN:  -Diet as tolerated, appreciate nutrition  -Continue monitoring stoma and bowel productions  -Activity as tolerated and encouraged, continue with bedsore prevention  -Surgery will follow along with WOC for wound VAC changes (T & Th)  -Medical management per primary team    SUBJECTIVE:   Franklyn Sotro was seen on rounds.  Patient states he is doing well as we are prepping him for wound VAC change.  He states he is trying to get more ensures in and tries to drink them each meal.  He is encouraged that his son has a new apartment set up, is really sad about leaving his home however.  Though states his son is doing a good job and the apartment should be ready soon.  Patient states he is having some abdominal pain on the left lower quadrant but has not  changed, is just mostly sore.  His bottom is also sore, especially after VAC changes      Patient Vitals for the past 24 hrs:   BP Temp Temp src Pulse Resp SpO2   08/20/24 0743 131/59 98.2  F (36.8  C) Oral 73 18 95 %   08/19/24 2356 117/62 97.5  F (36.4  C) Oral 72 18 95 %   08/19/24 1948 -- 98.1  F (36.7  C) Oral -- 20 --   08/19/24 1859 98/46 -- -- 73 -- --   08/19/24 1555 (!) 141/71 98.1  F (36.7  C) Oral 74 20 98 %         PHYSICAL EXAM:  General: patient seen resting in bed in no acute distress  Resp: no increased work of breathing, breathing comfortably on room air  Abdomen: Generally soft and nondistended with some tenderness with palpation over the left lower quadrant though very mild.  No peritoneal signs or guarding on exam.  Some ecchymosis visualized left lower quadrant wrapping around to the left side, per other providers has improved    Ostomy: Ostomy appears very dusky and the tissue is quite thin, with glass tube test appears the same on the lumen of the stoma.  Has some darker tissue on the medial side and when stoma is moved towards the lateral side there is no expression of purulent fluid in the junction.  With further palpation does not appear as though this is coming from the abdomen or mucocutaneous junction, appears it is coming from the stoma tissue however cannot be entirely sure.  Appliance is changed, prior to changing was filled with gas and stool which was soft light brown in nature.        Sacral wound reassessed and wound VAC changed at bedside with WOC.  Appears the wound bed remains without necrotic tissue however still remaining devitalized and sticky tissue along the sacral bone.  Debrided very small amount of tissue with a #11 blade.  Not malodorous.  Reapplied wound VAC and held suction.  Patient tolerated.    Extremities: No edema or cyanosis visualized on exam, no obvious deformities    08/19 0700 - 08/20 0659  In: 390 [P.O.:220]  Out: 2975 [Urine:2975]    No results displayed  because visit has over 200 results.           [facility]  [facility]  [facility] [phone]

## 2024-08-20 NOTE — PROGRESS NOTES
"Care Management Follow Up    Length of Stay (days): 15    Expected Discharge Date: 08/22/2024    Anticipated Discharge Plan:     Goal is to d/c to [facility] (not [facility] [address] [place] [address] w/24/7 care from [facility] for 24/7 PCA private pay care #[phone].       Transportation: TBD    PT Recommendations: Long term care facility, Per plan established by the PT  OT Recommendations:  Long term care facility     Barriers to Discharge: medical stability/ diuresing/ working on DME, home RN need for wound vac/ iv abx, Final ID plan to get proper home IV abx quotes.       Prior Living Situation:  \"Goal is to d/c to [facility] (not [facility]) [address] [place] [address] w/24/7 care from [facility] -[phone]. Requires assist w/ADLs/IADLs. Transfers w/lift and not ambulating. Tony Stanford is primary contact. [facility] stretcher-cost discussed. Will need DME and palliative outpatient to follow. Will also need skilled HC for wound care vac, ostomy, and IV abx need. DME orders sent to [facility]. Referral sent to [place] and [facility] for benefit check for IV ABX. Pt does not qualify for LTACH, only 2 night ICU stay. \"        Main family contact is tony Stanford  [facility] Nusrat Sandoval [phone]       Patient/Spokesperson Updated: No    Additional Information:  Chart reviewed.     Cm updates:  LTACH cannot accept pt , only had two night ICU stay.       Needs for pt to discharge:    Home Care-Skilled RN needed for wound care/wound vac, ostomy care, and PICC dressing changes for IV abx. RNCM sent 8 more home care referrals with some being around the [place] area (pending).       Home Wound Vac- Pt's home wound vac was approved by the [employer] portal and is down in the stores area in the basement, until plan more finalized keep wound vac down there. Then when other parts are established bring home vac to pt's room and have son Abdoulaye " sign forms for proof of delivery.       DME equipment- Pt needs at caroline lift, W/c, and Hosp bed, orders were faxed to [facility] by previous CM. 8/16. RNCM called [facility] today this AM, and left Vm. RNCM called [facility] this afternoon at 2:45pm, they answered # [phone]. They stated they need more documentation for Hosp bed and W/c and sent the form to be filled out to CM office fax # [phone].       2:49pm- Documentation form received. Paged provider regarding. He plans to fill out documentation clearer tomorrow, documentation reference form clipped to pt's chart, and also emailed to CM on tomorrow.         Home IV Abx - [facility], [facility], and [facility] following for IV abx need, once IV abx plan is finalized they can give a better quote, all plans pt will have a co pay for.         Cm will continue to follow plan of care,review recommendations, and assist with any discharge needs anticipated.       Christina Palma RN

## 2024-08-20 NOTE — PLAN OF CARE
Goal Outcome Evaluation:    Pt A/O x4. C/O buttock pain, turn and repo Q2 with PRN tylenol. Patient would refuse repositioning at times, writer educated importance of turning. PICC patent, saline locked. GJ flushed per orders. Colostomy intact. Stoma is dusky. Javier patent and draining. Diet is easy to chew, mildly thick liquids. Wound vac in place on bottom. Kevin lift for transfers. VSS.    Temp: 97.5  F (36.4  C) Temp src: Oral BP: 117/62 Pulse: 72   Resp: 18 SpO2: 95 % O2 Device: None (Room air)

## 2024-08-20 NOTE — PROGRESS NOTES
[facility] Progress Note - Hospitalist Service    Date of Admission:  8/5/2024    Assessment & Plan    88M with history of HFrEF, hyperlipidemia, hypertension, aortic stenosis status post TAVR, thrombophilia, type 2 diabetes, stage III sacral ulcer, CAD, prior CVA admitted on 8/5/2024 with altered mental status & hypotension secondary to sepsis possibly from urinary tract infection, aspiration pneumonia and suspected infected, unstageable sacral ulcer.     He was managed briefly at the ICU by instensivist on admission but did not require vasopressor. Hypotension and ARIADNE improved with IV hydration and albumin infusion.  Patient transferred to medical floor on 8/6/2024.  However, patient transferred back to ICU for possible septic shock and management with pressors.  On 8/8/2024 patient transferred back to floor.  UC grew E. coli.  BC grew ESBL E Coli & Streptococcus salivarius. Polymicrobial bacteremia thought to be secondary to infected sacral decubitus ulcer.  Patient underwent multiple bedside debridement and eventually OR debridement with diverting colostomy on 8/13/2024.       Assessment/Plan  # Septic shock, resolved  #Infected sacral decubitus ulcer;  #Polymicrobial bacteremia with ESBL and Strep salivarius due to infected sacral ulcer;  Patient underwent multiple bedside debridement and eventually OR debridement with diverting colostomy on 8/13/2024.  -S/p debridement 8/6/2024 with Cx polymicrobial (E Coli, B Fragilis, Staph Aureus, E faecium)  -Ucx ESBLI E Coli & Strep salivarius  -Unable to obtain MRI secondary to intracardiac lead. CT Pelvis without any signs of osteo or abscess  -Abx by ID, currently on meropenem (8/7- )  added daptomycin for VRE (8/10- ), vancomycin (8/5-8/8)  -Wound vac per WOC  -Midodrine 5mg TID was started during this admission for hypotension, (5mg BID 8/10 and 5mg daily 8/11 and then off)-now 8/14 having hypotension again, added midodrine as  needed  -stoma management per surgery team    #Anasarca - improving but still volume up.  Continue diuresis.  Albumin PRN.  Monitor labs closely   #electrolyte imbalance-replace per protocol     #abdominal pain - from L flank abdominal wall hematoma.  Hb and appearance are stable.      #Acute on chronic pain from above  -tylenol ATC.  Oxycodone 2.5-5mg q4h PRN, dilaudid for breakthrough     #History of aspiration and dysphagia due to stroke;  #Aspiration PNA vs CAP LLL  -SLP following, easy to chew level 7 and mildly thick level 2 diet with water protocol  -Video swallow study revealed silent aspiration with thin liquid, including with chin tuck  -Aspiration precaution     #Recent acute ischemic stroke (5/25/2024)  #Residual left Hemiparesis   -Recent admission 5/25-6/7/2024 at [facility] for ischemic stroke; discharged to TCU   -PT/OT      #History of the left upper extremity DVT with thrombophilia (positive antiphospholipid antibodies, 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  INR subtherapeutic.  Adjust Coumadin dose, monitor INR closely.  Pharmacy following     #Diabetes mellitus type 2, noninsulin dependent  -home regimen: jardiance    -inpatient: Lantus 5u, insulin sliding scale and hypoglycemic protocol     #Moderate Malnutrition  #S/p PEG   -Nutrition is following. Calorie count ongoing.   -If still not meeting calorie needs, consider TF via existing PEG.     #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   #H/o HFrEF with EF recovery  #CAD   -History of distal RCA stent, last echo 8/2019 with EF 50-55%  -had hypotension with resumption of low dose coreg.  Start low dose toprol-xl instead.     -PTA crestor    #GERD/Hx PUD   -PPI PO qAM      Dispo:  -Ordered DME (Kevin lift, hospital bed, wheelchair)  -Outpatient palliative care consult placed               Diet: Snacks/Supplements Adult: Magic Cup; With Meals  Snacks/Supplements Adult: Expedite Cup; With Meals  Room  "Service  Easy to Chew Diet (level 7) Mildly Thick (level 2) (Water protocol)  Snacks/Supplements Adult: Ensure Enlive; With Meals    DVT Prophylaxis: Warfarin  Javier Catheter: PRESENT, indication: Acute retention or obstruction, Wound deterioration and failed external collection device  Lines: PRESENT      PICC 08/15/24 Single Lumen Right Brachial vein medial IV Antibiotics-Site Assessment: WDL except;Edematous;Ecchymotic      Cardiac Monitoring: None  Code Status: No CPR- Do NOT Intubate      Clinically Significant Risk Factors        # Hypokalemia: Lowest K = 3.2 mmol/L in last 2 days, will replace as needed     # Hypomagnesemia: Lowest Mg = 1.6 mg/dL in last 2 days, will replace as needed   # Hypoalbuminemia: Lowest albumin = 2.2 g/dL at 8/5/2024 11:55 AM, will monitor as appropriate     # Hypertension: Noted on problem list          # DMII: A1C = N/A within past 6 months   # Overweight: Estimated body mass index is 25.26 kg/m  as calculated from the following:    Height as of this encounter: 1.753 m (5' 9\").    Weight as of this encounter: 77.6 kg (171 lb 1.2 oz).   # Moderate Malnutrition: based on nutrition assessment    # Financial/Environmental Concerns:                 Disposition Plan     Medically Ready for Discharge: Anticipated in 2-4 Days             Jamshid Gardner MD  Hospitalist Service  [facility]  Securely message with [employer] (more info)  Text page via [employer] Paging/Directory   ______________________________________________________________________    Interval History   Patient is new to me.  Patient seen and examined.  Notes, labs, imaging report personally reviewed.  Patient lying in the bed, looks comfortable, reported just had breakfast before my visit this morning.  Reported abdomen pain, nausea, vomiting.  Denied feeling short of breath or chest pain.  Patient has a significant left-sided weakness from previous stroke.  Discussed with care manager/.  Discussed " with nursing staff.  Called patient's son Mr. Stanford after getting permission from patient and left message.    Physical Exam   Vital Signs: Temp: 98.2  F (36.8  C) Temp src: Oral BP: 131/59 Pulse: 73   Resp: 18 SpO2: 95 % O2 Device: None (Room air)    Weight: 171 lbs 1.23 oz      General: Not in obvious distress.  Ill looking  HEENT: Normocephalic, supple neck  Chest: Clear to auscultation bilateral anteriorly, no wheezing  Heart: S1S2 normal, regular  Abdomen: Soft.  No tenderness appreciated, colostomy bag with air, feeding tube in place  Extremities: + legs swelling  Neuro: alert and awake, follows commands, left hemiparesis due to recent stroke    Medical Decision Making             Data     I have personally reviewed the following data over the past 24 hrs:    9.3; 9.5  \   8.8 (L)   / 195     141 98 15.2 /  131 (H)   3.2 (L) 38 (H) 0.53 (L) \     INR:  1.66 (H) PTT:  N/A   D-dimer:  N/A Fibrinogen:  N/A       Imaging results reviewed over the past 24 hrs:   No results found for this or any previous visit (from the past 24 hour(s)).

## 2024-08-20 NOTE — CONSULTS
"SPIRITUAL HEALTH SERVICES CONSULT NOTE  [facility]; P1    Saw pt Franklyn Sorto per Spiritual Care Consult (due to LOS)    Patient/Family Understanding of Illness and Goals of Care - Abdoulaye welcomed a visit. He shares that he had a stroke on May 25th and that his life has gotten significantly worse since then. 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 I wish I had .\" In addition to this, Abdoulaye has a significant ulcer on his back side that is infected. He says this developed at the TCU he was at in [place] and that he never wants to return there. He is grateful for the care that he has received here in the hospital. His son is making arrangements for him to move to a new apartment in [place]. Abdoulaye anticipates being here for several more days.     Distress and Loss - Abdoulaye cried frequently during my visit. In addition to the significant changes in his health, he shares that his SO of 27 years (Narda) has left him for someone else and that he has to sell his house to pay for the care that he needs. He also expresses concern about Narda being happy. He is experiencing significant heartache and feels completely overwhelmed.     Strengths, Coping, and Resources - Abdoulaye is grateful for his son Abdoulaye, and his friend Devin, who have been significant sources of support for him. 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     Meaning, Beliefs, and Spirituality - Abdoulaye identifies as a conservative Jehovah's witness. He is not connected to a Catholic community. He asks \"Why did this happen to me? What did I do to deserve this? I wish I had .\" He denies any anger at God. Prayer shared.     Plan of Care: Spiritual care staff will remain available for further follow-up as requested by patient, family or staff.      Lesley Viveros M.Div.      Office: [phone] (for non-urgent requests)  Please Vocera or page through [facility] for " time-sensitive requests

## 2024-08-20 NOTE — PROGRESS NOTES
"[facility] Nurse Inpatient Assessment       Consulted for: Sacral    8/15 visit to teach ostomy cares to son and health care worker that will visit patient after discharge.  VAC intact and sacrum not assessed today.    Patient History (according to provider note(s):      HPI: Franklyn Sorto is a 88 year old male with h/o T2DM, GERD, HTN, CVA 5/25/24 currently in TCU presenting to the ER for evaluation of fatigue and altered mental status. Patient's family was visiting today and states when they got there \"5 staff members were around the patient and he was not responding\". They called EMS who initially got a BP with 90 systolic, however, repeat on the way over was 65 systolic. Per patient's family, patient had feeding tube placed following his stroke as he was having difficulty swallowing.  Patient has improved a fair amount and is now able to swallow, has not used his feeding tube in over a month.  Patient's family states that they are quite adamant about him only drinking and eating well sitting up but they are nervous that his facility may have given him some fluid while laying down that could have resulted in an aspiration pneumonia. Patient denies any recent fevers. States he has been coughing a bit more, no sputum production.     Vitals reviewed, initial BP upon arrival to the ER was 69/48. Temp 97.5F. SpO2 89% on room air. 2L nasal cannula was placed and patient quickly improved to 98% on room air On exam he is pale but nontoxic-appearing.  He is speaking in full sentences and is alert. Differential diagnosis includes but not limited to UTI, pyelonephritis, bronchitis, pneumonia, PE, ACS, hypovolemia, cardiogenic shock, septic shock. After initial 500 mL fluid bolus his BP has improved to 87/47. He was given an additional 500 mL and improved to 90/66.        Assessment:      Ostomy not assessed today 8/16: previous assessment below    Pressure Injury Location: Negative pressure " wound therapy applied to: Sacrum     8/6 8/7 8/9 8/12 8/14 8/16    Last photo: 8/16  Wound type: Pressure Injury     Pressure Injury Stage: 4, present on admission   Wound history/plan of care:   Came from TCU. 8/20, small bedside debridement by PA    Wound base: 75 % non-granular tissue and adipose tissue, 25 % bone/fascia     Palpation of the wound bed: normal      Drainage: small     Description of drainage: serosanguinous and purulent     Measurements (length x width x depth, in cm) 9  x 6  x  2.4 cm - small amount of fascia over coccyx     Tunneling NA     Undermining all around, 1-2cm  Periwound skin:  discolored      Color: dusky, pink, and red      Temperature: warm  Odor: mild  Pain: moderate and during dressing change, tender  Pain intervention prior to dressing change: slow and gentle cares   Treatment goal: Drainage control, Infection control/prevention, Increase granulation, Protection, Remove necrotic tissue, and Soften necrotic tissue  STATUS: stable    Surgical date: 8/13   Service following: surgery  Date Negative Pressure Wound Therapy initiated: 8/14   Interventions in place: repositioning, pressure redistribution with device or dressing, moisture/incontinence management, and offloading  Is patient s nutritional status compromised? no   If yes, what interventions are in place? Protein supplements  Reason for initiating vac therapy? Presence of co-morbidities and Need for accelerated granulation tissue  Which?of?the?following?co-morbidities?apply? Diabetes  If diabetic is patient on a diabetic management program? Yes   Is osteomyelitis present in wound? no   If yes what treatments are in place? N/A        Volume in cannister: 0 - new cannister placed     Last cannister change date: 8/20       Number of foam pieces removed from a wound (excluding  foam for bridge) :  1 GranuFoam Black and 1 Oil emulsion dressing   Verified this matched the number of foam pieces applied last dressing change: Yes   Number of foam pieces packed into wound (excluding foam for bridge) :  1 GranuFoam Black and 1 Oil emulsion dressing - bridge to L hip  ___________________________________________________________________________________________________________________________________________________________________________________________________________________________________________    Assessment of new end Colostomy:  Diagnosis Pertinent to Stoma:  large sacrococcygeal wound       Surgery Date: 8/14  Surgeon:[facility]       Hospital: [facility]  Pouching system in place on assessment today: Martha one piece   Pouch barrier status: intact  Pouch last changed/wear time: post op pouch intact  Reason for pouch change today: ostomy education and initial post-op assessment  Effectiveness of current pouching/ supply plan: Effective  Change made with ostomy management today: Yes  Pouching system placed today: [place] two piece flat  Supplies: gathered        8/14 8/20    Last photo: 8/20  Stoma location: [facility]  Stoma size: 40mm  Stoma appearance: dusky, round, and moist - venous congestion, some bloody purulence noted with PA at bedside, appears to be from under the sloughing mucosa. Second PA came to assess as well. Patient in a 2 piece pouch for easy assessment by surgery team.   Mucocutaneous junction:  intact  Peristomal complication(s): none   Output: stool/blood   Output volume emptied during visit: 250ml  Abdominal assessment: Soft  Surgical site(s): dressing dry and intact  NG still in place? No  Pain: Sharp, Dull ache, and Fullness  Is patient still on a PCA? No    Changed pouch with surgery team, Son arrived to room after but interdisciplinary team discussed where we are at in terms of plan of care.  "          Treatment Plan:     [facility] Colostomy pouching plan:   Pouching system: ostomy supplies pouches: Martha 57 FECAL ([id number]) ostomy supplies barrier: Martha 57mm FLAT ([id number])  Accessories used: [facility] ostomy accessories: 2\" Cera Barrier Ring ([id number]) and Cavilon no sting barrier film ([id number])   Frequency of pouch changes: Twice weekly and PRN leakage  WOC follow up plan: Twice weekly and As needed   Bedside RN interventions: Change pouch PRN if leaking using the supplies above, Empty pouch when 1/3 to 1/2 full, ensure to clean pouch outlet after emptying to prevent odor, Notify WOC for ongoing pouch leakage, Document stoma appearance and output volume, color, and consistency every shift, Encourage patient to empty pouch with assist, and Assist patient to measure and record output     Negative pressure wound therapy plan:  Wound location: Sacrococcygeal   Change Days: Tues/ Fri by WO RN    Supplies (including all accessories) used: medium Black foam , Adaptic/Curity oil emulsion contact layer , Adapt barrier ring, and Cavilon no sting barrier film  Cleanse with Vashe prior to replacing NPWT  Suction setting: -125   Methods used: Window paned all periwound skin with vac drape prior to applying sponge, Bridged trac pad off bony prominences, and Placed barrier ring into periwound creases to improve seal    Staff RN to assess integrity of dressing and ensure suction is set at appropriate level every shift.   Date canister. Chart canister output every shift. Change cannister weekly and PRN if full/occluded     Remove foam dressing and replace with BID normal saline moist gauze dressing if:   -a dressing failure which cannot be repaired within 2 hours   -patient is discharging to home without a home pump   -patient is discharging to a facility outside the local area   -if a dressing is a \"Silver Foam\", remove before Radiation Therapy or MRI     The hospital VAC pump is not to be discharged with the patient.?Ensure " "to disconnect patient from machine prior to discharge. Then,    - If a home KCI VAC pump has been delivered, connect home cannister to dressing tubing then connect cannister to home pump and turn on machine    - If transferring to a nearby facility with a KCI vac, can disconnect and clamp tubing then cover end with glove so can be reconnected within 2 hours       Pressure Injury Prevention (PIP) Plan:  If patient is declining pressure injury prevention interventions: Explore reason why and address patient's concerns, Educate on pressure injury risk and prevention intervention(s), If patient is still declining, document \"informed refusal\" , and Ensure Care team is aware ( provider, charge nurse, etc)  Mattress: Follow bed algorithm, reassess daily and order specialty mattress, if indicated.  HOB: Maintain at or below 30 degrees, unless contraindicated  Repositioning in bed: Every 1-2 hours , Left/right positioning; avoid supine, Raise foot of bed prior to raising head of bed, to reduce patient sliding down (shear), and Frequent microturns using positioning wedges, as patient tolerates  Heels: Keep elevated off mattress, Pillows under calves, and Heel lift boots  Protective Dressing:  wound care to sacrum  Positioning Equipment:Positioning wedges (#[id number]) to help maintain 30 degree side lying position   Chair positioning: Chair cushion (#[id number])    If patient has a buttock pressure injury, or high risk for PI use chair cushion or SPS.  Moisture Management: Perineal cleansing /protection: Follow Incontinence Protocol, Avoid brief in bed, Clean and dry skin folds with bathing , Consider InterDry (#[id number]) between folds, and Moisturize dry skin  Under Devices: Inspect skin under all medical devices during skin inspection , Ensure tubes are stabilized without tension, and Ensure patient is not lying on medical devices or equipment when repositioned  Ask provider to discontinue device when no longer needed.      Orders: " Updated    RECOMMEND PRIMARY TEAM ORDER: None, at this time  Education provided: importance of repositioning, plan of care, and wound progress  Discussed plan of care with: Patient, Nurse, and Physicians Assistant  [facility] nurse follow-up plan: twice weekly  Notify WO if wound(s) deteriorate.  Nursing to notify the Provider(s) and re-consult the [facility] Nurse if new skin concern.    DATA:     Current support surface: Standard  Low air loss (TORY pump, Isolibrium, Pulsate)  Containment of urine/stool: Incontinence Protocol  BMI: Body mass index is 25.26 kg/m .   Active diet order: Orders Placed This Encounter      Easy to Chew Diet (level 7) Mildly Thick (level 2) (Water protocol)     Output: I/O last 3 completed shifts:  In: 390 [P.O.:220; NG/GT:170]  Out: 2975 [Urine:2975]     Labs:   Recent Labs   Lab 08/20/24  0543   HGB 8.8*   INR 1.66*   WBC 9.5  9.3     Pressure injury risk assessment:   Sensory Perception: 3-->slightly limited  Moisture: 3-->occasionally moist  Activity: 1-->bedfast  Mobility: 2-->very limited  Nutrition: 2-->probably inadequate  Friction and Shear: 1-->problem  Dilan Score: 12    VIDAL Garcia RN CWOCN  Pager no longer in use, please contact through [employer] group: [facility] [facility]

## 2024-08-20 NOTE — PLAN OF CARE
Problem: Wound  Goal: Optimal Functional Ability  Outcome: Progressing  Intervention: Optimize Functional Ability  Recent Flowsheet Documentation  Taken 8/19/2024 1618 by Ivonne Molina RN  Assistive Device Utilized: lift device  Activity Management: activity encouraged  Activity Assistance Provided: assistance, 2 people     Problem: Skin Injury Risk Increased  Goal: Skin Health and Integrity  Outcome: Progressing  Intervention: Plan: Nurse Driven Intervention: Positioning  Recent Flowsheet Documentation  Taken 8/19/2024 1618 by Ivonne Molina RN  Plan: Positioning Interventions:   REPOSITION Left/Right (No supine) q2h   Use wedge positioners   OFF-LOAD HEELS with pillows  Intervention: Plan: Nurse Driven Intervention: Moisture Management  Recent Flowsheet Documentation  Taken 8/19/2024 1618 by Ivonne Molina RN  Moisture Interventions:   No brief in bed   Incontinence pad   Urinary collection device   Fecal collection device  Intervention: Plan: Nurse Driven Intervention: Friction and Shear  Recent Flowsheet Documentation  Taken 8/19/2024 1618 by Ivonne Molina RN  Friction/Shear Interventions:   Repositioning device (TAP system, etc.)   HOB 30 degrees or less  Intervention: Optimize Skin Protection  Recent Flowsheet Documentation  Taken 8/19/2024 1859 by Ivonne Molina RN  Head of Bed (HOB) Positioning: HOB at 30 degrees  Taken 8/19/2024 1618 by Ivonne Molina RN  Pressure Reduction Devices: positioning supports utilized  Activity Management: activity encouraged  Head of Bed (HOB) Positioning: HOB at 20-30 degrees     Problem: Adult Inpatient Plan of Care  Goal: Optimal Comfort and Wellbeing  Outcome: Progressing  Intervention: Provide Person-Centered Care  Recent Flowsheet Documentation  Taken 8/19/2024 1618 by Ivonne Molina RN  Trust Relationship/Rapport:   emotional support provided   empathic listening provided   thoughts/feelings acknowledged   Goal Outcome Evaluation:    Pt is A+O x4, on RA. Has pain in  sacrum, managed by scheduled tylenol and repositioning. Pt is very edematous, BLE, RUE.  Elevating limbs. Lasix held this evening, BP 98/46.    Moderate amount of serosanguineous drainage seeping around edges of sacral wound vac and mepilex. Vac still holding suction.     Pt expresses feeling very low, helpless, overwhelmed and restless. Javier and ostomy patent. GJ flushed and clamped.     Pt is able to make his need known.     Ivonne Molina RN.

## 2024-08-21 NOTE — PROGRESS NOTES
"CLINICAL NUTRITION SERVICES - REASSESSMENT NOTE     Nutrition Prescription    RECOMMENDATIONS FOR MDs/PROVIDERS TO ORDER:    Malnutrition Status:    Moderate in chronic     Recommendations already ordered by Registered Dietitian (RD):  Adjust ONS- Increase ensure enlive TID, Gel plus BID between meals     Future/Additional Recommendations:  Monitor intake post colostomy procedure, need for supplemental enteral nutrition support  Adjust supplements pending intake, weight, tolerance, acceptance, labs, bg, wound healing  Consider need for additional snacks, protein powder to meet increased needs with wound     EVALUATION OF THE PROGRESS TOWARD GOALS   Diet: Level 7: Easy to Chew Dysphagia Diet  and Mildly Thick Thickened Liquids   Nutrition Supplement:  Magic cup TID, ensure enlive 4 oz BID, expedite cup daily   Intake/Tolerance:     Per Calorie Counts 8/16-19:  Pt consumes on average 1163 calories and 52 g protein/day   Pt meeting <75% estimated calorie needs and <50% estimated protein needs > 7 days      NEW FINDINGS   Met with pt at bedside this AM. Discussed not meeting nutrition needs via po intakes, reliance on nutrition supplements. Pt declined tube feeds, states he is willing to try anything to avoid using feeding tube. Pt very emotional with discussion of nutrition support. Pt very open to suggestions for improving calorie and protein intakes. Pt agreeable to try additional snacks and supplements to meet nutrition needs.     ANTHROPOMETRICS  Height: 175.3 cm (5' 9\")  Most Recent Weight: 77.6 kg (171 lb 1.2 oz)    Weight History: No clinically significant weight loss x1 year   Current weight trending up, likely r/t fluid retention, edema   Date/Time Weight Weight Method   08/19/24 0923 77.6 kg (171 lb 1.2 oz) Bed scale   08/08/24 0400 72.2 kg (159 lb 2.8 oz) Bed scale   08/05/24 1159 72.6 kg (160 lb)      Wt Readings from Last 10 Encounters:   08/19/24 77.6 kg (171 lb 1.2 oz)   06/21/24 73 kg (160 lb 15 oz) "   03/01/24 75.8 kg (167 lb)   11/13/23 76.2 kg (168 lb)   07/11/23 74.4 kg (164 lb)   07/07/23 74.4 kg (164 lb)   04/17/23 81.2 kg (179 lb)   01/06/23 83.9 kg (185 lb)   10/07/22 89.7 kg (197 lb 11.2 oz)   09/02/22 92.3 kg (203 lb 8 oz)      Dosing Weight: 72.6 kg     ASSESSED NUTRITION NEEDS 8/8/24  Estimated Energy Needs: 7419-8412+ kcals/day (25 - 30+ kcals/kg)  Justification: Increased needs and Wound healing  Estimated Protein Needs: 109-145 grams protein/day (1.5- 2 grams of pro/kg)  Justification: Wound healing  Estimated Fluid Needs: 1815+ mL/day (25+ mL/kg)   Justification: Maintenance    PHYSICAL FINDINGS/GI CONCERNS  See malnutrition section below.  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   Trace/Mild/Moderate edema periorbital, BUE, sacrum, scrotum, BLE    LABS  Reviewed  Creat 0.53(L)   BG  last 24 hrs     MEDICATIONS  Reviewed  Scheduled cubicin, lovenox, lasix, novolog, lantus, merrem, toprol xl, MVI/M, protonix, miralax, Vit C, warfarin    MALNUTRITION  Malnutrition Diagnosis: Moderate malnutrition  In Context of:  Chronic illness or disease    CURRENT NUTRITION DIAGNOSIS  Increased nutrient needs related to healing as evidenced by wounds   Evaluation: No change    INTERVENTIONS  Implementation  Medical food supplement therapy    Discussed wound healing, need for additional calories and protein to meet >/= 75% nutrition needs.     Goals  Wound healing - Progressing, stable per WOC 8/20   Pt to meet >75% nutritional needs - Not Met   Electrolytes WNL - Met  BG < 180 - Not Met, hyperglycemia overnight     Monitoring/Evaluation  Progress toward goals will be monitored and evaluated per protocol.

## 2024-08-21 NOTE — PROGRESS NOTES
"Patient expressed a depressive mood this AM: This RN and NA have spent time with patient to talk through his concerns and to listen with empathy to his concerns.  This afternoon, patient is very emotional and reiterated his concerns and his depressed feelings, stating \"I have lost my life\", \"my significant other has left\".  Patient wished to call \"Devin\" and wanted this RN to stay bedside.  This RN dial Devin's phone number and patient went on to tell Devin that he \"wasn't getting enough protein\".  It was clear that Devin could not speak at that time, and stated that he would call patient back \"later\".  Patient continued to be upset.  This RN addressed his protein concerns.  Patient then stated that he did not have concerns.  I offered to call his son, Abdoulaye.  Patient declined.  This RN has reached out to Spirit[facility] priyank Sutton to see if she could re-visit today.  yoana Virk will be able to see patient early afternoon.  Will continue to support patient with frequent check ins.   Zoey Estrada RN   "

## 2024-08-21 NOTE — PLAN OF CARE
Problem: Skin Injury Risk Increased  Goal: Skin Health and Integrity  Outcome: Progressing  Intervention: Plan: Nurse Driven Intervention: Moisture Management  Recent Flowsheet Documentation  Taken 8/20/2024 0800 by Mireille Feldman RN  Moisture Interventions:   Urinary collection device   No brief in bed  Intervention: Plan: Nurse Driven Intervention: Friction and Shear  Recent Flowsheet Documentation  Taken 8/20/2024 0800 by Mireille Feldman RN  Friction/Shear Interventions: HOB 30 degrees or less     Problem: Infection  Goal: Absence of Infection Signs and Symptoms  Outcome: Progressing  Intervention: Prevent or Manage Infection  Recent Flowsheet Documentation  Taken 8/20/2024 0800 by Mireille Feldman RN  Infection Management: aseptic technique maintained     Problem: Wound  Goal: Absence of Infection Signs and Symptoms  Outcome: Progressing  Intervention: Prevent or Manage Infection  Recent Flowsheet Documentation  Taken 8/20/2024 0800 by Mireille Feldman RN  Infection Management: aseptic technique maintained   Goal Outcome Evaluation:     Patient alert and oriented. Patient very emotional and expresses being anxious and stressed due to his health and personal relationships. Pain in sacral/buttocks where wound is present treated with PRN oxycodone and IV dilaudid. Patient was up in chair for approximately two hours this morning before wound VAC was changed. Patient tends to be reluctant about repositioning as he is comfortable in certain positions, educated on importance of turning at repositioning to promote proper skin integrity. IV ABX administered as ordered, has been afebrile. Mag, K, Phos protocol all replaced and redraws as ordered. Patient requesting medication to help him sleep, order for melatonin obtained.

## 2024-08-21 NOTE — PLAN OF CARE
Goal Outcome Evaluation:    Pt A/O x4. C/O pain in coccyx area, controlled with scheduled medications. Q2 turns, refused turns a few times, encouraged turning Q2 to relieve pressure points on bottom, pt verbalizes understanding. PICC infusing. GJ clamped and irrigated per orders. Colostomy putting out soft, tan stool, stoma is dusky. Javier patent and draining. Wound-vac in place, changed by WOC yesterday. K, Mag, and Phos protocol, Phos bump given.  & 116, no coverage. BUE still edematous. Easy to chew, mild thick liquids. A2/caroline when OOB. VSS.    Temp: 98.4  F (36.9  C) Temp src: Oral BP: 118/56 Pulse: 69   Resp: 18 SpO2: 96 % O2 Device: None (Room air)

## 2024-08-21 NOTE — PROGRESS NOTES
"Order for colostomy care and emptying states \"patient to empty with assist\".  Patient is not capable of emptying his colostomy or performing / caring for any ADL that requires two hands (patient has L side hemiparesis).  ALL colostomy cares are provided by skilled nursing. Zoey Estrada RN   "

## 2024-08-21 NOTE — PROGRESS NOTES
"INFECTIOUS DISEASE FOLLOW UP NOTE    Date: 08/21/2024   CHIEF COMPLAINT:   Chief Complaint   Patient presents with    Altered Mental Status    Hypotension        ASSESSMENT:    E coli ESBL bacteremia: 2/2 sacral ulcer. S/p bedside debridement with surgery. Necrotic tissue noted. Repeat bedside I&D 8/7 small purulence and again necrotic tissue 8/9. Vac currently in place. S/p I+D in OR 8/13 with diverting colostomy. Concern for osteomyelitis -- wound down to bone although bone did not appear unhealthy. Would likely plan 4-6 weeks IV antibiotics. Leukocytosis -- post-op, improved.   strep salivarius bacteremia: noted on admission.  Likely 2/2 above  Sacral ulcer s/p bedside debridement, cultures with ecoli, MSSA, bacteroides, VRE. CT with ulceration to periosteal margin.   ARIADNE on CKD  Hx CVA with residual dysphagia: increased risk for aspiration events, on room air. Unclear if actual infection however will be covered with treatment of sacral ulcer  DM2  Upper ext edema -- no DVT but there is superficial thrombophlebitis.     PLAN:  - continue meropenem IV, daptomycin, likely 4-6 weeks  Plan home IV antibiotics, weekly labs, follow up with me in about 3 weeks.       Eriberto Verdugo MD   [facility]  Direct messaging: [employer] Paging   ______________________________________________________________________    SUBJECTIVE / INTERVAL HISTORY:  Pain at wound. Seen with Dr. Garcia.     Plans for home infusions, home care.     //[facility]/PMH reviewed and unchanged.    OBJECTIVE:  /50 (BP Location: Right arm)   Pulse 78   Temp 97.5  F (36.4  C) (Oral)   Resp 19   Ht 1.753 m (5' 9\")   Wt 77.6 kg (171 lb 1.2 oz)   SpO2 97%   BMI 25.26 kg/m       Resp: 19      GEN: No acute distress. Tired.   RESPIRATORY:  clear bilaterally  CV: regular  Abd: colostomy, tissue appearing more healthy per Dr. Garcia.   EXTREMITIES: No edema.  SKIN/HAIR/NAILS: vac in place.   IV:  " PICC      Antibiotics:  Meropenem  daptomycin    Pertinent labs:  reviewed  Last Comprehensive Metabolic Panel:  Sodium   Date Value Ref Range Status   08/21/2024 139 135 - 145 mmol/L Final     Potassium   Date Value Ref Range Status   08/21/2024 3.6 3.4 - 5.3 mmol/L Final   11/29/2019 4.3 3.5 - 5.0 mmol/L Final     Chloride   Date Value Ref Range Status   08/21/2024 97 (L) 98 - 107 mmol/L Final   11/29/2019 107 98 - 107 mmol/L Final     Carbon Dioxide (CO2)   Date Value Ref Range Status   08/21/2024 36 (H) 22 - 29 mmol/L Final   11/29/2019 26 22 - 31 mmol/L Final     Anion Gap   Date Value Ref Range Status   08/21/2024 6 (L) 7 - 15 mmol/L Final   11/29/2019 7 5 - 18 mmol/L Final     Glucose   Date Value Ref Range Status   11/29/2019 126 (H) 70 - 125 mg/dL Final     GLUCOSE BY METER POCT   Date Value Ref Range Status   08/21/2024 220 (H) 70 - 99 mg/dL Final     Urea Nitrogen   Date Value Ref Range Status   08/21/2024 14.4 8.0 - 23.0 mg/dL Final   11/29/2019 10 8 - 28 mg/dL Final     Creatinine   Date Value Ref Range Status   08/21/2024 0.53 (L) 0.67 - 1.17 mg/dL Final     GFR Estimate   Date Value Ref Range Status   08/21/2024 >90 >60 mL/min/1.73m2 Final     Comment:     eGFR calculated using 2021 CKD-EPI equation.   11/29/2019 >60 >60 mL/min/1.73m2 Final     Calcium   Date Value Ref Range Status   08/21/2024 7.7 (L) 8.8 - 10.4 mg/dL Final     Comment:     Reference intervals for this test were updated on 7/16/2024 to reflect our healthy population more accurately. There may be differences in the flagging of prior results with similar values performed with this method. Those prior results can be interpreted in the context of the updated reference intervals.        MICROBIOLOGY DATA:  Personally reviewed.  7-Day Micro Results       No results found for the last 168 hours.             RADIOLOGY:  Personally Reviewed.  Recent Results (from the past 24 hour(s))   XR Video Swallow with SLP or OT    Narrative    EXAM: XR  VIDEO SWALLOW WITH SLP OR OT  LOCATION: [facility]  DATE: 8/6/2024    INDICATION: Difficulty swallowing.  COMPARISON: None.    TECHNIQUE: Routine swallow study with speech pathology using multiple barium thicknesses.    RADIATION DOSE: Total Air Kerma 5.1 mGy    FINDINGS:   Swallow study with Speech Pathology using multiple barium thicknesses. Silent aspiration with thin liquid, including with chin tuck. Aspiration also occurred with thin liquid and smaller drink without chin tuck. No other significant penetration or   aspiration.         Principal Problem:    Sepsis with acute renal failure and tubular necrosis without septic shock (H)  Active Problems:    Benign prostatic hyperplasia    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) (H)    Hyperlipidemia    Hypertension    Long term current use of anticoagulant therapy    S/P cardiac pacemaker procedure    S/P TAVR (transcatheter aortic valve replacement)    Thrombophilia (H24)    Diabetes mellitus type 2, noninsulin dependent (H)    History of CVA (cerebrovascular accident)    Hypoxia    Hypotension, unspecified hypotension type    Community acquired pneumonia of left lower lobe of lung    Pressure injury of sacral region, stage 3 (H)    Leukocytosis, unspecified type  ;

## 2024-08-21 NOTE — PROGRESS NOTES
Care Management Follow Up    Length of Stay (days): 16    Expected Discharge Date: 08/22/2024    Anticipated Discharge Plan:       Transportation: Anticipate medical transport    PT Recommendations: Long term care facility, Per plan established by the PT  OT Recommendations:  Long term care facility     Barriers to Discharge: medical stability, placement    Prior Living Situation:   with  (from TCU)     Patient/Spokesperson Updated: No    Additional Information:      Needs for pt to discharge:     Home Care-Skilled RN needed for wound care/wound vac, ostomy care, and PICC dressing changes for IV abx. RNCM sent 8 more home care referrals yesterday with some being around the [place] area (pending).         Home Wound Vac- Pt's home wound vac was approved by the [facility] [employer] portal and is down in the stores area in the basement, until plan more finalized keep wound vac down there. Then when other parts are established bring home vac to pt's room and have son Abdoulaye sign forms for proof of delivery.         DME equipment- Pt needs at [facility], W/c, and Hosp bed, orders were faxed to [facility] by previous     08/21/24  10:19 AM  Spoke to Dr VIDALES, he will complete required documentation for DME today. Cm will fax when available.      12:27 PM  Additional documents faxed to [facility]        Home IV Abx - [facility], [facility], and [facility] following for IV abx need, once IV abx plan is finalized they can give a better quote, all plans pt will have a co pay for.     Candice Evans RN

## 2024-08-21 NOTE — PLAN OF CARE
"0700 - 1900  Problem: Adult Inpatient Plan of Care  Goal: Absence of Hospital-Acquired Illness or Injury  Intervention: Prevent and Manage VTE (Venous Thromboembolism) Risk  Recent Flowsheet Documentation  Taken 8/21/2024 0850 by Taisha Estrada RN  VTE Prevention/Management: SCDs on (sequential compression devices) Lovenox administered every 24 hours     Problem: Skin Injury Risk Increased  Goal: Skin Health and Integrity  Intervention: Plan: Nurse Driven Intervention: Moisture Management  Recent Flowsheet Documentation  Taken 8/21/2024 0942 by Taisha Estrada RN  Moisture Interventions:   Incontinence pad   Urinary collection device  Taken 8/21/2024 0850 by Taisha Estrada RN  Bathing/Skin Care: wipes, CHG  Taken 8/21/2024 0800 by Taisha Estrada RN  Moisture Interventions: Urinary collection device  Bathing/Skin Care: (colostomy cares)   wipes, CHG   other (see comments)        /50 (BP Location: Right arm)   Pulse 78   Temp 97.5  F (36.4  C) (Oral)   Resp 19   Ht 1.753 m (5' 9\")   Wt 77.6 kg (171 lb 1.2 oz)   SpO2 97%   BMI 25.26 kg/m    VSS Pt continues to have ups and downs with his mood and verbalized concerns frequently today about his significant other, struggles he is having with his family and his significant other \"getting along\".  Spiritual Care [place]aca was additionally bedside to support patient (see previous notes).  G J flushed, colostomy emptied x3 today.  Good urine output with indwelling cath positional. Wound vac in place, changed by WOC RN 8/20.  PICC infusing upon shift change. Warfarin given this evening.  Assist of 2 with caroline support if out of bed.  Q2 repositioning remains effective in alleviating  pain.  Total feed, appetite and hydration are fair today. Zoey Estrada RN                "

## 2024-08-21 NOTE — CONSULTS
"SPIRITUAL HEALTH SERVICES CONSULT NOTE  [facility]; P1    Saw pt Franklyn Sorto per Spiritual Care Consult - Follow-up visit     Patient/Family Understanding of Illness and Goals of Care - Abdoulaye is grieving the changes in his health. He notes that his backside hurts, and expresses frustration that the stroke took so much from him. He says again \"I don't know why this happened.... I just want to go be with Nuno.\" He doesn't feel that he has much left for him in life given his lack of mobility and need for care. The only goal he verbalizes (repeatedly) is that he wants his friend Narda to come to his apartment to cook for him and take him for a walk. He has confidence in the arrangements his son has made for him at a facility in [place].     Distress and Loss - Abdoulaye is adjusting to significant changes in his health. He is grieving the loss of his mobility, his home, and a relationship. He identified relational stressors around his friend Narda, noting that his son asked Narda not to come visit him in the hospital, which Abdoulaye agrees maybe is for the best.     Strengths, Coping, and Resources - Abdoulaye identifies his son and his friend Devin as sources of support. He notes that they are competent, trustworthy, and want the best for him. He enjoys watching sports on TV.     Meaning, Beliefs, and Spirituality - Abdoulaye is Rastafari.     Plan of Care: Spiritual care staff will remain available for further follow-up as requested by patient, family or staff.      Lesley Viveros M.Div.      Office: [phone] (for non-urgent requests)  Please Vocera or page through [facility] for time-sensitive requests    "

## 2024-08-22 NOTE — PLAN OF CARE
Problem: Skin Injury Risk Increased  Goal: Skin Health and Integrity  Outcome: Progressing  Intervention: Plan: Nurse Driven Intervention: Moisture Management  Recent Flowsheet Documentation  Taken 8/22/2024 0845 by Mireille Feldman RN  Moisture Interventions:   Incontinence pad   Urinary collection device  Bathing/Skin Care: wipes, CHG  Intervention: Plan: Nurse Driven Intervention: Friction and Shear  Recent Flowsheet Documentation  Taken 8/22/2024 0845 by Mireille Feldman RN  Friction/Shear Interventions: HOB 30 degrees or less  Intervention: Optimize Skin Protection  Recent Flowsheet Documentation  Taken 8/22/2024 1200 by Mireille Feldman, RN  Activity Management: up in chair  Taken 8/22/2024 0845 by Mireille Feldman, RN  Activity Management: activity adjusted per tolerance     Problem: Infection  Goal: Absence of Infection Signs and Symptoms  Outcome: Progressing     Problem: Wound  Goal: Absence of Infection Signs and Symptoms  Outcome: Progressing     Problem: Pain Acute  Goal: Optimal Pain Control and Function  Outcome: Progressing  Intervention: Develop Pain Management Plan  Recent Flowsheet Documentation  Taken 8/22/2024 0910 by Mireille Feldman RN  Pain Management Interventions: medication (see MAR)  Intervention: Prevent or Manage Pain  Recent Flowsheet Documentation  Taken 8/22/2024 0845 by Mireille Feldman, RN  Medication Review/Management: medications reviewed   Goal Outcome Evaluation:    Patient's pain controlled with scheduled Tylenol and repositioning. PICC line dressing changed as it was soiled. Javier patent and draining. Wound VAC intact. GJ tube flushed per order. Colostomy checked by surgery team today, having soft brown output.

## 2024-08-22 NOTE — PROGRESS NOTES
[facility]    Update re: discharge IV antibiotic plan costs. The estimated self pay quote for Daptomycin 400 mg IV q 24 hours and Meropenem 1g q 8 hours through [facility] is $97.18 per day for drugs/supplies.    Writer will wait for pt's decision on providers.  Updated Christina Palma RN CM.  Thank you for the referral.    Gayle Angulo RN, BSN  [facility] Liaison  [phone] (Mon through Fri, 8:00 am-5:00 pm)  [phone] (Office)

## 2024-08-22 NOTE — PROGRESS NOTES
"CLINICAL NUTRITION SERVICES - BRIEF NOTE    EVALUATION OF THE PROGRESS TOWARD GOALS   Diet: Level 7: Easy to Chew Dysphagia Diet  and Mildly Thick Thickened Liquids   Nutrition Supplement: Magic cup TID, ensure enlive TID, expedite cup daily, Gel plus BID   Intake: Progressing     8/21: 100% breakfast, 75% lunch, 75% dinner + x3 magic cups - estimated 1476 kcal, 47 g protein + po ensure, gel plus intakes not documented     8/22: 75% po at breakfast + 75% berry magic cup, 25% vanilla magic cup, 100% expedite, 75% ensure enlive po 612 kcal and 25 g protein this AM     NEW FINDINGS   Met with pt, pt son at bedside this afternoon. Pt reports taking ensure enlive, magic cup, gel plus nutrition supplements. Discussed nutrition goals with pt and pt son. Family planning to order ensure enlive, magic cup, gel plus for home.     ANTHROPOMETRICS  Height: 175.3 cm (5' 9\")  Most Recent Weight: 77.6 kg (171 lb 1.2 oz)    Weight History:   08/19/24 0923 77.6 kg (171 lb 1.2 oz) Bed scale   08/08/24 0400 72.2 kg (159 lb 2.8 oz) Bed scale   08/05/24 1159 72.6 kg (160 lb)        Wt Readings from Last 10 Encounters:   08/19/24 77.6 kg (171 lb 1.2 oz)   06/21/24 73 kg (160 lb 15 oz)   03/01/24 75.8 kg (167 lb)   11/13/23 76.2 kg (168 lb)   07/11/23 74.4 kg (164 lb)   07/07/23 74.4 kg (164 lb)   04/17/23 81.2 kg (179 lb)   01/06/23 83.9 kg (185 lb)   10/07/22 89.7 kg (197 lb 11.2 oz)   09/02/22 92.3 kg (203 lb 8 oz)     Dosing Weight: 72.6 kg     ASSESSED NUTRITION NEEDS 8/8/24  Estimated Energy Needs: 5961-7774+ kcals/day (25 - 30+ kcals/kg)  Justification: Increased needs and Wound healing  Estimated Protein Needs: 109-145 grams protein/day (1.5- 2 grams of pro/kg)  Justification: Wound healing  Estimated Fluid Needs: 1815+ mL/day (25+ mL/kg)   Justification: Maintenance    MALNUTRITION  Malnutrition Diagnosis: Moderate malnutrition  In Context of:  Chronic illness or disease    INTERVENTIONS  Implementation  Medical food supplement " therapy- Continue current supplements     Goals  Wound healing - Progressing, stable per WOC 8/20   Pt to meet >75% nutritional needs - Progressing   Electrolytes WNL - Not Met, hypophosphatemia today   BG < 180 - Not Met, hyperglycemia today     Monitoring/Evaluation  Progress toward goals will be monitored and evaluated per protocol.

## 2024-08-22 NOTE — PROGRESS NOTES
[facility] Progress Note - Hospitalist Service    Date of Admission:  8/5/2024    Assessment & Plan   88M with history of HFrEF, hyperlipidemia, hypertension, aortic stenosis status post TAVR, thrombophilia, type 2 diabetes, stage III sacral ulcer, CAD, prior CVA admitted on 8/5/2024 with altered mental status & hypotension secondary to sepsis possibly from urinary tract infection, aspiration pneumonia and suspected infected, unstageable sacral ulcer.     He was managed briefly at the ICU by instensivist on admission but did not require vasopressor. Hypotension and ARIADNE improved with IV hydration and albumin infusion.  Patient transferred to medical floor on 8/6/2024.  However, patient transferred back to ICU for possible septic shock and management with pressors.  On 8/8/2024 patient transferred back to floor.  UC grew E. coli.  BC grew ESBL E Coli & Streptococcus salivarius. Polymicrobial bacteremia thought to be secondary to infected sacral decubitus ulcer.  Patient underwent multiple bedside debridement and eventually OR debridement with diverting colostomy on 8/13/2024.       Assessment/Plan  # Septic shock, resolved  #Infected sacral decubitus ulcer;  #Polymicrobial bacteremia with ESBL and Strep salivarius due to infected sacral ulcer;  Patient underwent multiple bedside debridement and eventually OR debridement with diverting colostomy on 8/13/2024.  -S/p debridement 8/6/2024 with Cx polymicrobial (E Coli, B Fragilis, Staph Aureus, E faecium)  -Ucx ESBL E Coli & Strep salivarius  -Unable to obtain MRI secondary to intracardiac lead. CT Pelvis without any signs of osteo or abscess  --Abx by ID, currently on meropenem (8/7- )  added daptomycin for VRE (8/10- ). Per ID likely antibiotics 4 to 6 weeks.  -Stoma care, wound vac care per WOC and surgery team    #Anasarca - improving.  --Diuretics changed to daily on 8/22.  Monitor labs closely.    #Electrolyte imbalance-replace  per protocol     #Acute on chronic pain from sacral wound;  -Scheduled Tylenol, try to wean off.  -Oxycodone 2.5-5mg q4h PRN, dilaudid for breakthrough.     #History of aspiration and dysphagia due to stroke;  #Aspiration PNA vs CAP LLL  -SLP following, easy to chew level 7 and mildly thick level 2 diet with water protocol  -Video swallow study revealed silent aspiration with thin liquid, including with chin tuck  -Aspiration precaution     #Recent acute ischemic stroke (5/25/2024)  #Residual left Hemiparesis   -Recent admission 5/25-6/7/2024 at [facility] for ischemic stroke; discharged to TCU   -PT/OT      #History of the LUE DVT with thrombophilia (positive antiphospholipid antibodies, 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  INR therapeutic on 8/22.  Coumadin dosing and INR monitoring per pharmacy.     #Diabetes mellitus type 2, noninsulin dependent  -home regimen: jardiance    -inpatient: Lantus 5u daily, insulin sliding scale and hypoglycemic protocol     #Moderate Malnutrition  #S/p GJ tube, present on admission   -On 8/21, personally discussed with registered dietitian, reported patient not meeting his nutritional needs, recommended oximetry pending.  However, patient declined tube feeding and protein supplements encouraged.     #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   #H/o HFrEF with EF recovery  #CAD   -History of distal RCA stent, last echo 8/2019 with EF 50-55%  -Had hypotension with resumption of low dose coreg.  Started low dose toprol-xl instead and tolerating.     -PTA crestor on hold due to patient on daptomycin    #GERD/Hx PUD   -Continue PPI     Dispo:  -Ordered DME (Kevin lift, hospital bed, wheelchair)  -Outpatient palliative care consult placed     Patient needs Kevin lift;  -Beneficiary requires transfer between bed and chair, wheelchair, or commode is required and without the use of lift, the beneficiary would be bed confined and  -Beneficiary  "requires help from another person to transfer between a wheelchair, bed, commode or other surfaces in the home and  -Beneficiary cannot be safely transferred without a lift due to recipients medical conditions or the caregivers limitations and  -That the left is documented as fitting and all necessary parts of recipients home.            Diet: Snacks/Supplements Adult: Magic Cup; With Meals  Snacks/Supplements Adult: Expedite Cup; With Meals  Room Service  Easy to Chew Diet (level 7) Mildly Thick (level 2) (Water protocol)  Snacks/Supplements Adult: Ensure Enlive; With Meals  Snacks/Supplements Adult: Gelatein Plus; Between Meals    DVT Prophylaxis: Warfarin  Javier Catheter: PRESENT, indication: Acute retention or obstruction, Wound deterioration and failed external collection device  Lines: PRESENT      PICC 08/15/24 Single Lumen Right Brachial vein medial IV Antibiotics-Site Assessment: WDL      Cardiac Monitoring: None  Code Status: No CPR- Do NOT Intubate      Clinically Significant Risk Factors              # Hypoalbuminemia: Lowest albumin = 2.2 g/dL at 8/5/2024 11:55 AM, will monitor as appropriate     # Hypertension: Noted on problem list          # DMII: A1C = N/A within past 6 months   # Overweight: Estimated body mass index is 25.26 kg/m  as calculated from the following:    Height as of this encounter: 1.753 m (5' 9\").    Weight as of this encounter: 77.6 kg (171 lb 1.2 oz).   # Moderate Malnutrition: based on nutrition assessment    # Financial/Environmental Concerns:                 Disposition Plan     Medically Ready for Discharge: Anticipated in 2-4 Days             Jamshid Gardner MD  Hospitalist Service  [facility]  Securely message with [employer] (more info)  Text page via [employer] Paging/Directory   ______________________________________________________________________    Interval History   Patient seen and examined.  Notes, labs, imaging report personally reviewed.  Patient " reported intermittent buttock pain from his wound otherwise denied new concern or complaints.  Discussed with nursing staff at bedside.  Discussed with care manager/.  Called patient's son Mr. Stanford again but he did not  the phone, I did not leave any message, I will call him later today again.  Addendum, finally able to discussed with patient's son at bedside, discussed about ongoing medical issues, management, disposition plan.  Also he requested for FMLA paper filled out which I did    Physical Exam   Vital Signs: Temp: 97.1  F (36.2  C) Temp src: Oral BP: 136/77 Pulse: 73   Resp: 18 SpO2: 96 % O2 Device: None (Room air)    Weight: 171 lbs 1.23 oz      General: Not in obvious distress.  HEENT: Normocephalic, supple neck  Chest: Clear to auscultation bilateral anteriorly, no wheezing  Heart: S1S2 normal, regular  Abdomen: Soft.  No significant tenderness appreciated.  G-tube in place, colostomy bag in place with brown stool.  Extremities: Bilateral lower extremity swelling improving.  Neuro: alert and awake, residual left-sided weakness from recent stroke.        Medical Decision Making             Data     I have personally reviewed the following data over the past 24 hrs:    N/A  \   8.4 (L)   / 192     140 101 18.2 /  200 (H)   4.0; 4.0 32 (H) 0.50 (L); 0.50 (L) \     INR:  2.05 (H) PTT:  N/A   D-dimer:  N/A Fibrinogen:  N/A       Imaging results reviewed over the past 24 hrs:   No results found for this or any previous visit (from the past 24 hour(s)).

## 2024-08-22 NOTE — PROGRESS NOTES
"Care Management Follow Up    Length of Stay (days): 17    Expected Discharge Date: 08/22/2024    Anticipated Discharge Plan:  Goal is to d/c to [facility] (not [facility]) [address] [place] [address] [address]. w/24/7 care from [facility] for 24/7 PCA private pay care #[phone].      Transportation: TBD    PT Recommendations: Long term care facility, Per plan established by the PT  OT Recommendations:  Long term care facility       Barriers to Discharge: Surgery ok with discharging pt, ID plan in.   CM working on getting DME delivered to pt's apartment, home RN for wound vac/picc dressing changes, and clarifying home infusion quotes.      Prior Living Situation: \"Goal is to d/c to [facility] (not [facility]) [address] [place] [address] [address] w/24/7 care from [facility] -[phone]. Requires assist w/ADLs/IADLs. Transfers w/lift and not ambulating. Tony Stanford is primary contact. [facility] stretcher-cost discussed. Will need DME and palliative outpatient to follow. Will also need skilled HC for wound care vac, ostomy, and IV abx need. DME orders sent to [facility]. Referral sent to [place] and [facility] for benefit check for IV ABX. Pt does not qualify for LTACH, only 2 night ICU stay.\"       Patient/Spokesperson Updated: Yes. Who? Pt's tony Stanford       Additional Information:  Chart reviewed    Cm updates:  Needs for pt to discharge:     Home Care-Skilled RN needed for wound care/wound vac, ostomy care, and PICC dressing changes for IV abx. Accurate home care able to accept with SOC Tues 8/27.          Home Wound Vac- Pt's home wound vac was approved by the [facility]. Proof of delivery forms signed by tony Stanford, and faxed to [employer] fax #[phone]. Liaison also informed of pt's new address via email. Pt's home wound vac is in bag in the pt's bathroom at the hospital. Bedside RN knows not to place on pt yet.           DME equipment- " Pt needs at kevin lift, W/c, and Hosp bed, updated medical information for w/c and hosp bed sent to [facility] yesterday 8/21. RNCM called [facility] today this AM, [phone]. They did receive updated information/MD progress notes. The person writer spoke to said that the w/c and hosp bed info looks good for pt's insurance to start processing, but that they need more info regarding kevin lift. [facility] plans to send writer form to fill out for kevin lift, faxing it to CM office fax# [phone]. Kevin form received, Hosp plans to fill out in progress note today.         3:15pm- RNCM faxed kevin lift progress note to [facility] fax #[phone]. RNCM will check in the Am with [facility] if they received.       RNCM verified with tony Stanford that DME equipment will be delivered to [facility] (not [facility]) [id number] [place] [id number].          Home IV Abx - [facility], and [facility] following. RNCM closed referral for [facility] found out today that pt's quote through them would be very expensive. [facility] called back and quote will be 158.58/day. [facility] says quote is $97.18/day total for drug/supplies. [facility] is the cheapest quote. RNCM messaged [facility] to update them that they are the cheapest option for pt. Tony Stanford agreeable to plan and cost. [facility] to reach out to tony Stanford for main contact, # on pt's facesheet.         Pt's son is at the hospital until about 2:30pm today, he is hoping for FMLA paperwork to be filled out, hosp plans to do today.           Cm will continue to follow plan of care,review recommendations, and assist with any discharge needs anticipated.       Christina Palma RN

## 2024-08-22 NOTE — PLAN OF CARE
Problem: Comorbidity Management  Goal: Blood Glucose Levels Within Targeted Range  8/21/2024 2147 by Leon Macias, RN  Outcome: Progressing     Problem: Comorbidity Management  Goal: Blood Pressure in Desired Range  8/21/2024 2147 by Leon Macias, RN  Outcome: Progressing     Problem: Pain Acute  Goal: Optimal Pain Control and Function  Outcome: Progressing  Intervention: Develop Pain Management Plan  Recent Flowsheet Documentation  Taken 8/21/2024 2055 by Leon Macias, RN  Pain Management Interventions: medication (see MAR)   Goal Outcome Evaluation:       A&Ox4. Able to make needs known. Vitals stable on room air. Has 4/10 buttock pain, gave prn oxycodone. G-tube flushed and clamped, patent. Javier cath patent with yellow urine output. Colostomy with moderate stools, loose and brown. Repo with assist x2.

## 2024-08-22 NOTE — PROGRESS NOTES
General Surgery Progress Note:    Hospital Day # 17    ASSESSMENT:  1. Pressure injury of sacral region, unstageable (H)    2. Community acquired pneumonia of left lower lobe of lung    3. Pressure injury of sacral region, stage 3 (H)    4. Hypotension, unspecified hypotension type    5. Hypoxia    6. History of stroke    7. Colostomy care (H)    8. Pressure injury of sacral region, stage 4 (H) [L89.154]    9. Wound infection        Franklyn Sorto is a 88 year old male stage IV infected sacral decubitus ulcer who is s/p wound debridement and laparoscopic diverting colostomy on 8/13/2024.  Stoma continues to appear dusky and thin-walled today, did not appreciate further fluid collection/release, wound VAC continuing to hold with suction anticipate change Tuesdays and Fridays.    PLAN:  -Diet as tolerated  -Continue monitoring stoma and bowel productions  -If patient remains inpatient will change wound VAC with WOC tomorrow  -Okay to discharge from surgical standpoint if deemed medically appropriate.  -Discharge recommendations involve instructions placed in AVS.  -Follow up will be arranged  -Medical management per primary team    SUBJECTIVE:   Franklyn Sorto was seen on rounds.  Patient states he is doing okay, he is still down about the whole situation and is really worried about infection.  Discussion regarding watching the ostomy and keeping the wound VAC intact.  Patient is eating meals and has eaten plenty of protein, including Jell-O's and tomato soup and ensures.      Patient Vitals for the past 24 hrs:   BP Temp Temp src Pulse Resp SpO2   08/22/24 0807 136/77 97.1  F (36.2  C) Oral 73 18 96 %   08/22/24 0019 102/55 97.6  F (36.4  C) Oral 75 18 97 %   08/21/24 1542 125/50 97.5  F (36.4  C) Oral 78 19 97 %         PHYSICAL EXAM:  General: patient seen resting in bed in no acute distress  Resp: no increased work of breathing, breathing comfortably on room air  Abdomen: Generally soft and nontender,  ecchymosis along the left lower quadrant and flank better  Drains: Relatively moderate amount of stool in the appliance, removed and malodorous gas also escape.  Some red tinge but patient is exclusively eating red Jell-O and tomato soup.  Ostomy remains dusky, no major sloughing visualized on exam.  Remains thin-walled    Return patient to reassess the seal on the wound VAC and remains intact and placed to suction.  Did not fully undressed the wound as he will be changed tomorrow.  Extremities: No edema or cyanosis visualized on exam, no obvious deformities    08/21 0700 - 08/22 0659  In: 970 [P.O.:840; I.V.:10]  Out: 1250 [Urine:1250]    No results displayed because visit has over 200 results.           [facility] General Surgery  [address] [place] [id number]  [facility] [phone]

## 2024-08-23 NOTE — PLAN OF CARE
Problem: Adult Inpatient Plan of Care  Goal: Optimal Comfort and Wellbeing  Outcome: Progressing  Intervention: Monitor Pain and Promote Comfort  Recent Flowsheet Documentation  Taken 8/22/2024 2145 by Edna Pickett RN  Pain Management Interventions:   rest   emotional support   care clustered  Taken 8/22/2024 2050 by Edna Pickett RN  Pain Management Interventions:   medication (see MAR)   repositioned     Problem: Risk for Delirium  Goal: Improved Sleep  Outcome: Progressing     Problem: Skin Injury Risk Increased  Goal: Skin Health and Integrity  Outcome: Progressing  Intervention: Plan: Nurse Driven Intervention: Moisture Management  Recent Flowsheet Documentation  Taken 8/22/2024 2348 by Edna Pickett RN  Moisture Interventions:   No brief in bed   Fecal collection device   Urinary collection device  Taken 8/22/2024 2050 by Edna Pickett RN  Moisture Interventions:   No brief in bed   Fecal collection device   Urinary collection device  Intervention: Plan: Nurse Driven Intervention: Friction and Shear  Recent Flowsheet Documentation  Taken 8/22/2024 2348 by Edna Pickett RN  Friction/Shear Interventions:   HOB 30 degrees or less   Silicone foam sacral dressing   Repositioning device (TAP system, etc.)  Taken 8/22/2024 2050 by Edna Pickett RN  Friction/Shear Interventions:   HOB 30 degrees or less   Silicone foam sacral dressing   Repositioning device (TAP system, etc.)  Intervention: Optimize Skin Protection  Recent Flowsheet Documentation  Taken 8/23/2024 0430 by Edna Pickett RN  Activity Management: activity adjusted per tolerance  Head of Bed (HOB) Positioning: HOB at 20-30 degrees  Taken 8/23/2024 0230 by Edna Pickett RN  Activity Management: activity adjusted per tolerance  Head of Bed (HOB) Positioning: HOB at 20-30 degrees  Taken 8/22/2024 2348 by Edna Pickett RN  Activity Management: activity adjusted per tolerance  Head of Bed (HOB) Positioning: HOB at  20-30 degrees  Taken 8/22/2024 2145 by Edna Pickett, RN  Activity Management: activity adjusted per tolerance  Head of Bed (HOB) Positioning: HOB at 20-30 degrees  Taken 8/22/2024 2050 by Edna Pickett, RN  Head of Bed (HOB) Positioning: HOB at 20-30 degrees   Goal Outcome Evaluation:  A&Ox4. Kevin transfer. Complained of left arm pain relieved with PRN tylenol, elevated positioning, and rest. 0200 . Phos protocol recheck this morning. Q2h repositioning completed. Colostomy, wound vac, and cutler intact.

## 2024-08-23 NOTE — PROGRESS NOTES
[facility] Progress Note - Hospitalist Service    Date of Admission:  8/5/2024    Assessment & Plan   88M with history of HFrEF, hyperlipidemia, hypertension, aortic stenosis status post TAVR, thrombophilia, type 2 diabetes, stage III sacral ulcer, CAD, prior CVA admitted on 8/5/2024 with altered mental status & hypotension secondary to sepsis possibly from urinary tract infection, aspiration pneumonia and suspected infected, unstageable sacral ulcer.     He was managed briefly at the ICU by instensivist on admission but did not require vasopressor. Hypotension and ARIADNE improved with IV hydration and albumin infusion.  Patient transferred to medical floor on 8/6/2024.  However, patient transferred back to ICU for possible septic shock and management with pressors.  On 8/8/2024 patient transferred back to floor.  UC grew E. coli.  BC grew ESBL E Coli & Streptococcus salivarius. Polymicrobial bacteremia thought to be secondary to infected sacral decubitus ulcer.  Patient underwent multiple bedside debridement and eventually OR debridement with diverting colostomy on 8/13/2024.       Assessment/Plan  # Septic shock, resolved  #Infected sacral decubitus ulcer;  #Polymicrobial bacteremia with ESBL and Strep salivarius due to infected sacral ulcer;  Patient underwent multiple bedside debridement and eventually OR debridement with diverting colostomy on 8/13/2024.  -S/p debridement 8/6/2024 with Cx polymicrobial (E Coli, B Fragilis, Staph Aureus, E faecium)  -Ucx ESBL E Coli & Strep salivarius  -Unable to obtain MRI secondary to intracardiac lead. CT Pelvis without any signs of osteo or abscess  --Abx by ID, currently on meropenem (8/7- )  added daptomycin for VRE (8/10- ). Per ID likely antibiotics 4 to 6 weeks.  -Stoma care, wound vac care per WOC and surgery team    #Anasarca - improving.  --Diuretics changed to daily on 8/22.  Monitor labs closely.    #Electrolyte imbalance-replace  per protocol     #Acute on chronic pain from sacral wound;  -Scheduled Tylenol, try to wean off.  -Oxycodone 2.5-5mg q4h PRN, dilaudid for breakthrough.     #History of aspiration and dysphagia due to stroke;  #Aspiration PNA vs CAP LLL  -SLP following, easy to chew level 7 and mildly thick level 2 diet with water protocol  -Video swallow study revealed silent aspiration with thin liquid, including with chin tuck  -Aspiration precaution     #Recent acute ischemic stroke (5/25/2024)  #Residual left Hemiparesis   -Recent admission 5/25-6/7/2024 at [facility] for ischemic stroke; discharged to TCU   -PT/OT      #History of the LUE DVT with thrombophilia (positive antiphospholipid antibodies, 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  INR therapeutic on 8/22.  Coumadin dosing and INR monitoring per pharmacy.     #Diabetes mellitus type 2, noninsulin dependent  -home regimen: jardiance    -inpatient: Lantus 5u daily, insulin sliding scale and hypoglycemic protocol     #Moderate Malnutrition  #S/p GJ tube, present on admission   -On 8/21, personally discussed with registered dietitian, reported patient not meeting his nutritional needs, recommended oximetry pending.  However, patient declined tube feeding and protein supplements encouraged.     #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   #H/o HFrEF with EF recovery  #CAD   -History of distal RCA stent, last echo 8/2019 with EF 50-55%  -Had hypotension with resumption of low dose coreg.  Started low dose toprol-xl instead and tolerating.     -PTA crestor on hold due to patient on daptomycin    #GERD/Hx PUD   -Continue PPI     Dispo:  -Ordered DME (Kevin lift, hospital bed, wheelchair)  -Outpatient palliative care consult placed             Diet: Snacks/Supplements Adult: Magic Cup; With Meals  Snacks/Supplements Adult: Expedite Cup; With Meals  Room Service  Easy to Chew Diet (level 7) Mildly Thick (level 2) (Water  "protocol)  Snacks/Supplements Adult: Ensure Enlive; With Meals  Snacks/Supplements Adult: Gelatein Plus; Between Meals    DVT Prophylaxis: Warfarin  Javier Catheter: PRESENT, indication: Acute retention or obstruction, Wound deterioration and failed external collection device  Lines: PRESENT      PICC 08/15/24 Single Lumen Right Brachial vein medial IV Antibiotics-Site Assessment: WDL      Cardiac Monitoring: None  Code Status: No CPR- Do NOT Intubate      Clinically Significant Risk Factors              # Hypoalbuminemia: Lowest albumin = 2.2 g/dL at 8/5/2024 11:55 AM, will monitor as appropriate     # Hypertension: Noted on problem list          # DMII: A1C = N/A within past 6 months   # Overweight: Estimated body mass index is 25.26 kg/m  as calculated from the following:    Height as of this encounter: 1.753 m (5' 9\").    Weight as of this encounter: 77.6 kg (171 lb 1.2 oz).   # Moderate Malnutrition: based on nutrition assessment    # Financial/Environmental Concerns:                 Disposition Plan     Medically Ready for Discharge: Anticipated in 5+ Days, awaiting for Kevin lift, hospital bed, wheelchair.             Jamshid Gardner MD  Hospitalist Service  [facility]  Securely message with [employer] (more info)  Text page via [employer] Paging/Directory   ______________________________________________________________________    Interval History   Patient seen and examined.  Notes, labs, imaging report personally reviewed.  Patient denied significant abdomen pain.  No nausea or vomiting.  Decubitus ulcer pain fairly controlled.  Denies short of breath or chest pain.  Discussed with nursing staffs at bedside.  Discussed with care manager/.    Physical Exam   Vital Signs: Temp: 98.3  F (36.8  C) Temp src: Oral BP: 123/57 Pulse: 69   Resp: 18 SpO2: 98 % O2 Device: None (Room air)    Weight: 171 lbs 1.23 oz      General: Not in obvious distress.  HEENT: Normal cephalic, supple neck  Chest: " Clear to auscultation bilateral anteriorly, no wheezing  Heart: S1S2 normal, regular  Abdomen: Soft.  No significant tenderness appreciated.  Colostomy bag in place with brown stool  Extremities: Bilateral lower extremity swelling improving  Neuro: alert and awake, left-sided weakness due to recent stroke        Medical Decision Making             Data     I have personally reviewed the following data over the past 24 hrs:    INR:  2.03 (H) PTT:  N/A   D-dimer:  N/A Fibrinogen:  N/A       Imaging results reviewed over the past 24 hrs:   No results found for this or any previous visit (from the past 24 hour(s)).

## 2024-08-23 NOTE — PROGRESS NOTES
"[facility] Nurse Inpatient Assessment       Consulted for: Sacral    8/15 visit to teach ostomy cares to son and health care worker that will visit patient after discharge.  VAC intact and sacrum not assessed today.    Patient History (according to provider note(s):      HPI: Franklyn Sorto is a 88 year old male with h/o T2DM, GERD, HTN, CVA 5/25/24 currently in TCU presenting to the ER for evaluation of fatigue and altered mental status. Patient's family was visiting today and states when they got there \"5 staff members were around the patient and he was not responding\". They called EMS who initially got a BP with 90 systolic, however, repeat on the way over was 65 systolic. Per patient's family, patient had feeding tube placed following his stroke as he was having difficulty swallowing.  Patient has improved a fair amount and is now able to swallow, has not used his feeding tube in over a month.  Patient's family states that they are quite adamant about him only drinking and eating well sitting up but they are nervous that his facility may have given him some fluid while laying down that could have resulted in an aspiration pneumonia. Patient denies any recent fevers. States he has been coughing a bit more, no sputum production.     Vitals reviewed, initial BP upon arrival to the ER was 69/48. Temp 97.5F. SpO2 89% on room air. 2L nasal cannula was placed and patient quickly improved to 98% on room air On exam he is pale but nontoxic-appearing.  He is speaking in full sentences and is alert. Differential diagnosis includes but not limited to UTI, pyelonephritis, bronchitis, pneumonia, PE, ACS, hypovolemia, cardiogenic shock, septic shock. After initial 500 mL fluid bolus his BP has improved to 87/47. He was given an additional 500 mL and improved to 90/66.        Assessment:      Ostomy not assessed today 8/16: previous assessment below    Pressure Injury Location: Negative pressure " wound therapy applied to: Sacrum     8/6 8/7 8/9 8/12 8/14 8/16    Last photo: 8/16  Wound type: Pressure Injury     Pressure Injury Stage: 4, present on admission   Wound history/plan of care:   Came from TCU. 8/20, small bedside debridement by PA    Wound base: unhealthy granular tissue with exposed fascia, <10% adherent slough, bone palpated     Palpation of the wound bed: normal      Drainage: moderate     Description of drainage: serosanguinous     Measurements (length x width x depth, in cm) 9  x 6  x  2.4 cm     Tunneling NA     Undermining all around, 1-2cm  Periwound skin:  discolored      Color: dusky, pink, and red      Temperature: warm  Odor: none  Pain: mild and during dressing change, tender  Pain intervention prior to dressing change: slow and gentle cares   Treatment goal: Drainage control, Infection control/prevention, Increase granulation, Protection, Remove necrotic tissue, and Soften necrotic tissue  STATUS: stable    Surgical date: 8/13   Service following: surgery  Date Negative Pressure Wound Therapy initiated: 8/14   Interventions in place: repositioning, pressure redistribution with device or dressing, moisture/incontinence management, and offloading  Is patient s nutritional status compromised? no   If yes, what interventions are in place? Protein supplements  Reason for initiating vac therapy? Presence of co-morbidities and Need for accelerated granulation tissue  Which?of?the?following?co-morbidities?apply? Diabetes  If diabetic is patient on a diabetic management program? Yes   Is osteomyelitis present in wound? no   If yes what treatments are in place? N/A        Volume in cannister: 200     Last cannister change date: 8/20       Number of foam pieces removed from a wound (excluding foam for bridge) :  1 GranuFoam Black and 1 Oil  emulsion dressing   Verified this matched the number of foam pieces applied last dressing change: Yes   Number of foam pieces packed into wound (excluding foam for bridge) :  1 Guevara Black - bridge to L hip  ___________________________________________________________________________________________________________________________________________________________________________________________________________________________________________  Did not assess ostomy 8/23, nurse had already changed. Previous assessment:  Assessment of new end Colostomy:  Diagnosis Pertinent to Stoma:  large sacrococcygeal wound       Surgery Date: 8/14  Surgeon:[facility]       [facility]ospital[facility]  Pouching system in place on assessment today: Martha one piece   Pouch barrier status: intact  Pouch last changed/wear time: post op pouch intact  Reason for pouch change today: ostomy education and initial post-op assessment  Effectiveness of current pouching/ supply plan: Effective  Change made with ostomy management today: Yes  Pouching system placed today: Martha two piece flat  Supplies: gathered        8/14 8/20    Last photo: 8/20  Stoma location: [facility]  Stoma size: 40mm  Stoma appearance: dusky, round, and moist - venous congestion, some bloody purulence noted with PA at bedside, appears to be from under the sloughing mucosa. Second PA came to assess as well. Patient in a 2 piece pouch for easy assessment by surgery team.   Mucocutaneous junction:  intact  Peristomal complication(s): none   Output: stool/blood   Output volume emptied during visit: 250ml  Abdominal assessment: Soft  Surgical site(s): dressing dry and intact  NG still in place? No  Pain: Sharp, Dull ache, and Fullness  Is patient still on a PCA? No              Treatment Plan:     LLQ Colostomy pouching plan:   Pouching system: ostomy supplies pouches: Martha 57 FECAL (107876) ostomy  "supplies barrier: [place] 57mm FLAT (193354)  Accessories used: [facility] ostomy accessories: 2\" Cera Barrier Ring (202634) and Cavilon no sting barrier film (192362)   Frequency of pouch changes: Twice weekly and PRN leakage  WO follow up plan: Twice weekly and As needed   Bedside RN interventions: Change pouch PRN if leaking using the supplies above, Empty pouch when 1/3 to 1/2 full, ensure to clean pouch outlet after emptying to prevent odor, Notify WO for ongoing pouch leakage, Document stoma appearance and output volume, color, and consistency every shift, Encourage patient to empty pouch with assist, and Assist patient to measure and record output     Negative pressure wound therapy plan:  Wound location: Sacrococcygeal   Change Days: Tues/ Fri by [facility] RN    Supplies (including all accessories) used: medium Black foam , Adaptic/Curity oil emulsion contact layer , Adapt barrier ring, and Cavilon no sting barrier film  Cleanse with Vashe prior to replacing NPWT  Suction setting: -125   Methods used: Window paned all periwound skin with vac drape prior to applying sponge, Bridged trac pad off bony prominences, and Placed barrier ring into periwound creases to improve seal    Staff RN to assess integrity of dressing and ensure suction is set at appropriate level every shift.   Date canister. Chart canister output every shift. Change cannister weekly and PRN if full/occluded     Remove foam dressing and replace with BID normal saline moist gauze dressing if:   -a dressing failure which cannot be repaired within 2 hours   -patient is discharging to home without a home pump   -patient is discharging to a facility outside the local area   -if a dressing is a \"Silver Foam\", remove before Radiation Therapy or MRI     The hospital VAC pump is not to be discharged with the patient.?Ensure to disconnect patient from machine prior to discharge. Then,    - If a home KCI VAC pump has been delivered, connect home cannister to " "dressing tubing then connect cannister to home pump and turn on machine    - If transferring to a nearby facility with a KCI vac, can disconnect and clamp tubing then cover end with glove so can be reconnected within 2 hours       Pressure Injury Prevention (PIP) Plan:  If patient is declining pressure injury prevention interventions: Explore reason why and address patient's concerns, Educate on pressure injury risk and prevention intervention(s), If patient is still declining, document \"informed refusal\" , and Ensure Care team is aware ( provider, charge nurse, etc)  Mattress: Follow bed algorithm, reassess daily and order specialty mattress, if indicated.  HOB: Maintain at or below 30 degrees, unless contraindicated  Repositioning in bed: Every 1-2 hours , Left/right positioning; avoid supine, Raise foot of bed prior to raising head of bed, to reduce patient sliding down (shear), and Frequent microturns using positioning wedges, as patient tolerates  Heels: Keep elevated off mattress, Pillows under calves, and Heel lift boots  Protective Dressing:  wound care to sacrum  Positioning Equipment:Positioning wedges (#734385) to help maintain 30 degree side lying position   Chair positioning: Chair cushion (#207534)    If patient has a buttock pressure injury, or high risk for PI use chair cushion or SPS.  Moisture Management: Perineal cleansing /protection: Follow Incontinence Protocol, Avoid brief in bed, Clean and dry skin folds with bathing , Consider InterDry (#583583) between folds, and Moisturize dry skin  Under Devices: Inspect skin under all medical devices during skin inspection , Ensure tubes are stabilized without tension, and Ensure patient is not lying on medical devices or equipment when repositioned  Ask provider to discontinue device when no longer needed.      Orders: Reviewed    RECOMMEND PRIMARY TEAM ORDER: None, at this time  Education provided: importance of repositioning, plan of care, and wound " progress  Discussed plan of care with: Patient, Nurse, and Physicians Assistant  [facility] nurse follow-up plan: twice weekly  Notify [facility] if wound(s) deteriorate.  Nursing to notify the Provider(s) and re-consult the [facility] Nurse if new skin concern.    DATA:     Current support surface: Standard  Low air loss (TORY pump, Isolibrium, Pulsate)  Containment of urine/stool: Incontinence Protocol  BMI: Body mass index is 25.26 kg/m .   Active diet order: Orders Placed This Encounter      Easy to Chew Diet (level 7) Mildly Thick (level 2) (Water protocol)     Output: I/O last 3 completed shifts:  In: 90 [NG/GT:90]  Out: 1200 [Urine:450; Stool:750]     Labs:   Recent Labs   Lab 08/23/24  0602 08/22/24  0603 08/21/24  0530 08/20/24  0543   HGB  --  8.4*  --  8.8*   INR 2.03* 2.05*   < > 1.66*   WBC  --   --   --  9.5  9.3    < > = values in this interval not displayed.     Pressure injury risk assessment:   Sensory Perception: 3-->slightly limited  Moisture: 3-->occasionally moist  Activity: 1-->bedfast  Mobility: 1-->completely immobile  Nutrition: 2-->probably inadequate  Friction and Shear: 1-->problem  Dilan Score: 11    Tenisha Recinos, SUZANNEN, RN, PHN, HNB-BC, CWOCN  Pager no longer is use, please contact through [employer] group: [facility]

## 2024-08-23 NOTE — PROGRESS NOTES
General Surgery Progress Note:    Hospital Day # 18    ASSESSMENT:  1. Pressure injury of sacral region, unstageable (H)    2. Community acquired pneumonia of left lower lobe of lung    3. Pressure injury of sacral region, stage 3 (H)    4. Hypotension, unspecified hypotension type    5. Hypoxia    6. History of stroke    7. Colostomy care (H)    8. Pressure injury of sacral region, stage 4 (H) [L89.154]    9. Wound infection        Franklyn Sorto is a 88 year old male stage IV infected sacral decubitus ulcer who is s/p wound debridement and laparoscopic diverting colostomy on 8/13/2024.  Stoma continues to appear dusky and thin-walled though productive of gas and soft stools. Wound vac continued with expected change this afternoon.  Patient okay to discharge from surgical standpoint, appears to be awaiting delivery of Kevin lift and other supplies for apartment.    ADDENDUM 1450: see physical exam below for wound cares - no further debridement done and wound vac changed with WOC. Patient unable to travel to clinic appointment so have deferred in person appointment but if he has concerns or stoma stops being productive of gas/stool or if it becomes darker than present should call our clinic for assessment in person.    PLAN:  -Diet as tolerated, per nutrition  -Previously made a follow up appt with our team - patient will be unable to transport for an in-person appointment.   -Will coordinate with WO regarding wound vac change today, Follow-up with wound clinic / home cares outpatient  -Patient will need help with wound VAC changes and ostomy cares/appliance changes.  Encourage home care and patient to call our office if ostomy becomes dark or no stool / gas output.  -Okay for discharge from surgical standpoint. Surgery will sign off today after vac change  -Medical management per primary team    SUBJECTIVE:   Franklyn Sorto was seen on rounds.  Patient states he did have quite the appetite today and just  finished breakfast.  His friend is in the room and they are chatting.  Patient states he is trying his best to eat as much protein as possible so that he can heal himself.  States his left arm does hurt and he is surprised at how edematous it is.  Discussion regarding home supplies and potential timing of discharge, patient is still anxious he does not feel as though everything is being figured out, reassured patient that many teams are working and we are just waiting on delivery of supplies.  Denies fever, chills, nausea, vomiting.      Patient Vitals for the past 24 hrs:   BP Temp Temp src Pulse Resp SpO2   08/23/24 0857 123/57 -- -- 69 -- --   08/23/24 0730 118/57 98.3  F (36.8  C) Oral 70 18 98 %   08/23/24 0003 118/56 98.2  F (36.8  C) Oral 72 18 96 %   08/22/24 1537 126/58 98.2  F (36.8  C) Oral 71 18 99 %         PHYSICAL EXAM:  General: patient seen resting in bed in no acute distress, breakfast tray with majority eaten including Ensure and protein cups  Resp: no increased work of breathing, breathing comfortably on room air  Abdomen: Generally soft and nontender with incision sites clean, dry, intact and well-healing.  Drains: Stoma appears dusky as in days prior, no significant change.  Productive of soft light brown stool and malodorous gas.  Wound VAC: Appears to be holding suction.  Did not do a full evaluation and is planning to do 1 this afternoon.    Addendum: Wound appears with some points of new granulation tissue, some further devitalized sticky tissue along the sacral bone but unable to truly debride any further with sharp instruments.  Does not appear as though any further necrotic tissue needs to be debrided, actually had a little bit of bleeding along the patient's right side of the circular wound.  Helped WOC replaced the wound VAC and help to suction.  Patient tolerated the procedure well and was not premedicated.      Extremities: No edema or cyanosis visualized on exam, no obvious  deformities    08/22 0700 - 08/23 0659  In: 90   Out: 1500 [Urine:700]    No results displayed because visit has over 200 results.           [facility]  [address] [place] [address] [id number] [place] [address] [phone]

## 2024-08-23 NOTE — PROGRESS NOTES
"Care Management Follow Up    Length of Stay (days): 18    Expected Discharge Date: 08/28/2024    Anticipated Discharge Plan:   [facility] [address] [place] [address] [address]. w/24/7 care from [facility] for 24/7 PCA private pay care #[phone].      Transportation: TBD/ likely medical transport     PT Recommendations: Long term care facility, Per plan established by the PT  OT Recommendations:  Long term care facility     Barriers to Discharge: DME , needs to be delivered to pt's apartment prior to pt discharging.     Prior Living Situation:  \"[facility]al [facility] [address] [place] [address] [address] w/24/7 care from [facility] -[phone]. Requires assist w/ADLs/IADLs. Transfers w/lift and not ambulating. Tony Stanford is primary contact. [facility] stretcher-cost discussed. Will need DME and palliative outpatient to follow. Will also need skilled HC for wound care vac, ostomy, and IV abx need. [facility]ME orders sent to [facility]. Referral sent to [facility] and [facility] for benefit check for IV ABX. Pt does not qualify for LTACH, only 2 night ICU stay.\"       Main family contact is tony Stanford  [facility] Nusrat Sandoval [phone]       Patient/Spokesperson Updated: Yes pt's tony Stanford and PCA Nusrat.     Additional Information:  Chart reviewed.     Cm updates:  Needs for pt to discharge:     Home Care-Skilled RN needed for wound care/wound vac, ostomy care, and PICC dressing changes for IV abx. [facility] able to accept with SOC Tues 8/27.            Home Wound Vac- Pt's home wound vac was approved by the [facility]. Proof of delivery forms signed by tony Stanford, [facility] #[phone], on 8/22. Liaison also informed of pt's new address via email. Pt's home wound vac is in bag in the pt's bathroom at the hospital. Bedside RN knows not to place on pt yet.          "   DME equipment- Pt needs at caroline lift, W/c, and Hosp bed. RNCM called [facility] today# [phone]. They did receive updated information/MD progress notes, and the person writer spoke to said that all supporting documentation for all 3 pieces of equipment look good and that she plans to process this today.       To follow up with delivery CM is to call  [facility] # [phone] ext. [phone] for the service and delivery team. They also plan to call pt's son Abdoulaye phone # provided to plan a time to deliver. Writer stated that son is shooting for Tues 8/27 to deliver, they placed this in notes.            DME equipment will be delivered to [facility] [address] [place] [address]. RNCM updated [facility] of pt's address today.       Abdoulaye updated that the [facility] team will be calling him to set up delivery.        Home IV Abx - [facility], and [facility] following. RNCM closed referral for [facility] found out today that pt's quote through them would be very expensive. [facility] called back and quote will be 158.58/day. [facility] says quote is $97.18/day total for drug/supplies. [facility] is the cheapest quote. Tony Stanford agreeable to plan and cost. [facility] to reach out to tony Stanford for main contact, # on pt's facesheet. [facility] is able to do pt's teach early next week.          Pt's PCA Nusrat will be teachable party for IV abx, she is avail next Mon or Tues for teach (just not weds). There will be another PCA coming with her as well. [facility] to contact Nusrat #[phone] to set up teach.          Cm will continue to follow plan of care,review recommendations, and assist with any discharge needs anticipated.        Christina Palma RN

## 2024-08-23 NOTE — PLAN OF CARE
Problem: Adult Inpatient Plan of Care  Goal: Absence of Hospital-Acquired Illness or Injury  Intervention: Identify and Manage Fall Risk  Recent Flowsheet Documentation  Taken 8/23/2024 0900 by May Gómez RN  Safety Promotion/Fall Prevention:   activity supervised   clutter free environment maintained   increased rounding and observation   room organization consistent   room door open     Problem: Risk for Delirium  Goal: Improved Attention and Thought Clarity  Outcome: Progressing   Goal Outcome Evaluation:      Plan of Care Reviewed With: patient    Overall Patient Progress: improvingOverall Patient Progress: improving     Patient alert and orientated.  States he is very sad today for personal reasons, and is tearful at times.  Appeared to be very overwhelmed this am due to selling of his house and multiple staff in room.  Patient remains turn and reposition every two hours, has TAPS system in room.  Up to chair with caroline, patient stated increased pain in buttock while in chair, after several attempts to reposition patient was still in pain he requested to be moved back to bed.  Requires assistance with feeding.  Colostomy bag changed this afternoon due to partially coming off.  IV lasix this am, Javier remains in place.  Patient is being followed by WOC for wound vac on coccyx.

## 2024-08-24 NOTE — PROGRESS NOTES
[facility] Progress Note - Hospitalist Service    Date of Admission:  8/5/2024    Assessment & Plan   88M with history of HFrEF, hyperlipidemia, hypertension, aortic stenosis status post TAVR, thrombophilia, type 2 diabetes, stage III sacral ulcer, CAD, prior CVA admitted on 8/5/2024 with altered mental status & hypotension secondary to sepsis possibly from urinary tract infection, aspiration pneumonia and suspected infected, unstageable sacral ulcer.     He was managed briefly at the ICU by instensivist on admission but did not require vasopressor. Hypotension and ARIADNE improved with IV hydration and albumin infusion.  Patient transferred to medical floor on 8/6/2024.  However, patient transferred back to ICU for possible septic shock and management with pressors.  On 8/8/2024 patient transferred back to floor.  UC grew E. coli.  BC grew ESBL E Coli & Streptococcus salivarius. Polymicrobial bacteremia thought to be secondary to infected sacral decubitus ulcer.  Patient underwent multiple bedside debridement and eventually OR debridement with diverting colostomy on 8/13/2024.       Assessment/Plan  # Septic shock, resolved  #Infected sacral decubitus ulcer;  #Polymicrobial bacteremia with ESBL and Strep salivarius due to infected sacral ulcer;  Patient underwent multiple bedside debridement and eventually OR debridement with diverting colostomy on 8/13/2024.  -S/p debridement 8/6/2024 with Cx polymicrobial (E Coli, B Fragilis, Staph Aureus, E faecium)  -Ucx ESBL E Coli & Strep salivarius  -Unable to obtain MRI secondary to intracardiac lead. CT Pelvis without any signs of osteo or abscess  --Abx by ID, currently on meropenem (8/7- )  added daptomycin for VRE (8/10- ). Per ID likely antibiotics 4 to 6 weeks.  -Stoma care & wound vac care per WOC and surgery team    Addendum;  --RN paged for concern colostomy which is dusky in color (not new per surgery documentation) and concern for  leak which is new, d/w surgeon-plan for CT for further evaluation. D/w patient's nurse    #Mike - improving.  --Diuretics changed to daily on 8/22.  Monitor labs closely.    #Electrolyte imbalance-replace per protocol     #Acute on chronic pain from sacral wound;  -Scheduled Tylenol, try to wean off.  -Oxycodone 2.5-5mg q4h PRN, dilaudid for breakthrough.     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  INR therapeutic on 8/22.  Coumadin dosing and INR monitoring per pharmacy.  However, INR reversed on 8/25 for surgery.  Surgery team reported okay to resume Coumadin on 8/27.  Also consider subcu Lovenox at prophylactic dose until INR therapeutic     #Diabetes mellitus type 2, noninsulin dependent  -home regimen: jardiance    -inpatient: Lantus 5u daily, insulin sliding scale and hypoglycemic protocol     #Moderate Malnutrition;  #S/p GJ tube, present on admission;   -On 8/21, personally discussed with registered dietitian, reported patient not meeting his nutritional needs, recommended oximetry pending.  However, patient declined tube feeding and protein supplements encouraged.     #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   #H/o HFrEF with EF recovery  #CAD   -History of distal RCA stent, last echo 8/2019 with EF 50-55%  -Had hypotension with resumption of low dose coreg.  Started low dose toprol-xl instead and tolerating.   -PTA crestor on hold due to  "patient on daptomycin    #GERD/Hx PUD   -Continue PPI     Dispo:  -Ordered DME (Kevin lift, hospital bed, wheelchair)  -Outpatient palliative care consult placed             Diet: Snacks/Supplements Adult: Magic Cup; With Meals  Snacks/Supplements Adult: Expedite Cup; With Meals  Room Service  Easy to Chew Diet (level 7) Mildly Thick (level 2) (Water protocol)  Snacks/Supplements Adult: Ensure Enlive; With Meals  Snacks/Supplements Adult: Gelatein Plus; Between Meals    DVT Prophylaxis: Warfarin  Javier Catheter: PRESENT, indication: Acute retention or obstruction, Wound deterioration and failed external collection device  Lines: PRESENT      PICC 08/15/24 Single Lumen Right Brachial vein medial IV Antibiotics-Site Assessment: WDL      Cardiac Monitoring: None  Code Status: No CPR- Do NOT Intubate      Clinically Significant Risk Factors              # Hypoalbuminemia: Lowest albumin = 2.2 g/dL at 8/5/2024 11:55 AM, will monitor as appropriate     # Hypertension: Noted on problem list          # DMII: A1C = N/A within past 6 months   # Overweight: Estimated body mass index is 25.26 kg/m  as calculated from the following:    Height as of this encounter: 1.753 m (5' 9\").    Weight as of this encounter: 77.6 kg (171 lb 1.2 oz).   # Moderate Malnutrition: based on nutrition assessment    # Financial/Environmental Concerns:                 Disposition Plan     Medically Ready for Discharge: Medically stable, waiting for home equipments set up             Jamshid Gardner MD  Hospitalist Service  [facility]  Securely message with [employer] (more info)  Text page via [employer] Paging/Directory   ______________________________________________________________________    Interval History   Patient seen and examined.  Notes, labs, imaging report personally reviewed.  Patient denied new concern or complaints.  Discussed with nursing staff at bedside.    Physical Exam   Vital Signs: Temp: 98.4  F (36.9  C) Temp src: " Oral BP: 126/68 Pulse: 80   Resp: 18 SpO2: 97 % O2 Device: None (Room air)    Weight: 171 lbs 1.23 oz      General: Not in obvious distress.  Chronically ill looking  HEENT: Normocephalic, supple neck  Chest: Clear to auscultation bilateral anteriorly, no wheezing  Heart: S1S2 normal, regular  Abdomen: Soft.  No significant tenderness, colostomy bag in place  Extremities: Generalized anasarca improving  Neuro: alert and awake, left-sided weakness due to recent stroke        Medical Decision Making             Data     I have personally reviewed the following data over the past 24 hrs:    N/A  \   10.4 (L)   / N/A     N/A N/A N/A /  102 (H)   N/A N/A N/A \     ALT: N/A AST: N/A AP: N/A TBILI: N/A   ALB: N/A TOT PROTEIN: N/A LIPASE: N/A     INR:  1.91 (H) PTT:  N/A   D-dimer:  N/A Fibrinogen:  N/A       Imaging results reviewed over the past 24 hrs:   No results found for this or any previous visit (from the past 24 hour(s)).

## 2024-08-24 NOTE — PLAN OF CARE
"[id number]  Problem: Comorbidity Management  Goal: Blood Glucose Levels Within Targeted Range  Outcome: Progressing  Blood sugars are within targeted range      Problem: Pain Acute  Goal: Optimal Pain Control and Function  Intervention: Develop Pain Management Plan  Recent Flowsheet Documentation  Taken 8/23/2024 2246 by Taisha Estrada, RN    /55 (BP Location: Right arm)   Pulse 73   Temp 98.7  F (37.1  C) (Axillary)   Resp 16   Ht 1.753 m (5' 9\")   Wt 77.6 kg (171 lb 1.2 oz)   SpO2 97%   BMI 25.26 kg/m      Pain Management Interventions:   repositioned   rest   pillow support provided   medication (see MAR)  IV Dilaudid 0.4 given x2 during this overnight shift for pain rated as an (8)   Pain was relieved well.  Repositioning refused 1x,  minimal repositioning allowed x2.  Otherwise patient allowed substantial repositioning.  Left buttock continues to be reported as the major source of pain.  Patient required a moderate amount of support overnight, reassurance with every encounter.  Snacks of Gelatin Protein given, and ice chips.  Patient continues to be very pleasant.  INR, phos and Hgb drawn at 0600.  Zoey Estrada RN                        "

## 2024-08-24 NOTE — PROGRESS NOTES
General Surgery Progress Note    POST OP DAY  # 9 status post sacral decubitus debridement and colostomy placement    Subjective:   Pt is feeling okay but it was noted with his colostomy change that there was stool coming out on the superior aspect of the colostomy.    Vitals:    08/23/24 1935 08/24/24 0102 08/24/24 0830 08/24/24 1210   BP: 113/58 121/55 126/68 133/77   BP Location: Right arm Right arm Right arm Right arm   Patient Position:       Cuff Size:       Pulse: 90 73 80 83   Resp: 18 16 18 18   Temp: 98  F (36.7  C) 98.7  F (37.1  C) 98.4  F (36.9  C) 98.6  F (37  C)   TempSrc: Oral Axillary Oral Oral   SpO2: 99% 97% 97% 98%   Weight:       Height:           Physical Exam:  Lungs:  CTA  CV:       RRR  Ab:       Soft, + BS, the colostomy appears dusky with necrotic tissue and it is swollen.  Stool has built up underneath of the matured colostomy.    Recent Labs   Lab 08/24/24  1043 08/24/24  0604 08/22/24  0603 08/20/24  0543   WBC  --   --   --  9.5  9.3   HGB 10.4*   < > 8.4* 8.8*   HCT  --   --   --  27.2*   PLT  --   --  192 195    < > = values in this interval not displayed.       Recent Labs   Lab 08/24/24  1043 08/23/24  0602     --    CO2 30*  --    BUN 20.5  --    ALT  --  11       Assessment: The externalized aspects of the colostomy have necrosed.  I debrided these tissues back in his room.  It is hard to determine where the opening of the colostomy is at this point.  I would like to evaluate this colostomy further with a CT scan to make sure the seal between the colon and the abdominal wall is intact.    Plan: CT scan of abdomen to ensure the seal between the sigmoid colon and the abdominal wall is intact and the formation of his colostomy.    Al Lamar MD  [facility]  [phone]

## 2024-08-24 NOTE — CARE PLAN
Received critical lab value this AM of Phos at 0.8. Primary RN notified and wants lab to redraw blood work.

## 2024-08-24 NOTE — PLAN OF CARE
Problem: Comorbidity Management  Goal: Blood Pressure in Desired Range  Outcome: Progressing  Intervention: Maintain Blood Pressure Management  Recent Flowsheet Documentation  Taken 8/24/2024 1000 by May Gómez, RN  Medication Review/Management: medications reviewed     Problem: Infection  Goal: Absence of Infection Signs and Symptoms  Outcome: Progressing  Intervention: Prevent or Manage Infection  Recent Flowsheet Documentation  Taken 8/24/2024 1000 by May Gómez, RN  Isolation Precautions: contact precautions maintained     Problem: Acute Kidney Injury/Impairment  Goal: Fluid and Electrolyte Balance  Outcome: Progressing   Goal Outcome Evaluation:      Plan of Care Reviewed With: patient    Overall Patient Progress: improvingOverall Patient Progress: improving       Patient remains a turn and reposition every 2 hours.  Went into get patient up to chair at 1130, noted patient had a leaking colostomy bag.  Upon changing bag noted to have stool and gas coming out of top of ostomy site.  Hospitalist notified, new consult for surgery to come see patient again.  Surgery saw patient in room, new order for CT. Patient states pain in buttock, NPO for CT.  IV dilaudid administered while NPO, patient reported decrease in pain.

## 2024-08-24 NOTE — PROGRESS NOTES
Labs drawn via single lumen PICC this AM at 0600.  Protocol technique practiced, however labs appear to not match what we would expect.  Request to redraw via vein not PICC made; lab will do this STAT for Phos, INT and Hgb.  Charge and oncoming RN are aware.  Zoey Estrada RN

## 2024-08-24 NOTE — CONSULTS
CM consulted for Social Determents of health. Pt did not feel safe at TCU that pt was at. Pt's new plan is Goal is to d/[facility] [address] [place] [address] [facility] from [facility] for 24/7 PCA private pay care #[phone].  Pt will have [facility] for home RN for wound vac, colostomy, cath,and IV abx. Pt will be followed by [facility] for home IV abx. Pt has home wound vac through [employer]. Pt's son Abdoulaye will be checking in on patient as well. Pt agreeable and feels safe with this plan.            See detailed RNCM note 8/23 for details on plan.       Christina Palma RN on 8/24/2024 at 9:05 AM

## 2024-08-25 PROBLEM — L89.154 PRESSURE INJURY OF SACRAL REGION, STAGE 4 (H): Status: ACTIVE | Noted: 2024-01-01

## 2024-08-25 PROBLEM — L08.9 WOUND INFECTION: Status: ACTIVE | Noted: 2024-01-01

## 2024-08-25 PROBLEM — T14.8XXA WOUND INFECTION: Status: ACTIVE | Noted: 2024-01-01

## 2024-08-25 PROBLEM — Z86.73 HISTORY OF STROKE: Status: ACTIVE | Noted: 2024-01-01

## 2024-08-25 PROBLEM — Z43.3 COLOSTOMY CARE (H): Status: ACTIVE | Noted: 2024-01-01

## 2024-08-25 PROBLEM — L89.150 PRESSURE INJURY OF SACRAL REGION, UNSTAGEABLE (H): Status: ACTIVE | Noted: 2024-01-01

## 2024-08-25 NOTE — PROGRESS NOTES
General Surgery Progress Note  Hospital Day # 19    Subjective:   I was called earlier today to evaluate Mr. Ryan's colostomy.  Stool had started coming out the side of the colostomy rather than through the lumen.  I debrided away some dead tissue and cleaned up the colostomy site.  I then did a CT scan which shows a very thin wall of the colon leading up to the abdominal wall.    Vitals:    08/24/24 1210 08/24/24 1544 08/24/24 1815 08/24/24 1906   BP: 133/77 123/57  131/56   BP Location: Right arm Right arm  Right arm   Pulse: 83 82  77   Resp: 18 18  18   Temp: 98.6  F (37  C) 98.5  F (36.9  C)  98.2  F (36.8  C)   TempSrc: Oral Oral  Oral   SpO2: 98% 98%  99%   Weight:   70.6 kg (155 lb 10.3 oz)    Height:           Physical Exam:  Lungs:  CTA  CV:       RRR  Ab:       Soft, + BS, the left lower quadrant colostomy is necrotic in places and nonviable.  Stool is leaking out the side of the matured colostomy.    Recent Labs   Lab 08/24/24  1043   WBC 9.1   HGB 10.4*  10.4*   HCT 33.5*          Recent Labs   Lab 08/24/24  1043 08/23/24  0602     --    CO2 30*  --    BUN 20.5  --    ALT  --  11       Assessment: The end of the colostomy is ischemic and falling apart.  I have concerns after looking at the patient CT scan that this is going to progress to be an intra-abdominal process as well.  With that I would recommend that we recite his colostomy.    Plan: We will recite his colostomy tomorrow we may do it sooner if he starts to show signs of systemic infection.    Al Lamar MD  [facility]  [phone]

## 2024-08-25 NOTE — PROGRESS NOTES
[facility] Progress Note - Hospitalist Service    Date of Admission:  8/5/2024    Assessment & Plan   88M with history of HFrEF, hyperlipidemia, hypertension, aortic stenosis status post TAVR, thrombophilia, type 2 diabetes, stage III sacral ulcer, CAD, prior CVA admitted on 8/5/2024 with altered mental status & hypotension secondary to sepsis possibly from urinary tract infection, aspiration pneumonia and suspected infected, unstageable sacral ulcer.     He was managed briefly at the ICU by instensivist on admission but did not require vasopressor. Hypotension and ARIADNE improved with IV hydration and albumin infusion.  Patient transferred to medical floor on 8/6/2024.  However, patient transferred back to ICU for possible septic shock and management with pressors.  On 8/8/2024 patient transferred back to floor.  UC grew E. coli.  BC grew ESBL E Coli & Streptococcus salivarius. Polymicrobial bacteremia thought to be secondary to infected sacral decubitus ulcer.  Patient underwent multiple bedside debridement and eventually OR debridement with diverting colostomy on 8/13/2024.    On 8/24 CT imaging reported ischemia of colostomy site and patient is scheduled for colostomy revision today       Assessment/Plan  # Septic shock, resolved  #Infected sacral decubitus ulcer;  #Polymicrobial bacteremia with ESBL and Strep salivarius due to infected sacral ulcer;  Patient underwent multiple bedside debridement and eventually OR debridement with diverting colostomy on 8/13/2024.  -S/p debridement 8/6/2024 with Cx polymicrobial (E Coli, B Fragilis, Staph Aureus, E faecium)  -Ucx ESBL E Coli & Strep salivarius  -Unable to obtain MRI secondary to intracardiac lead. CT Pelvis without any signs of osteo or abscess  --Abx by ID, currently on meropenem (8/7- )  added daptomycin for VRE (8/10- ). Per ID likely antibiotics 4 to 6 weeks.  -Stoma care & wound vac care per WOC and surgery team  -- On  8/24, CT imaging reported ischemia at colostomy site, patient is scheduled for colostomy revision today    #Acute blood loss anemia; multifactorial- postoperative, acute illness, chronic wound  -- Hemoglobin around 8 range.  Seems hemoglobin from yesterday likely lab error  -- Hemoglobin in a.m.    #Acute on chronic pain from sacral wound;  -Scheduled Tylenol, try to wean off.  -Oxycodone 2.5-5mg q4h PRN, dilaudid for breakthrough.     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  INR therapeutic on 8/22.  Coumadin dosing and INR monitoring per pharmacy.  However, now reversing INR for surgery today     #Diabetes mellitus type 2, noninsulin dependent  -home regimen: jardiance    -inpatient: Lantus 5u daily, insulin sliding scale and hypoglycemic protocol     #Moderate Malnutrition;  #S/p GJ tube, present on admission;   -On 8/21, personally discussed with registered dietitian, reported patient not meeting his nutritional needs, recommended oximetry pending.  However, patient declined tube feeding and protein supplements added.     #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   #H/o HFrEF with EF recovery  #CAD   -History of distal RCA stent, last echo 8/2019 with EF 50-55%  -Had hypotension with resumption of low dose coreg.  Started low dose toprol-xl instead and tolerating.  -PTA crestor on hold due to patient on daptomycin    #GERD/Hx PUD   -Continue PPI      Dispo:  -Ordered DME (Kevin lift, hospital bed, wheelchair)  -Outpatient palliative care consult placed             Diet: Snacks/Supplements Adult: Magic Cup; With Meals  Snacks/Supplements Adult: Expedite Cup; With Meals  Room Service  Snacks/Supplements Adult: Ensure Enlive; With Meals  Snacks/Supplements Adult: Gelatein Plus; Between Meals  NPO per Anesthesia Guidelines for Procedure/Surgery Except for: Meds    DVT Prophylaxis: Warfarin but currently reversing for surgery  Javier Catheter: PRESENT, indication: Acute retention or obstruction, Wound deterioration and failed external collection device  Lines: PRESENT      PICC 08/15/24 Single Lumen Right Brachial vein medial IV Antibiotics-Site Assessment: WDL      Cardiac Monitoring: None  Code Status: No CPR- Do NOT Intubate      Clinically Significant Risk Factors              # Hypoalbuminemia: Lowest albumin = 2.2 g/dL at 8/5/2024 11:55 AM, will monitor as appropriate     # Hypertension: Noted on problem list          # DMII: A1C = N/A within past 6 months    # Moderate Malnutrition: based on nutrition assessment    # Financial/Environmental Concerns:                 Disposition Plan     Medically Ready for Discharge: Anticipated in 2-4 Days             Jamshid Gardner MD  Hospitalist Service  [facility]  Securely message with [employer] (more info)  Text page via [employer] Paging/Directory   ______________________________________________________________________    Interval History   Patient seen and examined.  Notes, labs, imaging report personally reviewed.  Patient denied feeling short of breath or chest pain.  Denies significant abdominal pain.  Complaining of some leg pain, likely due to positioning.  Patient does have sacral decubitus ulcer contributing some pain.  Patient anxious about surgery.  Discussed with nursing staff.  Patient did receive vitamin K and surgery planning to give FFP during surgery.    Discussed with patient's son CT  finding and surgery plan.    Physical Exam   Vital Signs: Temp: 98.4  F (36.9  C) Temp src: Oral BP: 94/48 Pulse: 73   Resp: 18 SpO2: 97 % O2 Device: None (Room air)    Weight: 155 lbs 10.32 oz      General: Not in obvious distress.  Ill looking  HEENT: Normal cephalic, supple neck  Chest: Clear to auscultation bilateral anteriorly, no wheezing  Heart: S1S2 normal, regular  Abdomen: Soft.  No significant tenderness appreciated. Bowel sounds- active.  Extremities: Anasarca improving  Neuro: alert and awake, anxious, left-sided weakness due to recently stroke      Medical Decision Making             Data     I have personally reviewed the following data over the past 24 hrs:    9.1  \   8.6 (L)   / 223     140 102 20.5 /  112 (H)   4.3 30 (H) 0.48 (L) \     INR:  1.76 (H) PTT:  N/A   D-dimer:  N/A Fibrinogen:  N/A       Imaging results reviewed over the past 24 hrs:   Recent Results (from the past 24 hour(s))   CT Abdomen Pelvis w Contrast    Narrative    EXAM: CT ABDOMEN PELVIS W CONTRAST  LOCATION: [facility]  DATE: 8/24/2024    INDICATION: Abnormal ostomy  COMPARISON: 8/16/2024  TECHNIQUE: CT scan of the abdomen and pelvis was performed following injection of IV contrast. Multiplanar reformats were obtained. Dose reduction techniques were used.  CONTRAST: 80ml [id number]    FINDINGS:   LOWER CHEST: Stable elevated left hemidiaphragm. Small right pleural effusion. Bibasilar atelectasis. Replaced aortic valve. Cardiac pacemaker.    HEPATOBILIARY: Cholelithiasis. Thickened gallbladder wall measuring 0.5 cm. No biliary ductal dilatation.    PANCREAS: Normal.    SPLEEN: Normal.    ADRENAL GLANDS: Normal.    KIDNEYS/BLADDER: Cortical thinning and scarring bilaterally. Subcentimeter renal hypodensities which are too small to characterize. No hydronephrosis. Stable circumferential bladder wall thickening which is consistent with chronic outlet obstruction.   Javier catheter. Trace air in the bladder  lumen.    BOWEL: Partial left hemicolectomy. Left lower quadrant colostomy with the 5.4 cm of colon at the colostomy demonstrating hypoenhancement, pericolonic inflammation and pneumatosis coli, image 137:3. The remainder of the colon demonstrates normal   enhancement. No obstruction. Moderate stool burden in the colon. Diverticulosis. Percutaneous gastrojejunostomy. Appendix not visualized.    LYMPH NODES: Normal.    VASCULATURE: Moderately severe atherosclerosis. No aneurysm.    PELVIC ORGANS: Stable mild presacral fat stranding. Stable small fat-containing inguinal hernias.    MUSCULOSKELETAL: Slightly decreased anasarca. Stable lipoma in the proximal left thigh. Degenerative changes. Stable sacral decubitus ulcer.      Impression    IMPRESSION:   1.  Abnormal 5.4 cm of colon at the colostomy site which is most likely ischemic.  2.  Cholelithiasis with a thickened gallbladder wall. This either represents acute or chronic cholecystitis.  3.  Decreasing anasarca.

## 2024-08-25 NOTE — OP NOTE
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  The colostomy site was debrided removing dead necrotic tissue as part of the old ostomy.  The tissues were  from the cutaneous edge.  After this dead tissue was largely removed I closed the skin at that site with a running 2-0 silk suture.  After that ostomy site was closed we then prepped the abdomen with Betadine over the ostomy site and we prepped the rest of the abdomen with DuraPrep.     A midline incision was made the colon coming up to the colostomy site was identified a window was made underneath the colon and it was transected with a LITTLE 75 stapler getting back to healthy looking bowel.  A new ostomy site was made in the left upper quadrant coming out through the lateral aspect of the rectus muscle.  I was able to feed the sigmoid colon up into that space.  I secured it there from the inside with 3 interrupted 2-0 Vicryl sutures.  I then freed up all the tissues around the initial ostomy site I opened the skin and cleaned all the necrotic tissue out of the wound bed and was then able to withdrawal or excise the failed  colostomy out through the inside and remove it from the abdominal cavity.  The wound bed was swabbed with Betadine it was then closed with a running looped 0 PDS suture.  That wound was irrigated copiously with warm normal saline and packed with gauze.    Gloves were changed and we then addressed the midline wound closing it with a looped 0 PDS suture.  The subcutaneous tissues were irrigated with saline and the skin was closed with staples.  That wound was covered with a sterile surgical towel.    The colostomy was matured removing the staple line with use of electrocautery and then bringing the mucosal and bowel wall edge up and over the surrounding tissues securing it to the cutaneous edge all around the opening in the skin.  This was done circumferentially around the colostomy.  Once it was completely matured I placed an ostomy bag down over this new ostomy that had been formed.  The abdominal wound in the center was closed with a Primapore dressing and the initial colostomy site was packed with a fluff dressing which was taped to the skin.    Estimated Blood Loss:   10 cc       Drains:   Gastrostomy/Enterostomy Gastrojejunostomy LUQ  unknown placement date and time (Active)   Site Description WDL 08/25/24 0900   Site care cleansed with soap and water 08/25/24 0900   Drainage Appearance Yellow 08/21/24 1625   Status - Gastrostomy Clamped 08/25/24 0050   Status - Jejunostomy Clamped 08/24/24 1000   Dressing Status Normal: Clean, Dry & Intact 08/25/24 0900   Dressing Change Due 08/21/24 08/18/24 2110   Flush/Free Water (mL) 60 mL 08/25/24 0900   Residual (mL) 0 mL 08/25/24 0010   Output (ml) 1200 ml 08/16/24 2145       Colostomy (Active)   Stomal Appliance 1 piece 08/25/24 1600   Stoma Assessment [place] 08/25/24 1600       Urethral Catheter 08/13/24 Latex;Double-lumen 16 fr (Active)   Tube Description Positional 08/25/24 1523   Catheter Care Catheter wipes;Done 08/25/24 0943   Collection Container Standard 08/25/24  1523   Securement Method Securing device (Describe) 08/25/24 0900   Rationale for Continued Use Surgical procedure 08/25/24 1523   Urine Output 300 mL 08/25/24 1300       Implants:  * No implants in log *    Complications:    None    Al Lamar MD     Date: 8/25/2024  Time: 5:01 PM

## 2024-08-25 NOTE — ANESTHESIA POSTPROCEDURE EVALUATION
Patient: Franklyn Sorto    Procedure: Procedure(s):  REVISION, COLOSTOMY       Anesthesia Type:  General    Note:  Disposition: Inpatient   Postop Pain Control: Uneventful            Sign Out: Well controlled pain   PONV: No   Neuro/Psych: Uneventful            Sign Out: Acceptable/Baseline neuro status   Airway/Respiratory: Uneventful            Sign Out: Acceptable/Baseline resp. status   CV/Hemodynamics: Uneventful            Sign Out: Acceptable CV status; No obvious hypovolemia; No obvious fluid overload   Other NRE:    DID A NON-ROUTINE EVENT OCCUR?            Last vitals:  Vitals Value Taken Time   /80 08/25/24 1715   Temp 34.9  C (94.82  F) 08/25/24 1719   Pulse 74 08/25/24 1719   Resp 10 08/25/24 1719   SpO2 97 % 08/25/24 1719   Vitals shown include unfiled device data.    Electronically Signed By: Pro Duran MD  August 25, 2024  5:20 PM

## 2024-08-25 NOTE — PLAN OF CARE
Problem: Adult Inpatient Plan of Care  Goal: Absence of Hospital-Acquired Illness or Injury  Intervention: Identify and Manage Fall Risk  Recent Flowsheet Documentation  Taken 8/25/2024 0900 by May Gómez RN  Safety Promotion/Fall Prevention:   activity supervised   clutter free environment maintained   increased rounding and observation   room organization consistent   room door open     Problem: Risk for Delirium  Goal: Improved Attention and Thought Clarity  Outcome: Progressing   Goal Outcome Evaluation:      Plan of Care Reviewed With: patient    Overall Patient Progress: no changeOverall Patient Progress: no change       Patient hyper focused on surgery today, stating he is sad he has to go back to surgery.  Remains NPO.  Phosphorus replacement ordered this am per protocol to be run IV after FFP replacement for surgery. Turn and reposition every two hours.  Frequent oral cares completed per patient request.

## 2024-08-25 NOTE — ANESTHESIA PROCEDURE NOTES
Airway       Patient location during procedure: OR       Procedure Start/Stop Times: 8/25/2024 2:38 PM  Staff -        Anesthesiologist:  Pro Duran MD       CRNA: Jacobo Houston APRN CRNA       Performed By: CRNA  Consent for Airway        Urgency: elective  Indications and Patient Condition       Indications for airway management: demi-procedural       Induction type:intravenous       Mask difficulty assessment: 1 - vent by mask    Final Airway Details       Final airway type: endotracheal airway       Successful airway: ETT - single  Endotracheal Airway Details        ETT size (mm): 7.0       Cuffed: yes       Cuff volume (mL): 8       Successful intubation technique: video laryngoscopy       VL Blade Size: Glidescope 4       Grade View of Cords: 1       Adjucts: stylet       Position: Right       Measured from: lips       Secured at (cm): 21       Bite block used: None    Post intubation assessment        Placement verified by: capnometry, equal breath sounds and chest rise        Number of attempts at approach: 1       Number of other approaches attempted: 0       Secured with: tape       Ease of procedure: easy       Dentition: Intact and Unchanged    Medication(s) Administered   Medication Administration Time: 8/25/2024 2:38 PM

## 2024-08-25 NOTE — SIGNIFICANT EVENT
Significant Event Note    Time of event: 6:52 PM August 25, 2024    Description of event:  Paged by the patient's nurse for concerns of worsening left sided facial droop, slurred speech, and mild confusion. The patient has baseline left sided hemiparesis and slurred speech in the setting of recent stroke in May of 2024. Presented to evaluated patient given worsening of symptoms from baseline. Difficult to assess as baseline slurred speech and facial droop are unknown to this provider and the patient is still coming out of anesthesia (returned from PACU at 1830). Per nurse at bedside, there are no new noticeable deficits, just worsening of old symptoms. Patient also had an elevation in blood pressure at the bedside with systolic of 193. Of note, the patient had missed his afternoon dose of anti-hypertensive as he was undergoing surgery. Recycling of BP showed improved systolic of 167.     Exam: Patient is A+Ox2, not oriented to location. Left sided hemiparesis in the setting of old CVA. Moves right side well. Significant lymphedema of the LUE with bandages in place. Left sided facial droop. Producing speech although this is somewhat difficult to understand due to being somewhat garbled. Systolic murmur audible. Mildly tachycardic. Onchomycosis of the bilateral toes.     Plan:  This 88 y.o. patient has a known history of CVA with persistent right sided hemiparesis and slurred speech. Given no new neuro deficits from baseline and that the patient is still coming out of anesthesia plan is as follows:   - q1h neuro checks for 4-6 hours   - code stroke if new acute neuro deficits manifest     Discussed with: bedside nurse and on-call team, Dr. Rock Africa Oquendo,

## 2024-08-25 NOTE — ANESTHESIA PREPROCEDURE EVALUATION
Anesthesia Pre-Procedure Evaluation    Patient: Franklyn Sorto   MRN: [id number] : 1935        Procedure : Procedure(s):  REVISION, COLOSTOMY          Past Medical History:   Diagnosis Date    Abnormal lateral conjugate gaze 2023    Anticoagulated on Coumadin 2024    Arteriosclerosis of coronary artery 2014    Formatting of this note might be different from the original.  Angiogram/PCI 14:  Distal RCA 90%, treated with MARÍA.  Mild LAD and LCx disease.      Aspiration pneumonia, unspecified aspiration pneumonia type, unspecified laterality, unspecified part of lung (H) 2024    Benign essential HTN 2023    Benign prostatic hyperplasia 10/20/2011    Formatting of this note might be different from the original.  S/p TURP      Cervicalgia 2013    Diabetes mellitus type 2, noninsulin dependent (H) 2015    Frequent PVCs 2015    GERD (gastroesophageal reflux disease) 2013    Formatting of this note might be different from the original.  Ulcers in lat 's. Reflux symptoms if he misses proton pump inhibitor.      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) (H) 2015    History of CVA (cerebrovascular accident) 2024    Hypertension 2023    Formatting of this note might be different from the original.  Created by Conversion     Replacement Utility updated for latest IMO load  Formatting of this note might be different from the original.  ECG 06 sinus bradycardia otherwise normal  Stress Echo 06 Normal, sub-max HR      Long term current use of anticoagulant therapy 2013    Lumbar post-laminectomy syndrome 2012    Neck pain 2013    OA (osteoarthritis) of knee 2023    Peripheral vascular disease (H24) 2013    Formatting of this note might be different from the original.  Created by Conversion      Pressure injury of sacral region, stage 3 (H) 2024    Rheumatoid arthritis (H) 2023     Formatting of this note might be different from the original.  Created by Conversion  Formatting of this note might be different from the original.  Dr. Gaxiola. [facility] rheum      S/P cardiac pacemaker procedure 2014    S/P TAVR (transcatheter aortic valve replacement) 2014    Stage 3 chronic kidney disease, unspecified whether stage 3a or 3b CKD (H) 2023    Thrombocytopenia (H24) 10/20/2011    Thrombophilia (H24) 2014    Formatting of this note might be different from the original.  Antiphospholipid antibodies- positiive beta 2 glycoprotein and DRVVT confirmation x's 2 12 weeks apart .  Heterozygous Leiden Factor V  Follow by Dr Oly Hunter      Transient vision disturbance, bilateral 10/19/2011    Formatting of this note might be different from the original.  Episode of decreased vision in both eyes on 10/15/2011, lasting 10-15 minutes.        Past Surgical History:   Procedure Laterality Date    IRRIGATION AND DEBRIDEMENT DECUBITUS WOUND, COMBINED N/A 2024    Procedure: DEBRIDEMENT OF SACRAL DECUBITUS ULCER;  Surgeon: Demetri Garcia DO;  Location: [facility] [place] OR    LAPAROSCOPIC COLOSTOMY N/A 2024    Procedure: CREATION, END COLOSTOMY, LAPAROSCOPIC AND;  Surgeon: Demetri Garcia DO;  Location: [facility] [place] OR      Allergies   Allergen Reactions    Clopidogrel Hives    Hydrocodone      Other reaction(s): sick to his stomach    Hydrocodone-Acetaminophen Nausea and Vomiting    Levofloxacin Other (See Comments)     Other reaction(s): tendonitis  Tendonitis right calf      Spironolactone      Other reaction(s): Hyperkalemia      Social History     Tobacco Use    Smoking status: Former     Current packs/day: 0.00     Types: Cigarettes     Quit date: 1975     Years since quittin.6    Smokeless tobacco: Never   Substance Use Topics    Alcohol use: Yes      Wt Readings from Last 1 Encounters:   24 71.3 kg (157 lb 3 oz)        Anesthesia Evaluation            ROS/MED  "HX  ENT/Pulmonary:  - neg pulmonary ROS     Neurologic:  - neg neurologic ROS   (+)          CVA,    TIA,                  Cardiovascular:  - neg cardiovascular ROS   (+)  hypertension- -  CAD -  - -      CHF                                METS/Exercise Tolerance:     Hematologic:       Musculoskeletal:       GI/Hepatic:  - neg GI/hepatic ROS   (+) GERD,                   Renal/Genitourinary:  - neg Renal ROS   (+) renal disease,             Endo:  - neg endo ROS   (+) type I DM,                     Psychiatric/Substance Use:       Infectious Disease:  - neg infectious disease ROS     Malignancy:  - neg malignancy ROS     Other:  - neg other ROS          Physical Exam    Airway        Mallampati: II    Neck ROM: full     Respiratory Devices and Support         Dental           Cardiovascular   cardiovascular exam normal          Pulmonary   pulmonary exam normal                OUTSIDE LABS:  CBC:   Lab Results   Component Value Date    WBC 9.1 08/24/2024    WBC 9.3 08/20/2024    WBC 9.5 08/20/2024    HGB 8.6 (L) 08/25/2024    HGB 10.4 (L) 08/24/2024    HGB 10.4 (L) 08/24/2024    HCT 33.5 (L) 08/24/2024    HCT 27.2 (L) 08/20/2024     08/24/2024     08/22/2024     BMP:   Lab Results   Component Value Date     08/24/2024     08/22/2024    POTASSIUM 4.3 08/24/2024    POTASSIUM 4.0 08/22/2024    POTASSIUM 4.0 08/22/2024    CHLORIDE 102 08/24/2024    CHLORIDE 101 08/22/2024    CO2 30 (H) 08/24/2024    CO2 32 (H) 08/22/2024    BUN 20.5 08/24/2024    BUN 18.2 08/22/2024    CR 0.48 (L) 08/24/2024    CR 0.50 (L) 08/22/2024    CR 0.50 (L) 08/22/2024     (H) 08/25/2024     (H) 08/25/2024     COAGS:   Lab Results   Component Value Date    PTT 54 (H) 08/05/2024    INR 1.76 (H) 08/25/2024     POC: No results found for: \"BGM\", \"HCG\", \"HCGS\"  HEPATIC:   Lab Results   Component Value Date    ALBUMIN 2.2 (L) 08/05/2024    PROTTOTAL 6.1 (L) 08/05/2024    ALT 11 08/23/2024    AST 30 08/05/2024    " ALKPHOS 96 08/05/2024    BILITOTAL 0.4 08/05/2024     OTHER:   Lab Results   Component Value Date    LACT 1.1 08/16/2024    A1C 7.2 (H) 03/01/2024    JÚNIOR 8.1 (L) 08/24/2024    PHOS 2.7 08/25/2024    MAG 2.1 08/24/2024    LIPASE 43 06/20/2024    TSH 5.01 (H) 11/13/2023    T4 0.97 11/13/2023       Anesthesia Plan    ASA Status:  3, emergent    NPO Status:  NPO Appropriate    Anesthesia Type: General.     - Airway: ETT   Induction: Intravenous.           Consents    Anesthesia Plan(s) and associated risks, benefits, and realistic alternatives discussed. Questions answered and patient/representative(s) expressed understanding.     - Discussed:     - Discussed with:  Patient            Postoperative Care       PONV prophylaxis: Ondansetron (or other 5HT-3), Dexamethasone or Solumedrol     Comments:               Pro Duran MD    I have reviewed the pertinent notes and labs in the chart from the past 30 days and (re)examined the patient.  Any updates or changes from those notes are reflected in this note.

## 2024-08-25 NOTE — PROGRESS NOTES
General Surgery Progress Note    POST OP DAY  # 12 s/p Colostomy formation    Subjective:   Pt is feeling OK,      Vitals:    08/24/24 1815 08/24/24 1906 08/24/24 2359 08/25/24 0409   BP:  131/56  94/48   BP Location:  Right arm  Left leg   Pulse:  77 75 73   Resp:  18 16 18   Temp:  98.2  F (36.8  C) 97.5  F (36.4  C) 98.4  F (36.9  C)   TempSrc:  Oral Oral Oral   SpO2:  99% 97% 97%   Weight: 70.6 kg (155 lb 10.3 oz)      Height:           Physical Exam:  Lungs:  CTA  CV:       RRR  Ab:       Soft, + BS, Necrotic colostomy.     INR:  1.7    Recent Labs   Lab 08/24/24  1043   WBC 9.1   HGB 10.4*  10.4*   HCT 33.5*          Recent Labs   Lab 08/24/24  1043 08/23/24  0602     --    CO2 30*  --    BUN 20.5  --    ALT  --  11       Assessment:  Pt is progressing stable. INR is still a bit high at 1.7    Plan: FFP to reverse Coumadin,  To the OR to revise the colostomy today.    Al Lamar MD  [facility]  [phone]

## 2024-08-25 NOTE — ANESTHESIA CARE TRANSFER NOTE
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    Anesthesia Type:   General     Note:    Oropharynx: oropharynx clear of all foreign objects and spontaneously breathing  Level of Consciousness: awake  Oxygen Supplementation: face mask  Level of Supplemental Oxygen (L/min / FiO2): 4  Independent Airway: airway patency satisfactory and stable  Dentition: dentition unchanged  Vital Signs Stable: post-procedure vital signs reviewed and stable  Report to RN Given: handoff report given  Patient transferred to: PACU  Comments: Mental status appropriate given poor baseline. Responds appropriately, follows commands. Currently nauseous  Handoff Report: Identifed the Patient, Identified the Reponsible Provider, Reviewed the pertinent medical history, Discussed the surgical course, Reviewed Intra-OP anesthesia mangement and issues during anesthesia, Set expectations for post-procedure period and Allowed opportunity for questions and acknowledgement of understanding      Vitals:  Vitals Value Taken Time   /73 08/25/24 1706   Temp 34.5  C (94.1  F) 08/25/24 1710   Pulse 69 08/25/24 1710   Resp 18 08/25/24 1710   SpO2 99 % 08/25/24 1710   Vitals shown include unfiled device data.    Electronically Signed By: FER Faulkner CRNA  August 25, 2024  5:11 PM

## 2024-08-25 NOTE — PLAN OF CARE
Problem: Adult Inpatient Plan of Care  Goal: Plan of Care Review  Description: The Plan of Care Review/Shift note should be completed every shift.  The Outcome Evaluation is a brief statement about your assessment that the patient is improving, declining, or no change.  This information will be displayed automatically on your shift  note.  Outcome: Progressing  Flowsheets (Taken 8/25/2024 0125)  Plan of Care Reviewed With: patient  Goal: Optimal Comfort and Wellbeing  Outcome: Progressing  Intervention: Monitor Pain and Promote Comfort  Recent Flowsheet Documentation  Taken 8/25/2024 0052 by Debra Junior RN  Pain Management Interventions:   pillow support provided   repositioned   medication (see MAR)  Taken 8/24/2024 2050 by Debra Junior RN  Pain Management Interventions:   pillow support provided   medication (see MAR)     Problem: Skin Injury Risk Increased  Goal: Skin Health and Integrity  Outcome: Progressing  Intervention: Plan: Nurse Driven Intervention: Positioning  Recent Flowsheet Documentation  Taken 8/25/2024 0050 by Debra Junior RN  Plan: Positioning Interventions: REPOSITION Left/Right (No supine) q2h  Taken 8/24/2024 2100 by Debra Junior RN  Plan: Positioning Interventions: REPOSITION Left/Right (No supine) q2h  Intervention: Plan: Nurse Driven Intervention: Moisture Management  Recent Flowsheet Documentation  Taken 8/25/2024 0050 by Debra Junior RN  Moisture Interventions:   No brief in bed   Incontinence pad   Urinary collection device   Fecal collection device  Taken 8/24/2024 2100 by Debra Junior RN  Moisture Interventions:   No brief in bed   Incontinence pad   Urinary collection device   Fecal collection device  Intervention: Plan: Nurse Driven Intervention: Friction and Shear  Recent Flowsheet Documentation  Taken 8/25/2024 0050 by Debra Junior RN  Friction/Shear Interventions: HOB 30 degrees or less  Taken 8/24/2024 2100 by Debra Junior RN  Friction/Shear Interventions: HOB  30 degrees or less  Intervention: Optimize Skin Protection  Recent Flowsheet Documentation  Taken 8/25/2024 0050 by Debra Junior RN  Pressure Reduction Techniques: frequent weight shift encouraged  Pressure Reduction Devices: positioning supports utilized  Skin Protection: incontinence pads utilized  Taken 8/25/2024 0035 by Debra Junior RN  Head of Bed (HOB) Positioning: HOB at 20-30 degrees  Taken 8/24/2024 2150 by Debra Junior RN  Head of Bed (HOB) Positioning: HOB at 30 degrees  Taken 8/24/2024 2100 by Debra Junior RN  Pressure Reduction Techniques: frequent weight shift encouraged  Pressure Reduction Devices: positioning supports utilized  Skin Protection: incontinence pads utilized     Problem: Comorbidity Management  Goal: Blood Glucose Levels Within Targeted Range  Outcome: Progressing  Intervention: Monitor and Manage Glycemia  Recent Flowsheet Documentation  Taken 8/25/2024 0050 by Debra Junior RN  Medication Review/Management: medications reviewed  Taken 8/24/2024 2100 by Debra Junior RN  Medication Review/Management: medications reviewed     Problem: Infection  Goal: Absence of Infection Signs and Symptoms  Outcome: Progressing  Intervention: Prevent or Manage Infection  Recent Flowsheet Documentation  Taken 8/25/2024 0050 by Debra Junior RN  Isolation Precautions: contact precautions maintained  Taken 8/24/2024 2100 by Debra Junior RN  Isolation Precautions: contact precautions maintained     Problem: Wound  Goal: Optimal Functional Ability  Outcome: Progressing     Problem: Pain Acute  Goal: Optimal Pain Control and Function  Outcome: Progressing  Intervention: Develop Pain Management Plan  Recent Flowsheet Documentation  Taken 8/25/2024 0052 by Debra Junior RN  Pain Management Interventions:   pillow support provided   repositioned   medication (see MAR)  Taken 8/24/2024 2050 by Debra Junior RN  Pain Management Interventions:   pillow support provided   medication (see  MAR)  Intervention: Prevent or Manage Pain  Recent Flowsheet Documentation  Taken 8/25/2024 0050 by Debra Junior, KARON  Medication Review/Management: medications reviewed  Taken 8/24/2024 2100 by Debra Junior, RN  Medication Review/Management: medications reviewed   Goal Outcome Evaluation:      Plan of Care Reviewed With: patient      Patient alert and oriented x 4. Complained of pain in buttocks PRN tylenol and IV dilaudid given and stated was helpful. PRN Melatonin was given upon request. Repositioning done. Glucose checked. Colostomy bag with output and no leaking noted.    Received a call from surgery, patient plan for surgery tomorrow morning. Maintained NPO except medicine as instructed.    On Phosphorus protocol, replaced.

## 2024-08-26 NOTE — PROGRESS NOTES
"CLINICAL NUTRITION SERVICES - REASSESSMENT NOTE     Nutrition Prescription    RECOMMENDATIONS FOR MDs/PROVIDERS TO ORDER:  Advance diet as able    Malnutrition Status:    Moderate in chronic     Recommendations already ordered by Registered Dietitian (RD):  None    Future/Additional Recommendations:  -Adjust supplements pending intake, weight, tolerance, acceptance, labs, bg, wound healing  -Consider need for additional snacks, protein powder to meet increased needs with wound  - Will re-order supplements as soon as diet is advanced       EVALUATION OF THE PROGRESS TOWARD GOALS   Diet: NPO except ice chips, H20 and meds-changed from clear liquids this am by surgery    Previous diet: Level 7: Easy to Chew Dysphagia Diet  and Mildly Thick Thickened Liquids     Nutrition Supplement:  Magic cup TID, ensure enlive tid, expedite cup daily, gel plus bid- on hold    Intake/Tolerance:     Receiving D5Nacl at 30 ml/hr  Npo/Clear liquids day 3 today  8/24 2 meals ordered before changing to NPO-intake not documented- 3 meals ordered  8/23 Ate 75% of supper - no other p.o intake documented  8/22 Ate 75% of 2 meals and % of supplements - estimate intake 1540 kcal, 81 g protein-may have been more with undocumented lunch ordered +supplements. Met 85% of estimated kcal, 75% of estimated protein needs + potentially greater     NEW FINDINGS   - pt mostly only ordering tomato soup and some fruit + multiple supplements-extra ordered beyond Rx  -pt c/o surgical pain  - Per surgery plan for sips of H20 only and if tolerating advance to previous diet this afternoon  -Pt interested in returning to his previous diet when it is safe to do so-he wants to order his baseline    ANTHROPOMETRICS  Height: 175.3 cm (5' 9\")  Weight History:  2.5% weight loss x 2 weeks, not clinically significant  Wts 8/15, 8/19 fluid weight up vs error in bed weight.. +1 generalized, +2-+3 left arm, +2 bilateral ankles/feet  Date/Time Weight Weight Method "   08/25/24 0943 71.3 kg (157 lb 3 oz) Bed scale   08/24/24 1815 70.6 kg (155 lb 10.3 oz) Bed scale   08/19/24 0923 77.6 kg (171 lb 1.2 oz) Bed scale   08/15/24 1502 89.2 kg (196 lb 10.4 oz) Bed scale   08/08/24 0400 72.2 kg (159 lb 2.8 oz) Bed scale   08/05/24 1159 72.6 kg (160 lb) --     Wt Readings from Last 10 Encounters:   06/21/24 73 kg (160 lb 15 oz)   03/01/24 75.8 kg (167 lb)   11/13/23 76.2 kg (168 lb)   07/11/23 74.4 kg (164 lb)   07/07/23 74.4 kg (164 lb)   04/17/23 81.2 kg (179 lb)   01/06/23 83.9 kg (185 lb)   10/07/22 89.7 kg (197 lb 11.2 oz)   09/02/22 92.3 kg (203 lb 8 oz)      Dosing Weight: 72.6 kg     ASSESSED NUTRITION NEEDS 8/8/24  Estimated Energy Needs: 6574-7986+ kcals/day (25 - 30+ kcals/kg)  Justification: Increased needs and Wound healing  Estimated Protein Needs: 109-145 grams protein/day (1.5- 2 grams of pro/kg)  Justification: Wound healing  Estimated Fluid Needs: 1815+ mL/day (25+ mL/kg)   Justification: Maintenance    SKIN  Pressure injury -sacral, unstageable.- stable per WOC 8/23    GI  G-J tube placed 5/31/24, irrigated and clamped    30 ml H20 q port q shift for patency  Diverting colostomy- no OP since surgery. Last OP 8/24    LABS  Reviewed  Phos 2.3 (L), decreased  BG  mg/dl past 24 hours in the setting of minimal intake    MEDICATIONS  Reviewed  Scheduled biotene qid, iv abx, pepcid, ssi, novolog 1u: 15 g cho, mvi with minerals, protonix, phosporus todaymiralax q other day, pericolace bid, vit C 500 mg daily    MALNUTRITION:  % Weight Loss:  None noted  % Intake:  Decreased intake does not meet criteria- <50% x 2 day  Subcutaneous Fat Loss:  Orbital region moderate depletion and Upper arm region mild depletion, thoracic moderate depletion  Muscle Loss:  Severe temporal loss. Moderate clavicle loss  Fluid Retention:  Mild +1 generalized. +2-+3 left arm and bilateral ankles/feet  Unable to assess LE, deltoid d/t positioning, leg wraps     Malnutrition Diagnosis: Moderate  malnutrition  In Context of:  Chronic illness or disease    CURRENT NUTRITION DIAGNOSIS  Increased nutrient needs related to healing as evidenced by wounds   Evaluation: No change    INTERVENTIONS  Implementation  Will re-order supplements when diet can be advanced.     Goals  Wound healing - Progressing, stable  Pt to meet >75% nutritional needs - Not Met d/t NPO/Clear liquid 3 days  Electrolytes WNL - not met, hypophosphetemia  BG < 180 - Not Met, progressing    Monitoring/Evaluation  Progress toward goals will be monitored and evaluated per protocol.

## 2024-08-26 NOTE — PROGRESS NOTES
General Surgery Progress Note:    Hospital Day # 21    ASSESSMENT:  1. Pressure injury of sacral region, unstageable (H)    2. Community acquired pneumonia of left lower lobe of lung    3. Pressure injury of sacral region, stage 3 (H)    4. Hypotension, unspecified hypotension type    5. Hypoxia    6. History of stroke    7. Colostomy care (H)    8. Pressure injury of sacral region, stage 4 (H) [L89.154]    9. Wound infection        Franklyn Sorto is a 88 year old male with stage IV infected sacral decubitus ulcer s/p wound debridement and laparoscopic diverting colostomy creation on 8/13/2024, unfortunately now s/p open colostomy revision /creation of new ostomy for ischemia 8/26/2024.  Patient max temp 99.2 F otherwise vitally stable on 1 L oxygen with small increase in leukocytosis potentially reactive to major surgery/necrosis of previous ostomy.  Hemoglobin stable at 8.7.    PLAN:  -Oral cares okay and small sips of water this morning; if tolerating can go back to the soft foods/thickened diet he was on previously this afternoon  -Repositioning and continue wound VAC per WOC  -Continue monitoring stoma and return of bowel functions  -Can restart coumadin tomorrow (48 hours postop)  -Medical management per primary team    SUBJECTIVE:   Franklyn Sorto was seen on rounds.  Patient states he is okay, he is sad that he had to have surgery again.  He has pain in his abdomen mostly in the midline.  States that he is looking forward to having some chicken broth or tomato soup.  He would really like a glass of water as well.  Patient feels as though he is back to baseline, and feels as though his voice is his normal voice and mentation is as before surgery.    Friend Herman is also in the room.      Patient Vitals for the past 24 hrs:   BP Temp Temp src Pulse Resp SpO2 Weight   08/26/24 0809 114/54 98.5  F (36.9  C) Oral 79 17 98 % --   08/26/24 0653 -- -- -- 81 15 99 % --   08/26/24 0609 124/87 99.1  F (37.3  C)  Oral 79 17 99 % --   08/26/24 0324 128/56 -- -- 83 16 96 % --   08/26/24 0200 115/57 -- -- 80 15 97 % --   08/25/24 2356 -- -- -- -- 17 98 % --   08/25/24 2347 (!) 140/79 99.2  F (37.3  C) Oral 67 16 91 % --   08/25/24 2217 129/66 -- -- 92 16 97 % --   08/25/24 2121 (!) 130/96 -- -- 95 19 97 % --   08/25/24 2046 (!) 145/73 -- -- -- -- 98 % --   08/25/24 2043 -- -- -- -- 11 100 % --   08/25/24 2015 (!) 159/92 -- -- 100 12 99 % --   08/25/24 1936 (!) 165/109 99  F (37.2  C) Axillary 99 16 99 % --   08/25/24 1900 (!) 152/69 98.5  F (36.9  C) Axillary 96 12 95 % --   08/25/24 1831 (!) 193/84 98  F (36.7  C) Axillary 110 16 96 % --   08/25/24 1800 (!) 175/87 -- -- 100 15 98 % --   08/25/24 1745 (!) 164/82 99  F (37.2  C) Temporal 88 13 100 % --   08/25/24 1730 (!) 160/84 (!) 95  F (35  C) -- 80 15 99 % --   08/25/24 1715 (!) 146/80 (!) 94.8  F (34.9  C) -- 69 14 95 % --   08/25/24 1706 136/73 (!) 95.9  F (35.5  C) Core 69 18 (!) 82 % --   08/25/24 1336 (!) 161/67 98.4  F (36.9  C) Oral 76 18 98 % --   08/25/24 1253 132/63 98.3  F (36.8  C) Oral 75 -- 97 % --   08/25/24 1221 (!) 140/67 98.4  F (36.9  C) Oral 80 18 95 % --   08/25/24 1140 129/61 98.1  F (36.7  C) Oral 82 19 98 % --   08/25/24 1120 135/62 98  F (36.7  C) Oral 72 18 98 % --   08/25/24 1055 124/56 98.5  F (36.9  C) Oral 73 18 99 % --   08/25/24 1032 118/55 98.4  F (36.9  C) Oral 70 17 98 % --   08/25/24 0943 -- -- -- -- -- -- 71.3 kg (157 lb 3 oz)         PHYSICAL EXAM:  General: patient seen resting in bed in no acute distress, appears to be at baseline mentation, and speech  Resp: no increased work of breathing, breathing comfortably on 1 L oxygen through nasal cannula satting at 99%  Abdomen: Generally soft and tenderness with palpation appropriate postoperatively.  Midline incision with bandage clean, dry, intact.  Stoma appears pink and viable and protruding, bowel sweat and sanguinous watery drainage in the bag this morning.  No gas or stool in the bag.   Appliance appears clean, dry, intact  Drains: Wound VAC without leaking per machine, appears to be draining well.  Due to patient discomfort from recent surgery did not return him to assess the wound VAC fully  Extremities: No edema or cyanosis visualized on exam, no obvious deformities    08/25 0700 - 08/26 0659  In: 2201 [P.O.:170; I.V.:975]  Out: 750 [Urine:750]    No results displayed because visit has over 200 results.           TEA Sutherland [facility]  [address] [place] [address] [id number] [place] [address] [phone]

## 2024-08-26 NOTE — PROVIDER NOTIFICATION
House officer called to evaluate patient after returning from PACU.  Patient confused to place and situation.  Left sided facial-droop.  Garbled speech.  Hx of right sided CVA, exacerbation of previous symptoms.  New order for check neuro checks every 1 hour, if any changes in previous symptoms notify house officer.

## 2024-08-26 NOTE — PROGRESS NOTES
SPEECH PATHOLOGY CLINICAL SWALLOW EVALUATION       08/26/24 0203   Appointment Info   Signing Clinician's Name / Credentials (SLP) Karthik Moyer MA [facility] SLP   General Information   Onset of Illness/Injury or Date of Surgery 08/25/24   Referring Physician Jamshid Gardner MD   Patient/Family Therapy Goal Statement (SLP) to eat/drink after surgery   Pertinent History of Current Problem 88M with history of HFrEF, hyperlipidemia, hypertension, aortic stenosis status post TAVR, thrombophilia, type 2 diabetes, stage III sacral ulcer, CAD, prior CVA admitted on 8/5/2024 with altered mental status & hypotension secondary to sepsis possibly from urinary tract infection, aspiration pneumonia and suspected infected, unstageable sacral ulcer. He was managed briefly at the ICU by instensivist on admission but did not require vasopressor. Hypotension and ARIADNE improved with IV hydration and albumin infusion.  Patient transferred to medical floor on 8/6/2024.  However, patient transferred back to ICU for possible septic shock and management with pressors.On 8/8/2024 patient transferred back to floor.  UC grew E. coli.BC grew ESBL E Coli & Streptococcus salivarius. Polymicrobial bacteremia thought to be secondary to infected sacral decubitus ulcer.  Patient underwent multiple bedside debridement and eventually OR debridement with diverting colostomy on 8/13/2024. On 8/24 CT imaging reported ischemia of colostomy site and patient had colostomy revision 8/25/24. Pt has long gerda oropharyngeal dysphagia. He was seen earlier this admission and discharged from SLP services on Easy to Chew diet w/mildly thick liquids, water protocol. He is referred post op to determine appropriate clear liquid restrictions.   General Observations alert but fatigued, in bed, friend is present. Per surgeon, patient to only have water/ice chips at this time.   Type of Evaluation   Type of Evaluation Swallow Evaluation   Oral Motor   Mucosal Quality adequate    Dentition (Oral Motor)   Dentition (Oral Motor) adequate dentition   Facial Symmetry (Oral Motor)   Facial Symmetry (Oral Motor) left side impairment   Comment, Facial Symmetry (Oral Motor) baseline   Left Side Facial Asymmetry minimal impairment   Lip Function (Oral Motor)   Lip Range of Motion (Oral Motor) retraction impairment   Lip Strength (Oral Motor) left side;mild impairment   Lip Coordination (Oral Motor) minimal impairment   Retraction, Lip Range of Motion left side;minimal impairment   Tongue Function (Oral Motor)   Tongue Strength (Oral Motor) WFL   Tongue Coordination/Speed (Oral Motor) reduced rate   Tongue ROM (Oral Motor) WNL   Jaw Function (Oral Motor)   Jaw Function (Oral Motor) WNL   Cough/Swallow/Gag Reflex (Oral Motor)   Volitional Throat Clear/Cough (Oral Motor) reduced strength   Volitional Swallow (Oral Motor) effortful;other (see comments)   Vocal Quality/Secretion Management (Oral Motor)   Vocal Quality (Oral Motor) hoarse;other (see comments)  (baseline)   Secretion Management (Oral Motor) WNL   General Swallowing Observations   Past History of Dysphagia yes   Respiratory Support nasal cannula;other (see comments)  (1 L)   Current Diet/Method of Nutritional Intake (General Swallowing Observations, NIS) clear liquid diet   Swallowing Evaluation Clinical swallow evaluation   Clinical Swallow Evaluation   Feeding Assistance frequent cues/help required   Clinical Swallow Evaluation Textures Trialed thin liquids   Clinical Swallow Eval: Thin Liquid Texture Trial   Mode of Presentation, Thin Liquids spoon   Volume of Liquid or Food Presented 1-2 ice chips at a time   Oral Phase of Swallow WFL   Pharyngeal Phase of Swallow intact   Diagnostic Statement actively chews ice chips, swallows are observed, no overt s/s aspiration   Swallowing Recommendations   Diet Consistency Recommendations mildly thick liquids (level 2);clear liquid diet;other (see comments)  (ice chips okay)   Comment, Swallowing  Recommendations Recommend mildly thick water and ice chips for now while recovering from surgical procedure. When cleared by surgery to advance, he would be able to advance to recent baseline diet of Easy to chew/mech soft w/mildly thick liquids and water protocol.   Clinical Impression   Clinical Impression Comments Pt is alert but fatigued. He is cleared for water or ice only post op by surgeon. He appear at or close to baseline swallow function. Continues with risk of aspiration with thin liquids. He demonstrates good oral control of ice chips, swallows observed with eat trial. He has no cough, throat clear, or change in vocal quality. He did not want any additional PO or mildly thick water during assessment. Recommend mildly thick water or ice only while recovering, when cleared to advance diet by surgeon, he may advance up to baseline diet of easy to chew/mech soft w/mildly thick liquids, water protocol.   SLP Total Evaluation Time   Eval: oral/pharyngeal swallow function, clinical swallow Minutes (35578) 15   SLP Discharge Planning   SLP Plan eval only   SLP Rationale for DC Rec no ongoing SLP needs at this time   SLP Brief overview of current status  Recommend mildly thick water and ice chips for now while recovering from surgical procedure. When cleared by surgery to advance, he would be able to advance to recent baseline diet of Easy to chew/mech soft w/mildly thick liquids and water protocol.

## 2024-08-26 NOTE — PROGRESS NOTES
[facility] Progress Note - Hospitalist Service    Date of Admission:  8/5/2024    Assessment & Plan   88M with history of HFrEF, hyperlipidemia, hypertension, aortic stenosis status post TAVR, thrombophilia, type 2 diabetes, stage III sacral ulcer, CAD, prior CVA admitted on 8/5/2024 with altered mental status & hypotension secondary to sepsis possibly from urinary tract infection, aspiration pneumonia and suspected infected, unstageable sacral ulcer.     He was managed briefly at the ICU by instensivist on admission but did not require vasopressor. Hypotension and ARIADNE improved with IV hydration and albumin infusion.  Patient transferred to medical floor on 8/6/2024.  However, patient transferred back to ICU for possible septic shock and management with pressors.  On 8/8/2024 patient transferred back to floor.  UC grew E. coli.  BC grew ESBL E Coli & Streptococcus salivarius. Polymicrobial bacteremia thought to be secondary to infected sacral decubitus ulcer.  Patient underwent multiple bedside debridement and eventually OR debridement with diverting colostomy on 8/13/2024.    On 8/24 CT imaging reported ischemia of colostomy site.  On 8/25, patient underwent colostomy revision.       still working on setting up home equipments (hospital bed, Kevin lift, wheelchair) and hoping will be set in next couple of days    Assessment/Plan  # Septic shock, resolved  #Infected sacral decubitus ulcer;  #Polymicrobial bacteremia with ESBL and Strep salivarius due to infected sacral ulcer;  Patient underwent multiple bedside debridement and eventually OR debridement with diverting colostomy on 8/13/2024.  -S/p debridement 8/6/2024 with Cx polymicrobial (E Coli, B Fragilis, Staph Aureus, E faecium)  -Ucx ESBL E Coli & Strep salivarius  -Unable to obtain MRI secondary to intracardiac lead. CT Pelvis without any signs of osteo or abscess  --Abx by ID, currently on meropenem (8/7- )   added daptomycin for VRE (8/10- ). Per ID likely antibiotics 4 to 6 weeks.  -Stoma care & wound vac care per WOC and surgery team  -- On 8/24, CT imaging reported ischemia at colostomy site, patient is scheduled for colostomy revision today    #Acute blood loss anemia; multifactorial- postoperative, acute illness, chronic wound  -- Hemoglobin around 8 range.     #Acute on chronic pain from sacral wound;  -Scheduled Tylenol, try to wean off.  -Oxycodone 2.5-5mg q4h PRN, dilaudid for breakthrough.    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  INR therapeutic on 8/22.  Coumadin dosing and INR monitoring per pharmacy.  However, required INR reversal for surgery on 8/25, surgery reported okay to resume Coumadin from 8/27.       #Diabetes mellitus type 2, noninsulin dependent  -home regimen: jardiance    -inpatient: Lantus 5u daily, insulin sliding scale and hypoglycemic protocol     #Moderate Malnutrition;  #S/p GJ tube, present on admission;   -On 8/21, personally discussed with registered dietitian, reported patient not meeting his nutritional needs, recommended oximetry pending.  However, patient declined tube feeding and protein supplements added.     #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   #H/o HFrEF with EF recovery  #CAD   -History  of distal RCA stent, last echo 8/2019 with EF 50-55%  -Had hypotension with resumption of low dose coreg.  Started low dose toprol-xl instead and tolerating.  -PTA crestor on hold due to patient on daptomycin    #GERD/Hx PUD   -Continue PPI     Dispo:  -Ordered DME (Kevin lift, hospital bed, wheelchair)  -Outpatient palliative care consult placed             Diet: Snacks/Supplements Adult: Magic Cup; With Meals  Snacks/Supplements Adult: Expedite Cup; With Meals  Room Service  Snacks/Supplements Adult: Ensure Enlive; With Meals  Snacks/Supplements Adult: Gelatein Plus; Between Meals  Advance Diet as Tolerated: Clear Liquid Diet; Mildly Thick (level 2); Mechanical/Dental Soft Diet    DVT Prophylaxis: Warfarin  Javier Catheter: PRESENT, indication: Acute retention or obstruction, Wound deterioration and failed external collection device  Lines: PRESENT      PICC 08/15/24 Single Lumen Right Brachial vein medial IV Antibiotics-Site Assessment: WDL      Cardiac Monitoring: None  Code Status: No CPR- Pre-arrest intubation OK      Clinically Significant Risk Factors              # Hypoalbuminemia: Lowest albumin = 2.2 g/dL at 8/5/2024 11:55 AM, will monitor as appropriate     # Hypertension: Noted on problem list          # DMII: A1C = N/A within past 6 months    # Moderate Malnutrition: based on nutrition assessment    # Financial/Environmental Concerns:                 Disposition Plan     Medically Ready for Discharge: Anticipated in 2-4 Days             Jamshid Gardner MD  Hospitalist Service  [facility]  Securely message with [employer] (more info)  Text page via [employer] Paging/Directory   ______________________________________________________________________    Interval History   Patient seen and examined.  Notes, labs, imaging report personally reviewed.  Overnight events reviewed.  This morning during my visit, patient complaining of abdominal/surgical site pain but no associated nausea or vomiting,  requesting for oral diet.  Denied short of breath or chest pain.  Patient's friend at bedside.  Discussed with surgery team at bedside.  Discussed with nursing staffs.    Physical Exam   Vital Signs: Temp: 98.5  F (36.9  C) Temp src: Oral BP: 114/54 Pulse: 79   Resp: 17 SpO2: 98 % O2 Device: Nasal cannula Oxygen Delivery: 1 LPM  Weight: 157 lbs 3.01 oz      General: Not in obvious distress.  Chronically ill looking  HEENT: Normocephalic, supple neck  Chest: Clear to auscultation bilateral anteriorly, no wheezing  Heart: S1S2 normal, regular  Abdomen: Soft.  Midline surgical dressing in place, new colostomy bag with pink stoma.  Mild appropriate tenderness at surgical incision site.  Extremities: Generalized anasarca, improving  Neuro: alert and awake, soft voice, left-sided weakness due to recent stroke      Medical Decision Making             Data     I have personally reviewed the following data over the past 24 hrs:    14.7 (H)  \   8.7 (L); 8.7 (L)   / 236     140 104 15.7 /  161 (H)   4.3 28 0.51 (L) \     INR:  1.37 (H) PTT:  N/A   D-dimer:  N/A Fibrinogen:  N/A       Imaging results reviewed over the past 24 hrs:   No results found for this or any previous visit (from the past 24 hour(s)).

## 2024-08-26 NOTE — PLAN OF CARE
Problem: Adult Inpatient Plan of Care  Goal: Plan of Care Review  Description: The Plan of Care Review/Shift note should be completed every shift.  The Outcome Evaluation is a brief statement about your assessment that the patient is improving, declining, or no change.  This information will be displayed automatically on your shift  note.  Outcome: Progressing  Flowsheets (Taken 8/25/2024 2314)  Plan of Care Reviewed With: patient  Goal: Optimal Comfort and Wellbeing  Outcome: Progressing  Intervention: Monitor Pain and Promote Comfort  Recent Flowsheet Documentation  Taken 8/25/2024 2217 by Debra Junior RN  Pain Management Interventions:   rest   emotional support  Taken 8/25/2024 1936 by Debra Junior RN  Pain Management Interventions:   rest   emotional support     Problem: Risk for Delirium  Goal: Improved Attention and Thought Clarity  Outcome: Progressing     Problem: Skin Injury Risk Increased  Goal: Skin Health and Integrity  Outcome: Progressing  Intervention: Plan: Nurse Driven Intervention: Moisture Management  Recent Flowsheet Documentation  Taken 8/25/2024 2217 by Debra Junior RN  Moisture Interventions:   No brief in bed   Incontinence pad  Intervention: Plan: Nurse Driven Intervention: Friction and Shear  Recent Flowsheet Documentation  Taken 8/25/2024 2217 by Debra Junior RN  Friction/Shear Interventions: HOB 30 degrees or less  Intervention: Optimize Skin Protection  Recent Flowsheet Documentation  Taken 8/25/2024 2217 by Debra Junior RN  Activity Management: activity adjusted per tolerance  Taken 8/25/2024 2120 by Debra Junior RN  Head of Bed (HOB) Positioning: HOB at 30 degrees     Problem: Comorbidity Management  Goal: Blood Glucose Levels Within Targeted Range  Outcome: Progressing     Problem: Fall Injury Risk  Goal: Absence of Fall and Fall-Related Injury  Outcome: Progressing  Intervention: Promote Injury-Free Environment  Recent Flowsheet Documentation  Taken 8/25/2024 2217 by  Nishino, Debra, RN  Safety Promotion/Fall Prevention:   activity supervised   room door open   lighting adjusted   supervised activity   safety round/check completed   treat underlying cause     Problem: Infection  Goal: Absence of Infection Signs and Symptoms  Outcome: Progressing     Problem: Wound  Goal: Optimal Functional Ability  Outcome: Progressing  Intervention: Optimize Functional Ability  Recent Flowsheet Documentation  Taken 8/25/2024 2217 by Debra Junior RN  Activity Management: activity adjusted per tolerance  Activity Assistance Provided: assistance, 2 people     Problem: Pain Acute  Goal: Optimal Pain Control and Function  Outcome: Progressing  Intervention: Develop Pain Management Plan  Recent Flowsheet Documentation  Taken 8/25/2024 2217 by Debra Junior RN  Pain Management Interventions:   rest   emotional support  Taken 8/25/2024 1936 by Debra Junior RN  Pain Management Interventions:   rest   emotional support  Intervention: Prevent or Manage Pain  Recent Flowsheet Documentation  Taken 8/25/2024 2217 by Debra Junior RN  Sleep/Rest Enhancement:   room darkened   regular sleep/rest pattern promoted   relaxation techniques promoted   comfort measures  Intervention: Optimize Psychosocial Wellbeing  Recent Flowsheet Documentation  Taken 8/25/2024 2217 by Debra Junior RN  Supportive Measures:   verbalization of feelings encouraged   positive reinforcement provided   relaxation techniques promoted   active listening utilized     Problem: Gas Exchange Impaired  Goal: Optimal Gas Exchange  Outcome: Progressing  Intervention: Optimize Oxygenation and Ventilation  Recent Flowsheet Documentation  Taken 8/25/2024 2120 by Debra Junior RN  Head of Bed (HOB) Positioning: HOB at 30 degrees   Goal Outcome Evaluation:      Plan of Care Reviewed With: patient      Patient still confused to place and situation with left sided facial droop and garbled speech. Complaining of pain over the buttocks, on 2  hourly repositioning which is helpful. Denied shortness of breath. Oxygen able to wean down to 1 LPM via nasal cannula without desaturation. Scheduled tylenol and cold compress given for abdominal incision pain and stated was helpful. Patient back to baseline orientation at around 10 pm, and able to make needs known. Call light within reach.    Patient tolerated spoonfuls of water and ice chips overnight. PICC dressing changed.  Abdominal dressing with dried blood, no new bleeding. Colostomy bag with minimal bloody output, no leaking.

## 2024-08-26 NOTE — PROGRESS NOTES
PICC Team Note: assessment and sterile dressing change done to single lumen PICC line on the RUE. Power flushing times two done with the insertion site exposed and the site has no leaking with the flush. The dressing that was removed was wet with clear fluid. The line also has good blood return and easy flushing. This indicates that the leakage is from third spacing due to previously swollen arm and will likely happen again when the arm is in the dependent position. Elevating the arm on a pillow could reduce this issue. At this time, the PICC line is patent and has complete integrity. The PICC is good to use.. Please page the PICC team again if other symptoms develop such as swelling, pain or loss of blood return.

## 2024-08-26 NOTE — PROGRESS NOTES
PICC line looks like it is leaking when flushed. PICC team paged to come assess.     PICC team assessed line and states leaking is from extra fluid in the arm. Dressing changed by PICC RN.

## 2024-08-27 NOTE — PROGRESS NOTES
General Surgery Progress Note:    Hospital Day # 22    ASSESSMENT:   1. Pressure injury of sacral region, unstageable (H)    2. Community acquired pneumonia of left lower lobe of lung    3. Pressure injury of sacral region, stage 3 (H)    4. Hypotension, unspecified hypotension type    5. Hypoxia    6. History of stroke    7. Colostomy care (H)    8. Pressure injury of sacral region, stage 4 (H) [L89.154]    9. Wound infection        Franklyn Sorto is a 88 year old male s/p revision of colostomy on 8/25/2024.  Patient still with recovery pain from the midline incision.  He continues to have a viable appearance of the colostomy. Leukocytosis is 14.7, which I suspect is reactive from surgery given that he has stable vitals and remains afebrile.     PLAN:   - okay to restart coumadin.   - continue with the local wound care to the midline incision and packing of the old colostomy site daily.   - continue diet as tolerated per speech.   - trending WBC this AM.   - surgery following along      SUBJECTIVE:   Franklyn Sorto was seen on rounds. Pt states pain is 7 out of 10 without pain meds.  Still with some pain the midline incision.  No n/v.  Pt tolerated his tomato soup yesterday.      Patient Vitals for the past 24 hrs:   BP Temp Temp src Pulse Resp SpO2   08/27/24 0743 135/66 98.5  F (36.9  C) Oral 77 18 97 %   08/26/24 2351 133/55 99.6  F (37.6  C) Axillary 79 18 97 %   08/26/24 2000 -- -- -- -- -- 97 %   08/26/24 1556 106/69 98.2  F (36.8  C) Oral 82 19 98 %   08/26/24 1157 120/63 98  F (36.7  C) Oral 84 18 99 %       Physical Exam:  General: NAD, pleasant  CV:RRR  LUNGS:Normal respiratory effort, no accessory muscle use  ABD: midline surgery incision CDI.  Old colostomy site with good wound base. No significant drainage. Appropriately tender to palpation.       Demetri Garcia DO  General Surgeon  [facility]  Surgery [facility] - [address]  [place] [address]?   Office: [phone]  Employed by [facility]  Pager: [phone]

## 2024-08-27 NOTE — PLAN OF CARE
"  Problem: Adult Inpatient Plan of Care  Goal: Plan of Care Review  Description: The Plan of Care Review/Shift note should be completed every shift.  The Outcome Evaluation is a brief statement about your assessment that the patient is improving, declining, or no change.  This information will be displayed automatically on your shift  note.  Outcome: Progressing  Goal: Patient-Specific Goal (Individualized)  Description: You can add care plan individualizations to a care plan. Examples of Individualization might be:  \"Parent requests to be called daily at 9am for status\", \"I have a hard time hearing out of my right ear\", or \"Do not touch me to wake me up as it startles  me\".  Outcome: Progressing  Goal: Absence of Hospital-Acquired Illness or Injury  Outcome: Progressing  Goal: Optimal Comfort and Wellbeing  Outcome: Progressing  Intervention: Monitor Pain and Promote Comfort  Recent Flowsheet Documentation  Taken 8/26/2024 1203 by Kathy Morgan RN  Pain Management Interventions: medication (see MAR)  Goal: Readiness for Transition of Care  Outcome: Progressing     Problem: Risk for Delirium  Goal: Optimal Coping  Outcome: Progressing  Goal: Improved Behavioral Control  Outcome: Progressing  Goal: Improved Attention and Thought Clarity  Outcome: Progressing  Goal: Improved Sleep  Outcome: Progressing     Problem: Skin Injury Risk Increased  Goal: Skin Health and Integrity  Outcome: Progressing     Problem: Comorbidity Management  Goal: Blood Glucose Levels Within Targeted Range  Outcome: Progressing  Goal: Maintenance of Heart Failure Symptom Control  Outcome: Progressing  Goal: Blood Pressure in Desired Range  Outcome: Progressing     Problem: Fall Injury Risk  Goal: Absence of Fall and Fall-Related Injury  Outcome: Progressing     Problem: Infection  Goal: Absence of Infection Signs and Symptoms  Outcome: Progressing     Problem: Wound  Goal: Optimal Functional Ability  Outcome: Progressing  Goal: Absence of " Infection Signs and Symptoms  Outcome: Progressing     Problem: Acute Kidney Injury/Impairment  Goal: Fluid and Electrolyte Balance  Outcome: Progressing     Problem: Pain Acute  Goal: Optimal Pain Control and Function  Outcome: Progressing  Intervention: Develop Pain Management Plan  Recent Flowsheet Documentation  Taken 8/26/2024 1203 by Kathy Morgan, RN  Pain Management Interventions: medication (see MAR)     Problem: Gas Exchange Impaired  Goal: Optimal Gas Exchange  Outcome: Progressing

## 2024-08-27 NOTE — PLAN OF CARE
Problem: Adult Inpatient Plan of Care  Goal: Optimal Comfort and Wellbeing  Outcome: Progressing   Goal Outcome Evaluation:        Pt alert/oriented, denies pain. Turned and repositioned every 2 hours.  Dressings dry and intact to abdomen, wound vac running. Needs to be fed meals.  Javier patent.  Colostomy intact, call light within reach.

## 2024-08-27 NOTE — PLAN OF CARE
Problem: Adult Inpatient Plan of Care  Goal: Absence of Hospital-Acquired Illness or Injury  Intervention: Prevent and Manage VTE (Venous Thromboembolism) Risk  Recent Flowsheet Documentation  Taken 8/27/2024 0920 by Lucero Mosley RN  VTE Prevention/Management: SCDs on (sequential compression devices)     Problem: Adult Inpatient Plan of Care  Goal: Optimal Comfort and Wellbeing  Outcome: Progressing  Intervention: Monitor Pain and Promote Comfort  Recent Flowsheet Documentation  Taken 8/27/2024 0924 by Lucero Mosley RN  Pain Management Interventions: medication (see MAR)  Intervention: Provide Person-Centered Care  Recent Flowsheet Documentation  Taken 8/27/2024 0920 by Lucero Mosley RN  Trust Relationship/Rapport:   thoughts/feelings acknowledged   questions encouraged   emotional support provided   Goal Outcome Evaluation:      Pt A&Ox4. Kevin lift when transferring, refused getting up into the chair this morning and afternoon due to pain. PRN dilaudid and oxy given, rating pain at 7/10. Lost IV access, PICC patent. Wound vac and colostomy dressings changed by WOC in place. Javier intact and draining. G/J tube patent. Q2 turns as patient permits. Lucero Mosley, RN

## 2024-08-27 NOTE — PROGRESS NOTES
Pt alert and oriented x 4. Dressings to abd C/D/I. Colostomy with small amount of clear bloody fluid out. Pt was NPO except for ice chips and sips of water in the morning, in the afternoon diet advanced back to easy to chew and mildly thickened liquids, and pt tolerated well. Turn and repo Q 2 hours. VSS, will continue to monitor.

## 2024-08-27 NOTE — PROGRESS NOTES
[facility]    Medicine Progress Note - Hospitalist Service    Date of Admission:  8/5/2024    Assessment & Plan   88M with history of HFrEF, hyperlipidemia, hypertension, aortic stenosis status post TAVR, thrombophilia, type 2 diabetes, stage III sacral ulcer, CAD, prior CVA admitted on 8/5/2024 with altered mental status & hypotension secondary to sepsis possibly from urinary tract infection, aspiration pneumonia and suspected infected, unstageable sacral ulcer.     He was managed briefly at the ICU by instensivist on admission but did not require vasopressor. Hypotension and ARIADNE improved with IV hydration and albumin infusion.  Patient transferred to medical floor on 8/6/2024.  However, patient transferred back to ICU for possible septic shock and management with pressors.  On 8/8/2024 patient transferred back to floor.  UC grew E. coli.  BC grew ESBL E Coli & Streptococcus salivarius. Polymicrobial bacteremia thought to be secondary to infected sacral decubitus ulcer.  Patient underwent multiple bedside debridement and eventually OR debridement with diverting colostomy on 8/13/2024.    On 8/24 CT imaging reported ischemia of colostomy site.  On 8/25, patient underwent colostomy revision.       still working on setting up home equipments (hospital bed, Kevin lift, wheelchair) and hoping will be set in next couple of days    Assessment/Plan  # Septic shock, resolved  #Infected sacral decubitus ulcer;  #Polymicrobial bacteremia with ESBL and Strep salivarius due to infected sacral ulcer;  Patient underwent multiple bedside debridement and eventually OR debridement with diverting colostomy on 8/13/2024.  -S/p debridement 8/6/2024 with Cx polymicrobial (E Coli, B Fragilis, Staph Aureus, E faecium)  -Ucx ESBL E Coli & Strep salivarius  -Unable to obtain MRI secondary to intracardiac lead. CT Pelvis without any signs of osteo or abscess  -Abx by ID, currently on meropenem (8/6 )   added daptomycin for VRE (8/10). Per ID likely antibiotics 4 to 6 weeks.  -Stoma care & wound vac care per WOC and surgery team  -On 8/24, CT imaging reported ischemia at colostomy site, patient underwent colostomy revision    #Acute blood loss anemia; multifactorial- postoperative, acute illness, chronic wound  -- Hemoglobin around 8 range.     #Acute on chronic pain from sacral wound;  -Scheduled and prn Tylenol, try to wean off.  -Oxycodone 2.5-5mg q4h PRN, dilaudid for breakthrough.    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 Has feeding tube in place since May/24 but pt adamant about not using it.      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  INR therapeutic on 8/22.  Coumadin dosing and INR monitoring per pharmacy.  However, required INR reversal for surgery on 8/25, resuming coumadin today per surgery recommendations       #Diabetes mellitus type 2, noninsulin dependent  -home regimen: jardiance    -inpatient: Lantus 5u daily, insulin sliding scale and hypoglycemic protocol     #Moderate Malnutrition;  #S/p GJ tube, present on admission;   -On 8/21, personally discussed with registered dietitian, reported patient not meeting his nutritional needs. However, patient declined tube feeding and protein supplements added.     #S/P  TAVR (transcatheter aortic valve replacement) and dual chamber PM 11/2014   #H/o HFrEF with EF recovery  #CAD   -History of distal RCA stent, last echo 8/2019 with EF 50-55%  -Had hypotension with resumption of low dose coreg.  Started low dose toprol-xl instead and tolerating.  -PTA crestor on hold due to patient on daptomycin    #GERD/Hx PUD   -Continue PPI     Dispo:  -Ordered DME (Kevin lift, hospital bed, wheelchair)  -Outpatient palliative care consult placed             Diet: Snacks/Supplements Adult: Magic Cup; With Meals  Snacks/Supplements Adult: Expedite Cup; With Meals  Room Service  Snacks/Supplements Adult: Ensure Enlive; With Meals  Snacks/Supplements Adult: Gelatein Plus; Between Meals  Easy to Chew Diet (level 7) Mildly Thick (level 2)    DVT Prophylaxis: Warfarin  Javier Catheter: PRESENT, indication: Acute retention or obstruction, Wound deterioration and failed external collection device  Lines: PRESENT      PICC 08/15/24 Single Lumen Right Brachial vein medial IV Antibiotics-Site Assessment: WDL      Cardiac Monitoring: None  Code Status: No CPR- Pre-arrest intubation OK      Clinically Significant Risk Factors              # Hypoalbuminemia: Lowest albumin = 2.2 g/dL at 8/5/2024 11:55 AM, will monitor as appropriate     # Hypertension: Noted on problem list          # DMII: A1C = N/A within past 6 months    # Moderate Malnutrition: based on nutrition assessment      # Financial/Environmental Concerns:                 Disposition Plan     Medically Ready for Discharge: Anticipated in 2-4 Days         Ina Meade MD  Hospitalist Service  [facility]  Securely message with boolino (more info)  Text page via [employer] Paging/Directory   ______________________________________________________________________    Interval History   Patient is new to me. Chart reviewed and events noted.  Patient seen and examined.   Patient reports abd pain, 7-8/10 in severity, eating breakfast  during my visit. States doing well with eating, denies any N/V. Gas noted in colostomy bag, no stools yet. No chest pain, SOB.  No acute issues overnight      Physical Exam   Vital Signs: Temp: 98.5  F (36.9  C) Temp src: Oral BP: (!) 143/75 Pulse: 89   Resp: 18 SpO2: 97 % O2 Device: None (Room air) Oxygen Delivery: 1 LPM  Weight: 157 lbs 3.01 oz      General: Pleasant elderly male , chronically ill appearing inNAD  HEENT:EOMI, AT,NC  CVS:RRR, no edema  RS:CTAB  Abd: Soft, colostomy bag in place, no stools noted.   Neurology:Awake, alert, oriented, left sided weakness+  Psy: Calm, cooperative      Medical Decision Making       Plan d/w patient, bedside RN, JAI/TOMAS. Called son, left VM to call back           Data     I have personally reviewed the following data over the past 24 hrs:    INR:  1.49 (H) PTT:  N/A   D-dimer:  N/A Fibrinogen:  N/A       Imaging results reviewed over the past 24 hrs:   No results found for this or any previous visit (from the past 24 hour(s)).

## 2024-08-27 NOTE — PROGRESS NOTES
"[facility] Nurse Inpatient Assessment       Consulted for: Sacral    8/27: VAC dressing changed and new stoma assessed/pouch changed due to revision on 8/25.     Patient History (according to provider note(s):      HPI: Franklyn Sorto is a 88 year old male with h/o T2DM, GERD, HTN, CVA 5/25/24 currently in TCU presenting to the ER for evaluation of fatigue and altered mental status. Patient's family was visiting today and states when they got there \"5 staff members were around the patient and he was not responding\". They called EMS who initially got a BP with 90 systolic, however, repeat on the way over was 65 systolic. Per patient's family, patient had feeding tube placed following his stroke as he was having difficulty swallowing.  Patient has improved a fair amount and is now able to swallow, has not used his feeding tube in over a month.  Patient's family states that they are quite adamant about him only drinking and eating well sitting up but they are nervous that his facility may have given him some fluid while laying down that could have resulted in an aspiration pneumonia. Patient denies any recent fevers. States he has been coughing a bit more, no sputum production.     Vitals reviewed, initial BP upon arrival to the ER was 69/48. Temp 97.5F. SpO2 89% on room air. 2L nasal cannula was placed and patient quickly improved to 98% on room air On exam he is pale but nontoxic-appearing.  He is speaking in full sentences and is alert. Differential diagnosis includes but not limited to UTI, pyelonephritis, bronchitis, pneumonia, PE, ACS, hypovolemia, cardiogenic shock, septic shock. After initial 500 mL fluid bolus his BP has improved to 87/47. He was given an additional 500 mL and improved to 90/66.        Assessment:        Pressure Injury Location: Negative pressure wound therapy applied to: Sacrum     8/6 8/7 8/9 8/12                                        "                              8/14 8/16 8/27      Last photo: 8/27  Wound type: Pressure Injury     Pressure Injury Stage: 4, present on admission   Wound history/plan of care:   Came from TCU. 8/20, small bedside debridement by PA    Wound base: unhealthy granular tissue with exposed fascia, <10% adherent slough, bone palpated     Palpation of the wound bed: normal      Drainage: moderate     Description of drainage: serosanguinous     Measurements (length x width x depth, in cm) 7  x 6  x  2.5 cm     Tunneling NA     Undermining all around, 1-2cm  Periwound skin:  discolored      Color: dusky, pink, and red      Temperature: warm  Odor: none  Pain: mild and during dressing change, tender  Pain intervention prior to dressing change: slow and gentle cares , oral and IV pain meds administered by RN  Treatment goal: Drainage control, Infection control/prevention, Increase granulation, Protection, Remove necrotic tissue, and Soften necrotic tissue  STATUS: stable    Surgical date: 8/13   Service following: surgery  Date Negative Pressure Wound Therapy initiated: 8/14   Interventions in place: repositioning, pressure redistribution with device or dressing, moisture/incontinence management, and offloading  Is patient s nutritional status compromised? no   If yes, what interventions are in place? Protein supplements  Reason for initiating vac therapy? Presence of co-morbidities and Need for accelerated granulation tissue  Which?of?the?following?co-morbidities?apply? Diabetes  If diabetic is patient on a diabetic management program? Yes   Is osteomyelitis present in wound? no   If yes what treatments are in place? N/A        Volume in cannister: 300     Last cannister change date: 8/27       Number of foam pieces removed from a wound (excluding foam for bridge) :  1 GranuFoam Black   Verified this matched the number of foam pieces applied last dressing  "change: Yes   Number of foam pieces packed into wound (excluding foam for bridge) :  1 GranuFoam Black and 1 Oil emulsion dressing - bridge to L hip  ___________________________________________________________________________________________________________________________________________________________________________________________________________________________________________    Assessment of new end Colostomy:  Diagnosis Pertinent to Stoma:  large sacrococcygeal wound       Surgery Date: 8/14 and revision on 8/25/24  Surgeon:Brandon Lamar did revision       Hospital: [facility]  Pouching system in place on assessment today: Martha one piece   Pouch barrier status: intact  Pouch last changed/wear time: post op pouch intact  Reason for pouch change today: ostomy education and initial post-op assessment  Effectiveness of current pouching/ supply plan: Effective  Change made with ostomy management today: Yes  Pouching system placed today: Martha two piece flat  Supplies: gathered        8/14 8/20 8/27    Last photo: 8/27  Stoma location: [facility]  Stoma size: 1-5/8\"  Stoma appearance: red, protruding, edematous  Peristomal complication(s): none   Output: serosang sweat  Output volume emptied during visit: 10ml  Abdominal assessment: Soft  Surgical site(s): dressing dry and intact  NG still in place? No  Pain: Sharp, Dull ache, and Fullness  Is patient still on a PCA? No      Did not assess previous stoma site. Currently packed with gauze and being managed by general surgery team.        Treatment Plan:     LLQ Colostomy pouching plan:   Pouching system: ostomy supplies pouches: Steinauer 57 FECAL ([id number]) ostomy supplies barrier: Steinauer 57mm FLAT ([id number])  Accessories used: [facility] ostomy accessories: 2\" Cera Barrier Ring ([id number]) and Cavilon no sting barrier film ([id number])   Frequency of pouch changes: Twice weekly and PRN " "leakage  WOC follow up plan: Twice weekly and As needed   Bedside RN interventions: Change pouch PRN if leaking using the supplies above, Empty pouch when 1/3 to 1/2 full, ensure to clean pouch outlet after emptying to prevent odor, Notify WOC for ongoing pouch leakage, Document stoma appearance and output volume, color, and consistency every shift, Encourage patient to empty pouch with assist, and Assist patient to measure and record output     Negative pressure wound therapy plan:  Wound location: Sacrococcygeal   Change Days: Tues/ Fri by WOC RN    Supplies (including all accessories) used: medium Black foam , Adaptic/Curity oil emulsion contact layer , Adapt barrier ring, and Cavilon no sting barrier film  Cleanse with Vashe prior to replacing NPWT  Suction setting: -125   Methods used: Window paned all periwound skin with vac drape prior to applying sponge, Bridged trac pad off bony prominences, and Placed barrier ring into periwound creases to improve seal    Staff RN to assess integrity of dressing and ensure suction is set at appropriate level every shift.   Date canister. Chart canister output every shift. Change cannister weekly and PRN if full/occluded     Remove foam dressing and replace with BID normal saline moist gauze dressing if:   -a dressing failure which cannot be repaired within 2 hours   -patient is discharging to home without a home pump   -patient is discharging to a facility outside the local area   -if a dressing is a \"Silver Foam\", remove before Radiation Therapy or MRI     The hospital VAC pump is not to be discharged with the patient.?Ensure to disconnect patient from machine prior to discharge. Then,    - If a home KCI VAC pump has been delivered, connect home cannister to dressing tubing then connect cannister to home pump and turn on machine    - If transferring to a nearby facility with a KCI vac, can disconnect and clamp tubing then cover end with glove so can be reconnected within " "2 hours       Pressure Injury Prevention (PIP) Plan:  If patient is declining pressure injury prevention interventions: Explore reason why and address patient's concerns, Educate on pressure injury risk and prevention intervention(s), If patient is still declining, document \"informed refusal\" , and Ensure Care team is aware ( provider, charge nurse, etc)  Mattress: Follow bed algorithm, reassess daily and order specialty mattress, if indicated.  HOB: Maintain at or below 30 degrees, unless contraindicated  Repositioning in bed: Every 1-2 hours , Left/right positioning; avoid supine, Raise foot of bed prior to raising head of bed, to reduce patient sliding down (shear), and Frequent microturns using positioning wedges, as patient tolerates  Heels: Keep elevated off mattress, Pillows under calves, and Heel lift boots  Protective Dressing:  wound care to sacrum  Positioning Equipment:Positioning wedges (#[id number]) to help maintain 30 degree side lying position   Chair positioning: Chair cushion (#[id number])    If patient has a buttock pressure injury, or high risk for PI use chair cushion or SPS.  Moisture Management: Perineal cleansing /protection: Follow Incontinence Protocol, Avoid brief in bed, Clean and dry skin folds with bathing , Consider InterDry (#[id number]) between folds, and Moisturize dry skin  Under Devices: Inspect skin under all medical devices during skin inspection , Ensure tubes are stabilized without tension, and Ensure patient is not lying on medical devices or equipment when repositioned  Ask provider to discontinue device when no longer needed.      Orders: Reviewed    RECOMMEND PRIMARY TEAM ORDER: None, at this time  Education provided: importance of repositioning, plan of care, and wound progress  Discussed plan of care with: Patient and Nurse  WOC nurse follow-up plan: twice weekly  Notify WOC if wound(s) deteriorate.  Nursing to notify the Provider(s) and re-consult the WOC Nurse if new skin " concern.    DATA:     Current support surface: Standard  Low air loss (TORY pump, Isolibrium, Pulsate)  Containment of urine/stool: Incontinence Protocol  BMI: Body mass index is 23.21 kg/m .   Active diet order: Orders Placed This Encounter      Easy to Chew Diet (level 7) Mildly Thick (level 2)     Output: I/O last 3 completed shifts:  In: 170 [P.O.:50; NG/GT:120]  Out: 600 [Urine:500; Stool:100]     Labs:   Recent Labs   Lab 08/27/24  0544 08/26/24  0602   HGB  --  8.7*  8.7*   INR 1.49* 1.37*   WBC  --  14.7*     Pressure injury risk assessment:   Sensory Perception: 4-->no impairment  Moisture: 4-->rarely moist  Activity: 2-->chairfast  Mobility: 2-->very limited  Nutrition: 3-->adequate  Friction and Shear: 1-->problem  Dilan Score: 16    Candice Naik RN, CWOCN, CFCN  Pager no longer is use, please contact through [facility]: [facility] [employer]

## 2024-08-27 NOTE — PLAN OF CARE
Goal Outcome Evaluation:         Problem: Adult Inpatient Plan of Care  Goal: Plan of Care Review    Outcome: Progressing     Problem: Adult Inpatient Plan of Care  Goal: Optimal Comfort and Wellbeing  Outcome: Progressing  Intervention: Monitor Pain and Promote Comfort  Recent Flowsheet Documentation  Taken 8/26/2024 2101 by Kinjal Pickering RN  Pain Management Interventions:   medication (see MAR)   emotional support  Taken 8/26/2024 1632 by Kinjal Pickering RN  Pain Management Interventions:   medication (see MAR)   emotional support   repositioned     Problem: Comorbidity Management  Goal: Blood Glucose Levels Within Targeted Range  Outcome: Progressing  Intervention: Monitor and Manage Glycemia  Recent Flowsheet Documentation  Taken 8/26/2024 1600 by Kinjal Pickering RN  Medication Review/Management: medications reviewed     Problem: Wound  Goal: Optimal Functional Ability  Outcome: Progressing         Pt is alert and oriented X4, calm and cooperative, rating abdominal pain #6, Oxycodone given with good relief. Pt is turned and repositioned every 2 hours.  Dressings dry and intact to abdomen, wound vac dressing intact.  Lung sounds diminished in lower lobes, sating 97% on room air.  Tolerating easy to chew, thickened liquid diet, ate 100% of dinner, needs to be fed meals.  Javier patent.  Colostomy output serosanguinous, small amount of fluid.      Kinjal Pickering, RN

## 2024-08-28 NOTE — PROGRESS NOTES
CLINICAL NUTRITION SERVICES - Brief Note     Nutrition Prescription    RECOMMENDATIONS FOR MDs/PROVIDERS TO ORDER:  None    Malnutrition Status:    Moderate in chronic     Recommendations already ordered by Registered Dietitian (RD):  Increase magic cup to x 2 tid per pt/son request and decrease ensure enlive to bid d/t per pt preference and meeting needs with other supplements  ( gel plus bid and expedite cup daily = 2785 kcal,144 g protein total)    Future/Additional Recommendations:  -Adjust supplements pending intake, weight, tolerance, acceptance, labs, bg, wound healing  - Per pt/RD discussion 8/21:   Pt declined tube feeds, states he is willing to try anything to avoid using feeding tube. Pt very emotional with discussion of nutrition support- motivated to take supplements to avoid TF        EVALUATION OF THE PROGRESS TOWARD GOALS   Diet:  Level 7: Easy to Chew Dysphagia Diet  and Mildly Thick Thickened Liquids     Nutrition Supplement:  Magic cup TID, ensure enlive tid, expedite cup daily, gel plus bid    Intake/Tolerance:     -Good. Eating 100% of small meals + multiple supplements.   -Intake of multiple, but not all supplements documented yesterday  -Majority of nutrition coming from supplements   -Ordering tomato soup, cottage cheese  -Pt reported taking 1.5 ensure total, 4.5 magic cup total, and expedite cup.    Estimate intake yesterday over 3 meals and 8 supplements = 2314 kcal, 132 g protein total, meeting 100% + of estimated nutrition needs     NEW FINDINGS   - son requesting magic cup be increase to 2 q meal as pt really likes them and will eat them. They have been ordering extra on their own at meals.  -With increase in supplements pt will be meeting well > estimated needs. PT reports he does not like the ensure as much and is ok to decrease it to Bid. Pt seemed a little more confused in the afternoon than the am       Dosing Weight: 72.6 kg     ASSESSED NUTRITION NEEDS 8/8/24  Estimated Energy  Needs: 8906-7448+ kcals/day (25 - 30+ kcals/kg)  Justification: Increased needs and Wound healing  Estimated Protein Needs: 109-145 grams protein/day (1.5- 2 grams of pro/kg)  Justification: Wound healing  Estimated Fluid Needs: 1815+ mL/day (25+ mL/kg)   Justification: Maintenance    SKIN  Pressure injury -sacral, unstageable.- stable per WOC 8/27    GI  G-J tube placed 5/31/24, irrigated and clamped    30 ml H20 q port q shift for patency  Diverting colostomy- liquid OP today    LABS  Reviewed  Phos 1.5 (L), decreasing - phosphorus standard replacement protocol added  BG  mg/dl past 24 hours in fair control. Hypoglycemia x 1 at 2000- no insulin adjustments at this time    CURRENT NUTRITION DIAGNOSIS  Increased nutrient needs related to healing as evidenced by wounds   Evaluation: No change    INTERVENTIONS  -Increase magic cup to 2 per meal per pt/son request  -Decrease ensure enlive to bid d/t pt preference and meeting needs with other supplements      Goals  Wound healing - Progressing, stable  Pt to meet >75% nutritional needs - met  Electrolytes WNL - not met, hypophosphetemia  BG < 180 - met  BG  - new    Monitoring/Evaluation  Progress toward goals will be monitored and evaluated per protocol.

## 2024-08-28 NOTE — PROGRESS NOTES
ID brief visit with his son    Antibiotic plan in place. I will have clinic contact him for follow up -- video visit ok -- his son thought Franklyn's friend can likely help set this up.     Eriberto Verdugo MD

## 2024-08-28 NOTE — PLAN OF CARE
Problem: Comorbidity Management  Goal: Blood Glucose Levels Within Targeted Range  Outcome: Progressing  Intervention: Monitor and Manage Glycemia  Recent Flowsheet Documentation  Taken 8/28/2024 0920 by Lucero Mosley RN  Medication Review/Management: medications reviewed     Problem: Wound  Goal: Optimal Functional Ability  Outcome: Progressing  Intervention: Optimize Functional Ability  Recent Flowsheet Documentation  Taken 8/28/2024 1000 by Lucero Mosley RN  Activity Management: activity adjusted per tolerance  Activity Assistance Provided: assistance, 2 people     Problem: Pain Acute  Goal: Optimal Pain Control and Function  Outcome: Progressing  Intervention: Prevent or Manage Pain  Recent Flowsheet Documentation  Taken 8/28/2024 0920 by Lucero Mosley RN  Medication Review/Management: medications reviewed  Intervention: Optimize Psychosocial Wellbeing  Recent Flowsheet Documentation  Taken 8/28/2024 0920 by Lucero Mosley RN  Supportive Measures:   active listening utilized   relaxation techniques promoted   verbalization of feelings encouraged   Goal Outcome Evaluation:         Pt A&Ox4. Q2 repositioning. Assist with feeding, good appetite. Javier intact and draining. G/J tube patent. Colostomy stoma pink & intact, creating gas in colostomy bag. PICC dressing changed. Wound vac intact. Rating pain 7/10 PRN dilaudid given x1, reassessed pain and pt rated 4/10. Lucero Mosley RN

## 2024-08-28 NOTE — PROGRESS NOTES
General Surgery Progress Note:    Hospital Day # 23    ASSESSMENT:  1. Pressure injury of sacral region, unstageable (H)    2. Community acquired pneumonia of left lower lobe of lung    3. Pressure injury of sacral region, stage 3 (H)    4. Hypotension, unspecified hypotension type    5. Hypoxia    6. History of stroke    7. Colostomy care (H)    8. Pressure injury of sacral region, stage 4 (H) [L89.154]    9. Wound infection        Franklyn Sorto is a 88 year old male with stage IV infected sacral decubitus ulcer s/p wound debridement and laparoscopic diverting colostomy creation on 8/13/2024, now s/p open colostomy revision /creation of new ostomy for ischemia 8/26/2024.  Old ostomy site is being packed.  Max temp 99.1 F and vitally stable, no leukocytosis, INR at 1.43 and bowel sweat and gas in ostomy bag with viable stoma.     PLAN:  -Okay for soft foods diet  -Repositioning as able  -Continue WOC cares and wound VAC, expect next change Friday.  -Continue monitoring stoma for bowel functions  -Continue packing previous stoma site BID or when soiled - saline soaked gauze and dry gauze over top  -Patient is likely okay for discharge when home supplies and cares are in place, will continue to monitor for bowel functions and wound cares while inpatient  -Eventually patient's midline staples will need removal POD 14, unsure if patient will be able to transport to this appointment but will schedule one - if home cares / wound cares can remove staples also okay per our standpoint  -Surgery will continue to follow patient MWF  -Medical management per primary team    SUBJECTIVE:   Franklyn Sorto was seen on rounds.  Patient states he is doing okay, he was really nervous about whether the stoma is working and what is happening with his body.  Patient is reassured that his stoma appears to be producing gas and is still pink and viable.  He is relieved.  He is trying to get as much Ensure shakes into his diet as  possible.  His bottom is sore, but he mostly has pain in his midline of abdomen.      Patient Vitals for the past 24 hrs:   BP Temp Temp src Pulse Resp SpO2   08/28/24 0951 114/67 -- -- -- -- --   08/28/24 0727 117/69 98  F (36.7  C) Oral 76 18 96 %   08/27/24 2359 96/67 99.1  F (37.3  C) Oral 88 16 97 %   08/27/24 1545 107/55 97.7  F (36.5  C) Oral 80 18 98 %         PHYSICAL EXAM:  General: patient seen resting in bed in no acute distress  Resp: no increased work of breathing, breathing comfortably on room air  Abdomen: Generally soft, nondistended, tenderness appropriate with palpation near the midline staples which are clean, dry, intact.  Previous stoma site appears well-closed, no pockets of purulence or necrotic tissue.  Is packed with gauze which has serosanguineous output on it.  Replaced with Vashe soaked gauze.  Stoma: Appears pink and viable with some mild sloughing tissue at the 6 o'clock position.  Does appear to have some bowel sweat in the bag and light serous appearing fluid in the bag.  Does appear to have a mild amount of gas in the bag, per nurse they have burped the bag for gas once today.  Appliance is clean, dry, intact and not interfering with previous stoma site.  Extremities: No edema or cyanosis visualized on exam, no obvious deformities    08/27 0700 - 08/28 0659  In: 485 [P.O.:175; I.V.:250]  Out: 1575 [Urine:1575]    No results displayed because visit has over 200 results.           TE[facility] General Surgery  [id number] [place] [address] [phone]

## 2024-08-28 NOTE — PROGRESS NOTES
[facility]    Medicine Progress Note - Hospitalist Service    Date of Admission:  8/5/2024    Assessment & Plan   88M with history of HFrEF, hyperlipidemia, hypertension, aortic stenosis status post TAVR, thrombophilia, type 2 diabetes, stage III sacral ulcer, CAD, prior CVA admitted on 8/5/2024 with altered mental status & hypotension secondary to sepsis possibly from urinary tract infection, aspiration pneumonia and suspected infected, unstageable sacral ulcer.     He was managed briefly at the ICU by instensivist on admission but did not require vasopressor. Hypotension and ARIADNE improved with IV hydration and albumin infusion.  Patient transferred to medical floor on 8/6/2024.  However, patient transferred back to ICU for possible septic shock and management with pressors.  On 8/8/2024 patient transferred back to floor.  UC grew E. coli.  BC grew ESBL E Coli & Streptococcus salivarius. Polymicrobial bacteremia thought to be secondary to infected sacral decubitus ulcer.  Patient underwent multiple bedside debridement and eventually OR debridement with diverting colostomy on 8/13/2024.    On 8/24 CT imaging reported ischemia of colostomy site.  On 8/25, patient underwent colostomy revision.       still working on setting up home equipments (hospital bed, Kevin lift, wheelchair) and hoping will be set in next couple of days    Assessment/Plan  #Septic shock, resolved  #Infected sacral decubitus ulcer;  #Polymicrobial bacteremia with ESBL and Strep salivarius due to infected sacral ulcer;  Patient underwent multiple bedside debridement and eventually OR debridement with diverting colostomy on 8/13/2024.  -S/p debridement 8/6/2024 with Cx polymicrobial (E Coli, B Fragilis, Staph Aureus, E faecium)  -Ucx ESBL E Coli & Strep salivarius  -Unable to obtain MRI secondary to intracardiac lead. CT Pelvis without any signs of osteo or abscess  -Abx by ID, currently on meropenem (8/6 )   added daptomycin for VRE (8/10). Per ID likely antibiotics 4 to 6 weeks.  -Stoma care & wound vac care per WOC and surgery team, wound vac changed on 8/27  -On 8/24, CT imaging reported ischemia at colostomy site, patient underwent colostomy revision    #Acute blood loss anemia; multifactorial- postoperative, acute illness, chronic wound  -Hemoglobin around 8 range.     #Acute on chronic pain from sacral wound;  -Scheduled and prn Tylenol, try to wean off.  -Oxycodone 2.5-5mg q4h PRN, dilaudid for breakthrough.    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 Has feeding tube in place since May/24 but pt adamant about not using it.      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  INR therapeutic on 8/22.  Coumadin dosing and INR monitoring per pharmacy.  However, required INR reversal for surgery on 8/25, resumed coumadin 8/27 per surgery recommendations       #Diabetes mellitus type 2, noninsulin dependent  -Home regimen: jardiance    -Inpatient: Lantus 5u daily, insulin sliding scale and hypoglycemic protocol     #Moderate Malnutrition  #S/p GJ tube, present on admission;   #HypoPhos  -On 8/21, this was discussed with registered dietitian, reported patient not meeting his nutritional needs. However, patient declined tube feeding and  protein supplements added.  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  Started low dose toprol-xl instead and tolerating.  -PTA crestor on hold due to patient on daptomycin    #GERD/Hx PUD   -Continue PPI     Dispo:  -Ordered DME (Kevin lift, hospital bed, wheelchair)  -Outpatient palliative care consult placed             Diet: Snacks/Supplements Adult: Magic Cup; With Meals  Snacks/Supplements Adult: Expedite Cup; With Meals  Room Service  Snacks/Supplements Adult: Ensure Enlive; With Meals  Snacks/Supplements Adult: Gelatein Plus; Between Meals  Easy to Chew Diet (level 7) Mildly Thick (level 2)    DVT Prophylaxis: Warfarin  Javier Catheter: PRESENT, indication: Acute retention or obstruction, Wound deterioration and failed external collection device  Lines: PRESENT      PICC 08/15/24 Single Lumen Right Brachial vein medial IV Antibiotics-Site Assessment: WDL      Cardiac Monitoring: None  Code Status: No CPR- Pre-arrest intubation OK      Clinically Significant Risk Factors              # Hypoalbuminemia: Lowest albumin = 2.2 g/dL at 8/5/2024 11:55 AM, will monitor as appropriate     # Hypertension: Noted on problem list          # DMII: A1C = N/A within past 6 months    # Moderate Malnutrition: based on nutrition assessment      # Financial/Environmental Concerns:                 Disposition Plan     Medically Ready for Discharge: Anticipated in 2-4 Days         Ina Meade MD  Hospitalist Service  [facility]  Securely message with Secur[employer] (more info)  Text page via [employer] Paging/Directory   ______________________________________________________________________    Interval History   Chart reviewed and events noted.  Patient seen and examined.   Patient reports abd pain  on and off. Tolerating PO diet. No N/V. He is caroline life, not OOB . Wound vac changed yest. No acute events overnight.      Physical Exam   Vital Signs: Temp: 98  F (36.7  C) Temp src: Oral BP: 117/69 Pulse: 76   Resp: 18 SpO2: 96 % O2 Device: None (Room air)    Weight: 157 lbs 3.01 oz      General: Pleasant elderly male, chronically ill appearing inNAD  HEENT:EOMI, AT,NC  RS:Non labored breathing  Abd: Soft, colostomy bag in place, no stools noted.   Neurology:Awake, alert, oriented, left sided weakness+  Psy: Calm, cooperative      Medical Decision Making       Plan d/w patient, bedside RN, SW/CM. Updated son Abdoulaye over the phone today          Data     I have personally reviewed the following data over the past 24 hrs:    10.2  \   8.2 (L)   / 240     N/A N/A N/A /  107 (H)   N/A N/A N/A \     INR:  1.43 (H) PTT:  N/A   D-dimer:  N/A Fibrinogen:  N/A       Imaging results reviewed over the past 24 hrs:   No results found for this or any previous visit (from the past 24 hour(s)).

## 2024-08-28 NOTE — PLAN OF CARE
"  Problem: Skin Injury Risk Increased  Goal: Skin Health and Integrity  Intervention: Plan: Nurse Driven Intervention: Moisture Management  Recent Flowsheet Documentation  Taken 8/28/2024 0016 by Eron Khalil RN  Moisture Interventions:   Incontinence pad   Urinary collection device  Taken 8/27/2024 2015 by Eron Khalil RN  Moisture Interventions:   Incontinence pad   Urinary collection device     Problem: Fall Injury Risk  Goal: Absence of Fall and Fall-Related Injury  Intervention: Promote Injury-Free Environment  Recent Flowsheet Documentation  Taken 8/28/2024 0016 by Eron Khalil RN  Safety Promotion/Fall Prevention:   clutter free environment maintained   patient and family education   safety round/check completed  Taken 8/27/2024 2015 by Eron Khalil RN  Safety Promotion/Fall Prevention:   clutter free environment maintained   patient and family education   safety round/check completed     Problem: Risk for Delirium  Goal: Improved Sleep  Intervention: Promote Sleep  Recent Flowsheet Documentation  Taken 8/28/2024 0016 by Eron Khalil RN  Sleep/Rest Enhancement:   room darkened   regular sleep/rest pattern promoted   relaxation techniques promoted   comfort measures  Taken 8/27/2024 2015 by Eron Khalil RN  Sleep/Rest Enhancement:   room darkened   regular sleep/rest pattern promoted   relaxation techniques promoted   comfort measures   Goal Outcome Evaluation:       Pt A & O x 4 - ,Q2 turn,  VVS, SR on tele HR \"97\" on RA  O2SATs \"   \", fall precautions maintained  with bed alarm on, bed locked in the lowest position with call light within reach at the time of writing. POC discussed questions encouraged and answered. Plan to \"discharge to [facility]\" Education provided. Pt resting with RR \"20\", even and unlabored  bilateral chest rise and fall. Pt is arousalable with verbal stimulation. PRN  Dextrose was given for hypoglycemia; per cross cover we held bedtime Lantus.  Dilaudid 0.2 mg @2335.   No concerns or " "incidents noted on my shift.    Vital signs:  Temp: 99.1  F (37.3  C) Temp src: Oral BP: 96/67 Pulse: 88   Resp: 16 SpO2: 97 % O2 Device: None (Room air) Oxygen Delivery: 1 LPM Height: 175.3 cm (5' 9\") Weight: 71.3 kg (157 lb 3 oz)  Estimated body mass index is 23.21 kg/m  as calculated from the following:    Height as of this encounter: 1.753 m (5' 9\").    Weight as of this encounter: 71.3 kg (157 lb 3 oz).                       "

## 2024-08-28 NOTE — PROGRESS NOTES
"Care Management Follow Up    Length of Stay (days): 23    Expected Discharge Date: 08/29/2024    Anticipated Discharge Plan:    [facility] [address], [place] [address] [address]. w/24/7 care from [facility] for 24/7 PCA private pay care #[phone].      Transportation: Anticipate Stretcher. Transportation costs discussed? Yes. Discussed with pts tony Stanford agreeable to cost.     PT Recommendations: Long term care facility  OT Recommendations:  Long term care facility       Barriers to Discharge: medical stability/ DME delivery (should be delivered to pt's home tomorrow)/  PCA can do Teach for home IV abx, on 8/29. Likely goal to discharge Friday 8/30.         Prior Living Situation:  \"Requires assist w/ADLs/IADLs. Transfers w/lift and not ambulating. oTny Stanford is primary contact. [facility] stretcher-cost discussed. Will need DME and palliative outpatient to follow. DME orders sent to [facility]. VI following for home IV abx need. [facility] for home RN for wound care vac, ostomy, and IV abx need. \"      Main family contact is son Abdoulaye  [facility] Nusrat Sandoval [phone]        Discussed  Partnership in Safe Discharge Planning  document with patient/family: Yes: Pts tony Stanford        Patient/Spokesperson Updated: Yes. Who? Pt's son Abdoulaye     Additional Information:  Chart reviewed.     Needs for pt to discharge:     Home Care-Skilled RN needed for wound care/wound vac, ostomy care, and PICC dressing changes for IV abx. [facility] able to accept with [facility] [place] Sept 9/3. Call out to see if they can remove pt's staples on 9/6,and also to make sure they aware pt's ostomy site needs to be packed. Awaiting call back.            Home Wound Vac- Pt's home wound vac was approved by the [facility]. Located in pt's bathroom, please put on day of discharge goal discharge day Friday 8/30.            DME equipment- Pt needs at [facility] " lift, W/c, and Hosp bed. Per son Abdoulaye [employer] is going to deliver all DME on Thurs 8/29, no time yet given to son. Pt will need all equipment at home prior to pt being able to discharge.          [facility] [address] [place] [address].        Home IV Abx - [facility] is following. Pt's PCA Nusrat will be teachable party for IV abx, teach is planned at the hospital tomorrow Thurs 8/29 @ 1:30pm.          Next Steps: Home IV Abx teach tomorrow with [facility], and pt's PCA. Verifying tomorrow that DME has been to delivered to pt's home tomorrow. Set up transport ride for Friday 8/30.         Cm will continue to follow plan of care,review recommendations, and assist with any discharge needs anticipated.       Christina Palma RN

## 2024-08-28 NOTE — PROVIDER NOTIFICATION
Noted during PICC line dressing change catheter was out 5cm from insertion site, prior documentation was 0cm. Notified MD to get order for XRAY before PICC can proceed.

## 2024-08-28 NOTE — PROGRESS NOTES
PRN oxycodone administered once this shift. Turning and repositioning. Gave oral bedtime medications early per patient request.

## 2024-08-29 NOTE — PLAN OF CARE
"  Problem: Risk for Delirium  Goal: Improved Sleep  Outcome: Progressing     Problem: Pain Acute  Goal: Optimal Pain Control and Function  Intervention: Prevent or Manage Pain  Recent Flowsheet Documentation  Taken 8/28/2024 2100 by Eron Khalil RN  Sensory Stimulation Regulation: other (see comments)   Goal Outcome Evaluation:      Patient is A & O X 4. Had c/o not being able to sleep. Behavior is abrasive due to not being able to sleep.  BG was WDL.  Q2turn. ACHS; . Left side paralysis. Edema throughout LUE and LLE. On tele and RA. Clear liquid/puree diet. Ice chips are allowed. C/O pain 6/10 given Oxycodone.  Patient stated his,\" butt was hurting\" staff reposition him with no relief. He can be anxious about the light being on and interrupting his sleep. No other concerns or incidents during my shift. Pt was resting with equal chest rise and falls, and stable VS.                      "

## 2024-08-29 NOTE — PROGRESS NOTES
"Care Management Follow Up    Length of Stay (days): 24    Expected Discharge Date: 08/30/2024    Anticipated Discharge Plan:   [facility] [address], [place] [address] [address]. w/24/7 care from [facility] for 24/7 PCA private pay care #[phone]. On Friday 8/30.       Transportation: Anticipate Stretcher. Transportation costs discussed? Yes. Discussed with Pt's son Abdoulaye agreeable to private cost of stretcher transport.    PT Recommendations: Long term care facility  OT Recommendations:  Long term care facility     Barriers to Discharge: IV abx teach today, goal for pt to discharge tomorrow 8/30.     Prior Living Situation:  \"Requires assist w/ADLs/IADLs. Transfers w/lift and not ambulating. Son Abdoulaye is primary contact. [facility] stretcher-cost discussed. Will need DME and palliative outpatient to follow. DME orders sent to [facility]. VI following for home IV abx need. [facility] for home RN for wound care vac, ostomy, and IV abx need. \"      Main family contact is son Abdoulaye  [facility] Nusrat Sandoval [phone]        Discussed  Partnership in Safe Discharge Planning  document with patient/family: Yes: Pt's son Abdoulaye and PCA Nusrat     Handoff Completed: Yes, non-[facility] PCP: External handoff communication completed    Patient/Spokesperson Updated: Yes. Who? Pt's son Abdoulaye and PCA Nusrat     Additional Information:  Chart reviewed    Cm updates:  Home Care-Skilled RN needed for wound care/wound vac, ostomy care, and PICC dressing changes for IV abx. [facility] able to accept with [facility] T[facility] Sept 9/3, they need this said in discharge orders \"Ok for SOC Tues sept 9/3.\" They can remove pt's staples on 9/6,and just need detailed orders stating when to remove.            Home Wound Vac- Pt's home wound vac was approved by the [facility]. Located in pt's bathroom, please put on day of discharge pt discharging tomorrow Friday " 8/30.            DME equipment- Pt needs at caroline lift, W/c, and Hosp bed. Per son Abdoulaye [employer] delivered equipment today 8/29 this AM.           Home IV Abx - [facility] is following. Pt's PCA Nusrat will be teachable party for IV abx, teach is planned at the hospital today Thurs 8/29 @ 1:30pm.           Final discharge plan is for pt to go to new apartment ([address] [place] [address]).[facility] stretcher transport set up for Friday 8/30 @2:10pm-2:50pm. Pt will have 24/7 care from [facility]. Pt will be followed by [facility] for IV abx supply.  Pt will have [facility] for home RN for PICC dressing changes, home wound vac care, and ostomy care. PCS form completed and faxed.         Pt will need home wound vac switched over, and dressing done prior to discharge tomorrow Friday 8/30, also questioning if ostomy needs to be changed prior to discharge? RNCM paged WOC regarding, awaiting response.       Cm will continue to follow plan of care,review recommendations,and assist with any discharge needs anticipated.       Christina Palma RN

## 2024-08-29 NOTE — PLAN OF CARE
Problem: Adult Inpatient Plan of Care  Goal: Optimal Comfort and Wellbeing  Intervention: Monitor Pain and Promote Comfort  Recent Flowsheet Documentation  Taken 8/29/2024 1055 by Nora Finney RN  Pain Management Interventions: medication (see MAR)  Taken 8/29/2024 0911 by oNra Finney RN  Pain Management Interventions: medication (see MAR)     Problem: Infection  Goal: Absence of Infection Signs and Symptoms  Outcome: Progressing  Intervention: Prevent or Manage Infection  Recent Flowsheet Documentation  Taken 8/29/2024 0804 by Nora Finney RN  Isolation Precautions: contact precautions maintained     Problem: Adult Inpatient Plan of Care  Goal: Plan of Care Review  Description: The Plan of Care Review/Shift note should be completed every shift.  The Outcome Evaluation is a brief statement about your assessment that the patient is improving, declining, or no change.  This information will be displayed automatically on your shift  note.  Outcome: Progressing  Flowsheets (Taken 8/29/2024 1453)  Plan of Care Reviewed With:   patient   friend   caregiver  Overall Patient Progress: improving   Goal Outcome Evaluation:      Plan of Care Reviewed With: patient, friend, caregiver    Overall Patient Progress: improvingOverall Patient Progress: improving       Alert and oriented. Intermittent forgetfulness. Asks freq what room he's in. Anxious about tomorrow. Feeder. Took in a small amount of breakfast but did better for lunch. Ate multiple of his supplements. Taking sched Tylenol and prn Oxycodone. C/o abdominal and left sided lower back pain where wound vac is located. Dressings changed per order. Scott, caregivers at discharge, here for dressing changes. Questions answered. Turn and reposition every 2hrs. Wedge in place. Up in chair for approx 2hrs today.

## 2024-08-29 NOTE — PROGRESS NOTES
[facility]    Met with patient and PCA Nusrat at bedside for teach of IV Dapto and Meropenem via syringe. Plan is for patient to discharge tomorrow afternoon following 2 pm dose of Meropenem.    Provided hands-on teaching using teaching mats and demo equipment. Nusrat taught SAS method and was able to provide return demonstration using aseptic technique.  Instructed on med delivery and storage as well as importance of hand hygiene. Extension tubing was not added to PICC as pt will not be self administering. Delivery of med and supplies will be delivered to patient's apartment tomorrow after discharge prior to next dose of Dapto due at 1900. Plan to receive skilled nursing care from [facility] and have private pay PCA care 24/7 through [facility].    Provided 24/7 phone number and answered all questions. Patient /family/PCA all verbalized understanding of discharge plans.    Thank you for the opportunity to provide infusion services to this patient.    Holly Manzano RN  [facility]  [phone] (Monday through Friday, 8am-5pm)  [phone] (office)

## 2024-08-29 NOTE — PROGRESS NOTES
GENERAL SURGERY CHART CHECK:    Franklyn Sorto is a 88 year old stage IV infected sacral decubitus ulcer s/p wound debridement and laparoscopic diverting colostomy creation on 8/13/2024, now s/p open colostomy revision /creation of new ostomy for ischemia 8/26/2024.  Old ostomy site is being packed.  Afebrile and vitally stable, no leukocytosis, remains tolerating diet and productive of gas but not stool as of yet per notes.      Patient not seen; chart check only    Plan:  -Have confirmed with care mgmt for wound cares including ostomy, previous stoma site, wound vac sacrum, and midline staples could be cared for with home care agency    -As patient will be unable to come in for in-person visit, will have wound cares remove staples on post op day 14 (8/8 or 8/9 if only coming weekdays)    -Please make sure patient is taking home a staple removal kit and steri strips when he discharges, instructions in AVS    [facility]

## 2024-08-29 NOTE — PROGRESS NOTES
[facility]    Medicine Progress Note - Hospitalist Service    Date of Admission:  8/5/2024    Assessment & Plan   88M with history of HFrEF, hyperlipidemia, hypertension, aortic stenosis status post TAVR, thrombophilia, type 2 diabetes, stage III sacral ulcer, CAD, prior CVA admitted on 8/5/2024 with altered mental status & hypotension secondary to sepsis possibly from urinary tract infection, aspiration pneumonia and suspected infected, unstageable sacral ulcer.     He was managed briefly at the ICU by instensivist on admission but did not require vasopressor. Hypotension and ARIADNE improved with IV hydration and albumin infusion.  Patient transferred to medical floor on 8/6/2024.  However, patient transferred back to ICU for possible septic shock and management with pressors.  On 8/8/2024 patient transferred back to floor.  UC grew E. coli.  BC grew ESBL E Coli & Streptococcus salivarius. Polymicrobial bacteremia thought to be secondary to infected sacral decubitus ulcer.  Patient underwent multiple bedside debridement and eventually OR debridement with diverting colostomy on 8/13/2024.    On 8/24 CT imaging reported ischemia of colostomy site.  On 8/25, patient underwent colostomy revision.       still working on setting up home equipments (hospital bed, Kevin lift, wheelchair) and hoping will be set in next couple of days    Assessment/Plan  #Septic shock, resolved  #Infected sacral decubitus ulcer;  #Polymicrobial bacteremia with ESBL and Strep salivarius due to infected sacral ulcer;  Patient underwent multiple bedside debridement and eventually OR debridement with diverting colostomy on 8/13/2024.  -S/p debridement 8/6/2024 with Cx polymicrobial (E Coli, B Fragilis, Staph Aureus, E faecium)  -Ucx ESBL E Coli & Strep salivarius  -Unable to obtain MRI secondary to intracardiac lead. CT Pelvis without any signs of osteo or abscess  -Abx by ID, currently on meropenem (8/6 )   added daptomycin for VRE (8/10). Per ID likely antibiotics 4 to 6 weeks, end  date entered  -Stoma care & wound vac care per WOC and surgery team, wound vac changed on 8/27  -On 8/24, CT imaging reported ischemia at colostomy site, patient underwent colostomy revision    #Acute blood loss anemia; multifactorial- postoperative, acute illness, chronic wound  -Hemoglobin around 8 range.     #Acute on chronic pain from sacral wound;  -Scheduled and prn Tylenol, try to wean off.  -Oxycodone 2.5-5mg q4h PRN, dilaudid for breakthrough.    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 Has feeding tube in place since May/24 but pt adamant about not using it.      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  INR therapeutic on 8/22.  Coumadin dosing and INR monitoring per pharmacy.  However, required INR reversal for surgery on 8/25, resumed coumadin 8/27 per surgery recommendations       #Diabetes mellitus type 2, noninsulin dependent  -Home regimen: Jardiance    -Inpatient: Lantus 5u daily, insulin sliding scale and hypoglycemic protocol     #Moderate Malnutrition  #S/p GJ tube, present on admission;   #HypoPhos  -On 8/21, this was discussed with registered dietitian, reported patient not meeting his nutritional needs. However, patient declined  tube feeding and protein supplements added.  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  Started low dose toprol-xl instead and tolerating.  -PTA crestor on hold due to patient on daptomycin    #GERD/Hx PUD   -Continue PPI     Dispo:  -Ordered DME (Kevin lift, hospital bed, wheelchair)  -Outpatient palliative care consult placed             Diet: Snacks/Supplements Adult: Magic Cup; With Meals  Snacks/Supplements Adult: Expedite Cup; With Meals  Room Service  Snacks/Supplements Adult: Gelatein Plus; Between Meals  Easy to Chew Diet (level 7) Mildly Thick (level 2)  Snacks/Supplements Adult: Ensure Enlive; With Meals    DVT Prophylaxis: Warfarin  Javier Catheter: PRESENT, indication: Acute retention or obstruction, Wound deterioration and failed external collection device  Lines: PRESENT      PICC 08/15/24 Single Lumen Right Brachial vein medial IV Antibiotics-Site Assessment: WDL      Cardiac Monitoring: None  Code Status: No CPR- Pre-arrest intubation OK      Clinically Significant Risk Factors              # Hypoalbuminemia: Lowest albumin = 2.2 g/dL at 8/5/2024 11:55 AM, will monitor as appropriate     # Hypertension: Noted on problem list            # Moderate Malnutrition: based on nutrition assessment      # Financial/Environmental Concerns:             Disposition Plan     Medically Ready for Discharge: Anticipated in 2-4 Days         Ina Meade MD  Hospitalist Service  [facility]  Securely message with [email or web address] (more info)  Text page via [employer] Paging/Directory   ______________________________________________________________________    Interval History   Chart reviewed and events noted.  Patient seen and examined.   Abd pain tolerable. Back pain tolerable. Tolerating  Po diet, No N/V. No fever      Physical Exam   Vital Signs: Temp: 98.1  F (36.7  C) Temp src: Oral BP: 115/67 Pulse: 80   Resp: 18 SpO2: 97 % O2 Device: None (Room air)    Weight: 157 lbs 3.01 oz      General: Pleasant elderly male, chronically ill appearing in NAD  HEENT:EOMI, AT,NC  RS:Non labored breathing  Abd: Soft, colostomy bag in place, no stools noted.   Neurology:Awake, alert, oriented, left sided weakness+  Psy: Calm, cooperative      Medical Decision Making       Plan d/w patient, bedside RN, SW/CM. Updated friend Herman morin today. Last updated son Abdoulaye over the phone 8/28          Data     I have personally reviewed the following data over the past 24 hrs:    9.2  \   8.3 (L)   / 261     136 99 16.2 /  115 (H)   4.1 32 (H) 0.49 (L) \     INR:  1.54 (H) PTT:  N/A   D-dimer:  N/A Fibrinogen:  N/A       Imaging results reviewed over the past 24 hrs:   Recent Results (from the past 24 hour(s))   XR PICC Chest Placement Port 1vw    Narrative    EXAM: XR CHEST PICC PLACEMENT PORT 1 VIEW  LOCATION: [facility]  DATE: 8/28/2024    INDICATION: PICC line placement check  COMPARISON: 8/15/2024.      Impression    IMPRESSION: A right PICC tip likely terminates over the mid SVC, though evaluation is compromised by multiple electrodes. Left lung opacities are again seen and could represent the elevated hemidiaphragm, pleural fluid, atelectasis, and/or airspace   disease. No pneumothorax. The heart is not well assessed. A TAVR stent is present. There is a left chest wall biventricular ICD. Cervical fusion hardware is noted.

## 2024-08-30 NOTE — PROGRESS NOTES
Care Management Discharge Note    Discharge Date: 08/30/2024       Discharge Disposition: Home, Home Infusion    Discharge Services:  Home, with home infusion     Discharge DME:  caroline lift, W/c, and Hosp bed  delivered yesterday to pt's home.     Discharge Transportation: health plan transportation    Private pay costs discussed: transportation costs/ Son Abdoulaye agreeable to private cost of stretcher transport. And IV abx in the home, self pay quote signed and sent to [facility], and also emailed to [facility] liaison Holly COFFMAN       Does the patient's insurance plan have a 3 day qualifying hospital stay waiver?  No    PAS Confirmation Code:  NA  Patient/family educated on Medicare website which has current facility and service quality ratings:  NA    Education Provided on the Discharge Plan:  Per Care Team   Persons Notified of Discharge Plans: Yes   Patient/Family in Agreement with the Plan: yes    Handoff Referral Completed: Yes, [facility] PCP: Internal handoff referral completed    Additional Information:    [facility] [place] [address] @2:10pm-2:50pm. Pt will have 24/7 care from [facility]. Pt will be followed by [facility] for IV abx supply.  Pt will have [facility] for home RN for PICC dressing changes, home wound vac care, and ostomy care. Home wound vac from [facility] will be sent with pt. [facility] nurse doing wound care prior to pt's discharge today. Orders faxed to home care agency, packet made for PCA please sent with pt, and [facility] has orders for IV abx as well, and plan to supply IV abx to pts home for evening/night dose.         PCS form completed and faxed.       Christina Palma RN

## 2024-08-30 NOTE — PROGRESS NOTES
Discharged to home w/ [facility]- VSS on RA. A&Ox3-4, forgetful. Abd & sacral pain managed w/ PRN oxy. Turned q2h. Free water protocol followed per policy. DC instructions reviewed w/ pt, son had no further inquiries upon calling him, belongings & supplies sent w/ including wound care supplies & wound vac, ostomy supplies, DC meds, steri strips, & suture/staple removal kit. Transport via [facility] services.

## 2024-08-30 NOTE — PROGRESS NOTES
General Surgery Progress Note:    Hospital Day # 25    ASSESSMENT:     1. Pressure injury of sacral region, unstageable (H)    2. Community acquired pneumonia of left lower lobe of lung    3. Pressure injury of sacral region, stage 3 (H)    4. Hypotension, unspecified hypotension type    5. Hypoxia    6. History of stroke    7. Colostomy care (H)    8. Pressure injury of sacral region, stage 4 (H) [L89.154]    9. Wound infection    10. Congestive heart failure, unspecified HF chronicity, unspecified heart failure type (H)    11. Type 2 diabetes mellitus with other specified complication, unspecified whether long term insulin use (H)    12. Diabetes mellitus type 2, noninsulin dependent (H)    13. Benign essential HTN    14. 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) (H)    15. Pressure injury of sacral region, stage 4 (H)        Franklyn Sorto is a 88 year old male  stage IV infected sacral decubitus ulcer s/p wound debridement and laparoscopic diverting colostomy creation on 8/13/2024, now s/p open colostomy revision /creation of new ostomy for ischemia 8/26/2024.  Old ostomy site is being packed.  Afebrile and vitally stable, no leukocytosis, remains tolerating diet and productive of gas but not stool as of yet although ostomy remains pink and viable.    PLAN:   -Soft diet as tolerated  -Turning and repositioning  -WOC vac change today hopefully prior to discharge  -Outpatient wound cares for stoma, prior stoma site, sacral wound with vac, staple removal POD 14 on 8/8-8/9 (staple removal kit in room please make sure goes home with patient and steri strips)  -Okay to discharge from surgical standpoint if deemed medically appropriate.  -Discharge recommendations involve instructions placed in AVS. Patient to call for in person appointment as needed  -Medical management per primary team    SUBJECTIVE:   Franklyn Sorto was seen on rounds.  Patient states he ate tomato soup and is drinking Ensure.  He  would like the chocolate Ensure now.  Denies fever, chills, nausea, vomiting.  He is still worried and anxious about all of the plans going to the apartment and how he is going to get there and whether he is healing his wound.  Discussion regarding care management set up's and follow-ups all at home.  Patient is reassured.    Patient Vitals for the past 24 hrs:   BP Temp Temp src Pulse Resp SpO2 Weight   08/30/24 0729 (!) 167/74 98  F (36.7  C) Oral 84 18 95 % --   08/29/24 2355 124/69 98.4  F (36.9  C) Oral 83 18 95 % --   08/29/24 1708 135/77 97.8  F (36.6  C) Oral 81 18 95 % --   08/29/24 1443 -- -- -- -- -- -- 74.4 kg (164 lb 0.4 oz)       Physical Exam:  General: patient seen resting in bed in no acute distress  Resp: no increased work of breathing, breathing comfortably on room air  Abdomen: Generally soft and nontender with palpation.  Old ostomy site is packed with dry gauze and appears without necrosis or purulence or other stigmata of infection.  However the tape is irritating the skin, it appears as though it has been wet for a while likely due to patient sweating. Replaced packing with dry gauze and new tape.     Stoma appears pink and viable with gas in bag and appliance clean, dry, intact. Bowel sweat and light / white colored sloughing materials in bag. Some light yellow clear fluid in bag no stool.      Extremities: No edema, cyanosis, or obvious deformities visualized on exam    No results displayed because visit has over 200 results.           [facility] General Surgery  [address]  [address]  [place], [place] [id number] [place] [address] [phone]

## 2024-08-30 NOTE — DISCHARGE SUMMARY
[facility] MEDICINE  DISCHARGE SUMMARY     Primary Care Physician: Redd Sommer  Admission Date: 8/5/2024   Discharge Provider: Ina Meade MD Discharge Date: 8/30/2024   Diet:   Active Diet and Nourishment Order   Procedures     Snacks/Supplements Adult: Magic Cup; With Meals     Snacks/Supplements Adult: Expedite Cup; With Meals     Room Service     Calorie Counts     Calorie Counts     Snacks/Supplements Adult: Gelatein Plus; Between Meals     Snacks/Supplements Adult: Ensure Enlive; With Meals     Easy to Chew Diet (level 7) Mildly Thick (level 2)     Diet       Code Status: No CPR- Pre-arrest intubation OK   Activity: DCACTIVITY: Activity as tolerated        Condition at Discharge: Stable     REASON FOR PRESENTATION(See Admission Note for Details)     Sacral ulcer, sepsis    PRINCIPAL & ACTIVE DISCHARGE DIAGNOSES     Principal Problem:    Sepsis with acute renal failure and tubular necrosis without septic shock (H)  Active Problems:    Benign prostatic hyperplasia    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) (H)    Hyperlipidemia    Hypertension    Long term current use of anticoagulant therapy    S/P cardiac pacemaker procedure    S/P TAVR (transcatheter aortic valve replacement)    Thrombophilia (H24)    Diabetes mellitus type 2, noninsulin dependent (H)    History of CVA (cerebrovascular accident)    Hypoxia    Hypotension, unspecified hypotension type    Community acquired pneumonia of left lower lobe of lung    Pressure injury of sacral region, stage 3 (H)    Leukocytosis, unspecified type    Colostomy care (H)    Wound infection    History of stroke    Pressure injury of sacral region, stage 4 (H)    Pressure injury of sacral region, unstageable (H)      PENDING LABS     Unresulted Labs Ordered in the Past 30 Days of this Admission       No orders found from 7/6/2024 to 8/6/2024.              PROCEDURES ( this hospitalization only)       Procedure(s):  REVISION, COLOSTOMY    RECOMMENDATIONS TO OUTPATIENT PROVIDER FOR F/U VISIT     Follow-up Appointments     Follow-up and recommended labs and tests       Follow up with primary care provider, Redd Sommer, within 2-3 days   for hospital follow- up.  The following labs/tests are recommended: CBC,   BMP, INR. .              DISPOSITION     Home with home care    SUMMARY OF HOSPITAL COURSE:      88M with history of HFrEF, hyperlipidemia, hypertension, aortic stenosis status post TAVR, thrombophilia, type 2 diabetes, stage III sacral ulcer, CAD, prior CVA admitted on 8/5/2024 with altered mental status & hypotension secondary to sepsis possibly from urinary tract infection, aspiration pneumonia and suspected infected, unstageable sacral ulcer.     He was managed briefly at the ICU by instensivist on admission but did not require vasopressor. Hypotension and ARIADNE improved with IV hydration and albumin infusion.  Patient transferred to medical floor on 8/6/2024.  However, patient transferred back to ICU for possible septic shock and management with pressors.  On 8/8/2024 patient transferred back to floor.  UC grew E. coli.  BC grew ESBL E Coli & Streptococcus salivarius. Polymicrobial bacteremia thought to be secondary to infected sacral decubitus ulcer.  Patient underwent multiple bedside debridement and eventually OR debridement with diverting colostomy on 8/13/2024.     On 8/24 CT imaging reported ischemia of colostomy site.  On 8/25, patient underwent colostomy revision.      Assessment/Plan  #Septic shock, resolved  #Infected sacral decubitus ulcer;  #Polymicrobial bacteremia with ESBL and Strep salivarius due to infected sacral ulcer;  Patient underwent multiple bedside debridement and eventually OR debridement with diverting colostomy on 8/13/2024.  -S/p debridement 8/6/2024 with Cx polymicrobial (E Coli, B Fragilis, Staph Aureus, E faecium)  -Ucx ESBL E Coli & Strep salivarius  -Unable to  obtain MRI secondary to intracardiac lead. CT Pelvis without any signs of osteo or abscess  -Abx by ID, currently on meropenem (8/6 )  added daptomycin for VRE (8/10). Per ID likely antibiotics 4 to 6 weeks, end  date entered  -Stoma care & wound vac care per WOC and surgery team, wound vac changed on 8/27  -On 8/24, CT imaging reported ischemia at colostomy site, patient underwent colostomy revision     #Acute blood loss anemia; multifactorial- postoperative, acute illness, chronic wound  -Hemoglobin around 8 range.      #Acute on chronic pain from sacral wound;  -Scheduled and prn Tylenol, try to wean off.  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 Has feeding tube in place since May/24 but pt adamant about not using it.      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  INR therapeutic on 8/22.  Coumadin dosing and INR monitoring per pharmacy.  However, required INR reversal for surgery on 8/25, resumed coumadin 8/27 per surgery recommendations  -Repeat INR check in 2-3 days        #Diabetes mellitus type 2, noninsulin dependent  -Resume PTA regimen, lantus 5U added at bedtime      #Moderate Malnutrition  #S/p GJ tube, present on admission;   #HypoPhos  -On 8/21, this was discussed with registered  dietitian, reported patient not meeting his nutritional needs. However, patient declined tube feeding and protein supplements added.  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  Started low dose toprol-xl instead and tolerating.  -PTA crestor on hold due to patient on daptomycin     #GERD/Hx PUD   -Continue PPI     .Patient stable to be discharged home with [facility] cares today. Please refer to discharge medications and instructions for more details.      Discharge Medications with Med changes:     Current Discharge Medication List        START taking these medications    Details   DAPTOmycin 400 mg Inject 400 mg over 30 minutes into the vein every 24 hours. Last day 9/24/24    Associated Diagnoses: Wound infection      furosemide (LASIX) 20 MG tablet Take 1 tablet (20 mg) by mouth daily.  Qty: 30 tablet, Refills: 0    Associated Diagnoses: Congestive heart failure, unspecified HF chronicity, unspecified heart failure type (H)      insulin glargine (LANTUS PEN) 100 UNIT/ML pen Inject 5 Units subcutaneously at bedtime.  Qty: 1.5 mL, Refills: 0    Comments: If Lantus is not covered by insurance, may substitute Basaglar or Semglee or other insulin glargine product per insurance preference at same dose and frequency.    Associated Diagnoses: Type 2 diabetes mellitus with other specified complication, unspecified whether long term insulin use (H)      meropenem (MERREM) 1 g vial Inject 1,000 mg (1 g) over 30 minutes into the vein every 8 hours. Last day 9/24/24. Weekly labs while on IV antibiotics: CBC with differential, CMP, CK, C-reactive protein. Fax lab results to Dr. Verdugo at [phone]    Associated Diagnoses: Wound infection      metoprolol succinate ER (TOPROL XL) 25 MG 24 hr tablet Take 1 tablet (25  mg) by mouth daily.  Qty: 30 tablet, Refills: 0    Associated Diagnoses: Congestive heart failure, unspecified HF chronicity, unspecified heart failure type (H)      multivitamin w/minerals (THERA-VIT-M) tablet Take 1 tablet by mouth daily.  Qty: 30 tablet, Refills: 0    Associated Diagnoses: Wound infection      oxyCODONE (ROXICODONE) 5 MG tablet Take 0.5 tablets (2.5 mg) by mouth every 4 hours as needed for pain.  Qty: 30 tablet, Refills: 0    Associated Diagnoses: Pressure injury of sacral region, stage 4 (H)      potassium & sodium phosphates (NEUTRA-PHOS) 280-160-250 MG Packet Take 2 packets by mouth daily.  Qty: 60 packet, Refills: 0    Associated Diagnoses: Hypophosphatemia           CONTINUE these medications which have CHANGED    Details   acetaminophen (TYLENOL) 500 MG tablet Take 1 tablet (500 mg) by mouth every 8 hours as needed for mild pain.      rosuvastatin (CRESTOR) 20 MG tablet Take 1 tablet (20 mg) by mouth at bedtime. Do not start before September 25, 2024.           CONTINUE these medications which have NOT CHANGED    Details   allopurinol (ZYLOPRIM) 300 MG tablet Take 300 mg by mouth daily  Refills: 0      artificial saliva (BIOTENE MT) AERS spray Take 2 sprays by mouth 4 times daily      aspirin 81 MG EC tablet Take 81 mg by mouth daily      bisacodyl (DULCOLAX) 10 MG suppository Place 10 mg rectally daily as needed for constipation      COLCRYS 0.6 MG tablet Take 0.6 mg by mouth daily as needed  Refills: 0      diclofenac (VOLTAREN) 1 % topical gel Apply 2 g topically 4 times daily      empagliflozin (JARDIANCE) 25 MG TABS tablet Take 1 tablet (25 mg) by mouth daily  Qty: 90 tablet, Refills: 3    Associated Diagnoses: Type 2 diabetes mellitus with hyperglycemia, without long-term current use of insulin (H)      ENTRESTO  MG per tablet Take 1 tablet by mouth 2 times daily  Refills: 3      insulin aspart (NOVOLOG PEN) 100 UNIT/ML pen Inject 1-3 Units Subcutaneous 3 times daily (before  meals) Correction Scale - LOW INSULIN RESISTANCE DOSING   Do Not give Correction Insulin if Pre-Meal BG less than 140. For Pre-Meal  - 239 give 1 unit. For Pre-Meal  - 339 give 2 units. For Pre-Meal BG greater than or equal to 340 give 3 units. To be given with prandial insulin, and based on pre-meal blood glucose. Administering insulin within 5 minutes of the start of the meal is ideal. Administer insulin no more than 30 minutes after the start of the meal, unless directed otherwise by provider. Notify provider if glucose greater than or equal to 350 mg/dL after administration of correction dose.    Associated Diagnoses: Controlled type 2 diabetes mellitus with diabetic neuropathy, without long-term current use of insulin (H)      LANsoprazole (PREVACID SOLUTAB) 30 MG ODT Place 30 mg under the tongue every morning (before breakfast)      lipase-protease-amylase (CREON 12) 52920-85247-93027 units CPEP Take 1 capsule by mouth as needed      melatonin 3 MG tablet Take 3 mg by mouth at bedtime      menthol-zinc oxide (CALMOSEPTINE) 0.44-20.6 % OINT ointment Apply topically 4 times daily as needed for skin protection Apply to sacrum/groin    Associated Diagnoses: Wound of sacral region, sequela      nitroGLYcerin (NITROSTAT) 0.4 MG sublingual tablet Place 0.4 mg under the tongue every 5 minutes as needed for chest pain For chest pain place 1 tablet under the tongue every 5 minutes for 3 doses. If symptoms persist 5 minutes after 1st dose call 911.      nystatin (MYCOSTATIN) 316784 UNIT/GM external powder Apply topically 2 times daily as needed for other      polyethylene glycol-propylene glycol (SYSTANE) 0.4-0.3 % SOLN ophthalmic solution Apply 2 drops to eye 2 times daily      vitamin D3 (CHOLECALCIFEROL) 50 mcg (2000 units) tablet Take 2 tablets by mouth daily      warfarin ANTICOAGULANT (COUMADIN) 2 MG tablet Take 2-4 mg by mouth daily Take 4mg on Saturday. Take 2mg all other days of week           STOP  taking these medications       bacitracin 500 UNIT/GM external ointment Comments:   Reason for Stopping:         Baclofen (LIORESAL) 5 MG tablet Comments:   Reason for Stopping:         carvedilol (COREG) 6.25 MG tablet Comments:   Reason for Stopping:         Dextromethorphan-guaiFENesin  MG/5ML syrup Comments:   Reason for Stopping:         erythromycin (ROMYCIN) 5 MG/GM ophthalmic ointment Comments:   Reason for Stopping:         HYDROmorphone (DILAUDID) 2 MG tablet Comments:   Reason for Stopping:         ondansetron (ZOFRAN ODT) 4 MG ODT tab Comments:   Reason for Stopping:         Sennosides 17.2 MG TABS Comments:   Reason for Stopping:         sodium bicarbonate 650 MG tablet Comments:   Reason for Stopping:                     Rationale for medication changes:      See med rec        Consults       PHARMACY TO DOSE VANCO  OCCUPATIONAL THERAPY ADULT IP CONSULT  PHYSICAL THERAPY ADULT IP CONSULT  WOUND OSTOMY CONTINENCE NURSE  IP CONSULT  SPEECH LANGUAGE PATH ADULT IP CONSULT  INFECTIOUS DISEASES IP CONSULT  PHARMACY TO DOSE WARFARIN  SURGERY GENERAL IP CONSULT  INFECTIOUS DISEASES IP CONSULT  INTENSIVIST IP CONSULT  CARE MANAGEMENT / SOCIAL WORK IP CONSULT  PHARMACY TO DOSE VANCO  INTENSIVIST IP CONSULT  VASCULAR ACCESS ADULT IP CONSULT  UROLOGY IP CONSULT  PALLIATIVE CARE ADULT IP CONSULT  NUTRITION SERVICES ADULT IP CONSULT  VASCULAR ACCESS ADULT IP CONSULT  PHYSICAL THERAPY ADULT IP CONSULT  OCCUPATIONAL THERAPY ADULT IP CONSULT  SPIRITUAL HEALTH SERVICES IP CONSULT  SPIRITUAL HEALTH SERVICES IP CONSULT  CARE MANAGEMENT / SOCIAL WORK IP CONSULT  SURGERY GENERAL IP CONSULT  SPEECH LANGUAGE PATH ADULT IP CONSULT  PHARMACY IP CONSULT    Immunizations given this encounter     Most Recent Immunizations   Administered Date(s) Administered     COVID-19 MONOVALENT 12+ (Pfizer) 08/17/2021     COVID-19 Monovalent 12+ (Pfizer 2022) 04/12/2022     DT (PEDS <7y) 01/01/2000     DTAP (<7y) 01/01/2000     Flu 65+  Years 09/18/2019     Flu, Unspecified 10/01/2014     Influenza (H1N1) 02/04/2010     Influenza (High Dose) 3 valent vaccine 09/16/2016     Influenza (IIV3) PF 09/20/2013     Influenza Vaccine 65+ (FLUAD) 01/03/2024     Influenza Vaccine 65+ (Fluzone HD) 09/07/2022     Influenza Vaccine >6 months,quad, PF 09/21/2015     Influenza Vaccine, 6+MO IM (QUADRIVALENT W/PRESERVATIVES) 09/21/2015     Influenza, seasonal, injectable, PF 09/19/2011     Pneumo Conj 13-V (2010&after) 09/16/2016     Pneumococcal 23 valent 02/04/2010     TDAP (Adacel,Boostrix) 11/14/2017     Td (Adult), Adsorbed 01/01/2006     Zoster recombinant adjuvanted (SHINGRIX) 02/04/2020           Anticoagulation Information      Recent INR results:   Recent Labs   Lab 08/30/24  0604 08/29/24  0550 08/28/24  0534 08/27/24  0544 08/26/24  0602 08/25/24  0621 08/24/24  2246   INR 1.75* 1.54* 1.43* 1.49* 1.37* 1.76* 2.11*     Warfarin doses (if applicable) or name of other anticoagulant: Warfarin      SIGNIFICANT IMAGING FINDINGS     Results for orders placed or performed during the hospital encounter of 08/05/24   Chest XR,  PA & LAT    Impression    IMPRESSION: Again seen is opacification of the left lower lung which could represent atelectasis, infection, and/or aspiration with a small amount of pleural fluid. No pneumothorax. The heart is not well assessed. 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 Cervical fusion hardware is present.   CT Chest Pulmonary Embolism w Contrast    Impression    IMPRESSION:  1.  No PE. Persistent perfusion of the opacified left lower lobe represents a shunt and could contribute to hypoxemia. Enlargement of the main pulmonary artery can be seen with pulmonary hypertension.  2.  The central airway to the left lower lobe is opacified. Opacification of the left lower lobe could represent aspiration, infection, and/or atelectasis. There is a small volume of left pleural fluid. The left hemidiaphragm is  elevated.  3.  Cholelithiasis. Gallbladder wall thickening. Recommend ultrasound if there is concern for cholecystitis.  4.  Coronary artery disease.   CT Abdomen Pelvis w Contrast    Impression    IMPRESSION:   1.  Bladder wall thickening and pericystic stranding, which can be seen with cystitis. Correlate with urinalysis.  2.  Otherwise no acute abnormality in the abdomen or pelvis with additional details in the findings.  3.  Chest reported separately.   XR Chest 1 View    Impression    IMPRESSION: Mild low lung volumes. Marked elevation of the left hemidiaphragm. AICD with lead tips stable. Numerous EKG wires and leads projecting over the chest obscure some details. Right basilar atelectasis. Heart size upper limits of normal.   Endovascular aortic valve. Linear atelectasis left lung base.   CT Pelvis Bone w Contrast    Impression    IMPRESSION:  1.  Both hips negative for fracture or CT evidence of avascular necrosis.  2.  Bony pelvis negative for fracture with degenerative change and areas of nicolette ankylosis of the SI joints and additional degenerative change at the symphysis pubis.  3.  There is a soft tissue ulceration along the midline posteriorly and slightly eccentric to the left posteriorly. Surrounding edema or cellulitis. This extends down to the periosteal margin of the penultimate coccygeal segment making osteomyelitis   difficult to completely exclude but there is no evidence for bone destruction and no evidence for undrained abscess.  4.  Catheter within the urinary bladder. Intrapelvic contents otherwise negative.  5.  Benign lipoma within the left tensor fascia uzair.  6.  Edema or cellulitis external to the pelvis.     US Upper Extremity Venous Duplex Right    Impression    IMPRESSION:  1.  No deep venous thrombosis in the right upper extremity.  2.  Superficial thrombophlebitis of the right cephalic vein.   XR Chest Port 1 View    Impression    IMPRESSION: Cardiac enlargement. Transcatheter  aortic valve replacement. Left chest pacer, leads in the heart. Left basilar atelectasis or infiltrate with an associated small effusion, more apparent on current study. Mild elevation of the left   hemidiaphragm. Right lung relatively clear. No effusion on the right. Atherosclerotic thoracic aorta. Degenerative changes both shoulders and the spine. Gastrostomy.   CT Abdomen Pelvis w Contrast    Impression    IMPRESSION:   1.  No definite evidence of acute bleeding. Diffuse body wall edema, somewhat asymmetrically involving the left flank.  2.  Redemonstration of soft tissue ulcer superficial to the penultimate coccygeal segment with surrounding edema or cellulitis, without drainable fluid collection in this region or associated bony erosion to definitively suggest osteomyelitis.  3.  Small bilateral pleural effusions with compressive atelectasis, similar on the left and new on the right. Periportal edema.  4.  Cholelithiasis with slightly increased gallbladder distention and with apparent wall thickening as well as trace pericholecystic fluid. Recommend right upper quadrant ultrasound to assess for possible cholecystitis.  5.  Probable mild hepatic steatosis.  6.  Javier catheter in place. Apparent thickening of the urinary bladder, though perhaps somewhat exaggerated by underdistention. Cystitis is possible. Recommend correlation with urinalysis.  7.  Partial colectomy with left lower quadrant colostomy. Moderate colonic stool, suggestive of constipation. Percutaneous gastrojejunostomy tube with tip terminating in the proximal jejunum. No small bowel obstruction.   CT Abdomen Pelvis w Contrast    Impression    IMPRESSION:   1.  Abnormal 5.4 cm of colon at the colostomy site which is most likely ischemic.  2.  Cholelithiasis with a thickened gallbladder wall. This either represents acute or chronic cholecystitis.  3.  Decreasing anasarca.   XR PICC Chest Placement Port 1vw    Impression    IMPRESSION: A right PICC  "tip likely terminates over the mid SVC, though evaluation is compromised by multiple electrodes. Left lung opacities are again seen and could represent the elevated hemidiaphragm, pleural fluid, atelectasis, and/or airspace   disease. No pneumothorax. The heart is not well assessed. A TAVR stent is present. There is a left chest wall biventricular ICD. Cervical fusion hardware is noted.       SIGNIFICANT LABORATORY FINDINGS         Discharge Orders        Comprehensive metabolic panel     CK total     CRP, inflammation     Adult Palliative Care  Referral      Medication Therapy Management Referral      Home Care Referral      Reason for your hospital stay    Sacral ulcer, septic shock     Follow-up and recommended labs and tests     Follow up with primary care provider, Redd Sommer, within 2-3 days for hospital follow- up.  The following labs/tests are recommended: CBC, BMP, INR. .     Activity    Your activity upon discharge: activity as tolerated     Kevin Lift Order for DME - ONLY FOR DME     Wheelchair Order for DME - ONLY FOR DME     Hospital Bed Order for DME - ONLY FOR DME    Hospital Bed Documentation:   Hospital bed is required for body positioning, to allow for safe transfers to wheelchair and standing and frequent changes in body position, not feasible in an ordinary bed     NOTE: Patient must have a \"Yes\" in one of the four following questions to qualify for a hospital bed.    1. Does the patient require positioning of the body in ways not feasible with an ordinary bed due to a medical condition that is expected to last at least 1 month? Yes (Please explain): He has recent stroke and reduced mobility.  Therefore developed a significant sacral ulcer causing infections needing hospitalization.  Will need a hospital bed for stability and positioning.    2. Does the patient require, for the alleviation of pain, positioning of the body in ways not feasible with an ordinary bed? Yes (Please " explain):  He has recent stroke and reduced mobility.  Therefore developed a significant sacral ulcer causing infections needing hospitalization.  Will need a hospital bed for stability and positioning.     3. Does the patient require the head of the bed to be elevated more than 30 degrees most of the time due to congestive heart failure, chronic pulmonary disease, or aspiration? No    4. Does the patient require traction that can only be attached to a hospital bed? No    Additional Criteria:    Does the patient require frequent changes in body position and/or have an immediate need for change in body position? Yes - Patient qualifies for Semi Electric Bed     Trapeze Criteria:  (Patient must meet standard hospital bed criteria also)   1. Does patient need this device to sit up because of a respiratory condition, for change in body position for other medical reasons, or to get in or out of bed? No    I, the undersigned, certify that the above prescribed supplies are medically necessary for this patient and is both reasonable and necessary in reference to accepted standards of medical and necessary in reference to accepted standards of medical practice in the treatment of this patient's condition and is not prescribed as a convenience.     Diet    Follow this diet upon discharge: Current Diet:Orders Placed This Encounter      Snacks/Supplements Adult: Magic Cup; With Meals      Snacks/Supplements Adult: Expedite Cup; With Meals      Room Service      Calorie Counts      Calorie Counts      Snacks/Supplements Adult: Gelatein Plus; Between Meals      Snacks/Supplements Adult: Ensure Enlive; With Meals      Easy to Chew Diet (level 7) Mildly Thick (level 2)    Free water order:  Free water ro     CBC with platelets and differential       Examination   Physical Exam   Temp:  [97.8  F (36.6  C)-98.4  F (36.9  C)] 98  F (36.7  C)  Pulse:  [81-84] 84  Resp:  [18] 18  BP: (124-167)/(69-77) 167/74  SpO2:  [95 %] 95 %  Wt  Readings from Last 1 Encounters:   08/29/24 74.4 kg (164 lb 0.4 oz)       General: Pleasant elderly male, chronically ill appearing in NAD  HEENT:EOMI, AT,NC  RS:Non labored breathing  Abd: Soft, colostomy bag in place, no stools noted.   Neurology:Awake, alert, oriented, left sided weakness+  Psy: Calm, cooperative      Please see EMR for more detailed significant labs, imaging, consultant notes etc.    I, Ina Meade MD, personally saw the patient today and spent greater than 30 minutes discharging this patient.    Ina Meade MD  [facility]    CC:Redd Sommer

## 2024-08-30 NOTE — PLAN OF CARE
Problem: Adult Inpatient Plan of Care  Goal: Absence of Hospital-Acquired Illness or Injury  Outcome: Progressing  Intervention: Identify and Manage Fall Risk  Recent Flowsheet Documentation  Taken 8/30/2024 0000 by Redd Sevilla RN  Safety Promotion/Fall Prevention:   activity supervised   room door open  Intervention: Prevent Skin Injury  Recent Flowsheet Documentation  Taken 8/30/2024 0216 by Redd Sevilla RN  Body Position:   right   turned  Taken 8/30/2024 0036 by Redd Sevilla RN  Body Position:   turned   left  Intervention: Prevent and Manage VTE (Venous Thromboembolism) Risk  Recent Flowsheet Documentation  Taken 8/30/2024 0000 by Redd Sevilla RN  VTE Prevention/Management: SCDs on (sequential compression devices)  Intervention: Prevent Infection  Recent Flowsheet Documentation  Taken 8/30/2024 0000 by Redd Sevilla RN  Infection Prevention: rest/sleep promoted  Goal: Optimal Comfort and Wellbeing  Outcome: Progressing     Problem: Skin Injury Risk Increased  Goal: Skin Health and Integrity  Outcome: Progressing  Intervention: Plan: Nurse Driven Intervention: Moisture Management  Recent Flowsheet Documentation  Taken 8/30/2024 0000 by Redd Sevilla RN  Moisture Interventions: Urinary collection device  Intervention: Plan: Nurse Driven Intervention: Friction and Shear  Recent Flowsheet Documentation  Taken 8/30/2024 0000 by Redd Sevilla RN  Friction/Shear Interventions: HOB 30 degrees or less  Intervention: Optimize Skin Protection  Recent Flowsheet Documentation  Taken 8/30/2024 0216 by Redd Sevilla RN  Head of Bed (HOB) Positioning: HOB at 20-30 degrees  Taken 8/30/2024 0036 by Redd Sevilla RN  Head of Bed (HOB) Positioning: HOB at 20-30 degrees  Taken 8/30/2024 0000 by Redd Sevilla RN  Activity Management: activity adjusted per tolerance     Problem: Comorbidity Management  Goal: Blood Glucose Levels Within Targeted Range  Outcome: Progressing  Intervention: Monitor  and Manage Glycemia  Recent Flowsheet Documentation  Taken 8/30/2024 0000 by Redd Sevilla RN  Medication Review/Management: medications reviewed   Goal Outcome Evaluation:       A&O x 4. Patient is bed-bound. Admitted for sepsis with acute renal failure and tubular necrosis without septic shock. Single lumen PICC on right brachial vein. No acute changes this shift. Phosphorous protocol not ran on night shift. Last phosphorous replacement given @2028 8/29/2024. Recheck scheduled for 8/30/2024 morning. Patient expected to discharge to apartment 8/30/2024 and will require a staple removal kit and steri strips to be sent home with him. Bed in low position, call light within reach. Non-slip footwear in use. Patient calls appropriately.   Visit Vitals  /69 (BP Location: Right arm, Patient Position: Semi-Nur's, Cuff Size: Adult Regular)   Pulse 83   Temp 98.4  F (36.9  C) (Oral)   Resp 18

## 2024-08-30 NOTE — PROGRESS NOTES
"INFECTIOUS DISEASE FOLLOW UP NOTE    Date: 08/30/2024   CHIEF COMPLAINT:   Chief Complaint   Patient presents with    Altered Mental Status    Hypotension        ASSESSMENT:    E coli ESBL bacteremia: 2/2 sacral ulcer. S/p bedside debridement with surgery. Necrotic tissue noted. Repeat bedside I&D 8/7 small purulence and again necrotic tissue 8/9. Vac currently in place. S/p I+D in OR 8/13 with diverting colostomy. Concern for osteomyelitis -- wound down to bone although bone did not appear unhealthy. Would likely plan 4-6 weeks IV antibiotics. Leukocytosis -- post-op, improved. Underwent revision colostomy.  strep salivarius bacteremia: noted on admission.  Likely 2/2 above  Sacral ulcer s/p bedside debridement, cultures with ecoli, MSSA, bacteroides, VRE. CT with ulceration to periosteal margin.   CKD  Hx CVA with residual dysphagia: increased risk for aspiration events, on room air. Unclear if actual infection however will be covered with treatment of sacral ulcer  DM2  Upper ext edema -- no DVT but there is superficial thrombophlebitis.     PLAN:  - continue meropenem IV, daptomycin, likely 4-6 weeks  Plan home IV antibiotics, weekly labs, follow up with me in about 2 weeks, clinic will contact him.       Eriberto Verdugo MD   [facility]  Direct messaging: [place] Paging   ______________________________________________________________________    SUBJECTIVE / INTERVAL HISTORY:  Seen earlier today. Pain improved but still present at wound and ostomy.    SH/FH/Habits/PMH reviewed and unchanged.    OBJECTIVE:  BP (!) 152/70 (BP Location: Right arm, Patient Position: Semi-Nur's, Cuff Size: Adult Regular)   Pulse 84   Temp 98  F (36.7  C) (Oral)   Resp 18   Ht 1.753 m (5' 9\")   Wt 74.4 kg (164 lb 0.4 oz)   SpO2 95%   BMI 24.22 kg/m       Resp: 18      GEN: No acute distress.   RESPIRATORY:  clear bilaterally  CV: regular  Abd: colostomy  EXTREMITIES: No edema.  SKIN/HAIR/NAILS: vac " in place.   IV:  PICC      Antibiotics:  Meropenem  daptomycin    Pertinent labs:  reviewed  Last Comprehensive Metabolic Panel:  Sodium   Date Value Ref Range Status   08/29/2024 136 135 - 145 mmol/L Final     Potassium   Date Value Ref Range Status   08/29/2024 4.1 3.4 - 5.3 mmol/L Final   11/29/2019 4.3 3.5 - 5.0 mmol/L Final     Chloride   Date Value Ref Range Status   08/29/2024 99 98 - 107 mmol/L Final   11/29/2019 107 98 - 107 mmol/L Final     Carbon Dioxide (CO2)   Date Value Ref Range Status   08/29/2024 32 (H) 22 - 29 mmol/L Final   11/29/2019 26 22 - 31 mmol/L Final     Anion Gap   Date Value Ref Range Status   08/29/2024 5 (L) 7 - 15 mmol/L Final   11/29/2019 7 5 - 18 mmol/L Final     Glucose   Date Value Ref Range Status   11/29/2019 126 (H) 70 - 125 mg/dL Final     GLUCOSE BY METER POCT   Date Value Ref Range Status   08/30/2024 150 (H) 70 - 99 mg/dL Final     Urea Nitrogen   Date Value Ref Range Status   08/29/2024 16.2 8.0 - 23.0 mg/dL Final   11/29/2019 10 8 - 28 mg/dL Final     Creatinine   Date Value Ref Range Status   08/29/2024 0.49 (L) 0.67 - 1.17 mg/dL Final     GFR Estimate   Date Value Ref Range Status   08/29/2024 >90 >60 mL/min/1.73m2 Final     Comment:     eGFR calculated using 2021 CKD-EPI equation.   11/29/2019 >60 >60 mL/min/1.73m2 Final     Calcium   Date Value Ref Range Status   08/29/2024 7.7 (L) 8.8 - 10.4 mg/dL Final     Comment:     Reference intervals for this test were updated on 7/16/2024 to reflect our healthy population more accurately. There may be differences in the flagging of prior results with similar values performed with this method. Those prior results can be interpreted in the context of the updated reference intervals.        MICROBIOLOGY DATA:  Personally reviewed.  7-Day Micro Results       No results found for the last 168 hours.             RADIOLOGY:  Personally Reviewed.  Recent Results (from the past 24 hour(s))   XR Video Swallow with SLP or OT    Narrative     EXAM: XR VIDEO SWALLOW WITH SLP OR OT  LOCATION: [facility]  DATE: 8/6/2024    INDICATION: Difficulty swallowing.  COMPARISON: None.    TECHNIQUE: Routine swallow study with speech pathology using multiple barium thicknesses.    RADIATION DOSE: Total Air Kerma 5.1 mGy    FINDINGS:   Swallow study with Speech Pathology using multiple barium thicknesses. Silent aspiration with thin liquid, including with chin tuck. Aspiration also occurred with thin liquid and smaller drink without chin tuck. No other significant penetration or   aspiration.         Principal Problem:    Sepsis with acute renal failure and tubular necrosis without septic shock (H)  Active Problems:    Benign prostatic hyperplasia    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) (H)    Hyperlipidemia    Hypertension    Long term current use of anticoagulant therapy    S/P cardiac pacemaker procedure    S/P TAVR (transcatheter aortic valve replacement)    Thrombophilia (H24)    Diabetes mellitus type 2, noninsulin dependent (H)    History of CVA (cerebrovascular accident)    Hypoxia    Hypotension, unspecified hypotension type    Community acquired pneumonia of left lower lobe of lung    Pressure injury of sacral region, stage 3 (H)    Leukocytosis, unspecified type    Colostomy care (H)    Wound infection    History of stroke    Pressure injury of sacral region, stage 4 (H)    Pressure injury of sacral region, unstageable (H)  ;

## 2024-08-30 NOTE — PROGRESS NOTES
"[facility] Nurse Inpatient Assessment       Consulted for: Sacral      Patient History (according to provider note(s):      HPI: Franklyn Sorto is a 88 year old male with h/o T2DM, GERD, HTN, CVA 5/25/24 currently in TCU presenting to the ER for evaluation of fatigue and altered mental status. Patient's family was visiting today and states when they got there \"5 staff members were around the patient and he was not responding\". They called EMS who initially got a BP with 90 systolic, however, repeat on the way over was 65 systolic. Per patient's family, patient had feeding tube placed following his stroke as he was having difficulty swallowing.  Patient has improved a fair amount and is now able to swallow, has not used his feeding tube in over a month.  Patient's family states that they are quite adamant about him only drinking and eating well sitting up but they are nervous that his facility may have given him some fluid while laying down that could have resulted in an aspiration pneumonia. Patient denies any recent fevers. States he has been coughing a bit more, no sputum production.     Vitals reviewed, initial BP upon arrival to the ER was 69/48. Temp 97.5F. SpO2 89% on room air. 2L nasal cannula was placed and patient quickly improved to 98% on room air On exam he is pale but nontoxic-appearing.  He is speaking in full sentences and is alert. Differential diagnosis includes but not limited to UTI, pyelonephritis, bronchitis, pneumonia, PE, ACS, hypovolemia, cardiogenic shock, septic shock. After initial 500 mL fluid bolus his BP has improved to 87/47. He was given an additional 500 mL and improved to 90/66.        Assessment:        Pressure Injury Location: Negative pressure wound therapy applied to: Sacrum     8/6 8/7 8/9 8/12 8/14                                                          "        8/16 8/27      Last photo: 8/27  Wound type: Pressure Injury     Pressure Injury Stage: 4, present on admission   Wound history/plan of care:   Came from TCU. 8/20, small bedside debridement by PA    Wound base: unhealthy granular tissue with exposed fascia, <10% adherent slough, bone palpated     Palpation of the wound bed: normal      Drainage: moderate     Description of drainage: serosanguinous     Measurements (length x width x depth, in cm) 7  x 6  x  2.5 cm     Tunneling NA     Undermining 2-9 o'clock up to 2cm  Periwound skin:  discolored      Color: dusky, pink, and red      Temperature: warm  Odor: none  Pain: mild and during dressing change, tender  Pain intervention prior to dressing change: slow and gentle cares , oral and IV pain meds administered by RN  Treatment goal: Drainage control, Infection control/prevention, Increase granulation, Protection, Remove necrotic tissue, and Soften necrotic tissue  STATUS: stable    Surgical date: 8/13   Service following: surgery  Date Negative Pressure Wound Therapy initiated: 8/14   Interventions in place: repositioning, pressure redistribution with device or dressing, moisture/incontinence management, and offloading  Is patient s nutritional status compromised? no   If yes, what interventions are in place? Protein supplements  Reason for initiating vac therapy? Presence of co-morbidities and Need for accelerated granulation tissue  Which?of?the?following?co-morbidities?apply? Diabetes  If diabetic is patient on a diabetic management program? Yes   Is osteomyelitis present in wound? no   If yes what treatments are in place? N/A        Volume in cannister: 10       Last cannister change date: 8/27       Number of foam pieces removed from a wound (excluding foam for bridge) :  1 GranuFoam Black and 1 Oil emulsion dressing   Verified this matched the number of foam pieces applied last dressing change: Yes   Number of foam pieces packed into wound  "(excluding foam for bridge) :  1 Guevara Black - bridge to L hip  ___________________________________________________________________________________________________________________________________________________________________________________________________________________________________________    Assessment of new end Colostomy: - assessed through pouch only 8/30- stoma remains pink and healthy  Diagnosis Pertinent to Stoma:  large sacrococcygeal wound       Surgery Date: 8/14 and revision on 8/25/24  Surgeon:Brandon Laamr did revision       Hospital[facility]  Pouching system in place on assessment today: Elizabeth one piece   Pouch barrier status: intact  Pouch last changed/wear time: post op pouch intact  Reason for pouch change today: ostomy education and initial post-op assessment  Effectiveness of current pouching/ supply plan: Effective  Change made with ostomy management today: Yes  Pouching system placed today: Martha two piece flat  Supplies: gathered        8/14 8/20 8/27    Last photo: 8/27  Stoma location: [facility]  Stoma size: 1-5/8\"  Stoma appearance: red, protruding, edematous  Peristomal complication(s): none   Output: serosang sweat  Output volume emptied during visit: 10ml  Abdominal assessment: Soft  Surgical site(s): dressing dry and intact  NG still in place? No  Pain: Sharp, Dull ache, and Fullness  Is patient still on a PCA? No      Did not assess previous stoma site. Currently packed with gauze and being managed by general surgery team.        Treatment Plan:     LLQ Colostomy pouching plan:   Pouching system: ostomy supplies pouches: Martha 57 FECAL (515883) ostomy supplies barrier: Martha 57mm FLAT ([id number])  Accessories used: WOC ostomy accessories: 2\" Cera Barrier Ring (736958) and Cavilon no sting barrier film ([id number])   Frequency of pouch changes: Twice weekly and PRN leakage  WOC follow up " "plan: Twice weekly and As needed   Bedside RN interventions: Change pouch PRN if leaking using the supplies above, Empty pouch when 1/3 to 1/2 full, ensure to clean pouch outlet after emptying to prevent odor, Notify WOC for ongoing pouch leakage, Document stoma appearance and output volume, color, and consistency every shift, Encourage patient to empty pouch with assist, and Assist patient to measure and record output     Negative pressure wound therapy plan:  Wound location: Sacrococcygeal   Change Days: Tues/ Fri by WOC RN    Supplies (including all accessories) used: medium Black foam , Adaptic/Curity oil emulsion contact layer , Adapt barrier ring, and Cavilon no sting barrier film  Cleanse with Vashe prior to replacing NPWT  Suction setting: -125   Methods used: Window paned all periwound skin with vac drape prior to applying sponge, Bridged trac pad off bony prominences, and Placed barrier ring into periwound creases to improve seal    Staff RN to assess integrity of dressing and ensure suction is set at appropriate level every shift.   Date canister. Chart canister output every shift. Change cannister weekly and PRN if full/occluded     Remove foam dressing and replace with BID normal saline moist gauze dressing if:   -a dressing failure which cannot be repaired within 2 hours   -patient is discharging to home without a home pump   -patient is discharging to a facility outside the local area   -if a dressing is a \"Silver Foam\", remove before Radiation Therapy or MRI     The hospital VAC pump is not to be discharged with the patient.?Ensure to disconnect patient from machine prior to discharge. Then,    - If a home KCI VAC pump has been delivered, connect home cannister to dressing tubing then connect cannister to home pump and turn on machine    - If transferring to a nearby facility with a KCI vac, can disconnect and clamp tubing then cover end with glove so can be reconnected within 2 hours       Pressure " "Injury Prevention (PIP) Plan:  If patient is declining pressure injury prevention interventions: Explore reason why and address patient's concerns, Educate on pressure injury risk and prevention intervention(s), If patient is still declining, document \"informed refusal\" , and Ensure Care team is aware ( provider, charge nurse, etc)  Mattress: Follow bed algorithm, reassess daily and order specialty mattress, if indicated.  HOB: Maintain at or below 30 degrees, unless contraindicated  Repositioning in bed: Every 1-2 hours , Left/right positioning; avoid supine, Raise foot of bed prior to raising head of bed, to reduce patient sliding down (shear), and Frequent microturns using positioning wedges, as patient tolerates  Heels: Keep elevated off mattress, Pillows under calves, and Heel lift boots  Protective Dressing:  wound care to sacrum  Positioning Equipment:Positioning wedges (#681346) to help maintain 30 degree side lying position   Chair positioning: Chair cushion (#[id number])    If patient has a buttock pressure injury, or high risk for PI use chair cushion or SPS.  Moisture Management: Perineal cleansing /protection: Follow Incontinence Protocol, Avoid brief in bed, Clean and dry skin folds with bathing , Consider InterDry (#470010) between folds, and Moisturize dry skin  Under Devices: Inspect skin under all medical devices during skin inspection , Ensure tubes are stabilized without tension, and Ensure patient is not lying on medical devices or equipment when repositioned  Ask provider to discontinue device when no longer needed.      Orders: Reviewed    RECOMMEND PRIMARY TEAM ORDER: None, at this time  Education provided: importance of repositioning, plan of care, and wound progress  Discussed plan of care with: Patient and Nurse  WOC nurse follow-up plan: twice weekly  Notify WOC if wound(s) deteriorate.  Nursing to notify the Provider(s) and re-consult the WOC Nurse if new skin concern.    DATA:     Current " support surface: Standard  Low air loss (TORY pump, Isolibrium, Pulsate)  Containment of urine/stool: Incontinence Protocol  BMI: Body mass index is 24.22 kg/m .   Active diet order: Orders Placed This Encounter      Easy to Chew Diet (level 7) Mildly Thick (level 2)      Diet     Output: I/O last 3 completed shifts:  In: 970 [P.O.:600; I.V.:250; NG/GT:120]  Out: 1425 [Urine:1350; Stool:75]     Labs:   Recent Labs   Lab 08/30/24  0604 08/29/24  0550   HGB  --  8.3*   INR 1.75* 1.54*   WBC  --  9.2     Pressure injury risk assessment:   Sensory Perception: 3-->slightly limited  Moisture: 2-->very moist  Activity: 1-->bedfast  Mobility: 2-->very limited  Nutrition: 2-->probably inadequate  Friction and Shear: 1-->problem  Dilan Score: 11    Candice Naik RN, CWOCN, CFCN  Pager no longer is use, please contact through [employer] group: [facility]

## 2024-08-30 NOTE — PROGRESS NOTES
Physical Therapy Discharge Summary    Reason for therapy discharge:    Discharged to Home with 24/7 care    Progress towards therapy goal(s). See goals on Care Plan in [facility] electronic health record for goal details.  Goals not met.  Barriers to achieving goals:   discharge from facility.    Therapy recommendation(s):    Long Term Care

## 2024-08-31 NOTE — PROGRESS NOTES
"Received a call from Charge Nurse stating unit manager is requesting that a \"\" call home care nurse Nusrat at [phone] regarding delivery of home infusion supplies. Writer left a message for home infusion to call either Nusrat or writer back.  9:19 AM: No call back received from [facility] but perhaps they have already called her. Writer left a message for Nusrat to follow up.  9:31 AM:  Spoke with Nusrat who stated that she needed the contact number for [facility]. Writer provided number of [phone]. Per Nusrat, patient came to us from a facility so does not have Coumadin, glucometer etc, as these must have been initiated during a hospital stay prior to discharge to TCU.  Text page sent to lead hospitalist to discuss next steps.  10:45 AM:  Received a call from [facility] nurse who notes that they did have the incorrect address for patient but she has followed up with Nusrat.   1:40 PM:  Received update from MD that they have sent a request to [facility] for patient's coumadin. She states they can also obtain a glucometer there. Update provided to Nusrat who was aware.    Candice Lowry RN  "

## 2024-09-03 NOTE — PROGRESS NOTES
[facility]:   [facility] Contact  [facility]/Voicemail     Clinical Data: Post-Discharge Outreach     Outreach attempted x 2.  Left message on patient's voicemail, providing [facility]'s central phone number of [phone] ([phone]) for questions/concerns and/or to schedule an appt with an [facility] provider, if they do not have a PCP.      Plan:  [facility] will do no further outreaches at this time.       Bhavana Shipley  Community Health Worker  [facility], [facility]  [phone]      *Connected Care Resource Team does NOT follow patient ongoing. Referrals are identified based on internal discharge reports and the outreach is to ensure patient has an understanding of their discharge instructions.

## 2024-09-05 NOTE — PROGRESS NOTES
"Pt's PCA Nusrat Sandoval called #[phone], she stated, \"Franklyn left with a cutler catheter and does not have any orders as to what to do with it. \"    RNCM paged Hosp.       [facility] contacted surgery. Surgery stated, \"pt was only having cath for surgery purposes,and that it can be pulled and Voiding trial attempted.\"      [facility] placed updated orders for home RN to pull cutler cath 9/5, and attempt voiding trial. If unsuccessful voiding trial, then replace cutler. Urology referral placed just in case there is any issues.       RNCM sent updated orders 9/5 to [facility] fax #[phone]. RNCM also emailed updated orders to PCA Nusrat email [email or web address].         Christina Palma RN on 9/5/2024 at 2:00 PM    "

## 2024-09-05 NOTE — TREATMENT PLAN
I was reached out by the  about this patient who was discharged on 8/30 with Javier in place but there were no orders for the Javier management as outpatient.  I discussed with surgery SALLIE Partida, who stated Javier was placed at the time of surgery and trial of void can be attempted.  Patient has a wound VAC to his sacral ulcer and no reason to keep Javier for long.  Orders were placed in the discharge navigator for Javier to be removed to attempt trial of void, replace if he fails. Patient will need a follow-up appointment with urology if continues to have urinary retention.  All the instructions and orders were placed in the discharge navigator.    Ina morrisseyCentral Valley Medical Centerdenisse

## 2024-09-06 NOTE — TELEPHONE ENCOUNTER
Nurse from [facility] home care calling for orders to change the PICC line dressing cares from Tuesdays to Monday. V.o. provided that this is ok.

## 2024-09-06 NOTE — LETTER
"9/6/2024      Franklyn Sorto  [id number]  [facility] [address]      Dear Colleague,    Thank you for referring your patient, Franklyn Sorto, to the [facility] SURGERY CLINIC AND BARIATRICS CARE [place]. Please see a copy of my visit note below.      The patient has been notified of following:     \"This telephone visit will be conducted via a call between you and your physician/provider. We have found that certain health care needs can be provided without the need for a physical exam.  This service lets us provide the care you need with a short phone conversation.  If a prescription is necessary we can send it directly to your pharmacy.  If lab work is needed we can place an order for that and you can then stop by our lab to have the test done at a later time.    Telephone visits are billed at different rates depending on your insurance coverage. During this emergency period, for some insurers they may be billed the same as an in-person visit.  Please reach out to your insurance provider with any questions.    If during the course of the call the physician/provider feels a telephone visit is not appropriate, you will not be charged for this service.\"    Patient has given verbal consent to a Telephone visit? Yes    What phone number would you like to be contacted at? [phone]    Patient would like to receive their AVS by [facility] [facility]    Are there any specific questions or needs that you would like addressed at your visit today? are management can ask home care / home wound care to check this patient and take out their staples       Telephone Start Time: 2:46 PM    Telephone End Time (time telephone stopped): 2:46 PM    Originating Patient Location (pt. Location): Home    Spoke to his POA Herman Layton and he states that Franklyn is doing well.  He is actually gaining weight now that he is tolerating more food intake.  Patient's new colostomy site is pink and viable.  Still producing significant stools.  " The old colostomy site does not have any significant findings of purulent material or infection.  The patient does have a Javier catheter in which his primary care team knows about.  They are planning to either remove it or replace it.  Otherwise the sacral wound is still healing with wound VAC and with wound care support.  Otherwise, no further complaints at this time.        Distant Location (provider location):  On-site    Distant Location (provider location):  [facility] SURGERY [facility] AND BARIATRICS CARE [place]     Demetri Garcia DO  General Surgeon  [facility]  Surgery [address] [address]?  Office: [phone]  Employed by - [facility]  Pager: [phone]             Again, thank you for allowing me to participate in the care of your patient.        Sincerely,        Demetri Garcia DO

## 2024-09-06 NOTE — PROGRESS NOTES
"  The patient has been notified of following:     \"This telephone visit will be conducted via a call between you and your physician/provider. We have found that certain health care needs can be provided without the need for a physical exam.  This service lets us provide the care you need with a short phone conversation.  If a prescription is necessary we can send it directly to your pharmacy.  If lab work is needed we can place an order for that and you can then stop by our lab to have the test done at a later time.    Telephone visits are billed at different rates depending on your insurance coverage. During this emergency period, for some insurers they may be billed the same as an in-person visit.  Please reach out to your insurance provider with any questions.    If during the course of the call the physician/provider feels a telephone visit is not appropriate, you will not be charged for this service.\"    Patient has given verbal consent to a Telephone visit? Yes    What phone number would you like to be contacted at? [phone]    Patient would like to receive their AVS by [employer]    Are there any specific questions or needs that you would like addressed at your visit today? are management can ask home care / home wound care to check this patient and take out their staples       Telephone Start Time: 2:46 PM    Telephone End Time (time telephone stopped): 2:46 PM    Originating Patient Location (pt. Location): Home    Spoke to his POA Herman Layton and he states that Franklyn is doing well.  He is actually gaining weight now that he is tolerating more food intake.  Patient's new colostomy site is pink and viable.  Still producing significant stools.  The old colostomy site does not have any significant findings of purulent material or infection.  The patient does have a Javier catheter in which his primary care team knows about.  They are planning to either remove it or replace it.  Otherwise the sacral wound is " still healing with wound VAC and with wound care support.  Otherwise, no further complaints at this time.        Distant Location (provider location):  On-site    Distant Location (provider location):  [facility] SURGERY CLINIC AND BARIATRICS CARE [place]     Demetri Garcia DO  General Surgeon  [facility]  Surgery [facility] - [address]  [place] [address]?  Office: [phone]  Employed by - [facility] Services  Pager: [phone]

## 2024-09-11 NOTE — PROGRESS NOTES
Franklyn is a 89 year old who is being evaluated via a billable video visit.      ASSESSMENT:    Presented with E coli ESBL bacteremia: 2/2 sacral ulcer. S/p bedside debridement with surgery. Necrotic tissue noted. Repeat bedside I&D 8/7 small purulence and again necrotic tissue 8/9. Vac currently in place. S/p I+D in OR 8/13 with diverting colostomy. Concern for osteomyelitis -- wound down to bone although bone did not appear unhealthy. Plan 4-6 weeks IV antibiotics. Underwent revision colostomy.  strep salivarius bacteremia: noted on admission.  Likely 2/2 above  Sacral ulcer s/p bedside debridement, cultures with ecoli, MSSA, bacteroides, VRE. CT with ulceration to periosteal margin.   CKD  Hx CVA with residual dysphagia: increased risk for aspiration events, on room air. Unclear if actual infection however will be covered with treatment of sacral ulcer  DM2  Upper ext edema -- no DVT but there is superficial thrombophlebitis.   Leukocytosis this week. Of note he had fluctuating WBC in the hospital, but that was around the time of surgery.     Doing well. Improved but recovery is slow with his age and frailty. CRP and WBC increased this week. Doubt bacterial UTI on broad spectrum antibiotics, but at risk for yeast UTI.        PLAN:  - continue meropenem IV, daptomycin end date 9/24, then remove PICC    Change cutler -- [facility] -- check UA/UC    Weekly labs while on IV     Once antibiotics complete, monitor for fever, monitor wound    Return as needed.     Eriberto Verdugo MD  [facility]  Contact via [email or web address] or [email or web address] paging  ______________________________________________________________________     Subjective   Franklyn is a 89 year old, presenting for the following health issues:      Video Start Time: 1:03 PM    MASON Corbin, is present.      HPI     Still has catheter, urine appears dark.     Overall feeling better. Still with pain at his wound, this is a little better,  depends on positioning. Getting home health, wound care, and is having photos checked, per report this is improving. Pain meds sent through primary, needs something for anxiety.     Catheter still preferred to keep wound dry. Current catheter >3 weeks. Has been in touch with Dr. Sommer.       Objective           Vitals:  No vitals were obtained today due to virtual visit.    Physical Exam   GENERAL: alert and no distress, eye patch on right  RESP: No audible wheeze, cough, or visible cyanosis.    SKIN: Visible skin clear. No significant rash, abnormal pigmentation or lesions.  NEURO: Cranial nerves grossly intact.  Mentation and speech appropriate for age.  PSYCH: deconditioned  PICC R UE  Wound not visualized.         Video-Visit Details    Type of service:  Video Visit   Video End Time:1:19 PM  Originating Location (pt. Location): Home    Distant Location (provider location):  On-site  Platform used for Video Visit: Antoinette  Signed Electronically by: Eriberto Verdugo MD

## 2024-09-11 NOTE — Clinical Note
Please send orders for Javier catheter change and UA/UC collection after catheter change to [facility].  Eriberto Verdugo MD

## 2024-09-11 NOTE — PATIENT INSTRUCTIONS
I am encouraged that wound appears to be healing. Long IV antibiotic course is planned to treat possible early bone infection. Bone appeared healthy at the time of surgery but it was visible at the base of the wound.     WBC is elevated this past week again. Without other clear symptoms of another source of infection, plan to change catheter and check urine testing.     Continue IV antibiotics until 9/24/24 then PICC can be removed.     We will monitor labs.     Please contact me if there are further questions or concerns. Once the antibiotics are finished, monitor for fever or worsening of the wound. If temperature stays normal and wound continues to heal off antibiotics, that is a good sign.     Eriberto Verdugo MD

## 2024-09-12 NOTE — NURSING NOTE
Is the patient currently in the state of [place]? YES    Visit mode:VIDEO    If the visit is dropped, the patient can be reconnected by: VIDEO VISIT: Send to e-mail at: [email or web address]    Will anyone else be joining the visit? NO  (If patient encounters technical issues they should call [phone]    How would you like to obtain your AVS? MyChart    Are changes needed to the allergy or medication list? No  Rooming Documentation:  Questionnaire(s) completed      Reason for visit: Video Visit (New apt )    Padma MAHONEY

## 2024-09-12 NOTE — PROGRESS NOTES
Palliative Care Outpatient Clinic Consultation Note    Patient Name: Franklyn Sorto is being evaluated via a billable video visit.    Primary Provider:  Redd Sommer  Reason for referral: Goals of Care, pain management      Chief Complaint: Pain    Background/Summary  Medical:  88M with history of HFrEF, hyperlipidemia, hypertension, aortic stenosis status post TAVR, thrombophilia, type 2 diabetes, stage III sacral ulcer, CAD, prior CVA admitted on 8/5/2024 with altered mental status & hypotension secondary to sepsis possibly from urinary tract infection, aspiration pneumonia and suspected infected, unstageable sacral ulcer.     He was managed briefly at the ICU by insensivist on admission but did not require vasopressor. Hypotension and ARIADNE improved with IV hydration and albumin infusion.  Patient transferred to medical floor on 8/6/2024.  However, patient transferred back to ICU for possible septic shock and management with pressors.  On 8/8/2024 patient transferred back to floor.  UC grew E. coli.  BC grew ESBL E Coli & Streptococcus salivarius. Polymicrobial bacteremia thought to be secondary to infected sacral decubitus ulcer.  Patient underwent multiple bedside debridement and eventually OR debridement with diverting colostomy on 8/13/2024.     On 8/24 CT imaging reported ischemia of colostomy site.  On 8/25, patient underwent colostomy revision.  Pt has been living back at home since discharge from hospital at beginning of September. Continues to have significant pain in abdomen, knee and sacral wound. Pt reports that he no longer wants to go back to to the hospital.  Pt is clear that he wants his care to focus on comfort.     Social    Pt and family report good [place] of social support  Herman: Friends and DPOA  Son  Daughter Sudha      Psychospiritual  Jew Eugenia, would appreciate support from Pastoral Care    Care Planning     Opioid Safety   Using oxycodone 10mg BID currently, per  case management currently has 24 supervision from home health agency at present.     : reviewed.    History:  Patient is on the call   History gathered today from: patient, family/loved ones    Significant Ongoing Pain:  Pts POA and  significant pain in left knee, sacral wound and abdomen. Pt has been using tylenol 1000mg twice daily, and oxycodone 10mg BID,however the oxycodone is wearing off after a few hours and the pt becomes agitated and uncomfortable at this time.     Anxiety/Agitation  Pt has fear and anxiety around transfers per per family, does not want to use caroline lift. 911 had to be called for agitation night prior as home care staff was unable to calm pt down.Previously using xanax with some relief, family feels his agitation is primarily related to pain     Concern for UTI:  Family concerned about risk of UTI. Had virtual visit with ID who recommended catheter exchange and check UC/UA, however home care agency has not yet received these orders.  [phone]    Appetite has been ok  -Eating soups and soft diet      Functional Status:  Palliative Performance Scale: 30%              Data / Chart Review:    Review of Systems:   ROS: 10 point ROS neg other than the symptoms noted above in the HPI and pertinents here.         Physical Exam:   Physical Exam:  Vital Signs not checked, virtual visit    CONSTIT: awake, lying in bed, appears very frail but in no apparent distress.   EENT:  EOMI, no icterus  RESP: reg, normal effort, No cough  MSK: moves x4, ROM  SKIN: no rash, no obvious lesions  NEURO: alert, oriented x3  PSYCH: Normal affect, memory and thought process intact    The rest of a comprehensive physical examination is deferred  due to public health emergency video visit restrictions.    Wt Readings from Last 3 Encounters:   08/29/24 74.4 kg (164 lb 0.4 oz)   06/21/24 73 kg (160 lb 15 oz)   03/01/24 75.8 kg (167 lb)           Current pertinent medications:  Oxycodone 10mg  BID  -Meropenum 1g IV daily  -dolcuolax  -Tyelnol 500mg daily  -Coumadin  -Lasix 20mg daily        No pertinent allergies      Lab and imaging data reviewed:      Impression & Recommendations & CounselinM with history of HFrEF, hyperlipidemia, hypertension, aortic stenosis status post TAVR, thrombophilia, type 2 diabetes, stage III sacral ulcer, CAD, prior CVA admitted on 2024 with altered mental status & hypotension secondary to sepsis possibly from urinary tract infection, aspiration pneumonia and suspected infected, unstageable sacral ulcer, who continues to have pain and discomfort at home and now desires comfort focused approach.     Goals of Care:   Pt reports that he no longer wants to go back to to the hospital.  Upon questioning pt is is clear that he wants his care to focus on comfort. Does not want additional treatments at hospital or ER for any reason, has completed DNR paperwork, even if it leads to an earlier death,  wants to be kept comfortable at home. Notably, he also wants to continue IV antibiotics.  -discuss hospice care philosophy at length, pt and family feel that they are moving towards hospice and that pt would benefit from coordinate end-of-life care from hospice team  -pt and family open to meeting with hospice at this time, however family would like pt to complete current course of IV antibiotics and wound vac treatment before considering enrollment in hospice if these are not compatible with hospice  -will place hospice referral to initiate conversations with family and [facility]  -recommend completion of POLST at next visit, did not have time to complete during visit today, pt is clear he does not desire resuccitation or intubation going forward    Pain:  Anxiety/Agitation  Pain appears to be inadequately controlled with schedule tylenol and BID oxycodone 10mg  -discussed strategy of starting long acting medication (?belbucca) vs increasing frequency of short acting  opioids  -will trial oxycodone 10mg q4prn  -will hold off on starting additional benzo at this time, to see if increased oxycodone frequency can improve agitation  -Follow-up in 2 weeks to reassess pain management strategy    Return to clinic in 2 weeks    Alton Santana MD  Staffed with Dr. English  Palliative Medicine    Attending Note:  Patient seen and evaluated with Dr Wong and I agree with/confirm their findings/recs in this note.  I personally spent over 60 minutes on the date of service in various clinical activities associated with this patient's care. The longitudinal plan of care for the diagnosis(es)/condition(s) as documented were addressed during this visit. Due to the added complexity in care, I will continue to support Franklyn in the subsequent management and with ongoing continuity of care.    Thank you for involving us in the patient's care.   Trey English MD / Palliative Medicine / Text me via [facility].    Video-Visit Details    Type of service:  Video Visit    Physician has received verbal consent for a Video Visit from the patient? Yes    Video Start Time: 3:10pm    Video End Time (time video stopped): 4:00pm    Originating Location (pt. Location): Home    Distant Location (provider location):  [facility] CANC[facility]   Distant Location (provider location):  On-site    Mode of Communication:  Video Conference via [employer]

## 2024-09-12 NOTE — LETTER
9/12/2024       RE: Franklyn Sorto  [address]  [facility] [address]     Dear Colleague,    Thank you for referring your patient, [facility]lyn Sorto[facility]. Please see a copy of my visit note below.    Palliative Care Outpatient Clinic Consultation Note    Patient Name: Franklyn Sorto is being evaluated via a billable video visit.    Primary Provider:  Redd Sommer  Reason for referral: Goals of Care, pain management      Chief Complaint: Pain    Background/Summary  Medical:  88M with history of HFrEF, hyperlipidemia, hypertension, aortic stenosis status post TAVR, thrombophilia, type 2 diabetes, stage III sacral ulcer, CAD, prior CVA admitted on 8/5/2024 with altered mental status & hypotension secondary to sepsis possibly from urinary tract infection, aspiration pneumonia and suspected infected, unstageable sacral ulcer.     He was managed briefly at the ICU by insensivist on admission but did not require vasopressor. Hypotension and ARIADNE improved with IV hydration and albumin infusion.  Patient transferred to medical floor on 8/6/2024.  However, patient transferred back to ICU for possible septic shock and management with pressors.  On 8/8/2024 patient transferred back to floor.  UC grew E. coli.  BC grew ESBL E Coli & Streptococcus salivarius. Polymicrobial bacteremia thought to be secondary to infected sacral decubitus ulcer.  Patient underwent multiple bedside debridement and eventually OR debridement with diverting colostomy on 8/13/2024.     On 8/24 CT imaging reported ischemia of colostomy site.  On 8/25, patient underwent colostomy revision.  Pt has been living back at home since discharge from hospital at beginning of September. Continues to have significant pain in abdomen, knee and sacral wound. Pt reports that he no longer wants to go back to to the hospital.  Pt is clear that he wants his  care to focus on comfort.     Social    Pt and family report good [place] of social support  Peter: Friends and DPOA  Son  Daughter Sudha      Psychospiritual  [employer], would appreciate support from Pastoral Care    Care Planning     Opioid Safety   Using oxycodone 10mg BID currently, per case management currently has 24 supervision from home health agency at present.     : reviewed.    History:  Patient is on the call   History gathered today from: patient, family/loved ones    Significant Ongoing Pain:  Pts POA and  significant pain in left knee, sacral wound and abdomen. Pt has been using tylenol 1000mg twice daily, and oxycodone 10mg BID,however the oxycodone is wearing off after a few hours and the pt becomes agitated and uncomfortable at this time.     Anxiety/Agitation  Pt has fear and anxiety around transfers per per family, does not want to use caroline lift. 911 had to be called for agitation night prior as home care staff was unable to calm pt down.Previously using xanax with some relief, family feels his agitation is primarily related to pain     Concern for UTI:  Family concerned about risk of UTI. Had virtual visit with ID who recommended catheter exchange and check UC/UA, however home care agency has not yet received these orders.  [phone]    Appetite has been ok  -Eating soups and soft diet      Functional Status:  Palliative Performance Scale: 30%              Data / Chart Review:    Review of Systems:   ROS: 10 point ROS neg other than the symptoms noted above in the HPI and pertinents here.         Physical Exam:   Physical Exam:  Vital Signs not checked, virtual visit    CONSTIT: awake, lying in bed, appears very frail but in no apparent distress.   EENT:  EOMI, no icterus  RESP: reg, normal effort, No cough  MSK: moves x4, ROM  SKIN: no rash, no obvious lesions  NEURO: alert, oriented x3  PSYCH: Normal affect, memory and thought process intact    The  rest of a comprehensive physical examination is deferred  due to [facility] emergency video visit restrictions.    Wt Readings from Last 3 Encounters:   24 74.4 kg (164 lb 0.4 oz)   24 73 kg (160 lb 15 oz)   24 75.8 kg (167 lb)           Current pertinent medications:  Oxycodone 10mg BID  -Meropenum 1g IV daily  -dolcuolax  -Tyelnol 500mg daily  -Coumadin  -Lasix 20mg daily        No pertinent allergies      Lab and imaging data reviewed:      Impression & Recommendations & CounselinM with history of HFrEF, hyperlipidemia, hypertension, aortic stenosis status post TAVR, thrombophilia, type 2 diabetes, stage III sacral ulcer, CAD, prior CVA admitted on 2024 with altered mental status & hypotension secondary to sepsis possibly from urinary tract infection, aspiration pneumonia and suspected infected, unstageable sacral ulcer, who continues to have pain and discomfort at home and now desires comfort focused approach.     Goals of Care:   Pt reports that he no longer wants to go back to to the hospital.  Upon questioning pt is is clear that he wants his care to focus on comfort. Does not want additional treatments at hospital or ER for any reason, has completed DNR paperwork, even if it leads to an earlier death,  wants to be kept comfortable at home. Notably, he also wants to continue IV antibiotics.  -discuss hospice care philosophy at length, pt and family feel that they are moving towards hospice and that pt would benefit from coordinate end-of-life care from hospice team  -pt and family open to meeting with hospice at this time, however family would like pt to complete current course of IV antibiotics and wound vac treatment before considering enrollment in hospice if these are not compatible with hospice  -will place hospice referral to initiate conversations with family and [facility]  -recommend completion of POLST at next visit, did not have time to complete during  visit today, pt is clear he does not desire resuccitation or intubation going forward    Pain:  Anxiety/Agitation  Pain appears to be inadequately controlled with schedule tylenol and BID oxycodone 10mg  -discussed strategy of starting long acting medication (?belbucca) vs increasing frequency of short acting opioids  -will trial oxycodone 10mg q4prn  -will hold off on starting additional benzo at this time, to see if increased oxycodone frequency can improve agitation  -Follow-up in 2 weeks to reassess pain management strategy    Return to clinic in 2 weeks    Alton Santana MD  Staffed with Dr. English  Palliative Medicine    Attending Note:  Patient seen and evaluated with Dr Wong and I agree with/confirm their findings/recs in this note.  I personally spent over 60 minutes on the date of service in various clinical activities associated with this patient's care. The longitudinal plan of care for the diagnosis(es)/condition(s) as documented were addressed during this visit. Due to the added complexity in care, I will continue to support Franklyn in the subsequent management and with ongoing continuity of care.    Thank you for involving us in the patient's care.   Trey English MD / Palliative Medicine / Text me via [facility].    Video-Visit Details    Type of service:  Video Visit    Physician has received verbal consent for a Video Visit from the patient? Yes    Video Start Time: 3:10pm    Video End Time (time video stopped): 4:00pm    Originating Location (pt. Location): Home    Distant Location (provider location):  [facility]   Distant Location (provider location):  On-site    Mode of Communication:  Video Conference via [employer]           Again, thank you for allowing me to participate in the care of your patient.      Sincerely,    Trey English MD

## 2024-09-12 NOTE — PROGRESS NOTES
"Virtual Visit Details    Type of service:  Video Visit   Video Start Time: {video visit start/end time for provider to select:257608}  Video End Time:{video visit start/end time for provider to select:699781}    Originating Location (pt. Location): {video visit patient location:559590::\"Home\"}  {PROVIDER LOCATION On-site should be selected for visits conducted from your clinic location or adjoining [facility] hospital, academic office, or other nearby [facility] building. Off-site should be selected for all other provider locations, including home:883397}  Distant Location (provider location):  {virtual location provider:931251}  Platform used for Video Visit: {Virtual Visit Platforms:794933::\"Tunes.[email or web address]\"}    "

## 2024-09-12 NOTE — PROGRESS NOTES
I called Lidia with [facility] at [phone]  and provided the below orders, she is to call with questions.

## 2024-09-14 NOTE — ED PROVIDER NOTES
"  Emergency Department Note      History of Present Illness     Chief Complaint   Catheter Problem    HPI   Franklyn Sorto is a 89 year old male, anticoagulated on Coumadin, with history of stroke, heart failure, hypertension, type 2 diabetes, hyperlipidemia, and stage 3 CKD who presents to the ED via EMS with group home staff and son for evaluation of a catheter problem. Group home staff reports that the patient had surgery in August and got a catheter placed, but no orders were placed to take care of the catheter. On September 2nd, she talked to a home care company who told her there were no orders for the catheter. She states that today the patient complained of dysuria and she saw \"white stuff\" on the patient and blood clots in his catheter this morning. She explains that the patient needs a catheter due to a stage 4 pressure wound on his sacral region. He has had his current catheter in for 30 days. Patient has a PICC line, colostomy, G-tube, and wound vac. She notes that the patient hasn't used his G-tube in months since he is able to eat. Patient is getting antibiotics through his PICC line. Franklyn has had follow-up with surgery, but he needs a referral for urology. Of note, patient is on Coumadin and takes one a day, except for Wednesday and Saturday which he takes 2 in the afternoon.    Independent Historian   [facility] [place] staff as detailed above.    Review of External Notes   I reviewed the infectious disease virtual visit from 9/11/2024.  I reviewed the palliative care virtual visit from 9/12/2024.    Past Medical History     Medical History and Problem List   Arteriosclerosis of coronary artery  Aspiration pneumonia  Hypertension  Benign prostatic hyperplasia  Cervicalgia  Type 2 diabetes  Frequent PVC's  GERD  Heart failure with reduced ejection fraction  CVA  Lumbar post-laminectomy syndrome  Osteoarthritis of knee  Peripheral vascular disease  Rheumatoid arthritis  Stage 3 " CKD  Thrombocytopenia  Thrombophilia  Chronic systolic CHF  Hyperlipidemia   Microalbuminuria  Mucopurulent chronic bronchitis   Chronic nonalcoholic liver disease   Spinal stenosis   Transient visual disturbance, bilateral   Acute cystitis   Hypoxia   Hypotension  Community acquired pneumonia of left lower lobe of lung  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 Coli sepsis   Left bundle branch block  First degree atrioventricular block  Arterial embolus and thrombosis of left upper extremity  Synovial cyst, L5-S1  Aortic stenosis  Herniated disc, L4-5  Anemia   Duodenal ulcer  Cervical disc displacement  Irregular heart beat  Hypertrophy of prostate   Obesity   Cataract, bilateral   Gout  Sleep apnea  Neuropathy     Medications   Zyloprim  Aspirin 81 mg  Colcrys  Daptomycin  Jardiance  Entresto    Lasix  Novolog Pen  Lantus Pen  Prevacid  Merrem  Toprol  Nitrostat  Roxicodone  Coumadin  Januvia   Actos   Arava   Norvasc   Ambien  Hytrin   Lipitor   Prilosec  Xanax  Accupril   Crestor   Coreg   Zofran   Senokot   Buspar  Atarax     Surgical History   Debridement of sacral decubitus ulcer  Creation end colostomy, laparoscopic  Revision colostomy  TAVR  Dual chamber permanent pacemaker implantation   Appendectomy  Tonsillectomy  Spinal fusion, neck  Right rotator cuff repair  Colonoscopy  TURP  Cervical decompression   Cyst removal, spine  Bilateral knee arthroscopy  Laminotomy, foraminotomy, lumbar, right L5/S1 with synovial cystectomy   Bilateral cataract extraction with IOL  PTA right popliteal artery  Gastrostomy jejunostomy tube placement     Physical Exam     Patient Vitals for the past 24 hrs:   BP Temp Temp src Pulse Resp SpO2 Height Weight   09/14/24 1430 -- -- -- -- -- 98 % -- --   09/14/24 1330 -- -- -- -- -- 99 %  "-- --   09/14/24 1300 -- -- -- -- -- 98 % -- --   09/14/24 1153 116/67 98  F (36.7  C) Oral 96 16 98 % 1.727 m (5' 8\") 72.6 kg (160 lb)     Physical Exam  Vitals and nursing note reviewed.   Constitutional:       General: He is not in acute distress.     Appearance: He is ill-appearing (Chronically ill-appearing).   HENT:      Head: Normocephalic and atraumatic.      Right Ear: External ear normal.      Left Ear: External ear normal.      Nose: Nose normal.   Eyes:      Conjunctiva/sclera: Conjunctivae normal.   Cardiovascular:      Rate and Rhythm: Normal rate and regular rhythm.      Heart sounds: No murmur heard.  Pulmonary:      Effort: Pulmonary effort is normal. No respiratory distress.      Breath sounds: No wheezing, rhonchi or rales.   Abdominal:      General: Abdomen is flat. There is no distension.      Palpations: Abdomen is soft.      Tenderness: There is no abdominal tenderness. There is no guarding or rebound.      Comments: +g tube; +colostomy; wound from old colostomy; healed midline surgical incision with a single staple in place   Genitourinary:     Comments: +cutler catheter in place with clear yellow urine  Musculoskeletal:         General: Swelling present. No deformity. Injury: LUE.     Cervical back: Normal range of motion and neck supple.   Skin:     General: Skin is warm and dry.      Findings: No rash.   Neurological:      Mental Status: He is alert.      Comments: + Left-sided hemiparesis   Psychiatric:         Behavior: Behavior normal.           Diagnostics     Lab Results   Labs Ordered and Resulted from Time of ED Arrival to Time of ED Departure   ROUTINE UA WITH MICROSCOPIC REFLEX TO CULTURE - Abnormal       Result Value    Color Urine Yellow      Appearance Urine Slightly Cloudy (*)     Glucose Urine >=1000 (*)     Bilirubin Urine Negative      Ketones Urine Negative      Specific Gravity Urine 1.010      Blood Urine Large (*)     pH Urine 8.0 (*)     Protein Albumin Urine 20 (*)     " Urobilinogen Urine Normal      Nitrite Urine Negative      Leukocyte Esterase Urine Large (*)     WBC Clumps Urine Present (*)     RBC Urine >182 (*)     WBC Urine >182 (*)    UA MACROSCOPIC WITH REFLEX TO MICRO AND CULTURE - Abnormal    Color Urine Yellow      Appearance Urine Slightly Cloudy (*)     Glucose Urine >=1000 (*)     Bilirubin Urine Negative      Ketones Urine Negative      Specific Gravity Urine 1.010      Blood Urine Large (*)     pH Urine 8.0 (*)     Protein Albumin Urine 20 (*)     Urobilinogen Urine Normal      Nitrite Urine Negative      Leukocyte Esterase Urine Large (*)     WBC Clumps Urine Present (*)     RBC Urine >182 (*)     WBC Urine >182 (*)    URINE CULTURE     Imaging   None     EKG   None     Independent Interpretation   None    ED Course      Medications Administered   Medications - No data to display    Procedures   None      Discussion of Management   Pharmacist, Lea  Infection and Disease, Dr. Arias    ED Course   ED Course as of 09/14/24 1649   Sat Sep 14, 2024   1250 I obtained history and examined the patient as noted above.    1416 I rechecked and updated the patient.    1454 I consulted with Lea from pharmacy regarding antibiotics for the patient. Recommended consulting infection and disease.   1530 I discussed with Dr. Arias from infectious diseases     Additional Documentation  None    Medical Decision Making / Diagnosis     CMS Diagnoses: None    MIPS       Javier catheter was inserted because patient had an existing catheter.    TOMASZ Sorto is a 89 year old male who presents with concerns of needing a Javier catheter change.  His current Javier catheter has been placed for at least a month and home care provider noticed that there was some whitish discoloration and clot noted in the catheter.  Patient otherwise is at baseline without any acute complaints.  The catheter was removed and replaced without difficulty.  The urinalysis does show signs of possible  UTI but it is possible this could just be colonization.  He is not febrile and there are no signs of acute ongoing infection.  The patient also is receiving meropenem and daptomycin through the PICC line already.  I discussed with the pharmacist and with infectious diseases and they both advised that the best course was to we will hold off on adding additional antibiosis and wait for urine culture results.  Patient was also incidentally noted to have a residual staple in his midline surgical incision which is fully healed.  This was removed at the bedside without difficulty.  I gave the patient a prescription for Javier catheter care and replacement each month.  The home care provider notes that she is arranging for follow-up with all of the patient's providers for further evaluation of his ongoing issues.  We discussed return precautions.    Disposition   The patient was discharged.     Diagnosis     ICD-10-CM    1. Leukocytosis, unspecified type  D72.829 UA Macroscopic with reflex to Microscopic and Culture - Lab Collect     UA Macroscopic with reflex to Microscopic and Culture - Lab Collect     CANCELED: Urine Culture     CANCELED: Urine Culture      2. Urinary catheter (Javier) change required  Z46.6       3. Encounter for staple removal  Z48.02          Discharge Medications   New Prescriptions    No medications on file     Scribe Disclosure:  LISA MEADE, am serving as a scribe at 12:30 PM on 9/14/2024 to document services personally performed by Ricardo Quevedo MD based on my observations and the provider's statements to me.      Ricardo Quevedo MD  09/14/24 [id number]

## 2024-09-14 NOTE — ED NOTES
Javier replaced and gtube care complete.    Demi care provided pt had a significant amount of white buildup under forskin and around demi area.    G tube site had significant drainage built up on drain sponge and around tube site.  After cleaning site with soap and water small amount of blood noticed   Also assessed one single staple noticed in incision

## 2024-09-14 NOTE — ED TRIAGE NOTES
[facility]IB[facility] from [facility]. Staff state pt has blood coming from his cutler cath. Cath changed on Wednesdays. Pt currently being treated for buttock wound.  HX CVA- left side weakness   PICC like to R bicep.      Triage Assessment (Adult)       Row Name 09/14/24 1151          Triage Assessment    Airway WDL WDL        Respiratory WDL    Respiratory WDL WDL        Skin Circulation/Temperature WDL    Skin Circulation/Temperature WDL all     Skin Circulation cyanosis  Left arm post stroke, swelling and pale to color.     Skin Temperature cool        Cardiac WDL    Cardiac WDL WDL        Peripheral/Neurovascular WDL    Peripheral Neurovascular WDL all  Hx left sided CVA, Left side weakness, minimal use of left arm.        Cognitive/Neuro/Behavioral WDL    Cognitive/Neuro/Behavioral WDL WDL

## 2024-09-16 NOTE — TELEPHONE ENCOUNTER
Spoke to Lidia states pt was dehydrated they were unable to get anything out of pt and unable to do a butterfly either. They will get the labs done tomorrow a nurse will be sent out.

## 2024-09-16 NOTE — TELEPHONE ENCOUNTER
LOV 9/11/24  PLAN:  - continue meropenem IV, daptomycin end date 9/24, then remove PICC     Change cutler -- [facility] -- check UA/UC     Weekly labs while on IV      Once antibiotics complete, monitor for fever, monitor wound     Return as needed.

## 2024-09-16 NOTE — TELEPHONE ENCOUNTER
[facility] Call Center    Phone Message    May a detailed message be left on voicemail: yes     Reason for Call: Other: Caller called and ask if possible to delay the order for lab today to tomorrow since they were unable to drawn the blood work today. Per caller she stated they will have someone coming in tomorrow who will be able to. Please follow-up regarding delaying this lab order. Per caller she stated it is okay to leave a verbal order, if she is unreachable. Thank you      Action Taken: Other: ID    Travel Screening: Not Applicable     Date of Service:

## 2024-09-17 NOTE — TELEPHONE ENCOUNTER
"I called the home care nurse, pt's PICC is not drawing blood and [facility] \"wont prescribe TPA\". I informed I will discuss with [facility].  Message sent to [facility] about TPA.  "

## 2024-09-17 NOTE — TELEPHONE ENCOUNTER
Requesting a call back for verbal orders to delay labs for pt. Patient still too dehydrated. They have directed pt to drink more water before their next visit. They can try again Thursday 9/19/24. Otherwise they may need to bring pt into the clinic.

## 2024-09-18 NOTE — TELEPHONE ENCOUNTER
[facility] Call Center    Phone Message    May a detailed message be left on voicemail: yes     Reason for Call: Other: Please contact Audrey with [facility] home care- [phone] she need verbal orders places kassandra Estes has an appointment at 730 am tomorrow     Action Taken: Other: ID    Travel Screening: Not Applicable     Date of Service:

## 2024-09-18 NOTE — TELEPHONE ENCOUNTER
Pt does not have coverage for TPA in the home. Please place a therapy plan for TPA, if appropriate.

## 2024-09-18 NOTE — TELEPHONE ENCOUNTER
TPA order placed. I called the number on file, which is the pt's HC nurse. She states that the pt is bedridden and would not be able to go in for this, however she will speak to the family and call us back.

## 2024-09-18 NOTE — TELEPHONE ENCOUNTER
Lidia calling- they have instructed patient to keep well hydrated all day today again. Stated taht the pt's family has been paying out of pocket for everything. They are waiting to see if they will be paying for the TPA. If they do they will give to pt if available tomorrow. They would like verbal orders to draw blood tomorrow if pt is able. If they are not able they will be sending pt in to clinic to have done.

## 2024-09-20 NOTE — PLAN OF CARE
Goal Outcome Evaluation:      Plan of Care Reviewed With: patient    Overall Patient Progress: improvingOverall Patient Progress: improving     Patient arrived to unit at 1330. Tranferred to bed via hover mat. Rated pain about a 4- tolerable to patient and declined anything for pain. Denies N/V. WOC at bedside for sacrum/coccyx wound. L extremity deficits due to past CVA. No straws (per pt)    No blood draw from PICC, Alteplase administered in ED, Attempted to draw back after 30 min- no return. Waiting an additional 90 minutes per Mar instructions.     Speech saw patient today. Pureed diet    Pending discharge plans     Problem: Adult Inpatient Plan of Care  Goal: Plan of Care Review  Outcome: Progressing  Flowsheets (Taken 9/20/2024 1436)  Plan of Care Reviewed With: patient  Overall Patient Progress: improving  Goal: Patient-Specific Goal (Individualized)  Outcome: Progressing  Goal: Absence of Hospital-Acquired Illness or Injury  Outcome: Progressing  Intervention: Identify and Manage Fall Risk  Recent Flowsheet Documentation  Taken 9/20/2024 1419 by Shirlene Maynard RN  Safety Promotion/Fall Prevention:   activity supervised   assistive device/personal items within reach   safety round/check completed   room organization consistent   room near nurse's station  Intervention: Prevent Skin Injury  Recent Flowsheet Documentation  Taken 9/20/2024 1419 by Shirlene Maynard RN  Body Position: turned  Intervention: Prevent Infection  Recent Flowsheet Documentation  Taken 9/20/2024 1419 by Shirlene Maynard, RN  Infection Prevention:   hand hygiene promoted   rest/sleep promoted  Goal: Optimal Comfort and Wellbeing  Outcome: Progressing  Intervention: Monitor Pain and Promote Comfort  Recent Flowsheet Documentation  Taken 9/20/2024 1333 by Shirlene Maynard, RN  Pain Management Interventions:   breathing exercises   care clustered   distraction   emotional support   pillow support provided   quiet environment facilitated    relaxation techniques promoted   repositioned   rest  Goal: Readiness for Transition of Care  Outcome: Progressing     Problem: Skin Injury Risk Increased  Goal: Skin Health and Integrity  Outcome: Progressing  Intervention: Plan: Nurse Driven Intervention: Moisture Management  Recent Flowsheet Documentation  Taken 9/20/2024 1419 by Shirlene Maynard, RN  Moisture Interventions:   No brief in bed   Incontinence pad   Urinary collection device   Fecal collection device  Intervention: Plan: Nurse Driven Intervention: Friction and Shear  Recent Flowsheet Documentation  Taken 9/20/2024 1419 by Shirlene Maynard, RN  Friction/Shear Interventions: HOB 30 degrees or less  Intervention: Optimize Skin Protection  Recent Flowsheet Documentation  Taken 9/20/2024 1419 by Shirlene Maynard, RN  Activity Management: activity adjusted per tolerance

## 2024-09-20 NOTE — ED TRIAGE NOTES
Pt presents via EMS for evaluation of progressing wound concerns and required care. Pt currently resides at a  facility with home care. Has a wound vac on his sacrum. Now has a second wound appearing close to sacral wound. Pain on sacrum. PICC in RUE for abx. G-tube, colostomy and indwelling cutler in place. Hx of CVA with left sided deficits. .

## 2024-09-20 NOTE — H&P
[facility]  Hospitalist Admission Note  Name: Franklyn Sorto    MRN: [id number]  YOB: 1935    Age: 89 year old  Date of admission: 9/19/2024  Primary care provider: Redd Sommer    Chief Complaint:  wound care concerns    Assessment and Plan:   Unstageable sacral pressure ulcer, present on admission  VRE and ESBL wound infections  S/p diverting colostomy: Hospitalized at [facility] 8/5 through 8/30 for unstageable sacral pressure ulcer and wounds requiring wound debridement and diverting colostomy on 8/13.  8/5 blood culture grew ESBL E. coli and Streptococcus salivarius and 8/6 wound cultures grew MSSA, ESBL E. coli, VRE Enterococcus VCM susceptible to daptomycin and linezolid, and Bacteroides fragilis.  Discharged on IV daptomycin 400 mg daily and IV meropenem 1 g every 8 hours at home via right arm PICC through 9/24.  Also discharged with wound VAC and twice weekly wound care RN.  Per his  the wound VAC has not been forming a seal and the wounds are getting worse despite this and IV antibiotics.  There are outpatient ID provider Dr. Verdugo's clinic recommended bring him to the ER.  In the ER he does have a leukocytosis of 16.8, but no other signs of sepsis with negative lactic acid and procalcitonin 0.11.  His CRP is elevated 44 down from last checked 68.  -Continue IV daptomycin 400 mg daily  -Continue IV meropenem 1 g every 8 hours  -Consult general surgery to evaluate if any debridement is necessary, he is on warfarin but I am reluctant to hold given history of DVT and heterozygous FVL unless there is a surgical plan  -Consult infectious disease to determine if antibiotic should be continued past 9/24  -Blood culture obtained  -Consult WOC RN for wound VAC, ostomy management, and left arm wounds present on admission  -Javier catheter present, not sure if this is for wound healing or not.  Continue catheter.  -Consult vascular access team to evaluate his right arm  PICC  -Acetaminophen for mild pain.  He is on some oxycodone as needed at home.  Will use oxycodone 2.5 mg for moderate and 5 mg severe pain every 4 hours as needed.    History CVA with left hemiparesis  Oropharyngeal dysphagia: History CVA resulting in left-sided hemiparesis with minimal movement on the left, persistent double vision, sacral wounds, and oropharyngeal dysphagia on a dysphagia diet.  Is s/p GJ placement, but has never used it for tube feeds and he asks if it can be removed.  He is on 81 mg aspirin and warfarin.  He was told to hold his rosuvastatin until after he is off the IV antibiotics.  -Resume 81 mg aspirin  -Consult SLP, for now ordered his easy to chew level 7 and mildly thickened level 2 diet that he discharged from 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  -Check INR now and daily  -Continue warfarin, pharmacy to dose    BPH: He has a chronic Javier catheter in place on admission.  I am unsure if this is secondary to urinary retention or for wound care purposes.  -Continue Javier catheter cares    CAD  Chronic systolic CHF  HTN  Aortic stenosis: History of distal RCA stent 2019.  S/p TAVR for aortic stenosis, dual-chamber PPM, and ICD 2014.  Previous systolic heart failure, now with EF of 50-55%.  Carvedilol stopped last admission in favor of metoprolol succinate 25 mg daily.  He is also on Entresto  mg twice daily, furosemide 20 mg daily, 81 mg aspirin, and Jardiance 25 mg daily.  He does have some diffuse mild anasarca.  -Resume aspirin, Entresto, metoprolol, Jardiance, furosemide  -Strict intake and output and daily weights  -PTA Crestor held while on daptomycin    IDDM type II: PTA on glargine 5 units at bedtime, low-dose sliding scale, and Jardiance 25 mg daily.  -Resume Jardiance (also has CHF)  -Resume glargine 5 units at bedtime and low-dose sliding scale insulin  -Hypoglycemia protocol    GERD  History of PUD: PTA on Prevacid daily.  Use  pantoprazole while here.    Chronic anemia: Hemoglobin 11.8 up from 8 range during his hospitalization last month where he did have some bleeding.    Moderate malnutrition: Has not used his GJ tube for feeds.  Has had hypophosphatemia previously.  -Consult dietitian  -Potassium, magnesium, phosphorus replacement protocols    Gout: Resume allopurinol 300 mg daily.    DVT Prophylaxis: Warfarin  Code Status: No CPR in event of cardiac arrest, prearrest intubation okay for now.  He does have a POLST that says DNR/DNI from 6/21/2024, but he discharged with prearrest intubation okay.  He does not quite seem to understand what I am asking him on admission.  Discuss further with son tomorrow.  FEN: Easy to chew level 7, mildly thickened liquids level 2  Lines: Right arm PICC, GJ tube, cutler catheter  Discharge Dispo: When hospitalized last month long-term care was recommended, but he chose to return to [facility] with wound care.  Possibly back to [facility] with ongoing wound care management although  has concerns.  Consult PT/OT/SW.  Estimated Disch Date / # of Days until Disch: Admit patient for potentially worsening infected sacral ulcer/wounds.  Anticipate least 2 or 3 night hospitalization will be required and may need alternate place of discharge depending on level of wound care.      History of Present Illness:  Franklyn Sorto is a 89 year old male with PMH including CVA 5/2024 resulting in left-sided hemiparesis, unstageable sacral pressure ulcers, s/p diverting colostomy for wound care, Cutler catheter, GJ tube placement, oropharyngeal dysphagia, malnutrition, DVT with FVL on warfarin, CAD s/p stent on aspirin, aortic stenosis s/p TAVR, s/p PPM/ICD, systolic CHF with EF 50-55%, anemia, PUD, IDDM type II, and gout who presents with from his assisted living facility due to concern regarding worsening sacral wounds.  He was hospitalized from 8/5 through 8/30 due to sepsis secondary to infected unstageable sacral  pressure ulcer/wounds.  He did have ESBL in the wound and bacteremia.  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  He underwent debridement and diverting colostomy on 8/13.  He did have ARIADNE/ATN and was treated for possible aspiration pneumonia during that admission.  Long-term care was recommended, but he preferred to discharge home to his [facility] with ongoing wound care twice daily for wound VAC management.  He was discharged with a right arm PICC with IV daptomycin and IV meropenem through 9/24.  He has a  that has been concerned regarding his wounds getting worse despite antibiotics and wound VAC therapy.  The wound VAC has not been forming a seal per their report.  They sent some pictures to his outpatient ID doctor [facility] who recommended he come in for evaluation.  The patient says he feels okay in the ER.  He is hungry.  Not reporting any significant pain at this moment.  He has not been having any fevers or shaking chills.    History obtained from patient, medical record, and from Dr. Sr in the emergency department.  Blood pressure 151/95, heart rate 91, temperature 98.8  F, oxygen 90% on room air.  Initial labs show leukocytosis 16.8, hemoglobin 11.8, platelet count 326.  Lactic acid normal at 1.2.  BMP unremarkable.  Glucose 194.  Albumin low at 2.6.  Procalcitonin normal at 0.11.  CRP is elevated 44.  Chest x-ray shows a persistent left basilar opacity, PICC line, PPM/ICD, and TAVR.  IV daptomycin and IV meropenem have been ordered.  Admit inpatient with general surgery and wound care consultation.        Clinically Significant Risk Factors Present on Admission              # Hypoalbuminemia: Lowest albumin = 2.4 g/dL at 9/19/2024  8:03 AM, will monitor as appropriate    # Drug Induced Coagulation Defect: home medication list includes an anticoagulant medication  # Drug Induced Platelet Defect: home medication list includes an antiplatelet medication   # Hypertension: Noted on problem list              # Financial/Environmental Concerns:                     Past Medical History reviewed:  Past Medical History:   Diagnosis Date    Abnormal lateral conjugate gaze 07/11/2023    Anticoagulated on Coumadin 06/21/2024    Arteriosclerosis of coronary artery 09/19/2014    Formatting of this note might be different from the original.  Angiogram/PCI 9/19/14:  Distal RCA 90%, treated with MARÍA.  Mild LAD and LCx disease.      Aspiration pneumonia, unspecified aspiration pneumonia type, unspecified laterality, unspecified part of lung (H) 06/20/2024    Benign essential HTN 07/11/2023    Benign prostatic hyperplasia 10/20/2011    Formatting of this note might be different from the original.  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  Ulcers in lat 90's. Reflux symptoms if he misses proton pump inhibitor.      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) (H) 09/02/2015    History of CVA (cerebrovascular accident) 06/20/2024    Hypertension 07/11/2023    Formatting of this note might be different from the original.  Created by Conversion     Replacement Utility updated for latest IMO load  Formatting of this note might be different from the original.  ECG 8/18/06 sinus bradycardia otherwise normal  Stress Echo 8/24/06 Normal, sub-max HR      Long term current use of anticoagulant therapy 01/11/2013    Lumbar post-laminectomy syndrome 11/29/2012    Neck pain 11/11/2013    OA (osteoarthritis) of knee 07/11/2023    Peripheral vascular disease (H24) 01/14/2013    Formatting of this note might be different from the original.  Created by Conversion      Pressure injury of sacral region, stage 3 (H) 08/05/2024    Rheumatoid arthritis (H) 07/11/2023    Formatting of this note might be different from the original.  Created  by Conversion  Formatting of this note might be different from the original.  Dr. Gaxiola. [facility] rheum      S/P cardiac pacemaker procedure 2014    S/P TAVR (transcatheter aortic valve replacement) 2014    Stage 3 chronic kidney disease, unspecified whether stage 3a or 3b CKD (H) 2023    Thrombocytopenia (H24) 10/20/2011    Thrombophilia (H24) 2014    Formatting of this note might be different from the original.  Antiphospholipid antibodies- positiive beta 2 glycoprotein and DRVVT confirmation x's 2 12 weeks apart .  Heterozygous Leiden Factor V  Follow by Dr Oly Hunter      Transient vision disturbance, bilateral 10/19/2011    Formatting of this note might be different from the original.  Episode of decreased vision in both eyes on 10/15/2011, lasting 10-15 minutes.       Past Surgical History reviewed:  Past Surgical History:   Procedure Laterality Date    IRRIGATION AND DEBRIDEMENT DECUBITUS WOUND, COMBINED N/A 2024    Procedure: DEBRIDEMENT OF SACRAL DECUBITUS ULCER;  Surgeon: Demetri Garcia DO;  Location: [facility] [place], [place] OR    LAPAROSCOPIC COLOSTOMY N/A 2024    Procedure: CREATION, END COLOSTOMY, LAPAROSCOPIC AND;  Surgeon: Demetri Garcia DO;  Location: [facility] [place], [place] OR    REVISION COLOSTOMY N/A 2024    Procedure: REVISION, COLOSTOMY;  Surgeon: Al Lamar MD;  Location: [facility] [place], [place] OR     Social History reviewed:  Social History     Tobacco Use    Smoking status: Former     Current packs/day: 0.00     Types: Cigarettes     Quit date: 1975     Years since quittin.7    Smokeless tobacco: Never   Substance Use Topics    Alcohol use: Yes     Social History     Social History Narrative    Not on file     Family History reviewed:  No family history on file.  Allergies:  Allergies   Allergen Reactions    Clopidogrel Hives    Hydrocodone      Other reaction(s): sick to his stomach    Hydrocodone-Acetaminophen Nausea and Vomiting    Levofloxacin Other (See  Comments)     Other reaction(s): tendonitis  Tendonitis right calf      Spironolactone      Other reaction(s): Hyperkalemia     Medications:  Prior to Admission medications    Medication Sig Last Dose Taking? Auth Provider Long Term End Date   acetaminophen (TYLENOL) 500 MG tablet Take 1 tablet (500 mg) by mouth every 8 hours as needed for mild pain. Unknown Yes Ina Meade MD     allopurinol (ZYLOPRIM) 300 MG tablet Take 300 mg by mouth daily 9/19/2024 at am Yes Reported, Patient     artificial saliva (BIOTENE MT) AERS spray Take 2 sprays by mouth 4 times daily 9/19/2024 Yes Unknown, Entered By History     aspirin 81 MG EC tablet Take 81 mg by mouth daily 9/18/2024 Yes Reported, Patient     bisacodyl (DULCOLAX) 10 MG suppository Place 10 mg rectally daily as needed for constipation Unknown Yes Reported, Patient     COLCRYS 0.6 MG tablet Take 0.6 mg by mouth daily as needed Unknown Yes Reported, Patient     DAPTOmycin 400 mg Inject 400 mg over 30 minutes into the vein every 24 hours. Last day 9/24/24 9/18/2024 at 1900 Yes Eriberto Verdugo MD  9/24/24   diclofenac (VOLTAREN) 1 % topical gel Apply 2 g topically 4 times daily 9/19/2024 at am Yes Reported, Patient     empagliflozin (JARDIANCE) 25 MG TABS tablet Take 1 tablet (25 mg) by mouth daily 9/19/2024 at am Yes Merly Gutiérrez PA-C     furosemide (LASIX) 20 MG tablet Take 1 tablet (20 mg) by mouth daily. 9/19/2024 at am Yes Ina Meade MD Yes 9/28/24   insulin aspart (NOVOLOG PEN) 100 UNIT/ML pen Inject 1-3 Units Subcutaneous 3 times daily (before meals) Correction Scale - LOW INSULIN RESISTANCE DOSING   Do Not give Correction Insulin if Pre-Meal BG less than 140. For Pre-Meal  - 239 give 1 unit. For Pre-Meal  - 339 give 2 units. For Pre-Meal BG greater than or equal to 340 give 3 units. To be given with prandial insulin, and based on pre-meal blood glucose. Administering insulin within 5 minutes of the start of the meal is ideal.  Administer insulin no more than 30 minutes after the start of the meal, unless directed otherwise by provider. Notify provider if glucose greater than or equal to 350 mg/dL after administration of correction dose. Unknown Yes Chalino Manzanares MD Yes    insulin glargine (LANTUS PEN) 100 UNIT/ML pen Inject 5 Units subcutaneously at bedtime. 9/18/2024 Yes Ina Meade MD Yes 9/28/24   lipase-protease-amylase (CREON 12) 49838-81851-45821 units CPEP Take 1 capsule by mouth as needed Unknown Yes Reported, Patient     melatonin 3 MG tablet Take 3 mg by mouth at bedtime 9/18/2024 Yes Reported, Patient     menthol-zinc oxide (CALMOSEPTINE) 0.44-20.6 % OINT ointment Apply topically 4 times daily as needed for skin protection Apply to sacrum/groin Unknown Yes Chalino Manzanares MD     meropenem (MERREM) 1 g vial Inject 1,000 mg (1 g) over 30 minutes into the vein every 8 hours. Last day 9/24/24. Weekly labs while on IV antibiotics: CBC with differential, CMP, CK, C-reactive protein. Fax lab results to Dr. Verdugo at [phone] 9/19/2024 at 1500 Yes Eriberto Verdugo MD  9/24/24   metoprolol succinate ER (TOPROL XL) 25 MG 24 hr tablet Take 1 tablet (25 mg) by mouth daily. 9/19/2024 at am Yes Ina Meade MD Yes 9/28/24   multivitamin w/minerals (THERA-VIT-M) tablet Take 1 tablet by mouth daily. 9/19/2024 at am Yes Ina Meade MD  9/28/24   nitroGLYcerin (NITROSTAT) 0.4 MG sublingual tablet Place 0.4 mg under the tongue every 5 minutes as needed for chest pain For chest pain place 1 tablet under the tongue every 5 minutes for 3 doses. If symptoms persist 5 minutes after 1st dose call 911. Unknown Yes Reported, Patient Yes    nystatin (MYCOSTATIN) 111421 UNIT/GM external powder Apply topically 2 times daily as needed for other Unknown Yes Reported, Patient     oxyCODONE (ROXICODONE) 5 MG tablet Take 2 tablets (10 mg) by mouth every 4 hours as needed for pain. 9/19/2024 at 1200 Yes Trey English MD    "  polyethylene glycol-propylene glycol (SYSTANE) 0.4-0.3 % SOLN ophthalmic solution Apply 2 drops to eye 2 times daily 9/19/2024 at x2 Yes Reported, Patient     potassium & sodium phosphates (NEUTRA-PHOS) 280-160-250 MG Packet Take 2 packets by mouth daily. 9/19/2024 at am Yes Ina Meade MD  9/29/24   rosuvastatin (CRESTOR) 20 MG tablet Take 1 tablet (20 mg) by mouth at bedtime. Do not start before September 25, 2024. 9/18/2024 at hs Yes Ina Meade MD Yes    sacubitril-valsartan (ENTRESTO)  MG per tablet Take 1 tablet by mouth 2 times daily.  Yes Unknown, Entered By History     vitamin D3 (CHOLECALCIFEROL) 50 mcg (2000 units) tablet Take 2 tablets by mouth daily 9/19/2024 at am Yes Reported, Patient     warfarin ANTICOAGULANT (COUMADIN) 2 MG tablet Take by mouth daily. 4mg=Wed and Sat, 2mg=ROW 9/18/2024 at 2mg Yes Unknown, Entered By History No      Review of Systems:  A Comprehensive greater than 10 system review of systems was carried out.  Pertinent positives and negatives are noted above.  Otherwise negative.     Physical Exam:  Blood pressure (!) 155/94, pulse 97, temperature 98.8  F (37.1  C), temperature source Oral, resp. rate 20, height 1.74 m (5' 8.5\"), weight 66.6 kg (146 lb 12.8 oz), SpO2 94%.  Wt Readings from Last 1 Encounters:   09/19/24 66.6 kg (146 lb 12.8 oz)     Exam:  Constitutional: Awake, NAD  Eyes: sclera white   HEENT: Dry mucous membranes.  Respiratory: no respiratory distress, anterior lungs cta bilaterally, no crackles or wheeze  Cardiovascular: RRR.  No murmur appreciated  GI: GJ tube present.  Soft and not distended.  Bowel sounds present.  Nontender.  Genitourinary: Javier catheter present with yellow urine output  Skin: Did not evaluate was not able to evaluate his sacral wounds  Musculoskeletal/extremities: Diffuse mild anasarca  Neurologic: Alert, mildly dysarthric and soft-spoken, oriented to place and the situation  Psychiatric: calm, cooperative, normal " affect    Lab and imaging data personally reviewed:  Labs:  Recent Labs   Lab 09/19/24 2309 09/19/24  0803   WBC 16.8* 17.3*   HGB 11.8* 11.4*   HCT 37.0* 36.2*   MCV 91 94    295     Recent Labs   Lab 09/19/24 2309 09/19/24  0803    139   POTASSIUM 4.2 4.0   CHLORIDE 102 103   CO2 24 25   ANIONGAP 11 11   * 121*   BUN 16.7 17.3   CR 0.60* 0.57*  0.57*   GFRESTIMATED >90 >90  >90   JÚNIOR 8.5* 8.2*   PROTTOTAL 6.9 6.5   ALBUMIN 2.6* 2.4*   BILITOTAL 0.4 0.3   ALKPHOS 99 96   AST 19 25   ALT 11 10     Recent Labs   Lab 09/19/24 2309   LACT 1.2     CRP 44    Imaging:  No imaging obtained    Stepan Mckeon MD  Hospitalist  [facility]

## 2024-09-20 NOTE — PROGRESS NOTES
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  He was hospitalized from 8/5 through 8/30 due to sepsis secondary to infected unstageable sacral pressure ulcer/wounds.  He did have ESBL in the wound and bacteremia.  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  He underwent debridement and diverting colostomy on 8/13.  He did have ARIADNE/ATN and was treated for possible aspiration pneumonia during that admission.  Long-term care was recommended, but he preferred to discharge home to his [facility] with ongoing wound care twice daily for wound VAC management.  He was discharged with a right arm PICC with IV daptomycin and IV meropenem through 9/24.  He has a  that has been concerned regarding his wounds getting worse despite antibiotics and wound VAC therapy.  The wound VAC has not been forming a seal per their report.  They sent some pictures to his outpatient ID doctor [facility] who recommended he come in for evaluation.  The patient says he feels okay in the ER.  He is hungry.  Not reporting any significant pain at this moment.  He has not been having any fevers or shaking chills.     Unstageable sacral pressure ulcer present on admission.  Recent diverting colostomy.  VRE and ESBL wound infections.  -Appreciate general surgery input.  -No further acute surgical intervention at this time plan.  -Appreciate infectious  disease input.  -Continue IV daptomycin and meropenem.  -Wound nurse consult.  -Continue wound VAC.    Dysphagia.  -Speech pathology consult.  -Dysphagia diet.    Malnutrition.  -Registered dietitian consult.  -Diet supplements.    Drug-induced coagulation defect.  Previous DVT.  -INR mildly supratherapeutic.  -Continue monitor daily INR.  -Pharmacy to dose warfarin.    Diabetes mellitus type 2.  -Continue Jardiance 25 mg a day.  -Continue glargine insulin 5 units a day.  -Aspart insulin sliding scale as needed.    Previous stroke.  Hypertension.  Hyperlipidemia.  Chronic heart failure with preserved ejection fraction.  -Rosuvastatin currently on hold.  -Continue warfarin.  -Continue aspirin 81 mg a day.  -Continue metoprolol succinate 25 mg a day.  -Continue Entresto 97/103 mg twice a day.    GERD.  -Continue pantoprazole 40 mg a day.              Diet: Combination Diet Easy to Chew (level 7); Mildly Thick (level 2)  Snacks/Supplements Adult: Ensure Enlive; With Meals    DVT Prophylaxis: Warfarin  Javier Catheter: PRESENT, indication: Wound deterioration and failed external collection device  Lines: PRESENT      PICC 08/15/24 Single Lumen Right Brachial vein medial IV Antibiotics-Site Assessment: WDL except;Other (Comment) (dressing peeling, wings out)      Cardiac Monitoring: None  Code Status: No CPR- Pre-arrest intubation OK      Clinically Significant Risk Factors Present on Admission              # Hypoalbuminemia: Lowest albumin = 2.4 g/dL at 9/19/2024  8:03 AM, will monitor as appropriate  # Drug Induced Coagulation Defect: home medication list includes an anticoagulant medication  # Drug Induced Platelet Defect: home medication list includes an antiplatelet medication   # Hypertension: Noted on problem list             # Financial/Environmental Concerns:           Disposition Plan     Medically Ready for Discharge: Anticipated in 2-4 Days             Jasiel El DO  Hospitalist [facility]  Securely message with [employer] (more info)  Text page via [employer] Paging/Directory   ______________________________________________________________________    Interval History   Having some buttocks pain.  Denies chest pain, shortness of breath, fevers, chills, nausea, vomiting.    Physical Exam   Vital Signs: Temp: 97.8  F (36.6  C) Temp src: Temporal BP: 127/72 Pulse: 84   Resp: 16 SpO2: 99 % O2 Device: None (Room air)    Weight: 146 lbs 12.8 oz    Gen:  NAD, A&Ox3.  Eyes:  PERRL, sclera anicteric.  OP:  MMM, no lesions.  Neck:  Supple.  CV:  Regular, no loud murmurs.  Lung:  CTA b/l, normal effort.  Ab: Ostomy present, +BS, soft.  Skin:  Warm, dry to touch.  No rash.  Ext:  No pitting edema LE b/l.      Medical Decision Making       40 MINUTES SPENT BY ME on the date of service doing chart review, history, exam, documentation & further activities per the note.      Data     I have personally reviewed the following data over the past 24 hrs:    14.3 (H)  \   11.7 (L)   / 307     140 103 16.1 /  152 (H)   4.1 28 0.62 (L) \     ALT: 11 AST: 19 AP: 99 TBILI: 0.4   ALB: 2.6 (L) TOT PROTEIN: 6.9 LIPASE: N/A     Procal: 0.11 CRP: N/A Lactic Acid: 1.2       INR:  4.14 (H) PTT:  N/A   D-dimer:  N/A Fibrinogen:  N/A       Imaging results reviewed over the past 24 hrs:   Recent Results (from the past 24 hour(s))   Chest XR,  PA & LAT    Narrative    EXAM: XR CHEST 2 VIEWS  LOCATION: [facility]  DATE: 9/19/2024    INDICATION: PICC placement  COMPARISON: 8/20/2024.      Impression    IMPRESSION: The distal portion of the right PICC is partially obscured by multiple pacemaker wires. The PICC terminates near the superior cavoatrial junction in satisfactory position. Persistent left basilar opacification may be explained by elevation of   the hemidiaphragm, pleural effusion, atelectasis, and/or airspace disease. No pneumothorax. Endovascular repair of the aortic valve. Stable cardiac  silhouette. Normal pulmonary vascularity. Left subclavian pacemaker/defibrillator unchanged. Anterior   cervical spine fixation plate and screws.   XR Chest Port 1 View    Narrative    CHEST ONE VIEW  9/20/2024 12:59 PM     HISTORY: RN placed PICC - verify tip placement.    COMPARISON: September 19, 2024      Impression    IMPRESSION: Right PICC tip in the low SVC. Stable elevated left  hemidiaphragm. A cardiac implantable electronic device is in place  with lead(s) grossly intact. No abandoned leads/wires or other  unexpected metallic foreign bodies demonstrated on the film.     MELANIE KNIGHT MD         SYSTEM ID:  [id number]

## 2024-09-20 NOTE — ED NOTES
"[facility]  ED Nurse Handoff Report    ED Chief complaint: Wound Check  . ED Diagnosis:   Final diagnoses:   Pressure injury of sacral region, unstageable (H)       Allergies:   Allergies   Allergen Reactions    Clopidogrel Hives    Hydrocodone      Other reaction(s): sick to his stomach    Hydrocodone-Acetaminophen Nausea and Vomiting    Levofloxacin Other (See Comments)     Other reaction(s): tendonitis  Tendonitis right calf      Spironolactone      Other reaction(s): Hyperkalemia       Code Status: No CPR, pre-arrest intubation ok as stated in EPIC    Activity level - Baseline/Home:  in bed.  Activity Level - Current:   in bed.   Lift room needed: No.   Bariatric: No   Needed: No   Isolation: Yes.   Infection: Not Applicable  ESBL  VRE.     Respiratory status: Room air    Vital Signs (within 30 minutes):   Vitals:    09/19/24 2033 09/19/24 2221   BP: (!) 151/95 (!) 155/94   Pulse: 91 97   Resp: 20    Temp: 98.8  F (37.1  C)    TempSrc: Oral    SpO2: 98% 94%   Weight: 66.6 kg (146 lb 12.8 oz)    Height: 1.74 m (5' 8.5\")        Cardiac Rhythm:  ,      Pain level:    Patient confused: No.   Patient Falls Risk: arm band in place, patient and family education, and room door open.   Elimination Status: Urethral catheter (cutler) in place; refer to orders to discontinue as per protocol      Patient Report - Initial Complaint: Wound check.   Focused Assessment: Cardiac- Hx of pacemaker. Neuro- Hx of CVA with left sided hemiplegia. GI- LUQ colostomy, 2 piece, colostomy changed. Has old colostomy site underneath current colostomy which is packed with wet to dry saline dressing and covered with a Mepilex. Small to medium amount of soft, brown stool in original colostomy bag. Stoma pink, but slightly dusky, irritation around ostomy. Skin barrier wipe used with stoma paste. Per caregiver, pt eats and drinks modified diet. Takes pills crushed with applesauce. - Indwelling cutler catheter in " place upon arrival. Per caregiver, switched out on 9/14/24. Penile erythema and swelling. Groin rash. Miconazole powder ordered. Musculoskeletal- Generalized weakness. Bed bound due to recent CVA. Skin- Stage 4 pressure ulcer on sacrum. Wound vac dressing in place upon arrival. Currently covered with wet to dry, saline soaked guaze and Mepilex. Skin breakdown to side of wound, Mepilex applied. Pressure ulcers to bilateral heels. Mepilex and heel protector boots applied. Lap sites x 3 from recent abdominal surgery. Adhesive still in place. Mid abdominal incision from recent colostomy and colostomy revision. Old colostomy site packed with wet to dry dressing and Mepilex under current colostomy. G-tube in place, but isn't being used per caregiver. Site with skin irritation and dried drainage. Area cleansed with skin barrier wipes and drainage dressing. See active LDAs and photo of sacral wound. Has PICC in RUE. Verified with xray. Per caregiver, hasn't been pulling back blood, but has been flushing.     Abnormal Results:   Labs Ordered and Resulted from Time of ED Arrival to Time of ED Departure   COMPREHENSIVE METABOLIC PANEL - Abnormal       Result Value    Sodium 137      Potassium 4.2      Carbon Dioxide (CO2) 24      Anion Gap 11      Urea Nitrogen 16.7      Creatinine 0.60 (*)     GFR Estimate >90      Calcium 8.5 (*)     Chloride 102      Glucose 194 (*)     Alkaline Phosphatase 99      AST 19      ALT 11      Protein Total 6.9      Albumin 2.6 (*)     Bilirubin Total 0.4     CBC WITH PLATELETS AND DIFFERENTIAL - Abnormal    WBC Count 16.8 (*)     RBC Count 4.06 (*)     Hemoglobin 11.8 (*)     Hematocrit 37.0 (*)     MCV 91      MCH 29.1      MCHC 31.9      RDW 15.0      Platelet Count 326      % Neutrophils 77      % Lymphocytes 13      % Monocytes 8      % Eosinophils 1      % Basophils 1      % Immature Granulocytes 1      NRBCs per 100 WBC 0      Absolute Neutrophils 13.0 (*)     Absolute Lymphocytes 2.2       Absolute Monocytes 1.3      Absolute Eosinophils 0.2      Absolute Basophils 0.1      Absolute Immature Granulocytes 0.1      Absolute NRBCs 0.0     LACTIC ACID WHOLE BLOOD WITH 1X REPEAT IN 2 HR WHEN >2 - Normal    Lactic Acid, Initial 1.2     PROCALCITONIN   CK TOTAL   BLOOD CULTURE        Chest XR,  PA & LAT   Final Result   IMPRESSION: The distal portion of the right PICC is partially obscured by multiple pacemaker wires. The PICC terminates near the superior cavoatrial junction in satisfactory position. Persistent left basilar opacification may be explained by elevation of    the hemidiaphragm, pleural effusion, atelectasis, and/or airspace disease. No pneumothorax. Endovascular repair of the aortic valve. Stable cardiac silhouette. Normal pulmonary vascularity. Left subclavian pacemaker/defibrillator unchanged. Anterior    cervical spine fixation plate and screws.          Treatments provided: Miconazole powder, 1 gram Merrem, 400 mg daptomycin  Family Comments: Caregiver, but has now gone home  OBS brochure/video discussed/provided to patient:  N/A  ED Medications:   Medications   miconazole (MICATIN) 2 % powder (has no administration in time range)   meropenem (MERREM) 1 g vial to attach to  mL bag (has no administration in time range)   DAPTOmycin (CUBICIN) 400 mg in sodium chloride 0.9 % 100 mL intermittent infusion (has no administration in time range)       Drips infusing:  No  For the majority of the shift this patient was Green.   Interventions performed were N/A.    Sepsis treatment initiated: No    Cares/treatment/interventions/medications to be completed following ED care: See orders    ED Nurse Name: Christa Jansen RN  11:38 PM    RECEIVING UNIT ED HANDOFF REVIEW    Above ED Nurse Handoff Report was reviewed: Yes  Reviewed by: Shirlene Maynard RN on September 20, 2024 at 1:01 PM   DESHAUN Dunn called the ED to inform them the note was read: Yes

## 2024-09-20 NOTE — CONSULTS
General Surgery Consultation    Assessment:    Franklyn Sorto is a 89 year old male seen in consultation for a sacral decubitus ulcer undergoing wound vac treatment. There is some fibrinous slough at the wound base and macerated skin around the wound, but these should be able to be managed with local wound cares.     Plan:  - Recommend WOC consult and continued VAC treatment. Patient may need to increase VAC changes to three times weekly.     Maryam Carter MD    HPI:  Franklyn Sorto is a 89 year old male with limited mobility following a CVA who was admitted through the ED with pain and drainage at his sacral decubitus ulcer. He has had thi ulcer for several months and underwent wound debridment at [facility] last month. He has been getting wound vac changes twice weekly and the vac has been having difficulty holding a seal. He has a new divderting colostomy which is functioning well.  No fever.     Fever/Chills: No  Additional signs/symptoms of sepsis:  No  Currently on antibiotics: No  Antibiotics prior to admission:  No  MRSA exposure: No        PMH:  Past Medical History:   Diagnosis Date    Abnormal lateral conjugate gaze 07/11/2023    Anticoagulated on Coumadin 06/21/2024    Arteriosclerosis of coronary artery 09/19/2014    Formatting of this note might be different from the original.  Angiogram/PCI 9/19/14:  Distal RCA 90%, treated with MARÍA.  Mild LAD and LCx disease.      Aspiration pneumonia, unspecified aspiration pneumonia type, unspecified laterality, unspecified part of lung (H) 06/20/2024    Benign essential HTN 07/11/2023    Benign prostatic hyperplasia 10/20/2011    Formatting of this note might be different from the original.  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  Ulcers in lat 90's.  Reflux symptoms if he misses proton pump inhibitor.      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) (H) 09/02/2015    History of CVA (cerebrovascular accident) 06/20/2024    Hypertension 07/11/2023    Formatting of this note might be different from the original.  Created by Conversion     Replacement Utility updated for latest IMO load  Formatting of this note might be different from the original.  ECG 8/18/06 sinus bradycardia otherwise normal  Stress Echo 8/24/06 Normal, sub-max HR      Long term current use of anticoagulant therapy 01/11/2013    Lumbar post-laminectomy syndrome 11/29/2012    Neck pain 11/11/2013    OA (osteoarthritis) of knee 07/11/2023    Peripheral vascular disease (H24) 01/14/2013    Formatting of this note might be different from the original.  Created by Conversion      Pressure injury of sacral region, stage 3 (H) 08/05/2024    Rheumatoid arthritis (H) 07/11/2023    Formatting of this note might be different from the original.  Created by Conversion  Formatting of this note might be different from the original.  Dr. Gaxiola. [facility] rheum      S/P cardiac pacemaker procedure 11/06/2014    S/P TAVR (transcatheter aortic valve replacement) 11/05/2014    Stage 3 chronic kidney disease, unspecified whether stage 3a or 3b CKD (H) 01/17/2023    Thrombocytopenia (H24) 10/20/2011    Thrombophilia (H24) 02/11/2014    Formatting of this note might be different from the original.  Antiphospholipid antibodies- positiive beta 2 glycoprotein and DRVVT confirmation x's 2 12 weeks apart 2013.  Heterozygous Leiden Factor V  Follow by Dr Oly Hunter      Transient vision disturbance, bilateral 10/19/2011    Formatting of this note might be different from the original.  Episode of decreased vision in both eyes on 10/15/2011, lasting 10-15 minutes.         PSH:  Past Surgical History:   Procedure Laterality Date    IRRIGATION AND DEBRIDEMENT DECUBITUS WOUND, COMBINED N/A 8/13/2024    Procedure:  DEBRIDEMENT OF SACRAL DECUBITUS ULCER;  Surgeon: Demetri Garcia DO;  Location: [facility] OR    LAPAROSCOPIC COLOSTOMY N/A 8/13/2024    Procedure: CREATION, END COLOSTOMY, LAPAROSCOPIC AND;  Surgeon: Demetri Garcia DO;  Location: [facility] OR    REVISION COLOSTOMY N/A 8/25/2024    Procedure: REVISION, COLOSTOMY;  Surgeon: Al Lamar MD;  Location: [facility] OR       Allergies:  Allergies   Allergen Reactions    Clopidogrel Hives    Hydrocodone      Other reaction(s): sick to his stomach    Hydrocodone-Acetaminophen Nausea and Vomiting    Levofloxacin Other (See Comments)     Other reaction(s): tendonitis  Tendonitis right calf      Spironolactone      Other reaction(s): Hyperkalemia       Home Medications:  Current Outpatient Medications   Medication Sig Dispense Refill    acetaminophen (TYLENOL) 500 MG tablet Take 1 tablet (500 mg) by mouth every 8 hours as needed for mild pain.      allopurinol (ZYLOPRIM) 300 MG tablet Take 300 mg by mouth daily  0    artificial saliva (BIOTENE MT) AERS spray Take 2 sprays by mouth 4 times daily      aspirin 81 MG EC tablet Take 81 mg by mouth daily      bisacodyl (DULCOLAX) 10 MG suppository Place 10 mg rectally daily as needed for constipation      COLCRYS 0.6 MG tablet Take 0.6 mg by mouth daily as needed  0    DAPTOmycin 400 mg Inject 400 mg over 30 minutes into the vein every 24 hours. Last day 9/24/24      diclofenac (VOLTAREN) 1 % topical gel Apply 2 g topically 4 times daily      empagliflozin (JARDIANCE) 25 MG TABS tablet Take 1 tablet (25 mg) by mouth daily 90 tablet 3    furosemide (LASIX) 20 MG tablet Take 1 tablet (20 mg) by mouth daily. 30 tablet 0    insulin aspart (NOVOLOG PEN) 100 UNIT/ML pen Inject 1-3 Units Subcutaneous 3 times daily (before meals) Correction Scale - LOW INSULIN RESISTANCE DOSING   Do Not give Correction Insulin if Pre-Meal BG less than 140. For Pre-Meal  - 239 give 1 unit. For Pre-Meal  - 339 give 2 units. For  Pre-Meal BG greater than or equal to 340 give 3 units. To be given with prandial insulin, and based on pre-meal blood glucose. Administering insulin within 5 minutes of the start of the meal is ideal. Administer insulin no more than 30 minutes after the start of the meal, unless directed otherwise by provider. Notify provider if glucose greater than or equal to 350 mg/dL after administration of correction dose.      insulin glargine (LANTUS PEN) 100 UNIT/ML pen Inject 5 Units subcutaneously at bedtime. 1.5 mL 0    lipase-protease-amylase (CREON 12) 55920-828[address]-32250 units CPEP Take 1 capsule by mouth as needed      melatonin 3 MG tablet Take 3 mg by mouth at bedtime      menthol-zinc oxide (CALMOSEPTINE) 0.44-20.6 % OINT ointment Apply topically 4 times daily as needed for skin protection Apply to sacrum/groin      meropenem (MERREM) 1 g vial Inject 1,000 mg (1 g) over 30 minutes into the vein every 8 hours. Last day 9/24/24. Weekly labs while on IV antibiotics: CBC with differential, CMP, CK, C-reactive protein. Fax lab results to Dr. Verdugo at [phone]      metoprolol succinate ER (TOPROL XL) 25 MG 24 hr tablet Take 1 tablet (25 mg) by mouth daily. 30 tablet 0    multivitamin w/minerals (THERA-VIT-M) tablet Take 1 tablet by mouth daily. 30 tablet 0    nitroGLYcerin (NITROSTAT) 0.4 MG sublingual tablet Place 0.4 mg under the tongue every 5 minutes as needed for chest pain For chest pain place 1 tablet under the tongue every 5 minutes for 3 doses. If symptoms persist 5 minutes after 1st dose call 911.      nystatin (MYCOSTATIN) 494179 UNIT/GM external powder Apply topically 2 times daily as needed for other      oxyCODONE (ROXICODONE) 5 MG tablet Take 2 tablets (10 mg) by mouth every 4 hours as needed for pain.      polyethylene glycol-propylene glycol (SYSTANE) 0.4-0.3 % SOLN ophthalmic solution Apply 2 drops to eye 2 times daily      potassium & sodium phosphates (NEUTRA-PHOS) 280-160-250 MG Packet Take 2  "packets by mouth daily. 60 packet 0    [START ON 2024] rosuvastatin (CRESTOR) 20 MG tablet Take 1 tablet (20 mg) by mouth at bedtime. Do not start before 2024.      sacubitril-valsartan (ENTRESTO)  MG per tablet Take 1 tablet by mouth 2 times daily.      vitamin D3 (CHOLECALCIFEROL) 50 mcg (2000 units) tablet Take 2 tablets by mouth daily      warfarin ANTICOAGULANT (COUMADIN) 2 MG tablet Take by mouth daily. 4mg=Wed and Sat, 2mg=ROW         Social History:  Social History     Tobacco Use    Smoking status: Former     Current packs/day: 0.00     Types: Cigarettes     Quit date: 1975     Years since quittin.7    Smokeless tobacco: Never   Substance Use Topics    Alcohol use: Yes       Family History:  Family history reviewed and is not pertinent.    ROS:  The 10 point review of systems is negative other than noted in the HPI and above.    PE:  /76 (BP Location: Left arm)   Pulse 80   Temp 97.7  F (36.5  C) (Oral)   Resp 16   Ht 1.74 m (5' 8.5\")   Wt 66.6 kg (146 lb 12.8 oz)   SpO2 95%   BMI 22.00 kg/m    General appearance: well-nourished, no apparent distress  Lungs: respirations unlabored  Abdomen: Left sided colostomy with formed stool. Inferior to this, his old colostomy site is packed. There is macerated, wet skin around his old colostomy site.  Skin: there is a~ 15 x 15 cm sacral ulcer with a beefy red wound base. There is mild fibrinous slough at the superior aspect of the ulcer bed. No necrotic skin. The surrounding skin is pink and macerated appearing.     Extremities- without edema  Psych- mood and affect are appropriate  Neurologic- alert, speech is clear, moves all extremities with good strength  Integument- without lesions, rashes, or jaundice    Recent Labs   Lab 24  1128 24  0751 24  0626 24  0201 24  2309 24  0803   NA  --   --  140  --  137 139   POTASSIUM  --   --  4.1  --  4.2 4.0   CHLORIDE  --   --  103  --  102 " 103   CO2  --   --  28 -- 24 25   ANIONGAP  --   --  9  --  11 11   * 127* 146*   < > 194* 121*   BUN  --   --  16.1  --  16.7 17.3   CR  --   --  0.62*  --  0.60* 0.57*  0.57*   GFRESTIMATED  --   --  >90  --  >90 >90  >90   JÚNIOR  --   --  8.5*  --  8.5* 8.2*    < > = values in this interval not displayed.     Recent Labs   Lab 09/20/24  1128 09/20/24  0751 09/20/24  0626 09/20/24  0201 09/19/24  2309 09/19/24  0803   NA  --   --  140  --  137 139   POTASSIUM  --   --  4.1  --  4.2 4.0   CHLORIDE  --   --  103  --  102 103   CO2  --   --  28 -- 24 25   ANIONGAP  --   --  9  --  11 11   * 127* 146*   < > 194* 121*   BUN  --   --  16.1  --  16.7 17.3   CR  --   --  0.62*  --  0.60* 0.57*  0.57*   GFRESTIMATED  --   --  >90  --  >90 >90  >90   JÚNIOR  --   --  8.5*  --  8.5* 8.2*   MAG  --   --  2.1  --  2.0  --    PHOS  --   --  2.8  --  2.9  --    PROTTOTAL  --   --   --   --  6.9 6.5   ALBUMIN  --   --   --   --  2.6* 2.4*   BILITOTAL  --   --   --   --  0.4 0.3   ALKPHOS  --   --   --   --  99 96   AST  --   --   --   --  19 25   ALT  --   --   --   --  11 10    < > = values in this interval not displayed.     Recent Labs   Lab 09/20/24  0626   INR 4.14*     Recent Labs   Lab 09/19/24  2309   LACT 1.2     Recent Labs   Lab 09/14/24  1320   COLOR Yellow  Yellow   APPEARANCE Slightly Cloudy*  Slightly Cloudy*   URINEGLC >=1000*  >=1000*   URINEBILI Negative  Negative   URINEKETONE Negative  Negative   SG 1.010  1.010   UBLD Large*  Large*   URINEPH 8.0*  8.0*   PROTEIN 20*  20*   NITRITE Negative  Negative   LEUKEST Large*  Large*   RBCU >182*  >182*   WBCU >182*  >182*     Imaging:  No imaging    This note was created using voice recognition software. Undetected word substitutions or other errors may have occurred.     Time spent with the patient, reviewing the EMR, reviewing laboratory and imaging studies, more than 50% of which was counseling and coordinating care:  30 minutes..      Maryam Carter MD

## 2024-09-20 NOTE — ED PROVIDER NOTES
Emergency Department Note      History of Present Illness     Chief Complaint   Wound Check      HPI   Franklyn Sorto is a 89 year old male with history of stroke with left-sided deficits, decubitus sacral ulcer, stage 3 chronic kidney disease and hypertension who presents to the ED via EMS with case manger for evaluation of wound check. The patient is coming from senior home living with his .  She reports the patient has multiple pressure wounds including sacrum, bilateral heels.  He is currently being treated for an infected sacral ulcer with IV antibiotics.  He is status postdebridement that was done in August.  Currently is on meropenem, daptomycin through a PICC line.      He has home care nurse that sees him twice a week.  Reportedly that is him today.  His  saw the patient today and is very concerned regarding patient's care.  She states that patient sacral ulcer has worsened despite having the wound VAC and IV antibiotics.  She states that the wound VAC is not on appropriately.  States that he is on a regular bed which is not helping his ulcers.  She also reports that she did reach out to infectious disease they recommend that patient come here.  She states that patient's white count has up trended despite antibiotics.      Independent Historian    as detailed above.    Review of External Notes   Reviewed discharge summary from August 30, 2024.    Patient with sacral ulcer.  Status postdebridement.  Currently on IV antibiotics.    Past Medical History     Medical History and Problem List   Arteriosclerosis of coronary artery  Aspiration pneumonia  Hypertension  Benign prostatic hyperplasia  Cervicalgia  Type 2 diabetes  Frequent PVC's  GERD  Heart failure with reduced ejection fraction  CVA  Lumbar post-laminectomy syndrome  Osteoarthritis of knee  Peripheral vascular disease  Rheumatoid arthritis  Stage 3 CKD  Thrombocytopenia  Thrombophilia  Chronic systolic  "CHF  Hyperlipidemia   Microalbuminuria  Mucopurulent chronic bronchitis   Chronic nonalcoholic liver disease   Spinal stenosis   Transient visual disturbance, bilateral   Acute cystitis   Hypoxia   Hypotension  Community acquired pneumonia of left lower lobe of lung  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 Coli sepsis   Left bundle branch block  First degree atrioventricular block  Arterial embolus and thrombosis of left upper extremity  Synovial cyst, L5-S1  Aortic stenosis  Herniated disc, L4-5  Anemia   Duodenal ulcer  Cervical disc displacement  Irregular heart beat  Hypertrophy of prostate   Obesity   Cataract, bilateral   Gout  Sleep apnea  Neuropathy     Medications   Zyloprim  Aspirin 81 mg  Colcrys  Daptomycin  Jardiance  Entresto    Lasix  Novolog Pen  Lantus Pen  Prevacid  Merrem  Toprol  Nitrostat  Roxicodone  Coumadin  Januvia   Actos   Arava   Norvasc   Ambien  Hytrin   Lipitor   Prilosec  Xanax  Accupril   Crestor   Coreg   Zofran   Senokot   Buspar  Atarax     Surgical History   Debridement of sacral decubitus ulcer  Creation end colostomy, laparoscopic  Revision colostomy  TAVR  Dual chamber permanent pacemaker implantation   Appendectomy  Tonsillectomy  Spinal fusion, neck  Right rotator cuff repair  Colonoscopy  TURP  Cervical decompression   Cyst removal, spine  Bilateral knee arthroscopy  Laminotomy, foraminotomy, lumbar, right L5/S1 with synovial cystectomy   Bilateral cataract extraction with IOL  PTA right popliteal artery  Gastrostomy jejunostomy tube placement     Physical Exam     Patient Vitals for the past 24 hrs:   BP Temp Temp src Pulse Resp SpO2 Height Weight   09/19/24 2221 (!) 155/94 -- -- 97 -- 94 % -- --   09/19/24 2033 (!) 151/95 98.8  F (37.1  C) Oral 91 20 98 % 1.74 m (5' 8.5\") 66.6 " kg (146 lb 12.8 oz)     Physical Exam  Vitals reviewed.   Constitutional:       General: He is not in acute distress.     Appearance: He is ill-appearing.      Comments: Cachectic, ill-appearing   HENT:      Head: Normocephalic and atraumatic.   Eyes:      Extraocular Movements: Extraocular movements intact.   Cardiovascular:      Rate and Rhythm: Normal rate and regular rhythm.   Pulmonary:      Effort: Pulmonary effort is normal. No respiratory distress.      Breath sounds: Normal breath sounds. No wheezing.   Abdominal:      Palpations: Abdomen is soft.      Tenderness: There is no abdominal tenderness. There is no guarding.      Hernia: No hernia is present.      Comments: Ostomy bag in place with brown stool.   Genitourinary:     Comments: Significant sacral ulcer. see media.    Javier catheter in place  Musculoskeletal:      Cervical back: Normal range of motion.      Comments: Left-sided paralysis noted atrophy to the left upper and lower extremity.   Skin:     General: Skin is warm and dry.   Neurological:      Mental Status: He is alert.      GCS: GCS eye subscore is 4. GCS verbal subscore is 5. GCS motor subscore is 6.   Psychiatric:         Behavior: Behavior normal.                     Diagnostics     Lab Results   Labs Ordered and Resulted from Time of ED Arrival to Time of ED Departure   COMPREHENSIVE METABOLIC PANEL - Abnormal       Result Value    Sodium 137      Potassium 4.2      Carbon Dioxide (CO2) 24      Anion Gap 11      Urea Nitrogen 16.7      Creatinine 0.60 (*)     GFR Estimate >90      Calcium 8.5 (*)     Chloride 102      Glucose 194 (*)     Alkaline Phosphatase 99      AST 19      ALT 11      Protein Total 6.9      Albumin 2.6 (*)     Bilirubin Total 0.4     CBC WITH PLATELETS AND DIFFERENTIAL - Abnormal    WBC Count 16.8 (*)     RBC Count 4.06 (*)     Hemoglobin 11.8 (*)     Hematocrit 37.0 (*)     MCV 91      MCH 29.1      MCHC 31.9      RDW 15.0      Platelet Count 326      %  Neutrophils 77      % Lymphocytes 13      % Monocytes 8      % Eosinophils 1      % Basophils 1      % Immature Granulocytes 1      NRBCs per 100 WBC 0      Absolute Neutrophils 13.0 (*)     Absolute Lymphocytes 2.2      Absolute Monocytes 1.3      Absolute Eosinophils 0.2      Absolute Basophils 0.1      Absolute Immature Granulocytes 0.1      Absolute NRBCs 0.0     PROCALCITONIN - Normal    Procalcitonin 0.11     LACTIC ACID WHOLE BLOOD WITH 1X REPEAT IN 2 HR WHEN >2 - Normal    Lactic Acid, Initial 1.2     CK TOTAL   BLOOD CULTURE       Imaging   Chest XR,  PA & LAT   Final Result   IMPRESSION: The distal portion of the right PICC is partially obscured by multiple pacemaker wires. The PICC terminates near the superior cavoatrial junction in satisfactory position. Persistent left basilar opacification may be explained by elevation of    the hemidiaphragm, pleural effusion, atelectasis, and/or airspace disease. No pneumothorax. Endovascular repair of the aortic valve. Stable cardiac silhouette. Normal pulmonary vascularity. Left subclavian pacemaker/defibrillator unchanged. Anterior    cervical spine fixation plate and screws.          Independent Interpretation   Chest x-ray reviewed PICC line in place    ED Course      Medications Administered   Medications   miconazole (MICATIN) 2 % powder ( Topical $Given 9/19/24 2339)   meropenem (MERREM) 1 g vial to attach to  mL bag (has no administration in time range)   DAPTOmycin (CUBICIN) 400 mg in sodium chloride 0.9 % 100 mL intermittent infusion (has no administration in time range)       Procedures   Procedures     Discussion of Management   Hospitalist, Dr. Mckeon  ED Course   ED Course as of 09/19/24 2345   Thu Sep 19, 2024   2034 I obtained history and examined the patient as noted above.    2216 Difficult blood draw. Labs delayed   2344 D/w Dr. Mckeon, hospitalist       Additional Documentation  None    Medical Decision Making / Diagnosis     CMS Diagnoses:  None    MIPS       None    [facility]   Franklyn Sorto is a 89 year old male history of a recent stroke with left-sided deficits currently bedbound presenting today with wound check.  His  is at the bedside stating that patient is not receiving the care he needs.  States that his sacral wound has worsened.  He currently presents with wound VAC in place however it is not attached to his skin.  Caregiver states that the sacral wound has spread.  He is on antibiotics daptomycin and meropenem through the PICC line.  He has home care nurses that come only twice a week.  He currently resides at senior living facility.  Caregiver states that patient's white count has up trended.  Basic blood work including blood cultures also ordered.  His IV antibiotics ordered here.  Plan for admission for wound care and further management. Pt was admitted to Dr. Mckeon    Disposition   The patient was admitted to the hospital.     Diagnosis     ICD-10-CM    1. Pressure injury of sacral region, unstageable (H)  L89.150              DO Remberto Lozoya Tiffani, DO  09/19/24 6536

## 2024-09-20 NOTE — PHARMACY-ANTICOAGULATION SERVICE
Clinical Pharmacy - Warfarin Dosing Consult     Pharmacy has been consulted to manage this patient s warfarin therapy.  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  Pharmacy will monitor Franklyn Sorto daily and order warfarin doses to achieve specified goal.      Please contact pharmacy as soon as possible if the warfarin needs to be held for a procedure or if the warfarin goals change.

## 2024-09-20 NOTE — CONSULTS
[facility] Nurse Inpatient Assessment     Consulted for: ileostomy, left arm, sacrum    Summary: Patient with chronic sacral wound.  Patient seen by surgery, no surgery indicated at this time.  Surgery recommended continuing wound VAC.  Wound with copious serosang and purulent drainage today.  Recommend Vashe packing over the weekend and reevaluating for VAC on Monday if purulent drainage is decreased.      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 He was hospitalized from 8/5 through 8/30 due to sepsis secondary to infected unstageable sacral pressure ulcer/wounds. He did have ESBL in the wound and bacteremia. 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 He underwent debridement and diverting colostomy on 8/13. He did have ARIADNE/ATN and was treated for possible aspiration pneumonia during that admission. Long-term care was recommended, but he preferred to discharge home to his [facility] with ongoing wound care twice daily for wound VAC management. He was discharged with a right arm PICC with IV daptomycin and IV meropenem through 9/24. He has a  that has been concerned regarding his wounds getting worse despite antibiotics and wound VAC therapy. The wound VAC has not been forming a seal per their report. They sent some pictures to his outpatient [facility] who recommended he come in for evaluation. The patient says he feels okay in the ER. He is hungry. Not  reporting any significant pain at this moment. He has not been having any fevers or shaking chills.     Assessment:      Areas visualized during today's visit: Sacrum/coccyx, abdomen, left arm, colostomy    Pressure Injury Location: Sacrum  Last photo: 9/20/24    Wound type: Pressure Injury     Pressure Injury Stage: 4, present on admission   Wound history/plan of care:   Patient with chronic sacral pressure injury and history of debridements.  Has been using wound VAC to site.   Wound base: 60 %  pink red tissue , 35 % Slough and Adipose tissue, 5% exposed bone     Palpation of the wound bed: normal      Drainage: copious     Description of drainage: serosanguinous and purulent     Measurements (length x width x depth, in cm) 7.5  x 8  x  4 cm      Tunneling N/A     Undermining up to 4 cm from 1-6 o'clock  Periwound skin: Erythema- blanchable      Color: pink and purple      Temperature: normal   Odor: mild  Pain: mild  Pain intervention prior to dressing change: slow and gentle cares   Treatment goal: Heal , Infection control/prevention, and Remove necrotic tissue  STATUS: initial assessment  Supplies ordered: gathered    My PI Risk Assessment     Sensory Perception: 2 - Very Limited     Moisture: 2 - Very moist      Activity: 2 - Chairfast     Mobility: 2 - Very limited     Nutrition: 2 - Probably inadequate      Friction/Shear: 2 - Potential problem      TOTAL: 12     Wound location: Left abdomen  Last photo: 9/20/24  Wound due to: Surgical Wound  Wound history/plan of care: Old colostomy site.  Patient s/p colostomy on 8/14/24 and revision on 8/25/24.    Wound base: 100 % Granulation tissue     Palpation of the wound bed: normal      Drainage: small     Description of drainage: serosanguinous     Measurements (length x width x depth, in cm): 1.5  x 2  x  0.8 cm      Tunneling: N/A     Undermining: N/A  Periwound skin: Rash      Color: pink      Temperature: normal   Odor: none  Pain: mild  Pain interventions  "prior to dressing change: slow and gentle cares   Treatment goal: Heal   STATUS: initial assessment  Supplies ordered: at bedside    Assessment of established  Colostomy:  Diagnosis Pertinent to Stoma:  Sacral wound       Surgery Date: 8/25/24  [facility]  Pouching system in place on assessment today: Martha two piece and cut to fit   Pouch barrier status: intact  Pouch last changed/wear time: 9/19/24  Reason for pouch change today: pouch not changed today  Effectiveness of current pouching/ supply plan: Effective  Change made with ostomy management today: No  Supplies: at bedside  Last photo: none  Stoma location: LUQ  Stoma size: did not measure today  Stoma appearance: pink  Mucocutaneous junction:  not visualized (barrier in place)  Peristomal complication(s): not visualized (barrier in place)  Output: semi-formed and brown  Output volume emptied during visit: 0ml  Abdominal assessment: Soft    Left arm inspected, no open areas noted.          Treatment Plan:     Sacral wound(s): BID and prn when saturated  Cleanse with Vashe  Pack with Vashe moistened kerlix   Cover with ABD      Left abdomen wound(s): Daily   Cleanse with Vashe  Pack with Vashe moistened gauze  Cover with dry gauze    Pressure Injury Prevention (PIP) Plan:  If patient is declining pressure injury prevention interventions: Explore reason why and address patient's concerns, Educate on pressure injury risk and prevention intervention(s), and If patient is still declining, document \"informed refusal\"   Mattress: Follow bed algorithm, add Low Air Loss (Air+) mattress pump if skin is very moist or constantly moist.   HOB: Maintain at or below 30 degrees, unless contraindicated  Repositioning in bed: Every 1-2 hours  and Left/right positioning; avoid supine  Heels: Pillows under calves  Protective Dressing: None  Chair positioning: Chair cushion (#[id number])    If patient has a buttock pressure injury, or high " risk for PI use chair cushion or SPS.  Moisture Management: Avoid brief in bed  Under Devices: Inspect skin under all medical devices during skin inspection , Ensure tubes are stabilized without tension, and Ensure patient is not lying on medical devices or equipment when repositioned  Ask provider to discontinue device when no longer needed.      Orders: Written    RECOMMEND PRIMARY TEAM ORDER: None, at this time  Education provided: importance of repositioning, plan of care, and Off-loading pressure  Discussed plan of care with: Patient and Nurse  WO nurse follow-up plan:  Monday  Notify WO if wound(s) deteriorate.  Nursing to notify the Provider(s) and re-consult the WO Nurse if new skin concern.    DATA:     Current support surface: Standard  Standard gel mattress (Isoflex)  Containment of urine/stool: Colostomy pouch  BMI: Body mass index is 22 kg/m .   Active diet order: Orders Placed This Encounter      Combination Diet Pureed Diet (level 4); Mildly Thick (level 2); Mildly Thick (level 2)     Output: I/O last 3 completed shifts:  In: 240 [P.O.:240]  Out: 1550 [Urine:1550]     Labs:   Recent Labs   Lab 09/20/24  0626 09/19/24  2309   ALBUMIN  --  2.6*   HGB 11.7* 11.8*   INR 4.14*  --    WBC 14.3* 16.8*     Pressure injury risk assessment:   Sensory Perception: 2-->very limited  Moisture: 4-->rarely moist  Activity: 1-->bedfast  Mobility: 2-->very limited  Nutrition: 2-->probably inadequate  Friction and Shear: 1-->problem  Dilan Score: 12    Chris Lomas RN CWOCN  Contact [facility] Nurse ([place])  Dept. Office Number: [phone]

## 2024-09-20 NOTE — PROGRESS NOTES
[facility]    ED Boarding Nurse Handoff Addendum Report:    Date/time: 9/20/2024, 1:22 PM    Activity Level: lift    Fall Risk: Yes:  patient and family education, assistive device/personal items within reach, and room door open    Active Infusions: N/A    Current Meds Due: N/A    Current care needs: Wound care, pain management, PICC- alteplase administered at 1300.      Oxygen requirements (liters/min and/or FiO2): RA    Respiratory status: Room air    Vital signs (within last 30 minutes):    Vitals:    09/20/24 0149 09/20/24 0413 09/20/24 0757 09/20/24 1300   BP: 112/76   113/76   BP Location: Left arm      Pulse: 89 104 80 74   Resp: 20 16 16   Temp: 97.5  F (36.4  C)  97.7  F (36.5  C) 98  F (36.7  C)   TempSrc: Oral  Oral Oral   SpO2: 92% 97% 95% 92%   Weight:       Height:           Focused assessment within last 30 minutes:    Pt is A/O x3, turn and repositioned in bed. Lift assistance. Pt also needs assistance with eating. SLP to see patient at 1400, per the care giver pt is on a puree diet at home. Currently on a easy to chew with mild thick liquids. ID, gen surgery, WOC, PT, OT, VAT, nutrition. Single lumen PICC present in the RUE, no blood return. Alteplase admin at 1300. Javier catheter and ostomy present. Tylenol given x1 for pain. Pt went up to the floor to room 643, report given to oncoming RN.     ED Boarding Nurse name: Maryam Donis RN

## 2024-09-20 NOTE — CONSULTS
[facility]    Infectious Disease Consultation     Date of Admission:  9/19/2024  Date of Consult : 09/20/24      Assessment:  89YM chronically ill with hx of CVA, CKD, diabetes, CHF, s/p TAVR for aortic stenosis, recurrent recent hospitalizations, sacral decubitus ulcer with osteomyelitis for which he was most recently hospitalized from 8/5-8/30 , underwent surgical debridement with diverting colostomy, and has been planned for a 6 week antibiotic course until 9/24 (due to isolation of MRSA, VRE, E.coli ESBL, bacteroides) , who has been admitted due to worsening appearance of his sacral wound.     -Last hospitalization In August at [facility] for ESBL E.coli bacteremia and streptococcus salivarius  related to infected sacral decubitus ulcer requiring bedside debridement (cx MRSA, E.marco, bacteroides, VRE) and debridement in OR on 8/13 followed by diverting colostomy. 6 weeks of IV antibiotics were planned with Meropenem/Daptomycin until 9/24  -Recurrent recent hospitalizations 5/25-6/7 for CVA, again 6/21-06/25 for ESBL E.coli bacteremia/UTI, 8/5-8/30 decubitus ulcer with osteomyelitis  -Chronic medical conditions - CVA, CKD, diabetes, CHF, AORTIC STENOSIS s/p TAVR, hx of DVT (positive antiphospholipid antibody syndrome, heterozygous factor V Leiden), thrombophilia, HTN      Recommendations:  Patient has been seen by the surgical team, no acute need for additional surgical intervention  Continue Daptomycin and Meropenem until 9/24. No reason to extend antibiotic course.  Local wound care, wound vac  This is a complex situation, patient has cachexia, appears very debilitated. Wound is large and healing will be challenging.      Jessica Baeza MD    Reason for Consult   Reason for consult: I was asked to evaluate this patient for sacral osteomyelitis.    Primary Care Physician   Redd Sommer    Chief Complaint   Infected sacral decubitus ulcer    History is obtained from the patient and medical  records    History of Present Illness   Franklyn Sorto is a 89 YM, chronically ill, with hx of CVA, CKD, diabetes, CHF, s/p TAVR for aortic stenosis, recurrent recent hospitalizations, sacral decubitus ulcer with osteomyelitis for which he was most recently hospitalized from 8/5-8/30 , underwent surgical debridement with diverting colostomy, and has been planned for a 6 week antibiotic course until 9/24 (due to isolation of MRSA, VRE, E.coli ESBL, bacteroides) , who has been admitted due to worsening appearance of his sacral wound.     Patient has remained afebrile, has ongoing leukocytosis , has been evaluated by surgery and not felt to have need for further surgical debridement.     ID has been asked to assist with further management.  Past Medical History   I have reviewed this patient's medical history and updated it with pertinent information if needed.   Past Medical History:   Diagnosis Date    Abnormal lateral conjugate gaze 07/11/2023    Anticoagulated on Coumadin 06/21/2024    Arteriosclerosis of coronary artery 09/19/2014    Formatting of this note might be different from the original.  Angiogram/PCI 9/19/14:  Distal RCA 90%, treated with MARÍA.  Mild LAD and LCx disease.      Aspiration pneumonia, unspecified aspiration pneumonia type, unspecified laterality, unspecified part of lung (H) 06/20/2024    Benign essential HTN 07/11/2023    Benign prostatic hyperplasia 10/20/2011    Formatting of this note might be different from the original.  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  Ulcers in lat 90's. Reflux symptoms if he misses proton pump inhibitor.      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) (H) 09/02/2015    History of CVA (cerebrovascular accident) 06/20/2024    Hypertension  07/11/2023    Formatting of this note might be different from the original.  Created by Conversion     Replacement Utility updated for latest IMO load  Formatting of this note might be different from the original.  ECG 8/18/06 sinus bradycardia otherwise normal  Stress Echo 8/24/06 Normal, sub-max HR      Long term current use of anticoagulant therapy 01/11/2013    Lumbar post-laminectomy syndrome 11/29/2012    Neck pain 11/11/2013    OA (osteoarthritis) of knee 07/11/2023    Peripheral vascular disease (H24) 01/14/2013    Formatting of this note might be different from the original.  Created by Conversion      Pressure injury of sacral region, stage 3 (H) 08/05/2024    Rheumatoid arthritis (H) 07/11/2023    Formatting of this note might be different from the original.  Created by Conversion  Formatting of this note might be different from the original.  Dr. Gaxiola. [facility] rheum      S/P cardiac pacemaker procedure 11/06/2014    S/P TAVR (transcatheter aortic valve replacement) 11/05/2014    Stage 3 chronic kidney disease, unspecified whether stage 3a or 3b CKD (H) 01/17/2023    Thrombocytopenia (H24) 10/20/2011    Thrombophilia (H24) 02/11/2014    Formatting of this note might be different from the original.  Antiphospholipid antibodies- positiive beta 2 glycoprotein and DRVVT confirmation x's 2 12 weeks apart 2013.  Heterozygous Leiden Factor V  Follow by Dr Oly Hunter      Transient vision disturbance, bilateral 10/19/2011    Formatting of this note might be different from the original.  Episode of decreased vision in both eyes on 10/15/2011, lasting 10-15 minutes.         Past Surgical History   I have reviewed this patient's surgical history and updated it with pertinent information if needed.  Past Surgical History:   Procedure Laterality Date    IRRIGATION AND DEBRIDEMENT DECUBITUS WOUND, COMBINED N/A 8/13/2024    Procedure: DEBRIDEMENT OF SACRAL DECUBITUS ULCER;  Surgeon: Demetri Garcia DO;  Location:   [facility] OR    LAPAROSCOPIC COLOSTOMY N/A 8/13/2024    Procedure: CREATION, END COLOSTOMY, LAPAROSCOPIC AND;  Surgeon: Demetri Garcia DO;  Location: [facility] OR    REVISION COLOSTOMY N/A 8/25/2024    Procedure: REVISION, COLOSTOMY;  Surgeon: Al Lamar MD;  Location: [facility] OR       Prior to Admission Medications   Prior to Admission Medications   Prescriptions Last Dose Informant Patient Reported? Taking?   COLCRYS 0.6 MG tablet Unknown Nursing Home Yes Yes   Sig: Take 0.6 mg by mouth daily as needed   DAPTOmycin 400 mg 9/18/2024 at 1900  No Yes   Sig: Inject 400 mg over 30 minutes into the vein every 24 hours. Last day 9/24/24   acetaminophen (TYLENOL) 500 MG tablet Unknown  Yes Yes   Sig: Take 1 tablet (500 mg) by mouth every 8 hours as needed for mild pain.   allopurinol (ZYLOPRIM) 300 MG tablet 9/19/2024 at am Nursing Home Yes Yes   Sig: Take 300 mg by mouth daily   artificial saliva (BIOTENE MT) AERS spray 9/19/2024  Yes Yes   Sig: Take 2 sprays by mouth 4 times daily   aspirin 81 MG EC tablet 9/18/2024 Nursing Home Yes Yes   Sig: Take 81 mg by mouth daily   bisacodyl (DULCOLAX) 10 MG suppository Unknown Nursing Home Yes Yes   Sig: Place 10 mg rectally daily as needed for constipation   diclofenac (VOLTAREN) 1 % topical gel 9/19/2024 at am Nursing Home Yes Yes   Sig: Apply 2 g topically 4 times daily   empagliflozin (JARDIANCE) 25 MG TABS tablet 9/19/2024 at am Nursing Home No Yes   Sig: Take 1 tablet (25 mg) by mouth daily   furosemide (LASIX) 20 MG tablet 9/19/2024 at am  No Yes   Sig: Take 1 tablet (20 mg) by mouth daily.   insulin aspart (NOVOLOG PEN) 100 UNIT/ML pen Unknown  No Yes   Sig: Inject 1-3 Units Subcutaneous 3 times daily (before meals) Correction Scale - LOW INSULIN RESISTANCE DOSING   Do Not give Correction Insulin if Pre-Meal BG less than 140. For Pre-Meal  - 239 give 1 unit. For Pre-Meal  - 339 give 2 units. For Pre-Meal BG greater than or equal to 340  give 3 units. To be given with prandial insulin, and based on pre-meal blood glucose. Administering insulin within 5 minutes of the start of the meal is ideal. Administer insulin no more than 30 minutes after the start of the meal, unless directed otherwise by provider. Notify provider if glucose greater than or equal to 350 mg/dL after administration of correction dose.   insulin glargine (LANTUS PEN) 100 UNIT/ML pen 9/18/2024  No Yes   Sig: Inject 5 Units subcutaneously at bedtime.   lipase-protease-amylase (CREON 12) 25226-09[address]-60344 units CPEP Unknown Nursing Home Yes Yes   Sig: Take 1 capsule by mouth as needed   melatonin 3 MG tablet 9/18/2024 Nursing Home Yes Yes   Sig: Take 3 mg by mouth at bedtime   menthol-zinc oxide (CALMOSEPTINE) 0.44-20.6 % OINT ointment Unknown  No Yes   Sig: Apply topically 4 times daily as needed for skin protection Apply to sacrum/groin   meropenem (MERREM) 1 g vial 9/19/2024 at 1500  No Yes   Sig: Inject 1,000 mg (1 g) over 30 minutes into the vein every 8 hours. Last day 9/24/24. Weekly labs while on IV antibiotics: CBC with differential, CMP, CK, C-reactive protein. Fax lab results to Dr. Verdugo at [phone]   metoprolol succinate ER (TOPROL XL) 25 MG 24 hr tablet 9/19/2024 at am  No Yes   Sig: Take 1 tablet (25 mg) by mouth daily.   multivitamin w/minerals (THERA-VIT-M) tablet 9/19/2024 at am  No Yes   Sig: Take 1 tablet by mouth daily.   nitroGLYcerin (NITROSTAT) 0.4 MG sublingual tablet Unknown Nursing Home Yes Yes   Sig: Place 0.4 mg under the tongue every 5 minutes as needed for chest pain For chest pain place 1 tablet under the tongue every 5 minutes for 3 doses. If symptoms persist 5 minutes after 1st dose call 911.   nystatin (MYCOSTATIN) 169483 UNIT/GM external powder Unknown Nursing Home Yes Yes   Sig: Apply topically 2 times daily as needed for other   oxyCODONE (ROXICODONE) 5 MG tablet 9/19/2024 at 1200  Yes Yes   Sig: Take 2 tablets (10 mg) by mouth every 4 hours  as needed for pain.   polyethylene glycol-propylene glycol (SYSTANE) 0.4-0.3 % SOLN ophthalmic solution 9/19/2024 at x2 Nursing Home Yes Yes   Sig: Apply 2 drops to eye 2 times daily   potassium & sodium phosphates (NEUTRA-PHOS) 280-160-250 MG Packet 9/19/2024 at am  No Yes   Sig: Take 2 packets by mouth daily.   rosuvastatin (CRESTOR) 20 MG tablet 9/18/2024 at hs  Yes Yes   Sig: Take 1 tablet (20 mg) by mouth at bedtime. Do not start before September 25, 2024.   sacubitril-valsartan (ENTRESTO)  MG per tablet   Yes Yes   Sig: Take 1 tablet by mouth 2 times daily.   vitamin D3 (CHOLECALCIFEROL) 50 mcg (2000 units) tablet 9/19/2024 at am Nursing Home Yes Yes   Sig: Take 2 tablets by mouth daily   warfarin ANTICOAGULANT (COUMADIN) 2 MG tablet 9/18/2024 at 2mg  Yes Yes   Sig: Take by mouth daily. 4mg=Wed and Sat, 2mg=ROW      Facility-Administered Medications: None     Allergies   Allergies   Allergen Reactions    Clopidogrel Hives    Hydrocodone      Other reaction(s): sick to his stomach    Hydrocodone-Acetaminophen Nausea and Vomiting    Levofloxacin Other (See Comments)     Other reaction(s): tendonitis  Tendonitis right calf      Spironolactone      Other reaction(s): Hyperkalemia       Immunization History   Immunization History   Administered Date(s) Administered    COVID-19 MONOVALENT 12+ (Pfizer) 02/25/2021, 03/18/2021, 08/17/2021    COVID-19 Monovalent 12+ (Pfizer 2022) 04/12/2022    DT (PEDS <7y) 01/01/2000    DTAP (<7y) 01/01/2000    Flu 65+ (Fluad) 09/07/2017, 10/19/2018, 09/18/2019    Flu, Unspecified 10/30/2008, 09/23/2009, 10/01/2014    Influenza (H1N1) 02/04/2010    Influenza (High Dose) Trivalent,PF (Fluzone) 09/16/2016    Influenza (IIV3) PF 11/03/2006, 10/26/2007, 10/30/2008, 09/20/2010, 09/30/2011, 10/31/2012, 09/20/2013    Influenza Vaccine 65+ (FLUAD) 10/01/2020, 09/17/2021, 01/03/2024    Influenza Vaccine 65+ (Fluzone HD) 10/07/2020, 09/07/2022    Influenza Vaccine >6 months,quad, PF  10/20/2014, 09/21/2015    Influenza Vaccine, 6+MO IM (QUADRIVALENT W/PRESERVATIVES) 09/21/2015    Influenza, seasonal, injectable, PF 09/14/2010, 09/19/2011    Pneumo Conj 13-V (2010&after) 09/16/2016    Pneumococcal 23 valent 02/04/2010    TDAP (Adacel,Boostrix) 11/14/2017    Td (Adult), Adsorbed 01/01/2000, 01/01/2006    Zoster recombinant adjuvanted (SHINGRIX) 10/11/2019, 02/04/2020       Social History   I have reviewed this patient's social history and updated it with pertinent information if needed. Franklyn Sorto  reports that he quit smoking about 49 years ago. His smoking use included cigarettes. He has never used smokeless tobacco. He reports current alcohol use.    Family History   I have reviewed this patient's family history and updated it with pertinent information if needed.   No family history on file.    Review of Systems   The 10 point Review of Systems is as per HPI    Physical Exam   Temp: 97.7  F (36.5  C) Temp src: Oral BP: 112/76 Pulse: 80   Resp: 16 SpO2: 95 % O2 Device: None (Room air)    Vital Signs with Ranges  Temp:  [97.5  F (36.4  C)-98.8  F (37.1  C)] 97.7  F (36.5  C)  Pulse:  [] 80  Resp:  [16-20] 16  BP: (112-155)/(76-95) 112/76  SpO2:  [92 %-98 %] 95 %  146 lbs 12.8 oz  Body mass index is 22 kg/m .    GENERAL APPEARANCE:  awake, appears very cachexic and deconditioned  EYES: Eyes grossly normal to inspection  NECK: no adenopathy  RESP: lungs clear   CV: S1S2  ABDOMEN: soft, nontender, ostomy  SKIN: no rash        Data   All laboratory data reviewed       Microbiology  09/19/2024 2309 09/19/2024 2314 Blood Culture Arm, Left [[id number]]   Blood from Arm, Left    In process Component Value   No component results             09/14/2024 1320 09/15/2024 1310 Urine Culture [[id number]]   Urine, Javier Catheter    Final result Component Value   Culture No Growth        Imaging  EXAM: XR CHEST 2 VIEWS  LOCATION: [facility]  DATE: 9/19/2024     INDICATION:  PICC placement  COMPARISON: 8/20/2024.                                                                      IMPRESSION: The distal portion of the right PICC is partially obscured by multiple pacemaker wires. The PICC terminates near the superior cavoatrial junction in satisfactory position. Persistent left basilar opacification may be explained by elevation of   the hemidiaphragm, pleural effusion, atelectasis, and/or airspace disease. No pneumothorax. Endovascular repair of the aortic valve. Stable cardiac silhouette. Normal pulmonary vascularity. Left subclavian pacemaker/defibrillator unchanged. Anterior   cervical spine fixation plate and screws.

## 2024-09-20 NOTE — PHARMACY-ADMISSION MEDICATION HISTORY
Pharmacist Admission Medication History    Admission medication history is complete. The information provided in this note is only as accurate as the sources available at the time of the update.    Family member confirmed that patient is taking Entresto BID    Information Source(s): Caregiver and Cole ()  via in-person    Pertinent Information: none, patient was recently discharged from [facility]    Changes made to PTA medication list:  Added: None  Deleted: prevacid  Changed: warfarin    Allergies reviewed with patient and updates made in EHR: yes    Medication History Completed By: Kade Arrieta RPH 9/19/2024 10:39 PM    PTA Med List   Medication Sig Last Dose    acetaminophen (TYLENOL) 500 MG tablet Take 1 tablet (500 mg) by mouth every 8 hours as needed for mild pain. Unknown    allopurinol (ZYLOPRIM) 300 MG tablet Take 300 mg by mouth daily 9/19/2024 at am    artificial saliva (BIOTENE MT) AERS spray Take 2 sprays by mouth 4 times daily 9/19/2024    aspirin 81 MG EC tablet Take 81 mg by mouth daily 9/18/2024    bisacodyl (DULCOLAX) 10 MG suppository Place 10 mg rectally daily as needed for constipation Unknown    COLCRYS 0.6 MG tablet Take 0.6 mg by mouth daily as needed Unknown    DAPTOmycin 400 mg Inject 400 mg over 30 minutes into the vein every 24 hours. Last day 9/24/24 9/18/2024 at 1900    diclofenac (VOLTAREN) 1 % topical gel Apply 2 g topically 4 times daily 9/19/2024 at am    empagliflozin (JARDIANCE) 25 MG TABS tablet Take 1 tablet (25 mg) by mouth daily 9/19/2024 at am    furosemide (LASIX) 20 MG tablet Take 1 tablet (20 mg) by mouth daily. 9/19/2024 at am    insulin aspart (NOVOLOG PEN) 100 UNIT/ML pen Inject 1-3 Units Subcutaneous 3 times daily (before meals) Correction Scale - LOW INSULIN RESISTANCE DOSING   Do Not give Correction Insulin if Pre-Meal BG less than 140. For Pre-Meal  - 239 give 1 unit. For Pre-Meal  - 339 give 2 units. For Pre-Meal BG greater than or  equal to 340 give 3 units. To be given with prandial insulin, and based on pre-meal blood glucose. Administering insulin within 5 minutes of the start of the meal is ideal. Administer insulin no more than 30 minutes after the start of the meal, unless directed otherwise by provider. Notify provider if glucose greater than or equal to 350 mg/dL after administration of correction dose. Unknown    insulin glargine (LANTUS PEN) 100 UNIT/ML pen Inject 5 Units subcutaneously at bedtime. 9/18/2024    lipase-protease-amylase (CREON 12) 92339-04661-91116 units CPEP Take 1 capsule by mouth as needed Unknown    melatonin 3 MG tablet Take 3 mg by mouth at bedtime 9/18/2024    menthol-zinc oxide (CALMOSEPTINE) 0.44-20.6 % OINT ointment Apply topically 4 times daily as needed for skin protection Apply to sacrum/groin Unknown    meropenem (MERREM) 1 g vial Inject 1,000 mg (1 g) over 30 minutes into the vein every 8 hours. Last day 9/24/24. Weekly labs while on IV antibiotics: CBC with differential, CMP, CK, C-reactive protein. Fax lab results to Dr. Verdugo at [phone] 9/19/2024 at 1500    metoprolol succinate ER (TOPROL XL) 25 MG 24 hr tablet Take 1 tablet (25 mg) by mouth daily. 9/19/2024 at am    multivitamin w/minerals (THERA-VIT-M) tablet Take 1 tablet by mouth daily. 9/19/2024 at am    nitroGLYcerin (NITROSTAT) 0.4 MG sublingual tablet Place 0.4 mg under the tongue every 5 minutes as needed for chest pain For chest pain place 1 tablet under the tongue every 5 minutes for 3 doses. If symptoms persist 5 minutes after 1st dose call 911. Unknown    nystatin (MYCOSTATIN) 265708 UNIT/GM external powder Apply topically 2 times daily as needed for other Unknown    oxyCODONE (ROXICODONE) 5 MG tablet Take 2 tablets (10 mg) by mouth every 4 hours as needed for pain. 9/19/2024 at 1200    polyethylene glycol-propylene glycol (SYSTANE) 0.4-0.3 % SOLN ophthalmic solution Apply 2 drops to eye 2 times daily 9/19/2024 at x2    potassium &  sodium phosphates (NEUTRA-PHOS) 280-160-250 MG Packet Take 2 packets by mouth daily. 9/19/2024 at am    [START ON 9/25/2024] rosuvastatin (CRESTOR) 20 MG tablet Take 1 tablet (20 mg) by mouth at bedtime. Do not start before September 25, 2024. 9/18/2024 at hs    vitamin D3 (CHOLECALCIFEROL) 50 mcg (2000 units) tablet Take 2 tablets by mouth daily 9/19/2024 at am    warfarin ANTICOAGULANT (COUMADIN) 2 MG tablet Take by mouth daily. 4mg=Wed and Sat, 2mg=ROW 9/18/2024 at 2mg

## 2024-09-20 NOTE — PROGRESS NOTES
09/20/24 1400   Appointment Info   Signing Clinician's Name / Credentials (SLP) Surekha Holliday M.A. CCC-SLP   General Information   Onset of Illness/Injury or Date of Surgery 09/19/24   Referring Physician Stepan Mckeon MD   Patient/Family Therapy Goal Statement (SLP) Pt wishes he could swallow H20 and either [employer] or [place] [employer].   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  He was hospitalized from 8/5 through 8/30 due to sepsis secondary to infected unstageable sacral pressure ulcer/wounds.  He did have ESBL in the wound and bacteremia.  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  He underwent debridement and diverting colostomy on 8/13.  He did have ARIADNE/ATN and was treated for possible aspiration pneumonia during that admission.  Long-term care was recommended, but he preferred to discharge home to his [facility] with ongoing wound care twice daily for wound VAC management.  He was discharged with a right arm PICC with IV daptomycin and IV meropenem through 9/24.  He has a  that has been concerned regarding his wounds getting worse despite antibiotics and wound VAC therapy.  The wound VAC has not been forming a seal per their report.  They sent some pictures to his outpatient [facility] who recommended he come in for evaluation.  The patient says he feels okay in the ER.  He is hungry.  Not reporting any significant pain at this moment.  He has not been having any fevers or shaking  chills.     History obtained from patient, medical record, and from Dr. Sr in the emergency department.  Blood pressure 151/95, heart rate 91, temperature 98.8  F, oxygen 90% on room air.  Initial labs show leukocytosis 16.8, hemoglobin 11.8, platelet count 326.  Lactic acid normal at 1.2.  BMP unremarkable.  Glucose 194.  Albumin low at 2.6.  Procalcitonin normal at 0.11.  CRP is elevated 44.  Chest x-ray shows a persistent left basilar opacity, PICC line, PPM/ICD, and TAVR.  IV daptomycin and IV meropenem have been ordered.  Admit inpatient with general surgery and wound care consultation.       SLP consulted due to hx of dysphagia.   General Observations alert, interactive and cooperative with SLP   Type of Evaluation   Type of Evaluation Swallow Evaluation   Oral Motor   Oral Musculature anomalies present   Mucosal Quality dry   Dentition (Oral Motor)   Dentition (Oral Motor) adequate dentition   Facial Symmetry (Oral Motor)   Facial Symmetry (Oral Motor) left side impairment   Left Side Facial Asymmetry minimal impairment   Lip Function (Oral Motor)   Lip Range of Motion (Oral Motor) protrusion impairment;retraction impairment   Retraction, Lip Range of Motion left side   Lip Strength (Oral Motor) mild impairment;left side   Lip Coordination (Oral Motor) minimal impairment;left side   Protrusion, Lip Range of Motion left side   Tongue Function (Oral Motor)   Tongue ROM (Oral Motor) WNL   Tongue Strength (Oral Motor) WFL   Tongue Coordination/Speed (Oral Motor) reduced rate   Jaw Function (Oral Motor)   Jaw Function (Oral Motor) WNL   Cough/Swallow/Gag Reflex (Oral Motor)   Soft Palate/Velum (Oral Motor) not tested   Gag Reflex (Oral Motor) not tested   Volitional Throat Clear/Cough (Oral Motor) reduced strength   Volitional Swallow (Oral Motor) weak   Vocal Quality/Secretion Management (Oral Motor)   Vocal Quality (Oral Motor) hoarse   Secretion Management (Oral Motor) WNL   General Swallowing Observations    Current Diet/Method of Nutritional Intake (General Swallowing Observations, NIS) easy to chew (level 7);mildly thick (nectar-thick) liquids (level 2)   Respiratory Support room air   Past History of Dysphagia Hx of oropharyngeal dysphagia s/p CVA. Previous VFSS completed in May, June and August 2024 with recommendation for an easy to chew diet, mildly thick liquids & a free water protocol. Reportedly the patient is on a puree diet and mildly thick liquids at . See hx for detailed dysphagia notes.   Swallowing Evaluation Clinical swallow evaluation   Clinical Swallow Evaluation   Feeding Assistance frequent cues/help required   Clinical Swallow Evaluation Textures Trialed mildly thick liquids;pureed   Clinical Swallow Eval: Mildly Thick Liquids   Mode of Presentation cup;self-fed   Volume Presented 4 oz mildly thick OJ   Oral Phase WFL   Pharyngeal Phase reduction in laryngeal movement;coughing/choking   Diagnostic Statement intermittent weak cough   Clinical Swallow Evaluation: Puree Solid Texture Trial   Mode of Presentation, Puree spoon;fed by clinician   Volume of Puree Presented 100% pudding cup   Oral Phase, Puree WFL   Pharyngeal Phase, Puree intact   Diagnostic Statement intermittent weak cough   Esophageal Phase of Swallow   Patient reports or presents with symptoms of esophageal dysphagia No   Swallowing Recommendations   Diet Consistency Recommendations mildly thick liquids (level 2);pureed (level 4)   Supervision Level for Intake close supervision needed   Mode of Delivery Recommendations bolus size, small;slow rate of intake   Postural Recommendations chin tuck  (per previous VFSS)   Swallowing Maneuver Recommendations alternate food and liquid intake   Monitoring/Assistance Required (Eating/Swallowing) stop eating activities when fatigue is present   Recommended Feeding/Eating Techniques (Swallow Eval) maintain upright sitting position for eating   Medication Administration Recommendations,  "Swallowing (SLP) as tolerated   Instrumental Assessment Recommendations   (consider repeat VFSS pending tolerance with better positioning and clinical presentation)   General Therapy Interventions   Planned Therapy Interventions Dysphagia Treatment   Dysphagia treatment Instruction of safe swallow strategies;Modified diet education   Clinical Impression   Criteria for Skilled Therapeutic Interventions Met (SLP Eval) Yes, treatment indicated   SLP Diagnosis oropharyngeal dysphagia   Risks & Benefits of therapy have been explained evaluation/treatment results reviewed;care plan/treatment goals reviewed;current/potential barriers reviewed;participants voiced agreement with care plan;participants included;patient   Clinical Impression Comments Clinical dysphagia evaluation completed per MD order. The patient has a known hx of oropharyngeal dysphagia s/p CVA with previous VFSS (May, June and August 2024).  Most recently an easy to chew diet with mildly thick liquids was recommended. However, the patient reports fear of choking and requests foods \"in the \" at this time.     This visit he consumed puree and mildly thick liquid with intermittent weak coughing. Positioning to 90 degrees is currently a challenge due to his wounds. The patient does express interest in trying to advance back to thin liquid. A repeat VFSS and/or implementation of a free water protocol could be considered by SLP pending improved strength and positioning.      At this time recommend a puree diet (IDDSI 4) with mildly thick consistency liquids (2). The patient reports thickened tomato soup, yogurt, magic cup and pudding as being some of his preferred foods. He did report to SLP that he has been considering hospice cares, so an inpatient palliative consult could be considered.     SLP will f/u for diet tolerance, readiness for possible repeat instrumental and/or diet advancement in accordance with GOC.   SLP Total Evaluation Time   Eval: " oral/pharyngeal swallow function, clinical swallow Minutes (64442) 30   SLP Goals   Therapy Frequency (SLP Eval) 5 times/week   SLP Predicted Duration/Target Date for Goal Attainment 09/26/24   SLP Goals Swallow   SLP: Safely tolerate diet without signs/symptoms of aspiration Minced & moist diet;Thin liquids;With use of swallow precautions;With assistance/supervision   Interventions   Interventions Quick Adds Swallowing Dysfunction   SLP Discharge Planning   SLP Plan diet tolerance, training for safe swallow strategies, consider repeat VFSS and/or free water protocol pending improved tolerance with positioning, clinical presentation & GOC   SLP Discharge Recommendation home with assist   SLP Rationale for DC Rec benefits from SLP to ensure maximum safe swallowing on least restrictive diet; chronic dysphagia   SLP Brief overview of current status  At this time recommend a puree diet (IDDSI 4) with mildly thick consistency liquids (2).   Total Session Time   Total Session Time (sum of timed and untimed services) 30

## 2024-09-20 NOTE — CONSULTS
"CLINICAL NUTRITION SERVICES  -  ASSESSMENT NOTE    Recommendations:   - Diet per SLP.  - Ensure TID with meals, Magic Cup BID in between meals, and 1 Expedite daily to support intake and wound healing.   - Continue MVI/M.     - Reweigh as able (standing weight preferred).  Not clear if admit wt of 146# is accurate.     MALNUTRITION:  % Weight Loss:  unable to assess - query admit wt accuracy.   % Intake:  </= 75% for >/= 1 month   Subcutaneous Fat Loss:  Orbital region mild-moderate depletion, Upper arm region mild-mod depletion, and Thoracic region mild-mod depletion   Muscle Loss:  Temporal region moderate depletion, Clavicle bone region moderate depletion, and Acromion bone region moderate depletion  Fluid Retention:  2+ mild in L arm    Malnutrition Diagnosis: Severe malnutrition  In Context of:  Acute illness or injury     REASON FOR ASSESSMENT  Franklyn Sorto is a 89 year old male seen by Registered Dietitian for Provider Order - \"malnutrition, does not use his GJ tube\".    PMH of: PIs requiring wound debridement and diverting colostomy on 8/13/2024, documented to be admitted at [facility] 8/05-8/30/2024, discharged w/ wound VAC, CVA w/ dysphagia and L-sided hemiparesis (minimal movement on left documented), GJT, CHF, DMII, malnutrition.    Admit 2/2: PIs, concern for wound infection.    NUTRITION HISTORY  - Information obtained from patient at bedside.   - Reviewed RD notes from 8/2024 [facility] admit. Documented that patient received GJT 5/31/2024 while at [facility] following CVA.  From hospital he transferred to TCU facility and RD note indicates patient was not using his EAD since ~6/2024 or ~7/2024.    - While at [facility] calorie counts were initiated given dysphagia, documented dislike of dysphagia diets, variable PO intakes, and wound healing needs.  On 8/21/2024 it was documented patient was meeting <75% of energy needs and <50% of protein needs for >7 days and EN was recommended.  RD notes read " "patient was willing to do whatever it took to avoid using his FT.  It looks like PO intakes eventually improved near end of admission and patient planned on using oral supplements at discharge.   - Diet at home: Typically follows a soft and bite sized diet. He likes soups, yogurt, magic cups, and pudding.  He mentions how hard it has been to maintain his weight.   - Allergies: NKFA.      CURRENT NUTRITION ORDERS  Diet Order:     Pureed diet, Mildly Thick Liquids    Current Intake/Tolerance:  % intakes are documented. Patient is ordering meals consistently.   Abdoulaye says he has been eating a lot of soup, dislikes the taste of the thickener. He drinks every ensure that he gets - requested a mix of flavors. RD also offered magic cup supplement - patient was also happy to have those. Offered expedite for wound healing, pt was agreeable to try. Abdoulaye says he is trying his best with eating and acknowledges how hard it has been to maintain his weight. He intermittently mentions how he would like to have a [employer] hamburger or a steak.     RD offered oral intake encouragement with supplements to help him have more energy, get stronger, and help his wounds heal. Pt was appreciative of visit and acknowledged how well the nurses have been taking care of him.     SLP & WOC following.     ANTHROPOMETRICS  Height: 5' 8.5\"  Weight: 146 lbs 12.8 oz  Body mass index is 22 kg/m .  Weight Status:  Normal BMI  Weight History:  Wt Readings from Last 10 Encounters:   09/19/24 66.6 kg (146 lb 12.8 oz)   09/14/24 72.6 kg (160 lb)   09/12/24 74.4 kg (164 lb)   08/29/24 74.4 kg (164 lb 0.4 oz)   06/21/24 73 kg (160 lb 15 oz)   03/01/24 75.8 kg (167 lb)   11/13/23 76.2 kg (168 lb)   07/11/23 74.4 kg (164 lb)   07/07/23 74.4 kg (164 lb)   04/17/23 81.2 kg (179 lb)     - Method of admit wt not recorded.  Seems low or possibly inaccurate when compared to trends from days ago?    LABS: Reviewed.  - Noted electrolytes WNL this " AM    MEDICATIONS: Reviewed  - Including insulin regimen  - Lasix  - Not on IVFs  - MVI/M    GI: Baseline colostomy.    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  Estimated Energy Needs: >/=2190 (>/=30 Kcal/Kg)  Justification: wound healing  Estimated Protein Needs: >/=110 grams protein (1.2-1.5 g pro/Kg)  Justification: wound healing and preservation of lean body mass  Estimated Fluid Needs: per MD    NUTRITION DIAGNOSIS:  Increased nutrient needs (energy/protein) related to wound healing as evidenced by assessed needs as outlined.      NUTRITION INTERVENTIONS  Recommendations / Nutrition Prescription  See above.    Implementation  Nutrition education: as above    Medical Food Supplement: As above.     Multivitamin/Mineral: continue    Collaboration and Referral of Nutrition care    Nutrition goals:  Patient to consume at least % of meals and/or supplements 3-5x daily to meet estimated needs and promote wound healing    MONITORING AND EVALUATION:  Progress towards goals will be monitored and evaluated per protocol and Practice Guidelines    Zulma Grubbs, MS, RD, LD  Clinical Dietitian  3rd floor/ICU: [phone]  All other floors: [phone]  Weekend/holiday: [phone]  Office: [phone]

## 2024-09-20 NOTE — CONSULTS
"CLINICAL NUTRITION SERVICES - BRIEF NOTE    - Received/aware of MD consult.  - Patient just admitted to 6th floor from ER.  Received call from ER RN that MD requesting Ensure be added for patient.  - Formal assessment note pended and chart reviewed.  Attempted to meet with patient on unit but was getting settled w/ nursing + SLP came to eval.    - Not able to formally obtain nutrition assessment at this time, but did add scheduled Ensure in the interim.   - Will prioritize tomorrow as schedule/availability allows.        Lizbeth Oliver RDN, , LD  Clinical Dietitian  [facility] Message Group: \"Dietitian [[place]]\"  Office: [phone]  Pagers:  3rd floor/ICU: [phone]  All other floors: [phone]  Weekend/holiday: [phone]      "

## 2024-09-21 NOTE — PLAN OF CARE
"Goal Outcome Evaluation:      Plan of Care Reviewed With: patient    Overall Patient Progress: no changeOverall Patient Progress: no change    Patient alert but has some intermittent confusion/forgetful. VSS, on a pureed diet and thickened fluids. Tylenol given for pain management. Has a cutler catheter, colostomy bag. JG tube not in use, open area on sacrum and dressing changed once during this shift. Alteplase to PICC and PICC had blood return. Total feed, repositioned, will continue to follow plan of care  Problem: Adult Inpatient Plan of Care  Goal: Plan of Care Review  Description: The Plan of Care Review/Shift note should be completed every shift.  The Outcome Evaluation is a brief statement about your assessment that the patient is improving, declining, or no change.  This information will be displayed automatically on your shift  note.  Outcome: Progressing  Flowsheets (Taken 9/20/2024 0608)  Plan of Care Reviewed With: patient  Overall Patient Progress: no change  Goal: Patient-Specific Goal (Individualized)  Description: You can add care plan individualizations to a care plan. Examples of Individualization might be:  \"Parent requests to be called daily at 9am for status\", \"I have a hard time hearing out of my right ear\", or \"Do not touch me to wake me up as it startles  me\".  Outcome: Progressing  Goal: Absence of Hospital-Acquired Illness or Injury  Outcome: Progressing  Intervention: Identify and Manage Fall Risk  Recent Flowsheet Documentation  Taken 9/20/2024 1645 by Venus Swartz, RN  Safety Promotion/Fall Prevention:   activity supervised   assistive device/personal items within reach   clutter free environment maintained  Intervention: Prevent Skin Injury  Recent Flowsheet Documentation  Taken 9/20/2024 1645 by Venus Swartz, RN  Body Position:   turned   right  Device Skin Pressure Protection: positioning supports utilized  Intervention: Prevent Infection  Recent Flowsheet " Documentation  Taken 9/20/2024 1645 by Venus Swartz RN  Infection Prevention:   hand hygiene promoted   personal protective equipment utilized  Goal: Optimal Comfort and Wellbeing  Outcome: Progressing  Goal: Readiness for Transition of Care  Outcome: Progressing     Problem: Skin Injury Risk Increased  Goal: Skin Health and Integrity  Outcome: Progressing  Intervention: Plan: Nurse Driven Intervention: Positioning  Recent Flowsheet Documentation  Taken 9/20/2024 1645 by Venus Swartz RN  Plan: Positioning Interventions: REPOSITION Left/Right (No supine) q2h  Intervention: Plan: Nurse Driven Intervention: Moisture Management  Recent Flowsheet Documentation  Taken 9/20/2024 1645 by Venus Swartz RN  Bathing/Skin Care: wipes, CHG  Intervention: Optimize Skin Protection  Recent Flowsheet Documentation  Taken 9/20/2024 1645 by Venus Swartz RN  Pressure Reduction Techniques: frequent weight shift encouraged  Pressure Reduction Devices: (boots) heel offloading device utilized  Activity Management: activity adjusted per tolerance  Head of Bed (HOB) Positioning: HOB at 20 degrees     Problem: Wound  Goal: Optimal Coping  Outcome: Progressing  Goal: Optimal Functional Ability  Outcome: Progressing  Intervention: Optimize Functional Ability  Recent Flowsheet Documentation  Taken 9/20/2024 1645 by Venus Swartz RN  Activity Management: activity adjusted per tolerance  Activity Assistance Provided: assistance, 2 people  Goal: Absence of Infection Signs and Symptoms  Outcome: Progressing  Intervention: Prevent or Manage Infection  Recent Flowsheet Documentation  Taken 9/20/2024 1645 by Venus Swartz RN  Isolation Precautions: contact precautions maintained  Goal: Improved Oral Intake  Outcome: Progressing  Goal: Optimal Pain Control and Function  Outcome: Progressing  Goal: Skin Health and Integrity  Outcome: Progressing  Intervention: Optimize Skin Protection  Recent Flowsheet  Documentation  Taken 9/20/2024 1645 by Venus Swartz, RN  Pressure Reduction Techniques: frequent weight shift encouraged  Pressure Reduction Devices: (boots) heel offloading device utilized  Activity Management: activity adjusted per tolerance  Head of Bed (HOB) Positioning: HOB at 20 degrees  Goal: Optimal Wound Healing  Outcome: Progressing

## 2024-09-21 NOTE — PLAN OF CARE
"Goal Outcome Evaluation:      Plan of Care Reviewed With: patient    Overall Patient Progress: no changeOverall Patient Progress: no change    Outcome Evaluation: A&Ox3, disoriented to time. VSS on RA except soft BP at times. C/o LUE and L buttock/sacrum pain, given PRN Tylenol and oxycodone with decrease in pain. Assist of 2, lift. Turned/repositioned as tolerated. Colostomy with formed stool. Javier patent. PICC to RUE, SL. IV abx. Pureed, mildly thick liquid diet, tolerating. PIV x 1, SL. Contact precautions maintained. Tearful at times. Nusrat visited briefly. Wound cares provided to abdominal and sacral wounds. BG checks ACHS. Strict I&O. Speech/PT/OT/WOC/Social Work following.    Problem: Adult Inpatient Plan of Care  Goal: Plan of Care Review  Description: The Plan of Care Review/Shift note should be completed every shift.  The Outcome Evaluation is a brief statement about your assessment that the patient is improving, declining, or no change.  This information will be displayed automatically on your shift  note.  Outcome: Not Progressing  Flowsheets (Taken 9/21/2024 1500)  Outcome Evaluation: A&Ox3, disoriented to time. VSS on RA except soft BP at times. C/o LUE and L buttock/sacrum pain, given PRN Tylenol and oxycodone with decrease in pain. Assist of 2, lift. Turned/repositioned as tolerated. Colostomy with formed stool. Javier patent. PICC to RUE, SL. IV abx. Pureed, mildly thick liquid diet, tolerating. PIV x 1, SL. Contact precautions maintained. Tearful at times. Nusrat visited briefly. Wound cares provided to abdominal and sacral wounds. BG checks ACHS. Strict I&O. Speech/PT/OT/WOC/Social Work following.  Plan of Care Reviewed With: patient  Overall Patient Progress: no change  Goal: Patient-Specific Goal (Individualized)  Description: You can add care plan individualizations to a care plan. Examples of Individualization might be:  \"Parent requests to be called daily at 9am for status\", \"I have a hard " "time hearing out of my right ear\", or \"Do not touch me to wake me up as it startles  me\".  Outcome: Not Progressing  Goal: Absence of Hospital-Acquired Illness or Injury  Outcome: Not Progressing  Intervention: Identify and Manage Fall Risk  Recent Flowsheet Documentation  Taken 9/21/2024 0915 by Rosy Almaraz RN  Safety Promotion/Fall Prevention:   activity supervised   clutter free environment maintained   assistive device/personal items within reach   increase visualization of patient   increased rounding and observation   nonskid shoes/slippers when out of bed   patient and family education   safety round/check completed  Intervention: Prevent Skin Injury  Recent Flowsheet Documentation  Taken 9/21/2024 1034 by Rosy Almaraz RN  Body Position:   right   weight shifting  Taken 9/21/2024 0915 by Rosy Almaraz RN  Skin Protection: adhesive use limited  Device Skin Pressure Protection: adhesive use limited  Intervention: Prevent and Manage VTE (Venous Thromboembolism) Risk  Recent Flowsheet Documentation  Taken 9/21/2024 0915 by Rosy Almaraz RN  VTE Prevention/Management: SCDs off (sequential compression devices)  Goal: Optimal Comfort and Wellbeing  Outcome: Not Progressing  Intervention: Monitor Pain and Promote Comfort  Recent Flowsheet Documentation  Taken 9/21/2024 1321 by Rosy Almaraz RN  Pain Management Interventions: medication (see MAR)  Taken 9/21/2024 1034 by Rosy Almaraz RN  Pain Management Interventions: medication (see MAR)  Goal: Readiness for Transition of Care  Outcome: Not Progressing     "

## 2024-09-21 NOTE — PLAN OF CARE
Occupational Therapy: Orders received. Chart reviewed and discussed with care team.? Occupational Therapy not indicated due to discussion with PT.  Pt. Previously lived in long term and had 24 hour NSG care and currently requiring max A for transfers and care and recommendation for discharge to LTC.? Defer discharge recommendations to IP PT and medical team.? Will complete orders.

## 2024-09-21 NOTE — PROGRESS NOTES
Anny with [facility] calls for update. She states he was supposed start services with them yesterday but then got admitted here at the hospital. Update given. She can be reached at #[phone] with any questions.

## 2024-09-21 NOTE — PLAN OF CARE
Goal Outcome Evaluation:      Plan of Care Reviewed With: patient    Overall Patient Progress: no changeOverall Patient Progress: no change    Outcome Evaluation: No prn meds given overnight. JG clamped. Resistent to repositioning and turning to examine wound. Colostomy and cutler patent. pureed diet and thickened liquids. DC TBD.      Problem: Adult Inpatient Plan of Care  Goal: Plan of Care Review  Description: The Plan of Care Review/Shift note should be completed every shift.  The Outcome Evaluation is a brief statement about your assessment that the patient is improving, declining, or no change.  This information will be displayed automatically on your shift  note.  Outcome: Progressing  Flowsheets (Taken 9/21/2024 0540)  Outcome Evaluation: No prn meds given overnight. JG clamped. Resistent to repositioning and turning to examine wound. Colostomy and cutler patent. pureed diet and thickened liquids. DC TBD.  Plan of Care Reviewed With: patient  Overall Patient Progress: no change  Goal: Absence of Hospital-Acquired Illness or Injury  Intervention: Identify and Manage Fall Risk  Recent Flowsheet Documentation  Taken 9/21/2024 0200 by Myrna Mcdaniel RN  Safety Promotion/Fall Prevention: activity supervised  Intervention: Prevent Skin Injury  Recent Flowsheet Documentation  Taken 9/21/2024 0030 by Myrna Mcdaniel RN  Body Position:   turned   right  Intervention: Prevent Infection  Recent Flowsheet Documentation  Taken 9/21/2024 0001 by Myrna Mcdaniel RN  Infection Prevention:   hand hygiene promoted   personal protective equipment utilized     Problem: Wound  Goal: Absence of Infection Signs and Symptoms  Intervention: Prevent or Manage Infection  Recent Flowsheet Documentation  Taken 9/21/2024 0001 by Myrna Mcdaniel RN  Isolation Precautions: contact precautions maintained

## 2024-09-21 NOTE — PROGRESS NOTES
09/21/24 0929   Appointment Info   Signing Clinician's Name / Credentials (PT) Vida Mccarthy DPT   Living Environment   People in Home facility resident   Current Living Arrangements assisted living   Living Environment Comments Pt from [facility].   Self-Care   Usual Activity Tolerance fair   Current Activity Tolerance poor   Activity/Exercise/Self-Care Comment Pt reports using Kevin in TCU, but since being at [facility] with nursing has not been OOB. Unclear if facility has Kevin/lift device. Reports he has been getting HHPT 3x/week.   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  He was hospitalized from 8/5 through 8/30 due to sepsis secondary to infected unstageable sacral pressure ulcer/wounds.  He did have ESBL in the wound and bacteremia.  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  He underwent debridement and diverting colostomy on 8/13.  He did have ARIADNE/ATN and was treated for possible aspiration pneumonia during that admission.  Long-term care was recommended, but he preferred to discharge home to his [facility] with ongoing wound care twice daily for wound VAC management.  He was discharged with a right arm PICC with IV daptomycin and IV meropenem through 9/24.  He has a   that has been concerned regarding his wounds getting worse despite antibiotics and wound VAC therapy.  The wound VAC has not been forming a seal per their report.  They sent some pictures to his outpatient ID doctor [facility] who recommended he come in for evaluation.  The patient says he feels okay in the ER.  He is hungry.  Not reporting any significant pain at this moment.  He has not been having any fevers or shaking chills.   Existing Precautions/Restrictions fall   Cognition   Affect/Mental Status (Cognition) WFL   Follows Commands (Cognition) WFL   Pain Assessment   Patient Currently in Pain Yes, see Vital Sign flowsheet   Integumentary/Edema   Integumentary/Edema Comments sacral wound, age related changes, see nursing notes   Posture    Posture Forward head position;Protracted shoulders   Posture Comments posterior and R lean in sitting, mod A   Range of Motion (ROM)   Range of Motion ROM deficits secondary to weakness   ROM Comment L sided hemiparesis per baseline   Strength (Manual Muscle Testing)   Strength (Manual Muscle Testing) Deficits observed during functional mobility   Strength Comments L sided hemiparesis per baseline, poor trunk control, mod A in sitting, unable to SLR   Bed Mobility   Comment, (Bed Mobility) supine<>sit with max-total A   Transfers   Comment, (Transfers) unable to assess, unable to tolerate   Gait/Stairs (Locomotion)   Comment, (Gait/Stairs) unable to assess, unable to tolerate   Balance   Balance Comments impaired sitting balance, mod A   Clinical Impression   Criteria for Skilled Therapeutic Intervention Yes, treatment indicated   PT Diagnosis (PT) impaired functional mobility   Influenced by the following impairments L hemiparesis, weakness, deconditioning, impaired balance   Functional limitations due to impairments difficulty with bed mobility, sitting balance, transfers, wheelchair mobility   Clinical Presentation (PT Evaluation Complexity) stable   Clinical  Presentation Rationale clinical judgement   Clinical Decision Making (Complexity) low complexity   Planned Therapy Interventions (PT) balance training;bed mobility training;ROM (range of motion);strengthening;neuromuscular re-education;transfer training;wheelchair management/propulsion training   PT Total Evaluation Time   PT Eval, Low Complexity Minutes (87773) 12   Physical Therapy Goals   PT Frequency 3x/week   PT Predicted Duration/Target Date for Goal Attainment 09/28/24   PT Goals Bed Mobility;PT Goal 1   PT: Bed Mobility Moderate assist;Supine to/from sit   PT: Goal 1 Pt will be able to tolerate sitting in supported chair with SBA for 5 minutes   PT Discharge Planning   PT Plan PT: increase IND bed mob, sitting tolerance/balance, LE ROM   PT Discharge Recommendation (DC Rec) Long term care facility   PT Rationale for DC Rec Patient will require 24/7 care and mechanical lift for transfers. Max-total A for bed mobility. Pt limited by weakness, impaired balance, and deconditioning. Rec LTC.   PT Brief overview of current status Max-Total A for bed mobility.   Total Session Time   Total Session Time (sum of timed and untimed services) 12

## 2024-09-21 NOTE — PROGRESS NOTES
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  He was hospitalized from 8/5 through 8/30 due to sepsis secondary to infected unstageable sacral pressure ulcer/wounds.  He did have ESBL in the wound and bacteremia.  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  He underwent debridement and diverting colostomy on 8/13.  He did have ARIADNE/ATN and was treated for possible aspiration pneumonia during that admission.  Long-term care was recommended, but he preferred to discharge home to his [facility] with ongoing wound care twice daily for wound VAC management.  He was discharged with a right arm PICC with IV daptomycin and IV meropenem through 9/24.  He has a  that has been concerned regarding his wounds getting worse despite antibiotics and wound VAC therapy.  The wound VAC has not been forming a seal per their report.  They sent some pictures to his outpatient ID doctor [facility] who recommended he come in for evaluation.  The patient says he feels okay in the ER.  He is hungry.  Not reporting any significant pain at this moment.  He has not been having any fevers or shaking chills.      Unstageable sacral pressure ulcer present on admission.  Recent diverting colostomy.  VRE and ESBL wound infections.  -Appreciate general surgery input.  -No further acute surgical intervention at this time plan.  -Appreciate infectious  disease input.  -Continue IV daptomycin and meropenem.  -Wound nurse following.  -Reassess for wound VAC on 9/21.     Dysphagia.  -Speech pathology consult appreciated.  -Dysphagia diet.     Malnutrition.  -Registered dietitian consult appreciated.  -Diet supplements.     Drug-induced coagulation defect.  Previous DVT.  -INR mildly supratherapeutic.  -Continue monitor daily INR.  -Pharmacy to dose warfarin.     Diabetes mellitus type 2.  -Continue Jardiance 25 mg a day.  -Continue glargine insulin 5 units a day.  -Aspart insulin sliding scale as needed.     Previous stroke.  Hypertension.  Hyperlipidemia.  Chronic heart failure with preserved ejection fraction.  -Rosuvastatin currently on hold.  -Continue warfarin.  -Continue aspirin 81 mg a day.  -Continue metoprolol succinate 25 mg a day.  -Continue Entresto 97/103 mg twice a day.     GERD.  -Continue pantoprazole 40 mg a day.          Diet: Snacks/Supplements Adult: Ensure Enlive; With Meals  Combination Diet Pureed Diet (level 4); Mildly Thick (level 2); Mildly Thick (level 2)    DVT Prophylaxis: Warfarin  Javier Catheter: PRESENT, indication: Wound deterioration and failed external collection device  Lines: PRESENT      PICC 08/15/24 Single Lumen Right Brachial vein medial IV Antibiotics-Site Assessment: WDL except;Drainage (serous, old)      Cardiac Monitoring: None  Code Status: No CPR- Pre-arrest intubation OK      Clinically Significant Risk Factors              # Hypoalbuminemia: Lowest albumin = 2.4 g/dL at 9/19/2024  8:03 AM, will monitor as appropriate  # Coagulation Defect: INR = 3.94 (Ref range: 0.85 - 1.15) and/or PTT = 54 Seconds (Ref range: 22 - 38 Seconds), will monitor for bleeding    # Hypertension: Noted on problem list               # Financial/Environmental Concerns:           Disposition Plan     Medically Ready for Discharge: Anticipated in 2-4 Days             Jasiel El DO  Hospitalist Service  [facility]  Hospital  Securely message with [employer] (more info)  Text page via [facility] Paging/Directory   ______________________________________________________________________    Interval History   Having some buttocks pain.  Denies chest pain, shortness of breath, fevers, chills, nausea, vomiting.    Physical Exam   Vital Signs: Temp: 97.7  F (36.5  C) Temp src: Temporal BP: 115/75 Pulse: 80   Resp: 20 SpO2: 91 % O2 Device: None (Room air)    Weight: 146 lbs 12.8 oz    Gen:  NAD, A&Ox3.  Eyes:  PERRL, sclera anicteric.  OP:  MMM, no lesions.  Neck:  Supple.  CV:  Regular, no murmurs.  Lung:  CTA b/l, normal effort.  Ab: Ostomy bag present, +BS, soft.  Skin:  Warm, dry to touch.  No rash.  Ext:  No pitting edema LE b/l.      Medical Decision Making       40 MINUTES SPENT BY ME on the date of service doing chart review, history, exam, documentation & further activities per the note.      Data     I have personally reviewed the following data over the past 24 hrs:    14.0 (H)  \   10.5 (L)   / 287     139 104 17.0 /  116 (H)   3.5 27 0.53 (L) \     INR:  3.94 (H) PTT:  N/A   D-dimer:  N/A Fibrinogen:  N/A       Imaging results reviewed over the past 24 hrs:   No results found for this or any previous visit (from the past 24 hour(s)).
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
## 2024-09-22 NOTE — PLAN OF CARE
"Goal Outcome Evaluation:      Plan of Care Reviewed With: patient          Outcome Evaluation: VSS on RA. Denies pain/SOB. Javier patent. Single lumen PICC. Contact precautions maintained.      Problem: Adult Inpatient Plan of Care  Goal: Plan of Care Review  Description: The Plan of Care Review/Shift note should be completed every shift.  The Outcome Evaluation is a brief statement about your assessment that the patient is improving, declining, or no change.  This information will be displayed automatically on your shift  note.  Outcome: Progressing  Flowsheets (Taken 9/22/2024 0751)  Outcome Evaluation: VSS on RA. Denies pain/SOB. Javier patent. Single lumen PICC. Contact precautions maintained.  Plan of Care Reviewed With: patient  Goal: Patient-Specific Goal (Individualized)  Description: You can add care plan individualizations to a care plan. Examples of Individualization might be:  \"Parent requests to be called daily at 9am for status\", \"I have a hard time hearing out of my right ear\", or \"Do not touch me to wake me up as it startles  me\".  Outcome: Progressing  Goal: Absence of Hospital-Acquired Illness or Injury  Outcome: Progressing  Goal: Optimal Comfort and Wellbeing  Outcome: Progressing  Goal: Readiness for Transition of Care  Outcome: Progressing     Problem: Skin Injury Risk Increased  Goal: Skin Health and Integrity  Outcome: Progressing     Problem: Wound  Goal: Optimal Coping  Outcome: Progressing  Goal: Optimal Functional Ability  Outcome: Progressing  Goal: Absence of Infection Signs and Symptoms  Outcome: Progressing  Goal: Improved Oral Intake  Outcome: Progressing  Goal: Optimal Pain Control and Function  Outcome: Progressing  Goal: Skin Health and Integrity  Outcome: Progressing  Goal: Optimal Wound Healing  Outcome: Progressing     "

## 2024-09-22 NOTE — PLAN OF CARE
Goal Outcome Evaluation:      Plan of Care Reviewed With: patient          Outcome Evaluation: VSS. C/o buttock discomfort - Adequately relieved with position changes and PRN medications. Tolerating modified diet. G/J tube remains clamped - dressing changed noting old purulent appearing drainage. Wound cares completed as ordered. Bilateral heels reddened with mepilex - rooke boots and isoflex mattress applied. Ostomy with stool and gas output. Indwelling cutler with adequate output. Phos replaced per protocol. PICC line dressing continues to have noticeable saturation within a days time following dressing change. Dressing changed per vascular access today - receiving instructions to continue to change when dressing peeling - otherwise ok to continue to use. Pt with alternative PIV that is functional and SL. Pt not out of bed this shift. Plan for palliative consult tomorrow and wound care re-evaluate. Remains on antibiotics until Tuesday.      Problem: Adult Inpatient Plan of Care  Goal: Plan of Care Review  Description: The Plan of Care Review/Shift note should be completed every shift.  The Outcome Evaluation is a brief statement about your assessment that the patient is improving, declining, or no change.  This information will be displayed automatically on your shift  note.  Outcome: Progressing  Flowsheets (Taken 9/22/2024 1431)  Outcome Evaluation: VSS. C/o buttock discomfort - Adequately relieved with position changes and PRN medications. Tolerating modified diet. G/J tube remains clamped - dressing changed noting old purulent appearing drainage. Wound cares completed as ordered. Bilateral heels reddened with mepilex - rooke boots and isoflex mattress applied. Ostomy with stool and gas output. Indwelling cutler with adequate output. Phos replaced per protocol. PICC line dressing continues to have noticeable saturation within a days time following dressing change. Dressing changed per vascular access today -  "receiving instructions to continue to change when dressing peeling - otherwise ok to continue to use. Pt with alternative PIV that is functional and SL. Pt not out of bed this shift. Plan for palliative consult tomorrow and wound care re-evaluate. Remains on antibiotics until Tuesday.  Plan of Care Reviewed With: patient  Goal: Patient-Specific Goal (Individualized)  Description: You can add care plan individualizations to a care plan. Examples of Individualization might be:  \"Parent requests to be called daily at 9am for status\", \"I have a hard time hearing out of my right ear\", or \"Do not touch me to wake me up as it startles  me\".  Outcome: Progressing  Goal: Absence of Hospital-Acquired Illness or Injury  Outcome: Progressing  Intervention: Identify and Manage Fall Risk  Recent Flowsheet Documentation  Taken 9/22/2024 0900 by Mack Robertson, RN  Safety Promotion/Fall Prevention:   assistive device/personal items within reach   increased rounding and observation   increase visualization of patient   patient and family education   lighting adjusted   mobility aid in reach   nonskid shoes/slippers when out of bed  Intervention: Prevent Skin Injury  Recent Flowsheet Documentation  Taken 9/22/2024 0900 by Mack Robertson, RN  Device Skin Pressure Protection:   absorbent pad utilized/changed   positioning supports utilized   pressure points protected   skin-to-skin areas padded  Goal: Optimal Comfort and Wellbeing  Outcome: Progressing  Intervention: Monitor Pain and Promote Comfort  Recent Flowsheet Documentation  Taken 9/22/2024 0900 by Mack Robertson, RN  Pain Management Interventions:   medication (see MAR)   repositioned  Goal: Readiness for Transition of Care  Outcome: Progressing     "

## 2024-09-22 NOTE — PROGRESS NOTES
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  He was hospitalized from 8/5 through 8/30 due to sepsis secondary to infected unstageable sacral pressure ulcer/wounds.  He did have ESBL in the wound and bacteremia.  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  He underwent debridement and diverting colostomy on 8/13.  He did have ARIADNE/ATN and was treated for possible aspiration pneumonia during that admission.  Long-term care was recommended, but he preferred to discharge home to his [facility] with ongoing wound care twice daily for wound VAC management.  He was discharged with a right arm PICC with IV daptomycin and IV meropenem through 9/24.  He has a  that has been concerned regarding his wounds getting worse despite antibiotics and wound VAC therapy.  The wound VAC has not been forming a seal per their report.  They sent some pictures to his outpatient ID doctor [facility] who recommended he come in for evaluation.  The patient says he feels okay in the ER.  He is hungry.  Not reporting any significant pain at this moment.  He has not been having any fevers or shaking chills.      Unstageable sacral pressure ulcer present on admission.  Recent diverting colostomy.  VRE and ESBL wound infections.  -Appreciate general surgery input.  -No further acute surgical intervention at this time plan.  -Appreciate infectious  disease input.  -Continue IV daptomycin and meropenem.  -Wound nurse following.  -Reassess for wound VAC on 9/21.  -TLC consult to consider hospice care.     Dysphagia.  -Speech pathology consult appreciated.  -Dysphagia diet.     Malnutrition.  -Registered dietitian consult appreciated.  -Diet supplements.     Drug-induced coagulation defect.  Previous DVT.  -INR mildly supratherapeutic.  -Continue to monitor daily INR.  -Pharmacy to dose warfarin.     Diabetes mellitus type 2.  -Continue Jardiance 25 mg a day.  -Continue glargine insulin 5 units a day.  -Aspart insulin sliding scale as needed.     Previous stroke.  Hypertension.  Hyperlipidemia.  Chronic heart failure with preserved ejection fraction.  -Rosuvastatin currently on hold.  -Continue warfarin.  -Continue aspirin 81 mg a day.  -Continue metoprolol succinate 25 mg a day.  -Continue Entresto 97/103 mg twice a day.     GERD.  -Continue pantoprazole 40 mg a day.    Hypophosphatemia.  -Phosphorus replacement protocol.                Diet: Combination Diet Pureed Diet (level 4); Mildly Thick (level 2); Mildly Thick (level 2)  Snacks/Supplements Adult: Ensure Enlive; With Meals  Snacks/Supplements Adult: Magic Cup; Between Meals  Snacks/Supplements Adult: Expedite Bottle; With Meals    DVT Prophylaxis: Warfarin  Javier Catheter: PRESENT, indication: Wound deterioration and failed external collection device  Lines: PRESENT      PICC 08/15/24 Single Lumen Right Brachial vein medial IV Antibiotics-Site Assessment: WDL except;Drainage;Other (Comment) (scant drainage noted on dressing)      Cardiac Monitoring: None  Code Status: No CPR- Pre-arrest intubation OK      Clinically Significant Risk Factors              # Hypoalbuminemia: Lowest albumin = 2.4 g/dL at 9/19/2024  8:03 AM, will monitor as appropriate  # Coagulation Defect: INR = 2.71 (Ref range: 0.85 - 1.15) and/or PTT = 54 Seconds (Ref range: 22 - 38 Seconds), will monitor for bleeding    # Hypertension:  Noted on problem list            # Severe Malnutrition: based on nutrition assessment, PRESENT ON ADMISSION   # Financial/Environmental Concerns: none         Disposition Plan     Medically Ready for Discharge: Anticipated in 2-4 Days             Jasiel El DO  Hospitalist Service  [facility]  Securely message with [employer] (more info)  Text page via [employer] Paging/Directory   ______________________________________________________________________    Interval History   Having some buttocks pain.  Denies chest pain, shortness of breath, fevers, chills, nausea, vomiting.    Physical Exam   Vital Signs: Temp: 97.4  F (36.3  C) Temp src: Temporal BP: (!) 144/49 Pulse: 87   Resp: 20 SpO2: 99 % O2 Device: None (Room air)    Weight: 146 lbs 12.8 oz    Gen: Thin, NAD, A&Ox3.  Eyes:  PERRL, sclera anicteric.  OP:  MMM, no lesions.  Neck:  Supple.  CV:  Regular, no loud murmurs.  Lung:  CTA b/l, normal effort.  Ab:  +BS, soft.  Skin:  Warm, dry to touch.  No rash.  Ext:  No pitting edema LE b/l.      Medical Decision Making       37 MINUTES SPENT BY ME on the date of service doing chart review, history, exam, documentation & further activities per the note.      Data     I have personally reviewed the following data over the past 24 hrs:    N/A  \   N/A   / N/A     N/A N/A N/A /  225 (H)   3.7 N/A N/A \     INR:  2.71 (H) PTT:  N/A   D-dimer:  N/A Fibrinogen:  N/A       Imaging results reviewed over the past 24 hrs:   No results found for this or any previous visit (from the past 24 hour(s)).

## 2024-09-22 NOTE — PLAN OF CARE
"Goal Outcome Evaluation:      Plan of Care Reviewed With: patient    Overall Patient Progress: no changeOverall Patient Progress: no [id number] RN    Patient VS are at baseline: Yes  Patient able to ambulate as they were prior to admission or with assist devices provided by therapy during their stay: No needs lift for transfers  Patient must void prior to discharge: No has a cutler catheter  Patient able to tolerate oral intake: Yes  Patient has adequate pain control using oral analgesics: Yes  Oxycodone and repositioning used for pain management  Colostomy bag changed this shift, dressing changed on abdominal wound  Dressing on buttocks/sacrum CDI  PICC on RUE, continues on IV Meropenem.  Blood sugars 285 and 214, will continue to follow plan of care  Problem: Adult Inpatient Plan of Care  Goal: Plan of Care Review  Description: The Plan of Care Review/Shift note should be completed every shift.  The Outcome Evaluation is a brief statement about your assessment that the patient is improving, declining, or no change.  This information will be displayed automatically on your shift  note.  Outcome: Not Progressing  Flowsheets (Taken 9/21/2024 5805)  Plan of Care Reviewed With: patient  Overall Patient Progress: no change  Goal: Patient-Specific Goal (Individualized)  Description: You can add care plan individualizations to a care plan. Examples of Individualization might be:  \"Parent requests to be called daily at 9am for status\", \"I have a hard time hearing out of my right ear\", or \"Do not touch me to wake me up as it startles  me\".  Outcome: Not Progressing  Goal: Absence of Hospital-Acquired Illness or Injury  Outcome: Not Progressing  Intervention: Identify and Manage Fall Risk  Recent Flowsheet Documentation  Taken 9/21/2024 1620 by Venus Swartz, RN  Safety Promotion/Fall Prevention:   activity supervised   assistive device/personal items within reach   clutter free environment maintained  Intervention: " Prevent Skin Injury  Recent Flowsheet Documentation  Taken 9/21/2024 1620 by Venus Swartz RN  Body Position:   turned   left  Intervention: Prevent and Manage VTE (Venous Thromboembolism) Risk  Recent Flowsheet Documentation  Taken 9/21/2024 1620 by Venus Swartz RN  VTE Prevention/Management: SCDs off (sequential compression devices)  Intervention: Prevent Infection  Recent Flowsheet Documentation  Taken 9/21/2024 1620 by Venus Swartz RN  Infection Prevention:   hand hygiene promoted   personal protective equipment utilized  Goal: Optimal Comfort and Wellbeing  Outcome: Not Progressing  Intervention: Monitor Pain and Promote Comfort  Recent Flowsheet Documentation  Taken 9/21/2024 2153 by Venus Swartz RN  Pain Management Interventions:   medication (see MAR)   repositioned  Goal: Readiness for Transition of Care  Outcome: Not Progressing     Problem: Skin Injury Risk Increased  Goal: Skin Health and Integrity  Outcome: Not Progressing  Intervention: Plan: Nurse Driven Intervention: Positioning  Recent Flowsheet Documentation  Taken 9/21/2024 1620 by Venus Swartz RN  Plan: Positioning Interventions:   REPOSITION Left/Right (No supine) q2h   OFF-LOAD HEELS with boots  Intervention: Optimize Skin Protection  Recent Flowsheet Documentation  Taken 9/21/2024 1620 by Venus Swartz RN  Pressure Reduction Techniques: frequent weight shift encouraged  Pressure Reduction Devices: heel offloading device utilized  Activity Management:   activity adjusted per tolerance   activity encouraged  Head of Bed (HOB) Positioning: HOB at 20-30 degrees     Problem: Wound  Goal: Optimal Coping  Outcome: Not Progressing  Goal: Optimal Functional Ability  Outcome: Not Progressing  Intervention: Optimize Functional Ability  Recent Flowsheet Documentation  Taken 9/21/2024 1620 by Venus Swartz RN  Activity Management:   activity adjusted per tolerance   activity encouraged  Activity Assistance  Provided: assistance, 2 people  Goal: Absence of Infection Signs and Symptoms  Outcome: Not Progressing  Intervention: Prevent or Manage Infection  Recent Flowsheet Documentation  Taken 9/21/2024 1620 by Venus Swartz RN  Isolation Precautions: contact precautions maintained  Goal: Improved Oral Intake  Outcome: Not Progressing  Goal: Optimal Pain Control and Function  Outcome: Not Progressing  Intervention: Prevent or Manage Pain  Recent Flowsheet Documentation  Taken 9/21/2024 2153 by Venus Swartz, RN  Pain Management Interventions:   medication (see MAR)   repositioned  Goal: Skin Health and Integrity  Outcome: Not Progressing  Intervention: Optimize Skin Protection  Recent Flowsheet Documentation  Taken 9/21/2024 1620 by Venus Swartz, RN  Pressure Reduction Techniques: frequent weight shift encouraged  Pressure Reduction Devices: heel offloading device utilized  Activity Management:   activity adjusted per tolerance   activity encouraged  Head of Bed (HOB) Positioning: HOB at 20-30 degrees  Goal: Optimal Wound Healing  Outcome: Not Progressing

## 2024-09-23 NOTE — PROGRESS NOTES
Care Management Follow Up    Length of Stay (days): 4    Expected Discharge Date: 09/27/2024     Concerns to be Addressed: disposition patient wants to go home on hospice   Patient plan of care discussed at interdisciplinary rounds: yes    Anticipated Discharge Disposition: Home with hospice  Anticipated Discharge Services:  hospice  Anticipated Discharge DME:  Air mattress from hospice    Patient/family educated on Medicare website which has current facility and service quality ratings:  yes  Education Provided on the Discharge Plan:  yes  Patient/Family in Agreement with the Plan: yes    Referrals Placed by CM/SW:  yes to [facility]  Private pay costs discussed: transportation costs    Discussed  Partnership in Safe Discharge Planning  document with patient/family: No     Additional Information:  Patient and son have decided on hospice care. [facility] is preferred for hosp[facility] care. Referral sent.    JAI met with patient and caregiver, Nusrat. Patient already has a hospital bed and all the DME he needs but needs an air mattress due to his wounds and he bed bound.    Next Steps: Waiting for hospice to return call to set up hospice care so patient can go home.    Christina Bella, [facility] HospitalSW

## 2024-09-23 NOTE — CONSULTS
SPIRITUAL HEALTH SERVICES - Consult Note  Ortho 6    Referral Source/ Reason for Visit: Staff consult for emotional support.    Summary and Recommendations -     Compassionately listened to patient engage in life review.  He says he does not know why he survived his stroke.  He was not open to comfort measures previously; but has now changed his mind.    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    His biggest fear is dying alone and in pain.    Patient remains grateful for his son's support.    Plan: He asked for prayers for comfort and peace, which I provided.  No additional needs.  [facility] remains available upon request.    Rev. SANDRA Enciso.  Staff     [facility]S available 24/7 for emergent requests/ referrals, either by paging the on-call  or by entering an ASAP/STAT consult in Room 8 Studio, which will also page the on-call .      Assessment    Saw pt Franklyn Sorto regarding staff consult for emotional support.    Patient/Family Understanding of Illness and Goals of Care - Patient says that he is open to comfort care; which was not previously the case.    Distress and Loss - He regrets that he and his daughter do not get along.    Strengths, Coping and Resources - He named his son as a supportive person in his life.    Meaning, Beliefs and Spirituality - Patient is Pentecostalism.  He has no home Quaker at this time.

## 2024-09-23 NOTE — CONSULTS
Palliative Care Consultation Note  [facility]      Patient: Franklyn Sorto  Date of Admission:  9/19/2024    Requesting Clinician / Team: Hospital medicine  Reason for consult:   Goals of care     Recommendations & Counseling     GOALS OF CARE:   Comfort focused   Abdoulaye wishes to discharge and to go home with hospice support.  While he awaits discharge, he assents to transitioning to comfort care.  Orders now reflective of comfort care    ADVANCE CARE PLANNING:  Patient has an advance directive dated 05/29/24.  Primary Health Care Agent Herman Layton.  Alternate(s) Abdoulaye Sorto.   There is a POLST form on file, this was reviewed and current.  Code status: No CPR- Pre-arrest intubation OK    DECISION MAKING:  Patient's decision making preferences: shared with support from loved ones  Patient has decision-making capacity today for complex decisions: Intact     MEDICAL MANAGEMENT:   Comfort Care   Below are common symptoms routinely seen in patients with comfort focused goals and at the end of life. This patient may not currently exhibit all of these symptoms; however, if these were to occur our recommendations are as follows:     #Pain  Oxycodone 2.5 to 5 mg every 3 hours as needed for severe pain  Continue with Tylenol as needed    #Anxiety    1st choice: Lorazepam PO/SL q 1 mg  q 3 hour as needed.   2nd choice: Lorazepam IV q 1 mg  q 3 hour as needed    #Secretion burden   Robinul 0.1 mg (PO/IV) q 4 hours as needed. If ineffective, increase up to 0.3 mg   Consider atropine if Robinul ineffective after dose increase      #Nausea   Zofran 4 mg q 6 hours as needed. Can increase to 8 mg   Zyprexa 5 mg q 6 hours as needed     #Agitation  Aggressive symptoms control first, then  1st choice: Zyprexa 5 mg ODT or IM   2nd choice: Increase dose of Zyprexa to 10 mg or consider switch to Haldol   3rd choice: Lorazepam as above     PSYCHOSOCIAL/SPIRITUAL SUPPORT:  Family   Friends   [place] community:  Religious   Would appreciate Spiritual Health Services, Consult has been placed for support     Palliative Care will continue to follow. Thank you for the consult and allowing us to aid in the care of Franklyn Sorto.    These recommendations have been discussed with bedside and primary team.    Sherman Dela Cruz NP  [facility], Palliative Care  Securely message with the [employer] (learn more here) or  Text page via [facility] Paging/Directory     Chart documentation was completed, in part, with [employer] voice-recognition software. Even though reviewed, some grammatical, spelling, and word errors may remain.    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 He was hospitalized from 8/5 through 8/30 due to sepsis secondary to infected unstageable sacral pressure ulcer/wounds. He was admitted for wound assessment and treatment     Today, the patient was seen for:  Goals of care support, established patient with our service    History of Present Illness   Met with Abdoulaye and his friend also power of  Devin.   Introduced the role of palliative care as an interdisciplinary team that cares for patients with serious illness to help support symptom management, communication, coping for patients and their families as well as support with medical decision making.    Prognosis, Goals, & Planning:   Functional Status just prior to this current hospitalization:  Outpatient Palliative Performance Score (PPS) 40%  Extensive disease. Mainly in bed  "w/little ambulation. ADLs/activities mainly w/assistance. Normal or reduced intake. Full LOC or drowsy or confused.   ECOG3 (Capable of only limited self-care; needs help with ADLs; in bed/chair >50% of waking hours)    Prognosis, Goals, and/or Advance Care Planning:  Abdoulaye and jae shared his medical journey and the burden that this is caused for him and his quality of life.  He reflected on his time while hospitalized and at TCU.  He shared his frustration with loss of independence and being reliant upon others.  He discussed how this is all very abrupt happening from his stroke back in May.  He shared that he is ready to \"go home \"and \"be with the Lord\" he discussed that he is wanting to discharge from the hospital and to go home with hospice support.  Education provided on transition to comfort-focused goals of care would be including discontinuation of IV fluids, cardiac monitoring, labs, tube feeding, TPN and other interventions that do not directly promote comfort.  Anticipatory guidance was given regarding feeding, hunger, fluids at end of life. We discussed utilization of medications to ease air hunger, agitation and restlessness at the time that ventilator is compassionately withdrawn.  Discussed that this process is very purposeful in terms of ensuring patient is as comfortable as possible and that family wishes are honored.  Education provided regarding hospice philosophy, prognostic,and eligibility criteria. Discussed what services are provided and those that are not,  Discussed common misconceptions. We explored the various disposition options where they can receive hospice care (home, residential hospice homes, LTC with hospice) including subsequent financial and familial implications. Discussed typical anticipated timing of discharge.    Code Status was addressed today:   No previously DNR/DNI which is clinically appropriate          Coping, Meaning, & Spirituality:   Mood, coping, and/or meaning in " the context of serious illness were addressed today: Yes    Social:   Living situation:resides in a nursing home previously residing in [facility]  Important relationships/caregivers: Son, Abdoulaye, friend, Herman and daughter who lives in [place]  Occupation: Retired  and /port    Medications:  Reviewed this patient's medication profile and medications from this hospitalization.   [place] Board of Pharmacy Data Base Reviewed: No    ROS:  Comprehensive ROS is reviewed and is negative except as here & per HPI:     Physical Exam   Vital Signs with Ranges  Temp:  [97.3  F (36.3  C)-98.1  F (36.7  C)] 97.3  F (36.3  C)  Pulse:  [75-95] 95  Resp:  [16-20] 16  BP: ()/(15-72) 139/72  SpO2:  [95 %-98 %] 97 %  Wt Readings from Last 10 Encounters:   09/19/24 66.6 kg (146 lb 12.8 oz)   09/14/24 72.6 kg (160 lb)   09/12/24 74.4 kg (164 lb)   08/29/24 74.4 kg (164 lb 0.4 oz)   06/21/24 73 kg (160 lb 15 oz)   03/01/24 75.8 kg (167 lb)   11/13/23 76.2 kg (168 lb)   07/11/23 74.4 kg (164 lb)   07/07/23 74.4 kg (164 lb)   04/17/23 81.2 kg (179 lb)     146 lbs 12.8 oz    Physical Exam  Vitals and nursing note reviewed.   Constitutional:       General: He is not in acute distress.  HENT:      Head: Normocephalic.      Mouth/Throat:      Mouth: Mucous membranes are moist.      Pharynx: Oropharynx is clear.   Eyes:      Extraocular Movements: Extraocular movements intact.      Conjunctiva/sclera: Conjunctivae normal.      Pupils: Pupils are equal, round, and reactive to light.   Cardiovascular:      Rate and Rhythm: Normal rate and regular rhythm.      Pulses: Normal pulses.   Pulmonary:      Effort: Pulmonary effort is normal. No respiratory distress.      Breath sounds: No wheezing.   Abdominal:      General: Abdomen is flat. There is no distension.      Tenderness: There is no abdominal tenderness.   Musculoskeletal:         General: Normal range of motion.   Skin:     General: Skin is warm and dry.       Capillary Refill: Capillary refill takes less than 2 seconds.   Neurological:      General: No focal deficit present.      Mental Status: He is alert. Mental status is at baseline.   Psychiatric:         Mood and Affect: Mood normal.         Thought Content: Thought content normal.      Comments: Tearful at times           Data reviewed:  Results for orders placed or performed during the hospital encounter of 09/19/24 (from the past 24 hour(s))   Glucose by meter   Result Value Ref Range    GLUCOSE BY METER POCT 261 (H) 70 - 99 mg/dL   Glucose by meter   Result Value Ref Range    GLUCOSE BY METER POCT 231 (H) 70 - 99 mg/dL   Glucose by meter   Result Value Ref Range    GLUCOSE BY METER POCT 181 (H) 70 - 99 mg/dL   INR   Result Value Ref Range    INR 2.31 (H) 0.85 - 1.15   Potassium   Result Value Ref Range    Potassium 4.1 3.4 - 5.3 mmol/L   Magnesium   Result Value Ref Range    Magnesium 2.0 1.7 - 2.3 mg/dL   Phosphorus   Result Value Ref Range    Phosphorus 2.3 (L) 2.5 - 4.5 mg/dL   Glucose by meter   Result Value Ref Range    GLUCOSE BY METER POCT 140 (H) 70 - 99 mg/dL   Glucose by meter   Result Value Ref Range    GLUCOSE BY METER POCT 259 (H) 70 - 99 mg/dL       Medical Decision Making       85 MINUTES SPENT BY ME on the date of service doing chart review, history, exam, documentation & further activities per the note.

## 2024-09-23 NOTE — PROGRESS NOTES
[facility] Nurse Inpatient Assessment     Consulted for: ileostomy, left arm, sacrum    Summary: Patient now transitioning to comfort care/hospice.  Continue Vashe packing to sacrum to help stabilize wound, no plans to restart VAC now.      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 He was hospitalized from 8/5 through 8/30 due to sepsis secondary to infected unstageable sacral pressure ulcer/wounds. He did have ESBL in the wound and bacteremia. 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 He underwent debridement and diverting colostomy on 8/13. He did have ARIADNE/ATN and was treated for possible aspiration pneumonia during that admission. Long-term care was recommended, but he preferred to discharge home to his [facility] with ongoing wound care twice daily for wound VAC management. He was discharged with a right arm PICC with IV daptomycin and IV meropenem through 9/24. He has a  that has been concerned regarding his wounds getting worse despite antibiotics and wound VAC therapy. The wound VAC has not been forming a seal per their report. They sent some pictures to his outpatient [facility] who recommended he come in for evaluation. The patient says he feels okay in the ER. He is hungry. Not reporting any significant pain at this moment. He has not been having any fevers or shaking chills.     Assessment:      Areas visualized during today's visit: Sacrum/coccyx,  abdomen, left arm, colostomy    Pressure Injury Location: Sacrum  Last photo: 9/23/24    Wound type: Pressure Injury     Pressure Injury Stage: 4, present on admission   Wound history/plan of care:   Patient with chronic sacral pressure injury and history of debridements.  Has been using wound VAC to site.   Wound base: 65 %  pink red tissue , 30 % Slough and Adipose tissue, 5% exposed bone     Palpation of the wound bed: normal      Drainage:  large     Description of drainage: serosanguinous and purulent     Measurements (length x width x depth, in cm) 7.5  x 8  x  4 cm      Tunneling N/A     Undermining up to 4 cm from 1-6 o'clock  Periwound skin: Erythema- blanchable      Color: pink and purple      Temperature: normal   Odor: none  Pain: mild  Pain intervention prior to dressing change: slow and gentle cares   Treatment goal: Heal , Infection control/prevention, and Remove necrotic tissue  STATUS: improving  Supplies ordered: gathered    My PI Risk Assessment     Sensory Perception: 2 - Very Limited     Moisture: 2 - Very moist      Activity: 2 - Chairfast     Mobility: 2 - Very limited     Nutrition: 2 - Probably inadequate      Friction/Shear: 2 - Potential problem      TOTAL: 12     Wound location: Left abdomen  Last photo: 9/20/24    Wound due to: Surgical Wound  Wound history/plan of care: Old colostomy site.  Patient s/p colostomy on 8/14/24 and revision on 8/25/24.    Wound base: 100 % Granulation tissue     Palpation of the wound bed: normal      Drainage: small     Description of drainage: serosanguinous     Measurements (length x width x depth, in cm): 1.5  x 1.8  x  0.7 cm      Tunneling: N/A     Undermining: N/A  Periwound skin: Rash-improving      Color: pink      Temperature: normal   Odor: none  Pain: mild  Pain interventions prior to dressing change: slow and gentle cares   Treatment goal: Heal   STATUS: improving  Supplies ordered: at bedside    Assessment of established  Colostomy:  Diagnosis  "Pertinent to Stoma:  Sacral wound       Surgery Date: 8/25/24  Surgeon:Renata       Hospital: [facility]  Pouching system in place on assessment today: Martha two piece and cut to fit   Pouch barrier status: intact  Pouch last changed/wear time: 9/23/24  Reason for pouch change today: pouch not changed today-patient reports this was recently changed  Effectiveness of current pouching/ supply plan: Effective  Change made with ostomy management today: No  Supplies: at bedside  Last photo: none  Stoma location: LUQ  Stoma size: did not measure today  Stoma appearance: pink  Mucocutaneous junction:  not visualized (barrier in place)  Peristomal complication(s): not visualized (barrier in place)  Output: semi-formed and brown  Output volume emptied during visit: 0ml  Abdominal assessment: Soft      Treatment Plan:     Sacral wound(s): BID and prn when saturated  Cleanse with Vashe  Pack with Vashe moistened kerlix   Cover with ABD      Left abdomen wound(s): Daily   Cleanse with Vashe  Pack with Vashe moistened gauze  Cover with dry gauze    Pressure Injury Prevention (PIP) Plan:  If patient is declining pressure injury prevention interventions: Explore reason why and address patient's concerns, Educate on pressure injury risk and prevention intervention(s), and If patient is still declining, document \"informed refusal\"   Mattress: Follow bed algorithm, add Low Air Loss (Air+) mattress pump if skin is very moist or constantly moist.   HOB: Maintain at or below 30 degrees, unless contraindicated  Repositioning in bed: Every 1-2 hours  and Left/right positioning; avoid supine  Heels: Pillows under calves  Protective Dressing: None  Chair positioning: Chair cushion (#[id number])    If patient has a buttock pressure injury, or high risk for PI use chair cushion or SPS.  Moisture Management: Avoid brief in bed  Under Devices: Inspect skin under all medical devices during skin inspection , Ensure tubes are stabilized " without tension, and Ensure patient is not lying on medical devices or equipment when repositioned  Ask provider to discontinue device when no longer needed.      Orders: Reviewed    RECOMMEND PRIMARY TEAM ORDER: None, at this time  Education provided: importance of repositioning, plan of care, and Off-loading pressure  Discussed plan of care with: Patient and Nurse  WO nurse follow-up plan: weekly  Notify WOC if wound(s) deteriorate.  Nursing to notify the Provider(s) and re-consult the WOC Nurse if new skin concern.    DATA:     Current support surface: Standard  Standard gel mattress (Isoflex)  Containment of urine/stool: Colostomy pouch  BMI: Body mass index is 22 kg/m .   Active diet order: Orders Placed This Encounter      Minced & Moist Diet (level 5) Thin Liquids (level 0)     Output: I/O last 3 completed shifts:  In: 820 [P.O.:820]  Out: 2050 [Urine:2050]     Labs:   Recent Labs   Lab 09/23/24  0612 09/22/24  0629 09/21/24  0510 09/20/24  0626 09/19/24  2309   ALBUMIN  --   --   --   --  2.6*   HGB  --   --  10.5*   < > 11.8*   INR 2.31*   < > 3.94*   < >  --    WBC  --   --  14.0*   < > 16.8*    < > = values in this interval not displayed.     Pressure injury risk assessment:   Sensory Perception: 2-->very limited  Moisture: 3-->occasionally moist  Activity: 1-->bedfast  Mobility: 2-->very limited  Nutrition: 2-->probably inadequate  Friction and Shear: 1-->problem  Dilan Score: 11    Chris Lomas RN CWOCN  Contact [facility] Nurse ([place])  Dept. Office Number: [phone]

## 2024-09-23 NOTE — PLAN OF CARE
Goal Outcome Evaluation:    Plan of Care Reviewed With: patient, child, friend    Overall Patient Progress: declining    Outcome Evaluation: Transitioned to comfort cares. Oxy10 given for severe pain. Reposition q2h and PRN. Sacral wound care BID and PRN. Colostomy - moderate output. Javier remains in place. GJ remains in place. Orders for PICC removal - per patient request would like it removed after next round of pain meds around 1600. Plan to discharge home on hospice later this week.    Problem: Adult Inpatient Plan of Care  Goal: Plan of Care Review  Outcome: Not Progressing  Flowsheets (Taken 9/23/2024 1426)  Outcome Evaluation: Transitioned to comfort cares. Oxy10 given for severe pain. Reposition q2h and PRN. Sacral wound care BID and PRN. Colostomy - moderate output. Javier remains in place. GJ remains in place. Orders for PICC removal - per patient request would like it removed after next round of pain meds around 1600. Plan to discharge home on hospice later this week.  Plan of Care Reviewed With:   patient   child   friend  Overall Patient Progress: declining  Goal: Patient-Specific Goal (Individualized)  Outcome: Not Progressing  Goal: Absence of Hospital-Acquired Illness or Injury  Outcome: Not Progressing  Intervention: Identify and Manage Fall Risk  Recent Flowsheet Documentation  Taken 9/23/2024 0924 by Cleo Vásquez RN  Safety Promotion/Fall Prevention:   activity supervised   assistive device/personal items within reach   clutter free environment maintained   increased rounding and observation   increase visualization of patient   lighting adjusted   mobility aid in reach   nonskid shoes/slippers when out of bed   patient and family education   room organization consistent   safety round/check completed   supervised activity  Intervention: Prevent Skin Injury  Recent Flowsheet Documentation  Taken 9/23/2024 1301 by Cleo Vásquez RN  Body Position:   turned   left  Taken 9/23/2024 1221 by  Cleo Vásquez RN  Body Position:   turned   right  Taken 9/23/2024 1142 by Cleo Vásquez RN  Body Position:   turned   left  Taken 9/23/2024 0924 by Cleo Vásquez RN  Body Position:   turned   right  Device Skin Pressure Protection:   absorbent pad utilized/changed   adhesive use limited   positioning supports utilized   pressure points protected   skin-to-device areas padded   skin-to-skin areas padded   tubing/devices free from skin contact  Intervention: Prevent and Manage VTE (Venous Thromboembolism) Risk  Recent Flowsheet Documentation  Taken 9/23/2024 0924 by Cleo Vásquez RN  VTE Prevention/Management: SCDs off (sequential compression devices)  Goal: Optimal Comfort and Wellbeing  Outcome: Not Progressing  Intervention: Monitor Pain and Promote Comfort  Recent Flowsheet Documentation  Taken 9/23/2024 1348 by Cleo Vásquez RN  Pain Management Interventions:   around-the-clock dosing utilized   repositioned   rest  Taken 9/23/2024 1323 by Cleo Vásquez RN  Pain Management Interventions:   pain management plan reviewed with patient/caregiver   rest   repositioned  Taken 9/23/2024 1301 by Cleo Vásquez RN  Pain Management Interventions: medication (see MAR)  Taken 9/23/2024 1006 by Cleo Vásquez RN  Pain Management Interventions:   distraction   emotional support   medication offered but refused   pain management plan reviewed with patient/caregiver   pillow support provided   rest   repositioned  Taken 9/23/2024 0924 by Cleo Vásquez RN  Pain Management Interventions:   medication (see MAR)   repositioned   pillow support provided  Goal: Readiness for Transition of Care  Outcome: Not Progressing  Intervention: Mutually Develop Transition Plan  Recent Flowsheet Documentation  Taken 9/23/2024 1000 by Cleo Vásquez RN  Equipment Currently Used at Home:   lift device   wheelchair, manual     Problem: Skin Injury Risk Increased  Goal: Skin Health and Integrity  Outcome: Not  Progressing  Intervention: Plan: Nurse Driven Intervention: Positioning  Recent Flowsheet Documentation  Taken 9/23/2024 0924 by Cleo Vásquez RN  Plan: Positioning Interventions:   REPOSITION Left/Right (No supine) q2h   HOB 30 degrees or less   OFF-LOAD HEELS with boots  Intervention: Plan: Nurse Driven Intervention: Moisture Management  Recent Flowsheet Documentation  Taken 9/23/2024 0924 by Cleo Vásquez RN  Moisture Interventions:   No brief in bed   Incontinence pad   Urinary collection device  Intervention: Plan: Nurse Driven Intervention: Friction and Shear  Recent Flowsheet Documentation  Taken 9/23/2024 0924 by Cleo Vásquez RN  Friction/Shear Interventions:   HOB 30 degrees or less   Silicone foam sacral dressing   Pad bony prominence (elbow pads, heel pads, ear protectors)   Assistive lifting device (portable/ceiling lift, etc.)   Seated Positioning System  Intervention: Optimize Skin Protection  Recent Flowsheet Documentation  Taken 9/23/2024 0924 by Cleo Vásquez RN  Pressure Reduction Devices:   chair cushion utilized   foam padding utilized   heel offloading device utilized   positioning supports utilized  Activity Management: activity encouraged  Head of Bed (HOB) Positioning: HOB at 30 degrees     Problem: Wound  Goal: Optimal Coping  Outcome: Not Progressing  Goal: Optimal Functional Ability  Outcome: Not Progressing  Intervention: Optimize Functional Ability  Recent Flowsheet Documentation  Taken 9/23/2024 0924 by Cleo Vásquez RN  Activity Management: activity encouraged  Activity Assistance Provided: assistance, 2 people  Goal: Absence of Infection Signs and Symptoms  Outcome: Not Progressing  Goal: Improved Oral Intake  Outcome: Not Progressing  Goal: Optimal Pain Control and Function  Outcome: Not Progressing  Intervention: Prevent or Manage Pain  Recent Flowsheet Documentation  Taken 9/23/2024 1348 by Cleo Vásquez RN  Pain Management Interventions:   around-the-clock  dosing utilized   repositioned   rest  Taken 9/23/2024 1323 by Cleo Vásquez RN  Pain Management Interventions:   pain management plan reviewed with patient/caregiver   rest   repositioned  Taken 9/23/2024 1301 by Cleo Vásquez RN  Pain Management Interventions: medication (see MAR)  Taken 9/23/2024 1006 by Cleo Vásquez RN  Pain Management Interventions:   distraction   emotional support   medication offered but refused   pain management plan reviewed with patient/caregiver   pillow support provided   rest   repositioned  Taken 9/23/2024 0924 by Cleo Vásquez RN  Pain Management Interventions:   medication (see MAR)   repositioned   pillow support provided  Goal: Skin Health and Integrity  Outcome: Not Progressing  Intervention: Optimize Skin Protection  Recent Flowsheet Documentation  Taken 9/23/2024 0924 by Cleo Vásquez RN  Pressure Reduction Devices:   chair cushion utilized   foam padding utilized   heel offloading device utilized   positioning supports utilized  Activity Management: activity encouraged  Head of Bed (HOB) Positioning: HOB at 30 degrees  Goal: Optimal Wound Healing  Outcome: Not Progressing

## 2024-09-23 NOTE — PLAN OF CARE
"Goal Outcome Evaluation:      Plan of Care Reviewed With: patient    Overall Patient Progress: no changeOverall Patient Progress: no change    3pm-11pm RN    Patient alert but has some intermittent confusion. Pain on wound to  buttocks managed with tylenol and repositioning, dressing changed. Boots on elevating the heels. Continues on IV antibiotics, has a palliative care consult. JG tube clamped, ostomy patent. Javier catheter in place, will continue to follow plan of care.  Problem: Adult Inpatient Plan of Care  Goal: Plan of Care Review  Description: The Plan of Care Review/Shift note should be completed every shift.  The Outcome Evaluation is a brief statement about your assessment that the patient is improving, declining, or no change.  This information will be displayed automatically on your shift  note.  Outcome: Progressing  Flowsheets (Taken 9/22/2024 2231)  Plan of Care Reviewed With: patient  Overall Patient Progress: no change  Goal: Patient-Specific Goal (Individualized)  Description: You can add care plan individualizations to a care plan. Examples of Individualization might be:  \"Parent requests to be called daily at 9am for status\", \"I have a hard time hearing out of my right ear\", or \"Do not touch me to wake me up as it startles  me\".  Outcome: Progressing  Goal: Absence of Hospital-Acquired Illness or Injury  Outcome: Progressing  Intervention: Identify and Manage Fall Risk  Recent Flowsheet Documentation  Taken 9/22/2024 1700 by Venus Swartz, RN  Safety Promotion/Fall Prevention:   activity supervised   assistive device/personal items within reach   clutter free environment maintained   safety round/check completed  Intervention: Prevent Skin Injury  Recent Flowsheet Documentation  Taken 9/22/2024 1700 by Venus Swartz, RN  Body Position:   turned   right  Device Skin Pressure Protection:   positioning supports utilized   tubing/devices free from skin contact  Intervention: Prevent and " Manage VTE (Venous Thromboembolism) Risk  Recent Flowsheet Documentation  Taken 9/22/2024 1700 by Venus Swartz RN  VTE Prevention/Management: SCDs off (sequential compression devices)  Intervention: Prevent Infection  Recent Flowsheet Documentation  Taken 9/22/2024 1700 by Venus Swartz RN  Infection Prevention:   hand hygiene promoted   personal protective equipment utilized  Goal: Optimal Comfort and Wellbeing  Outcome: Progressing  Intervention: Monitor Pain and Promote Comfort  Recent Flowsheet Documentation  Taken 9/22/2024 1729 by Venus Swartz RN  Pain Management Interventions: medication (see MAR)  Goal: Readiness for Transition of Care  Outcome: Progressing     Problem: Skin Injury Risk Increased  Goal: Skin Health and Integrity  Outcome: Progressing  Intervention: Plan: Nurse Driven Intervention: Positioning  Recent Flowsheet Documentation  Taken 9/22/2024 1700 by Venus Swartz RN  Plan: Positioning Interventions: REPOSITION Left/Right (No supine) q2h  Intervention: Optimize Skin Protection  Recent Flowsheet Documentation  Taken 9/22/2024 1700 by Venus Swartz RN  Pressure Reduction Techniques: weight shift assistance provided  Pressure Reduction Devices:   heel offloading device utilized   pressure-redistributing mattress utilized  Activity Management: activity adjusted per tolerance     Problem: Wound  Goal: Optimal Coping  Outcome: Progressing  Goal: Optimal Functional Ability  Outcome: Progressing  Intervention: Optimize Functional Ability  Recent Flowsheet Documentation  Taken 9/22/2024 1700 by Venus Swartz RN  Activity Management: activity adjusted per tolerance  Goal: Absence of Infection Signs and Symptoms  Outcome: Progressing  Intervention: Prevent or Manage Infection  Recent Flowsheet Documentation  Taken 9/22/2024 1700 by Venus Swartz RN  Isolation Precautions: contact precautions maintained  Goal: Improved Oral Intake  Outcome: Progressing  Goal:  Optimal Pain Control and Function  Outcome: Progressing  Intervention: Prevent or Manage Pain  Recent Flowsheet Documentation  Taken 9/22/2024 1729 by Venus Swartz, RN  Pain Management Interventions: medication (see MAR)  Goal: Skin Health and Integrity  Outcome: Progressing  Intervention: Optimize Skin Protection  Recent Flowsheet Documentation  Taken 9/22/2024 1700 by Venus Swartz, RN  Pressure Reduction Techniques: weight shift assistance provided  Pressure Reduction Devices:   heel offloading device utilized   pressure-redistributing mattress utilized  Activity Management: activity adjusted per tolerance  Goal: Optimal Wound Healing  Outcome: Progressing

## 2024-09-23 NOTE — PROGRESS NOTES
Physical Therapy Discharge Summary    Reason for therapy discharge:    Patient/family request discontinuation of services.    Progress towards therapy goal(s). See goals on Care Plan in [facility] electronic health record for goal details.  Goals partially met.  Barriers to achieving goals:   change to comfort care focus.    Therapy recommendation(s):    No further therapy is recommended.  Patient will require 24/7 care and mechanical lift for transfers. Max-total A for bed mobility. Pt limited by weakness, impaired balance, and deconditioning. Rec LTC.

## 2024-09-23 NOTE — PROGRESS NOTES
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  He was hospitalized from 8/5 through 8/30 due to sepsis secondary to infected unstageable sacral pressure ulcer/wounds.  He did have ESBL in the wound and bacteremia.  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  He underwent debridement and diverting colostomy on 8/13.  He did have ARIADNE/ATN and was treated for possible aspiration pneumonia during that admission.  Long-term care was recommended, but he preferred to discharge home to his [facility] with ongoing wound care twice daily for wound VAC management.  He was discharged with a right arm PICC with IV daptomycin and IV meropenem through 9/24.  He has a  that has been concerned regarding his wounds getting worse despite antibiotics and wound VAC therapy.  The wound VAC has not been forming a seal per their report.  They sent some pictures to his outpatient [facility] doctor [facility] who recommended he come in for evaluation.  The patient says he feels okay in the ER.  He is hungry.  Not reporting any significant pain at this moment.  He has not been having any fevers or shaking chills.     He had previously been contemplating entering hospice care.  He did decide to change focus of care to comfort care only with discharge to hospice on 9/23.     Unstageable sacral pressure ulcer present on admission.  Recent diverting  colostomy.  VRE and ESBL wound infections.  -Appreciate general surgery input.  -No further acute surgical intervention at this time plan.  -Appreciate infectious disease input.  -Had been on IV daptomycin and meropenem.  -Wound nurse following.  -Has now decided to transition to comfort care and discharge on hospice care.     Dysphagia.  -Speech pathology consult appreciated.  -Dysphagia diet.     Malnutrition.  -Registered dietitian consult appreciated.  -Diet supplements.     Drug-induced coagulation defect.  Previous DVT.  -INR initially mildly supratherapeutic.  -Now transitioning to comfort care.     Diabetes mellitus type 2.  -Now transitioning to comfort care.     Previous stroke.  Hypertension.  Hyperlipidemia.  Chronic heart failure with preserved ejection fraction.  -Transitioning to comfort care.     GERD.  -Continue pantoprazole 40 mg a day.     Hypophosphatemia.  -Transition to comfort care.  -Stop lab monitoring.          Diet: Snacks/Supplements Adult: Ensure Enlive; With Meals  Snacks/Supplements Adult: Magic Cup; Between Meals  Snacks/Supplements Adult: Expedite Bottle; With Meals  Minced & Moist Diet (level 5) Thin Liquids (level 0)    DVT Prophylaxis: Transition to comfort care.  Javier Catheter: PRESENT, indication: Wound deterioration and failed external collection device  Lines: PRESENT      PICC 08/15/24 Single Lumen Right Brachial vein medial IV Antibiotics-Site Assessment: WDL      Cardiac Monitoring: None  Code Status: No CPR- Pre-arrest intubation OK      Clinically Significant Risk Factors              # Hypoalbuminemia: Lowest albumin = 2.4 g/dL at 9/19/2024  8:03 AM, will monitor as appropriate     # Hypertension: Noted on problem list            # Severe Malnutrition: based on nutrition assessment, PRESENT ON ADMISSION   # Financial/Environmental Concerns: none         Disposition Plan     Medically Ready for Discharge: Anticipated Tomorrow             Jasiel El,  DO  Hospitalist Service  [facility]  Securely message with [employer] (more info)  Text page via [employer] Paging/Directory   ______________________________________________________________________    Interval History   Having buttocks pain.  Denies chest pain, shortness of breath, fevers, chills, nausea.  Has decided he no longer wants aggressive medical therapy.  Wants to focus on comfort measures only.    Physical Exam   Vital Signs: Temp: 98.8  F (37.1  C) Temp src: Temporal BP: (!) 96/34 Pulse: 87   Resp: 20 SpO2: 95 % O2 Device: None (Room air)    Weight: 146 lbs 12.8 oz    Gen:  NAD, A&Ox3.  Eyes:  PERRL, sclera anicteric.  OP:  MMM, no lesions.  Neck:  Supple.  CV:  Regular, no murmurs.  Lung:  CTA b/l, normal effort.  Ab:  +BS, soft.  Skin:  Warm, dry to touch.  No rash.  Ext:  No pitting edema LE b/l.      Medical Decision Making       45 MINUTES SPENT BY ME on the date of service doing chart review, history, exam, documentation & further activities per the note.      Data     I have personally reviewed the following data over the past 24 hrs:    N/A  \   N/A   / N/A     N/A N/A N/A /  259 (H)   4.1 N/A N/A \     INR:  2.31 (H) PTT:  N/A   D-dimer:  N/A Fibrinogen:  N/A       Imaging results reviewed over the past 24 hrs:   No results found for this or any previous visit (from the past 24 hour(s)).

## 2024-09-23 NOTE — PROGRESS NOTES
[facility]  Infectious Disease Progress Note          Assessment and Plan:   Date of Admission:  9/19/2024  Date of Consult : 09/20/24        Assessment:  89YM chronically ill with hx of CVA, CKD, diabetes, CHF, s/p TAVR for aortic stenosis, recurrent recent hospitalizations, sacral decubitus ulcer with osteomyelitis for which he was most recently hospitalized from 8/5-8/30 , underwent surgical debridement with diverting colostomy, and has been planned for a 6 week antibiotic course until 9/24 (due to isolation of MRSA, VRE, E.coli ESBL, bacteroides) , who has been admitted due to worsening appearance of his sacral wound.      -Last hospitalization [facility] for ESBL E.coli bacteremia and streptococcus salivarius  related to infected sacral decubitus ulcer requiring bedside debridement (cx MRSA, E.marco, bacteroides, VRE) and debridement in OR on 8/13 followed by diverting colostomy. 6 weeks of IV antibiotics were planned with Meropenem/Daptomycin until 9/24  -Recurrent recent hospitalizations 5/25-6/7 for CVA, again 6/21-06/25 for ESBL E.coli bacteremia/UTI, 8/5-8/30 decubitus ulcer with osteomyelitis  -Chronic medical conditions - CVA, CKD, diabetes, CHF, AORTIC STENOSIS s/p TAVR, hx of DVT (positive antiphospholipid antibody syndrome, heterozygous factor V Leiden), thrombophilia, HTN        Recommendations:  Any benefit from antibiotics has already occurred, no role for continuing the antibiotics I would stop them now even if not comfort care issue  Plan now is comfort care certainly stop antibiotics and watch off  Call if issues           Interval History:     no new complaints except for the ongoing pain, no ongoing major sepsis              Medications:     Current Facility-Administered Medications   Medication Dose Route Frequency Provider Last Rate Last Admin    allopurinol (ZYLOPRIM) tablet 300 mg  300 mg Oral Daily Stepan Mckeon MD   300 mg at 09/23/24 0989     "artificial tears (GENTEAL) 0.1-0.2-0.3 % ophthalmic solution 2 drop  2 drop Both Eyes BID Stepan Mckeon MD   2 drop at 09/23/24 0935    diclofenac (VOLTAREN) 1 % topical gel 2 g  2 g Topical 4x Daily Stepan Mckeon MD   2 g at 09/22/24 2121    melatonin tablet 3 mg  3 mg Oral At Bedtime Stepan Mckeon MD   3 mg at 09/22/24 2116    miconazole (MICATIN) 2 % powder   Topical BID Stepan Mckeon MD   Given at 09/22/24 2134    pantoprazole (PROTONIX) EC tablet 40 mg  40 mg Oral QAM AC Stepan Mckeon MD   40 mg at 09/23/24 0924    sodium chloride (PF) 0.9% PF flush 10-40 mL  10-40 mL Intracatheter Q7 Days Stepan Mckeon MD   10 mL at 09/20/24 0841    sodium chloride (PF) 0.9% PF flush 3 mL  3 mL Intracatheter Q8H Stepan Mckeon MD   3 mL at 09/22/24 2350                  Physical Exam:   Blood pressure (!) 96/34, pulse 87, temperature 98.8  F (37.1  C), temperature source Temporal, resp. rate 20, height 1.74 m (5' 8.5\"), weight 66.6 kg (146 lb 12.8 oz), SpO2 95%.  Wt Readings from Last 2 Encounters:   09/19/24 66.6 kg (146 lb 12.8 oz)   09/14/24 72.6 kg (160 lb)     Vital Signs with Ranges  Temp:  [97.3  F (36.3  C)-98.8  F (37.1  C)] 98.8  F (37.1  C)  Pulse:  [75-95] 87  Resp:  [16-20] 20  BP: ()/(15-72) 96/34  SpO2:  [95 %-98 %] 95 %    Constitutional: Awake, alert, cooperative, no apparent distress     Lungs: Clear to auscultation bilaterally, no crackles or wheezing   Cardiovascular: Regular rate and rhythm, normal S1 and S2, and no murmur noted   Abdomen: Normal bowel sounds, soft, non-distended, non-tender   Skin: No rashes, no cyanosis, no edema   Other:           Data:   All microbiology laboratory data reviewed.  Recent Labs   Lab Test 09/21/24  0510 09/20/24  0626 09/19/24  2309   WBC 14.0* 14.3* 16.8*   HGB 10.5* 11.7* 11.8*   HCT 33.4* 37.7* 37.0*   MCV 93 92 91    307 326     Recent Labs   Lab Test 09/21/24 0510 09/20/24  0626 " "09/19/24  2309   CR 0.53* 0.62* 0.60*     No lab results found.  No lab results found.    Invalid input(s): \"UC\"     "

## 2024-09-23 NOTE — PROGRESS NOTES
Speech Language Therapy Discharge Summary    Reason for therapy discharge:    Patient/family request discontinuation of services.    Progress towards therapy goal(s). See goals on Care Plan in [facility] electronic health record for goal details.  Goals partially met.  Barriers to achieving goals:   change to comfort care focus.    Therapy recommendation(s):    No further therapy is recommended.  Diet was advanced to minced and moist and thin liquids as least restrictive diet, with known risk of silent aspiration of thin liquids but goals of comfort care.

## 2024-09-24 NOTE — PROGRESS NOTES
Care Management Discharge Note    Discharge Date: 09/25/2024       Discharge Disposition: Hospice    Discharge Services:      Discharge DME:      Discharge Transportation: agency    Private pay costs discussed: Not applicable    Patient/family educated on Medicare website which has current facility and service quality ratings:  (family requested agency)    Education Provided on the Discharge Plan: Yes  Persons Notified of Discharge Plans:   Patient/Family in Agreement with the Plan: yes    Handoff Referral Completed: No, handoff not indicated or clinically appropriate    Additional Information:  Pt accepted at [facility], Kaylene, [phone], is coordinating care with caregiver Nusrat.  Nusrat shared how they need to get all pt's medical equipment picked up from DME company before hospice gets theirs delivered, we will plan for Thursday am discharge.    Pt stretcher transport is scheduled for Thursday, 10:38-11:23 with admission to [facility]. Nusrat and Kaylene are updated. Nusrat is communicating with POA.    MUNA Mccallum, LICJAI  Inpatient Care Coordination  [facility]  [phone]      KANDI MCCALLUM

## 2024-09-24 NOTE — PLAN OF CARE
"Comfort cares.   Heels noted to have bilateral wounds. WOC assessed and treatment plan. Prevalon boots are on. Dressing to right heel.  changed abd wound per plan of care.   Sacral wound dressing changed 1500 as patient did not allow me to change the dressing earlier in this day.   Alert, oriented to self. Confusion. Able to answer some easy questions.  Patient did voice in agreement of hospice and \"ready to get out of here to go to my room.\"  Turned and repositioned. All cares provided- dependent for cares.   Fed. Ate poor to fair.  Safety and cares provided while providing empathy.  Alarms on for safety.   Chronic cutler- will discharge with cutler. Colostomy.  Problem: Adult Inpatient Plan of Care  Goal: Plan of Care Review  Description: The Plan of Care Review/Shift note should be completed every shift.  The Outcome Evaluation is a brief statement about your assessment that the patient is improving, declining, or no change.  This information will be displayed automatically on your shift  note.  Outcome: Not Progressing  Flowsheets (Taken 9/24/2024 1630)  Plan of Care Reviewed With: patient  Overall Patient Progress: no change  Goal: Patient-Specific Goal (Individualized)  Description: You can add care plan individualizations to a care plan. Examples of Individualization might be:  \"Parent requests to be called daily at 9am for status\", \"I have a hard time hearing out of my right ear\", or \"Do not touch me to wake me up as it startles  me\".  Outcome: Not Progressing  Goal: Absence of Hospital-Acquired Illness or Injury  Outcome: Not Progressing  Intervention: Identify and Manage Fall Risk  Recent Flowsheet Documentation  Taken 9/24/2024 1144 by Edith Whittington, RN  Safety Promotion/Fall Prevention:   activity supervised   assistive device/personal items within reach   clutter free environment maintained   increased rounding and observation   increase visualization of patient   lighting adjusted   mobility aid " in reach   nonskid shoes/slippers when out of bed   patient and family education   room organization consistent   safety round/check completed   supervised activity  Intervention: Prevent Skin Injury  Recent Flowsheet Documentation  Taken 9/24/2024 1539 by Edith Whittington RN  Body Position:   right   turned  Taken 9/24/2024 1301 by Edith Whittington RN  Body Position:   left   turned  Taken 9/24/2024 1144 by Edith Whittington RN  Body Position:   turned   right  Device Skin Pressure Protection:   absorbent pad utilized/changed   adhesive use limited   positioning supports utilized   pressure points protected   skin-to-device areas padded   skin-to-skin areas padded   tubing/devices free from skin contact  Taken 9/24/2024 1030 by Edith Whittington RN  Body Position:   turned   right   heels elevated  Intervention: Prevent and Manage VTE (Venous Thromboembolism) Risk  Recent Flowsheet Documentation  Taken 9/24/2024 1144 by Edith Whittington RN  VTE Prevention/Management: SCDs off (sequential compression devices)  Intervention: Prevent Infection  Recent Flowsheet Documentation  Taken 9/24/2024 1144 by Edith Whittington RN  Infection Prevention: single patient room provided  Goal: Optimal Comfort and Wellbeing  Outcome: Not Progressing  Intervention: Monitor Pain and Promote Comfort  Recent Flowsheet Documentation  Taken 9/24/2024 1033 by Edith Whittington RN  Pain Management Interventions: medication (see MAR)  Goal: Readiness for Transition of Care  Outcome: Not Progressing     Problem: Skin Injury Risk Increased  Goal: Skin Health and Integrity  Outcome: Not Progressing  Intervention: Plan: Nurse Driven Intervention: Moisture Management  Recent Flowsheet Documentation  Taken 9/24/2024 1144 by Edith Whittington RN  Moisture Interventions:   No brief in bed   Incontinence pad   Urinary collection device   Fecal collection device   Perineal cleanser  Intervention: Plan:  Nurse Driven Intervention: Friction and Shear  Recent Flowsheet Documentation  Taken 9/24/2024 1144 by Edith Whittington RN  Friction/Shear Interventions: HOB 30 degrees or less  Intervention: Optimize Skin Protection  Recent Flowsheet Documentation  Taken 9/24/2024 1144 by Edith Whittington RN  Pressure Reduction Devices:   chair cushion utilized   foam padding utilized   heel offloading device utilized   positioning supports utilized  Activity Management: activity encouraged  Head of Bed (HOB) Positioning: HOB at 30 degrees     Problem: Wound  Goal: Optimal Coping  Outcome: Not Progressing  Goal: Optimal Functional Ability  Outcome: Not Progressing  Intervention: Optimize Functional Ability  Recent Flowsheet Documentation  Taken 9/24/2024 1144 by Edith Whittington, RN  Activity Management: activity encouraged  Activity Assistance Provided: assistance, 2 people  Goal: Absence of Infection Signs and Symptoms  Outcome: Not Progressing  Intervention: Prevent or Manage Infection  Recent Flowsheet Documentation  Taken 9/24/2024 1144 by Edith Whittington RN  Isolation Precautions: contact precautions maintained  Goal: Improved Oral Intake  Outcome: Not Progressing  Goal: Optimal Pain Control and Function  Outcome: Not Progressing  Intervention: Prevent or Manage Pain  Recent Flowsheet Documentation  Taken 9/24/2024 1033 by Edith Whittington RN  Pain Management Interventions: medication (see MAR)  Goal: Skin Health and Integrity  Outcome: Not Progressing  Intervention: Optimize Skin Protection  Recent Flowsheet Documentation  Taken 9/24/2024 1144 by Edith Whittington RN  Pressure Reduction Devices:   chair cushion utilized   foam padding utilized   heel offloading device utilized   positioning supports utilized  Activity Management: activity encouraged  Head of Bed (HOB) Positioning: HOB at 30 degrees  Goal: Optimal Wound Healing  Outcome: Not Progressing   Goal Outcome Evaluation:       Plan of Care Reviewed With: patient    Overall Patient Progress: no changeOverall Patient Progress: no change

## 2024-09-24 NOTE — PLAN OF CARE
"Goal Outcome Evaluation:      Plan of Care Reviewed With: patient    Overall Patient Progress: no changeOverall Patient Progress: no change    Outcome Evaluation: Comfort cares, Q2 repo, pain managed    Comfort cares, pain managed with PRN oxy, Q2 hr repo, PPP, Ax2 lift, colostomy in place, cutler patent, PIV SL, minced/moist diet, thin liquids, no straws, oral care and small sips of water provided, declined PRN biotene spray, given oxy x2 for pain, contact precautions maintained, plan to d\c on hospice care    Problem: Adult Inpatient Plan of Care  Goal: Patient-Specific Goal (Individualized)  Description: You can add care plan individualizations to a care plan. Examples of Individualization might be:  \"Parent requests to be called daily at 9am for status\", \"I have a hard time hearing out of my right ear\", or \"Do not touch me to wake me up as it startles  me\".  Outcome: Not Progressing     Problem: Skin Injury Risk Increased  Goal: Skin Health and Integrity  Outcome: Not Progressing  Intervention: Plan: Nurse Driven Intervention: Moisture Management  Recent Flowsheet Documentation  Taken 9/24/2024 0255 by Ginana Farrell, RN  Bathing/Skin Care: (face washed, warm compress to eyes) other (see comments)     Problem: Wound  Goal: Optimal Functional Ability  Outcome: Not Progressing  Goal: Skin Health and Integrity  Outcome: Not Progressing  Goal: Optimal Wound Healing  Outcome: Not Progressing     Problem: Adult Inpatient Plan of Care  Goal: Plan of Care Review  Description: The Plan of Care Review/Shift note should be completed every shift.  The Outcome Evaluation is a brief statement about your assessment that the patient is improving, declining, or no change.  This information will be displayed automatically on your shift  note.  Outcome: Progressing  Flowsheets (Taken 9/24/2024 9357)  Outcome Evaluation: Comfort cares, Q2 repo, pain managed  Plan of Care Reviewed With: patient  Overall Patient Progress: no " change  Goal: Absence of Hospital-Acquired Illness or Injury  Outcome: Progressing  Goal: Optimal Comfort and Wellbeing  Outcome: Progressing  Goal: Readiness for Transition of Care  Outcome: Progressing     Problem: Wound  Goal: Optimal Coping  Outcome: Progressing  Goal: Absence of Infection Signs and Symptoms  Outcome: Progressing  Goal: Improved Oral Intake  Outcome: Progressing  Goal: Optimal Pain Control and Function  Outcome: Progressing

## 2024-09-24 NOTE — PROGRESS NOTES
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  He was hospitalized from 8/5 through 8/30 due to sepsis secondary to infected unstageable sacral pressure ulcer/wounds.  He did have ESBL in the wound and bacteremia.  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  He underwent debridement and diverting colostomy on 8/13.  He did have ARIADNE/ATN and was treated for possible aspiration pneumonia during that admission.  Long-term care was recommended, but he preferred to discharge home to his [facility] with ongoing wound care twice daily for wound VAC management.  He was discharged with a right arm PICC with IV daptomycin and IV meropenem through 9/24.  He has a  that has been concerned regarding his wounds getting worse despite antibiotics and wound VAC therapy.  The wound VAC has not been forming a seal per their report.  They sent some pictures to his outpatient ID doctor [facility] who recommended he come in for evaluation.  The patient says he feels okay in the ER.  He is hungry.  Not reporting any significant pain at this moment.  He has not been having any fevers or shaking chills.      He had previously been contemplating entering hospice care.  He did decide to change focus of care to comfort care only with discharge to hospice on 9/23.     Unstageable sacral pressure ulcer present on admission.  Recent diverting  colostomy.  VRE and ESBL wound infections.  -Appreciate general surgery input.  -No further acute surgical intervention at this time plan.  -Appreciate infectious disease input.  -Had been on IV daptomycin and meropenem.  -Wound nurse following.  -Has now decided to transition to comfort care and discharge on hospice care.  -Antibiotics discontinued 9/23.     Dysphagia.  -Speech pathology consult appreciated.  -Dysphagia diet.     Malnutrition.  -Registered dietitian consult appreciated.  -Diet supplements.     Drug-induced coagulation defect.  Previous DVT.  -INR initially mildly supratherapeutic.  -Now transitioning to comfort care.     Diabetes mellitus type 2.  -Now transitioning to comfort care.     Previous stroke.  Hypertension.  Hyperlipidemia.  Chronic heart failure with preserved ejection fraction.  -Transitioning to comfort care.     GERD.  -Continue pantoprazole 40 mg a day.     Hypophosphatemia.  -Transition to comfort care.  -Stop lab monitoring.          Diet: Snacks/Supplements Adult: Ensure Enlive; With Meals  Snacks/Supplements Adult: Magic Cup; Between Meals  Snacks/Supplements Adult: Expedite Bottle; With Meals  Minced & Moist Diet (level 5) Thin Liquids (level 0)    DVT Prophylaxis: Comfort care.  Javier Catheter: PRESENT, indication: Wound deterioration and failed external collection device  Lines: None     Cardiac Monitoring: None  Code Status: No CPR- Pre-arrest intubation OK      Clinically Significant Risk Factors              # Hypoalbuminemia: Lowest albumin = 2.4 g/dL at 9/19/2024  8:03 AM, will monitor as appropriate     # Hypertension: Noted on problem list            # Severe Malnutrition: based on nutrition assessment    # Financial/Environmental Concerns: none         Disposition Plan     Medically Ready for Discharge: Ready Now             Jasiel El DO  Hospitalist Service  [facility]  Securely message with [employer] (more info)  Text page via [employer]  Paging/Directory   ______________________________________________________________________    Interval History   Some buttocks pain.  Denies chest pain, shortness of breath, fevers, chills, nausea.    Physical Exam   Vital Signs:           Resp: 16        Weight: 146 lbs 12.8 oz    Gen: Thin, NAD, A&Ox3.  Eyes:  PERRL, sclera anicteric.  OP:  MMM, no lesions.  Neck:  Supple.  CV:  Regular, no loud murmurs.  Lung:  CTA b/l, normal effort.  Ab:  +BS, soft.  Skin:  Warm, dry to touch.  No rash.  Ext:  No pitting edema LE b/l.      Medical Decision Making       20 MINUTES SPENT BY ME on the date of service doing chart review, history, exam, documentation & further activities per the note.      Data         Imaging results reviewed over the past 24 hrs:   No results found for this or any previous visit (from the past 24 hour(s)).

## 2024-09-24 NOTE — CONSULTS
[facility] Nurse Inpatient Assessment     Consulted for: ileostomy, left arm, sacrum, bilateral heels    Summary: Patient with new wounds to heels.  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 He was hospitalized from 8/5 through 8/30 due to sepsis secondary to infected unstageable sacral pressure ulcer/wounds. He did have ESBL in the wound and bacteremia. 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 He underwent debridement and diverting colostomy on 8/13. He did have ARIADNE/ATN and was treated for possible aspiration pneumonia during that admission. Long-term care was recommended, but he preferred to discharge home to his snf with ongoing wound care twice daily for wound VAC management. He was discharged with a right arm PICC with IV daptomycin and IV meropenem through 9/24. He has a  that has been concerned regarding his wounds getting worse despite antibiotics and wound VAC therapy. The wound VAC has not been forming a seal per their report. They sent some pictures to his outpatient ID doctor [facility] clinic who recommended he come in for evaluation. The patient says he feels okay in the ER. He is hungry. Not reporting any significant pain at this moment. He has not been having any fevers or shaking chills.     Assessment:      Areas visualized during today's visit: Heel    Pressure  Injury Location: Bilateral heels  Last photo: 9/24/24    Left      Right heel      Wound type: Pressure Injury     Pressure Injury Stage: Deep Tissue Pressure Injury (DTPI), hospital acquired   Wound history/plan of care:   Patient with new pressure injuries to his bilateral heels.  Surrounding brown discoloration surrounding wounds suggest serous blistering may have been present prior to wound identification today).  Patient denies previous wounds to heels.  Wound base: Left heel with 1x1cm area of dark purple discoloration with surrounding brown discoloration (appears to be reabsorbed serous blistering).  Right heel with 0.3x0.5cm reabsorbing serous blister.  Right lateral heel is 2x1.5cm partially ruptured serous blister, 30% dry drainage, 50% serous blister, 20% serous blister with purple base (this wound also has surrounding brown discoloration).      Palpation of the wound bed: normal      Drainage: scant only right side with drainage     Description of drainage: serosanguinous     Measurements (length x width x depth, in cm) see above      Tunneling N/A     Undermining N/A  Periwound skin: Erythema- blanchable      Color: pink and purple      Temperature: normal   Odor: none  Pain: denies   Pain intervention prior to dressing change: N/A  Treatment goal: Heal  and Protection  STATUS: initial assessment  Supplies ordered: at bedside    Pressure Injury Location: Sacrum  Last photo: 9/23/24    Wound type: Pressure Injury     Pressure Injury Stage: 4, present on admission   Wound history/plan of care:   Patient with chronic sacral pressure injury and history of debridements.  Has been using wound VAC to site.   Wound base: 65 %  pink red tissue , 30 % Slough and Adipose tissue, 5% exposed bone     Palpation of the wound bed: normal      Drainage:  large     Description of drainage: serosanguinous and purulent     Measurements (length x width x depth, in cm) 7.5  x 8  x  4 cm      Tunneling N/A     Undermining up  to 4 cm from 1-6 o'clock  Periwound skin: Erythema- blanchable      Color: pink and purple      Temperature: normal   Odor: none  Pain: mild  Pain intervention prior to dressing change: slow and gentle cares   Treatment goal: Heal , Infection control/prevention, and Remove necrotic tissue  STATUS: improving  Supplies ordered: gathered    My PI Risk Assessment     Sensory Perception: 2 - Very Limited     Moisture: 2 - Very moist      Activity: 2 - Chairfast     Mobility: 2 - Very limited     Nutrition: 2 - Probably inadequate      Friction/Shear: 2 - Potential problem      TOTAL: 12     Wound location: Left abdomen  Last photo: 9/20/24    Wound due to: Surgical Wound  Wound history/plan of care: Old colostomy site.  Patient s/p colostomy on 8/14/24 and revision on 8/25/24.    Wound base: 100 % Granulation tissue     Palpation of the wound bed: normal      Drainage: small     Description of drainage: serosanguinous     Measurements (length x width x depth, in cm): 1.5  x 1.8  x  0.7 cm      Tunneling: N/A     Undermining: N/A  Periwound skin: Rash-improving      Color: pink      Temperature: normal   Odor: none  Pain: mild  Pain interventions prior to dressing change: slow and gentle cares   Treatment goal: Heal   STATUS: improving  Supplies ordered: at bedside    Assessment of established  Colostomy:  Diagnosis Pertinent to Stoma:  Sacral wound       Surgery Date: 8/25/24  [facility]  Pouching system in place on assessment today: [place] two piece and cut to fit   Pouch barrier status: intact  Pouch last changed/wear time: 9/23/24  Reason for pouch change today: pouch not changed today-patient reports this was recently changed  Effectiveness of current pouching/ supply plan: Effective  Change made with ostomy management today: No  Supplies: at bedside  Last photo: none  Stoma location: LUQ  Stoma size: did not measure today  Stoma appearance: pink  Mucocutaneous junction:   "not visualized (barrier in place)  Peristomal complication(s): not visualized (barrier in place)  Output: semi-formed and brown  Output volume emptied during visit: 0ml  Abdominal assessment: Soft      Treatment Plan:     Bilateral heel wounds: Every shift  Float heels at all times with Prevalon boots  No dressing to left heel unless it starts draining  Right heel-cleanse with wound cleanser, cover with Mepilex 4x4, change every 3 days    Sacral wound(s): BID and prn when saturated  Cleanse with Vashe  Pack with Vashe moistened kerlix   Cover with ABD      Left abdomen wound(s): Daily   Cleanse with Vashe  Pack with Vashe moistened gauze  Cover with dry gauze    Pressure Injury Prevention (PIP) Plan:  If patient is declining pressure injury prevention interventions: Explore reason why and address patient's concerns, Educate on pressure injury risk and prevention intervention(s), and If patient is still declining, document \"informed refusal\"   Mattress: Follow bed algorithm, add Low Air Loss (Air+) mattress pump if skin is very moist or constantly moist.   HOB: Maintain at or below 30 degrees, unless contraindicated  Repositioning in bed: Every 1-2 hours  and Left/right positioning; avoid supine  Heels: Pillows under calves  Protective Dressing: None  Chair positioning: Chair cushion (#[id number])    If patient has a buttock pressure injury, or high risk for PI use chair cushion or SPS.  Moisture Management: Avoid brief in bed  Under Devices: Inspect skin under all medical devices during skin inspection , Ensure tubes are stabilized without tension, and Ensure patient is not lying on medical devices or equipment when repositioned  Ask provider to discontinue device when no longer needed.      Orders: Reviewed    RECOMMEND PRIMARY TEAM ORDER: None, at this time  Education provided: importance of repositioning, plan of care, and Off-loading pressure  Discussed plan of care with: Patient and Nurse  WOC nurse follow-up plan: " weekly  Notify [facility] if wound(s) deteriorate.  Nursing to notify the Provider(s) and re-consult the WOC Nurse if new skin concern.    DATA:     Current support surface: Standard  Standard gel mattress (Isoflex)  Containment of urine/stool: Colostomy pouch  BMI: Body mass index is 22 kg/m .   Active diet order: Orders Placed This Encounter      Minced & Moist Diet (level 5) Thin Liquids (level 0)     Output: I/O last 3 completed shifts:  In: 480 [P.O.:480]  Out: 2105 [Urine:2100; Stool:5]     Labs:   Recent Labs   Lab 09/23/24  0612 09/22/24  0629 09/21/24  0510 09/20/24  0626 09/19/24  2309   ALBUMIN  --   --   --   --  2.6*   HGB  --   --  10.5*   < > 11.8*   INR 2.31*   < > 3.94*   < >  --    WBC  --   --  14.0*   < > 16.8*    < > = values in this interval not displayed.     Pressure injury risk assessment:   Sensory Perception: 2-->very limited  Moisture: 3-->occasionally moist  Activity: 1-->bedfast  Mobility: 2-->very limited  Nutrition: 2-->probably inadequate  Friction and Shear: 1-->problem  Dilan Score: 11    Chris Lomas RN CWOCN  Contact Via [employer]oc[employer]- [facility] Nurse ([place])  Dept. Office Number: [phone]

## 2024-09-24 NOTE — PLAN OF CARE
Goal Outcome Evaluation: Pt A&O x3. Mild-moderate pain 3-4/10. Dressing to sacral wound came apart. New dressing replaced, wound care completed per plan of care. GJ tube site cleansed, dressing changed. PICC Line removed successfully; one single purple lumen removed whole measuring 47 cc. Pt tolerated removal well. Pt's urinary elimination maintained with 850 ml clear yellow output without sediments. PRN artificial salvia administered for dry mouth. Colostomy intact. Pt was repositioned q 2hrs.  PLAN: Pt will discharge with [facility]; referral sent.      Plan of Care Reviewed With: patient, child    Overall Patient Progress: no changeOverall Patient Progress: no change    Outcome Evaluation: Comfort care inititated. Pt reported mild-moderate pain in sacral wound. Pain controlled with PRN Tylenol, oxycodone, schedule voltaren gel, and repositioning.      Problem: Adult Inpatient Plan of Care  Goal: Plan of Care Review  Description: The Plan of Care Review/Shift note should be completed every shift.  The Outcome Evaluation is a brief statement about your assessment that the patient is improving, declining, or no change.  This information will be displayed automatically on your shift  note.  9/23/2024 2201 by Maylin Gonzalez, RN  Outcome: Progressing  9/23/2024 2200 by Maylin Gonzalez, RN  Outcome: Progressing  Flowsheets (Taken 9/23/2024 2200)  Plan of Care Reviewed With:   patient   child  Overall Patient Progress: no change  9/23/2024 2158 by Maylin Gonzalez, RN  Outcome: Progressing  Flowsheets (Taken 9/23/2024 2158)  Outcome Evaluation: Comfort care inititated. Pt reported mild-moderate pain in sacral wound. Pain controlled with PRN Tylenol, oxycodone, schedule voltaren gel, and repositioning.  Plan of Care Reviewed With:   patient   child  Overall Patient Progress: no change  Goal: Patient-Specific Goal (Individualized)  Description: You can add care plan individualizations to a care plan.  "Examples of Individualization might be:  \"Parent requests to be called daily at 9am for status\", \"I have a hard time hearing out of my right ear\", or \"Do not touch me to wake me up as it startles  me\".  9/23/2024 2201 by Maylin Gonzalez RN  Outcome: Not Progressing  9/23/2024 2200 by Maylin Gonzalez RN  Outcome: Not Progressing  Goal: Absence of Hospital-Acquired Illness or Injury  9/23/2024 2201 by Maylin Gonzalez RN  Outcome: Progressing  9/23/2024 2200 by Maylin Gonzalez RN  Outcome: Progressing  Intervention: Identify and Manage Fall Risk  Recent Flowsheet Documentation  Taken 9/23/2024 1619 by Maylin Gonzalez RN  Safety Promotion/Fall Prevention:   activity supervised   assistive device/personal items within reach   clutter free environment maintained   increased rounding and observation   increase visualization of patient   lighting adjusted   mobility aid in reach   nonskid shoes/slippers when out of bed   patient and family education   room organization consistent   safety round/check completed   supervised activity  Intervention: Prevent Skin Injury  Recent Flowsheet Documentation  Taken 9/23/2024 1750 by Maylin Gonzalez RN  Body Position:   turned   right  Skin Protection:   adhesive use limited   incontinence pads utilized  Device Skin Pressure Protection:   absorbent pad utilized/changed   skin-to-device areas padded   skin-to-skin areas padded   tubing/devices free from skin contact   positioning supports utilized  Taken 9/23/2024 1619 by Maylin Gonzalez RN  Body Position:   turned   left  Intervention: Prevent and Manage VTE (Venous Thromboembolism) Risk  Recent Flowsheet Documentation  Taken 9/23/2024 1619 by Maylin Gonzalez RN  VTE Prevention/Management: SCDs off (sequential compression devices)  Intervention: Prevent Infection  Recent Flowsheet Documentation  Taken 9/23/2024 1619 by Maylin Gonzalez RN  Infection Prevention:   hand hygiene promoted   personal protective equipment utilized  Goal: " Optimal Comfort and Wellbeing  9/23/2024 2201 by Maylin Gonzalez RN  Outcome: Progressing  9/23/2024 2200 by Maylin Gonzalez RN  Outcome: Progressing  Intervention: Monitor Pain and Promote Comfort  Recent Flowsheet Documentation  Taken 9/23/2024 1614 by Maylin Gonzalez RN  Pain Management Interventions: medication (see MAR)  Goal: Readiness for Transition of Care  9/23/2024 2201 by Maylin Gonzalez RN  Outcome: Progressing  9/23/2024 2200 by Maylin Gonzalez RN  Outcome: Progressing     Problem: Skin Injury Risk Increased  Goal: Skin Health and Integrity  9/23/2024 2201 by Maylin Gonzalez RN  Outcome: Not Progressing  9/23/2024 2200 by Maylin Gonzalez RN  Outcome: Not Progressing  Intervention: Plan: Nurse Driven Intervention: Positioning  Recent Flowsheet Documentation  Taken 9/23/2024 1948 by Maylin Gonzalez RN  Plan: Positioning Interventions:   REPOSITION Left/Right (No supine) q2h   OFF-LOAD HEELS with boots   HOB 30 degrees or less  Taken 9/23/2024 1750 by Maylin Gonzalez RN  Plan: Positioning Interventions:   REPOSITION Left/Right (No supine) q2h   OFF-LOAD HEELS with boots  Intervention: Plan: Nurse Driven Intervention: Moisture Management  Recent Flowsheet Documentation  Taken 9/23/2024 1619 by Maylin Gonzalez RN  Moisture Interventions:   No brief in bed   Incontinence pad   Urinary collection device  Bathing/Skin Care:   dressed/undressed   moisturizer applied   incontinence care  Intervention: Plan: Nurse Driven Intervention: Friction and Shear  Recent Flowsheet Documentation  Taken 9/23/2024 1619 by Maylin Gonzalez RN  Friction/Shear Interventions:   HOB 30 degrees or less   Silicone foam sacral dressing   Pad bony prominence (elbow pads, heel pads, ear protectors)   Assistive lifting device (portable/ceiling lift, etc.)   Seated Positioning System  Intervention: Optimize Skin Protection  Recent Flowsheet Documentation  Taken 9/23/2024 1948 by Maylin Gonzalez RN  Pressure Reduction Techniques:   heels  elevated off bed   positioned off wounds   weight shift assistance provided  Taken 9/23/2024 1750 by aMylin Gonzalez RN  Pressure Reduction Techniques:   rest period provided between sit times   heels elevated off bed   positioned off wounds   pressure points protected   weight shift assistance provided  Pressure Reduction Devices:   heel offloading device utilized   positioning supports utilized   pressure-redistributing mattress utilized  Skin Protection:   adhesive use limited   incontinence pads utilized  Head of Bed (HOB) Positioning: HOB at 30 degrees  Taken 9/23/2024 1619 by Maylin Gonzalez RN  Activity Management: activity encouraged  Head of Bed (HOB) Positioning: HOB at 30 degrees  Intervention: Promote and Optimize Oral Intake  Recent Flowsheet Documentation  Taken 9/23/2024 1619 by Maylin Gonzalez RN  Oral Nutrition Promotion: calorie-dense foods provided     Problem: Wound  Goal: Optimal Coping  9/23/2024 2201 by Maylin Gonzalez RN  Outcome: Progressing  9/23/2024 2200 by Maylin Gonzalez RN  Outcome: Not Progressing  Intervention: Support Patient and Family Response  Recent Flowsheet Documentation  Taken 9/23/2024 1619 by Maylin Gonzalez RN  Supportive Measures: active listening utilized  Goal: Optimal Functional Ability  9/23/2024 2201 by Maylin Gonzalez RN  Outcome: Not Progressing  9/23/2024 2200 by Maylin Gonzalez RN  Outcome: Not Progressing  Intervention: Optimize Functional Ability  Recent Flowsheet Documentation  Taken 9/23/2024 1619 by Maylin Gonzalez RN  Assistive Device Utilized: (lift team) other (see comments)  Activity Management: activity encouraged  Activity Assistance Provided: assistance, 2 people  Goal: Absence of Infection Signs and Symptoms  9/23/2024 2201 by Maylin Gonzalez RN  Outcome: Progressing  9/23/2024 2200 by Maylin Gonzalez RN  Outcome: Progressing  Intervention: Prevent or Manage Infection  Recent Flowsheet Documentation  Taken 9/23/2024 1619 by Maylin Gonzalez  RN  Infection Management: aseptic technique maintained  Isolation Precautions: contact precautions maintained  Goal: Improved Oral Intake  9/23/2024 2201 by Maylin Gonzalez RN  Outcome: Progressing  9/23/2024 2200 by Maylin Gonzalez RN  Outcome: Progressing  Intervention: Promote and Optimize Oral Intake  Recent Flowsheet Documentation  Taken 9/23/2024 1619 by Maylin Gonzalez RN  Oral Nutrition Promotion: calorie-dense foods provided  Goal: Optimal Pain Control and Function  9/23/2024 2201 by Maylin Gonzalez RN  Outcome: Progressing  9/23/2024 2200 by Maylin Gonzalez RN  Outcome: Progressing  Intervention: Prevent or Manage Pain  Recent Flowsheet Documentation  Taken 9/23/2024 1619 by Maylin Gonzalez RN  Sleep/Rest Enhancement:   regular sleep/rest pattern promoted   relaxation techniques promoted   comfort measures  Complementary Therapy: aromatherapy utilized  Taken 9/23/2024 1614 by Maylin Gonzalez RN  Pain Management Interventions: medication (see MAR)  Goal: Skin Health and Integrity  9/23/2024 2201 by Maylin Gonzalez RN  Outcome: Not Progressing  9/23/2024 2200 by Maylin Gonzalez RN  Outcome: Not Progressing  Intervention: Optimize Skin Protection  Recent Flowsheet Documentation  Taken 9/23/2024 1948 by Maylin Gonzalez RN  Pressure Reduction Techniques:   heels elevated off bed   positioned off wounds   weight shift assistance provided  Taken 9/23/2024 1750 by Maylin Gonzalez RN  Pressure Reduction Techniques:   rest period provided between sit times   heels elevated off bed   positioned off wounds   pressure points protected   weight shift assistance provided  Pressure Reduction Devices:   heel offloading device utilized   positioning supports utilized   pressure-redistributing mattress utilized  Skin Protection:   adhesive use limited   incontinence pads utilized  Head of Bed (HOB) Positioning: HOB at 30 degrees  Taken 9/23/2024 1619 by Maylin Gonzalez, RN  Activity Management: activity encouraged  Head of  Bed (HOB) Positioning: HOB at 30 degrees  Goal: Optimal Wound Healing  9/23/2024 2201 by Maylin Gonzalez, RN  Outcome: Not Progressing  9/23/2024 2200 by Maylin Gonzalez, RN  Outcome: Not Progressing  Intervention: Promote Wound Healing  Recent Flowsheet Documentation  Taken 9/23/2024 1619 by Maylin Gonzalez, RN  Sleep/Rest Enhancement:   regular sleep/rest pattern promoted   relaxation techniques promoted   comfort measures

## 2024-09-25 NOTE — PLAN OF CARE
"Patient is alert to self, calm, cooperative and comfortable, had pain and oxycodone was administered, assist of 2 with repositioning q2h, has a cutler catheter, colostomy, sacral wound dressing change was done at 0100. Son came to visit in the evening, slept well. Plan: Discharge on Thursday on hospice.     Goal Outcome Evaluation:      Plan of Care Reviewed With: patient    Overall Patient Progress: no changeOverall Patient Progress: no change    Outcome Evaluation: Comfort cares, resposition q2h, pain management      Problem: Adult Inpatient Plan of Care  Goal: Plan of Care Review  Description: The Plan of Care Review/Shift note should be completed every shift.  The Outcome Evaluation is a brief statement about your assessment that the patient is improving, declining, or no change.  This information will be displayed automatically on your shift  note.  Outcome: Not Progressing  Flowsheets (Taken 9/24/2024 2155)  Outcome Evaluation: Comfort cares, resposition q2h, pain management  Plan of Care Reviewed With: patient  Overall Patient Progress: no change  Goal: Patient-Specific Goal (Individualized)  Description: You can add care plan individualizations to a care plan. Examples of Individualization might be:  \"Parent requests to be called daily at 9am for status\", \"I have a hard time hearing out of my right ear\", or \"Do not touch me to wake me up as it startles  me\".  Outcome: Not Progressing  Goal: Absence of Hospital-Acquired Illness or Injury  Outcome: Not Progressing  Intervention: Identify and Manage Fall Risk  Recent Flowsheet Documentation  Taken 9/24/2024 2012 by Lilian Vanegas RN  Safety Promotion/Fall Prevention:   activity supervised   clutter free environment maintained   increased rounding and observation   increase visualization of patient   lighting adjusted   patient and family education   room organization consistent   safety round/check completed   room near nurse's station  Intervention: Prevent " Skin Injury  Recent Flowsheet Documentation  Taken 9/24/2024 2012 by Lilian Vanegas, RN  Body Position: weight shifting  Skin Protection:   adhesive use limited   incontinence pads utilized  Device Skin Pressure Protection:   absorbent pad utilized/changed   adhesive use limited   pressure points protected  Intervention: Prevent and Manage VTE (Venous Thromboembolism) Risk  Recent Flowsheet Documentation  Taken 9/24/2024 2012 by Lilian Vanegas, RN  VTE Prevention/Management: SCDs off (sequential compression devices)  Intervention: Prevent Infection  Recent Flowsheet Documentation  Taken 9/24/2024 2012 by Lilian Vanegas, RN  Infection Prevention:   cohorting utilized   hand hygiene promoted   rest/sleep promoted   single patient room provided  Goal: Optimal Comfort and Wellbeing  Outcome: Not Progressing  Goal: Readiness for Transition of Care  Outcome: Not Progressing

## 2024-09-25 NOTE — PROGRESS NOTES
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  He was hospitalized from 8/5 through 8/30 due to sepsis secondary to infected unstageable sacral pressure ulcer/wounds.  He did have ESBL in the wound and bacteremia.  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  He underwent debridement and diverting colostomy on 8/13.  He did have ARIADNE/ATN and was treated for possible aspiration pneumonia during that admission.  Long-term care was recommended, but he preferred to discharge home to his [facility] with ongoing wound care twice daily for wound VAC management.  He was discharged with a right arm PICC with IV daptomycin and IV meropenem through 9/24.  He has a  that has been concerned regarding his wounds getting worse despite antibiotics and wound VAC therapy.  The wound VAC has not been forming a seal per their report.  They sent some pictures to his outpatient ID doctor [facility] who recommended he come in for evaluation.  The patient says he feels okay in the ER.  He is hungry.  Not reporting any significant pain at this moment.  He has not been having any fevers or shaking chills.      He had previously been contemplating entering hospice care.  He did decide to change focus of care to comfort care only with discharge to hospice on 9/23.  Current plan is for discharge to hospice care on 9/26.     Unstageable sacral  pressure ulcer present on admission.  Recent diverting colostomy.  VRE and ESBL wound infections.  -Appreciate general surgery input.  -No further acute surgical intervention at this time plan.  -Appreciate infectious disease input.  -Had been on IV daptomycin and meropenem.  -Wound nurse following.  -Has now decided to transition to comfort care and discharge on hospice care.  -Antibiotics discontinued 9/23.     Dysphagia.  -Speech pathology consult appreciated.  -Dysphagia diet.     Severe Malnutrition.  -Registered dietitian consult appreciated.  -Diet supplements.     Drug-induced coagulation defect.  Previous DVT.  -INR initially mildly supratherapeutic.  -Now transitioning to comfort care.     Diabetes mellitus type 2.  -Now transitioning to comfort care.     Previous stroke.  Hypertension.  Hyperlipidemia.  Chronic heart failure with preserved ejection fraction.  -Transitioning to comfort care.     GERD.  -Continue pantoprazole 40 mg a day.     Hypophosphatemia.  -Transition to comfort care.  -Stop lab monitoring.          Diet: Snacks/Supplements Adult: Ensure Enlive; With Meals  Snacks/Supplements Adult: Magic Cup; Between Meals  Snacks/Supplements Adult: Expedite Bottle; With Meals  Minced & Moist Diet (level 5) Thin Liquids (level 0)    DVT Prophylaxis: Comfort care.  Javier Catheter: PRESENT, indication: Wound deterioration and failed external collection device  Lines: None     Cardiac Monitoring: None  Code Status: No CPR- Pre-arrest intubation OK      Clinically Significant Risk Factors              # Hypoalbuminemia: Lowest albumin = 2.4 g/dL at 9/19/2024  8:03 AM, will monitor as appropriate     # Hypertension: Noted on problem list            # Severe Malnutrition: based on nutrition assessment    # Financial/Environmental Concerns: none         Disposition Plan     Medically Ready for Discharge: Ready Now             Jasiel El DO  Hospitalist Service  [facility]  Hospital  Securely message with [employer] (more info)  Text page via [facility] Paging/Directory   ______________________________________________________________________    Interval History   Having some buttocks pain.  Denies chest pain, shortness of breath, fevers, chills, nausea, vomiting.    Physical Exam   Vital Signs: Temp: 98.1  F (36.7  C) Temp src: Temporal BP: 128/66 Pulse: 99   Resp: 16 SpO2: 96 % O2 Device: None (Room air)    Weight: 146 lbs 12.8 oz    Gen:  NAD, A&O x2 to person and place.  Has mild trouble with time.  Eyes:  PERRL, sclera anicteric.  OP:  MMM, no lesions.  Neck:  Supple.  CV:  Regular, no loud murmurs.  Lung:  CTA b/l, normal effort.  Ab:  +BS, soft.  Skin:  Warm, dry to touch.  No rash.  Ext:  No pitting edema LE b/l.      Medical Decision Making       20 MINUTES SPENT BY ME on the date of service doing chart review, history, exam, documentation & further activities per the note.      Data         Imaging results reviewed over the past 24 hrs:   No results found for this or any previous visit (from the past 24 hour(s)).

## 2024-09-25 NOTE — PROGRESS NOTES
CLINICAL NUTRITION SERVICES    Informed decision made to change pt s status to comfort care. Supplements can be discontinued if needed, but, from nutrition perspective, pt is free to keep scheduled supplements and order additional as desired. No further interventions planned at this time. RD can be consulted if needed.    RD signing off on 9/25/2024.      Lisa Cruz RD, LD  Clinical Dietitian  3rd Floor/ICU: [phone]  All Other Floors: [phone]  Weekends/Holiday: [phone]  Office: [phone]

## 2024-09-25 NOTE — PROGRESS NOTES
Care Management Follow Up    Length of Stay (days): 6    Expected Discharge Date: 09/26/2024     Concerns to be Addressed: all concerns addressed in this encounter     Patient plan of care discussed at interdisciplinary rounds: Yes    Anticipated Discharge Disposition: Hospice    Patient/family educated on Medicare website which has current facility and service quality ratings:  (family requested agency)  Education Provided on the Discharge Plan: Yes  Patient/Family in Agreement with the Plan: yes    Referrals Placed by CM/SW: Hospice, Transportation    Additional Information:  SW received call from caregiver, Nusrat.  [facility] has been declined now due to too many concerns.  They obtained [facility].  SW spoke with Jenny, [phone] ext [phone], they will admit pt Friday morning, he needs to be home by 11:00.  Sent SNF referral via Epic, received.     Transportation rescheduled for Friday, 9:38-10:23. Updated caregiver and provider.    Next Steps: send orders to hospice on discharge.    MUNA Mccallum, LICSW  Inpatient Care Coordination  [facility]  [phone]      KANDI MCCALLUM

## 2024-09-25 NOTE — CONSULTS
"SPIRITUAL HEALTH SERVICES - Consult Note  Ortho 6    Referral Source/ Reason for Visit: Staff consult for emotional support.    Summary and Recommendations -     Reflectively listened to patient state his personal preferences regarding his care.      For his remaining time at , he desires to have water, tomato soup and ice cream.    He would like his space bubble maintained.    Following this discussion; he named his fear of a \"Bad Death\"- Dying in pain and not being given honor by others.    He does, however; look forward to being reunited with his parents in heaven.  This thought is comforting and sustaining him.    He has no additional needs at this time.  He concluded, \"Thank you for sitting with me, focusing on me; and listening to me.\"    Plan: No additional needs.  [facility]S remains available.    Rev. SANDRA Enciso.  Staff     SHS available 24/7 for emergent requests/ referrals, either by paging the on-call  or by entering an ASAP/STAT consult in [facility], which will also page the on-call .      Assessment    Saw pt Franklyn Sorto regarding staff consult for emotional support.    Patient/Family Understanding of Illness and Goals of Care - Patient understands that he is nearing end of life.  He shared that he will be discharging to residential hospice 9/26/24.    Distress and Loss - Patient is fixated on the notion that he will not experience a good death.  For him, this means apologies from everyone who has wronged him.  He would also like to have his pain managed and have caregivers attentive to his personal space.    Strengths, Coping and Resources - His son has been a source of support.    Meaning, Beliefs and Spirituality - Patient is Spiritism.  He is not currently affiliated with a Hinduism.        "

## 2024-09-26 NOTE — PLAN OF CARE
Goal Outcome Evaluation:      Plan of Care Reviewed With: patient    Overall Patient Progress: no changeOverall Patient Progress: no change    Outcome Evaluation: Comfort cares. All dressings changed. Minimal pain noted. 1xprn oxy given. Frequent questioning about discharge. RN needed to explain plan to return home after all equipment installed multiple times. Colostomy producing gas. Needs venting often.      Problem: Adult Inpatient Plan of Care  Goal: Plan of Care Review  Description: The Plan of Care Review/Shift note should be completed every shift.  The Outcome Evaluation is a brief statement about your assessment that the patient is improving, declining, or no change.  This information will be displayed automatically on your shift  note.  9/26/2024 1312 by Vj Thayer RN  Outcome: Progressing  Flowsheets (Taken 9/26/2024 1312)  Outcome Evaluation: Remains comfort cares. Dressings changed. Purulent drainage noted at GJ site. Provider updated. No new orders. Continue wound cares. Patient perseverates on leaving. Frequent reassurance given. Only orders tomato soup and magic cups for meals. Staff that attempt to deviate from his preferences have issues with behaviors from patient.  Plan of Care Reviewed With: patient  Overall Patient Progress: no change  9/26/2024 1312 by Vj Thayer RN  Outcome: Progressing  Flowsheets (Taken 9/26/2024 1312)  Outcome Evaluation: Remains comfort cares. Dressings changed. Purulent drainage noted at GJ site. Provider updated. No new orders. Continue wound cares. Patient perseverates on leaving. Frequent reassurance given. Only orders tomato soup and magic cups for meals. Staff that attempt to deviate from his preferences have issues with behaviors from patient.  Plan of Care Reviewed With: patient  Overall Patient Progress: no change  Goal: Patient-Specific Goal (Individualized)  Description: You can add care plan individualizations to a care plan. Examples of  "Individualization might be:  \"Parent requests to be called daily at 9am for status\", \"I have a hard time hearing out of my right ear\", or \"Do not touch me to wake me up as it startles  me\".  9/26/2024 1312 by Vj Thayer RN  Outcome: Progressing  9/26/2024 1312 by Vj Thayer RN  Outcome: Progressing  Goal: Absence of Hospital-Acquired Illness or Injury  9/26/2024 1312 by Vj Thayer RN  Outcome: Progressing  9/26/2024 1312 by Vj Thayer RN  Outcome: Progressing  Intervention: Identify and Manage Fall Risk  Recent Flowsheet Documentation  Taken 9/26/2024 0835 by Vj Thayer RN  Safety Promotion/Fall Prevention:   safety round/check completed   room near nurse's station   room door open   patient video monitoring   nonskid shoes/slippers when out of bed   mobility aid in reach   lighting adjusted   activity supervised  Intervention: Prevent and Manage VTE (Venous Thromboembolism) Risk  Recent Flowsheet Documentation  Taken 9/26/2024 0835 by Vj Thayer RN  VTE Prevention/Management: SCDs off (sequential compression devices)  Goal: Optimal Comfort and Wellbeing  9/26/2024 1312 by Vj Thayer RN  Outcome: Progressing  9/26/2024 1312 by Vj Thayer RN  Outcome: Progressing  Intervention: Monitor Pain and Promote Comfort  Recent Flowsheet Documentation  Taken 9/26/2024 0835 by Vj Thayer RN  Pain Management Interventions: medication (see MAR)  Goal: Readiness for Transition of Care  9/26/2024 1312 by Vj Thayer RN  Outcome: Progressing  9/26/2024 1312 by Vj Thayer RN  Outcome: Progressing     Problem: Skin Injury Risk Increased  Goal: Skin Health and Integrity  9/26/2024 1312 by Vj Thayer RN  Outcome: Progressing  9/26/2024 1312 by Vj Thayer RN  Outcome: Progressing     Problem: Wound  Goal: Optimal Coping  9/26/2024 1312 by Vj Thayer RN  Outcome: Progressing  9/26/2024 1312 by Vj Thayer" RN  Outcome: Progressing  Goal: Optimal Functional Ability  9/26/2024 1312 by Vj Thayer RN  Outcome: Progressing  9/26/2024 1312 by Vj Thayer RN  Outcome: Progressing  Goal: Absence of Infection Signs and Symptoms  9/26/2024 1312 by Vj Thayer RN  Outcome: Progressing  9/26/2024 1312 by Vj Thayer RN  Outcome: Progressing  Intervention: Prevent or Manage Infection  Recent Flowsheet Documentation  Taken 9/26/2024 0835 by Vj Thayer RN  Isolation Precautions: contact precautions maintained  Goal: Improved Oral Intake  9/26/2024 1312 by Vj Thayer RN  Outcome: Progressing  9/26/2024 1312 by jV Thayer RN  Outcome: Progressing  Goal: Optimal Pain Control and Function  9/26/2024 1312 by Vj Thayer RN  Outcome: Progressing  9/26/2024 1312 by Vj Thayer RN  Outcome: Progressing  Intervention: Prevent or Manage Pain  Recent Flowsheet Documentation  Taken 9/26/2024 0835 by Vj Thayer RN  Pain Management Interventions: medication (see MAR)  Goal: Skin Health and Integrity  9/26/2024 1312 by Vj Thayer RN  Outcome: Progressing  9/26/2024 1312 by Vj Thayer RN  Outcome: Progressing  Goal: Optimal Wound Healing  9/26/2024 1312 by Vj Thayer RN  Outcome: Progressing  9/26/2024 1312 by Vj Thayer RN  Outcome: Progressing

## 2024-09-26 NOTE — PLAN OF CARE
"Goal Outcome Evaluation:      Plan of Care Reviewed With: patient    Overall Patient Progress: no changeOverall Patient Progress: no change    Outcome Evaluation: Comfort cares. All dressings changed. Minimal pain noted. 1xprn oxy given. Frequent questioning about discharge. RN needed to explain plan to return home after all equipment installed multiple times. Colostomy producing gas. Needs venting often.      Problem: Adult Inpatient Plan of Care  Goal: Plan of Care Review  Description: The Plan of Care Review/Shift note should be completed every shift.  The Outcome Evaluation is a brief statement about your assessment that the patient is improving, declining, or no change.  This information will be displayed automatically on your shift  note.  Outcome: Progressing  Flowsheets (Taken 9/25/2024 1903)  Outcome Evaluation: Comfort cares. All dressings changed. Minimal pain noted. 1xprn oxy given. Frequent questioning about discharge. RN needed to explain plan to return home after all equipment installed multiple times. Colostomy producing gas. Needs venting often.  Plan of Care Reviewed With: patient  Overall Patient Progress: no change  Goal: Patient-Specific Goal (Individualized)  Description: You can add care plan individualizations to a care plan. Examples of Individualization might be:  \"Parent requests to be called daily at 9am for status\", \"I have a hard time hearing out of my right ear\", or \"Do not touch me to wake me up as it startles  me\".  Outcome: Progressing  Goal: Absence of Hospital-Acquired Illness or Injury  Outcome: Progressing  Intervention: Identify and Manage Fall Risk  Recent Flowsheet Documentation  Taken 9/25/2024 0920 by Vj Thayer RN  Safety Promotion/Fall Prevention:   safety round/check completed   room near nurse's station   room door open   patient video monitoring   nonskid shoes/slippers when out of bed   mobility aid in reach   lighting adjusted   activity " supervised  Intervention: Prevent Skin Injury  Recent Flowsheet Documentation  Taken 9/25/2024 1300 by Vj Thayer RN  Body Position:   left   turned  Intervention: Prevent and Manage VTE (Venous Thromboembolism) Risk  Recent Flowsheet Documentation  Taken 9/25/2024 0920 by Vj Thayer RN  VTE Prevention/Management: SCDs off (sequential compression devices)  Goal: Optimal Comfort and Wellbeing  Outcome: Progressing  Intervention: Monitor Pain and Promote Comfort  Recent Flowsheet Documentation  Taken 9/25/2024 0920 by Vj Thayer RN  Pain Management Interventions: medication (see MAR)  Goal: Readiness for Transition of Care  Outcome: Progressing     Problem: Skin Injury Risk Increased  Goal: Skin Health and Integrity  Outcome: Progressing     Problem: Wound  Goal: Optimal Coping  Outcome: Progressing  Intervention: Support Patient and Family Response  Recent Flowsheet Documentation  Taken 9/25/2024 0920 by Vj Thayer RN  Supportive Measures:   active listening utilized   goal-setting facilitated   self-responsibility promoted  Goal: Optimal Functional Ability  Outcome: Progressing  Intervention: Optimize Functional Ability  Recent Flowsheet Documentation  Taken 9/25/2024 1300 by Vj Thayer RN  Activity Assistance Provided: assistance, 2 people  Goal: Absence of Infection Signs and Symptoms  Outcome: Progressing  Intervention: Prevent or Manage Infection  Recent Flowsheet Documentation  Taken 9/25/2024 0920 by Vj Thayer RN  Isolation Precautions: contact precautions maintained  Goal: Improved Oral Intake  Outcome: Progressing  Goal: Optimal Pain Control and Function  Outcome: Progressing  Intervention: Prevent or Manage Pain  Recent Flowsheet Documentation  Taken 9/25/2024 0920 by Vj Thayer RN  Pain Management Interventions: medication (see MAR)  Goal: Skin Health and Integrity  Outcome: Progressing  Goal: Optimal Wound Healing  Outcome: Progressing

## 2024-09-26 NOTE — PLAN OF CARE
"Goal Outcome Evaluation:      Plan of Care Reviewed With: patient    Overall Patient Progress: no changeOverall Patient Progress: no change         [id number]  Comfort cares. Alert to self, and sometimes situation.   Chronic cath in place. RA. Wet to moist wound care completed   Colostomy   Meds crushed   Prn oxy 5 at 2116 for pain prn tylenol for minimal pain this AM at 0632      Problem: Adult Inpatient Plan of Care  Goal: Plan of Care Review  Description: The Plan of Care Review/Shift note should be completed every shift.  The Outcome Evaluation is a brief statement about your assessment that the patient is improving, declining, or no change.  This information will be displayed automatically on your shift  note.  Outcome: Progressing  Flowsheets (Taken 9/26/2024 0456)  Plan of Care Reviewed With: patient  Overall Patient Progress: no change  Goal: Patient-Specific Goal (Individualized)  Description: You can add care plan individualizations to a care plan. Examples of Individualization might be:  \"Parent requests to be called daily at 9am for status\", \"I have a hard time hearing out of my right ear\", or \"Do not touch me to wake me up as it startles  me\".  Outcome: Progressing  Goal: Absence of Hospital-Acquired Illness or Injury  Outcome: Progressing  Intervention: Identify and Manage Fall Risk  Recent Flowsheet Documentation  Taken 9/25/2024 2300 by Mack Álvarez, RN  Safety Promotion/Fall Prevention:   safety round/check completed   room near nurse's station   room door open   patient video monitoring   nonskid shoes/slippers when out of bed   mobility aid in reach   lighting adjusted   activity supervised  Intervention: Prevent Skin Injury  Recent Flowsheet Documentation  Taken 9/26/2024 0300 by Mack Álvarez, RN  Body Position: weight shifting  Taken 9/25/2024 2300 by Mack Álvarez, RN  Body Position:   turned   left  Skin Protection:   adhesive use limited   incontinence pads utilized  Device Skin " Pressure Protection:   absorbent pad utilized/changed   adhesive use limited   pressure points protected  Taken 9/25/2024 2000 by Mack Álvarez, RN  Body Position:   turned   right  Intervention: Prevent and Manage VTE (Venous Thromboembolism) Risk  Recent Flowsheet Documentation  Taken 9/25/2024 2300 by Mack Álvarez RN  VTE Prevention/Management: SCDs off (sequential compression devices)  Intervention: Prevent Infection  Recent Flowsheet Documentation  Taken 9/25/2024 2300 by Mack Álvarez, RN  Infection Prevention:   cohorting utilized   hand hygiene promoted   rest/sleep promoted   single patient room provided  Goal: Optimal Comfort and Wellbeing  Outcome: Progressing  Intervention: Monitor Pain and Promote Comfort  Recent Flowsheet Documentation  Taken 9/25/2024 2116 by Mack Álvarez, RN  Pain Management Interventions: medication (see MAR)  Goal: Readiness for Transition of Care  Outcome: Progressing

## 2024-09-26 NOTE — PROGRESS NOTES
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  He was hospitalized from 8/5 through 8/30 due to sepsis secondary to infected unstageable sacral pressure ulcer/wounds.  He did have ESBL in the wound and bacteremia.  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  He underwent debridement and diverting colostomy on 8/13.  He did have ARIADNE/ATN and was treated for possible aspiration pneumonia during that admission.  Long-term care was recommended, but he preferred to discharge home to his [facility] with ongoing wound care twice daily for wound VAC management.  He was discharged with a right arm PICC with IV daptomycin and IV meropenem through 9/24.  He has a  that has been concerned regarding his wounds getting worse despite antibiotics and wound VAC therapy.  The wound VAC has not been forming a seal per their report.  They sent some pictures to his outpatient [facility] who recommended he come in for evaluation.  The patient says he feels okay in the ER.  He is hungry.  Not reporting any significant pain at this moment.  He has not been having any fevers or shaking chills.      He had previously been contemplating entering hospice care.  He did decide to change focus of care to comfort care only with discharge to hospice on 9/23.  Current plan is for discharge to hospice care on 9/26.     Unstageable sacral  pressure ulcer present on admission.  Recent diverting colostomy.  VRE and ESBL wound infections.  -Appreciate general surgery input.  -No further acute surgical intervention at this time plan.  -Appreciate infectious disease input.  -Had been on IV daptomycin and meropenem.  -Wound nurse following.  -Has now decided to transition to comfort care and discharge on hospice care.  -Antibiotics discontinued 9/23.     Dysphagia.  -Speech pathology consult appreciated.  -Dysphagia diet.     Severe Malnutrition.  -Registered dietitian consult appreciated.  -Diet supplements.     Drug-induced coagulation defect.  Previous DVT.  -INR initially mildly supratherapeutic.  -Now transitioning to comfort care.     Diabetes mellitus type 2.  -Now transitioning to comfort care.     Previous stroke.  Hypertension.  Hyperlipidemia.  Chronic heart failure with preserved ejection fraction.  -Transitioning to comfort care.     GERD.  -Continue pantoprazole 40 mg a day.     Hypophosphatemia.  -Transition to comfort care.  -Stop lab monitoring.                Diet: Snacks/Supplements Adult: Ensure Enlive; With Meals  Snacks/Supplements Adult: Magic Cup; Between Meals  Snacks/Supplements Adult: Expedite Bottle; With Meals  Minced & Moist Diet (level 5) Thin Liquids (level 0)    DVT Prophylaxis: Comfort care.  Javier Catheter: PRESENT, indication: Wound deterioration and failed external collection device  Lines: None     Cardiac Monitoring: None  Code Status: No CPR- Pre-arrest intubation OK      Clinically Significant Risk Factors              # Hypoalbuminemia: Lowest albumin = 2.4 g/dL at 9/19/2024  8:03 AM, will monitor as appropriate     # Hypertension: Noted on problem list            # Severe Malnutrition: based on nutrition assessment    # Financial/Environmental Concerns: none         Disposition Plan     Medically Ready for Discharge: Ready Now             Jasiel El DO  Hospitalist Service  [facility]  Hospital  Securely message with [employer] (more info)  Text page via [facility] Paging/Directory   ______________________________________________________________________    Interval History   Having buttocks pain.  Denies chest pain, shortness of breath, fevers, chills, nausea, vomiting.    Physical Exam   Vital Signs: Temp: 97.3  F (36.3  C) Temp src: Tympanic BP: 106/53 Pulse: 84   Resp: 20        Weight: 146 lbs 12.8 oz    Gen:  NAD, A&Ox2 to person and place.  Mild trouble with time.  Eyes:  PERRL, sclera anicteric.  OP:  MMM, no lesions.  Neck:  Supple.  CV:  Regular, no murmurs.  Lung:  CTA b/l, normal effort.  Ab:  +BS, soft.  Skin:  Warm, dry to touch.  No rash.  Ext:  No pitting edema LE b/l.      Medical Decision Making       20 MINUTES SPENT BY ME on the date of service doing chart review, history, exam, documentation & further activities per the note.      Data         Imaging results reviewed over the past 24 hrs:   No results found for this or any previous visit (from the past 24 hour(s)).

## 2024-09-27 NOTE — PLAN OF CARE
"Goal Outcome Evaluation:      Plan of Care Reviewed With: patient    Overall Patient Progress: no changeOverall Patient Progress: no change         [id number]  Mostly calm. Pt did become agitated and more confused around 2 Am. PRN zyprexa given at 0242 for delirium.   Colostomy producing gas. Pain controlled with prn oxy 0111. GJ tube in place. Wound cares completed. Frequent reposition while awake.  0600 continuously calling out for help, confused alert to self.  Discharge today [facility] transport scheduled for 0938-1023      Problem: Adult Inpatient Plan of Care  Goal: Plan of Care Review  Description: The Plan of Care Review/Shift note should be completed every shift.  The Outcome Evaluation is a brief statement about your assessment that the patient is improving, declining, or no change.  This information will be displayed automatically on your shift  note.  Outcome: Progressing  Flowsheets (Taken 9/27/2024 0317)  Plan of Care Reviewed With: patient  Overall Patient Progress: no change  Goal: Patient-Specific Goal (Individualized)  Description: You can add care plan individualizations to a care plan. Examples of Individualization might be:  \"Parent requests to be called daily at 9am for status\", \"I have a hard time hearing out of my right ear\", or \"Do not touch me to wake me up as it startles  me\".  Outcome: Progressing  Goal: Absence of Hospital-Acquired Illness or Injury  Outcome: Progressing  Intervention: Identify and Manage Fall Risk  Recent Flowsheet Documentation  Taken 9/27/2024 0000 by Mack Álvarez, RN  Safety Promotion/Fall Prevention:   safety round/check completed   room near nurse's station   nonskid shoes/slippers when out of bed   mobility aid in reach   clutter free environment maintained  Intervention: Prevent Skin Injury  Recent Flowsheet Documentation  Taken 9/27/2024 0111 by Mack Álvarez, RN  Body Position: weight shifting  Taken 9/27/2024 0000 by Mack Álvarez, RN  Skin " Protection:   adhesive use limited   incontinence pads utilized  Device Skin Pressure Protection:   absorbent pad utilized/changed   adhesive use limited   pressure points protected  Taken 9/26/2024 2300 by Mack Álvarez RN  Body Position:   turned   right  Intervention: Prevent and Manage VTE (Venous Thromboembolism) Risk  Recent Flowsheet Documentation  Taken 9/27/2024 0000 by Mack Álvarez RN  VTE Prevention/Management: SCDs off (sequential compression devices)  Intervention: Prevent Infection  Recent Flowsheet Documentation  Taken 9/27/2024 0000 by Mack Álvarez RN  Infection Prevention:   rest/sleep promoted   single patient room provided   hand hygiene promoted  Goal: Optimal Comfort and Wellbeing  Outcome: Progressing  Intervention: Monitor Pain and Promote Comfort  Recent Flowsheet Documentation  Taken 9/27/2024 0111 by Mack Álvarez RN  Pain Management Interventions: medication (see MAR)  Taken 9/26/2024 2106 by Mack Álvarez RN  Pain Management Interventions: medication (see MAR)  Goal: Readiness for Transition of Care  Outcome: Progressing

## 2024-09-27 NOTE — PROGRESS NOTES
"Care Management Discharge Note    Discharge Date: 09/27/2024       Discharge Disposition: Hospice    Discharge Services:      Discharge DME:      Discharge Transportation: agency    Private pay costs discussed: transportation costs    Does the patient's insurance plan have a 3 day qualifying hospital stay waiver?  No    PAS Confirmation Code:    Patient/family educated on Medicare website which has current facility and service quality ratings:  (family requested agency)    Education Provided on the Discharge Plan: Yes  Persons Notified of Discharge Plans: Caregiver Nusrat, [facility], [place]  Patient/Family in Agreement with the Plan: yes    Handoff Referral Completed: No, handoff not indicated or clinically appropriate    Additional Information:    JAI spoke with [facility] who confirms plans for today, faxed orders. JAI spoke with Caregiver Nusrat who also confirms that she is at home waiting for the pt.     Plan for return home with [facility] and 24/7 care with \"[facility]\" via stretcher between [id number] today.     Jovanna Wheat/MUNA Ellis, LGJAI  Inpatient Care Coordination  Emergency Room /Float  [phone]    Jovanna Wheat, ДМИТРИЙ     "

## 2024-09-27 NOTE — PLAN OF CARE
"Goal Outcome Evaluation:      Plan of Care Reviewed With: patient    Overall Patient Progress: no changeOverall Patient Progress: no change    Outcome Evaluation: pt is comfort cares, discharging home and will be admitted to hospice cares. discharge medications and copy of discharge given to pt. pt left with transport at 1020. sacral wound cares not done before discharge. heels had new mepilex applied new. total feed. GJ site good. pt discharged with cutler.      Problem: Adult Inpatient Plan of Care  Goal: Plan of Care Review  Description: The Plan of Care Review/Shift note should be completed every shift.  The Outcome Evaluation is a brief statement about your assessment that the patient is improving, declining, or no change.  This information will be displayed automatically on your shift  note.  Outcome: Adequate for Care Transition  Flowsheets (Taken 9/27/2024 1028)  Outcome Evaluation: pt is comfort cares, discharging home and will be admitted to hospice cares. discharge medications and copy of discharge given to pt. pt left with transport at 1020. sacral wound cares not done before discharge. heels had new mepilex applied new. total feed. GJ site good. pt discharged with cutler.  Plan of Care Reviewed With: patient  Overall Patient Progress: no change  Goal: Patient-Specific Goal (Individualized)  Description: You can add care plan individualizations to a care plan. Examples of Individualization might be:  \"Parent requests to be called daily at 9am for status\", \"I have a hard time hearing out of my right ear\", or \"Do not touch me to wake me up as it startles  me\".  Outcome: Adequate for Care Transition  Goal: Absence of Hospital-Acquired Illness or Injury  Outcome: Adequate for Care Transition  Intervention: Identify and Manage Fall Risk  Recent Flowsheet Documentation  Taken 9/27/2024 0836 by Becky Cortes, RN  Safety Promotion/Fall Prevention:   activity supervised   assistive device/personal items within " reach   clutter free environment maintained   lighting adjusted   mobility aid in reach   nonskid shoes/slippers when out of bed   patient and family education   safety round/check completed  Intervention: Prevent Skin Injury  Recent Flowsheet Documentation  Taken 9/27/2024 0842 by Becky Cortes RN  Body Position: supine, head elevated  Skin Protection: adhesive use limited  Device Skin Pressure Protection:   absorbent pad utilized/changed   adhesive use limited  Intervention: Prevent and Manage VTE (Venous Thromboembolism) Risk  Recent Flowsheet Documentation  Taken 9/27/2024 0842 by Becky Cortes RN  VTE Prevention/Management: SCDs off (sequential compression devices)  Intervention: Prevent Infection  Recent Flowsheet Documentation  Taken 9/27/2024 0842 by Becky Cortes RN  Infection Prevention:   rest/sleep promoted   environmental surveillance performed   hand hygiene promoted   single patient room provided  Goal: Optimal Comfort and Wellbeing  Outcome: Adequate for Care Transition  Intervention: Monitor Pain and Promote Comfort  Recent Flowsheet Documentation  Taken 9/27/2024 0842 by Becky Cortes RN  Pain Management Interventions:   medication (see MAR)   pain management plan reviewed with patient/caregiver  Goal: Readiness for Transition of Care  Outcome: Adequate for Care Transition     Problem: Skin Injury Risk Increased  Goal: Skin Health and Integrity  Outcome: Adequate for Care Transition  Intervention: Plan: Nurse Driven Intervention: Positioning  Recent Flowsheet Documentation  Taken 9/27/2024 0842 by Becky Cortes RN  Plan: Positioning Interventions:   REPOSITION Left/Right (No supine) q2h   OFF-LOAD HEELS with boots  Intervention: Plan: Nurse Driven Intervention: Moisture Management  Recent Flowsheet Documentation  Taken 9/27/2024 0842 by Becky Cortes RN  Moisture Interventions:   Encourage regular toileting   No brief in bed   Incontinence pad   Urinary  collection device  Intervention: Plan: Nurse Driven Intervention: Friction and Shear  Recent Flowsheet Documentation  Taken 9/27/2024 0842 by Becky Cortes RN  Friction/Shear Interventions: HOB 30 degrees or less  Intervention: Optimize Skin Protection  Recent Flowsheet Documentation  Taken 9/27/2024 0842 by Becky Cortes RN  Pressure Reduction Techniques: positioned off wounds  Pressure Reduction Devices: heel offloading device utilized  Skin Protection: adhesive use limited  Activity Management: activity adjusted per tolerance  Head of Bed (HOB) Positioning: HOB at 20 degrees     Problem: Wound  Goal: Optimal Coping  Outcome: Adequate for Care Transition  Intervention: Support Patient and Family Response  Recent Flowsheet Documentation  Taken 9/27/2024 0842 by Becky Cortes RN  Supportive Measures:   active listening utilized   goal-setting facilitated   positive reinforcement provided  Goal: Optimal Functional Ability  Outcome: Adequate for Care Transition  Intervention: Optimize Functional Ability  Recent Flowsheet Documentation  Taken 9/27/2024 0842 by Becky Cortes RN  Assistive Device Utilized: (lift) other (see comments)  Activity Management: activity adjusted per tolerance  Activity Assistance Provided: assistance, 2 people  Goal: Absence of Infection Signs and Symptoms  Outcome: Adequate for Care Transition  Intervention: Prevent or Manage Infection  Recent Flowsheet Documentation  Taken 9/27/2024 0842 by Becky Cortes RN  Infection Management: aseptic technique maintained  Isolation Precautions: contact precautions maintained  Goal: Improved Oral Intake  Outcome: Adequate for Care Transition  Goal: Optimal Pain Control and Function  Outcome: Adequate for Care Transition  Intervention: Prevent or Manage Pain  Recent Flowsheet Documentation  Taken 9/27/2024 0842 by Becky Cortes RN  Pain Management Interventions:   medication (see MAR)   pain management plan reviewed with  patient/caregiver  Sleep/Rest Enhancement: comfort measures  Complementary Therapy: aromatherapy utilized  Goal: Skin Health and Integrity  Outcome: Adequate for Care Transition  Intervention: Optimize Skin Protection  Recent Flowsheet Documentation  Taken 9/27/2024 0842 by Becky Cortes RN  Pressure Reduction Techniques: positioned off wounds  Pressure Reduction Devices: heel offloading device utilized  Skin Protection: adhesive use limited  Activity Management: activity adjusted per tolerance  Head of Bed (HOB) Positioning: HOB at 20 degrees  Goal: Optimal Wound Healing  Outcome: Adequate for Care Transition  Intervention: Promote Wound Healing  Recent Flowsheet Documentation  Taken 9/27/2024 0842 by Becky Cortes RN  Sleep/Rest Enhancement: comfort measures

## 2024-09-27 NOTE — PROGRESS NOTES
Assumed hospitalist care this AM.  Patient without new complaints.  Expressed understanding and agreement with plan for d/c to hospice today.   I have sent hospice med Rx's to pharmacy for discharge.   Patients pain controlled, continuing on PRN oxycodone which has worked well for him he states.  Full d/c summary to follow.

## 2024-09-27 NOTE — DISCHARGE SUMMARY
[facility]  Discharge Summary  Hospitalist    Date of Admission:  9/19/2024  Date of Discharge:  9/28/2024  Provider:  Asa Vaca DO, FHM    Discharge Diagnoses   Patient decision to pursue comfort care/Hospice  Severe unstageable sacral pressure wounds present on admission contributing to ongoing health declines/infections  Severe malnutrition  Additional less severe pressure wounds elsewhere present on admission    Other medical issues:  Past Medical History:   Diagnosis Date    Abnormal lateral conjugate gaze 07/11/2023    Anticoagulated on Coumadin 06/21/2024    Arteriosclerosis of coronary artery 09/19/2014    Formatting of this note might be different from the original.  Angiogram/PCI 9/19/14:  Distal RCA 90%, treated with MARÍA.  Mild LAD and LCx disease.      Aspiration pneumonia, unspecified aspiration pneumonia type, unspecified laterality, unspecified part of lung (H) 06/20/2024    Benign essential HTN 07/11/2023    Benign prostatic hyperplasia 10/20/2011    Formatting of this note might be different from the original.  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  Ulcers in lat 90's. Reflux symptoms if he misses proton pump inhibitor.      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) (H) 09/02/2015    History of CVA (cerebrovascular accident) 06/20/2024    Hypertension 07/11/2023    Formatting of this note might be different from the original.  Created by Conversion     Replacement Utility updated for latest IMO load  Formatting of this note might be different from the original.  ECG 8/18/06 sinus bradycardia otherwise normal  Stress Echo 8/24/06 Normal, sub-max HR      Long term current use of anticoagulant therapy 01/11/2013    Lumbar post-laminectomy syndrome 11/29/2012     Neck pain 11/11/2013    OA (osteoarthritis) of knee 07/11/2023    Peripheral vascular disease (H) 01/14/2013    Formatting of this note might be different from the original.  Created by Conversion      Pressure injury of sacral region, stage 3 (H) 08/05/2024    Rheumatoid arthritis (H) 07/11/2023    Formatting of this note might be different from the original.  Created by Conversion  Formatting of this note might be different from the original.  Dr. Gaxiola. 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  Antiphospholipid antibodies- positiive beta 2 glycoprotein and DRVVT confirmation x's 2 12 weeks apart 2013.  Heterozygous Leiden Factor V  Follow by Dr Oly Hunter      Transient vision disturbance, bilateral 10/19/2011    Formatting of this note might be different from the original.  Episode of decreased vision in both eyes on 10/15/2011, lasting 10-15 minutes.         History of Present Illness   Franklyn Sorto is an 89 year old male who presented with worsening wound issues.  Please see the admission history and physical for full details.    Hospital Course   Franklyn Sorto presenting on 9/19/2024.  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  He was hospitalized from 8/5 through 8/30 due to sepsis secondary to infected unstageable sacral pressure ulcer/wounds.  He did have ESBL in the wound and bacteremia.  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  He underwent debridement and diverting colostomy on 8/13.  He did have ARIADNE/ATN and was treated for possible aspiration pneumonia during that admission.  Long-term care was recommended, but he preferred to discharge home to his [facility] with ongoing wound care twice daily for wound VAC management.  He was discharged with a right arm PICC with IV daptomycin and IV meropenem through 9/24.  He has a  that has been concerned regarding his wounds getting worse despite antibiotics and wound VAC therapy.  The wound VAC has not been forming a seal per their report.  They sent some pictures to his outpatient [facility] who recommended he come in for evaluation.  Evaluation here demonstrated ongoing severe wound issues with associated infections.  Infectious disease consulted and confirmed there was no significant curative benefit with antibiotics given wound issues and wounds were not responding to extensive treatment efforts prior to admission.  The patients health has steadily been declining and he met with palliative care.  Further goal of care discussions carried out and ultimately he opted for comfort care and hospice.  Wound care was continued at a level for comfort.   Antibiotics, aggressive diabetic cares, chronic anticoagulation were stopped at his request.  Please see detailed goal of care notes for further details of these discussions and prior to admission other medical records for further details of extensive wound care history.           Significant Results and Procedures   See below    Pending Results   Unresulted Labs Ordered in the Past 30 Days of this Admission       No orders found from 8/20/2024 to 9/20/2024.            Code Status   Comfort  "Care       Primary Care Physician   Redd Sommer    Blood pressure 106/53, pulse 84, temperature 97.3  F (36.3  C), temperature source Tympanic, resp. rate 16, height 1.74 m (5' 8.5\"), weight 66.6 kg (146 lb 12.8 oz), SpO2 96%.      Discharge Disposition   Hospice    Consultations This Hospital Stay   PHARMACY TO DOSE WARFARIN  SURGERY GENERAL IP CONSULT  INFECTIOUS DISEASES IP CONSULT  WOUND OSTOMY CONTINENCE NURSE  IP CONSULT  CARE MANAGEMENT / SOCIAL WORK IP CONSULT  NUTRITION SERVICES ADULT IP CONSULT  SPEECH LANGUAGE PATH ADULT IP CONSULT  VASCULAR ACCESS ADULT IP CONSULT  PHYSICAL THERAPY ADULT IP CONSULT  OCCUPATIONAL THERAPY PEDS IP CONSULT  OCCUPATIONAL THERAPY ADULT IP CONSULT  SPIRITUAL HEALTH SERVICES IP CONSULT  CARE MANAGEMENT / SOCIAL WORK IP CONSULT  PALLIATIVE CARE ADULT IP CONSULT  SPIRITUAL HEALTH SERVICES IP CONSULT  WOUND OSTOMY CONTINENCE NURSE  IP CONSULT  SPIRITUAL HEALTH SERVICES IP CONSULT    Time Spent on this Encounter   IAsa DO, personally saw the patient today and spent greater than 30 minutes discharging this patient.    Discharge Orders      Medication Therapy Management Referral      Hospice Referral      Reason for your hospital stay    Wound     Follow-up and recommended labs and tests     Hospice enrollment     Activity    Your activity upon discharge: activity as tolerated     Brief Discharge Instructions    Bilateral heel wounds:   Float heels at all times with boots as able for comfort  No dressing to left heel unless it starts draining  Right heel-cleanse with wound cleanser, cover with Mepilex 4x4,  change every 3 days as able/patient desires    Sacral wound(s): BID and prn when saturated  Cleanse with Vashe  Pack with Vashe moistened kerlix  Cover with ABD    Left abdomen wound(s): Daily  Cleanse with Vashe  Pack with Vashe moistened gauze   Cover with dry gauze     Diet    Follow this diet upon discharge  Level 5, thins as tolerated     Discharge " Medications   Current Discharge Medication List        START taking these medications    Details   acetaminophen (TYLENOL) 650 MG suppository Place 1 suppository (650 mg) rectally every 4 hours as needed for fever.  Qty: 4 suppository, Refills: 0    Associated Diagnoses: Hospice care      atropine 1 % ophthalmic solution Take 1-2 drops by mouth, place under tongue or place inside cheek every 4 hours as needed for secretions.  Qty: 5 mL, Refills: 0    Associated Diagnoses: Hospice care      !! bisacodyl (DULCOLAX) 10 MG suppository Place 1 suppository (10 mg) rectally daily as needed for constipation.  Qty: 2 suppository, Refills: 0    Associated Diagnoses: Hospice care      haloperidol (HALDOL) 0.5 MG tablet Take 1-2 tablets (0.5-1 mg) by mouth every 6 hours as needed for agitation.  Qty: 15 tablet, Refills: 0    Associated Diagnoses: Hospice care      LORazepam (ATIVAN) 0.5 MG tablet Take 0.5-1 tablets (0.25-0.5 mg) by mouth or place under tongue every 4 hours as needed for anxiety (restlessness).  Qty: 15 tablet, Refills: 0    Associated Diagnoses: Pressure injury of sacral region, stage 4; Wound infection; Hospice care      ondansetron (ZOFRAN ODT) 4 MG ODT tab Take 1 tablet (4 mg) by mouth every 6 hours as needed for nausea or vomiting.  Qty: 15 tablet, Refills: 0    Associated Diagnoses: Hospice care      polyethylene glycol (MIRALAX) 17 GM/Dose powder Take 17 g by mouth 2 times daily as needed for constipation.    Associated Diagnoses: Hospice care      senna (SENNA LAXATIVE) 8.6 MG tablet Take 1-2 tablets by mouth 2 times daily as needed for constipation.  Qty: 100 tablet, Refills: 0    Associated Diagnoses: Hospice care       !! - Potential duplicate medications found. Please discuss with provider.        CONTINUE these medications which have CHANGED    Details   oxyCODONE (ROXICODONE) 5 MG tablet Take 1-2 tablets (5-10 mg) by mouth every 4 hours as needed for moderate to severe pain.  Qty: 30 tablet,  Refills: 0    Associated Diagnoses: Pressure injury of sacral region, stage 4; Wound infection; Hospice care           CONTINUE these medications which have NOT CHANGED    Details   acetaminophen (TYLENOL) 500 MG tablet Take 1 tablet (500 mg) by mouth every 8 hours as needed for mild pain.      allopurinol (ZYLOPRIM) 300 MG tablet Take 300 mg by mouth daily  Refills: 0      artificial saliva (BIOTENE MT) AERS spray Take 2 sprays by mouth 4 times daily      !! bisacodyl (DULCOLAX) 10 MG suppository Place 10 mg rectally daily as needed for constipation      COLCRYS 0.6 MG tablet Take 0.6 mg by mouth daily as needed  Refills: 0      diclofenac (VOLTAREN) 1 % topical gel Apply 2 g topically 4 times daily      lipase-protease-amylase (CREON 12) 42633-57610-80045 units CPEP Take 1 capsule by mouth as needed      melatonin 3 MG tablet Take 3 mg by mouth at bedtime      menthol-zinc oxide (CALMOSEPTINE) 0.44-20.6 % OINT ointment Apply topically 4 times daily as needed for skin protection Apply to sacrum/groin    Associated Diagnoses: Wound of sacral region, sequela      nitroGLYcerin (NITROSTAT) 0.4 MG sublingual tablet Place 0.4 mg under the tongue every 5 minutes as needed for chest pain For chest pain place 1 tablet under the tongue every 5 minutes for 3 doses. If symptoms persist 5 minutes after 1st dose call 911.      nystatin (MYCOSTATIN) 414784 UNIT/GM external powder Apply topically 2 times daily as needed for other      polyethylene glycol-propylene glycol (SYSTANE) 0.4-0.3 % SOLN ophthalmic solution Apply 2 drops to eye 2 times daily       !! - Potential duplicate medications found. Please discuss with provider.        STOP taking these medications       aspirin 81 MG EC tablet Comments:   Reason for Stopping:         DAPTOmycin 400 mg Comments:   Reason for Stopping:         empagliflozin (JARDIANCE) 25 MG TABS tablet Comments:   Reason for Stopping:         furosemide (LASIX) 20 MG tablet Comments:   Reason for  Stopping:         insulin aspart (NOVOLOG PEN) 100 UNIT/ML pen Comments:   Reason for Stopping:         insulin glargine (LANTUS PEN) 100 UNIT/ML pen Comments:   Reason for Stopping:         meropenem (MERREM) 1 g vial Comments:   Reason for Stopping:         metoprolol succinate ER (TOPROL XL) 25 MG 24 hr tablet Comments:   Reason for Stopping:         multivitamin w/minerals (THERA-VIT-M) tablet Comments:   Reason for Stopping:         potassium & sodium phosphates (NEUTRA-PHOS) 280-160-250 MG Packet Comments:   Reason for Stopping:         rosuvastatin (CRESTOR) 20 MG tablet Comments:   Reason for Stopping:         sacubitril-valsartan (ENTRESTO)  MG per tablet Comments:   Reason for Stopping:         vitamin D3 (CHOLECALCIFEROL) 50 mcg (2000 units) tablet Comments:   Reason for Stopping:         warfarin ANTICOAGULANT (COUMADIN) 2 MG tablet Comments:   Reason for Stopping:               Allergies   Allergies   Allergen Reactions    Clopidogrel Hives    Hydrocodone      Other reaction(s): sick to his stomach    Hydrocodone-Acetaminophen Nausea and Vomiting    Levofloxacin Other (See Comments)     Other reaction(s): tendonitis  Tendonitis right calf      Spironolactone      Other reaction(s): Hyperkalemia     Data         Recent Labs   Lab 09/23/24  1218 09/23/24  0754 09/23/24  0612 09/23/24  0229 09/22/24  0839 09/22/24  0629   POTASSIUM  --   --  4.1  --   --  3.7   * 140*  --  181*   < >  --    MAG  --   --  2.0  --   --  2.0   PHOS  --   --  2.3*  --   --  2.4*    < > = values in this interval not displayed.     Results for orders placed or performed during the hospital encounter of 09/19/24   Chest XR,  PA & LAT    Narrative    EXAM: XR CHEST 2 VIEWS  LOCATION: [facility]  DATE: 9/19/2024    INDICATION: PICC placement  COMPARISON: 8/20/2024.      Impression    IMPRESSION: The distal portion of the right PICC is partially obscured by multiple pacemaker wires. The PICC  terminates near the superior cavoatrial junction in satisfactory position. Persistent left basilar opacification may be explained by elevation of   the hemidiaphragm, pleural effusion, atelectasis, and/or airspace disease. No pneumothorax. Endovascular repair of the aortic valve. Stable cardiac silhouette. Normal pulmonary vascularity. Left subclavian pacemaker/defibrillator unchanged. Anterior   cervical spine fixation plate and screws.   XR Chest Port 1 View    Narrative    CHEST ONE VIEW  9/20/2024 12:59 PM     HISTORY: RN placed PICC - verify tip placement.    COMPARISON: September 19, 2024      Impression    IMPRESSION: Right PICC tip in the low SVC. Stable elevated left  hemidiaphragm. A cardiac implantable electronic device is in place  with lead(s) grossly intact. No abandoned leads/wires or other  unexpected metallic foreign bodies demonstrated on the film.     MELANIE KNIGHT MD         SYSTEM ID:  [id number]

## 2024-10-20 ENCOUNTER — HEALTH MAINTENANCE LETTER (OUTPATIENT)
Age: 89
End: 2024-10-20

## (undated) DEVICE — COLLECTION KIT E SWAB REG 220245

## (undated) DEVICE — ADH LIQUID MASTISOL TOPICAL VIAL 2-3ML 0523-48

## (undated) DEVICE — DRSG PRIMAPORE 04X11 3/4"

## (undated) DEVICE — SUCTION MANIFOLD NEPTUNE 2 SYS 1 PORT 702-025-000

## (undated) DEVICE — SUTURE VICRYL+ 3-0 8-18 SH/CR VLT VCP774D

## (undated) DEVICE — CUSTOM PACK LAP CHOLE SBA5BLCHEA

## (undated) DEVICE — SU VICRYL+ 0 27 UR6 VLT VCP603H

## (undated) DEVICE — PREP POVIDONE-IODINE 10% SOLUTION 4OZ BOTTLE MDS093944

## (undated) DEVICE — GLOVE UNDER INDICATOR PI SZ 7.0 LF 41670

## (undated) DEVICE — SU SILK 2-0 SH 30" K833H

## (undated) DEVICE — CUSTOM PACK GEN MAJOR SBA5BGMHEA

## (undated) DEVICE — PREP POVIDONE-IODINE 7.5% SCRUB 4OZ BOTTLE MDS093945

## (undated) DEVICE — SUCTION TIP YANKAUER W/O VENT K86

## (undated) DEVICE — LIGACLIP MEDIUM AESCULAP B2180-1

## (undated) DEVICE — SU VICRYL+ 3-0 27IN SH UND VCP416H

## (undated) DEVICE — Device

## (undated) DEVICE — SOL WATER IRRIG 1000ML BOTTLE 2F7114

## (undated) DEVICE — SU DERMABOND ADVANCED .7ML DNX12

## (undated) DEVICE — ESU GROUND PAD ADULT REM W/15' CORD E7507DB

## (undated) DEVICE — SYR 20ML LL W/O NDL 302830

## (undated) DEVICE — SUTURE PASSOR W/GUIDE RSG-14F-4-WG

## (undated) DEVICE — DRSG GAUZE 4X4" 3033

## (undated) DEVICE — PREP CHLORAPREP 26ML TINTED HI-LITE ORANGE 930815

## (undated) DEVICE — ENDO SHEARS RENEW LAP ENDOCUT SCISSOR TIP 16.5MM 3142

## (undated) DEVICE — SUCTION CANISTER MEDIVAC LINER 3000ML W/LID 65651-530

## (undated) DEVICE — TRAY PREP DRY SKIN SCRUB 067

## (undated) DEVICE — NDL INSUFFLATION 13GA 120MM C2201

## (undated) DEVICE — SU MONOCRYL+ 4-0 18IN PS2 UND MCP496G

## (undated) DEVICE — STPL LINEAR CUT 75MM TLC75

## (undated) DEVICE — ENDO TROCAR SLEEVE KII Z-THREADED 05X100MM CTS02

## (undated) DEVICE — SUTURE SILK 0 PSL 580H

## (undated) DEVICE — ESU LIGASURE MARYLAND LAPAROSCOPIC SLR/DVDR 5MMX37CM LF1937

## (undated) DEVICE — GLOVE BIOGEL PI ULTRATOUCH G SZ 8.0 42180

## (undated) DEVICE — ENDO TROCAR OPTICAL ACCESS KII Z-THRD 05X100MM CTR03

## (undated) DEVICE — DECANTER VIAL 2006S

## (undated) DEVICE — BLADE KNIFE SURG 15 371115

## (undated) DEVICE — CATH TRAY FOLEY SURESTEP 16FR DRAIN BAG STATOCK A899916

## (undated) DEVICE — TUBING SMOKE EVAC PNEUMOCLEAR HIGH FLOW 0620050250

## (undated) DEVICE — GOWN SURGICAL SMARTGOWN 2XL 89075

## (undated) DEVICE — DRAIN BLAKE 19FR SIL 2231

## (undated) DEVICE — CUSTOM PACK COLON CLOSING SBA5BCCHEA

## (undated) DEVICE — ENDO TROCAR FIRST ENTRY KII FIOS Z-THRD 12X100MM CTF73

## (undated) DEVICE — GLOVE SURG PI ULTRA TOUCH M SZ 6 LF

## (undated) DEVICE — DRAPE OR LAPAROTOMY KC 89228*

## (undated) DEVICE — STPL RELOAD REG TISSUE ECHELON 60 X 3.6MM BLUE GST60B

## (undated) DEVICE — SUTURE VICRYL+ 3-0 18 SH/CR UND VCP864

## (undated) DEVICE — DRSG ABD TNDRSRB WET PRUF 8IN X 10IN STRL  9194A

## (undated) DEVICE — SOL NACL 0.9% IRRIG 1000ML BOTTLE 2F7124

## (undated) DEVICE — GOWN XLG DISP 9545

## (undated) DEVICE — STAPLER INTERNAL ECHELON 3000 60MM STANDARD ECH60S

## (undated) DEVICE — SUTURE SILK 2-0 TIES 30IN SA85H

## (undated) DEVICE — SUTURE PDS 0 60IN TP-1+ LPED VLT PDP991G

## (undated) DEVICE — TUBE PENROSE 1/4 X 12 STRL 30414-025

## (undated) DEVICE — SYR 10ML LL W/O NDL 302995

## (undated) DEVICE — BLADE KNIFE SURG 10 371110

## (undated) DEVICE — DRSG KERLIX SUPER SPONGE 6X6.75" 2585

## (undated) DEVICE — GLOVE UNDER INDICATOR PI SZ 6.5 LF 41665

## (undated) DEVICE — DRSG DRAIN 4X4" 7086

## (undated) DEVICE — STPL SKIN 35W 6.9MM  PXW35

## (undated) DEVICE — DRSG KERLIX 4 1/2"X4YDS ROLL 6715

## (undated) DEVICE — SUTURE VICRYL+ 2-0 27IN SH UND VCP417H

## (undated) DEVICE — ESU PENCIL SMOKE EVAC W/ROCKER SWITCH 0703-047-000

## (undated) RX ORDER — FENTANYL CITRATE 50 UG/ML
INJECTION, SOLUTION INTRAMUSCULAR; INTRAVENOUS
Status: DISPENSED
Start: 2024-01-01

## (undated) RX ORDER — ONDANSETRON 2 MG/ML
INJECTION INTRAMUSCULAR; INTRAVENOUS
Status: DISPENSED
Start: 2024-01-01

## (undated) RX ORDER — PROPOFOL 10 MG/ML
INJECTION, EMULSION INTRAVENOUS
Status: DISPENSED
Start: 2024-01-01

## (undated) RX ORDER — LIDOCAINE HYDROCHLORIDE 10 MG/ML
INJECTION, SOLUTION EPIDURAL; INFILTRATION; INTRACAUDAL; PERINEURAL
Status: DISPENSED
Start: 2024-01-01

## (undated) RX ORDER — SINCALIDE 5 UG/5ML
INJECTION, POWDER, LYOPHILIZED, FOR SOLUTION INTRAVENOUS
Status: DISPENSED
Start: 2024-01-01

## (undated) RX ORDER — DIPHENHYDRAMINE HYDROCHLORIDE 50 MG/ML
INJECTION INTRAMUSCULAR; INTRAVENOUS
Status: DISPENSED
Start: 2024-01-01

## (undated) RX ORDER — EPHEDRINE SULFATE 50 MG/ML
INJECTION, SOLUTION INTRAMUSCULAR; INTRAVENOUS; SUBCUTANEOUS
Status: DISPENSED
Start: 2024-01-01

## (undated) RX ORDER — BUPIVACAINE HYDROCHLORIDE AND EPINEPHRINE 2.5; 5 MG/ML; UG/ML
INJECTION, SOLUTION INFILTRATION; PERINEURAL
Status: DISPENSED
Start: 2024-01-01

## (undated) RX ORDER — PHENYLEPHRINE HYDROCHLORIDE 10 MG/ML
INJECTION INTRAVENOUS
Status: DISPENSED
Start: 2024-01-01

## (undated) RX ORDER — DEXAMETHASONE SODIUM PHOSPHATE 10 MG/ML
INJECTION, SOLUTION INTRAMUSCULAR; INTRAVENOUS
Status: DISPENSED
Start: 2024-01-01

## (undated) RX ORDER — WATER 10 ML/10ML
INJECTION INTRAMUSCULAR; INTRAVENOUS; SUBCUTANEOUS
Status: DISPENSED
Start: 2024-01-01